# Patient Record
Sex: FEMALE | Race: WHITE | NOT HISPANIC OR LATINO | Employment: UNEMPLOYED | URBAN - METROPOLITAN AREA
[De-identification: names, ages, dates, MRNs, and addresses within clinical notes are randomized per-mention and may not be internally consistent; named-entity substitution may affect disease eponyms.]

---

## 2018-08-10 ENCOUNTER — OFFICE VISIT CONVERTED (OUTPATIENT)
Dept: FAMILY MEDICINE CLINIC | Facility: CLINIC | Age: 58
End: 2018-08-10
Attending: FAMILY MEDICINE

## 2018-08-10 ENCOUNTER — CONVERSION ENCOUNTER (OUTPATIENT)
Dept: FAMILY MEDICINE CLINIC | Facility: CLINIC | Age: 58
End: 2018-08-10

## 2018-12-31 ENCOUNTER — CONVERSION ENCOUNTER (OUTPATIENT)
Dept: FAMILY MEDICINE CLINIC | Facility: CLINIC | Age: 58
End: 2018-12-31

## 2018-12-31 ENCOUNTER — OFFICE VISIT CONVERTED (OUTPATIENT)
Dept: FAMILY MEDICINE CLINIC | Facility: CLINIC | Age: 58
End: 2018-12-31
Attending: FAMILY MEDICINE

## 2019-02-28 ENCOUNTER — OFFICE VISIT CONVERTED (OUTPATIENT)
Dept: FAMILY MEDICINE CLINIC | Facility: CLINIC | Age: 59
End: 2019-02-28
Attending: FAMILY MEDICINE

## 2019-02-28 ENCOUNTER — HOSPITAL ENCOUNTER (OUTPATIENT)
Dept: FAMILY MEDICINE CLINIC | Facility: CLINIC | Age: 59
Discharge: HOME | End: 2019-02-28
Attending: FAMILY MEDICINE

## 2019-02-28 LAB — CONV RHEUMATOID FACTOR IGM: 19 [IU]/ML (ref 0–14)

## 2019-03-01 LAB
CENTROMERE B AB SER-ACNC: <0.2 AI (ref 0–0.9)
CHROMATIN AB: <0.2 AI (ref 0–0.9)
CONV ANTI DOUBLE STRAND DNA  (REFLEX): <1 IU/ML (ref 0–9)
CONV ANTI NUCLEAR ANTIBODY WITH REFLEX: POSITIVE
CONV SS-A ANTIBODY (REFLEX): <0.2 AI (ref 0–0.9)
CONV SS-B ANTIBODY (REFLEX): <0.2 AI (ref 0–0.9)
ENA JO1 AB SER IA-ACNC: <0.2 AI (ref 0–0.9)
ENA RNP AB SER-ACNC: <0.2 AI (ref 0–0.9)
ENA SCL70 AB SER QL IA: 8 AI (ref 0–0.9)
ENA SM AB SER-ACNC: <0.2 AI (ref 0–0.9)
Lab: ABNORMAL

## 2019-03-13 ENCOUNTER — HOSPITAL ENCOUNTER (OUTPATIENT)
Dept: URGENT CARE | Facility: CLINIC | Age: 59
Discharge: HOME | End: 2019-03-13
Attending: FAMILY MEDICINE

## 2019-03-15 LAB
CONV HEPATITIS B SURFACE AG W CONFIRMATION RE: NEGATIVE
CONV HIV-1/ HIV-2: NEGATIVE
HCV AB SER DONR QL: <0.1 S/CO RATIO (ref 0–0.9)

## 2019-05-20 ENCOUNTER — CONVERSION ENCOUNTER (OUTPATIENT)
Dept: FAMILY MEDICINE CLINIC | Facility: CLINIC | Age: 59
End: 2019-05-20

## 2019-05-20 ENCOUNTER — OFFICE VISIT CONVERTED (OUTPATIENT)
Dept: FAMILY MEDICINE CLINIC | Facility: CLINIC | Age: 59
End: 2019-05-20
Attending: NURSE PRACTITIONER

## 2019-12-20 ENCOUNTER — OFFICE VISIT CONVERTED (OUTPATIENT)
Dept: FAMILY MEDICINE CLINIC | Facility: CLINIC | Age: 59
End: 2019-12-20
Attending: FAMILY MEDICINE

## 2020-02-13 ENCOUNTER — OFFICE VISIT CONVERTED (OUTPATIENT)
Dept: FAMILY MEDICINE CLINIC | Facility: CLINIC | Age: 60
End: 2020-02-13
Attending: FAMILY MEDICINE

## 2020-04-20 ENCOUNTER — TELEPHONE CONVERTED (OUTPATIENT)
Dept: FAMILY MEDICINE CLINIC | Facility: CLINIC | Age: 60
End: 2020-04-20
Attending: FAMILY MEDICINE

## 2020-04-24 ENCOUNTER — CONVERSION ENCOUNTER (OUTPATIENT)
Dept: FAMILY MEDICINE CLINIC | Facility: CLINIC | Age: 60
End: 2020-04-24

## 2020-10-09 ENCOUNTER — HOSPITAL ENCOUNTER (OUTPATIENT)
Dept: FAMILY MEDICINE CLINIC | Facility: CLINIC | Age: 60
Discharge: HOME | End: 2020-10-09
Attending: FAMILY MEDICINE

## 2020-10-09 ENCOUNTER — OFFICE VISIT CONVERTED (OUTPATIENT)
Dept: FAMILY MEDICINE CLINIC | Facility: CLINIC | Age: 60
End: 2020-10-09
Attending: FAMILY MEDICINE

## 2020-10-09 ENCOUNTER — CONVERSION ENCOUNTER (OUTPATIENT)
Dept: FAMILY MEDICINE CLINIC | Facility: CLINIC | Age: 60
End: 2020-10-09

## 2020-10-09 LAB
ALBUMIN SERPL-MCNC: 4.4 G/DL (ref 3.5–5)
ALBUMIN/GLOB SERPL: 1.2 {RATIO} (ref 1.4–2.6)
ALP SERPL-CCNC: 116 U/L (ref 53–141)
ALT SERPL-CCNC: 14 U/L (ref 10–40)
ANION GAP SERPL CALC-SCNC: 14 MMOL/L (ref 8–19)
AST SERPL-CCNC: 19 U/L (ref 15–50)
BILIRUB SERPL-MCNC: 0.65 MG/DL (ref 0.2–1.3)
BUN SERPL-MCNC: 13 MG/DL (ref 5–25)
BUN/CREAT SERPL: 17 {RATIO} (ref 6–20)
CALCIUM SERPL-MCNC: 9.8 MG/DL (ref 8.7–10.4)
CHLORIDE SERPL-SCNC: 100 MMOL/L (ref 99–111)
CONV CO2: 28 MMOL/L (ref 22–32)
CONV TOTAL PROTEIN: 8 G/DL (ref 6.3–8.2)
CREAT UR-MCNC: 0.78 MG/DL (ref 0.5–0.9)
GFR SERPLBLD BASED ON 1.73 SQ M-ARVRAT: >60 ML/MIN/{1.73_M2}
GLOBULIN UR ELPH-MCNC: 3.6 G/DL (ref 2–3.5)
GLUCOSE SERPL-MCNC: 147 MG/DL (ref 65–99)
OSMOLALITY SERPL CALC.SUM OF ELEC: 289 MOSM/KG (ref 273–304)
POTASSIUM SERPL-SCNC: 3.9 MMOL/L (ref 3.5–5.3)
SODIUM SERPL-SCNC: 138 MMOL/L (ref 135–147)

## 2021-01-12 ENCOUNTER — OFFICE VISIT CONVERTED (OUTPATIENT)
Dept: FAMILY MEDICINE CLINIC | Facility: CLINIC | Age: 61
End: 2021-01-12
Attending: FAMILY MEDICINE

## 2021-01-12 ENCOUNTER — HOSPITAL ENCOUNTER (OUTPATIENT)
Dept: FAMILY MEDICINE CLINIC | Facility: CLINIC | Age: 61
Discharge: HOME | End: 2021-01-12
Attending: FAMILY MEDICINE

## 2021-01-12 LAB
EST. AVERAGE GLUCOSE BLD GHB EST-MCNC: 160 MG/DL
HBA1C MFR BLD: 7.2 % (ref 3.5–5.7)

## 2021-01-14 LAB — SARS-COV-2 RNA SPEC QL NAA+PROBE: DETECTED

## 2021-03-11 ENCOUNTER — CONVERSION ENCOUNTER (OUTPATIENT)
Dept: FAMILY MEDICINE CLINIC | Facility: CLINIC | Age: 61
End: 2021-03-11

## 2021-03-11 ENCOUNTER — OFFICE VISIT CONVERTED (OUTPATIENT)
Dept: FAMILY MEDICINE CLINIC | Facility: CLINIC | Age: 61
End: 2021-03-11
Attending: FAMILY MEDICINE

## 2021-03-11 ENCOUNTER — HOSPITAL ENCOUNTER (OUTPATIENT)
Dept: FAMILY MEDICINE CLINIC | Facility: CLINIC | Age: 61
Discharge: HOME | End: 2021-03-11
Attending: FAMILY MEDICINE

## 2021-03-11 LAB
ALBUMIN SERPL-MCNC: 4.5 G/DL (ref 3.5–5)
ALBUMIN/GLOB SERPL: 1.5 {RATIO} (ref 1.4–2.6)
ALP SERPL-CCNC: 106 U/L (ref 53–141)
ALT SERPL-CCNC: 18 U/L (ref 10–40)
ANION GAP SERPL CALC-SCNC: 16 MMOL/L (ref 8–19)
AST SERPL-CCNC: 24 U/L (ref 15–50)
BILIRUB SERPL-MCNC: 0.25 MG/DL (ref 0.2–1.3)
BILIRUB UR QL STRIP: NEGATIVE
BUN SERPL-MCNC: 11 MG/DL (ref 5–25)
BUN/CREAT SERPL: 15 {RATIO} (ref 6–20)
CALCIUM SERPL-MCNC: 9.9 MG/DL (ref 8.7–10.4)
CHLORIDE SERPL-SCNC: 99 MMOL/L (ref 99–111)
COLOR UR: YELLOW
CONV CLARITY OF URINE: CLEAR
CONV CO2: 30 MMOL/L (ref 22–32)
CONV PROTEIN IN URINE BY AUTOMATED TEST STRIP: NORMAL
CONV TOTAL PROTEIN: 7.6 G/DL (ref 6.3–8.2)
CONV UROBILINOGEN IN URINE BY AUTOMATED TEST STRIP: NORMAL
CREAT UR-MCNC: 0.71 MG/DL (ref 0.5–0.9)
GFR SERPLBLD BASED ON 1.73 SQ M-ARVRAT: >60 ML/MIN/{1.73_M2}
GLOBULIN UR ELPH-MCNC: 3.1 G/DL (ref 2–3.5)
GLUCOSE SERPL-MCNC: 136 MG/DL (ref 65–99)
GLUCOSE UR QL: NEGATIVE
HGB UR QL STRIP: NEGATIVE
KETONES UR QL STRIP: NEGATIVE
LEUKOCYTE ESTERASE UR QL STRIP: NEGATIVE
NITRITE UR QL STRIP: NEGATIVE
OSMOLALITY SERPL CALC.SUM OF ELEC: 293 MOSM/KG (ref 273–304)
PH UR STRIP.AUTO: 6 [PH]
POTASSIUM SERPL-SCNC: 4.1 MMOL/L (ref 3.5–5.3)
SODIUM SERPL-SCNC: 141 MMOL/L (ref 135–147)
SP GR UR: NORMAL

## 2021-03-13 LAB
AMPICILLIN SUSC ISLT: >=32
AMPICILLIN+SULBAC SUSC ISLT: >=32
BACTERIA UR CULT: ABNORMAL
CEFAZOLIN SUSC ISLT: >=64
CEFEPIME SUSC ISLT: 16
CEFTAZIDIME SUSC ISLT: >=64
CEFTRIAXONE SUSC ISLT: >=64
CIPROFLOXACIN SUSC ISLT: 0.5
ERTAPENEM SUSC ISLT: <=0.12
GENTAMICIN SUSC ISLT: <=1
LEVOFLOXACIN SUSC ISLT: 1
NITROFURANTOIN SUSC ISLT: 64
PIP+TAZO SUSC ISLT: <=4
TMP SMX SUSC ISLT: >=320
TOBRAMYCIN SUSC ISLT: <=1

## 2021-05-11 ENCOUNTER — CONVERSION ENCOUNTER (OUTPATIENT)
Dept: OTHER | Facility: HOSPITAL | Age: 61
End: 2021-05-11

## 2021-05-11 ENCOUNTER — HOSPITAL ENCOUNTER (OUTPATIENT)
Dept: FAMILY MEDICINE CLINIC | Facility: CLINIC | Age: 61
Discharge: HOME | End: 2021-05-11
Attending: FAMILY MEDICINE

## 2021-05-11 ENCOUNTER — OFFICE VISIT CONVERTED (OUTPATIENT)
Dept: FAMILY MEDICINE CLINIC | Facility: CLINIC | Age: 61
End: 2021-05-11
Attending: FAMILY MEDICINE

## 2021-05-11 LAB
ALBUMIN SERPL-MCNC: 4 G/DL (ref 3.5–5)
ALBUMIN/GLOB SERPL: 1 {RATIO} (ref 1.4–2.6)
ALP SERPL-CCNC: 126 U/L (ref 53–141)
ALT SERPL-CCNC: 13 U/L (ref 10–40)
ANION GAP SERPL CALC-SCNC: 19 MMOL/L (ref 8–19)
AST SERPL-CCNC: 20 U/L (ref 15–50)
BASOPHILS # BLD AUTO: 0.14 10*3/UL (ref 0–0.2)
BASOPHILS NFR BLD AUTO: 1.2 % (ref 0–3)
BILIRUB SERPL-MCNC: 0.22 MG/DL (ref 0.2–1.3)
BUN SERPL-MCNC: 8 MG/DL (ref 5–25)
BUN/CREAT SERPL: 11 {RATIO} (ref 6–20)
CALCIUM SERPL-MCNC: 9.5 MG/DL (ref 8.7–10.4)
CHLORIDE SERPL-SCNC: 99 MMOL/L (ref 99–111)
CONV ABS IMM GRAN: 0.04 10*3/UL (ref 0–0.2)
CONV CO2: 26 MMOL/L (ref 22–32)
CONV IMMATURE GRAN: 0.4 % (ref 0–1.8)
CONV TOTAL PROTEIN: 7.9 G/DL (ref 6.3–8.2)
CREAT UR-MCNC: 0.71 MG/DL (ref 0.5–0.9)
DEPRECATED RDW RBC AUTO: 41.4 FL (ref 36.4–46.3)
EOSINOPHIL # BLD AUTO: 0.31 10*3/UL (ref 0–0.7)
EOSINOPHIL # BLD AUTO: 2.7 % (ref 0–7)
ERYTHROCYTE [DISTWIDTH] IN BLOOD BY AUTOMATED COUNT: 13.1 % (ref 11.7–14.4)
GFR SERPLBLD BASED ON 1.73 SQ M-ARVRAT: >60 ML/MIN/{1.73_M2}
GLOBULIN UR ELPH-MCNC: 3.9 G/DL (ref 2–3.5)
GLUCOSE SERPL-MCNC: 180 MG/DL (ref 65–99)
HCT VFR BLD AUTO: 48.2 % (ref 37–47)
HGB BLD-MCNC: 14.8 G/DL (ref 12–16)
LYMPHOCYTES # BLD AUTO: 3.24 10*3/UL (ref 1–5)
LYMPHOCYTES NFR BLD AUTO: 28.5 % (ref 20–45)
MCH RBC QN AUTO: 26.7 PG (ref 27–31)
MCHC RBC AUTO-ENTMCNC: 30.7 G/DL (ref 33–37)
MCV RBC AUTO: 87 FL (ref 81–99)
MONOCYTES # BLD AUTO: 0.54 10*3/UL (ref 0.2–1.2)
MONOCYTES NFR BLD AUTO: 4.7 % (ref 3–10)
NEUTROPHILS # BLD AUTO: 7.1 10*3/UL (ref 2–8)
NEUTROPHILS NFR BLD AUTO: 62.5 % (ref 30–85)
NRBC CBCN: 0 % (ref 0–0.7)
OSMOLALITY SERPL CALC.SUM OF ELEC: 291 MOSM/KG (ref 273–304)
PLATELET # BLD AUTO: 312 10*3/UL (ref 130–400)
PMV BLD AUTO: 11 FL (ref 9.4–12.3)
POTASSIUM SERPL-SCNC: 4.5 MMOL/L (ref 3.5–5.3)
RBC # BLD AUTO: 5.54 10*6/UL (ref 4.2–5.4)
SODIUM SERPL-SCNC: 139 MMOL/L (ref 135–147)
T4 FREE SERPL-MCNC: 1.1 NG/DL (ref 0.9–1.8)
TSH SERPL-ACNC: 2.06 M[IU]/L (ref 0.27–4.2)
WBC # BLD AUTO: 11.37 10*3/UL (ref 4.8–10.8)

## 2021-05-12 LAB
EST. AVERAGE GLUCOSE BLD GHB EST-MCNC: 157 MG/DL
HBA1C MFR BLD: 7.1 % (ref 3.5–5.7)

## 2021-05-12 NOTE — PROGRESS NOTES
Quick Note      Patient Name: Desiree Garcia   Patient ID: [id number]   Sex: Female   YOB: 1960    Primary Care Provider: Sandra Kaba MD   Referring Provider: Sandra Kaba MD    Visit Date: April 20, 2020    Provider: Sandra Kaba MD   Location: [facility]   Location Address: [address] [place] [address], [address] [place] [address]  [id number]   Location Phone: [phone]          History Of Present Illness  TELEHEALTH TELEPHONE VISIT  Chief Complaint: 3 month follow up   Desiree Garcia is a 60 year old /White female who is presenting for evaluation via telehealth telephone visit. Verbal consent obtained before beginning visit.   Provider spent 25 minutes with the patient during telehealth visit.   The following staff were present during this visit: Lindsay Tang   Past Medical History/Overview of Patient Symptoms     Depressionpatient reports that her depression is not well controlled since she was not able to get the Trintellix medication.  Patient reports that she was told that her insurance would not cover without prior to position from her doctor's office.  Will be receiving a prescription to see what paperwork comes through for approval.    Shoulder cystpatient reports that she has the cyst on her shoulder that is still not treated and with the antibiotic it did help medicine but she needed to have antibiotic for longer.  Patient will be coming into the office on Friday of this week for me to assess it and I&D it if necessary.               Assessment  · Major depressive disorder     296.20/F32.9  · Sebaceous cyst     706.2/L72.3    Problems Reconciled  Plan  · Orders  o Physician Telephone Evaluation, 21-30 minutes (00462) - 706.2/L72.3, 296.20/F32.9 - 04/20/2020  · Medications  o Symbicort 160-4.5 mcg/actuation inhalation HFA aerosol inhaler   SIG: inhale 2 puffs by inhalation route 2 times per day in the morning and evening for 30 days   DISP: (2) 6 gm aer w/adap  with 0 refills  Adjusted on 04/20/2020     o lisinopril 5 mg oral tablet   SIG: take 1 tablet (5 mg) by oral route once daily for 90 days   DISP: (90) tablets with 1 refills  Refilled on 04/20/2020     o Trintellix 10 mg oral tablet   SIG: take 1 tablet (10 mg) by oral route once daily at the same time each day for 30 days   DISP: (30) tablets with 2 refills  Refilled on 04/20/2020     o venlafaxine 37.5 mg oral capsule,extended release 24hr   SIG: take 1 capsule (37.5 mg) by oral route twice daily with food for 1 week then once daily with food for 1 week.   DISP: (21) capsules with 0 refills  Discontinued on 04/20/2020     o Medications have been Reconciled  o Transition of Care or Provider Policy  · Instructions  o Plan Of Care:   o Chronic conditions reviewed and taken into consideration for today's treatment plan.  o Discussed Covid-19 precautions including, but not limited to, social distancing, avoid touching your face, and hand washing.   · Disposition  o Return Visit Request in/on 4 days +/- 0 days ([id number]).            Electronically Signed by: Sandra Kaba MD -Author on April 20, 2020 02:06:00 PM

## 2021-05-13 NOTE — PROGRESS NOTES
Progress Note      Patient Name: Desiree Garcia   Patient ID: [id number]   Sex: Female   YOB: 1960    Primary Care Provider: Sandra Kaba MD   Referring Provider: Sandra Kaba MD    Visit Date: October 9, 2020    Provider: Sandra Kaba MD   Location: [facility]   Location Address: [address] [place] [address], [address] [place] [address]  [id number]   Location Phone: [phone]          Chief Complaint  · Annual Physical Exam      History Of Present Illness  Desiree Garcia is a 60 year old /White female who presents for evaluation and treatment of:      Pt has a disability form her today for food stamps.  She reports that she is currently homeless and living on the floor of her sons place.  She is tearful and says she has called the local women shelters but a lot of them have closed down due to COVID.    COPD- pt is currently supposed to be taking Symbicort but not able to always get samples.     Depression- pt is supposed to be on Trintellix but not able to give samples today so I will be sending in her prescription.    HTN- pt reports she has not been taking her Lisinopril and her bp is well controlled today.       Past Medical History  Arthritis; Cervical cancer screening; Colon cancer screening; COPD (chronic obstructive pulmonary disease); Depression with anxiety; Forgetfulness; Gastric reflux; Leg paresthesia; Lumbago/low back pain; Lumbar Spinal Stenosis; Migraines; Night sweat; Radiculopathy, lumbosacral; Reflux; SOB (shortness of breath)         Past Surgical History  Cesarian Section; Cholecystectomy; Colonoscopy; EGD; Foot surgery, right; Hand surgery         Medication List  dicyclomine 10 mg oral capsule; lisinopril 5 mg oral tablet; Symbicort 160-4.5 mcg/actuation inhalation HFA aerosol inhaler; Trintellix 10 mg oral tablet         Allergy List  NO KNOWN DRUG ALLERGIES         Family Medical History  Stroke; Heart Disease; - No Family History of  "Colorectal Cancer         Reproductive History   6 Para 3 0 0 0       Social History  Alcohol (Former); Tobacco (Current every day)         Immunizations  Name Date Admin   Influenza 10/09/2020   Influenza Refused         Review of Systems  · Constitutional  o Denies  o : fatigue, fever, weight loss, weight gain  · Eyes  o Denies  o : eye discomfort, eye pain  · HENT  o Denies  o : vertigo, nasal congestion  · Cardiovascular  o Denies  o : chest pain, irregular heart beats  · Respiratory  o Denies  o : shortness of breath, wheezing  · Gastrointestinal  o Denies  o : nausea, vomiting  · Genitourinary  o Denies  o : frequency, dysuria  · Integument  o Denies  o : rash, itching  · Neurologic  o Denies  o : muscular weakness, memory difficulties  · Musculoskeletal  o Denies  o : joint swelling, muscle pain  · Psychiatric  o Denies  o : anxiety, depression  · Heme-Lymph  o Denies  o : lightheadedness, easy bleeding  · Allergic-Immunologic  o Denies  o : sinus allergy symptoms, allergic dermatitis      Vitals  Date Time BP Position Site L\R Cuff Size HR RR TEMP (F) WT  HT  BMI kg/m2 BSA m2 O2 Sat FR L/min FiO2 HC       10/09/2020 03:21 /76 Sitting    82 - R  97.5 169lbs 2oz 5'  6\" 27.3 1.89 94 %            Physical Examination  · Constitutional  o Appearance  o : well-nourished, in no acute distress  · Eyes  o Conjunctivae  o : conjunctivae normal  o Sclerae  o : sclerae white  o Pupils and Irises  o : pupils equal, round, and reactive to light and accommodation bilaterally  o Eyelids/Ocular Adnexae  o : eyelid appearance normal, no exudates present  · Ears, Nose, Mouth and Throat  o Ears  o :   § External Ears  § : external auditory canal appearance within normal limits, no discharge present  § Otoscopic Examination  § : tympanic membrane appearance within normal limits bilaterally  o Nose  o :   § External Nose  § : appearance normal  § Nasopharynx  § : no discharge present  o Oral Cavity  o :   § Oral " Mucosa  § : oral mucosa normal  § Lips  § : lip appearance normal  o Throat  o :   § Oropharynx  § : no inflammation or lesions present, tonsils within normal limits  · Neck  o Inspection/Palpation  o : normal appearance, no masses or tenderness, trachea midline  o Range of Motion  o : cervical range of motion within normal limits  o Thyroid  o : gland size normal, nontender, no nodules or masses present on palpation  o Jugular Veins  o : JVP normal  · Respiratory  o Respiratory Effort  o : breathing unlabored  o Inspection of Chest  o : normal appearance  o Auscultation of Lungs  o : normal breath sounds throughout  · Cardiovascular  o Heart  o :   § Auscultation of Heart  § : regular rate and rhythm, no murmurs, gallops or rubs  o Peripheral Vascular System  o :   § Extremities  § : no edema  · Gastrointestinal  o Abdominal Examination  o : abdomen nontender to palpation, tone normal without rigidity or guarding, no masses present, bowel sounds present in all four quadrants  o Liver and spleen  o : no hepatomegaly present, liver nontender to palpation, spleen not palpable  o Hernias  o : no hernias present  · Lymphatic  o Neck  o : no lymphadenopathy present  · Musculoskeletal  o Spine  o :   § Inspection/Palpation  § : no spinal tenderness, scoliosis or kyphosis present  § Stability  § : no subluxations present  § Range of Motion  § : spine range of motion normal  o Right Upper Extremity  o :   § Inspection/Palpation  § : no tenderness to palpation, ROM normal  o Left Upper Extremity  o :   § Inspection/Palpation  § : no tenderness to palpation, ROM normal  o Right Lower Extremity  o :   § Inspection/Palpation  § : no joint or limb tenderness to palpation, ROM normal  o Left Lower Extremity  o :   § Inspection/Palpation  § : no joint or limb tenderness to palpation, ROM normal  · Skin and Subcutaneous Tissue  o General Inspection  o : no rashes or lesions present, no areas of discoloration  o Body Hair  o : scalp  palpation normal, hair normal for age, general body hair distribution normal for age  o Digits and Nails  o : no clubbing, cyanosis, deformities or edema present, normal appearing nails  · Neurologic  o Mental Status Examination  o :   § Orientation  § : grossly oriented to person, place and time  o Gait and Station  o : normal gait, able to stand without difficulty  · Psychiatric  o Judgement and Insight  o : judgment and insight intact  o Mood and Affect  o : mood normal, affect appropriate          Assessment  · COPD (chronic obstructive pulmonary disease)     496/J44.9  · Essential hypertension     401.9/I10  · Polyarthralgia     719.49/M25.50  · Need for influenza vaccination     V04.81/Z23      Plan  · Orders  o Fluarix QUADRIVALENT Vaccine, Syringe, Latex Free, Preservative Free, age 3+ (46784) - V04.81/Z23 - 10/09/2020   Vaccine - Influenza; Dose: 0.5; Site: Left Deltoid; Route: Intramuscular; Date: 10/09/2020 15:23:00; Exp: 06/30/2021; Lot: [id number]; Mfg: [employer]annah; TradeName: Fluarix, quadrivalent, preservative free; Administered By: Citlaly Scanlon MA; Comment: NDC: 06332-887-33 patient tolerated well  o CMP [facility] (04671) - 719.49/M25.50 - 10/09/2020  o ACO-39: Current medications updated and reviewed (1159F, ) - - 10/09/2020  o ACO-14: Influenza immunization administered or previously received [facility] () - - 10/09/2020  · Medications  o Symbicort 160-4.5 mcg/actuation inhalation HFA aerosol inhaler   SIG: inhale 2 puffs by inhalation route 2 times per day in the morning and evening for 30 days   DISP: (1) Inhaler with 11 refills  Adjusted on 10/09/2020     o Trintellix 10 mg oral tablet   SIG: take 1 tablet (10 mg) by oral route once daily at the same time each day for 30 days   DISP: (30) Tablet with 11 refills  Adjusted on 10/09/2020     o lisinopril 5 mg oral tablet   SIG: take 1 tablet (5 mg) by oral route once daily for 90 days   DISP: (90) tablets with 1 refills  Discontinued on 10/09/2020      o Medications have been Reconciled  o Transition of Care or Provider Policy  · Instructions  o Patient was educated/instructed on their diagnosis, treatment and medications prior to discharge from the clinic today.  o Minutes spent with patient including greater than 50% in Education/Counseling/Care Coordination.  o Time spent with the patient was minutes, more than 50% face to face. 25 minutes  · Disposition  o Return Visit Request in/on 3 months +/- 0 days ([id number]).            Electronically Signed by: Sandra Kaba MD -Author on October 9, 2020 04:13:18 PM

## 2021-05-14 VITALS
TEMPERATURE: 99.5 F | HEART RATE: 91 BPM | BODY MASS INDEX: 30.58 KG/M2 | HEIGHT: 66 IN | SYSTOLIC BLOOD PRESSURE: 138 MMHG | OXYGEN SATURATION: 91 % | DIASTOLIC BLOOD PRESSURE: 74 MMHG | RESPIRATION RATE: 24 BRPM | WEIGHT: 190.25 LBS

## 2021-05-14 VITALS
RESPIRATION RATE: 14 BRPM | HEART RATE: 91 BPM | WEIGHT: 182 LBS | HEIGHT: 66 IN | SYSTOLIC BLOOD PRESSURE: 144 MMHG | OXYGEN SATURATION: 98 % | DIASTOLIC BLOOD PRESSURE: 78 MMHG | TEMPERATURE: 98 F | BODY MASS INDEX: 29.25 KG/M2

## 2021-05-14 VITALS
BODY MASS INDEX: 27.18 KG/M2 | OXYGEN SATURATION: 94 % | DIASTOLIC BLOOD PRESSURE: 76 MMHG | HEIGHT: 66 IN | SYSTOLIC BLOOD PRESSURE: 136 MMHG | HEART RATE: 82 BPM | TEMPERATURE: 97.5 F | WEIGHT: 169.12 LBS
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
## 2021-05-14 NOTE — PROGRESS NOTES
Progress Note      Patient Name: Desiree Garcia   Patient ID: [id number]   Sex: Female   YOB: 1960    Primary Care Provider: Sandra Kaba MD   Referring Provider: Sandra Kaba MD    Visit Date: March 11, 2021    Provider: Sandra Kaba MD   Location: [facility]   Location Address: [address] [place] [address], [address] [place] [address]  [id number]   Location Phone: [phone]          Chief Complaint  · Follow Up      History Of Present Illness  Desiree Garcia is a 61 year old /White female who presents for evaluation and treatment of:      Pt reports she has an apartment that [employer] [place] and has rent free for 3 month then gradually increase her part to pay.  Pt needs to get a job.    Pt has left arm pain from the shoulder all the way down the arm.  Pt most likely has a flare up of her arthritis since she has some pain with range of motion.  Pt also reports a lot of back pain and leg pain which will get worse since she is going to have to walk to work before she is able to save up enough money to get a car.     DM type 2- pt was a newly diagnosed Diabetic at our last visit but she was also living in a homeless shelter.  Now patient is in an apartment and able to control her meals.   She is taking the Metformin 500mg twice a day.       Past Medical History  Arthritis; Cervical cancer screening; Colon cancer screening; COPD (chronic obstructive pulmonary disease); Depression with anxiety; Forgetfulness; Gastric reflux; Leg paresthesia; Lumbago/low back pain; Lumbar Spinal Stenosis; Migraines; Night sweat; Radiculopathy, lumbosacral; Reflux; SOB (shortness of breath)         Past Surgical History  Cesarian Section; Cholecystectomy; Colonoscopy; EGD; Foot surgery, right; Hand surgery         Medication List  dicyclomine 10 mg oral capsule; FreeStyle Lancets 28 gauge miscellaneous misc; FreeStyle Lite Meter miscellaneous kit; FreeStyle Lite Strips  "miscellaneous strip; metformin 500 mg oral tablet; Symbicort 160-4.5 mcg/actuation inhalation HFA aerosol inhaler         Allergy List  NO KNOWN DRUG ALLERGIES         Family Medical History  Stroke; Heart Disease; - No Family History of Colorectal Cancer         Reproductive History   6 Para 3 0 0 0       Social History  Alcohol (Former); Tobacco (Current every day)         Immunizations  Name Date Admin   Influenza 10/09/2020   Influenza Refused         Review of Systems  · Constitutional  o Denies  o : fatigue, fever, weight loss, weight gain  · Eyes  o Denies  o : eye discomfort, eye pain  · HENT  o Denies  o : vertigo, nasal congestion  · Cardiovascular  o Denies  o : chest pain, irregular heart beats  · Respiratory  o Denies  o : shortness of breath, wheezing  · Gastrointestinal  o Denies  o : nausea, vomiting  · Genitourinary  o Denies  o : frequency, dysuria  · Integument  o Denies  o : rash, itching  · Neurologic  o Denies  o : muscular weakness, memory difficulties  · Musculoskeletal  o Denies  o : joint swelling, muscle pain  · Psychiatric  o Denies  o : anxiety, depression  · Heme-Lymph  o Denies  o : lightheadedness, easy bleeding  · Allergic-Immunologic  o Denies  o : sinus allergy symptoms, allergic dermatitis      Vitals  Date Time BP Position Site L\R Cuff Size HR RR TEMP (F) WT  HT  BMI kg/m2 BSA m2 O2 Sat FR L/min FiO2 HC       2021 01:06 /78 Sitting    91 - R 14 98 182lbs 0oz 5'  6\" 29.38 1.96 98 %            Physical Examination  · Constitutional  o Appearance  o : well-nourished, in no acute distress  · Eyes  o Conjunctivae  o : conjunctivae normal  o Sclerae  o : sclerae white  o Pupils and Irises  o : pupils equal, round, and reactive to light and accommodation bilaterally  o Eyelids/Ocular Adnexae  o : eyelid appearance normal, no exudates present  · Ears, Nose, Mouth and Throat  o Ears  o :   § External Ears  § : external auditory canal appearance within normal limits, no " discharge present  § Otoscopic Examination  § : tympanic membrane appearance within normal limits bilaterally  o Nose  o :   § External Nose  § : appearance normal  § Nasopharynx  § : no discharge present  o Oral Cavity  o :   § Oral Mucosa  § : oral mucosa normal  § Lips  § : lip appearance normal  o Throat  o :   § Oropharynx  § : no inflammation or lesions present, tonsils within normal limits  · Neck  o Inspection/Palpation  o : normal appearance, no masses or tenderness, trachea midline  o Range of Motion  o : cervical range of motion within normal limits  o Thyroid  o : gland size normal, nontender, no nodules or masses present on palpation  o Jugular Veins  o : JVP normal  · Respiratory  o Respiratory Effort  o : breathing unlabored  o Inspection of Chest  o : normal appearance  o Auscultation of Lungs  o : normal breath sounds throughout  · Cardiovascular  o Heart  o :   § Auscultation of Heart  § : regular rate and rhythm, no murmurs, gallops or rubs  o Peripheral Vascular System  o :   § Extremities  § : no edema  · Gastrointestinal  o Abdominal Examination  o : abdomen nontender to palpation, tone normal without rigidity or guarding, no masses present, bowel sounds present in all four quadrants  o Liver and spleen  o : no hepatomegaly present, liver nontender to palpation, spleen not palpable  o Hernias  o : no hernias present  · Lymphatic  o Neck  o : no lymphadenopathy present  · Musculoskeletal  o Spine  o :   § Inspection/Palpation  § : no spinal tenderness, scoliosis or kyphosis present  § Stability  § : no subluxations present  § Range of Motion  § : spine range of motion normal  o Right Upper Extremity  o :   § Inspection/Palpation  § : no tenderness to palpation, ROM normal  o Left Upper Extremity  o :   § Inspection/Palpation  § : no tenderness to palpation, ROM normal  o Right Lower Extremity  o :   § Inspection/Palpation  § : no joint or limb tenderness to palpation, ROM normal  o Left Lower  Extremity  o :   § Inspection/Palpation  § : no joint or limb tenderness to palpation, ROM normal  · Skin and Subcutaneous Tissue  o General Inspection  o : no rashes or lesions present, no areas of discoloration  o Body Hair  o : scalp palpation normal, hair normal for age, general body hair distribution normal for age  o Digits and Nails  o : no clubbing, cyanosis, deformities or edema present, normal appearing nails  · Neurologic  o Mental Status Examination  o :   § Orientation  § : grossly oriented to person, place and time  o Gait and Station  o : normal gait, able to stand without difficulty  · Psychiatric  o Judgement and Insight  o : judgment and insight intact  o Mood and Affect  o : mood normal, affect appropriate          Results  · In-Office Procedures  o Lab procedure  § IOP - Urinalysis without Microscopy (Clinitek) [facility] (82573)   § Color Ur: Yellow   § Clarity Ur: Clear   § Glucose Ur Ql Strip: Negative   § Bilirub Ur Ql Strip: Negative   § Ketones Ur Ql Strip: Negative   § Sp Gr Ur Qn: greater than 1.030   § Hgb Ur Ql Strip: Negative   § pH Ur-LsCnc: 6.0   § Prot Ur Ql Strip: Trace   § Urobilinogen Ur Strip-mCnc: 0.2 E.U./dL   § Nitrite Ur Ql Strip: Negative   § WBC Est Ur Ql Strip: Negative       Assessment  · Diabetes mellitus, type 2     250.00/E11.9  · Osteoarthritis     715.90/M19.90  · Screening for depression     V79.0/Z13.89  · Increased urinary frequency     788.41/R35.0  · Shoulder pain, left     719.41/M25.512      Plan  · Orders  o [facility] ([id number]) - 250.00/E11.9 - 03/11/2021  o ACO-18: Positive screen for clinical depression using a standardized tool and a follow-up plan documented () - V79.0/Z13.89 - 03/11/2021  o IM - Injection Fee [facility] (89797) - - 03/11/2021  o ACO-39: Current medications updated and reviewed (, 1159F) - - 03/11/2021  o 4.00 - Toradol Injection 60mg (-6) - 719.41/M25.512 - 03/11/2021   Injection - Toradol 60 mg; Dose: 60 mg; Site: Left Hip; Route:  intramuscular; Date: 03/11/2021 17:10:04; Exp: 02/01/2022; Lot: -DK; Mfg: N/A; TradeName: ketorolac; Location: [facility]; Administered By: Lindsay Tang MA; Comment: tolerated well  o Urine Culture (Clean Catch) [facility] ([id number]) - 250.00/E11.9, 788.41/R35.0 - 03/11/2021  · Medications  o tramadol 50 mg oral tablet   SIG: take 1 tablet (50 mg) by oral route every 4-6 hours as needed   DISP: (60) Tablet with 1 refills  Prescribed on 03/11/2021     o Medications have been Reconciled  o Transition of Care or Provider Policy  · Instructions  o Depression Screen completed and scanned into the EMR under the designated folder within the patient's documents.  o Today's PHQ-9 result is 15___  o Patient was educated/instructed on their diagnosis, treatment and medications prior to discharge from the clinic today.  o Minutes spent with patient including greater than 50% in Education/Counseling/Care Coordination.  o Time spent with the patient was minutes, more than 50% face to face. 25 minutes  · Disposition  o f/u 1 month            Electronically Signed by: Sandra Kaba MD -Author on March 13, 2021 05:56:51 PM

## 2021-05-15 VITALS
TEMPERATURE: 98 F | HEIGHT: 66 IN | RESPIRATION RATE: 16 BRPM | WEIGHT: 173.12 LBS | BODY MASS INDEX: 27.82 KG/M2 | DIASTOLIC BLOOD PRESSURE: 82 MMHG | HEART RATE: 89 BPM | OXYGEN SATURATION: 95 % | SYSTOLIC BLOOD PRESSURE: 138 MMHG

## 2021-05-15 VITALS
DIASTOLIC BLOOD PRESSURE: 88 MMHG | SYSTOLIC BLOOD PRESSURE: 154 MMHG | HEART RATE: 86 BPM | TEMPERATURE: 97.7 F | OXYGEN SATURATION: 95 % | HEIGHT: 66 IN | WEIGHT: 170 LBS | BODY MASS INDEX: 27.32 KG/M2

## 2021-05-15 VITALS
SYSTOLIC BLOOD PRESSURE: 170 MMHG | HEIGHT: 66 IN | OXYGEN SATURATION: 96 % | HEART RATE: 100 BPM | WEIGHT: 176 LBS | BODY MASS INDEX: 28.28 KG/M2 | TEMPERATURE: 97.5 F | DIASTOLIC BLOOD PRESSURE: 82 MMHG | RESPIRATION RATE: 18 BRPM

## 2021-05-15 VITALS
HEART RATE: 84 BPM | RESPIRATION RATE: 16 BRPM | DIASTOLIC BLOOD PRESSURE: 84 MMHG | HEIGHT: 66 IN | TEMPERATURE: 98.1 F | WEIGHT: 168.37 LBS | OXYGEN SATURATION: 94 % | SYSTOLIC BLOOD PRESSURE: 136 MMHG | BODY MASS INDEX: 27.06 KG/M2

## 2021-05-16 VITALS
DIASTOLIC BLOOD PRESSURE: 82 MMHG | TEMPERATURE: 98.2 F | HEART RATE: 104 BPM | BODY MASS INDEX: 29.45 KG/M2 | SYSTOLIC BLOOD PRESSURE: 170 MMHG | HEIGHT: 66 IN | WEIGHT: 183.25 LBS | OXYGEN SATURATION: 96 %

## 2021-05-16 VITALS
RESPIRATION RATE: 20 BRPM | HEART RATE: 87 BPM | TEMPERATURE: 98.1 F | HEIGHT: 66 IN | OXYGEN SATURATION: 97 % | SYSTOLIC BLOOD PRESSURE: 140 MMHG | WEIGHT: 179.5 LBS | DIASTOLIC BLOOD PRESSURE: 82 MMHG | BODY MASS INDEX: 28.85 KG/M2

## 2021-05-16 VITALS
OXYGEN SATURATION: 94 % | TEMPERATURE: 98.2 F | WEIGHT: 180.5 LBS | DIASTOLIC BLOOD PRESSURE: 82 MMHG | HEIGHT: 66 IN | HEART RATE: 97 BPM | SYSTOLIC BLOOD PRESSURE: 170 MMHG | BODY MASS INDEX: 29.01 KG/M2

## 2021-06-03 ENCOUNTER — TRANSCRIBE ORDERS (OUTPATIENT)
Dept: ADMINISTRATIVE | Facility: HOSPITAL | Age: 61
End: 2021-06-03

## 2021-06-03 ENCOUNTER — CONVERSION ENCOUNTER (OUTPATIENT)
Dept: ORTHOPEDIC SURGERY | Facility: CLINIC | Age: 61
End: 2021-06-03

## 2021-06-03 ENCOUNTER — OFFICE VISIT CONVERTED (OUTPATIENT)
Dept: ORTHOPEDIC SURGERY | Facility: CLINIC | Age: 61
End: 2021-06-03
Attending: ORTHOPAEDIC SURGERY

## 2021-06-03 DIAGNOSIS — M77.8 LEFT SHOULDER TENDONITIS: Primary | ICD-10-CM

## 2021-06-05 NOTE — PROGRESS NOTES
Progress Note      Patient Name: Desiree Garcia   Patient ID: [id number]   Sex: Female   YOB: 1960    Primary Care Provider: Sandra Kaba MD   Referring Provider: Sandra Kaba MD    Visit Date: May 11, 2021    Provider: Sandra Kaba MD   Location: [facility]   Location Address: [address] [place] [address], [address]  [id number]   Location Phone: [phone]          Chief Complaint  · Diabetes Mellitus follow up      History Of Present Illness  Desiree Garcia is a 61 year old /White female who presents for evaluation and treatment of:      Lower Extremity Edema- Pt reports her right leg swelled up bad and hurts all the way down her feet.    DM type 2- pt last A1C was 7.2.  Pt reports she has been compliant with taking her metformin.     Pt reports she will be going up for exam and evaluation for disability.    She is currently working with a company to find her an apartment because she was previously homeless.    Left Shoulder Pain- pt reports she has some way injured her left shoulder and has a difficult time raising her arm above her head.  I worry about a possible rotator cuff injury.       Past Medical History  Arthritis; Cervical cancer screening; Colon cancer screening; COPD (chronic obstructive pulmonary disease); Depression with anxiety; Forgetfulness; Gastric reflux; Leg paresthesia; Lumbago/low back pain; Lumbar spinal stenosis; Migraines; Night sweat; Radiculopathy, lumbosacral; Reflux; SOB (shortness of breath)         Past Surgical History  Cesarian Section; Cholecystectomy; Colonoscopy; EGD; Foot surgery, right; Hand surgery         Medication List  dicyclomine 10 mg oral capsule; FreeStyle Lancets 28 gauge miscellaneous misc; FreeStyle Lite Meter miscellaneous kit; FreeStyle Lite Strips miscellaneous strip; metformin 500 mg oral tablet; Symbicort 160-4.5 mcg/actuation inhalation HFA aerosol inhaler; tramadol 50 mg oral tablet  "        Allergy List  NO KNOWN DRUG ALLERGIES       Allergies Reconciled  Family Medical History  Stroke; Heart Disease; - No Family History of Colorectal Cancer         Reproductive History   6 Para 3 0 0 0       Social History  Alcohol (Former); Tobacco (Current every day)         Immunizations  Name Date Admin   Influenza 10/09/2020   Influenza Refused         Review of Systems  · Constitutional  o Denies  o : fatigue, fever, weight loss, weight gain  · Eyes  o Denies  o : eye discomfort, eye pain  · HENT  o Denies  o : vertigo, nasal congestion  · Cardiovascular  o Admits  o : lower extremity edema  o Denies  o : chest pain  · Respiratory  o Denies  o : shortness of breath, wheezing  · Gastrointestinal  o Denies  o : nausea, vomiting  · Genitourinary  o Denies  o : frequency, dysuria  · Integument  o Denies  o : rash, itching  · Neurologic  o Denies  o : muscular weakness, memory difficulties  · Musculoskeletal  o Admits  o : shoulder pain  o Denies  o : joint swelling, muscle pain  · Psychiatric  o Denies  o : anxiety, depression  · Heme-Lymph  o Denies  o : lightheadedness, easy bleeding  · Allergic-Immunologic  o Denies  o : sinus allergy symptoms, allergic dermatitis      Vitals  Date Time BP Position Site L\R Cuff Size HR RR TEMP (F) WT  HT  BMI kg/m2 BSA m2 O2 Sat FR L/min FiO2 HC       2021 01:24 /70 Sitting    91 - R  98.2 179lbs 0oz 5'  6\" 28.89 1.94 95 %            Physical Examination  · Constitutional  o Appearance  o : well-nourished, in no acute distress  · Eyes  o Conjunctivae  o : conjunctivae normal  o Sclerae  o : sclerae white  o Pupils and Irises  o : pupils equal, round, and reactive to light and accommodation bilaterally  o Eyelids/Ocular Adnexae  o : eyelid appearance normal, no exudates present  · Ears, Nose, Mouth and Throat  o Ears  o :   § External Ears  § : external auditory canal appearance within normal limits, no discharge present  § Otoscopic Examination  § : " tympanic membrane appearance within normal limits bilaterally  o Nose  o :   § External Nose  § : appearance normal  § Nasopharynx  § : no discharge present  o Oral Cavity  o :   § Oral Mucosa  § : oral mucosa normal  § Lips  § : lip appearance normal  o Throat  o :   § Oropharynx  § : no inflammation or lesions present, tonsils within normal limits  · Neck  o Inspection/Palpation  o : normal appearance, no masses or tenderness, trachea midline  o Range of Motion  o : cervical range of motion within normal limits  o Thyroid  o : gland size normal, nontender, no nodules or masses present on palpation  o Jugular Veins  o : JVP normal  · Respiratory  o Respiratory Effort  o : breathing unlabored  o Inspection of Chest  o : normal appearance  o Auscultation of Lungs  o : normal breath sounds throughout  · Cardiovascular  o Heart  o :   § Auscultation of Heart  § : regular rate and rhythm, no murmurs, gallops or rubs  o Peripheral Vascular System  o :   § Extremities  § : no edema  · Gastrointestinal  o Abdominal Examination  o : abdomen nontender to palpation, tone normal without rigidity or guarding, no masses present, bowel sounds present in all four quadrants  o Liver and spleen  o : no hepatomegaly present, liver nontender to palpation, spleen not palpable  o Hernias  o : no hernias present  · Lymphatic  o Neck  o : no lymphadenopathy present  · Musculoskeletal  o Spine  o :   § Inspection/Palpation  § : no spinal tenderness, scoliosis or kyphosis present  § Stability  § : no subluxations present  § Range of Motion  § : spine range of motion normal  o Right Upper Extremity  o :   § Inspection/Palpation  § : no tenderness to palpation, ROM normal  o Left Upper Extremity  o :   § Inspection/Palpation  § : tenderness to palpation present   o Right Lower Extremity  o :   § Inspection/Palpation  § : no joint or limb tenderness to palpation, ROM normal  o Left Lower Extremity  o :   § Inspection/Palpation  § : no joint or  limb tenderness to palpation, ROM normal  · Skin and Subcutaneous Tissue  o General Inspection  o : no rashes or lesions present, no areas of discoloration  o Body Hair  o : scalp palpation normal, hair normal for age, general body hair distribution normal for age  o Digits and Nails  o : no clubbing, cyanosis, deformities or edema present, normal appearing nails  · Neurologic  o Mental Status Examination  o :   § Orientation  § : grossly oriented to person, place and time  o Gait and Station  o : normal gait, able to stand without difficulty  · Psychiatric  o Judgement and Insight  o : judgment and insight intact  o Mood and Affect  o : mood normal, affect appropriate          Assessment  · Diabetes mellitus, type 2     250.00/E11.9  · Fatigue     780.79/R53.83  · Shoulder pain, left     719.41/M25.512      Plan  · Orders  o CBC with Auto Diff [facility] ([id number]) - 250.00/E11.9, 780.79/R53.83 - 05/11/2021  o CMP [facility] (45401) - 250.00/E11.9 - 05/11/2021  o Hgb A1c [facility] (27827) - 250.00/E11.9 - 05/11/2021  o Thyroid Profile (THYII, 91977, 27910) - 250.00/E11.9 - 05/11/2021  o ACO-39: Current medications updated and reviewed (1159F, ) - - 05/11/2021  o ORTHOPEDIC CONSULTATION (ORTHO) - 719.41/M25.512 - 05/11/2021  · Medications  o tramadol 50 mg oral tablet   SIG: take 1 tablet (50 mg) by oral route every 4-6 hours as needed   DISP: (60) Tablet with 1 refills  Refilled on 05/11/2021     o Medications have been Reconciled  o Transition of Care or Provider Policy  · Instructions  o Patient was educated/instructed on their diagnosis, treatment and medications prior to discharge from the clinic today.  o Minutes spent with patient including greater than 50% in Education/Counseling/Care Coordination.  o Time spent with the patient was minutes, more than 50% face to face. 25 minutes  · Disposition  o Return Visit Request in/on 3 months +/- 0 days ([id number]).            Electronically Signed by: Sandra Kaba MD -Author on June  2, 2021 02:48:12 PM

## 2021-06-14 ENCOUNTER — HOSPITAL ENCOUNTER (INPATIENT)
Facility: HOSPITAL | Age: 61
LOS: 3 days | Discharge: HOME | End: 2021-06-17
Attending: EMERGENCY MEDICINE | Admitting: INTERNAL MEDICINE

## 2021-06-14 ENCOUNTER — APPOINTMENT (OUTPATIENT)
Dept: GENERAL RADIOLOGY | Facility: HOSPITAL | Age: 61
End: 2021-06-14

## 2021-06-14 DIAGNOSIS — I21.11 STEMI INVOLVING RIGHT CORONARY ARTERY (HCC): ICD-10-CM

## 2021-06-14 DIAGNOSIS — E11.9 TYPE 2 DIABETES MELLITUS WITHOUT COMPLICATION, WITHOUT LONG-TERM CURRENT USE OF INSULIN (HCC): ICD-10-CM

## 2021-06-14 DIAGNOSIS — I20.0 UNSTABLE ANGINA PECTORIS (HCC): ICD-10-CM

## 2021-06-14 DIAGNOSIS — I21.3 ST ELEVATION MYOCARDIAL INFARCTION (STEMI), UNSPECIFIED ARTERY (HCC): Primary | ICD-10-CM

## 2021-06-14 LAB
ALBUMIN SERPL-MCNC: 4.1 G/DL (ref 3.5–5.2)
ALBUMIN/GLOB SERPL: 1.3 G/DL
ALP SERPL-CCNC: 135 U/L (ref 39–117)
ALT SERPL W P-5'-P-CCNC: 17 U/L (ref 1–33)
ANION GAP SERPL CALCULATED.3IONS-SCNC: 11.5 MMOL/L (ref 5–15)
ANION GAP SERPL CALCULATED.3IONS-SCNC: 13 MMOL/L (ref 5–15)
AST SERPL-CCNC: 27 U/L (ref 1–32)
BASOPHILS # BLD AUTO: 0.15 10*3/MM3 (ref 0–0.2)
BASOPHILS NFR BLD AUTO: 0.8 % (ref 0–1.5)
BILIRUB SERPL-MCNC: 0.4 MG/DL (ref 0–1.2)
BUN SERPL-MCNC: 14 MG/DL (ref 8–23)
BUN SERPL-MCNC: 16 MG/DL (ref 8–23)
BUN/CREAT SERPL: 22.6 (ref 7–25)
BUN/CREAT SERPL: 23.2 (ref 7–25)
CALCIUM SPEC-SCNC: 9 MG/DL (ref 8.6–10.5)
CALCIUM SPEC-SCNC: 9.9 MG/DL (ref 8.6–10.5)
CHLORIDE SERPL-SCNC: 93 MMOL/L (ref 98–107)
CHLORIDE SERPL-SCNC: 95 MMOL/L (ref 98–107)
CHOLEST SERPL-MCNC: 160 MG/DL (ref 0–200)
CK MB SERPL-CCNC: 28.34 NG/ML
CK SERPL-CCNC: 241 U/L (ref 20–180)
CK SERPL-CCNC: 511 U/L (ref 20–180)
CO2 SERPL-SCNC: 24.5 MMOL/L (ref 22–29)
CO2 SERPL-SCNC: 27 MMOL/L (ref 22–29)
CREAT SERPL-MCNC: 0.62 MG/DL (ref 0.57–1)
CREAT SERPL-MCNC: 0.69 MG/DL (ref 0.57–1)
DEPRECATED RDW RBC AUTO: 42.1 FL (ref 37–54)
DEPRECATED RDW RBC AUTO: 42.5 FL (ref 37–54)
EOSINOPHIL # BLD AUTO: 0.27 10*3/MM3 (ref 0–0.4)
EOSINOPHIL NFR BLD AUTO: 1.5 % (ref 0.3–6.2)
ERYTHROCYTE [DISTWIDTH] IN BLOOD BY AUTOMATED COUNT: 13.8 % (ref 12.3–15.4)
ERYTHROCYTE [DISTWIDTH] IN BLOOD BY AUTOMATED COUNT: 13.8 % (ref 12.3–15.4)
GFR SERPL CREATININE-BSD FRML MDRD: 87 ML/MIN/1.73
GFR SERPL CREATININE-BSD FRML MDRD: 98 ML/MIN/1.73
GLOBULIN UR ELPH-MCNC: 3.2 GM/DL
GLUCOSE BLDC GLUCOMTR-MCNC: 148 MG/DL (ref 70–130)
GLUCOSE BLDC GLUCOMTR-MCNC: 164 MG/DL (ref 70–130)
GLUCOSE BLDC GLUCOMTR-MCNC: 167 MG/DL (ref 70–130)
GLUCOSE BLDC GLUCOMTR-MCNC: 173 MG/DL (ref 70–130)
GLUCOSE SERPL-MCNC: 159 MG/DL (ref 65–99)
GLUCOSE SERPL-MCNC: 232 MG/DL (ref 65–99)
HBA1C MFR BLD: 7.19 % (ref 4.8–5.6)
HCT VFR BLD AUTO: 45.9 % (ref 34–46.6)
HCT VFR BLD AUTO: 46.2 % (ref 34–46.6)
HDLC SERPL-MCNC: 48 MG/DL (ref 40–60)
HGB BLD-MCNC: 14.7 G/DL (ref 12–15.9)
HGB BLD-MCNC: 14.9 G/DL (ref 12–15.9)
HOLD SPECIMEN: NORMAL
IMM GRANULOCYTES # BLD AUTO: 0.13 10*3/MM3 (ref 0–0.05)
IMM GRANULOCYTES NFR BLD AUTO: 0.7 % (ref 0–0.5)
INR PPP: 0.86 (ref 2–3)
LDLC SERPL CALC-MCNC: 72 MG/DL (ref 0–100)
LDLC/HDLC SERPL: 1.3 {RATIO}
LYMPHOCYTES # BLD AUTO: 4.21 10*3/MM3 (ref 0.7–3.1)
LYMPHOCYTES NFR BLD AUTO: 23.5 % (ref 19.6–45.3)
MAGNESIUM SERPL-MCNC: 2.1 MG/DL (ref 1.6–2.4)
MAGNESIUM SERPL-MCNC: 2.1 MG/DL (ref 1.6–2.4)
MCH RBC QN AUTO: 27.1 PG (ref 26.6–33)
MCH RBC QN AUTO: 27.2 PG (ref 26.6–33)
MCHC RBC AUTO-ENTMCNC: 32 G/DL (ref 31.5–35.7)
MCHC RBC AUTO-ENTMCNC: 32.3 G/DL (ref 31.5–35.7)
MCV RBC AUTO: 84.3 FL (ref 79–97)
MCV RBC AUTO: 84.5 FL (ref 79–97)
MONOCYTES # BLD AUTO: 1.44 10*3/MM3 (ref 0.1–0.9)
MONOCYTES NFR BLD AUTO: 8 % (ref 5–12)
NEUTROPHILS NFR BLD AUTO: 11.75 10*3/MM3 (ref 1.7–7)
NEUTROPHILS NFR BLD AUTO: 65.5 % (ref 42.7–76)
NRBC BLD AUTO-RTO: 0 /100 WBC (ref 0–0.2)
NT-PROBNP SERPL-MCNC: 236.2 PG/ML (ref 0–900)
PHOSPHATE SERPL-MCNC: 3.6 MG/DL (ref 2.5–4.5)
PLAT MORPH BLD: NORMAL
PLATELET # BLD AUTO: 266 10*3/MM3 (ref 140–450)
PLATELET # BLD AUTO: 280 10*3/MM3 (ref 140–450)
PMV BLD AUTO: 10.3 FL (ref 6–12)
PMV BLD AUTO: 9.9 FL (ref 6–12)
POTASSIUM SERPL-SCNC: 4.1 MMOL/L (ref 3.5–5.2)
POTASSIUM SERPL-SCNC: 4.3 MMOL/L (ref 3.5–5.2)
PROT SERPL-MCNC: 7.3 G/DL (ref 6–8.5)
PROTHROMBIN TIME: 9.7 SECONDS (ref 9.4–12)
QT INTERVAL: 344 MS
RBC # BLD AUTO: 5.43 10*6/MM3 (ref 3.77–5.28)
RBC # BLD AUTO: 5.48 10*6/MM3 (ref 3.77–5.28)
RBC MORPH BLD: NORMAL
SODIUM SERPL-SCNC: 131 MMOL/L (ref 136–145)
SODIUM SERPL-SCNC: 133 MMOL/L (ref 136–145)
TRIGL SERPL-MCNC: 249 MG/DL (ref 0–150)
TROPONIN T SERPL-MCNC: 0.11 NG/ML (ref 0–0.03)
TROPONIN T SERPL-MCNC: 0.51 NG/ML (ref 0–0.03)
TSH SERPL DL<=0.05 MIU/L-ACNC: 5.66 UIU/ML (ref 0.27–4.2)
VLDLC SERPL-MCNC: 40 MG/DL (ref 5–40)
WBC # BLD AUTO: 17.95 10*3/MM3 (ref 3.4–10.8)
WBC # BLD AUTO: 19.79 10*3/MM3 (ref 3.4–10.8)
WBC MORPH BLD: NORMAL

## 2021-06-14 PROCEDURE — 85007 BL SMEAR W/DIFF WBC COUNT: CPT | Performed by: EMERGENCY MEDICINE

## 2021-06-14 PROCEDURE — C1874 STENT, COATED/COV W/DEL SYS: HCPCS | Performed by: INTERNAL MEDICINE

## 2021-06-14 PROCEDURE — 25010000002 MORPHINE PER 10 MG: Performed by: INTERNAL MEDICINE

## 2021-06-14 PROCEDURE — 25010000002 ONDANSETRON PER 1 MG: Performed by: EMERGENCY MEDICINE

## 2021-06-14 PROCEDURE — 25010000002 CANGRELOR TETRASODIUM 50 MG RECONSTITUTED SOLUTION 1 EACH VIAL: Performed by: INTERNAL MEDICINE

## 2021-06-14 PROCEDURE — 93005 ELECTROCARDIOGRAM TRACING: CPT | Performed by: EMERGENCY MEDICINE

## 2021-06-14 PROCEDURE — C1725 CATH, TRANSLUMIN NON-LASER: HCPCS | Performed by: INTERNAL MEDICINE

## 2021-06-14 PROCEDURE — 85610 PROTHROMBIN TIME: CPT | Performed by: EMERGENCY MEDICINE

## 2021-06-14 PROCEDURE — 92941 PRQ TRLML REVSC TOT OCCL AMI: CPT | Performed by: INTERNAL MEDICINE

## 2021-06-14 PROCEDURE — C1769 GUIDE WIRE: HCPCS | Performed by: INTERNAL MEDICINE

## 2021-06-14 PROCEDURE — 94799 UNLISTED PULMONARY SVC/PX: CPT

## 2021-06-14 PROCEDURE — 83735 ASSAY OF MAGNESIUM: CPT | Performed by: INTERNAL MEDICINE

## 2021-06-14 PROCEDURE — 25010000002 HEPARIN (PORCINE) PER 1000 UNITS: Performed by: INTERNAL MEDICINE

## 2021-06-14 PROCEDURE — C1887 CATHETER, GUIDING: HCPCS | Performed by: INTERNAL MEDICINE

## 2021-06-14 PROCEDURE — 84484 ASSAY OF TROPONIN QUANT: CPT | Performed by: INTERNAL MEDICINE

## 2021-06-14 PROCEDURE — 99153 MOD SED SAME PHYS/QHP EA: CPT | Performed by: INTERNAL MEDICINE

## 2021-06-14 PROCEDURE — 99291 CRITICAL CARE FIRST HOUR: CPT | Performed by: SURGERY

## 2021-06-14 PROCEDURE — B2111ZZ FLUOROSCOPY OF MULTIPLE CORONARY ARTERIES USING LOW OSMOLAR CONTRAST: ICD-10-PCS | Performed by: INTERNAL MEDICINE

## 2021-06-14 PROCEDURE — B2151ZZ FLUOROSCOPY OF LEFT HEART USING LOW OSMOLAR CONTRAST: ICD-10-PCS | Performed by: INTERNAL MEDICINE

## 2021-06-14 PROCEDURE — 25010000002 HYDROMORPHONE 1 MG/ML SOLUTION: Performed by: INTERNAL MEDICINE

## 2021-06-14 PROCEDURE — 99223 1ST HOSP IP/OBS HIGH 75: CPT | Performed by: INTERNAL MEDICINE

## 2021-06-14 PROCEDURE — 99152 MOD SED SAME PHYS/QHP 5/>YRS: CPT | Performed by: INTERNAL MEDICINE

## 2021-06-14 PROCEDURE — 82553 CREATINE MB FRACTION: CPT | Performed by: EMERGENCY MEDICINE

## 2021-06-14 PROCEDURE — 25010000002 HYDRALAZINE PER 20 MG: Performed by: INTERNAL MEDICINE

## 2021-06-14 PROCEDURE — 25010000002 FENTANYL CITRATE (PF) 50 MCG/ML SOLUTION: Performed by: INTERNAL MEDICINE

## 2021-06-14 PROCEDURE — 83880 ASSAY OF NATRIURETIC PEPTIDE: CPT | Performed by: INTERNAL MEDICINE

## 2021-06-14 PROCEDURE — 82550 ASSAY OF CK (CPK): CPT | Performed by: EMERGENCY MEDICINE

## 2021-06-14 PROCEDURE — 4A023N7 MEASUREMENT OF CARDIAC SAMPLING AND PRESSURE, LEFT HEART, PERCUTANEOUS APPROACH: ICD-10-PCS | Performed by: INTERNAL MEDICINE

## 2021-06-14 PROCEDURE — 83036 HEMOGLOBIN GLYCOSYLATED A1C: CPT | Performed by: INTERNAL MEDICINE

## 2021-06-14 PROCEDURE — 83735 ASSAY OF MAGNESIUM: CPT | Performed by: EMERGENCY MEDICINE

## 2021-06-14 PROCEDURE — 25010000002 HEPARIN (PORCINE) PER 1000 UNITS: Performed by: EMERGENCY MEDICINE

## 2021-06-14 PROCEDURE — 027034Z DILATION OF CORONARY ARTERY, ONE ARTERY WITH DRUG-ELUTING INTRALUMINAL DEVICE, PERCUTANEOUS APPROACH: ICD-10-PCS | Performed by: INTERNAL MEDICINE

## 2021-06-14 PROCEDURE — 80061 LIPID PANEL: CPT | Performed by: INTERNAL MEDICINE

## 2021-06-14 PROCEDURE — 84100 ASSAY OF PHOSPHORUS: CPT | Performed by: PHYSICIAN ASSISTANT

## 2021-06-14 PROCEDURE — 85027 COMPLETE CBC AUTOMATED: CPT | Performed by: INTERNAL MEDICINE

## 2021-06-14 PROCEDURE — 80053 COMPREHEN METABOLIC PANEL: CPT | Performed by: EMERGENCY MEDICINE

## 2021-06-14 PROCEDURE — 82550 ASSAY OF CK (CPK): CPT | Performed by: INTERNAL MEDICINE

## 2021-06-14 PROCEDURE — 25010000002 MIDAZOLAM PER 1MG: Performed by: INTERNAL MEDICINE

## 2021-06-14 PROCEDURE — C9606 PERC D-E COR REVASC W AMI S: HCPCS | Performed by: INTERNAL MEDICINE

## 2021-06-14 PROCEDURE — 84443 ASSAY THYROID STIM HORMONE: CPT | Performed by: INTERNAL MEDICINE

## 2021-06-14 PROCEDURE — 0 IOPAMIDOL PER 1 ML: Performed by: INTERNAL MEDICINE

## 2021-06-14 PROCEDURE — 82962 GLUCOSE BLOOD TEST: CPT

## 2021-06-14 PROCEDURE — C9460 INJECTION, CANGRELOR: HCPCS | Performed by: INTERNAL MEDICINE

## 2021-06-14 PROCEDURE — C1894 INTRO/SHEATH, NON-LASER: HCPCS | Performed by: INTERNAL MEDICINE

## 2021-06-14 PROCEDURE — 85025 COMPLETE CBC W/AUTO DIFF WBC: CPT | Performed by: EMERGENCY MEDICINE

## 2021-06-14 PROCEDURE — 93458 L HRT ARTERY/VENTRICLE ANGIO: CPT | Performed by: INTERNAL MEDICINE

## 2021-06-14 PROCEDURE — 99284 EMERGENCY DEPT VISIT MOD MDM: CPT

## 2021-06-14 PROCEDURE — 71045 X-RAY EXAM CHEST 1 VIEW: CPT

## 2021-06-14 PROCEDURE — 25010000002 MORPHINE PER 10 MG: Performed by: EMERGENCY MEDICINE

## 2021-06-14 DEVICE — XIENCE SIERRA™ EVEROLIMUS ELUTING CORONARY STENT SYSTEM 3.50 MM X 23 MM / RAPID-EXCHANGE
Type: IMPLANTABLE DEVICE | Status: FUNCTIONAL
Brand: XIENCE SIERRA™

## 2021-06-14 RX ORDER — NICOTINE 21 MG/24HR
1 PATCH, TRANSDERMAL 24 HOURS TRANSDERMAL EVERY 24 HOURS
Status: DISCONTINUED | OUTPATIENT
Start: 2021-06-14 | End: 2021-06-14 | Stop reason: SDUPTHER

## 2021-06-14 RX ORDER — ACETAMINOPHEN 325 MG/1
650 TABLET ORAL EVERY 4 HOURS PRN
Status: DISCONTINUED | OUTPATIENT
Start: 2021-06-14 | End: 2021-06-17 | Stop reason: HOSPADM

## 2021-06-14 RX ORDER — ATORVASTATIN CALCIUM 40 MG/1
40 TABLET, FILM COATED ORAL ONCE
Status: COMPLETED | OUTPATIENT
Start: 2021-06-14 | End: 2021-06-14

## 2021-06-14 RX ORDER — VORTIOXETINE 10 MG/1
10 TABLET, FILM COATED ORAL DAILY
COMMUNITY
Start: 2021-05-26 | End: 2021-12-06

## 2021-06-14 RX ORDER — NALOXONE HCL 0.4 MG/ML
0.4 VIAL (ML) INJECTION
Status: DISCONTINUED | OUTPATIENT
Start: 2021-06-14 | End: 2021-06-17 | Stop reason: HOSPADM

## 2021-06-14 RX ORDER — BISACODYL 10 MG
10 SUPPOSITORY, RECTAL RECTAL DAILY PRN
Status: DISCONTINUED | OUTPATIENT
Start: 2021-06-14 | End: 2021-06-17 | Stop reason: HOSPADM

## 2021-06-14 RX ORDER — TRAMADOL HYDROCHLORIDE 50 MG/1
50 TABLET ORAL EVERY 4 HOURS PRN
COMMUNITY
Start: 2021-05-29 | End: 2021-08-16 | Stop reason: SINTOL

## 2021-06-14 RX ORDER — POLYETHYLENE GLYCOL 3350 17 G/17G
17 POWDER, FOR SOLUTION ORAL DAILY PRN
Status: DISCONTINUED | OUTPATIENT
Start: 2021-06-14 | End: 2021-06-17 | Stop reason: HOSPADM

## 2021-06-14 RX ORDER — ONDANSETRON 2 MG/ML
4 INJECTION INTRAMUSCULAR; INTRAVENOUS EVERY 6 HOURS PRN
Status: DISCONTINUED | OUTPATIENT
Start: 2021-06-14 | End: 2021-06-17 | Stop reason: HOSPADM

## 2021-06-14 RX ORDER — SODIUM CHLORIDE 0.9 % (FLUSH) 0.9 %
10 SYRINGE (ML) INJECTION AS NEEDED
Status: DISCONTINUED | OUTPATIENT
Start: 2021-06-14 | End: 2021-06-17 | Stop reason: HOSPADM

## 2021-06-14 RX ORDER — ONDANSETRON 2 MG/ML
4 INJECTION INTRAMUSCULAR; INTRAVENOUS ONCE
Status: COMPLETED | OUTPATIENT
Start: 2021-06-14 | End: 2021-06-14

## 2021-06-14 RX ORDER — LORAZEPAM 2 MG/ML
0.5 INJECTION INTRAMUSCULAR EVERY 6 HOURS PRN
Status: DISCONTINUED | OUTPATIENT
Start: 2021-06-14 | End: 2021-06-17 | Stop reason: HOSPADM

## 2021-06-14 RX ORDER — ATORVASTATIN CALCIUM 40 MG/1
40 TABLET, FILM COATED ORAL NIGHTLY
Status: DISCONTINUED | OUTPATIENT
Start: 2021-06-14 | End: 2021-06-17 | Stop reason: HOSPADM

## 2021-06-14 RX ORDER — CARVEDILOL 3.12 MG/1
3.12 TABLET ORAL 2 TIMES DAILY WITH MEALS
Status: DISCONTINUED | OUTPATIENT
Start: 2021-06-14 | End: 2021-06-16

## 2021-06-14 RX ORDER — HYDRALAZINE HYDROCHLORIDE 20 MG/ML
INJECTION INTRAMUSCULAR; INTRAVENOUS AS NEEDED
Status: DISCONTINUED | OUTPATIENT
Start: 2021-06-14 | End: 2021-06-14 | Stop reason: HOSPADM

## 2021-06-14 RX ORDER — PANTOPRAZOLE SODIUM 40 MG/1
40 TABLET, DELAYED RELEASE ORAL
Status: DISCONTINUED | OUTPATIENT
Start: 2021-06-15 | End: 2021-06-17 | Stop reason: HOSPADM

## 2021-06-14 RX ORDER — LIDOCAINE HYDROCHLORIDE 20 MG/ML
INJECTION, SOLUTION INFILTRATION; PERINEURAL AS NEEDED
Status: DISCONTINUED | OUTPATIENT
Start: 2021-06-14 | End: 2021-06-14 | Stop reason: HOSPADM

## 2021-06-14 RX ORDER — FENTANYL CITRATE 50 UG/ML
INJECTION, SOLUTION INTRAMUSCULAR; INTRAVENOUS AS NEEDED
Status: DISCONTINUED | OUTPATIENT
Start: 2021-06-14 | End: 2021-06-14 | Stop reason: HOSPADM

## 2021-06-14 RX ORDER — NALOXONE HCL 0.4 MG/ML
0.4 VIAL (ML) INJECTION
Status: DISCONTINUED | OUTPATIENT
Start: 2021-06-14 | End: 2021-06-16

## 2021-06-14 RX ORDER — ALUMINA, MAGNESIA, AND SIMETHICONE 2400; 2400; 240 MG/30ML; MG/30ML; MG/30ML
15 SUSPENSION ORAL EVERY 6 HOURS PRN
Status: DISCONTINUED | OUTPATIENT
Start: 2021-06-14 | End: 2021-06-17 | Stop reason: HOSPADM

## 2021-06-14 RX ORDER — ASPIRIN 81 MG/1
81 TABLET ORAL DAILY
Status: DISCONTINUED | OUTPATIENT
Start: 2021-06-14 | End: 2021-06-17 | Stop reason: HOSPADM

## 2021-06-14 RX ORDER — DEXTROSE MONOHYDRATE 100 MG/ML
25 INJECTION, SOLUTION INTRAVENOUS
Status: DISCONTINUED | OUTPATIENT
Start: 2021-06-14 | End: 2021-06-17 | Stop reason: HOSPADM

## 2021-06-14 RX ORDER — LISINOPRIL 5 MG/1
5 TABLET ORAL DAILY
COMMUNITY
Start: 2021-04-02 | End: 2021-06-17 | Stop reason: HOSPADM

## 2021-06-14 RX ORDER — BISACODYL 5 MG/1
5 TABLET, DELAYED RELEASE ORAL DAILY PRN
Status: DISCONTINUED | OUTPATIENT
Start: 2021-06-14 | End: 2021-06-17 | Stop reason: HOSPADM

## 2021-06-14 RX ORDER — HEPARIN SODIUM 5000 [USP'U]/ML
INJECTION, SOLUTION INTRAVENOUS; SUBCUTANEOUS
Status: COMPLETED | OUTPATIENT
Start: 2021-06-14 | End: 2021-06-14

## 2021-06-14 RX ORDER — NICOTINE 21 MG/24HR
1 PATCH, TRANSDERMAL 24 HOURS TRANSDERMAL EVERY 24 HOURS
Status: DISCONTINUED | OUTPATIENT
Start: 2021-06-14 | End: 2021-06-17 | Stop reason: HOSPADM

## 2021-06-14 RX ORDER — NITROGLYCERIN 0.4 MG/1
0.4 TABLET SUBLINGUAL
Status: DISCONTINUED | OUTPATIENT
Start: 2021-06-14 | End: 2021-06-17 | Stop reason: HOSPADM

## 2021-06-14 RX ORDER — HEPARIN SODIUM 1000 [USP'U]/ML
INJECTION, SOLUTION INTRAVENOUS; SUBCUTANEOUS AS NEEDED
Status: DISCONTINUED | OUTPATIENT
Start: 2021-06-14 | End: 2021-06-14 | Stop reason: HOSPADM

## 2021-06-14 RX ORDER — MORPHINE SULFATE 2 MG/ML
1 INJECTION, SOLUTION INTRAMUSCULAR; INTRAVENOUS EVERY 4 HOURS PRN
Status: DISCONTINUED | OUTPATIENT
Start: 2021-06-14 | End: 2021-06-17 | Stop reason: HOSPADM

## 2021-06-14 RX ORDER — NICOTINE POLACRILEX 4 MG
15 LOZENGE BUCCAL
Status: DISCONTINUED | OUTPATIENT
Start: 2021-06-14 | End: 2021-06-17 | Stop reason: HOSPADM

## 2021-06-14 RX ORDER — AMOXICILLIN 250 MG
2 CAPSULE ORAL 2 TIMES DAILY
Status: DISCONTINUED | OUTPATIENT
Start: 2021-06-14 | End: 2021-06-17 | Stop reason: HOSPADM

## 2021-06-14 RX ORDER — MIDAZOLAM HYDROCHLORIDE 2 MG/2ML
INJECTION, SOLUTION INTRAMUSCULAR; INTRAVENOUS AS NEEDED
Status: DISCONTINUED | OUTPATIENT
Start: 2021-06-14 | End: 2021-06-14 | Stop reason: HOSPADM

## 2021-06-14 RX ADMIN — CANGRELOR 4 MCG/KG/MIN: 50 INJECTION, POWDER, LYOPHILIZED, FOR SOLUTION INTRAVENOUS at 04:41

## 2021-06-14 RX ADMIN — NITROGLYCERIN 1 INCH: 20 OINTMENT TOPICAL at 17:51

## 2021-06-14 RX ADMIN — ATORVASTATIN CALCIUM 40 MG: 40 TABLET, FILM COATED ORAL at 04:09

## 2021-06-14 RX ADMIN — NITROGLYCERIN 1 INCH: 20 OINTMENT TOPICAL at 09:04

## 2021-06-14 RX ADMIN — ASPIRIN 81 MG: 81 TABLET, COATED ORAL at 09:05

## 2021-06-14 RX ADMIN — MORPHINE SULFATE 1 MG: 2 INJECTION, SOLUTION INTRAMUSCULAR; INTRAVENOUS at 07:48

## 2021-06-14 RX ADMIN — TICAGRELOR 90 MG: 90 TABLET ORAL at 20:41

## 2021-06-14 RX ADMIN — CARVEDILOL 3.12 MG: 3.12 TABLET, FILM COATED ORAL at 17:51

## 2021-06-14 RX ADMIN — HYDROMORPHONE HYDROCHLORIDE: 1 INJECTION, SOLUTION INTRAMUSCULAR; INTRAVENOUS; SUBCUTANEOUS at 20:33

## 2021-06-14 RX ADMIN — ATORVASTATIN CALCIUM 40 MG: 40 TABLET, FILM COATED ORAL at 20:37

## 2021-06-14 RX ADMIN — DOCUSATE SODIUM 50MG AND SENNOSIDES 8.6MG 2 TABLET: 8.6; 5 TABLET, FILM COATED ORAL at 09:05

## 2021-06-14 RX ADMIN — CARVEDILOL 3.12 MG: 3.12 TABLET, FILM COATED ORAL at 07:46

## 2021-06-14 RX ADMIN — HEPARIN SODIUM 4734 UNITS: 5000 INJECTION INTRAVENOUS; SUBCUTANEOUS at 04:08

## 2021-06-14 RX ADMIN — MORPHINE SULFATE 4 MG: 4 INJECTION, SOLUTION INTRAMUSCULAR; INTRAVENOUS at 04:20

## 2021-06-14 RX ADMIN — TICAGRELOR 90 MG: 90 TABLET ORAL at 09:05

## 2021-06-14 RX ADMIN — DOCUSATE SODIUM 50MG AND SENNOSIDES 8.6MG 2 TABLET: 8.6; 5 TABLET, FILM COATED ORAL at 20:37

## 2021-06-14 RX ADMIN — ONDANSETRON 4 MG: 2 INJECTION INTRAMUSCULAR; INTRAVENOUS at 04:20

## 2021-06-14 RX ADMIN — NITROGLYCERIN 1 INCH: 20 OINTMENT TOPICAL at 12:41

## 2021-06-14 NOTE — CONSULTS
Nutrition Services    Patient Name:  Desiree Garcia  YOB: 1960  MRN: [id number]  Admit Date:  6/14/2021     Reviewed for MST score 2+.  Patient consuming 75% of meals since admission.  Weight report reveals no significant weight loss.    No further nutrition assessment indicated at this time.  RD will continue to follow prn per protocol.      Electronically signed by:  Cristy Flores RD  06/14/21 14:54 EDT

## 2021-06-14 NOTE — ED PROVIDER NOTES
Subjective   Pt reports that she has presented to the ED with complaints of crushing chest pain that started about an hour ago. She has had mild dyspnea, nausea, and diaphoresis with this pain. Pt was given 4 NTG en route, 4mg of Morphine, and 4 ASA.       History provided by:  Patient and EMS personnel   used: No    Chest Pain  Pain location:  Substernal area  Pain quality: crushing    Pain radiates to:  Does not radiate  Pain severity:  Moderate  Onset quality:  Sudden  Duration:  1 hour  Timing:  Constant  Progression:  Worsening  Chronicity:  New  Context: breathing    Context: not drug use, not eating, not intercourse, not lifting, not movement, not raising an arm, not at rest, not stress and not trauma    Relieved by:  Nothing  Worsened by:  Nothing  Ineffective treatments:  Aspirin and nitroglycerin  Associated symptoms: diaphoresis, nausea and shortness of breath    Associated symptoms: no abdominal pain, no AICD problem, no altered mental status, no anorexia, no anxiety, no back pain, no claudication, no cough, no dizziness, no dysphagia, no fatigue, no fever, no headache, no heartburn, no lower extremity edema, no near-syncope, no numbness, no orthopnea, no palpitations, no PND, no syncope, no vomiting and no weakness        Review of Systems   Constitutional: Positive for diaphoresis. Negative for chills, fatigue and fever.   HENT: Negative for congestion, ear pain, rhinorrhea, sore throat, tinnitus and trouble swallowing.    Eyes: Negative for photophobia and pain.   Respiratory: Positive for shortness of breath. Negative for cough.    Cardiovascular: Positive for chest pain. Negative for palpitations, orthopnea, claudication, syncope, PND and near-syncope.   Gastrointestinal: Positive for nausea. Negative for abdominal pain, anorexia, constipation, diarrhea, heartburn and vomiting.   Endocrine: Negative for polydipsia.   Genitourinary: Negative for dysuria and hematuria.    Musculoskeletal: Negative for back pain and neck pain.   Skin: Negative for rash.   Allergic/Immunologic: Negative for food allergies.   Neurological: Negative for dizziness, seizures, weakness, numbness and headaches.   Hematological: Negative for adenopathy.   Psychiatric/Behavioral: Negative for self-injury and suicidal ideas.       History reviewed. No pertinent past medical history.    No Known Allergies    History reviewed. No pertinent surgical history.    History reviewed. No pertinent family history.    Social History     Socioeconomic History   • Marital status:      Spouse name: Not on file   • Number of children: Not on file   • Years of education: Not on file   • Highest education level: Not on file         Objective   Physical Exam  Vitals and nursing note reviewed.   Constitutional:       General: She is awake.      Appearance: Normal appearance. She is ill-appearing (APPEARS UNCOMFORTABLE) and diaphoretic (MODERATELY).   HENT:      Head: Normocephalic and atraumatic.      Right Ear: Tympanic membrane, ear canal and external ear normal.      Left Ear: Tympanic membrane, ear canal and external ear normal.      Nose: Nose normal.      Mouth/Throat:      Mouth: Mucous membranes are moist.      Pharynx: Oropharynx is clear.   Eyes:      General: Lids are normal.      Extraocular Movements: Extraocular movements intact.      Conjunctiva/sclera: Conjunctivae normal.      Pupils: Pupils are equal, round, and reactive to light.   Cardiovascular:      Rate and Rhythm: Normal rate and regular rhythm.      Pulses: Normal pulses.      Heart sounds: Normal heart sounds.   Pulmonary:      Effort: Pulmonary effort is normal.      Breath sounds: Normal breath sounds and air entry.   Abdominal:      General: Bowel sounds are normal.      Palpations: Abdomen is soft.   Musculoskeletal:         General: Normal range of motion.      Right shoulder: Normal.      Left shoulder: Normal.      Right upper arm:  "Normal.      Left upper arm: Normal.      Right elbow: Normal.      Left elbow: Normal.      Right forearm: Normal.      Left forearm: Normal.      Right wrist: Normal.      Left wrist: Normal.      Right hand: Normal.      Left hand: Normal.      Cervical back: Normal, full passive range of motion without pain, normal range of motion and neck supple.      Thoracic back: Normal.      Lumbar back: Normal.      Right hip: Normal.      Left hip: Normal.      Right upper leg: Normal.      Left upper leg: Normal.      Right knee: Normal.      Left knee: Normal.      Right lower leg: Normal.      Left lower leg: Normal.      Right ankle: Normal.      Left ankle: Normal.      Right foot: Normal.      Left foot: Normal.   Skin:     General: Skin is warm and moist.   Neurological:      General: No focal deficit present.      Mental Status: She is alert and oriented to person, place, and time. Mental status is at baseline.      Cranial Nerves: Cranial nerves are intact.      Sensory: Sensation is intact.      Motor: Motor function is intact.      Coordination: Coordination is intact.   Psychiatric:         Attention and Perception: Attention and perception normal.         Mood and Affect: Mood and affect normal.         Speech: Speech normal.         Behavior: Behavior normal. Behavior is cooperative.         Thought Content: Thought content normal.         Cognition and Memory: Cognition and memory normal.         Judgment: Judgment normal.         Procedures         ED Course     /77   Pulse 103   Temp 97.6 °F (36.4 °C) (Oral)   Resp 26   Ht 167.6 cm (66\")   Wt 79.9 kg (176 lb 2.4 oz)   SpO2 97%   BMI 28.43 kg/m²   Labs Reviewed   CK TOTAL AND CKMB - Abnormal; Notable for the following components:       Result Value    CKMB 28.34 (*)     Creatine Kinase 241 (*)     All other components within normal limits    Narrative:     CKMB results may be falsely decreased if patient taking Biotin.   COMPREHENSIVE " METABOLIC PANEL - Abnormal; Notable for the following components:    Glucose 232 (*)     Sodium 133 (*)     Chloride 93 (*)     Alkaline Phosphatase 135 (*)     All other components within normal limits    Narrative:     GFR Normal >60  Chronic Kidney Disease <60  Kidney Failure <15     PROTIME-INR - Abnormal; Notable for the following components:    INR 0.86 (*)     All other components within normal limits    Narrative:     Suggested Therapeutic Ranges For Oral Anticoagulant Therapy:  Level of Therapy                      INR Target Range  Standard Dose                            2.0-3.0  High Dose                                2.5-3.5  Patients not receiving anticoagulant  Therapy Normal Range                     0.6-1.2   CBC WITH AUTO DIFFERENTIAL - Abnormal; Notable for the following components:    WBC 17.95 (*)     RBC 5.48 (*)     Immature Grans % 0.7 (*)     Neutrophils, Absolute 11.75 (*)     Lymphocytes, Absolute 4.21 (*)     Monocytes, Absolute 1.44 (*)     Immature Grans, Absolute 0.13 (*)     All other components within normal limits   MAGNESIUM - Normal   SCAN SLIDE - Normal   RAINBOW DRAW    Narrative:     The following orders were created for panel order [place] Draw.  Procedure                               Abnormality         Status                     ---------                               -----------         ------                     Green Top (Gel)[[id number]]                                  Final result               Lavender Top[[id number]]                                                                Gold Top - SST[[id number]]                                   Final result                 Please view results for these tests on the individual orders.   POCT TROPONIN I   CBC AND DIFFERENTIAL    Narrative:     The following orders were created for panel order CBC & Differential.  Procedure                               Abnormality         Status                     ---------                                -----------         ------                     Scan Slide[[id number]]                   Normal              Final result               CBC Auto Differential[[id number]]        Abnormal            Final result                 Please view results for these tests on the individual orders.   GREEN TOP   [facility]   POCT TROPONIN-I WITH HOLD TUBE    Narrative:     The following orders were created for panel order POC Troponin I with Hold Tube.  Procedure                               Abnormality         Status                     ---------                               -----------         ------                     POC Troponin I[[id number]]                                                              HOLD Troponin-I Tube[[id number]]                             In process                   Please view results for these tests on the individual orders.   HOLD ISTAT TROPONIN-I TUBE     Medications   sodium chloride 0.9 % flush 10 mL ( Intravenous MAR Hold 6/14/21 0426)   sodium chloride 0.9 % bolus 1,000 mL ( Intravenous MAR Hold 6/14/21 0426)   Midazolam HCl (PF) (VERSED) injection (2 mg Intravenous Given 6/14/21 0432)   fentaNYL citrate (PF) (SUBLIMAZE) injection (25 mcg Intravenous Given 6/14/21 0435)   Cangrelor Tetrasodium (200 mcg/mL) (KENGREAL) bolus from bag 2,400 mcg (2,400 mcg Intravenous Bolus from Bag 6/14/21 0440)     Followed by   cangrelor 50 mg in 250 ml (200 mcg/ml) IV infusion (4 mcg/kg/min × 79.9 kg Intravenous New Bag 6/14/21 0441)   heparin (porcine) injection (5,000 Units Intravenous Given 6/14/21 0443)   nitroglycerin 100 mcg/mL 20 mL syringe (400 mcg Intra-coronary Given 6/14/21 0517)   hydrALAZINE (APRESOLINE) injection (20 mg Intravenous Given 6/14/21 0514)   iopamidol (ISOVUE-370) 76 % injection (156 mL Intracatheter Given 6/14/21 0519)   lidocaine (XYLOCAINE) 2% injection (5 mL Injection Given 6/14/21 0430)   atorvastatin (LIPITOR) tablet 40 mg (40 mg Oral Given 6/14/21 0409)    heparin (porcine) 5000 UNIT/ML injection (4,734 Units Intravenous Given 6/14/21 0408)   morphine injection 4 mg (4 mg Intravenous Given 6/14/21 0420)   ondansetron (ZOFRAN) injection 4 mg (4 mg Intravenous Given 6/14/21 0420)     XR Chest 1 View    Result Date: 6/14/2021  Narrative: PROCEDURE: XR CHEST 1 VW  COMPARISON: None.  Prior studies from the Archive for unable to be retrieved during the time of this interpretation.  INDICATIONS: STEMI ALERT.  FINDINGS: A single AP upright portable chest radiograph reveals obscuring external artifact, no cardiac enlargement, and bilateral parahilar and infrahilar opacities, which may represent mild pulmonary edema.  Pneumonia cannot be excluded.  Atelectasis alone is possible.  No definite pleural effusion is suggested.  There may be chronic calcified granulomatous disease of the chest.  CONCLUSION: Bilateral atelectasis and/or infiltrate(s) is (are) possible.  No cardiac enlargement is suggested.      ADÁN MARINELLI JR, MD       Electronically Signed and Approved By: ADÁN MARINELLI JR, MD on 6/14/2021 at 4:49                  EKG: normal sinus rhythm, ST depression in I, aVL, V1, & V2, ST elevation in II, III, aVF, V3, V4, V5, & V6, code STEMI activated.       We received EMS ECG transmission at 0354 and activated code STEMI based on transmitted EKG.  Patient was discussed with  who agrees to activation.                             MDM  Code STEMI was activated based on EMS ECG.  Patient's pain was controlled with morphine and Zofran and IV fluids in the emergency department.  Nitroglycerin was held due to the location of suspected infarction.  Patient was treated with heparin bolus, aspirin prior to arrival and Plavix.  Patient was transferred emergently to the cardiac catheterization suite.  The patient's airway is intact, vital signs, and respiratory status are safe for admission at this time.      Final diagnoses:   ST elevation myocardial infarction (STEMI),  unspecified artery (CMS/HCC)   Unstable angina pectoris (CMS/[facility])       Documentation assistance provided by scooter Contreras.  Information recorded by the scribe was done at my direction and has been verified and validated by me.     Santos Contreras  06/14/21 0416       Sidney Ventura MD  06/14/21 0521

## 2021-06-14 NOTE — H&P
[facility]   CARDIOLOGY HISTORY AND PHYSICAL    Patient Name: Desiree Garcia  : 1960  MRN: [id number]  Primary Care Physician: Sandra Akers MD  Date of admission: 2021    Subjective   Subjective     Chief Complaint: Chest pain  Reason for admission : Acute inferior second elevation myocardial infarction    HPI:    Desiree Garcia is a 61 y.o. female with diabetes, hypertension, hyperlipidemia, chronic obstructive pulmonary disease.  She presented to the emergency room because of crushing chest pain.  Patient reports that she was having heartburn for the past 3 to 4 days.  This morning at 3 AM she woke up with severe crushing substernal chest pain and pressure.  There was no radiation of the pain she also felt diaphoretic and dizzy along with shortness of breath.  She called EMS.  EKG done in the field showed ST segment elevation inferior leads.  Patient was brought to the ER and subsequently to the Cath Lab on an emergent basis.    I evaluated the patient in the Cath Lab.  She already received heparin IV, aspirin and statins in the ER.  She was still reporting 3 out of 10 chest pain on arrival to the Cath Lab.  Patient denies having history of any cardiac illness or cardiac testing done in the past.  She has not had any chest pain until 3 to 4 days back.  She has COPD but not on oxygen.    Review of Systems   All systems were reviewed and negative except for: Chest pain, diaphoresis and shortness of breath along with cough.    Personal History     History reviewed. No pertinent past medical history.    Diabetes mellitus on oral agents  Hypertension  Chronic obstructive pulmonary disease    Family History: Positive for premature coronary artery disease.  Patient's father had acute myocardial infarction at the age of 35 years.    Social History:  Patient is a current smoker, who smokes half a pack of cigarettes per day.  Denies any alcohol or recreational drug usage.  She lives by  herself.    Home Medications:     A complete list of home medications is not available at the time of this dictation.  Patient takes medicines for diabetes mellitus and hypertension.    Allergies:  No Known Allergies    Objective   Objective     Vitals:   Temp:  [97.6 °F (36.4 °C)] 97.6 °F (36.4 °C)  Heart Rate:  [] 103  Resp:  [16-26] 26  BP: (116-119)/(77-83) 119/77  Physical Exam    Constitutional: Awake, alert, in mild distress because of chest pain   Eyes: PERRLA, sclerae anicteric, no conjunctival injection   HENT: NCAT, mucous membranes moist   Neck: Supple, no thyromegaly, no lymphadenopathy, trachea midline   Respiratory: Bilateral faint wheezing heard, no crackles   Cardiovascular: RRR, no murmurs, rubs, or gallops, palpable pedal pulses bilaterally   Gastrointestinal: Positive bowel sounds, soft, nontender, nondistended   Musculoskeletal: No bilateral ankle edema, no clubbing or cyanosis to extremities   Psychiatric: Appropriate affect, cooperative   Neurologic: Oriented x 3, strength symmetric in all extremities, speech clear   Skin: No rashes     Result Review    Result Review:  I have personally reviewed the results from the time of this admission to 6/14/2021 05:36 EDT and agree with these findings:  [x]  Laboratory  []  Microbiology  [x]  Radiology  [x]  EKG/Telemetry   [x]  Cardiology/Vascular   []  Pathology  [x]  Old records  []  Other:  Most notable findings include:     CBC    CBC 12/26/20 5/11/21 6/14/21   WBC 11.79 (A) 11.37 (A) 17.95 (A)   RBC 4.74 5.54 (A) 5.48 (A)   Hemoglobin 13.0 14.8 14.9   Hematocrit 41.8 48.2 (A) 46.2   MCV 88.2 87.0 84.3   MCH 27.4 26.7 (A) 27.2   MCHC 31.1 (A) 30.7 (A) 32.3   RDW 14.2 13.1 13.8   Platelets 262 312 280   (A) Abnormal value            CMP    CMP 3/11/21 5/11/21 6/14/21   Glucose   232 (A)   Glucose 136 (A) 180 (A)    BUN 11 8 16   Creatinine 0.71 0.71 0.69   eGFR Non African Am   87   Sodium 141 139 133 (A)   Potassium 4.1 4.5 4.3   Chloride 99  99 93 (A)   Calcium 9.9 9.5 9.9   Albumin 4.5 4.0 4.10   Total Bilirubin 0.25 0.22 0.4   Alkaline Phosphatase 106 126 135 (A)   AST (SGOT) 24 20 27   ALT (SGPT) 18 13 17   (A) Abnormal value            CARDIAC LABS:      Lab 06/14/21  0407   PROTIME 9.7   INR 0.86*     Cardiac catheterization : Done via right radial approach.  95% stenosis of the mid right coronary artery with heavy thrombus burden.  LAD artery had nonobstructive disease.  LVEF is 55% with mid to distal inferior wall hypokinesis.  Patient underwent angioplasty stent placement to mid right coronary artery with 3.5x23 Xience Tonia drug-eluting stent.    Assessment/Plan   Assessment / Plan     Brief Patient Summary:  Desiree Garcia is a 61 y.o. female who came to the hospital because of crushing chest pain and acute inferior ST segment elevation myocardial infarction.  She is status post cardiac authorization and primary angioplasty to mid right coronary artery.    Active Hospital Problems:  Active Hospital Problems    Diagnosis    • **ST elevation myocardial infarction (STEMI) (CMS/[facility])    Hypertension  Diabetes mellitus  Chronic obstructive pulmonary disease      Plan:     We will continue aspirin, Brilinta, statins  Will start carvedilol 3.125 mg p.o. twice daily, titrate the dose up as tolerated by blood pressure  We will place the patient on insulin sliding scale for now  Restart home medications once list is available  COPD management per ICU team    DVT prophylaxis:  Medical DVT prophylaxis orders are present.    CODE STATUS:    Level Of Support Discussed With: Patient  Code Status: CPR  Medical Interventions (Level of Support Prior to Arrest): Full    Admission Status:  I believe this patient meets inpatient status.    Electronically signed by Sidney Xiao MD, 06/14/21, 5:36 AM EDT.

## 2021-06-14 NOTE — NURSING NOTE
Cardiac Rehab Phase I - Occurrence #1    Education Topics:  Cardiac Rehab Yes   No Phase I need assessed No     Topic Components:  Exercise Yes     Teaching Aids:  Phase 2 Brochure Yes     Teaching Method:  Written Instruction Yes     Teaching Recipient:  Patient Yes       Recovery/Discharge Instructions:  Cardiac Rehab Phase 2 Yes     Cardiac Rehab Phase I Comm  Cardiac Rehab information sent to patient

## 2021-06-14 NOTE — CONSULTS
Pulmonary / Critical Care Consult Note      Patient Name: Desiree Garcia  : 1960  MRN: [id number]  Primary Care Physician:  Provider, No Known  Referring Physician: Sidney Xiao MD  Date of admission: 2021    Subjective   Subjective     Reason for Consult/ Chief Complaint:   STEMI    HPI:  Desiree Garcia is a 61 y.o. female with DM, HTN, HLD, and COPD who presented to the ED with severe chest pain. She was found to have STEMI in the ED and was taken emergently to the cath lab. She is s/p angio to RCA. She currently denies complaints.    Review of Systems  Constitutional symptoms:  Denied complaints   Ear, nose, throat: Denied complaints  Cardiovascular:  Denied complaints  Respiratory: Denied complaints  Gastrointestinal: Denied complaints  Musculoskeletal: Denied complaints  Genitourinary: Denied complaints  Allergy / Immunology: Denied complaints  Hematologic: Denied complaints  Neurologic: Denied complaints  Skin: Denied complaints  Endocrine: Denied complaints  Psychiatric: Denied complaints      Personal History     Past Medical History:   Diagnosis Date   • Arthritis    • Cervical cancer screening    • COPD (chronic obstructive pulmonary disease) (CMS/HCC)    • Depression with anxiety    • Diabetes mellitus (CMS/HCC)    • Forgetfulness    • Gastric reflux    • Hypertension    • Leg paresthesia 2017    Right   • Limb swelling    • Lumbago 2017    Low back pain   • Lumbar spinal stenosis 2017    L4/5 moderate to severe central canal stenosis   • Lumbosacral radiculopathy 2017    Right   • Migraines    • Night sweat    • Reflux esophagitis    • Shortness of breath    • Stomach disorder        Past Surgical History:   Procedure Laterality Date   • ABDOMINAL SURGERY      3 C-SECTIONS   • CARDIAC CATHETERIZATION     •  SECTION      x 3   • CHOLECYSTECTOMY     • COLONOSCOPY     • ENDOSCOPY      2007   • FOOT SURGERY Right    • HAND SURGERY          Family History: family history includes Arthritis in her brother, father, mother, and sister; Cancer in her mother; Diabetes in her brother and father; Heart disease in her brother and father; Stroke in her brother, father, mother, sister, and son. Otherwise pertinent FHx was reviewed and not pertinent to current issue.    Social History:  reports that she has been smoking cigarettes. She started smoking about 54 years ago. She has been smoking about 0.50 packs per day. She has never used smokeless tobacco. She reports that she does not drink alcohol and does not use drugs.    Home Medications:  Vortioxetine HBr, lisinopril, metFORMIN, and traMADol    Allergies:  No Known Allergies    Objective    Objective     Vitals:   Temp:  [97.6 °F (36.4 °C)-98.3 °F (36.8 °C)] 98.3 °F (36.8 °C)  Heart Rate:  [] 84  Resp:  [11-27] 21  BP: (116-156)/(77-91) 144/88    Physical Exam:  Vital Signs Reviewed   WDWN, Alert, NAD.    HEENT:  PERRL, EOMI.  OP, nares clear  Neck:  Supple, no JVD, no thyromegaly  Chest:  good aeration, clear to auscultation bilaterally, tympanic to percussion bilaterally, no work of breathing noted  CV: RRR, no MGR, pulses 2+, equal.  Abd:  Soft, NT, ND, + BS, no HSM  EXT:  no clubbing, no cyanosis, no edema  Neuro:  A&Ox3, CN grossly intact, no focal deficits.  Skin: No rashes or lesions noted      Result Review    Result Review:  I have personally reviewed the results from the time of this admission to 6/14/2021 16:18 EDT and agree with these findings:  [x]  Laboratory  [x]  Microbiology  [x]  Radiology  [x]  EKG/Telemetry   [x]  Cardiology/Vascular   []  Pathology  []  Old records  []  Other:  Most notable findings include: Leukocytosis, elevated troponin but trending down.    Assessment/Plan   Assessment / Plan     Active Hospital Problems:  Active Hospital Problems    Diagnosis    • Type 2 diabetes mellitus, without long-term current use of insulin (CMS/[facility])    • STEMI involving right  coronary artery (CMS/HCC)        Plan:    CV: Continue ASA, Brilinta, beta blocker, statin. Currently stable.    Pulm: Stable on RA, continue pulmonary toilet.    FEN/GI: Bowel regimen, regular diet.    Prophylaxis: PPI + SCDs    The patient is critically ill in the ICU with STEMI, COPD. Multidisciplinary bedside critical care rounds were performed with nursing staff, respiratory therapy, pharmacy, nutritional services, social work. I have personally reviewed the chart, labs and any pertinent imaging available.  I have spent 30 minutes of critical care time, excluding procedures, in the care of this patient.      Electronically signed by Terese Earl MD, 06/14/21, 4:18 PM EDT.

## 2021-06-15 ENCOUNTER — APPOINTMENT (OUTPATIENT)
Dept: CARDIOLOGY | Facility: HOSPITAL | Age: 61
End: 2021-06-15

## 2021-06-15 LAB
ALBUMIN SERPL-MCNC: 4 G/DL (ref 3.5–5.2)
ALBUMIN/GLOB SERPL: 1.3 G/DL
ALP SERPL-CCNC: 126 U/L (ref 39–117)
ALT SERPL W P-5'-P-CCNC: 24 U/L (ref 1–33)
ANION GAP SERPL CALCULATED.3IONS-SCNC: 8.5 MMOL/L (ref 5–15)
AST SERPL-CCNC: 70 U/L (ref 1–32)
BASOPHILS # BLD AUTO: 0.12 10*3/MM3 (ref 0–0.2)
BASOPHILS NFR BLD AUTO: 0.6 % (ref 0–1.5)
BH CV ECHO MEAS - AO ROOT DIAM: 2.1 CM
BH CV ECHO MEAS - AO V2 MAX: 154 CM/SEC
BH CV ECHO MEAS - EDV(MOD-SP2): 83 ML
BH CV ECHO MEAS - EDV(MOD-SP4): 63 ML
BH CV ECHO MEAS - ESV(MOD-SP2): 17 ML
BH CV ECHO MEAS - ESV(MOD-SP4): 30 ML
BH CV ECHO MEAS - IVSD: 1.1 CM
BH CV ECHO MEAS - LA DIMENSION(2D): 2.9 CM
BH CV ECHO MEAS - LAT PEAK E' VEL: 5 CM/SEC
BH CV ECHO MEAS - LV V1 MAX: 77 CM/SEC
BH CV ECHO MEAS - LVIDD: 3.1 CM
BH CV ECHO MEAS - LVIDS: 2.3 CM
BH CV ECHO MEAS - LVPWD: 1 CM
BH CV ECHO MEAS - MED PEAK E' VEL: 5 CM/SEC
BH CV ECHO MEAS - MV A MAX VEL: 86 CM/SEC
BH CV ECHO MEAS - MV DEC TIME: 217 MSEC
BH CV ECHO MEAS - MV E MAX VEL: 45 CM/SEC
BH CV ECHO MEAS - MV E/A: 0.5
BH CV ECHO MEASUREMENTS AVERAGE E/E' RATIO: 9
BILIRUB SERPL-MCNC: 1.4 MG/DL (ref 0–1.2)
BUN SERPL-MCNC: 12 MG/DL (ref 8–23)
BUN/CREAT SERPL: 15 (ref 7–25)
CALCIUM SPEC-SCNC: 9.3 MG/DL (ref 8.6–10.5)
CHLORIDE SERPL-SCNC: 95 MMOL/L (ref 98–107)
CK MB SERPL-CCNC: 61.6 NG/ML
CK SERPL-CCNC: 557 U/L (ref 20–180)
CO2 SERPL-SCNC: 29.5 MMOL/L (ref 22–29)
CREAT SERPL-MCNC: 0.8 MG/DL (ref 0.57–1)
DEPRECATED RDW RBC AUTO: 42.7 FL (ref 37–54)
EOSINOPHIL # BLD AUTO: 0.18 10*3/MM3 (ref 0–0.4)
EOSINOPHIL NFR BLD AUTO: 0.9 % (ref 0.3–6.2)
ERYTHROCYTE [DISTWIDTH] IN BLOOD BY AUTOMATED COUNT: 14 % (ref 12.3–15.4)
GFR SERPL CREATININE-BSD FRML MDRD: 73 ML/MIN/1.73
GLOBULIN UR ELPH-MCNC: 3.2 GM/DL
GLUCOSE BLDC GLUCOMTR-MCNC: 173 MG/DL (ref 70–130)
GLUCOSE BLDC GLUCOMTR-MCNC: 177 MG/DL (ref 70–130)
GLUCOSE BLDC GLUCOMTR-MCNC: 179 MG/DL (ref 70–130)
GLUCOSE BLDC GLUCOMTR-MCNC: 209 MG/DL (ref 70–130)
GLUCOSE SERPL-MCNC: 149 MG/DL (ref 65–99)
HCT VFR BLD AUTO: 45.6 % (ref 34–46.6)
HGB BLD-MCNC: 14.4 G/DL (ref 12–15.9)
IMM GRANULOCYTES # BLD AUTO: 0.17 10*3/MM3 (ref 0–0.05)
IMM GRANULOCYTES NFR BLD AUTO: 0.9 % (ref 0–0.5)
IVRT: 53 MSEC
LEFT ATRIUM VOLUME INDEX: 23 ML/M2
LYMPHOCYTES # BLD AUTO: 3.45 10*3/MM3 (ref 0.7–3.1)
LYMPHOCYTES NFR BLD AUTO: 17.5 % (ref 19.6–45.3)
MAGNESIUM SERPL-MCNC: 2.1 MG/DL (ref 1.6–2.4)
MAXIMAL PREDICTED HEART RATE: 160 BPM
MCH RBC QN AUTO: 26.9 PG (ref 26.6–33)
MCHC RBC AUTO-ENTMCNC: 31.6 G/DL (ref 31.5–35.7)
MCV RBC AUTO: 85.1 FL (ref 79–97)
MONOCYTES # BLD AUTO: 1.52 10*3/MM3 (ref 0.1–0.9)
MONOCYTES NFR BLD AUTO: 7.7 % (ref 5–12)
NEUTROPHILS NFR BLD AUTO: 14.3 10*3/MM3 (ref 1.7–7)
NEUTROPHILS NFR BLD AUTO: 72.4 % (ref 42.7–76)
NRBC BLD AUTO-RTO: 0 /100 WBC (ref 0–0.2)
PLATELET # BLD AUTO: 262 10*3/MM3 (ref 140–450)
PMV BLD AUTO: 10 FL (ref 6–12)
POTASSIUM SERPL-SCNC: 4.6 MMOL/L (ref 3.5–5.2)
PROT SERPL-MCNC: 7.2 G/DL (ref 6–8.5)
QT INTERVAL: 361 MS
QT INTERVAL: 398 MS
RBC # BLD AUTO: 5.36 10*6/MM3 (ref 3.77–5.28)
SODIUM SERPL-SCNC: 133 MMOL/L (ref 136–145)
STRESS TARGET HR: 136 BPM
TROPONIN T SERPL-MCNC: 1.08 NG/ML (ref 0–0.03)
WBC # BLD AUTO: 19.74 10*3/MM3 (ref 3.4–10.8)

## 2021-06-15 PROCEDURE — 84484 ASSAY OF TROPONIN QUANT: CPT | Performed by: INTERNAL MEDICINE

## 2021-06-15 PROCEDURE — 83735 ASSAY OF MAGNESIUM: CPT | Performed by: PHYSICIAN ASSISTANT

## 2021-06-15 PROCEDURE — 82553 CREATINE MB FRACTION: CPT | Performed by: INTERNAL MEDICINE

## 2021-06-15 PROCEDURE — 99232 SBSQ HOSP IP/OBS MODERATE 35: CPT | Performed by: INTERNAL MEDICINE

## 2021-06-15 PROCEDURE — 80053 COMPREHEN METABOLIC PANEL: CPT | Performed by: INTERNAL MEDICINE

## 2021-06-15 PROCEDURE — 85025 COMPLETE CBC W/AUTO DIFF WBC: CPT | Performed by: PHYSICIAN ASSISTANT

## 2021-06-15 PROCEDURE — 93306 TTE W/DOPPLER COMPLETE: CPT | Performed by: INTERNAL MEDICINE

## 2021-06-15 PROCEDURE — 93306 TTE W/DOPPLER COMPLETE: CPT

## 2021-06-15 PROCEDURE — 94799 UNLISTED PULMONARY SVC/PX: CPT

## 2021-06-15 PROCEDURE — 82550 ASSAY OF CK (CPK): CPT | Performed by: INTERNAL MEDICINE

## 2021-06-15 PROCEDURE — 93005 ELECTROCARDIOGRAM TRACING: CPT | Performed by: INTERNAL MEDICINE

## 2021-06-15 PROCEDURE — 25010000002 MORPHINE PER 10 MG: Performed by: INTERNAL MEDICINE

## 2021-06-15 PROCEDURE — 82962 GLUCOSE BLOOD TEST: CPT

## 2021-06-15 RX ORDER — TRAMADOL HYDROCHLORIDE 50 MG/1
50 TABLET ORAL EVERY 4 HOURS PRN
Status: DISCONTINUED | OUTPATIENT
Start: 2021-06-15 | End: 2021-06-17 | Stop reason: HOSPADM

## 2021-06-15 RX ORDER — LISINOPRIL 5 MG/1
5 TABLET ORAL DAILY
Status: DISCONTINUED | OUTPATIENT
Start: 2021-06-15 | End: 2021-06-16

## 2021-06-15 RX ADMIN — ASPIRIN 81 MG: 81 TABLET, COATED ORAL at 08:06

## 2021-06-15 RX ADMIN — TICAGRELOR 90 MG: 90 TABLET ORAL at 20:42

## 2021-06-15 RX ADMIN — CARVEDILOL 3.12 MG: 3.12 TABLET, FILM COATED ORAL at 08:05

## 2021-06-15 RX ADMIN — NITROGLYCERIN 1 INCH: 20 OINTMENT TOPICAL at 17:47

## 2021-06-15 RX ADMIN — NITROGLYCERIN 1 INCH: 20 OINTMENT TOPICAL at 11:11

## 2021-06-15 RX ADMIN — LISINOPRIL 5 MG: 5 TABLET ORAL at 08:08

## 2021-06-15 RX ADMIN — NITROGLYCERIN 1 INCH: 20 OINTMENT TOPICAL at 06:18

## 2021-06-15 RX ADMIN — DOCUSATE SODIUM 50MG AND SENNOSIDES 8.6MG 2 TABLET: 8.6; 5 TABLET, FILM COATED ORAL at 20:41

## 2021-06-15 RX ADMIN — CARVEDILOL 3.12 MG: 3.12 TABLET, FILM COATED ORAL at 17:47

## 2021-06-15 RX ADMIN — MORPHINE SULFATE 1 MG: 2 INJECTION, SOLUTION INTRAMUSCULAR; INTRAVENOUS at 11:42

## 2021-06-15 RX ADMIN — DOCUSATE SODIUM 50MG AND SENNOSIDES 8.6MG 2 TABLET: 8.6; 5 TABLET, FILM COATED ORAL at 08:06

## 2021-06-15 RX ADMIN — ATORVASTATIN CALCIUM 40 MG: 40 TABLET, FILM COATED ORAL at 20:42

## 2021-06-15 RX ADMIN — TICAGRELOR 90 MG: 90 TABLET ORAL at 08:05

## 2021-06-15 RX ADMIN — PANTOPRAZOLE SODIUM 40 MG: 40 TABLET, DELAYED RELEASE ORAL at 06:18

## 2021-06-15 RX ADMIN — TRAMADOL HYDROCHLORIDE 50 MG: 50 TABLET, FILM COATED ORAL at 08:05

## 2021-06-15 RX ADMIN — VORTIOXETINE 10 MG: 10 TABLET, FILM COATED ORAL at 08:08

## 2021-06-15 RX ADMIN — NITROGLYCERIN 1 INCH: 20 OINTMENT TOPICAL at 00:09

## 2021-06-15 NOTE — PROGRESS NOTES
[facility]     Cardiology Progress Note    Patient Name: Desiree Garcia  : 1960  MRN: [id number]  Primary Care Physician:  Provider, No Known  Date of admission: 2021    Subjective   Subjective     Chief Complaint: Follow-up visit for acute inferior Szell elevation myocardial infarction    HPI:  Patient Reports no new complaints today.  No further episodes of chest pain in the past 24 hours.  She is getting very restless due to inability for smoking.  Denies any major shortness of breath, palpitations.  No events noted on telemetry.    Review of Systems   All systems were reviewed and negative except for: Restlessness, baseline shortness of breath.  Negative for chest pain    Objective   Objective     Vitals:   Temp:  [98.3 °F (36.8 °C)-98.7 °F (37.1 °C)] 98.6 °F (37 °C)  Heart Rate:  [79-98] 85  Resp:  [11-22] 14  BP: (115-156)/(66-95) 133/76  Physical Exam      General : Alert, awake, no acute distress  CVS : Regular rate and rhythm, no murmur, rubs or gallops  Lungs: Bilateral faint wheezing heard, no crackles   Abdomen: Soft, nontender, bowel sounds heard in all 4 quadrants  Extremities: Warm, well-perfused, no pedal edema    Scheduled Meds:!Patient Home Medications Stored in Pharmacy, , Does not apply, Once  aspirin, 81 mg, Oral, Daily  atorvastatin, 40 mg, Oral, Nightly  carvedilol, 3.125 mg, Oral, BID With Meals  lisinopril, 5 mg, Oral, Daily  nicotine, 1 patch, Transdermal, Q24H  nitroglycerin, 1 inch, Topical, Q6H  pantoprazole, 40 mg, Oral, Q AM  senna-docusate sodium, 2 tablet, Oral, BID  sodium chloride, 1,000 mL, Intravenous, Once  ticagrelor, 90 mg, Oral, BID  Vortioxetine HBr, 10 mg, Oral, Daily         Result Review    Result Review:  I have personally reviewed the results from the time of this admission to 6/15/2021 08:39 EDT and agree with these findings:  [x]  Laboratory  []  Microbiology  [x]  Radiology  [x]  EKG/Telemetry   [x]  Cardiology/Vascular   []  Pathology  []  Old  records  []  Other:  Most notable findings include:     CBC    CBC 5/11/21 6/14/21 6/14/21 6/15/21     0406 0736    WBC 11.37 (A) 17.95 (A) 19.79 (A) 19.74 (A)   RBC 5.54 (A) 5.48 (A) 5.43 (A) 5.36 (A)   Hemoglobin 14.8 14.9 14.7 14.4   Hematocrit 48.2 (A) 46.2 45.9 45.6   MCV 87.0 84.3 84.5 85.1   MCH 26.7 (A) 27.2 27.1 26.9   MCHC 30.7 (A) 32.3 32.0 31.6   RDW 13.1 13.8 13.8 14.0   Platelets 312 280 266 262   (A) Abnormal value            CMP    CMP 5/11/21 6/14/21 6/14/21 6/15/21     0406 0736    Glucose  232 (A) 159 (A) 149 (A)   Glucose 180 (A)      BUN 8 16 14 12   Creatinine 0.71 0.69 0.62 0.80   eGFR Non African Am  87 98 73   Sodium 139 133 (A) 131 (A) 133 (A)   Potassium 4.5 4.3 4.1 4.6   Chloride 99 93 (A) 95 (A) 95 (A)   Calcium 9.5 9.9 9.0 9.3   Albumin 4.0 4.10  4.00   Total Bilirubin 0.22 0.4  1.4 (A)   Alkaline Phosphatase 126 135 (A)  126 (A)   AST (SGOT) 20 27  70 (A)   ALT (SGPT) 13 17  24   (A) Abnormal value             CARDIAC LABS:      Lab 06/15/21  0311 06/14/21  1005 06/14/21  0407   PROBNP  --   --  236.2   TROPONIN T 1.080* 0.506* 0.108*   PROTIME  --   --  9.7   INR  --   --  0.86*        Assessment/Plan   Assessment / Plan     Brief Patient Summary:  Desiree Garcia is a 61 y.o. female who was admitted with acute inferior STEMI with myocardial infarction, status post primary angioplasty to mid right coronary artery.  Currently no angina.  Troponin is still uptrending but other cardiac markers started to trend down.    Active Hospital Problems:  Active Hospital Problems    Diagnosis    • **ST elevation myocardial infarction (STEMI) (CMS/[facility])    • Type 2 diabetes mellitus, without long-term current use of insulin (CMS/[facility])    • STEMI involving right coronary artery (CMS/[facility])    Long-term tobacco use        Plan:     We will continue aspirin, Brilinta, statins  Continue carvedilol at the current dose, titrate the dose up as tolerated for blood pressure  Restart home lisinopril  We will  check echocardiogram today to reassess LV function rule out significant valvular abnormalities  Transfer the patient out of intensive care unit  Refusing according patch  Patient wanted to go home today, I explained to the patient in detail about the need to stay for 1 more day for monitoring, to have the echo done and for further medication titration.  She agreed to stay.       CODE STATUS:   Level Of Support Discussed With: Patient  Code Status: CPR  Medical Interventions (Level of Support Prior to Arrest): Full      Electronically signed by Sidney Xiao MD, 06/15/21, 8:39 AM EDT.

## 2021-06-16 LAB
ALBUMIN SERPL-MCNC: 3.6 G/DL (ref 3.5–5.2)
ALBUMIN/GLOB SERPL: 1.1 G/DL
ALP SERPL-CCNC: 119 U/L (ref 39–117)
ALT SERPL W P-5'-P-CCNC: 17 U/L (ref 1–33)
ANION GAP SERPL CALCULATED.3IONS-SCNC: 10.8 MMOL/L (ref 5–15)
ANION GAP SERPL CALCULATED.3IONS-SCNC: 11.4 MMOL/L (ref 5–15)
AST SERPL-CCNC: 43 U/L (ref 1–32)
BASOPHILS # BLD AUTO: 0.12 10*3/MM3 (ref 0–0.2)
BASOPHILS NFR BLD AUTO: 0.6 % (ref 0–1.5)
BILIRUB SERPL-MCNC: 1.1 MG/DL (ref 0–1.2)
BUN SERPL-MCNC: 24 MG/DL (ref 8–23)
BUN SERPL-MCNC: 27 MG/DL (ref 8–23)
BUN/CREAT SERPL: 14.2 (ref 7–25)
BUN/CREAT SERPL: 20.9 (ref 7–25)
CALCIUM SPEC-SCNC: 8.6 MG/DL (ref 8.6–10.5)
CALCIUM SPEC-SCNC: 8.6 MG/DL (ref 8.6–10.5)
CHLORIDE SERPL-SCNC: 97 MMOL/L (ref 98–107)
CHLORIDE SERPL-SCNC: 97 MMOL/L (ref 98–107)
CK MB SERPL-CCNC: 13.03 NG/ML
CK SERPL-CCNC: 254 U/L (ref 20–180)
CO2 SERPL-SCNC: 23.2 MMOL/L (ref 22–29)
CO2 SERPL-SCNC: 24.6 MMOL/L (ref 22–29)
CREAT SERPL-MCNC: 1.15 MG/DL (ref 0.57–1)
CREAT SERPL-MCNC: 1.9 MG/DL (ref 0.57–1)
DEPRECATED RDW RBC AUTO: 41.6 FL (ref 37–54)
EOSINOPHIL # BLD AUTO: 0.14 10*3/MM3 (ref 0–0.4)
EOSINOPHIL NFR BLD AUTO: 0.7 % (ref 0.3–6.2)
ERYTHROCYTE [DISTWIDTH] IN BLOOD BY AUTOMATED COUNT: 13.9 % (ref 12.3–15.4)
GFR SERPL CREATININE-BSD FRML MDRD: 27 ML/MIN/1.73
GFR SERPL CREATININE-BSD FRML MDRD: 48 ML/MIN/1.73
GLOBULIN UR ELPH-MCNC: 3.3 GM/DL
GLUCOSE BLDC GLUCOMTR-MCNC: 146 MG/DL (ref 70–130)
GLUCOSE BLDC GLUCOMTR-MCNC: 153 MG/DL (ref 70–130)
GLUCOSE BLDC GLUCOMTR-MCNC: 164 MG/DL (ref 70–130)
GLUCOSE SERPL-MCNC: 141 MG/DL (ref 65–99)
GLUCOSE SERPL-MCNC: 187 MG/DL (ref 65–99)
HCT VFR BLD AUTO: 42.2 % (ref 34–46.6)
HGB BLD-MCNC: 13.5 G/DL (ref 12–15.9)
IMM GRANULOCYTES # BLD AUTO: 0.29 10*3/MM3 (ref 0–0.05)
IMM GRANULOCYTES NFR BLD AUTO: 1.4 % (ref 0–0.5)
LYMPHOCYTES # BLD AUTO: 3.37 10*3/MM3 (ref 0.7–3.1)
LYMPHOCYTES NFR BLD AUTO: 15.7 % (ref 19.6–45.3)
MAGNESIUM SERPL-MCNC: 2.2 MG/DL (ref 1.6–2.4)
MCH RBC QN AUTO: 26.6 PG (ref 26.6–33)
MCHC RBC AUTO-ENTMCNC: 32 G/DL (ref 31.5–35.7)
MCV RBC AUTO: 83.1 FL (ref 79–97)
MONOCYTES # BLD AUTO: 1.83 10*3/MM3 (ref 0.1–0.9)
MONOCYTES NFR BLD AUTO: 8.5 % (ref 5–12)
NEUTROPHILS NFR BLD AUTO: 15.71 10*3/MM3 (ref 1.7–7)
NEUTROPHILS NFR BLD AUTO: 73.1 % (ref 42.7–76)
NRBC BLD AUTO-RTO: 0 /100 WBC (ref 0–0.2)
PLATELET # BLD AUTO: 244 10*3/MM3 (ref 140–450)
PMV BLD AUTO: 10.3 FL (ref 6–12)
POTASSIUM SERPL-SCNC: 3.7 MMOL/L (ref 3.5–5.2)
POTASSIUM SERPL-SCNC: 4.2 MMOL/L (ref 3.5–5.2)
PROT SERPL-MCNC: 6.9 G/DL (ref 6–8.5)
QT INTERVAL: 367 MS
QT INTERVAL: 392 MS
RBC # BLD AUTO: 5.08 10*6/MM3 (ref 3.77–5.28)
SODIUM SERPL-SCNC: 131 MMOL/L (ref 136–145)
SODIUM SERPL-SCNC: 133 MMOL/L (ref 136–145)
TRANSFERRIN SERPL-MCNC: 223 MG/DL (ref 200–360)
WBC # BLD AUTO: 21.46 10*3/MM3 (ref 3.4–10.8)

## 2021-06-16 PROCEDURE — 80053 COMPREHEN METABOLIC PANEL: CPT | Performed by: INTERNAL MEDICINE

## 2021-06-16 PROCEDURE — 84466 ASSAY OF TRANSFERRIN: CPT | Performed by: INTERNAL MEDICINE

## 2021-06-16 PROCEDURE — 82553 CREATINE MB FRACTION: CPT | Performed by: INTERNAL MEDICINE

## 2021-06-16 PROCEDURE — 82962 GLUCOSE BLOOD TEST: CPT

## 2021-06-16 PROCEDURE — 82550 ASSAY OF CK (CPK): CPT | Performed by: INTERNAL MEDICINE

## 2021-06-16 PROCEDURE — 99233 SBSQ HOSP IP/OBS HIGH 50: CPT | Performed by: INTERNAL MEDICINE

## 2021-06-16 PROCEDURE — 83735 ASSAY OF MAGNESIUM: CPT | Performed by: INTERNAL MEDICINE

## 2021-06-16 PROCEDURE — 93005 ELECTROCARDIOGRAM TRACING: CPT | Performed by: INTERNAL MEDICINE

## 2021-06-16 PROCEDURE — 94799 UNLISTED PULMONARY SVC/PX: CPT

## 2021-06-16 PROCEDURE — 85025 COMPLETE CBC W/AUTO DIFF WBC: CPT | Performed by: INTERNAL MEDICINE

## 2021-06-16 RX ORDER — SODIUM CHLORIDE 9 MG/ML
125 INJECTION, SOLUTION INTRAVENOUS CONTINUOUS
Status: DISCONTINUED | OUTPATIENT
Start: 2021-06-16 | End: 2021-06-16

## 2021-06-16 RX ORDER — COLCHICINE 0.6 MG/1
0.3 TABLET ORAL DAILY
Status: DISCONTINUED | OUTPATIENT
Start: 2021-06-16 | End: 2021-06-17 | Stop reason: HOSPADM

## 2021-06-16 RX ORDER — CARVEDILOL 3.12 MG/1
3.12 TABLET ORAL EVERY 12 HOURS SCHEDULED
Status: DISCONTINUED | OUTPATIENT
Start: 2021-06-16 | End: 2021-06-17 | Stop reason: HOSPADM

## 2021-06-16 RX ADMIN — NICOTINE 1 PATCH: 21 PATCH, EXTENDED RELEASE TRANSDERMAL at 08:03

## 2021-06-16 RX ADMIN — SODIUM CHLORIDE, PRESERVATIVE FREE 10 ML: 5 INJECTION INTRAVENOUS at 08:02

## 2021-06-16 RX ADMIN — ATORVASTATIN CALCIUM 40 MG: 40 TABLET, FILM COATED ORAL at 20:33

## 2021-06-16 RX ADMIN — CARVEDILOL 3.12 MG: 3.12 TABLET, FILM COATED ORAL at 15:35

## 2021-06-16 RX ADMIN — TICAGRELOR 90 MG: 90 TABLET ORAL at 20:33

## 2021-06-16 RX ADMIN — DOCUSATE SODIUM 50MG AND SENNOSIDES 8.6MG 2 TABLET: 8.6; 5 TABLET, FILM COATED ORAL at 08:03

## 2021-06-16 RX ADMIN — ASPIRIN 81 MG: 81 TABLET, COATED ORAL at 08:03

## 2021-06-16 RX ADMIN — PANTOPRAZOLE SODIUM 40 MG: 40 TABLET, DELAYED RELEASE ORAL at 06:16

## 2021-06-16 RX ADMIN — SODIUM CHLORIDE, PRESERVATIVE FREE 10 ML: 5 INJECTION INTRAVENOUS at 20:33

## 2021-06-16 RX ADMIN — COLCHICINE 0.3 MG: 0.6 TABLET, FILM COATED ORAL at 10:31

## 2021-06-16 RX ADMIN — VORTIOXETINE 10 MG: 10 TABLET, FILM COATED ORAL at 08:04

## 2021-06-16 RX ADMIN — SODIUM CHLORIDE 125 ML/HR: 9 INJECTION, SOLUTION INTRAVENOUS at 15:39

## 2021-06-16 RX ADMIN — NITROGLYCERIN 1 INCH: 20 OINTMENT TOPICAL at 06:16

## 2021-06-16 RX ADMIN — TICAGRELOR 90 MG: 90 TABLET ORAL at 08:04

## 2021-06-16 RX ADMIN — SODIUM CHLORIDE 125 ML/HR: 9 INJECTION, SOLUTION INTRAVENOUS at 08:06

## 2021-06-16 NOTE — PROGRESS NOTES
[facility]     Cardiology Progress Note    Patient Name: Desiree Garcia  : 1960  MRN: [id number]  Primary Care Physician:  Provider, No Known  Date of admission: 2021    Subjective   Subjective     Chief Complaint: Chest pain, left-sided neck pain    HPI:  Patient Reports constant moderate left-sided neck pain since admission.  She also reports having chest pain while taking a deep breath.  This pain is different from what she had at the time of presentation with acute STEMI.  Denies any significant shortness of breath or diaphoresis.  Yesterday, her blood pressure has been low most of the day.  She received her Coreg dose in the evening.  There are no events on telemetry and cardiac markers have been trending down.  However, her creatinine is 1.9 today, normal for the past 3 days.    Review of Systems   All systems were reviewed and negative except for: Pleuritic chest pain, left-sided neck pain.    Objective   Objective     Vitals:   Temp:  [98.1 °F (36.7 °C)-99.1 °F (37.3 °C)] 98.1 °F (36.7 °C)  Heart Rate:  [73-94] 88  Resp:  [17-22] 22  BP: ()/(26-67) 118/67  Physical Exam      General : Alert, awake, in mild distress because of left-sided neck pain   CVS : Regular rate and rhythm, no murmur, rubs or gallops  Lungs: Clear to auscultation bilaterally, no crackles or rhonchi  Abdomen: Soft, nontender, bowel sounds heard in all 4 quadrants  Extremities: Warm, well-perfused, no pedal edema    Scheduled Meds:!Patient Home Medications Stored in Pharmacy, , Does not apply, Once  aspirin, 81 mg, Oral, Daily  atorvastatin, 40 mg, Oral, Nightly  colchicine, 0.3 mg, Oral, Daily  nicotine, 1 patch, Transdermal, Q24H  pantoprazole, 40 mg, Oral, Q AM  senna-docusate sodium, 2 tablet, Oral, BID  sodium chloride, 1,000 mL, Intravenous, Once  ticagrelor, 90 mg, Oral, BID  Vortioxetine HBr, 10 mg, Oral, Daily      Continuous Infusions:sodium chloride, 125 mL/hr, Last Rate: 125 mL/hr (21  0806)           Result Review    Result Review:  I have personally reviewed the results from the time of this admission to 6/16/2021 08:49 EDT and agree with these findings:  [x]  Laboratory  []  Microbiology  [x]  Radiology  [x]  EKG/Telemetry   [x]  Cardiology/Vascular   []  Pathology  []  Old records  []  Other:  Most notable findings include:     CBC    CBC 6/14/21 6/14/21 6/15/21 6/16/21    0406 0736     WBC 17.95 (A) 19.79 (A) 19.74 (A) 21.46 (A)   RBC 5.48 (A) 5.43 (A) 5.36 (A) 5.08   Hemoglobin 14.9 14.7 14.4 13.5   Hematocrit 46.2 45.9 45.6 42.2   MCV 84.3 84.5 85.1 83.1   MCH 27.2 27.1 26.9 26.6   MCHC 32.3 32.0 31.6 32.0   RDW 13.8 13.8 14.0 13.9   Platelets 280 266 262 244   (A) Abnormal value            CMP    CMP 6/14/21 6/14/21 6/15/21 6/16/21    0406 0736     Glucose 232 (A) 159 (A) 149 (A) 141 (A)   BUN 16 14 12 27 (A)   Creatinine 0.69 0.62 0.80 1.90 (A)   eGFR Non  Am 87 98 73 27 (A)   Sodium 133 (A) 131 (A) 133 (A) 133 (A)   Potassium 4.3 4.1 4.6 4.2   Chloride 93 (A) 95 (A) 95 (A) 97 (A)   Calcium 9.9 9.0 9.3 8.6   Albumin 4.10  4.00 3.60   Total Bilirubin 0.4  1.4 (A) 1.1   Alkaline Phosphatase 135 (A)  126 (A) 119 (A)   AST (SGOT) 27  70 (A) 43 (A)   ALT (SGPT) 17  24 17   (A) Abnormal value             CARDIAC LABS:      Lab 06/15/21  0311 06/14/21  1005 06/14/21  0407   PROBNP  --   --  236.2   TROPONIN T 1.080* 0.506* 0.108*   PROTIME  --   --  9.7   INR  --   --  0.86*      Results for orders placed during the hospital encounter of 06/14/21    Adult Transthoracic Echo Complete w/ Color, Spectral and Contrast if necessary per protocol    Interpretation Summary  · Left ventricular systolic function is normal.  No pericardial effusion noted    Assessment/Plan   Assessment / Plan     Brief Patient Summary:  Desiree Garcia is a 61 y.o. female who was admitted with acute inferior system elevation myocardial infarction, status post primary and plastic stent placement right coronary  artery.  Currently patient reports constant left-sided neck pain and chest pain.  Chest pain is a pleuritic quality.  Cardiac markers trending down.  Patient developed acute kidney injury with a creatinine of 1.9 today.    Active Hospital Problems:  Active Hospital Problems    Diagnosis    • **ST elevation myocardial infarction (STEMI) (CMS/[facility])    • Type 2 diabetes mellitus, without long-term current use of insulin (CMS/[facility])    • STEMI involving right coronary artery (CMS/[facility])    Acute kidney injury        Plan:     1.  Acute kidney injury : Possibly induced by contrast, hypertension.  Patient was taking Metformin before admission.  Home lisinopril was restarted yesterday since her renal functions were stable.  Will hold further dose of lisinopril.  Start IV fluid hydration, normal saline 125 mL/h continuous.  Repeat BMP in the evening.    2.  STEMI/coronary artery disease ; cardiac markers trending down, some of the EKG changes persisting.  No events on telemetry.  She reports continuous chest pain, with pleuritic quality.  Echo showed no evidence of pericardial effusion.  Will empirically start colchicine 0.3 mg twice daily for possible pericarditis.  Will do a chest x-ray today.  Continue aspirin, Brilinta, statins today. Holding beta-blockers due to low blood pressure.    3.  Depression : Home antidepressants restarted on 6/15/2021       CODE STATUS:   Level Of Support Discussed With: Patient  Code Status: CPR  Medical Interventions (Level of Support Prior to Arrest): Full      Electronically signed by Sidney Xiao MD, 06/16/21, 8:49 AM EDT.

## 2021-06-17 ENCOUNTER — READMISSION MANAGEMENT (OUTPATIENT)
Dept: CALL CENTER | Facility: HOSPITAL | Age: 61
End: 2021-06-17

## 2021-06-17 VITALS
OXYGEN SATURATION: 92 % | BODY MASS INDEX: 31.22 KG/M2 | SYSTOLIC BLOOD PRESSURE: 108 MMHG | RESPIRATION RATE: 22 BRPM | TEMPERATURE: 98.1 F | DIASTOLIC BLOOD PRESSURE: 73 MMHG | WEIGHT: 187.39 LBS | HEIGHT: 65 IN | HEART RATE: 88 BPM

## 2021-06-17 PROBLEM — N17.9 ACUTE KIDNEY INJURY (HCC): Status: ACTIVE | Noted: 2021-06-17

## 2021-06-17 PROBLEM — N17.9 ACUTE KIDNEY INJURY: Status: RESOLVED | Noted: 2021-06-17 | Resolved: 2021-06-17

## 2021-06-17 LAB
ALBUMIN SERPL-MCNC: 3.7 G/DL (ref 3.5–5.2)
ALBUMIN/GLOB SERPL: 1 G/DL
ALP SERPL-CCNC: 127 U/L (ref 39–117)
ALT SERPL W P-5'-P-CCNC: 18 U/L (ref 1–33)
ANION GAP SERPL CALCULATED.3IONS-SCNC: 10.7 MMOL/L (ref 5–15)
AST SERPL-CCNC: 29 U/L (ref 1–32)
BILIRUB SERPL-MCNC: 0.7 MG/DL (ref 0–1.2)
BUN SERPL-MCNC: 17 MG/DL (ref 8–23)
BUN/CREAT SERPL: 20 (ref 7–25)
CALCIUM SPEC-SCNC: 9.1 MG/DL (ref 8.6–10.5)
CHLORIDE SERPL-SCNC: 99 MMOL/L (ref 98–107)
CO2 SERPL-SCNC: 24.3 MMOL/L (ref 22–29)
CREAT SERPL-MCNC: 0.85 MG/DL (ref 0.57–1)
DEPRECATED RDW RBC AUTO: 43.5 FL (ref 37–54)
ERYTHROCYTE [DISTWIDTH] IN BLOOD BY AUTOMATED COUNT: 14.1 % (ref 12.3–15.4)
GFR SERPL CREATININE-BSD FRML MDRD: 68 ML/MIN/1.73
GLOBULIN UR ELPH-MCNC: 3.7 GM/DL
GLUCOSE BLDC GLUCOMTR-MCNC: 135 MG/DL (ref 70–130)
GLUCOSE BLDC GLUCOMTR-MCNC: 160 MG/DL (ref 70–130)
GLUCOSE SERPL-MCNC: 174 MG/DL (ref 65–99)
HCT VFR BLD AUTO: 43.8 % (ref 34–46.6)
HGB BLD-MCNC: 13.8 G/DL (ref 12–15.9)
MAGNESIUM SERPL-MCNC: 2.3 MG/DL (ref 1.6–2.4)
MCH RBC QN AUTO: 26.9 PG (ref 26.6–33)
MCHC RBC AUTO-ENTMCNC: 31.5 G/DL (ref 31.5–35.7)
MCV RBC AUTO: 85.4 FL (ref 79–97)
PLATELET # BLD AUTO: 285 10*3/MM3 (ref 140–450)
PMV BLD AUTO: 10.3 FL (ref 6–12)
POTASSIUM SERPL-SCNC: 3.9 MMOL/L (ref 3.5–5.2)
PROT SERPL-MCNC: 7.4 G/DL (ref 6–8.5)
RBC # BLD AUTO: 5.13 10*6/MM3 (ref 3.77–5.28)
SODIUM SERPL-SCNC: 134 MMOL/L (ref 136–145)
WBC # BLD AUTO: 19.61 10*3/MM3 (ref 3.4–10.8)

## 2021-06-17 PROCEDURE — 99238 HOSP IP/OBS DSCHRG MGMT 30/<: CPT | Performed by: INTERNAL MEDICINE

## 2021-06-17 PROCEDURE — 85027 COMPLETE CBC AUTOMATED: CPT | Performed by: INTERNAL MEDICINE

## 2021-06-17 PROCEDURE — 83735 ASSAY OF MAGNESIUM: CPT | Performed by: INTERNAL MEDICINE

## 2021-06-17 PROCEDURE — 80053 COMPREHEN METABOLIC PANEL: CPT | Performed by: INTERNAL MEDICINE

## 2021-06-17 PROCEDURE — 82962 GLUCOSE BLOOD TEST: CPT

## 2021-06-17 PROCEDURE — 94799 UNLISTED PULMONARY SVC/PX: CPT

## 2021-06-17 RX ORDER — CARVEDILOL 3.12 MG/1
3.12 TABLET ORAL EVERY 12 HOURS SCHEDULED
Qty: 60 TABLET | Refills: 3 | Status: SHIPPED | OUTPATIENT
Start: 2021-06-17 | End: 2022-05-03 | Stop reason: SDUPTHER

## 2021-06-17 RX ORDER — ASPIRIN 81 MG/1
81 TABLET ORAL DAILY
Qty: 30 TABLET | Refills: 3 | Status: SHIPPED | OUTPATIENT
Start: 2021-06-18 | End: 2022-05-03 | Stop reason: SDUPTHER

## 2021-06-17 RX ORDER — ATORVASTATIN CALCIUM 40 MG/1
40 TABLET, FILM COATED ORAL NIGHTLY
Qty: 30 TABLET | Refills: 3 | Status: SHIPPED | OUTPATIENT
Start: 2021-06-17 | End: 2022-05-03 | Stop reason: SDUPTHER

## 2021-06-17 RX ADMIN — CARVEDILOL 3.12 MG: 3.12 TABLET, FILM COATED ORAL at 08:52

## 2021-06-17 RX ADMIN — COLCHICINE 0.3 MG: 0.6 TABLET, FILM COATED ORAL at 08:52

## 2021-06-17 RX ADMIN — NICOTINE 1 PATCH: 21 PATCH, EXTENDED RELEASE TRANSDERMAL at 08:50

## 2021-06-17 RX ADMIN — Medication: at 13:42

## 2021-06-17 RX ADMIN — VORTIOXETINE 10 MG: 10 TABLET, FILM COATED ORAL at 08:52

## 2021-06-17 RX ADMIN — SODIUM CHLORIDE, PRESERVATIVE FREE 10 ML: 5 INJECTION INTRAVENOUS at 08:50

## 2021-06-17 RX ADMIN — TICAGRELOR 90 MG: 90 TABLET ORAL at 08:51

## 2021-06-17 RX ADMIN — ASPIRIN 81 MG: 81 TABLET, COATED ORAL at 08:51

## 2021-06-17 RX ADMIN — PANTOPRAZOLE SODIUM 40 MG: 40 TABLET, DELAYED RELEASE ORAL at 06:30

## 2021-06-17 NOTE — CASE MANAGEMENT/SOCIAL WORK
Pt admitted for STEMI, pt Underwent LHC with PCI to RCA. Pt will d/c on Brilinta and aspirin.     Cardiac Rehab has been consulted. Pt informed of this and was agreeable.     CM added clinical references for Coronary stenting and radial care.    CM called pharmacy for pricing of Brilinta, no cost to patient with insurance.    Pt will be informed prior to d/c.

## 2021-06-17 NOTE — OUTREACH NOTE
Prep Survey      Responses   [facility] facility patient discharged from?  [facility]   Is LACE score < 7 ?  No   Emergency Room discharge w/ pulse ox?  No   Eligibility  [facility]   Date of Admission  06/14/21   Date of Discharge  06/17/21   Discharge Disposition  Home or Self Care   Discharge diagnosis  CARDIAC CATHETERIZATION  STEMI   Does the patient have one of the following disease processes/diagnoses(primary or secondary)?  Acute MI (STEMI,NSTEMI)   Does the patient have Home health ordered?  No   Is there a DME ordered?  No   Prep survey completed?  Yes          Citlaly Saleh RN

## 2021-06-17 NOTE — DISCHARGE SUMMARY
[facility]         CARDIOLOGY DISCHARGE SUMMARY    Patient Name: Desiree Garcia  : 1960  MRN: [id number]    Date of Admission: 2021  Date of Discharge: 2021  Primary Care Physician: Sandra Kaba MD    Consults     Date and Time Order Name Status Description    2021  6:33 AM Inpatient Intensivist Consult Completed     2021  4:03 AM Consult Interventional Cardiology and Notify Cath Lab Completed           Presenting Problem:   Unstable angina pectoris (CMS/HCC) [I20.0]  ST elevation myocardial infarction (STEMI), unspecified artery (CMS/HCC) [I21.3]  STEMI involving right coronary artery (CMS/HCC) [I21.11]    Active and Resolved Hospital Problems:  Active Hospital Problems    Diagnosis POA   • **ST elevation myocardial infarction (STEMI) (CMS/HCC) [I21.3] Yes   • Type 2 diabetes mellitus, without long-term current use of insulin (CMS/HCC) [E11.9] Yes   • STEMI involving right coronary artery (CMS/HCC) [I21.11] Yes      Resolved Hospital Problems    Diagnosis POA   • Acute kidney injury (CMS/HCC) [N17.9] No         Hospital Course     Hospital Course:  Desiree Garcia is a 61 y.o. female with a diabetes mellitus, hypertension, who presented to the emergency room because of chest pain of few hours duration.  EKG is done in the ER showed presence of acute inferior she still elevation myocardial infarction.  Patient underwent emergent cardiac catheterization which showed a subtotal thrombotic occlusion of the mid right coronary artery.  She underwent angioplasty and stent placement to the mid right coronary artery with drug-eluting stents.  Please see the procedure note for further details.  She was subsequently admitted to intensive care unit.  For the next 2 days she reported constant pleuritic chest pain.  However EKG changes resolved cardiac markers trended down the pain was managed conservatively.  Echocardiogram done showed preserved LV function and no  evidence of pericardial effusion.  Patient has noted episodes of hypotension on 6/15/2021.  The next morning her creatinine was 1.9 and of note she came with a normal creatinine at the time of admission.  Of note, home lisinopril was restarted on 6/15/2021 as well.  Lisinopril was discontinued and she was given IV fluid hydration. On 6/17/2021, BUN and creatinine were back to her baseline and normal.  She did not have any further episodes of chest pain. She was noted to be in good spirits, hemodynamically stable.  Telemetry did not show any arrhythmias.  She was also noted her persistent leukocytosis however it was trending down.  She did not have any fever or chills or any other signs or symptoms history of an active infection.  She is being discharged home in a stable condition on dual endplate therapy, statins and beta-blockers.  Metformin can be restarted on 6/18/2021.  Will hold lisinopril for the next 2 to 3 weeks until the repeat BMP is done because of recent acute kidney injury.  Extensive counseling regarding medication compliance especially compliance with dual alpha therapy was performed.   She will follow up with primary care provider and cardiology within the next 2 weeks.      DISCHARGE Follow Up Recommendations for labs and diagnostics:    1.  Follow-up with cardiology and primary care provider in 2 weeks  2.  Repeat BMP in 2 to 3 weeks, hold lisinopril until BMP is done  3.  Start taking home Metformin on 6/18/2021      Day of Discharge     Vital Signs:  Temp:  [98.1 °F (36.7 °C)-98.6 °F (37 °C)] 98.1 °F (36.7 °C)  Heart Rate:  [87-92] 88  Resp:  [16-22] 22  BP: (108-140)/(61-77) 108/73  Physical Exam:    General : Alert, awake, no acute distress, pleasant  HEENT: No pallor anicteric, extraocular motors normal  Neck: Supple, no carotid bruit, nodular venous distention, thyroid nonpalpable  CVS : Regular rate and rhythm, no murmur, rubs or gallops  Lungs: Clear to auscultation bilaterally, no crackles  or rhonchi  Abdomen: Soft, nontender, bowel sounds heard in all 4 quadrants  Extremities: Warm, well-perfused, no pedal edema  Neuro: Alert, awake, oriented x3, no focal neurological deficits  Skin: No skin rashes    Pertinent  and/or Most Recent Results     LAB RESULTS:      Lab 06/17/21  0416 06/16/21  0418 06/15/21  0311 06/14/21  0736 06/14/21  0407 06/14/21  0406   WBC 19.61* 21.46* 19.74* 19.79*  --  17.95*   HEMOGLOBIN 13.8 13.5 14.4 14.7  --  14.9   HEMATOCRIT 43.8 42.2 45.6 45.9  --  46.2   PLATELETS 285 244 262 266  --  280   NEUTROS ABS  --  15.71* 14.30*  --   --  11.75*   IMMATURE GRANS (ABS)  --  0.29* 0.17*  --   --  0.13*   LYMPHS ABS  --  3.37* 3.45*  --   --  4.21*   MONOS ABS  --  1.83* 1.52*  --   --  1.44*   EOS ABS  --  0.14 0.18  --   --  0.27   MCV 85.4 83.1 85.1 84.5  --  84.3   PROTIME  --   --   --   --  9.7  --          Lab 06/17/21  0416 06/16/21  1518 06/16/21  0418 06/15/21  0311 06/14/21  0736 06/14/21  0407 06/14/21  0406   SODIUM 134* 131* 133* 133* 131*  --  133*   POTASSIUM 3.9 3.7 4.2 4.6 4.1  --  4.3   CHLORIDE 99 97* 97* 95* 95*  --  93*   CO2 24.3 23.2 24.6 29.5* 24.5  --  27.0   ANION GAP 10.7 10.8 11.4 8.5 11.5  --  13.0   BUN 17 24* 27* 12 14  --  16   CREATININE 0.85 1.15* 1.90* 0.80 0.62  --  0.69   GLUCOSE 174* 187* 141* 149* 159*  --  232*   CALCIUM 9.1 8.6 8.6 9.3 9.0  --  9.9   MAGNESIUM 2.3  --  2.2 2.1  --  2.1 2.1   PHOSPHORUS  --   --   --   --  3.6  --   --    HEMOGLOBIN A1C  --   --   --   --   --   --  7.19*   TSH  --   --   --   --   --  5.660*  --          Lab 06/17/21  0416 06/16/21  0418 06/15/21  0311 06/14/21  0406   TOTAL PROTEIN 7.4 6.9 7.2 7.3   ALBUMIN 3.70 3.60 4.00 4.10   GLOBULIN 3.7 3.3 3.2 3.2   ALT (SGPT) 18 17 24 17   AST (SGOT) 29 43* 70* 27   BILIRUBIN 0.7 1.1 1.4* 0.4   ALK PHOS 127* 119* 126* 135*         Lab 06/15/21  0311 06/14/21  1005 06/14/21  0407   PROBNP  --   --  236.2   TROPONIN T 1.080* 0.506* 0.108*   PROTIME  --   --  9.7   INR   --   --  0.86*         Lab 06/14/21  0407   CHOLESTEROL 160   LDL CHOL 72   HDL CHOL 48   TRIGLYCERIDES 249*         Lab 06/16/21  0418   TRANSFERRIN 223         Brief Urine Lab Results  (Last result in the past 365 days)      Color   Clarity   Blood   Leuk Est   Nitrite   Protein   CREAT   Urine HCG        03/11/21 1410 Yellow Clear Negative Negative Negative Trace                          Results for orders placed during the hospital encounter of 06/14/21    Adult Transthoracic Echo Complete w/ Color, Spectral and Contrast if necessary per protocol    Interpretation Summary  · Left ventricular systolic function is normal.  Study was technically difficult.    Labs Pending at Discharge: None        Discharge Details        Discharge Medications      New Medications      Instructions Start Date   aspirin 81 MG EC tablet   81 mg, Oral, Daily   Start Date: June 18, 2021     atorvastatin 40 MG tablet  Commonly known as: LIPITOR   40 mg, Oral, Nightly      carvedilol 3.125 MG tablet  Commonly known as: COREG   3.125 mg, Oral, Every 12 Hours Scheduled      ticagrelor 90 MG tablet tablet  Commonly known as: BRILINTA   90 mg, Oral, 2 Times Daily         Continue These Medications      Instructions Start Date   metFORMIN 500 MG tablet  Commonly known as: GLUCOPHAGE   500 mg, Oral, 2 times daily      traMADol 50 MG tablet  Commonly known as: ULTRAM   50 mg, Oral, Every 4 Hours PRN      Trintellix 10 MG tablet  Generic drug: Vortioxetine HBr   10 mg, Oral, Daily         Stop These Medications    lisinopril 5 MG tablet  Commonly known as: PRINIVIL,ZESTRIL            No Known Allergies      Discharge Disposition:  Home or Self Care    Diet:  Hospital:  Diet Order   Procedures   • Diet Regular; Cardiac         Discharge Activity:   Activity Instructions     Activity as Tolerated              CODE STATUS:  Code Status and Medical Interventions:   Ordered at: 06/14/21 0533     Level Of Support Discussed With:    Patient     Code  Status:    CPR     Medical Interventions (Level of Support Prior to Arrest):    Full         Future Appointments   Date Time Provider Department Center   6/28/2021  2:15 PM [facility]aba MD [facility]   7/2/2021  9:30 AM Jose Yanez MD [facility]       Additional Instructions for the Follow-ups that You Need to Schedule     Discharge Follow-up with PCP   As directed       Currently Documented PCP:    Sandra Kaba MD    PCP Phone Number:    [phone]     Follow Up Details: Follow up in 2 weeks               Time spent on Discharge including face to face service: 30 minutes    Electronically signed by Sidney Xiao MD, 06/17/21, 12:31 PM EDT.

## 2021-06-17 NOTE — NURSING NOTE
Cardiac Rehab Phase I - Occurrence #1    Education Topics:  Cardiac Rehab yes   No Phase I need assessed no     Topic Components:  Exercise yes     Teaching Aids:  Phase 2 Brochure yes     Teaching Method:  Written Instruction yes     Teaching Recipient:  Patient yes       Recovery/Discharge Instructions:  Cardiac Rehab Phase 2 yes     Cardiac Rehab Phase I Comm  Cardiac Rehab information sent to pt via mail

## 2021-06-17 NOTE — PLAN OF CARE
"Goal Outcome Evaluation:      NO CHANGE IN PT STATUS SINCE ARRIVING ON UNIT. PT STATED SHE CONTINUES TO HAVE \"MUSCLE SPASMS IN LEFT SIDE OF NECK.\" VITAL SIGNS STABLE.            "
Goal Outcome Evaluation:              Outcome Summary: NO CHANGE  
Goal Outcome Evaluation:           Progress: improving  Outcome Summary: Patient did well throughout the night, no complaints of chest pain.  
Goal Outcome Evaluation:      PT BLOOD PRESSURE IMPROVING. POSSIBLE DC TOMORROW.            
Goal Outcome Evaluation:  Plan of Care Reviewed With: patient        Progress: no change  Outcome Summary: Patient continues to have Left sided neck pain. Patient in tears this morning holding chest due to pain. States it has been like this for 2 days with no changes. Patient states she just wants to go home. RN educated patient on importance of remaining in hospital at this time. Patient states understanding.  Patient continues to deny nicotine patch. Patient appears anxious and states she takes depression medications at home but none have been given to her since admission. Dr. Piper called this [facility]             Patient's home antidepressant medication has been restarted on 6/15/2021.   
Goal Outcome Evaluation:  Plan of Care Reviewed With: patient   NO C/O CHEST PAIN. VSS.      Progress: improving     
Goal Outcome Evaluation: Pt being discharged                 
Patient tolerated procedure well.
Patient tolerated procedure well.

## 2021-06-18 ENCOUNTER — TRANSITIONAL CARE MANAGEMENT TELEPHONE ENCOUNTER (OUTPATIENT)
Dept: CALL CENTER | Facility: HOSPITAL | Age: 61
End: 2021-06-18

## 2021-06-18 NOTE — OUTREACH NOTE
Call Center TCM Note      Responses   [facility] patient discharged from?  [facility]   Does the patient have one of the following disease processes/diagnoses(primary or secondary)?  Acute MI (STEMI,NSTEMI)   TCM attempt successful?  Yes   Call start time  1002   Call end time  1009   Discharge diagnosis  CARDIAC CATHETERIZATION  STEMI   Medication alerts for this patient  Bril[place]   Meds reviewed with patient/caregiver?  Yes   Is the patient having any side effects they believe may be caused by any medication additions or changes?  No   Does the patient have all prescriptions related to this admission filled (includes statins,anticoagulants,HTN meds,anti-arrhythmia meds)  Yes   Is the patient taking all medications as directed (includes completed medication regime)?  Yes   Comments regarding appointments  Cardiology 6/28/21 @1000am   Does the patient have a primary care provider?   Yes   Does the patient have an appointment with their PCP,cardiologist,or clinic within 7 days of discharge?  Greater than 7 days   What is preventing the patient from scheduling follow up appointments within 7 days of discharge?  Earlier appointment not available   Nursing Interventions  Verified appointment date/time/provider [PCP appt scheduled for 6/28/21.  Appt date meets TCM guidelines but noted to be a  15 minute appt,  will alert staff  this is a hospital f/u appt.]   Has the patient kept scheduled appointments due by today?  N/A   What is the Home health agency?   na   Has home health visited the patient within 72 hours of discharge?  N/A   Psychosocial issues?  No   Did the patient receive a copy of their discharge instructions?  Yes   Nursing interventions  Reviewed instructions with patient   What is the patient's perception of their health status since discharge?  Same [Patient complains of bruising to arms from IV sites/venipunctures]   Nursing interventions  Nurse provided patient education   Is the patient/caregiver  able to teach back signs and symptoms of when to call for help immediately:  Sudden chest discomfort, Sudden discomfort in arms, back, neck or jaw, Shortness of breath at any time, Sudden sweating or clammy skin, Nausea or vomiting, Dizziness or lightheadedness, Irregular or rapid heart rate   Nursing interventions  Nurse provided patient education   Is the pateint /caregiver able to teach back the importance of cardiac rehab?  No [REviewed with patient]   Nursing interventions  Provided education on importance of cardiac rehab   Is the patient/caregiver able to teach back lifestyle changes to help prevent MIs  Regular exercise as approved by provider, Heart healthy diet, Managing diabetes, Reducing stress, Quit smoking   Is the patient/caregiver able to teach back ways to prevent a second heart attack:  Take medications, Follow up with MD, Manage risk factors   If the patient is a current smoker, are they able to teach back resources for cessation?  Smoking cessation medications, Smoking cessation support groups   Is the patient/caregiver able to teach back the hierarchy of who to call/visit for symptoms/problems? PCP, Specialist, Home health nurse, Urgent Care, ED, 911  Yes   TCM call completed?  Yes          Latesha Bass RN    6/18/2021, 10:15 EDT

## 2021-06-18 NOTE — OUTREACH NOTE
Call Center TCM Note      Responses   [facility] patient discharged from?  [facility]   Does the patient have one of the following disease processes/diagnoses(primary or secondary)?  Acute MI (STEMI,NSTEMI)   TCM attempt successful?  No [No verbal release]   Unsuccessful attempts  Attempt 1          Latesha Bass RN    6/18/2021, 09:35 EDT

## 2021-06-21 ENCOUNTER — APPOINTMENT (OUTPATIENT)
Dept: GENERAL RADIOLOGY | Facility: HOSPITAL | Age: 61
End: 2021-06-21

## 2021-06-21 ENCOUNTER — HOSPITAL ENCOUNTER (EMERGENCY)
Facility: HOSPITAL | Age: 61
Discharge: HOME | End: 2021-06-22
Attending: EMERGENCY MEDICINE | Admitting: EMERGENCY MEDICINE

## 2021-06-21 DIAGNOSIS — R53.1 WEAKNESS: ICD-10-CM

## 2021-06-21 DIAGNOSIS — N30.00 ACUTE CYSTITIS WITHOUT HEMATURIA: Primary | ICD-10-CM

## 2021-06-21 LAB
ALBUMIN SERPL-MCNC: 4 G/DL (ref 3.5–5.2)
ALBUMIN/GLOB SERPL: 1.1 G/DL
ALP SERPL-CCNC: 139 U/L (ref 39–117)
ALT SERPL W P-5'-P-CCNC: 22 U/L (ref 1–33)
ANION GAP SERPL CALCULATED.3IONS-SCNC: 13.2 MMOL/L (ref 5–15)
AST SERPL-CCNC: 19 U/L (ref 1–32)
BASOPHILS # BLD AUTO: 0.13 10*3/MM3 (ref 0–0.2)
BASOPHILS NFR BLD AUTO: 0.8 % (ref 0–1.5)
BILIRUB SERPL-MCNC: 0.4 MG/DL (ref 0–1.2)
BUN SERPL-MCNC: 13 MG/DL (ref 8–23)
BUN/CREAT SERPL: 16 (ref 7–25)
CALCIUM SPEC-SCNC: 9.8 MG/DL (ref 8.6–10.5)
CHLORIDE SERPL-SCNC: 98 MMOL/L (ref 98–107)
CO2 SERPL-SCNC: 24.8 MMOL/L (ref 22–29)
CREAT SERPL-MCNC: 0.81 MG/DL (ref 0.57–1)
DEPRECATED RDW RBC AUTO: 40.4 FL (ref 37–54)
EOSINOPHIL # BLD AUTO: 0.28 10*3/MM3 (ref 0–0.4)
EOSINOPHIL NFR BLD AUTO: 1.8 % (ref 0.3–6.2)
ERYTHROCYTE [DISTWIDTH] IN BLOOD BY AUTOMATED COUNT: 13.4 % (ref 12.3–15.4)
GFR SERPL CREATININE-BSD FRML MDRD: 72 ML/MIN/1.73
GLOBULIN UR ELPH-MCNC: 3.6 GM/DL
GLUCOSE SERPL-MCNC: 175 MG/DL (ref 65–99)
HCT VFR BLD AUTO: 42.6 % (ref 34–46.6)
HGB BLD-MCNC: 13.7 G/DL (ref 12–15.9)
HOLD SPECIMEN: NORMAL
HOLD SPECIMEN: NORMAL
IMM GRANULOCYTES # BLD AUTO: 0.09 10*3/MM3 (ref 0–0.05)
IMM GRANULOCYTES NFR BLD AUTO: 0.6 % (ref 0–0.5)
LYMPHOCYTES # BLD AUTO: 3.81 10*3/MM3 (ref 0.7–3.1)
LYMPHOCYTES NFR BLD AUTO: 24.6 % (ref 19.6–45.3)
MAGNESIUM SERPL-MCNC: 1.8 MG/DL (ref 1.6–2.4)
MCH RBC QN AUTO: 26.8 PG (ref 26.6–33)
MCHC RBC AUTO-ENTMCNC: 32.2 G/DL (ref 31.5–35.7)
MCV RBC AUTO: 83.2 FL (ref 79–97)
MONOCYTES # BLD AUTO: 0.93 10*3/MM3 (ref 0.1–0.9)
MONOCYTES NFR BLD AUTO: 6 % (ref 5–12)
NEUTROPHILS NFR BLD AUTO: 10.27 10*3/MM3 (ref 1.7–7)
NEUTROPHILS NFR BLD AUTO: 66.2 % (ref 42.7–76)
NRBC BLD AUTO-RTO: 0 /100 WBC (ref 0–0.2)
PLATELET # BLD AUTO: 373 10*3/MM3 (ref 140–450)
PMV BLD AUTO: 9.9 FL (ref 6–12)
POTASSIUM SERPL-SCNC: 3.6 MMOL/L (ref 3.5–5.2)
PROT SERPL-MCNC: 7.6 G/DL (ref 6–8.5)
RBC # BLD AUTO: 5.12 10*6/MM3 (ref 3.77–5.28)
SODIUM SERPL-SCNC: 136 MMOL/L (ref 136–145)
TROPONIN I SERPL-MCNC: 0.21 NG/ML (ref 0–0.6)
TROPONIN T SERPL-MCNC: 0.24 NG/ML (ref 0–0.03)
WBC # BLD AUTO: 15.51 10*3/MM3 (ref 3.4–10.8)
WHOLE BLOOD HOLD SPECIMEN: NORMAL

## 2021-06-21 PROCEDURE — 99283 EMERGENCY DEPT VISIT LOW MDM: CPT

## 2021-06-21 PROCEDURE — 93005 ELECTROCARDIOGRAM TRACING: CPT

## 2021-06-21 PROCEDURE — 84484 ASSAY OF TROPONIN QUANT: CPT

## 2021-06-21 PROCEDURE — 36415 COLL VENOUS BLD VENIPUNCTURE: CPT

## 2021-06-21 PROCEDURE — 81001 URINALYSIS AUTO W/SCOPE: CPT | Performed by: EMERGENCY MEDICINE

## 2021-06-21 PROCEDURE — 84484 ASSAY OF TROPONIN QUANT: CPT | Performed by: EMERGENCY MEDICINE

## 2021-06-21 PROCEDURE — 93005 ELECTROCARDIOGRAM TRACING: CPT | Performed by: EMERGENCY MEDICINE

## 2021-06-21 PROCEDURE — 83735 ASSAY OF MAGNESIUM: CPT

## 2021-06-21 PROCEDURE — 71045 X-RAY EXAM CHEST 1 VIEW: CPT

## 2021-06-21 PROCEDURE — 85025 COMPLETE CBC W/AUTO DIFF WBC: CPT

## 2021-06-21 PROCEDURE — 80053 COMPREHEN METABOLIC PANEL: CPT

## 2021-06-21 RX ORDER — SODIUM CHLORIDE 0.9 % (FLUSH) 0.9 %
10 SYRINGE (ML) INJECTION AS NEEDED
Status: DISCONTINUED | OUTPATIENT
Start: 2021-06-21 | End: 2021-06-22 | Stop reason: HOSPADM

## 2021-06-22 VITALS
SYSTOLIC BLOOD PRESSURE: 137 MMHG | HEIGHT: 66 IN | HEART RATE: 87 BPM | WEIGHT: 187.39 LBS | DIASTOLIC BLOOD PRESSURE: 81 MMHG | BODY MASS INDEX: 30.12 KG/M2 | TEMPERATURE: 97.6 F | OXYGEN SATURATION: 96 % | RESPIRATION RATE: 20 BRPM

## 2021-06-22 LAB
BACTERIA UR QL AUTO: ABNORMAL /HPF
BILIRUB UR QL STRIP: NEGATIVE
CLARITY UR: ABNORMAL
COLOR UR: YELLOW
GLUCOSE UR STRIP-MCNC: NEGATIVE MG/DL
HGB UR QL STRIP.AUTO: NEGATIVE
HYALINE CASTS UR QL AUTO: ABNORMAL /LPF
KETONES UR QL STRIP: NEGATIVE
LEUKOCYTE ESTERASE UR QL STRIP.AUTO: NEGATIVE
NITRITE UR QL STRIP: POSITIVE
PH UR STRIP.AUTO: <=5 [PH] (ref 5–8)
PROT UR QL STRIP: NEGATIVE
QT INTERVAL: 326 MS
RBC # UR: ABNORMAL /HPF
REF LAB TEST METHOD: ABNORMAL
SP GR UR STRIP: 1.02 (ref 1–1.03)
SQUAMOUS #/AREA URNS HPF: ABNORMAL /HPF
TROPONIN T SERPL-MCNC: 0.24 NG/ML (ref 0–0.03)
UROBILINOGEN UR QL STRIP: ABNORMAL
WBC UR QL AUTO: ABNORMAL /HPF

## 2021-06-22 PROCEDURE — 96372 THER/PROPH/DIAG INJ SC/IM: CPT

## 2021-06-22 PROCEDURE — 25010000002 CEFTRIAXONE PER 250 MG: Performed by: EMERGENCY MEDICINE

## 2021-06-22 RX ORDER — NITROFURANTOIN 25; 75 MG/1; MG/1
100 CAPSULE ORAL 2 TIMES DAILY
Qty: 14 CAPSULE | Refills: 0 | Status: SHIPPED | OUTPATIENT
Start: 2021-06-22 | End: 2021-06-29

## 2021-06-22 RX ADMIN — LIDOCAINE HYDROCHLORIDE 1 G: 10 INJECTION, SOLUTION EPIDURAL; INFILTRATION; INTRACAUDAL; PERINEURAL at 01:37

## 2021-06-22 NOTE — ED TRIAGE NOTES
Pt reports weakness since Thursday when she was d/c from hospital after MI. Pt also states it feels like her abdomen drops when she stands and that she has a knot on her head that is not visible. Pt family member states the pt seems off since being d/c from hospital.

## 2021-06-22 NOTE — ED PROVIDER NOTES
Time: 2210  Arrived by: private car  Chief Complaint:   Chief Complaint   Patient presents with   • Weakness - Generalized     History provided by: patient  History is limited by: N/A     History of Present Illness:  Patient is a 61 y.o. year old female that presents to the emergency department with  generalized weakness.  Patient was recently admitted for an MI.  She states she has been having generalized weakness at home since discharge.  She states her legs feel like jelly.  She reports mild cough.  No fever no chills.  She also report lower abdominal discomfort. She denies any chest pain at this time.  No shortness of breath.    Onset of Symptoms: 4 day(s) ago.  Timing: constant   Content: Feels weak. Still able to ambulate  Location: n/a  Quality: weakness  Severity: moderate  Modifying factors: nothing improves nothing worsens  Associated signs/symptoms: none    Similar Symptoms Previously: no  Recently seen: yes admitted for MI      Patient Care Team  Primary Care Provider: Sandra Kaba MD    Past Medical History:       No Known Allergies  Past Medical History:   Diagnosis Date   • Arthritis    • Cervical cancer screening    • COPD (chronic obstructive pulmonary disease) (CMS/[facility])    • Depression with anxiety    • Diabetes mellitus (CMS/[facility])    • Forgetfulness    • Gastric reflux    • Hypertension    • Leg paresthesia 01/20/2017    Right   • Limb swelling    • Lumbago 01/20/2017    Low back pain   • Lumbar spinal stenosis 02/23/2017    L4/5 moderate to severe central canal stenosis   • Lumbosacral radiculopathy 01/20/2017    Right   • Migraines    • Night sweat    • Reflux esophagitis    • Shortness of breath    • Stomach disorder      Past Surgical History:   Procedure Laterality Date   • ABDOMINAL SURGERY      3 C-SECTIONS   • CARDIAC CATHETERIZATION     • CARDIAC CATHETERIZATION N/A 6/14/2021    Procedure: Left Heart Cath;  Surgeon: Sidney Xiao MD;  Location: [facility] INVASIVE LOCATION;   Service: Cardiology;  Laterality: N/A;   •  SECTION      x 3   • CHOLECYSTECTOMY     • COLONOSCOPY     • ENDOSCOPY      2007   • FOOT SURGERY Right    • HAND SURGERY       Family History   Problem Relation Age of Onset   • Stroke Mother    • Cancer Mother         Unspecified   • Arthritis Mother    • Stroke Father    • Heart disease Father    • Diabetes Father         Unspecified type   • Arthritis Father    • Stroke Sister    • Arthritis Sister    • Stroke Brother    • Heart disease Brother    • Diabetes Brother         Unspecified type   • Arthritis Brother    • Stroke Son        Home Medications:  Prior to Admission medications    Medication Sig Start Date End Date Taking? Authorizing Provider   aspirin 81 MG EC tablet Take 1 tablet by mouth Daily. Indications: Acute Heart Attack 21   Sidney Xiao MD   atorvastatin (LIPITOR) 40 MG tablet Take 1 tablet by mouth Every Night. 21   Sidney Xiao MD   carvedilol (COREG) 3.125 MG tablet Take 1 tablet by mouth Every 12 (Twelve) Hours. 21   Sidney Xiao MD   metFORMIN (GLUCOPHAGE) 500 MG tablet Take 500 mg by mouth 2 (two) times a day. 21   Mara Hinoojsa MD   ticagrelor (BRILINTA) 90 MG tablet tablet Take 1 tablet by mouth 2 (Two) Times a Day. Indications: Acute Coronary Syndrome 21   Sidney Xiao MD   traMADol (ULTRAM) 50 MG tablet Take 50 mg by mouth Every 4 (Four) Hours As Needed for Pain. 21   Mara Hinojosa MD   Trintellix 10 MG tablet Take 10 mg by mouth Daily. 21   Mara Hinojosa MD        Social History:   Social History     Tobacco Use   • Smoking status: Current Every Day Smoker     Packs/day: 0.50     Types: Cigarettes     Start date: 1967   • Smokeless tobacco: Never Used   • Tobacco comment: Smoked 21-30 years   Substance Use Topics   • Alcohol use: Never     Comment: Does not drink   • Drug use: Never     Recent travel: no     Record Review:  I have reviewed  "the patient's records in [facility].     Review of Systems:  Review of Systems   Constitutional: Negative for chills and fever.   HENT: Negative for congestion and sore throat.    Eyes: Negative for pain and redness.   Respiratory: Negative for shortness of breath.    Cardiovascular: Negative for chest pain and leg swelling.   Gastrointestinal: Negative for abdominal pain.   Genitourinary: Negative for difficulty urinating and dysuria.   Musculoskeletal: Negative for back pain and neck pain.   Skin: Negative for pallor and rash.   Neurological: Negative for dizziness, weakness, numbness and headaches.   Psychiatric/Behavioral: Negative for agitation and behavioral problems.        Physical Exam:  /81 (BP Location: Right arm, Patient Position: Lying)   Pulse 87   Temp 97.6 °F (36.4 °C) (Oral)   Resp 20   Ht 167.6 cm (66\")   Wt 85 kg (187 lb 6.3 oz)   SpO2 96%   BMI 30.25 kg/m²     Physical Exam  Constitutional:       Appearance: Normal appearance.   HENT:      Head: Atraumatic.      Nose: Nose normal.      Mouth/Throat:      Mouth: Mucous membranes are moist.   Eyes:      Extraocular Movements: Extraocular movements intact.      Conjunctiva/sclera: Conjunctivae normal.      Pupils: Pupils are equal, round, and reactive to light.   Cardiovascular:      Rate and Rhythm: Normal rate and regular rhythm.      Pulses: Normal pulses.      Heart sounds: Normal heart sounds.   Pulmonary:      Effort: Pulmonary effort is normal.      Breath sounds: Normal breath sounds.   Abdominal:      Palpations: Abdomen is soft.      Tenderness: There is no abdominal tenderness.   Musculoskeletal:         General: Normal range of motion.   Skin:     General: Skin is warm and dry.      Capillary Refill: Capillary refill takes less than 2 seconds.   Neurological:      General: No focal deficit present.      Mental Status: She is alert and oriented to person, place, and time. Mental status is at baseline.      Cranial Nerves: No " cranial nerve deficit.      Sensory: No sensory deficit.      Motor: No weakness.      Coordination: Coordination normal.      Gait: Gait normal.   Psychiatric:         Mood and Affect: Mood normal.         Behavior: Behavior normal.         Thought Content: Thought content normal.                Medications in the Emergency Department:  Medications   sodium chloride 0.9 % flush 10 mL (has no administration in time range)   cefTRIAXone (ROCEPHIN) 350 mg/ml in lidocaine 1% IM syringe (1 gm vial) (1 g Intramuscular Given 6/22/21 0137)        Labs  Lab Results (last 24 hours)     Procedure Component Value Units Date/Time    CBC & Differential [[id number]]  (Abnormal) Collected: 06/21/21 2043    Specimen: Blood Updated: 06/21/21 2104    Narrative:      The following orders were created for panel order CBC & Differential.  Procedure                               Abnormality         Status                     ---------                               -----------         ------                     CBC Auto Differential[[id number]]        Abnormal            Final result                 Please view results for these tests on the individual orders.    Comprehensive Metabolic Panel [[id number]]  (Abnormal) Collected: 06/21/21 2043    Specimen: Blood Updated: 06/21/21 2129     Glucose 175 mg/dL      BUN 13 mg/dL      Creatinine 0.81 mg/dL      Sodium 136 mmol/L      Potassium 3.6 mmol/L      Chloride 98 mmol/L      CO2 24.8 mmol/L      Calcium 9.8 mg/dL      Total Protein 7.6 g/dL      Albumin 4.00 g/dL      ALT (SGPT) 22 U/L      AST (SGOT) 19 U/L      Alkaline Phosphatase 139 U/L      Total Bilirubin 0.4 mg/dL      eGFR Non African Amer 72 mL/min/1.73      Globulin 3.6 gm/dL      A/G Ratio 1.1 g/dL      BUN/Creatinine Ratio 16.0     Anion Gap 13.2 mmol/L     Narrative:      GFR Normal >60  Chronic Kidney Disease <60  Kidney Failure <15      Troponin [[id number]]  (Abnormal) Collected: 06/21/21 2043    Specimen: Blood Updated:  06/21/21 2137     Troponin T 0.240 ng/mL     Narrative:      Troponin T Reference Range:  <= 0.03 ng/mL-   Negative for AMI  >0.03 ng/mL-     Abnormal for myocardial necrosis.  Clinicians would have to utilize clinical acumen, EKG, Troponin and serial changes to determine if it is an Acute Myocardial Infarction or myocardial injury due to an underlying chronic condition.       Results may be falsely decreased if patient taking Biotin.      Magnesium [[id number]]  (Normal) Collected: 06/21/21 2043    Specimen: Blood Updated: 06/21/21 2129     Magnesium 1.8 mg/dL     CBC Auto Differential [[id number]]  (Abnormal) Collected: 06/21/21 2043    Specimen: Blood Updated: 06/21/21 2104     WBC 15.51 10*3/mm3      RBC 5.12 10*6/mm3      Hemoglobin 13.7 g/dL      Hematocrit 42.6 %      MCV 83.2 fL      MCH 26.8 pg      MCHC 32.2 g/dL      RDW 13.4 %      RDW-SD 40.4 fl      MPV 9.9 fL      Platelets 373 10*3/mm3      Neutrophil % 66.2 %      Lymphocyte % 24.6 %      Monocyte % 6.0 %      Eosinophil % 1.8 %      Basophil % 0.8 %      Immature Grans % 0.6 %      Neutrophils, Absolute 10.27 10*3/mm3      Lymphocytes, Absolute 3.81 10*3/mm3      Monocytes, Absolute 0.93 10*3/mm3      Eosinophils, Absolute 0.28 10*3/mm3      Basophils, Absolute 0.13 10*3/mm3      Immature Grans, Absolute 0.09 10*3/mm3      nRBC 0.0 /100 WBC     Troponin [[id number]]  (Abnormal) Collected: 06/21/21 2337    Specimen: Blood Updated: 06/22/21 0115     Troponin T 0.240 ng/mL     Narrative:      Troponin T Reference Range:  <= 0.03 ng/mL-   Negative for AMI  >0.03 ng/mL-     Abnormal for myocardial necrosis.  Clinicians would have to utilize clinical acumen, EKG, Troponin and serial changes to determine if it is an Acute Myocardial Infarction or myocardial injury due to an underlying chronic condition.       Results may be falsely decreased if patient taking Biotin.      POC Troponin I [[id number]]  (Normal) Collected: 06/21/21 2338    Specimen: Blood  Updated: 06/21/21 2350     Troponin I 0.21 ng/mL      Comment: Serial Number: [id number]:  [id number]       Urinalysis With Microscopic If Indicated (No Culture) - Urine, Clean Catch [[id number]]  (Abnormal) Collected: 06/21/21 2348    Specimen: Urine, Clean Catch Updated: 06/22/21 0007     Color, UA Yellow     Appearance, UA Cloudy     pH, UA <=5.0     Specific Gravity, UA 1.022     Glucose, UA Negative     Ketones, UA Negative     Bilirubin, UA Negative     Blood, UA Negative     Protein, UA Negative     Leuk Esterase, UA Negative     Nitrite, UA Positive     Urobilinogen, UA 0.2 E.U./dL    Urinalysis, Microscopic Only - Urine, Clean Catch [[id number]]  (Abnormal) Collected: 06/21/21 2348    Specimen: Urine, Clean Catch Updated: 06/22/21 0007     RBC, UA 0-2 /HPF      WBC, UA 3-5 /HPF      Bacteria, UA 3+ /HPF      Squamous Epithelial Cells, UA 7-12 /HPF      Hyaline Casts, UA 7-12 /LPF      Methodology Automated Microscopy           Imaging:  XR Chest 1 View   Final Result          Procedures:  Procedures    Progress  ED Course as of Jun 22 0349 Mon Jun 21, 2021   2110 Normal sinus rhythm with rate of 98. QRS normal. PA interval normal. QTc interval is normal. No ST elevation or depression. Non specific t wave changes. No change when compared to andrew. This EKG was interpreted by me.          ECG 12 Lead [LD]   2156 Patient was recently admitted for MI. Trop is trending down. Last trop on 1.08 6 days ago   Troponin T(!!): 0.240 [LD]   2348 Sodium: 136 [LD]      ED Course User Index  [LD] Chante Horan MD                            Medical Decision Making:  MDM  Number of Diagnoses or Management Options  Acute cystitis without hematuria  Weakness  Diagnosis management comments: Patient is afebrile and nontoxic-appearing.  Vital signs are stable.  At time of evaluation she is lying in bed in no acute distress.  Patient reports generalized weakness has been present since she was discharged from the  hospital after she had an MI.  EKG shows sinus rhythm no acute ST elevation.  Troponin is elevated to 0.24 this is down from recent troponin of 1.08.  Repeat troponin 3. hours later was similar.  She denies any chest pain.  This is likely troponin trending down from recent MI.  Labs show no other significant abnormality.  UA consistent with urinary tract infection.  She was given a dose of antibiotics.  Patient has no focal neurological deficits.  She is able to ambulate without any difficulty.  On reevaluation she reports feeling better.  Recommend follow-up with her primary physician.  Discussed return precautions, discharge instructions and answered all herquestions.       Amount and/or Complexity of Data Reviewed  Clinical lab tests: reviewed and ordered  Tests in the radiology section of CPT®: reviewed and ordered  Tests in the medicine section of CPT®: reviewed and ordered  Review and summarize past medical records: yes  Independent visualization of images, tracings, or specimens: yes    Risk of Complications, Morbidity, and/or Mortality  Presenting problems: moderate  Diagnostic procedures: moderate  Management options: moderate    Patient Progress  Patient progress: stable       Final diagnoses:   Acute cystitis without hematuria   Weakness        Disposition:  ED Disposition     ED Disposition Condition Comment    Discharge Stable           MD Marline Mcknight Lize-Mari, MD  06/22/21 5706

## 2021-06-24 ENCOUNTER — READMISSION MANAGEMENT (OUTPATIENT)
Dept: CALL CENTER | Facility: HOSPITAL | Age: 61
End: 2021-06-24

## 2021-06-24 NOTE — OUTREACH NOTE
AMI Week 2 Survey      Responses   [facility] patient discharged from?  [facility]   Does the patient have one of the following disease processes/diagnoses(primary or secondary)?  Acute MI (STEMI,NSTEMI)   Week 2 attempt successful?  No   Unsuccessful attempts  Attempt 1          Marcela Santoyo RN

## 2021-06-25 PROBLEM — G43.909 MIGRAINES: Status: ACTIVE | Noted: 2021-06-25

## 2021-06-25 PROBLEM — J44.9 COPD (CHRONIC OBSTRUCTIVE PULMONARY DISEASE): Status: ACTIVE | Noted: 2021-06-25

## 2021-06-25 PROBLEM — R61 NIGHT SWEAT: Status: ACTIVE | Noted: 2021-06-25

## 2021-06-25 PROBLEM — M54.30 SCIATICA: Status: ACTIVE | Noted: 2017-01-20

## 2021-06-25 PROBLEM — Z12.4 CERVICAL CANCER SCREENING: Status: ACTIVE | Noted: 2021-06-25

## 2021-06-25 PROBLEM — M19.90 ARTHRITIS: Status: ACTIVE | Noted: 2021-06-25

## 2021-06-25 PROBLEM — R68.89 FORGETFULNESS: Status: ACTIVE | Noted: 2021-06-25

## 2021-06-25 PROBLEM — R20.2 LEG PARESTHESIA: Status: ACTIVE | Noted: 2017-01-20

## 2021-06-25 PROBLEM — M48.061 LUMBAR SPINAL STENOSIS: Status: ACTIVE | Noted: 2017-02-23

## 2021-06-25 PROBLEM — IMO0002 THORACIC OR LUMBOSACRAL NEURITIS OR RADICULITIS: Status: ACTIVE | Noted: 2017-01-20

## 2021-06-25 PROBLEM — F41.8 DEPRESSION WITH ANXIETY: Status: ACTIVE | Noted: 2021-06-25

## 2021-06-25 PROBLEM — R06.02 SOB (SHORTNESS OF BREATH): Status: ACTIVE | Noted: 2021-06-25

## 2021-06-25 PROBLEM — K21.9 ESOPHAGEAL REFLUX: Status: ACTIVE | Noted: 2021-06-25

## 2021-06-25 PROBLEM — K21.9 GASTRIC REFLUX: Status: ACTIVE | Noted: 2021-06-25

## 2021-06-28 ENCOUNTER — OFFICE VISIT (OUTPATIENT)
Dept: FAMILY MEDICINE CLINIC | Facility: CLINIC | Age: 61
End: 2021-06-28

## 2021-06-28 VITALS
TEMPERATURE: 98 F | WEIGHT: 178.4 LBS | OXYGEN SATURATION: 96 % | HEART RATE: 91 BPM | BODY MASS INDEX: 28.67 KG/M2 | DIASTOLIC BLOOD PRESSURE: 64 MMHG | SYSTOLIC BLOOD PRESSURE: 130 MMHG | HEIGHT: 66 IN

## 2021-06-28 DIAGNOSIS — M25.562 ACUTE PAIN OF LEFT KNEE: ICD-10-CM

## 2021-06-28 DIAGNOSIS — I21.11 ST ELEVATION MYOCARDIAL INFARCTION INVOLVING RIGHT CORONARY ARTERY (HCC): ICD-10-CM

## 2021-06-28 DIAGNOSIS — M79.602 PAIN OF LEFT UPPER EXTREMITY: Primary | ICD-10-CM

## 2021-06-28 DIAGNOSIS — M25.532 LEFT WRIST PAIN: ICD-10-CM

## 2021-06-28 PROCEDURE — 80048 BASIC METABOLIC PNL TOTAL CA: CPT | Performed by: FAMILY MEDICINE

## 2021-06-28 PROCEDURE — 99214 OFFICE O/P EST MOD 30 MIN: CPT | Performed by: FAMILY MEDICINE

## 2021-06-28 RX ORDER — HYDROCODONE BITARTRATE AND ACETAMINOPHEN 5; 325 MG/1; MG/1
1 TABLET ORAL EVERY 4 HOURS PRN
Qty: 42 TABLET | Refills: 0 | Status: SHIPPED | OUTPATIENT
Start: 2021-06-28 | End: 2021-07-05

## 2021-06-29 LAB
ANION GAP SERPL CALCULATED.3IONS-SCNC: 8.4 MMOL/L (ref 5–15)
BUN SERPL-MCNC: 7 MG/DL (ref 8–23)
BUN/CREAT SERPL: 9.9 (ref 7–25)
CALCIUM SPEC-SCNC: 8.8 MG/DL (ref 8.6–10.5)
CHLORIDE SERPL-SCNC: 96 MMOL/L (ref 98–107)
CO2 SERPL-SCNC: 27.6 MMOL/L (ref 22–29)
CREAT SERPL-MCNC: 0.71 MG/DL (ref 0.57–1)
GFR SERPL CREATININE-BSD FRML MDRD: 84 ML/MIN/1.73
GLUCOSE SERPL-MCNC: 193 MG/DL (ref 65–99)
POTASSIUM SERPL-SCNC: 3.7 MMOL/L (ref 3.5–5.2)
SODIUM SERPL-SCNC: 132 MMOL/L (ref 136–145)

## 2021-06-30 ENCOUNTER — READMISSION MANAGEMENT (OUTPATIENT)
Dept: CALL CENTER | Facility: HOSPITAL | Age: 61
End: 2021-06-30

## 2021-06-30 NOTE — OUTREACH NOTE
AMI Week 1 Survey      Responses   [facility] patient discharged from?  [place]   Does the patient have one of the following disease processes/diagnoses(primary or secondary)?  Acute MI (STEMI,NSTEMI)          Yue Blunt LPN

## 2021-07-02 ENCOUNTER — OFFICE VISIT (OUTPATIENT)
Dept: CARDIOLOGY | Facility: CLINIC | Age: 61
End: 2021-07-02

## 2021-07-02 ENCOUNTER — OFFICE VISIT (OUTPATIENT)
Dept: ORTHOPEDIC SURGERY | Facility: CLINIC | Age: 61
End: 2021-07-02

## 2021-07-02 VITALS — BODY MASS INDEX: 29.57 KG/M2 | OXYGEN SATURATION: 94 % | WEIGHT: 184 LBS | HEART RATE: 90 BPM | HEIGHT: 66 IN

## 2021-07-02 VITALS
DIASTOLIC BLOOD PRESSURE: 78 MMHG | SYSTOLIC BLOOD PRESSURE: 140 MMHG | WEIGHT: 176 LBS | BODY MASS INDEX: 28.28 KG/M2 | HEIGHT: 66 IN

## 2021-07-02 DIAGNOSIS — I10 ESSENTIAL HYPERTENSION: Primary | ICD-10-CM

## 2021-07-02 DIAGNOSIS — S63.502A SPRAIN OF LEFT WRIST, INITIAL ENCOUNTER: ICD-10-CM

## 2021-07-02 DIAGNOSIS — E78.5 HYPERLIPIDEMIA LDL GOAL <70: ICD-10-CM

## 2021-07-02 DIAGNOSIS — M17.12 PRIMARY OSTEOARTHRITIS OF LEFT KNEE: ICD-10-CM

## 2021-07-02 DIAGNOSIS — M25.532 LEFT WRIST PAIN: Primary | ICD-10-CM

## 2021-07-02 DIAGNOSIS — M25.562 LEFT KNEE PAIN, UNSPECIFIED CHRONICITY: ICD-10-CM

## 2021-07-02 DIAGNOSIS — S80.00XA CONTUSION OF KNEE, UNSPECIFIED LATERALITY, INITIAL ENCOUNTER: ICD-10-CM

## 2021-07-02 DIAGNOSIS — I25.10 CAD S/P PERCUTANEOUS CORONARY ANGIOPLASTY: ICD-10-CM

## 2021-07-02 DIAGNOSIS — Z98.61 CAD S/P PERCUTANEOUS CORONARY ANGIOPLASTY: ICD-10-CM

## 2021-07-02 PROCEDURE — 99214 OFFICE O/P EST MOD 30 MIN: CPT | Performed by: INTERNAL MEDICINE

## 2021-07-02 PROCEDURE — 99213 OFFICE O/P EST LOW 20 MIN: CPT | Performed by: ORTHOPAEDIC SURGERY

## 2021-07-02 PROCEDURE — 29075 APPL CST ELBW FNGR SHORT ARM: CPT | Performed by: ORTHOPAEDIC SURGERY

## 2021-07-02 RX ORDER — LISINOPRIL 5 MG/1
5 TABLET ORAL DAILY
Qty: 30 TABLET | Refills: 11
Start: 2021-07-02 | End: 2021-08-03 | Stop reason: SDUPTHER

## 2021-07-02 NOTE — PROGRESS NOTES
"Chief Complaint  Pain of the Left Shoulder and Pain of the Left Wrist     Subjective      Desiree Garcia presents to [facility] for follow up evaluation of her left shoulder and left wrist. The patient couldn't have an MRI due to she recently had a heart attack and had to have a stent placed. She reports since the stent she has no shoulder pain. She does report an injury to her left wrist. She fell on an out stretched hand and knee. She has been wearing a wrist brace. She is a smoker. She has no other complaints today.     No Known Allergies     Social History     Socioeconomic History   • Marital status:      Spouse name: Not on file   • Number of children: Not on file   • Years of education: Not on file   • Highest education level: Not on file   Tobacco Use   • Smoking status: Current Every Day Smoker     Packs/day: 0.50     Years: 30.00     Pack years: 15.00     Types: Cigarettes     Start date: 1/14/1967   • Smokeless tobacco: Never Used   • Tobacco comment: Smoked 21-30 years   Substance and Sexual Activity   • Alcohol use: Never     Comment: Does not drink   • Drug use: Never   • Sexual activity: Defer     Partners: Female        Review of Systems     Objective   Vital Signs:   Pulse 90   Ht 167.6 cm (66\")   Wt 83.5 kg (184 lb)   SpO2 94%   BMI 29.70 kg/m²       Physical Exam  Constitutional:       Appearance: Normal appearance. He is well-developed and normal weight.   HENT:      Head: Normocephalic.      Right Ear: Hearing and external ear normal.      Left Ear: Hearing and external ear normal.      Nose: Nose normal.   Eyes:      Conjunctiva/sclera: Conjunctivae normal.   Cardiovascular:      Rate and Rhythm: Normal rate.   Pulmonary:      Effort: Pulmonary effort is normal.      Breath sounds: No wheezing or rales.   Abdominal:      Palpations: Abdomen is soft.      Tenderness: There is no abdominal tenderness.   Musculoskeletal:      Cervical back: Normal " range of motion.   Skin:     Findings: No rash.   Neurological:      Mental Status: He is alert and oriented to person, place, and time.   Psychiatric:         Mood and Affect: Mood and affect normal.         Judgment: Judgment normal.       Ortho Exam      Left wrist- mild swelling to the hand and wrist. Tender over the distal radius. Extension 30. Flexion 25 degrees. Supination -10 pronation -10. Small hematoma to the lateral forearm with ecchymosis. Sensation to light touch median, radial, ulnar nerve. Positive AIN, PIN, ulnar nerve. Positive pulses. Paresthesia to the fingers.     Left knee- abrasion to anterior left knee. No signs of infection. diffuse tenderness to the knee. ROM -10 to 90 degrees. Neurovascularly intact. Stable to varus/valgus stress. Stable to anterior/posterior drawer.     Orthopedic Injury Treatment    Date/Time: 7/2/2021 10:36 AM  Performed by: Jose Yanez MD  Authorized by: Jose Yanez MD   Injury location: wrist  Location details: left wrist  Pre-procedure neurovascular assessment: neurovascularly intact    Anesthesia:  Local anesthesia used: no    Sedation:  Patient sedated: no    Immobilization: cast  Supplies used: cotton padding (fiberglass)  Post-procedure neurovascular assessment: post-procedure neurovascularly intact  Patient tolerance: patient tolerated the procedure well with no immediate complications  Comments: Closed treatment was obtained and fiberglass cast was applied.  The patient tolerated the procedure without any complications.  Applied by MERT Mcfadden          X-Ray Report:  Left wrist(s) X-Ray  Indication: Evaluation of left wrist/knee pain  AP and Lateral and standing view(s)  Findings: no acute fracture. Can not exclude non displaced occult fracture to distal radius. Degenerative changes to the wrist. ---Left knee advanced degenerative changes to the knee with bone on bone medial compartment. Tricompartmental  Osteophytes. enthesophyte to superior  patella. No acute fracture.  Prior studies available for comparison: no           Imaging Results (Most Recent)     Procedure Component Value Units Date/Time    XR Wrist 2 View Left [[id number]] Resulted: 21 1117     Updated: 21 111    Narrative:      Left wrist(s) X-Ray  Indication: Evaluation of left wrist/knee pain  AP and Lateral and standing view(s)  Findings: no acute fracture. Can not exclude non displaced occult fracture   to distal radius. Degenerative changes to the wrist. ---Left knee advanced   degenerative changes to the knee with bone on bone medial compartment.   Tricompartmental  Osteophytes. enthesophyte to superior patella. No acute   fracture.  Prior studies available for comparison: no     XR Knee 3 View Left [[id number]] Resulted: 21 1018     Updated: 21 1023           Result Review :       Adult Transthoracic Echo Complete w/ Color, Spectral and Contrast if necessary per protocol    Result Date: 6/15/2021  Narrative: · Left ventricular systolic function is normal.      XR Wrist 2 View Left    Result Date: 2021  Narrative: Left wrist(s) X-Ray Indication: Evaluation of left wrist/knee pain AP and Lateral and standing view(s) Findings: no acute fracture. Can not exclude non displaced occult fracture to distal radius. Degenerative changes to the wrist. ---Left knee advanced degenerative changes to the knee with bone on bone medial compartment. Tricompartmental  Osteophytes. enthesophyte to superior patella. No acute fracture. Prior studies available for comparison: no     Cardiac Catheterization/Vascular Study    Result Date: 2021  Narrative: [facility] CARDIAC CATHETERIZATION PROCEDURE REPORT Patient: Desiree Garcia : 1960 MRN: [id number] Procedure Date: 21 Referring Physician: Dr. Sidney Ventura MD Interventional Cardiologist: Sidney Xiao MD Indication: 1. Acute inferior Ciszek elevation myocardial infarction Clinical  Presentation: Patient presented with the crushing midsternal chest pain of less than 1 hour duration.  EKGs done in the field were consistent with acute inferior ST segment elevation myocardial infarction.  She was brought to the Cath Lab on emergent basis. Procedure performed: 1. Diagnostic Left Heart Catheterization 2. Coronary Angiography 3. Left Ventriculography Successful percutaneous coronary intervention to mid right coronary artery with placement of 3.5x23 Xience Tonia drug-eluting stent, postdilated using 3.75 noncompliant balloon with good angiographic results and no complications. Access Site(s): 1. Right radial Findings: 1. Coronary Artery Anatomy: Dominance: Right Left Main: Normal with no stenosis Left Anterior Descending: Medium caliber vessel.  Mid LAD had a long 40% stenosis.  First diagonal branch has a 50% no sinus ostium.  Distal and apical LAD free of any stenosis. Left Circumflex Artery: Gives rise to 2 OM branches and subbranches.  No significant stenosis noted in the left circumflex artery. Right Coronary Artery: Dominant vessel, giving rise to right PDA and PLV branches.  There is a long 95% stenosis involving the mid right coronary artery with heavy thrombus burden, which is a culprit lesion for patient presented with inferior STEMI.  After angioplasty, the distal LAD was noted to have a 30% stenosis.  The PDA and PLV branches have luminal irregularities. 2. Hemodynamics: The opening aortic pressure was 150/82 with a mean 107 mmHg.  The left ventricular end-diastolic pressure 17 mmHg.  There was no gradient across aortic valve on pullback.  The closing aortic pressure aky697/89 with a mean 126 mmHg. 3. Left Ventriculogram: Ejection Fraction: 55% Wall Motion: Moderate distal anterior hypokinesis Mitral Regurgitation: Trace 4. Percutaneous Intervention: Location: Mid RCA Treatment: Drug-eluting stent placement Pre-stenosis: 95% Post-stenosis: 0% Lesion Type C: Yes ABILIO Flow Pre: 3 ABILIO Flow  Post: 3 Bifurcation: No Severe Calcium: No Dissection: No Conclusions: 1. Successful percutaneous coronary intervention to mid right coronary artery with placement of 3.5x23 Xience Tonia drug-eluting stent, postdilated using 3.75 noncompliant balloon with good angiographic results and no complications. 2. 95% subtotal thrombotic occlusion of the right coronary artery which is the culprit lesion for which presented with inferior STEMI 3. Nonobstructive coronary disease noted in the left artery descending artery 4. Overall LV ejection fraction is 55% with the distal inferior wall hypokinesis 5. Normal left ventricular end-diastolic pressure Recommendations: 1. Continue dual endplate therapy with aspirin, Brilinta 2. High intensity statins and other measures for secondary prevention 3. Tobacco cessation 4. Further management intensive care unit Procedure Status: Emergent Details of the procedure: Informed consent was obtained with an explanation of the risks, benefits and alternatives of the procedure. The patient was brought to the Cardiac Catheterization Laboratory and was prepped and draped in a standard sterile fashion. Moderate sedation with Fentanyl and Versed was administered by the circulating nurse. Lidocaine 2% was used to anesthetize the right radial artery and a 5/6 Slender sheath was placed.  A 6 [place] Ikari 3.75 guide catheter was used to engage the ostium of right coronary artery.  A 4 [place] JL 3 5 catheter was used to successfully engage the ostium of left main coronary artery.  All the angiography was performed by injection of nonionic contrast in all appropriate projections.  We found 95% thrombotic stenosis of the mid right coronary artery, hence proceeded with angioplasty.  Please see below for details of angioplasty.  A 5 [place] pigtail catheter was used across the aortic valve.  Left ventricular hemodynamics were documented.  Left ventricular view was performed by injection of nonionic contrast  in WELCH projection.  Gradient across aortic was documented pullback technique.  All the catheter exchanges were done over a long exchange length wire. Details of angioplasty: 6 [place] Ikari 3.75 guide catheter was used to engage the ostium of right coronary artery.  A BMW intervention wire was advanced without difficulty across the lesion in the mid right coronary artery.  The lesion was predilated using 2.5x12 NC trek balloon with multiple inflations.  Lesion was then stented from normal to normal segment using 3.5x23 Xience Tonia drug-eluting stent deployed at 14 carol.  The stent was postdilated using 3.75x12 noncompliant balloon with multiple inflations.  Maximal inflation pressure was 20 carol at the proximal segment of the stent.  Post dilating balloon was removed and final angiogram was performed in multiple projections before and after wound interventional wire.  There was no dissection, perforation or distal embolization.  Good stent apposition was noted Patient was already loaded with aspirin in the ER.  Cangrelor was used a second antiplatelet agent during the procedure.  At the end of the procedure she was loaded with 180 mg of Brilinta.  Heparin was used for anticoagulation throughout the procedure with frequent checking of ACT.  At the end of the procedure, the radial artery sheath was pulled and TR band was applied for hemostasis.  The results of the test were explained in detail with the patient and there was no family member available for discussion. Cumulative fluoroscopy time: 12.6 minutes Cumulative air kerma: 191 0 mg Y Total amount of contrast used: 155 cc of Isovue Complications: None. Estimated Blood Loss: 10 mL Sidney Xiao MD 06/14/21 05:52 EDT    XR Chest 1 View    Result Date: 6/21/2021  Narrative: PROCEDURE: XR CHEST 1 VW  COMPARISON: [facility], , XR CHEST 1 VW, 6/14/2021, 4:12.  INDICATIONS: Weak/Dizzy/AMS  FINDINGS:  The cardiomediastinal silhouette is within normal  limits. The lungs are clear. There is no focal consolidation, pneumothorax or large pleural effusion.   CONCLUSION: No acute process.       CORY BAL MD       Electronically Signed and Approved By: CORY BAL MD on 6/21/2021 at 22:53             XR Chest 1 View    Result Date: 6/14/2021  Narrative: PROCEDURE: XR CHEST 1 VW  COMPARISON: None.  Prior studies from the Archive for unable to be retrieved during the time of this interpretation.  INDICATIONS: STEMI ALERT.  FINDINGS: A single AP upright portable chest radiograph reveals obscuring external artifact, no cardiac enlargement, and bilateral parahilar and infrahilar opacities, which may represent mild pulmonary edema.  Pneumonia cannot be excluded.  Atelectasis alone is possible.  No definite pleural effusion is suggested.  There may be chronic calcified granulomatous disease of the chest.  CONCLUSION: Bilateral atelectasis and/or infiltrate(s) is (are) possible.  No cardiac enlargement is suggested.      ADÁN MARINELLI JR, MD       Electronically Signed and Approved By: ADÁN MARINELLI JR, MD on 6/14/2021 at 4:49                      Assessment and Plan     DX: Left wrist selene. Left knee osteoarthritis. knee contusion    Discussed the treatment plan with the patient.  The patient was placed into a short arm cast. Cast care was discussed. Plan for conservative treatment for her knee    Call or return if worsening symptoms.    Follow Up     Follow up in 4 weeks with cast removal and repeat x-ray of the wrist      Patient was given instructions and counseling regarding her condition or for health maintenance advice. Please see specific information pulled into the AVS if appropriate.     Scribed for Jose Yanez MD by Sarah Weinberg.  07/02/21   10:09 EDT    I have personally performed the services described in this document as scribed by the above individual and it is both accurate and complete.  Jose Yanez MD 07/02/21  10:09 EDT

## 2021-07-02 NOTE — PROGRESS NOTES
CARDIOLOGY FOLLOW-UP PROGRESS NOTE        Chief Complaint  Hospital Follow Up Visit, acute myocardial infarction, acute kidney injury    Subjective            Desiree Garcia presents to [facility] CARDIOLOGY  History of Present Illness    This is a 61-year-old female with diabetes, hypertension, hyperlipidemia,, COPD who presented to the emergency room on 6/14/2021 because of chest pain of several hours duration.  She was having light chest discomfort and heartburn for the preceding 3 days as well.  EKGs done in the ER and by EMS showed inferior ST segment elevation myocardial infarction.  Cardiac cath showed 95 restenosis of the mid right coronary artery with heavy thrombus burden.  LAD artery had nonobstructive disease.  She underwent primary angioplasty and stent placement to mid right coronary artery with good angiographic results.  LV ejection fraction 55% with some inferior wall hypokinesis.  She continued to have pleuritic chest pain for the next 2 days.  On day 3 patient was noted to have episodes of hypotension with systolic blood pressure going down to 70s.  She received IV fluid boluses.  Of note patient appeared to be very depressed since initial event and was having poor oral intake.  She was spending time almost entirely in bed.  Her lisinopril was held and in 2 days creatinine became normal.  Chest pain resolved.  She was discharged home in a stable condition with dual antiplatelet therapy.    Patient had another visit to the emergency room 2 days later with weakness and some chest discomfort.  Cardiac work-up was unremarkable however UA showed presence of UTI.  She had a fall 3 days back with significant pain and swelling of her left wrist.  There were no fractures on x-rays but she is on cast now.  She has not had any chest pain episodes for the past 1 week.  She does have shortness of breath on moderate exertion.  No dizziness or syncopal episodes.  No bleeding  manifestations.  Taking all the medications as prescribed.      Past History:    Medical History: has a past medical history of Arthritis, Cervical cancer screening, COPD (chronic obstructive pulmonary disease) (CMS/[facility]), Depression with anxiety, Diabetes mellitus (CMS/[facility]), Forgetfulness, Gastric reflux, Hypertension, Leg paresthesia (2017), Limb swelling, Lumbago (2017), Lumbar spinal stenosis (2017), Lumbosacral radiculopathy (2017), Migraines, Night sweat, Reflux esophagitis, Shortness of breath, and Stomach disorder.     Surgical History: has a past surgical history that includes Abdominal surgery; Cardiac catheterization;  section; Cholecystectomy; Colonoscopy (); Esophagogastroduodenoscopy; Foot surgery (Right); Hand surgery; and Cardiac catheterization (N/A, 2021).     Family History: family history includes Arthritis in her brother, father, mother, and sister; Cancer in her mother; Diabetes in her brother and father; Heart disease in her brother and father; Stroke in her brother, father, mother, sister, and son.     Social History: reports that she has been smoking cigarettes. She started smoking about 54 years ago. She has a 15.00 pack-year smoking history. She has never used smokeless tobacco. She reports that she does not drink alcohol and does not use drugs.    Allergies: Patient has no known allergies.    Current Outpatient Medications on File Prior to Visit   Medication Sig   • aspirin 81 MG EC tablet Take 1 tablet by mouth Daily. Indications: Acute Heart Attack   • atorvastatin (LIPITOR) 40 MG tablet Take 1 tablet by mouth Every Night.   • carvedilol (COREG) 3.125 MG tablet Take 1 tablet by mouth Every 12 (Twelve) Hours.   • HYDROcodone-acetaminophen (NORCO) 5-325 MG per tablet Take 1 tablet by mouth Every 4 (Four) Hours As Needed for Moderate Pain  (arm pain) for up to 7 days.   • metFORMIN (GLUCOPHAGE) 500 MG tablet Take 500 mg by mouth 2 (two) times a day.  "  • ticagrelor (BRILINTA) 90 MG tablet tablet Take 1 tablet by mouth 2 (Two) Times a Day. Indications: Acute Coronary Syndrome   • traMADol (ULTRAM) 50 MG tablet Take 50 mg by mouth Every 4 (Four) Hours As Needed for Pain.   • Trintellix 10 MG tablet Take 10 mg by mouth Daily.     No current facility-administered medications on file prior to visit.          Review of Systems   Respiratory: Positive for shortness of breath and wheezing. Negative for cough.    Cardiovascular: Negative for chest pain, palpitations and leg swelling.   Gastrointestinal: Negative for nausea and vomiting.   Neurological: Negative for dizziness and syncope.        Objective     /78 (BP Location: Left arm, Patient Position: Sitting, Cuff Size: Adult)   Ht 167.6 cm (66\")   Wt 79.8 kg (176 lb)   BMI 28.41 kg/m²       Physical Exam  General : Alert, awake, no acute distress  CVS : Regular rate and rhythm, no murmur, rubs or gallops  Lungs: Clear to auscultation bilaterally, no crackles or rhonchi  Abdomen: Soft, nontender, bowel sounds heard in all 4 quadrants  Extremities: Warm, well-perfused, no pedal edema    Result Review :     The following data was reviewed by: Sidney Xiao MD on 07/02/2021:    CMP    CMP 6/17/21 6/21/21 6/28/21   Glucose 174 (A) 175 (A) 193 (A)   BUN 17 13 7 (A)   Creatinine 0.85 0.81 0.71   eGFR Non  Am 68 72 84   Sodium 134 (A) 136 132 (A)   Potassium 3.9 3.6 3.7   Chloride 99 98 96 (A)   Calcium 9.1 9.8 8.8   Albumin 3.70 4.00    Total Bilirubin 0.7 0.4    Alkaline Phosphatase 127 (A) 139 (A)    AST (SGOT) 29 19    ALT (SGPT) 18 22    (A) Abnormal value            CBC    CBC 6/16/21 6/17/21 6/21/21   WBC 21.46 (A) 19.61 (A) 15.51 (A)   RBC 5.08 5.13 5.12   Hemoglobin 13.5 13.8 13.7   Hematocrit 42.2 43.8 42.6   MCV 83.1 85.4 83.2   MCH 26.6 26.9 26.8   MCHC 32.0 31.5 32.2   RDW 13.9 14.1 13.4   Platelets 244 285 373   (A) Abnormal value            TSH    TSH 5/11/21 6/14/21   TSH 2.060 5.660 (A) "   (A) Abnormal value            Lipid Panel    Lipid Panel 6/14/21   Total Cholesterol 160   Triglycerides 249 (A)   HDL Cholesterol 48   VLDL Cholesterol 40   LDL Cholesterol  72   LDL/HDL Ratio 1.30   (A) Abnormal value               Data reviewed: Cardiology studies     Results for orders placed during the hospital encounter of 06/14/21    Adult Transthoracic Echo Complete w/ Color, Spectral and Contrast if necessary per protocol    Interpretation Summary  · Left ventricular systolic function is normal.    Cardiac cath on 6/14/2021    1. Successful percutaneous coronary intervention to mid right coronary artery with placement of 3.5x23 Xience Tonia drug-eluting stent, postdilated using 3.75 noncompliant balloon with good angiographic results and no complications.  2. 95% subtotal thrombotic occlusion of the right coronary artery which is the culprit lesion for which presented with inferior STEMI  3. Nonobstructive coronary disease noted in the left artery descending artery  4. Overall LV ejection fraction is 55% with the distal inferior wall hypokinesis  5. Normal left ventricular end-diastolic pressure             Assessment and Plan        Diagnoses and all orders for this visit:    1. CAD S/P percutaneous coronary angioplasty (Primary)  -     Comprehensive Metabolic Panel; Future  -     Lipid Panel; Future  -     CK; Future    Currently stable with no angina-like chest pain.  She reports some shortness of breath which could be related to Brilinta or due to COPD.  Continue aspirin, Brilinta, statin, beta-blocker.  We will enroll the patient in phase 2 cardiac rehabitation.    2. Hyperlipidemia LDL goal <70  -     Comprehensive Metabolic Panel; Future  -     Lipid Panel; Future  -     CK; Future    Continue Lipitor, will check lipid panel before next visit.    3.  Essential hypertension    Blood pressure is reasonably well controlled.  Recent labs showed normal creatinine and renal functions.  Will restart  "lisinopril 5 mg p.o. daily, which was held at the time of discharge due to acute kidney injury.    Other orders  -     lisinopril (PRINIVIL,ZESTRIL) 5 MG tablet; Take 1 tablet by mouth Daily.  Dispense: 30 tablet; Refill: 11      Desiree Garcia  reports that she has been smoking cigarettes. She started smoking about 54 years ago. She has a 15.00 pack-year smoking history. She has never used smokeless tobacco.. I have educated her on the risk of diseases from using tobacco products such as Tobacco Cessation Diseases:\"cancer\",\"COPD\",\"heart disease\"     I advised her to quit and she is not Willing to Quit Tobacco Products at this time.  She wants to try and cut down on her own.  Declined pharmacological therapy.    I spent 4 minutes counseling the patient.           Follow Up     Return in about 3 months (around 10/2/2021) for With Ms JOVANNA RODRIGUEZ ALVIN.    Patient was given instructions and counseling regarding her condition or for health maintenance advice. Please see specific information pulled into the AVS if appropriate.       "

## 2021-07-06 ENCOUNTER — READMISSION MANAGEMENT (OUTPATIENT)
Dept: CALL CENTER | Facility: HOSPITAL | Age: 61
End: 2021-07-06

## 2021-07-06 NOTE — OUTREACH NOTE
AMI Week 3 Survey      Responses   [facility] patient discharged from?  [facility]   Does the patient have one of the following disease processes/diagnoses(primary or secondary)?  Acute MI (STEMI,NSTEMI)   Week 3 attempt successful?  Yes   Call start time  1435   Call end time  1437   Discharge diagnosis  CARDIAC CATHETERIZATION  STEMI   Meds reviewed with patient/caregiver?  Yes   Is the patient having any side effects they believe may be caused by any medication additions or changes?  No   Does the patient have all prescriptions related to this admission filled (includes statins,anticoagulants,HTN meds,anti-arrhythmia meds)  Yes   Is the patient taking all medications as directed (includes completed medication regime)?  Yes   Does the patient have a primary care provider?   Yes   Does the patient have an appointment with their PCP,cardiologist,or clinic within 7 days of discharge?  Yes   Has the patient kept scheduled appointments due by today?  Yes   Has home health visited the patient within 72 hours of discharge?  N/A   Psychosocial issues?  No   Did the patient receive a copy of their discharge instructions?  Yes   Nursing interventions  Reviewed instructions with patient   What is the patient's perception of their health status since discharge?  Improving   Nursing interventions  Nurse provided patient education   Is the patient/caregiver able to teach back signs and symptoms of when to call for help immediately:  Sudden chest discomfort, Sudden discomfort in arms, back, neck or jaw, Shortness of breath at any time, Sudden sweating or clammy skin, Nausea or vomiting, Dizziness or lightheadedness, Irregular or rapid heart rate   Nursing interventions  Nurse provided patient education   Is the patient/caregiver able to teach back lifestyle changes to help prevent MIs  Regular exercise as approved by provider, Heart healthy diet, Managing diabetes, Reducing stress, Quit smoking   If the patient is a current  smoker, are they able to teach back resources for cessation?  Smoking cessation medications, Smoking cessation support groups   Is the patient/caregiver able to teach back the hierarchy of who to call/visit for symptoms/problems? PCP, Specialist, Home health nurse, Urgent Care, ED, 911  Yes   Week 3 call completed?  Yes   Revoked  No further contact(revokes)-requires comment   Is the patient interested in additional calls from an ambulatory ?  NOTE:  applies to high risk patients requiring additional follow-up.  No   Graduated/Revoked comments  No further calls per patient          Angel Stover RN

## 2021-07-15 VITALS
HEART RATE: 91 BPM | WEIGHT: 179 LBS | TEMPERATURE: 98.2 F | HEIGHT: 66 IN | SYSTOLIC BLOOD PRESSURE: 140 MMHG | BODY MASS INDEX: 28.77 KG/M2 | DIASTOLIC BLOOD PRESSURE: 70 MMHG | OXYGEN SATURATION: 95 %

## 2021-07-15 VITALS — WEIGHT: 174 LBS | HEIGHT: 66 IN | BODY MASS INDEX: 27.97 KG/M2

## 2021-07-15 NOTE — H&P
History and Physical      Patient Name: Desiree Garcia   Patient ID: [id number]   Sex: Female   YOB: 1960    Primary Care Provider: Sandra Kaba MD   Referring Provider: Sandra Kaba MD    Visit Date: Amirah 3, 2021    Provider: Jose Yanez MD   Location: [facility]   Location Address: [address] [place] [address]  [id number]   Location Phone: [phone]          Chief Complaint  · Left shoulder pain      History Of Present Illness  Desiree Garcia is a 61 year old /White female who presents today to [facility].      The patient presents here today for evaluation of her left shoulder. The patient states her shoulder feels out of place and has been ongoing for about 3 months. She has been wearing a sling. She has no known injury or trauma to the left shoulder. She has had no treatment.       Past Medical History  Arthritis; Cervical cancer screening; Chronic Obstructive Pulmonary Disease; Colon cancer screening; COPD (chronic obstructive pulmonary disease); Depression with anxiety; Diabetes; Forgetfulness; Gastric reflux; Hypertension; Leg paresthesia; Limb Swelling; Lumbago/low back pain; Lumbar Spinal Stenosis; Migraines; Night sweat; Radiculopathy, lumbosacral; Reflux; Shortness of Breath; SOB (shortness of breath); Stomach Disorder         Past Surgical History  Cesarian Section; Cholecystectomy; Colonoscopy; EGD; Foot surgery, right; Hand surgery         Medication List  dicyclomine 10 mg oral capsule; FreeStyle Lancets 28 gauge miscellaneous misc; FreeStyle Lite Meter miscellaneous kit; FreeStyle Lite Strips miscellaneous strip; metformin 500 mg oral tablet; Symbicort 160-4.5 mcg/actuation inhalation HFA aerosol inhaler; tramadol 50 mg oral tablet         Allergy List  NO KNOWN DRUG ALLERGIES       Allergies Reconciled  Family Medical History  Stroke; Heart Disease; Cancer, Unspecified; Diabetes, unspecified type; - No Family History  "of Colorectal Cancer; Family history of Arthritis         Reproductive History   6 Para 3 0 0 0       Social History  Alcohol (Former); Alcohol Use (Never); lives alone; .; Recreational Drug Use (Never); Tobacco (Current every day); Unemployed.         Review of Systems  · Constitutional  o Denies  o : fever, chills, weight loss  · Cardiovascular  o Denies  o : chest pain, shortness of breath  · Gastrointestinal  o Denies  o : liver disease, heartburn, nausea, blood in stools  · Genitourinary  o Denies  o : painful urination, blood in urine  · Integument  o Denies  o : rash, itching  · Neurologic  o Denies  o : headache, weakness, loss of consciousness  · Musculoskeletal  o Denies  o : painful, swollen joints  · Psychiatric  o Denies  o : drug/alcohol addiction, anxiety, depression      Vitals  Date Time BP Position Site L\R Cuff Size HR RR TEMP (F) WT  HT  BMI kg/m2 BSA m2 O2 Sat FR L/min FiO2 HC       2021 02:10 PM         174lbs 0oz 5'  6\" 28.08 1.92             Physical Examination  · Constitutional  o Appearance  o : well developed, well-nourished, no obvious deformities present  · Head and Face  o Head  o :   § Inspection  § : normocephalic  o Face  o :   § Inspection  § : no facial lesions  · Eyes  o Conjunctivae  o : conjunctivae normal  o Sclerae  o : sclerae white  · Ears, Nose, Mouth and Throat  o Ears  o :   § External Ears  § : appearance within normal limits  § Hearing  § : intact  o Nose  o :   § External Nose  § : appearance normal  · Neck  o Inspection/Palpation  o : normal appearance  o Range of Motion  o : full range of motion  · Respiratory  o Respiratory Effort  o : breathing unlabored  o Inspection of Chest  o : normal appearance  o Auscultation of Lungs  o : no audible wheezing or rales  · Cardiovascular  o Heart  o : regular rate  · Gastrointestinal  o Abdominal Examination  o : soft and non-tender  · Skin and Subcutaneous Tissue  o General Inspection  o : intact, no " rashes  · Psychiatric  o General  o : Alert and oriented x3  o Judgement and Insight  o : judgment and insight intact  o Mood and Affect  o : mood normal, affect appropriate  · Left Shoulder  o Inspection  o : Forward elevation 140. Abduction 90 External rotation 60. Internal rotation 35. 4+ Supraspinatus strength. 5/5 infraspinatus and infrared subscap. Internal rotation to SI joint. Positive Neer and Dunlap. Positive cross arm adduction.   · Injection Note/Aspiration Note  o Site  o : left shoulder  o Procedure  o : Procedure: After educating the patient, patient gave consent for procedure. After using Chloraprep, the joint space was injected. The patient tolerated the procedure well.  o Medication  o : 80 mg of DepoMedrol with 9cc of 1% Lidocaine  · In Office Procedures  o View  o : AP/LATERAL  o Site  o : left shoulder   o Indication  o : left shoulder pain  o Study  o : X-rays ordered, taken in the office, and reviewed today.  o Xray  o : X-rays- mild degenerative changes. Small calcification at the greater tuberosity. No fracture subluxation or dislocation.   o Comparative Data  o : No comparative data found          Assessment  · Primary osteoarthritis of left shoulder     715.11/M19.012  · Left shoulder pain, unspecified chronicity     719.41/M25.512  · Shoulder tendonitis, left     726.10/M77.8      Plan  · Orders  o Depo-Medrol injection 80mg () - - 06/03/2021   Lot [id number] Exp 11 2022 [employer] Administered by ENZO GOOD MD  o Shoulder Intra-articular Injection without US Guidance [facility] ([id number]) - - 06/03/2021   Lot [id number] Exp 12 01 2022 [employer] Administered by ENZO GOOD MD  o Scapula (Left) [facility] ([id number]-HZ) - 719.41/M25.512 - 06/03/2021  · Medications  o Medications have been Reconciled  o Transition of Care or Provider Policy  · Instructions  o Reviewed the patient's Past Medical, Social, and Family history as well as the ROS at today's visit, no changes.  o Call or return  if worsening symptoms.  o The above service was scribed by Sarah Weinberg on my behalf and I attest to the accuracy of the note. jsb  o Discussed the treatment plan with the patient. Plan for MRI of the left shoulder. Discussed the risks and benefits of a left shoulder injection. The patient expressed understanding and wished to proceed. She tolerated the injection well.  o Electronically Identified Patient Education Materials Provided Electronically            Electronically Signed by: Sarah Weinberg MA -Author on June 7, 2021 08:06:18 AM  Electronically Co-signed by: Jose Yanez MD -Reviewer on June 7, 2021 09:34:45 AM

## 2021-07-16 PROBLEM — S80.02XA CONTUSION OF LEFT KNEE: Status: ACTIVE | Noted: 2021-07-16

## 2021-07-19 ENCOUNTER — OFFICE VISIT (OUTPATIENT)
Dept: ORTHOPEDIC SURGERY | Facility: CLINIC | Age: 61
End: 2021-07-19

## 2021-07-19 VITALS — WEIGHT: 170.4 LBS | OXYGEN SATURATION: 96 % | HEIGHT: 66 IN | HEART RATE: 88 BPM | BODY MASS INDEX: 27.38 KG/M2

## 2021-07-19 DIAGNOSIS — S62.175D CLOSED NONDISPLACED FRACTURE OF TRAPEZIUM OF LEFT WRIST WITH ROUTINE HEALING, SUBSEQUENT ENCOUNTER: ICD-10-CM

## 2021-07-19 DIAGNOSIS — S63.502D SPRAIN OF LEFT WRIST, SUBSEQUENT ENCOUNTER: Primary | ICD-10-CM

## 2021-07-19 DIAGNOSIS — M17.12 ARTHRITIS OF KNEE, LEFT: ICD-10-CM

## 2021-07-19 DIAGNOSIS — S80.02XD CONTUSION OF LEFT KNEE, SUBSEQUENT ENCOUNTER: ICD-10-CM

## 2021-07-19 PROCEDURE — 99213 OFFICE O/P EST LOW 20 MIN: CPT | Performed by: PHYSICIAN ASSISTANT

## 2021-07-19 NOTE — PROGRESS NOTES
Chief Complaint  Pain and Follow-up of the Left Wrist and Follow-up of the Left Knee    Subjective          Desiree Garcia is a 61 y.o. female  presents to [facility] for   History of Present Illness    Patient presents for follow-up evaluation of left wrist sprain, left knee pain/contusion/left knee arthritis. Patient was last seen on July 2, 2021 by Dr. Yanez she was seen for injury to her left wrist, left knee, original injury occurred on patient was seen by Dr. Yanez, she was advised of probable sprain of the left wrist but Dr. Yanez did not rule out occult fracture and placed her into a short arm cast.  Patient states the cast was getting uncomfortable and this past Friday she cut it off with some wire clippers.  Patient presents with a wrist brace.  Patient still points to the dorsum of the wrist, base of the thumb as her area of worst pain.  She states she has pain with range of motion of the wrist.  Patient denies numbness and tingling.  She states she had bruising and swelling to the wrist and the knee, she states this has resolved.  Her main concern is the wrist pain.  No Known Allergies     Social History     Socioeconomic History   • Marital status:      Spouse name: Not on file   • Number of children: Not on file   • Years of education: Not on file   • Highest education level: Not on file   Tobacco Use   • Smoking status: Current Every Day Smoker     Packs/day: 0.50     Years: 30.00     Pack years: 15.00     Types: Cigarettes     Start date: 1/14/1967   • Smokeless tobacco: Never Used   • Tobacco comment: Smoked 21-30 years   Vaping Use   • Vaping Use: Never used   Substance and Sexual Activity   • Alcohol use: Never     Comment: Does not drink   • Drug use: Never   • Sexual activity: Defer     Partners: Female        REVIEW OF SYSTEMS    Constitutional: Denies fevers, chills, weight loss  Cardiovascular: Denies chest pain, shortness of  "breath  Skin: Denies rashes, acute skin changes  Neurologic: Denies headache, loss of consciousness  MSK: Left wrist pain, left knee pain.      Objective   Vital Signs:   Pulse 88   Ht 167.6 cm (66\")   Wt 77.3 kg (170 lb 6.4 oz)   SpO2 96%   BMI 27.50 kg/m²     Body mass index is 27.5 kg/m².    Physical Exam    Left wrist:, Tenderness to palpation of the dorsum of the wrist, base of the thumb, patient able to touch thumb to each finger, makes a full fist, full finger and thumb range of motion, wrist range of motion limited secondary to pain/stiffness.  2+ radial ulnar pulses, 2+ capillary refill.    Orthopedic Injury Treatment    Date/Time: 7/19/2021 2:58 PM  Performed by: Case Bell PA  Authorized by: Case Bell PA   Injury location: wrist  Location details: left wrist  Injury type: fracture  Pre-procedure neurovascular assessment: neurovascularly intact  Pre-procedure distal perfusion: normal  Pre-procedure neurological function: normal  Pre-procedure range of motion: reduced    Anesthesia:  Local anesthesia used: no    Sedation:  Patient sedated: no    Manipulation performed: no  Immobilization: cast  Supplies used: cotton padding (fiberglass)  Post-procedure neurovascular assessment: post-procedure neurovascularly intact  Post-procedure distal perfusion: normal  Post-procedure neurological function: normal  Post-procedure range of motion: unchanged  Patient tolerance: patient tolerated the procedure well with no immediate complications  Comments: Closed treatment was obtained and fiberglass cast was applied.  The patient tolerated the procedure without any complications.            Imaging Results (Most Recent)     Procedure Component Value Units Date/Time    XR Wrist 2 View Left [[id number]] Resulted: 07/19/21 1529     Updated: 07/19/21 1530    Narrative:      • View:AP and Lateral view(s)  • Site: Left wrist  • Indication: Left wrist pain  • Study: X-rays ordered, taken in " the office, and reviewed today  • X-ray: Possible nondisplaced fracture to the trapezium, mild to moderate   degenerative changes.  • Comparative data: No comparative data found             Result Review :   The following data was reviewed by: ANN Long on 07/19/2021:  Data reviewed: Radiologic studies Reviewed by me, reviewed by Dr. Yanez and               Assessment and Plan    Diagnoses and all orders for this visit:    1. Sprain of left wrist, subsequent encounter (Primary)  -     XR Wrist 2 View Left  -     Orthopedic Injury Treatment    2. Contusion of left knee, subsequent encounter    3. Arthritis of knee, left    4. Closed nondisplaced fracture of trapezium of left wrist with routine healing, subsequent encounter  -     Orthopedic Injury Treatment        Reviewed x-rays with the patient, discussed diagnosis and treatment options, patient was advised we will treat her conservatively with a short arm thumb spica cast, cast was placed, patient tolerated casting well, cast care was discussed she was advised to not remove her cast prior to her next visit.  Follow-up in 2 weeks with x-rays in the cast.    Call or return if worsening symptoms.    Follow Up   Return in about 2 weeks (around 8/2/2021) for Recheck.  Patient was given instructions and counseling regarding her condition or for health maintenance advice. Please see specific information pulled into the AVS if appropriate.

## 2021-08-03 ENCOUNTER — TELEPHONE (OUTPATIENT)
Dept: CARDIOLOGY | Facility: CLINIC | Age: 61
End: 2021-08-03

## 2021-08-03 RX ORDER — LISINOPRIL 5 MG/1
TABLET ORAL
Qty: 90 TABLET | Refills: 0 | Status: SHIPPED | OUTPATIENT
Start: 2021-08-03 | End: 2022-05-03 | Stop reason: SDUPTHER

## 2021-08-03 RX ORDER — LISINOPRIL 5 MG/1
5 TABLET ORAL DAILY
Qty: 30 TABLET | Refills: 11
Start: 2021-08-03 | End: 2021-08-16 | Stop reason: SDUPTHER

## 2021-08-03 NOTE — TELEPHONE ENCOUNTER
Received VM from patient stating that she needed to get an appt to get her cast cut off.     Returned call and advised patient that she needs to call Dr. Yanez's office. Phone number provided.

## 2021-08-03 NOTE — TELEPHONE ENCOUNTER
Caller: Desiree Garcia    Relationship: Self    Best call back number: [phone]    Medication needed:   Requested Prescriptions     Pending Prescriptions Disp Refills   • lisinopril (PRINIVIL,ZESTRIL) 5 MG tablet 30 tablet 11     Sig: Take 1 tablet by mouth Daily.       When do you need the refill by: ASAP      Does the patient have less than a 3 day supply:  [x] Yes  [] No    What is the patient's preferred pharmacy: [address] [phone] [facility] [phone]

## 2021-08-05 ENCOUNTER — OFFICE VISIT (OUTPATIENT)
Dept: ORTHOPEDIC SURGERY | Facility: CLINIC | Age: 61
End: 2021-08-05

## 2021-08-05 VITALS — HEIGHT: 66 IN | BODY MASS INDEX: 27.74 KG/M2 | WEIGHT: 172.6 LBS | OXYGEN SATURATION: 97 % | HEART RATE: 97 BPM

## 2021-08-05 DIAGNOSIS — S62.175D CLOSED NONDISPLACED FRACTURE OF TRAPEZIUM OF LEFT WRIST WITH ROUTINE HEALING, SUBSEQUENT ENCOUNTER: Primary | ICD-10-CM

## 2021-08-05 DIAGNOSIS — S80.02XD CONTUSION OF LEFT KNEE, SUBSEQUENT ENCOUNTER: ICD-10-CM

## 2021-08-05 DIAGNOSIS — S63.502D SPRAIN OF LEFT WRIST, SUBSEQUENT ENCOUNTER: ICD-10-CM

## 2021-08-05 DIAGNOSIS — M25.562 LEFT KNEE PAIN, UNSPECIFIED CHRONICITY: ICD-10-CM

## 2021-08-05 DIAGNOSIS — M17.12 ARTHRITIS OF KNEE, LEFT: ICD-10-CM

## 2021-08-05 PROCEDURE — 99213 OFFICE O/P EST LOW 20 MIN: CPT | Performed by: PHYSICIAN ASSISTANT

## 2021-08-05 NOTE — PROGRESS NOTES
"Chief Complaint  Pain and Follow-up of the Left Wrist and Follow-up of the Left Knee    Subjective          Desiree Garcia is a 61 y.o. female  presents to [facility] for   History of Present Illness    Patient presents for follow-up evaluation of left wrist sprain, left triquetral fracture, left knee contusion/arthritis/left knee pain.  Patient presents in cast cast was left in place for x-rays and initial physical exam.  Patient states she has decreased pain in the wrist, denies need for pain medication or NSAIDs, she states she would like to come out of her cast.  She states her knee is better, no new complaints of the left knee.  No Known Allergies     Social History     Socioeconomic History   • Marital status:      Spouse name: Not on file   • Number of children: Not on file   • Years of education: Not on file   • Highest education level: Not on file   Tobacco Use   • Smoking status: Current Every Day Smoker     Packs/day: 0.50     Years: 30.00     Pack years: 15.00     Types: Cigarettes     Start date: 1/14/1967   • Smokeless tobacco: Never Used   • Tobacco comment: Smoked 21-30 years   Vaping Use   • Vaping Use: Never used   Substance and Sexual Activity   • Alcohol use: Never     Comment: Does not drink   • Drug use: Never   • Sexual activity: Defer     Partners: Female        REVIEW OF SYSTEMS    Constitutional: Denies fevers, chills, weight loss  Cardiovascular: Denies chest pain, shortness of breath  Skin: Denies rashes, acute skin changes  Neurologic: Denies headache, loss of consciousness  MSK: Left wrist pain, left knee pain      Objective   Vital Signs:   Pulse 97   Ht 167.6 cm (66\")   Wt 78.3 kg (172 lb 9.6 oz)   SpO2 97%   BMI 27.86 kg/m²     Body mass index is 27.86 kg/m².    Physical Exam    Left wrist: Initial exam patient had cast in place, cast was removed after x-rays were reviewed, skin exam after cast was removed, skin is intact, no swelling, no " erythema, no skin irritation.  Limited range of motion secondary to stiffness of her wrist, full range of motion of fingers and thumb, neurovascular intact, 2+ radial ulnar pulses.  Nontender to palpation at base of thumb where patient had prior pain.    Procedures    Imaging Results (Most Recent)     Procedure Component Value Units Date/Time    XR Wrist 2 View Left [[id number]] Resulted: 08/05/21 1153     Updated: 08/05/21 1153    Narrative:      • View:AP and Lateral view(s)  • Site: Left wrist  • Indication: Left wrist pain  • Study: X-rays ordered, taken in the office, and reviewed today  • X-ray: Good healing of triquetral fracture found on prior exam, no   increased displacement or angulation  • Comparative data: No comparative data found             Result Review :   The following data was reviewed by: ANN Long on 08/05/2021:  Data reviewed: Radiologic studies Reviewed by me with the patient             Assessment and Plan    Diagnoses and all orders for this visit:    1. Closed nondisplaced fracture of trapezium of left wrist with routine healing, subsequent encounter (Primary)  -     XR Wrist 2 View Left  -     Ambulatory Referral to Physical Therapy Evaluate and treat (2-3x/week for 6-8 weeks)    2. Contusion of left knee, subsequent encounter    3. Left knee pain, unspecified chronicity    4. Arthritis of knee, left    5. Sprain of left wrist, subsequent encounter  -     Ambulatory Referral to Physical Therapy Evaluate and treat (2-3x/week for 6-8 weeks)        Reviewed x-rays with the patient, advised her she may come out of the cast, this was removed and patient was placed in a brace with thumb component.  Patient was advised to work on gentle range of motion at home out of the brace, use the brace with activities, follow-up in 4 weeks with x-rays.    Call or return if worsening symptoms.    Follow Up   Return in about 4 weeks (around 9/2/2021) for Recheck.  Patient was given  instructions and counseling regarding her condition or for health maintenance advice. Please see specific information pulled into the AVS if appropriate.

## 2021-08-16 ENCOUNTER — OFFICE VISIT (OUTPATIENT)
Dept: FAMILY MEDICINE CLINIC | Facility: CLINIC | Age: 61
End: 2021-08-16

## 2021-08-16 VITALS
DIASTOLIC BLOOD PRESSURE: 78 MMHG | TEMPERATURE: 98.2 F | SYSTOLIC BLOOD PRESSURE: 138 MMHG | HEART RATE: 86 BPM | WEIGHT: 175.2 LBS | OXYGEN SATURATION: 97 % | HEIGHT: 66 IN | BODY MASS INDEX: 28.16 KG/M2

## 2021-08-16 DIAGNOSIS — G89.29 CHRONIC LEFT SHOULDER PAIN: ICD-10-CM

## 2021-08-16 DIAGNOSIS — M25.512 CHRONIC LEFT SHOULDER PAIN: ICD-10-CM

## 2021-08-16 DIAGNOSIS — E11.9 TYPE 2 DIABETES MELLITUS WITHOUT COMPLICATION, WITHOUT LONG-TERM CURRENT USE OF INSULIN (HCC): ICD-10-CM

## 2021-08-16 DIAGNOSIS — J41.1 MUCOPURULENT CHRONIC BRONCHITIS (HCC): ICD-10-CM

## 2021-08-16 DIAGNOSIS — J44.1 COPD WITH EXACERBATION (HCC): Primary | ICD-10-CM

## 2021-08-16 PROCEDURE — 99214 OFFICE O/P EST MOD 30 MIN: CPT | Performed by: FAMILY MEDICINE

## 2021-08-16 PROCEDURE — 96372 THER/PROPH/DIAG INJ SC/IM: CPT | Performed by: FAMILY MEDICINE

## 2021-08-16 RX ORDER — ALBUTEROL SULFATE 1.25 MG/3ML
1 SOLUTION RESPIRATORY (INHALATION) EVERY 6 HOURS PRN
Qty: 3 ML | Refills: 12 | Status: SHIPPED | OUTPATIENT
Start: 2021-08-16 | End: 2021-08-26 | Stop reason: SDUPTHER

## 2021-08-16 RX ORDER — METHYLPREDNISOLONE SODIUM SUCCINATE 125 MG/2ML
125 INJECTION, POWDER, LYOPHILIZED, FOR SOLUTION INTRAMUSCULAR; INTRAVENOUS ONCE
Status: COMPLETED | OUTPATIENT
Start: 2021-08-16 | End: 2021-08-16

## 2021-08-16 RX ORDER — LEVOFLOXACIN 500 MG/1
500 TABLET, FILM COATED ORAL DAILY
Qty: 7 TABLET | Refills: 0 | Status: SHIPPED | OUTPATIENT
Start: 2021-08-16 | End: 2021-08-23

## 2021-08-16 RX ORDER — HYDROCODONE BITARTRATE AND ACETAMINOPHEN 5; 325 MG/1; MG/1
1 TABLET ORAL EVERY 4 HOURS PRN
Qty: 42 TABLET | Refills: 0 | Status: SHIPPED | OUTPATIENT
Start: 2021-08-16 | End: 2021-08-19 | Stop reason: SDUPTHER

## 2021-08-16 RX ADMIN — METHYLPREDNISOLONE SODIUM SUCCINATE 125 MG: 125 INJECTION, POWDER, LYOPHILIZED, FOR SOLUTION INTRAMUSCULAR; INTRAVENOUS at 16:26

## 2021-08-18 ENCOUNTER — TELEPHONE (OUTPATIENT)
Dept: FAMILY MEDICINE CLINIC | Facility: CLINIC | Age: 61
End: 2021-08-18

## 2021-08-18 NOTE — TELEPHONE ENCOUNTER
Caller: Desiree Garcia    Relationship: Self    Best call back number: [phone]    What is the best time to reach you: ANY    Who are you requesting to speak with (clinical staff, provider,  specific staff member): CLINICAL STAFF    What was the call regarding: PATIENT CALLED STATING THAT THE PHARMACY S UNABLE TO FILL HER PRESCRIPTION DUE TO THE DOSAGE BEING EVERY FOUR HOURS. THE HYDROcodone-acetaminophen (NORCO) 5-325 MG per tablet NEEDS TO BE EVERY SIX HOURS BEFORE THEY CAN FILL IT    Do you require a callback: NO

## 2021-08-19 DIAGNOSIS — M25.512 CHRONIC LEFT SHOULDER PAIN: ICD-10-CM

## 2021-08-19 DIAGNOSIS — G89.29 CHRONIC LEFT SHOULDER PAIN: ICD-10-CM

## 2021-08-19 RX ORDER — HYDROCODONE BITARTRATE AND ACETAMINOPHEN 5; 325 MG/1; MG/1
1 TABLET ORAL EVERY 6 HOURS PRN
Qty: 42 TABLET | Refills: 0 | Status: SHIPPED | OUTPATIENT
Start: 2021-08-19 | End: 2021-08-26

## 2021-08-26 DIAGNOSIS — J44.1 COPD WITH EXACERBATION (HCC): ICD-10-CM

## 2021-08-26 DIAGNOSIS — M25.512 CHRONIC LEFT SHOULDER PAIN: ICD-10-CM

## 2021-08-26 DIAGNOSIS — G89.29 CHRONIC LEFT SHOULDER PAIN: ICD-10-CM

## 2021-08-26 DIAGNOSIS — J41.1 MUCOPURULENT CHRONIC BRONCHITIS (HCC): ICD-10-CM

## 2021-08-26 RX ORDER — HYDROCODONE BITARTRATE AND ACETAMINOPHEN 5; 325 MG/1; MG/1
1 TABLET ORAL EVERY 6 HOURS PRN
Qty: 42 TABLET | Refills: 0 | Status: CANCELLED | OUTPATIENT
Start: 2021-08-26 | End: 2021-09-02

## 2021-08-26 RX ORDER — ALBUTEROL SULFATE 1.25 MG/3ML
1 SOLUTION RESPIRATORY (INHALATION) EVERY 6 HOURS PRN
Qty: 3 ML | Refills: 12 | Status: SHIPPED | OUTPATIENT
Start: 2021-08-26 | End: 2021-09-25

## 2021-08-26 NOTE — TELEPHONE ENCOUNTER
Caller: JoseDesiree    Relationship: Self    Best call back number: [phone] (H)    Medication needed:   Requested Prescriptions     Pending Prescriptions Disp Refills   • albuterol (ACCUNEB) 1.25 MG/3ML nebulizer solution 3 mL 12     Sig: Take 3 mL by nebulization Every 6 (Six) Hours As Needed for Wheezing for up to 30 days.   • HYDROcodone-acetaminophen (NORCO) 5-325 MG per tablet 42 tablet 0     Sig: Take 1 tablet by mouth Every 6 (Six) Hours As Needed for Moderate Pain  for up to 7 days.       When do you need the refill by: ASAP    What additional details did the patient provide when requesting the medication: PATIENT STATED THAT HER PHARMACY TOLD HER THAT HER INSURANCE WILL NOT PAY FOR HER MEDICATION WITHOUT SPEAKING WITH THE OFFICE- PATIENT DOES NOT KNOW IF THE MEDIATION NEEDS A PA    Does the patient have less than a 3 day supply:  [x] Yes  [] No    What is the patient's preferred pharmacy: [address] [phone] [facility] [phone] [facility]

## 2021-09-01 ENCOUNTER — TELEPHONE (OUTPATIENT)
Dept: FAMILY MEDICINE CLINIC | Facility: CLINIC | Age: 61
End: 2021-09-01

## 2021-09-01 NOTE — TELEPHONE ENCOUNTER
Caller: [facility] [place] [id number]  [place], [place] - [address] [facility] - [phone]  - [phone] [place]    Relationship: Pharmacy    Best call back number: [phone]    What is the best time to reach you: AFTER 2    Who are you requesting to speak with CLINICAL     What was the call regarding: PHARMACY IS CALLING ON A MEDICATION REGARDING PATIENT.    PLEASE ADVISE     Do you require a callback: YES

## 2021-09-30 PROBLEM — I21.3 ST ELEVATION MYOCARDIAL INFARCTION (STEMI): Status: RESOLVED | Noted: 2021-06-14 | Resolved: 2021-09-30

## 2021-09-30 PROBLEM — E78.5 HYPERLIPIDEMIA: Status: ACTIVE | Noted: 2021-09-30

## 2021-09-30 PROBLEM — I25.10 CORONARY ARTERY DISEASE INVOLVING NATIVE HEART WITHOUT ANGINA PECTORIS: Status: ACTIVE | Noted: 2021-09-30

## 2021-09-30 PROBLEM — I25.10 CORONARY ARTERY DISEASE INVOLVING NATIVE HEART WITHOUT ANGINA PECTORIS: Chronic | Status: ACTIVE | Noted: 2021-06-14

## 2021-09-30 PROBLEM — I10 HYPERTENSION, ESSENTIAL: Status: ACTIVE | Noted: 2021-09-30

## 2021-11-01 ENCOUNTER — OFFICE VISIT (OUTPATIENT)
Dept: CARDIOLOGY | Facility: CLINIC | Age: 61
End: 2021-11-01

## 2021-11-01 VITALS
DIASTOLIC BLOOD PRESSURE: 81 MMHG | HEIGHT: 66 IN | SYSTOLIC BLOOD PRESSURE: 142 MMHG | BODY MASS INDEX: 28.45 KG/M2 | WEIGHT: 177 LBS | HEART RATE: 86 BPM

## 2021-11-01 DIAGNOSIS — I25.10 CORONARY ARTERY DISEASE INVOLVING NATIVE HEART WITHOUT ANGINA PECTORIS, UNSPECIFIED VESSEL OR LESION TYPE: Primary | Chronic | ICD-10-CM

## 2021-11-01 DIAGNOSIS — F17.219 CIGARETTE NICOTINE DEPENDENCE WITH NICOTINE-INDUCED DISORDER: ICD-10-CM

## 2021-11-01 DIAGNOSIS — R68.89 FORGETFULNESS: ICD-10-CM

## 2021-11-01 DIAGNOSIS — E78.5 HYPERLIPIDEMIA, UNSPECIFIED HYPERLIPIDEMIA TYPE: ICD-10-CM

## 2021-11-01 DIAGNOSIS — R53.83 FATIGUE, UNSPECIFIED TYPE: ICD-10-CM

## 2021-11-01 DIAGNOSIS — I10 HYPERTENSION, ESSENTIAL: ICD-10-CM

## 2021-11-01 PROCEDURE — 99214 OFFICE O/P EST MOD 30 MIN: CPT | Performed by: NURSE PRACTITIONER

## 2021-11-01 NOTE — ASSESSMENT & PLAN NOTE
I have educated the patient on the risk of diseases from using tobacco products such as heart disease. Patient verbalizes understanding of the need for smoking cessation but is unwilling to attempt at this time.  Declined any aids

## 2021-11-01 NOTE — ASSESSMENT & PLAN NOTE
Uncertain control.  Encourage her to take her medications regularly.  Start to do a blood pressure log and will adjust meds accordingly.  On days when she feels dizzy or tired she is to also check her blood pressure and heart rate at that time.  Continue lisinopril 5 mg and carvedilol 3.125 twice daily

## 2021-11-01 NOTE — ASSESSMENT & PLAN NOTE
States she forgets meds frequently.  She does carry a smart phone so encouraged her to set alarms to take her meds and use a medicine planner.

## 2021-11-01 NOTE — PROGRESS NOTES
Chief Complaint  Coronary Artery Disease, Hyperlipidemia, Hypertension, Dizziness, and no refills needed    Subjective            Desiree Garcia presents to [facility] CARDIOLOG[facility]  She is a 61-year-old white female comes in for evaluation of coronary disease, hypertension, and hyperlipidemia.  Denies any chest pain or pressure.  Continues complain of shortness of breath is mild with mod exertion that is unchanged.  Does complain of fatigue at times.  Feels like it is attributed to her medications.  However she also admits that she forgets to take her medications and sometimes months at a time.  Does not monitor blood sugars at home.  Continues to smoke a half a pack a day without any desire to stop.  Has occasional pedal edema and admits that her diet is high in salt.  Complains of occasional dizziness.  Denies palpitations, syncope, PND, and orthopnea.  Has not had a Covid vaccine.  Weight is the same.            Past History:    Past Medical History:   Diagnosis Date   • Arthritis    • Cervical cancer screening    • COPD (chronic obstructive pulmonary disease) ([facility])    • Coronary artery disease involving native heart without angina pectoris 9/30/2021    primary angioplasty and stent placement to mid right coronary artery with good angiographic results on 6/14/2021 after STEMI   • Depression with anxiety    • Diabetes mellitus ([facility])    • Forgetfulness    • Gastric reflux    • Hypertension    • Leg paresthesia 01/20/2017    Right   • Limb swelling    • Lumbago 01/20/2017    Low back pain   • Lumbar spinal stenosis 02/23/2017    L4/5 moderate to severe central canal stenosis   • Lumbosacral radiculopathy 01/20/2017    Right   • Migraines    • Night sweat    • Reflux esophagitis    • Shortness of breath    • ST elevation myocardial infarction (STEMI) (CMS/[facility]) 6/14/2021   • Stomach disorder         Family History: family history includes Arthritis in her brother, father, mother, and sister;  Cancer in her mother and sister; Diabetes in her brother and father; Heart attack in her father; Heart disease in her brother and father; Stroke in her brother, father, mother, and sister.     Social History: reports that she has been smoking cigarettes. She started smoking about 54 years ago. She has a 15.00 pack-year smoking history. She has never used smokeless tobacco. She reports that she does not drink alcohol and does not use drugs.    Allergies: Tramadol      Past Surgical History:   Procedure Laterality Date   • ABDOMINAL SURGERY      3 C-SECTIONS   • CARDIAC CATHETERIZATION     • CARDIAC CATHETERIZATION N/A 2021    Procedure: Left Heart Cath;  Surgeon: Sidney Xiao MD;  Location: [facility] CATH INVASIVE [facility];  Service: Cardiology;  Laterality: N/A;   •  SECTION      x 3   • CHOLECYSTECTOMY     • COLONOSCOPY     • CORONARY STENT PLACEMENT     • ENDOSCOPY      2007   • FOOT SURGERY Right    • HAND SURGERY          Prior to Admission medications    Medication Sig Start Date End Date Taking? Authorizing Provider   aspirin 81 MG EC tablet Take 1 tablet by mouth Daily. Indications: Acute Heart Attack 21  Yes Sidney Xiao MD   atorvastatin (LIPITOR) 40 MG tablet Take 1 tablet by mouth Every Night. 21  Yes Sidney Xiao MD   carvedilol (COREG) 3.125 MG tablet Take 1 tablet by mouth Every 12 (Twelve) Hours. 21  Yes Sidney Xiao MD   lisinopril (PRINIVIL,ZESTRIL) 5 MG tablet Take 1 tablet by mouth once daily for 90 days 8/3/21  Yes Sandra Kaba MD   metFORMIN (GLUCOPHAGE) 500 MG tablet TAKE 1 TABLET BY MOUTH TWICE DAILY WITH MORNING MEAL AND WITH EVENING MEAL 7/15/21  Yes Sandra Kaba MD   ticagrelor (BRILINTA) 90 MG tablet tablet Take 1 tablet by mouth 2 (Two) Times a Day. Indications: Acute Coronary Syndrome 21  Yes Sidney Xiao MD   Trintellix 10 MG tablet Take 10 mg by mouth Daily. 21  Yes Mara Hinojosa MD     "    Review of Systems   Constitutional: Positive for fatigue.   Respiratory: Positive for cough and shortness of breath.    Cardiovascular: Positive for leg swelling. Negative for chest pain and palpitations.   Neurological: Positive for dizziness.   All other systems reviewed and are negative.       Objective     Physical Exam  Constitutional:       General: She is not in acute distress.     Appearance: She is obese.      Comments: Smells like smoke   Neck:      Vascular: No carotid bruit.   Cardiovascular:      Rate and Rhythm: Normal rate and regular rhythm.      Heart sounds: Normal heart sounds.   Pulmonary:      Effort: Pulmonary effort is normal.      Breath sounds: Normal breath sounds.   Musculoskeletal:      Right lower leg: No edema.      Left lower leg: No edema.   Neurological:      Mental Status: She is alert.       /81   Pulse 86   Ht 167.6 cm (66\")   Wt 80.3 kg (177 lb)   BMI 28.57 kg/m²       Vitals:    11/01/21 1208   BP: 142/81   Pulse:        Result Review :         The following data was reviewed by: ALVIN Rivera on 11/01/2021:      Lab Results   Component Value Date    PROBNP 236.2 06/14/2021    BNP 63 12/26/2020    BNP 65 03/17/2019     CMP    CMP 6/17/21 6/21/21 6/28/21   Glucose 174 (A) 175 (A) 193 (A)   BUN 17 13 7 (A)   Creatinine 0.85 0.81 0.71   eGFR Non  Am 68 72 84   Sodium 134 (A) 136 132 (A)   Potassium 3.9 3.6 3.7   Chloride 99 98 96 (A)   Calcium 9.1 9.8 8.8   Albumin 3.70 4.00    Total Bilirubin 0.7 0.4    Alkaline Phosphatase 127 (A) 139 (A)    AST (SGOT) 29 19    ALT (SGPT) 18 22    (A) Abnormal value            CBC w/diff    CBC w/Diff 6/16/21 6/17/21 6/21/21   WBC 21.46 (A) 19.61 (A) 15.51 (A)   RBC 5.08 5.13 5.12   Hemoglobin 13.5 13.8 13.7   Hematocrit 42.2 43.8 42.6   MCV 83.1 85.4 83.2   MCH 26.6 26.9 26.8   MCHC 32.0 31.5 32.2   RDW 13.9 14.1 13.4   Platelets 244 285 373   Neutrophil Rel % 73.1  66.2   Immature Granulocyte Rel % 1.4 (A)  0.6 (A) "   Lymphocyte Rel % 15.7 (A)  24.6   Monocyte Rel % 8.5  6.0   Eosinophil Rel % 0.7  1.8   Basophil Rel % 0.6  0.8   (A) Abnormal value             Lipid Panel    Lipid Panel 6/14/21   Total Cholesterol 160   Triglycerides 249 (A)   HDL Cholesterol 48   VLDL Cholesterol 40   LDL Cholesterol  72   LDL/HDL Ratio 1.30   (A) Abnormal value             Lab Results   Component Value Date    TSH 5.660 (H) 06/14/2021    TSH 2.060 05/11/2021      Lab Results   Component Value Date    FREET4 1.1 05/11/2021      No results found for: DDIMERQUANT  Magnesium   Date Value Ref Range Status   06/21/2021 1.8 1.6 - 2.4 mg/dL Final      No results found for: DIGOXIN   A1C Last 3 Results    HGBA1C Last 3 Results 1/12/21 5/11/21 6/14/21   Hemoglobin A1C 7.2 (A) 7.1 (A) 7.19 (A)   (A) Abnormal value       Comments are available for some flowsheets but are not being displayed.                                Assessment and Plan        Diagnoses and all orders for this visit:    1. Coronary artery disease involving native heart without angina pectoris, unspecified vessel or lesion type (Primary)  Assessment & Plan:  Without angina.  Continue aspirin and Brilinta.      2. Hypertension, essential  Assessment & Plan:  Uncertain control.  Encourage her to take her medications regularly.  Start to do a blood pressure log and will adjust meds accordingly.  On days when she feels dizzy or tired she is to also check her blood pressure and heart rate at that time.  Continue lisinopril 5 mg and carvedilol 3.125 twice daily      3. Hyperlipidemia, unspecified hyperlipidemia type  Assessment & Plan:  Uncertain control.  She is due to have labs this week.  Continue atorvastatin 40 mg a day.      4. Fatigue, unspecified type  Comments:  Encouraged to do a blood pressure log and check when she feels fatigue and dizzy.  We will check a thyroid evaluate her fatigue  Orders:  -     TSH; Future  -     T4, Free; Future    5. Forgetfulness  Assessment &  Plan:  States she forgets meds frequently.  She does carry a smart phone so encouraged her to set alarms to take her meds and use a medicine planner.      6. Cigarette nicotine dependence with nicotine-induced disorder  Assessment & Plan:  I have educated the patient on the risk of diseases from using tobacco products such as heart disease. Patient verbalizes understanding of the need for smoking cessation but is unwilling to attempt at this time.  Declined any aids      7.  Encouraged to get the Covid vaccine in practice safety measures.          Follow Up     Return in about 6 months (around 5/1/2022) for with Dr. Xiao.    Patient was given instructions and counseling regarding her condition or for health maintenance advice. Please see specific information pulled into the AVS if appropriate.       ALVIN Brody  11/01/21 12:12 EDT

## 2021-11-04 ENCOUNTER — OFFICE VISIT (OUTPATIENT)
Dept: FAMILY MEDICINE CLINIC | Facility: CLINIC | Age: 61
End: 2021-11-04

## 2021-11-04 VITALS
SYSTOLIC BLOOD PRESSURE: 134 MMHG | OXYGEN SATURATION: 95 % | TEMPERATURE: 98 F | HEIGHT: 66 IN | DIASTOLIC BLOOD PRESSURE: 68 MMHG | HEART RATE: 85 BPM | RESPIRATION RATE: 18 BRPM | BODY MASS INDEX: 29.09 KG/M2 | WEIGHT: 181 LBS

## 2021-11-04 DIAGNOSIS — M25.512 CHRONIC LEFT SHOULDER PAIN: Primary | ICD-10-CM

## 2021-11-04 DIAGNOSIS — E03.9 HYPOTHYROIDISM, UNSPECIFIED TYPE: ICD-10-CM

## 2021-11-04 DIAGNOSIS — E11.9 TYPE 2 DIABETES MELLITUS WITHOUT COMPLICATION, WITHOUT LONG-TERM CURRENT USE OF INSULIN (HCC): ICD-10-CM

## 2021-11-04 DIAGNOSIS — R53.83 OTHER FATIGUE: ICD-10-CM

## 2021-11-04 DIAGNOSIS — G89.29 CHRONIC LEFT SHOULDER PAIN: Primary | ICD-10-CM

## 2021-11-04 DIAGNOSIS — R53.83 FATIGUE, UNSPECIFIED TYPE: ICD-10-CM

## 2021-11-04 LAB
ALBUMIN SERPL-MCNC: 4.5 G/DL (ref 3.5–5.2)
ALBUMIN/GLOB SERPL: 1.4 G/DL
ALP SERPL-CCNC: 143 U/L (ref 39–117)
ALT SERPL W P-5'-P-CCNC: 14 U/L (ref 1–33)
ANION GAP SERPL CALCULATED.3IONS-SCNC: 8.8 MMOL/L (ref 5–15)
AST SERPL-CCNC: 14 U/L (ref 1–32)
BILIRUB SERPL-MCNC: 0.4 MG/DL (ref 0–1.2)
BUN SERPL-MCNC: 13 MG/DL (ref 8–23)
BUN/CREAT SERPL: 20.6 (ref 7–25)
CALCIUM SPEC-SCNC: 9.6 MG/DL (ref 8.6–10.5)
CHLORIDE SERPL-SCNC: 102 MMOL/L (ref 98–107)
CO2 SERPL-SCNC: 28.2 MMOL/L (ref 22–29)
CREAT SERPL-MCNC: 0.63 MG/DL (ref 0.57–1)
GFR SERPL CREATININE-BSD FRML MDRD: 96 ML/MIN/1.73
GLOBULIN UR ELPH-MCNC: 3.2 GM/DL
GLUCOSE SERPL-MCNC: 149 MG/DL (ref 65–99)
HBA1C MFR BLD: 7.75 % (ref 4.8–5.6)
POTASSIUM SERPL-SCNC: 4.1 MMOL/L (ref 3.5–5.2)
PROT SERPL-MCNC: 7.7 G/DL (ref 6–8.5)
SODIUM SERPL-SCNC: 139 MMOL/L (ref 136–145)
T4 FREE SERPL-MCNC: 1.22 NG/DL (ref 0.93–1.7)
TSH SERPL DL<=0.05 MIU/L-ACNC: 2.95 UIU/ML (ref 0.27–4.2)

## 2021-11-04 PROCEDURE — 99214 OFFICE O/P EST MOD 30 MIN: CPT | Performed by: FAMILY MEDICINE

## 2021-11-04 PROCEDURE — 83036 HEMOGLOBIN GLYCOSYLATED A1C: CPT | Performed by: FAMILY MEDICINE

## 2021-11-04 PROCEDURE — 84443 ASSAY THYROID STIM HORMONE: CPT | Performed by: FAMILY MEDICINE

## 2021-11-04 PROCEDURE — 84439 ASSAY OF FREE THYROXINE: CPT | Performed by: FAMILY MEDICINE

## 2021-11-04 PROCEDURE — 80053 COMPREHEN METABOLIC PANEL: CPT | Performed by: FAMILY MEDICINE

## 2021-11-04 NOTE — PROGRESS NOTES
Chief Complaint  Chief Complaint   Patient presents with   • Diabetes   • COPD   • Fatigue     after taking meds       HPI:  Desiree Garcia presents to [facility]     Pt reports she feels tired and weak after taking her medications.   Hypothyroidism-patient last thyroid levels were checked over the summer and her TSH level was actually lower so we will be rechecking it with thyroid antibodies.  That may explain why patient has been very fatigued.  Patient also to possibly be fatigued after taking carvedilol which side effect is fatigue.    Left shoulder pain-patient with medical to complete her physical therapy since there was cancellations due to Covid.  Patient needs to get back in with physical therapy for her right shoulder pain.    Diabetes type 2-patient is still taking her Metformin medication for her blood sugar.  Patient last hemoglobin A1c was 7.1 checked 5 months ago.      Medical History:  Past History:  Medical History: has a past medical history of Arthritis, Cervical cancer screening, COPD (chronic obstructive pulmonary disease) ([facility]), Coronary artery disease involving native heart without angina pectoris (2021), Depression with anxiety, Diabetes mellitus ([facility]), Forgetfulness, Gastric reflux, Hypertension, Leg paresthesia (2017), Limb swelling, Lumbago (2017), Lumbar spinal stenosis (2017), Lumbosacral radiculopathy (2017), Migraines, Night sweat, Reflux esophagitis, Shortness of breath, ST elevation myocardial infarction (STEMI) (CMS/[facility]) (2021), and Stomach disorder.   Surgical History: has a past surgical history that includes Abdominal surgery; Cardiac catheterization;  section; Cholecystectomy; Colonoscopy (); Esophagogastroduodenoscopy; Foot surgery (Right); Hand surgery; Cardiac catheterization (N/A, 2021); and Coronary stent placement.   Family History: family history includes Arthritis in her brother,  father, mother, and sister; Cancer in her mother and sister; Diabetes in her brother and father; Heart attack in her father; Heart disease in her brother and father; Stroke in her brother, father, mother, and sister.   Social History: reports that she has been smoking cigarettes. She started smoking about 54 years ago. She has a 15.00 pack-year smoking history. She has never used smokeless tobacco. She reports that she does not drink alcohol and does not use drugs.  Immunization History   Administered Date(s) Administered   • Influenza, Unspecified 10/09/2020         Allergies: Tramadol     Medications:  Current Outpatient Medications on File Prior to Visit   Medication Sig Dispense Refill   • aspirin 81 MG EC tablet Take 1 tablet by mouth Daily. Indications: Acute Heart Attack 30 tablet 3   • atorvastatin (LIPITOR) 40 MG tablet Take 1 tablet by mouth Every Night. 30 tablet 3   • carvedilol (COREG) 3.125 MG tablet Take 1 tablet by mouth Every 12 (Twelve) Hours. 60 tablet 3   • lisinopril (PRINIVIL,ZESTRIL) 5 MG tablet Take 1 tablet by mouth once daily for 90 days 90 tablet 0   • metFORMIN (GLUCOPHAGE) 500 MG tablet TAKE 1 TABLET BY MOUTH TWICE DAILY WITH MORNING MEAL AND WITH EVENING MEAL 60 tablet 0   • ticagrelor (BRILINTA) 90 MG tablet tablet Take 1 tablet by mouth 2 (Two) Times a Day. Indications: Acute Coronary Syndrome 60 tablet 3   • Trintellix 10 MG tablet Take 10 mg by mouth Daily.       No current facility-administered medications on file prior to visit.        Health Maintenance Due   Topic Date Due   • URINE MICROALBUMIN  Never done   • ANNUAL PHYSICAL  Never done   • Pneumococcal Vaccine 0-64 (1 of 2 - PPSV23) Never done   • COVID-19 Vaccine (1) Never done   • TDAP/TD VACCINES (1 - Tdap) Never done   • ZOSTER VACCINE (1 of 2) Never done   • MAMMOGRAM  08/15/2018   • HEPATITIS C SCREENING  Never done   • DIABETIC FOOT EXAM  Never done   • PAP SMEAR  Never done   • DIABETIC EYE EXAM  Never done  "      Vital Signs:   Vitals:    11/04/21 1416   BP: 134/68   Pulse: 85   Resp: 18   Temp: 98 °F (36.7 °C)   SpO2: 95%   Weight: 82.1 kg (181 lb)   Height: 167.6 cm (66\")      Body mass index is 29.21 kg/m².     ROS:  Review of Systems   Constitutional: Negative for fatigue and fever.   HENT: Negative for congestion, ear pain and sinus pressure.    Respiratory: Negative for cough, chest tightness and shortness of breath.    Cardiovascular: Negative for chest pain, palpitations and leg swelling.   Gastrointestinal: Negative for abdominal pain and diarrhea.   Genitourinary: Negative for dysuria and frequency.   Neurological: Negative for speech difficulty, headache and confusion.   Psychiatric/Behavioral: Negative for agitation and behavioral problems.          Physical Exam  Constitutional:       Appearance: Normal appearance.   HENT:      Right Ear: Tympanic membrane normal.      Left Ear: Tympanic membrane normal.      Nose: Nose normal.   Eyes:      Extraocular Movements: Extraocular movements intact.      Conjunctiva/sclera: Conjunctivae normal.      Pupils: Pupils are equal, round, and reactive to light.   Cardiovascular:      Rate and Rhythm: Normal rate and regular rhythm.   Pulmonary:      Effort: Pulmonary effort is normal.      Breath sounds: Normal breath sounds.   Abdominal:      General: Bowel sounds are normal.   Musculoskeletal:         General: Normal range of motion.      Cervical back: Normal range of motion.   Skin:     General: Skin is warm and dry.   Neurological:      General: No focal deficit present.      Mental Status: She is alert and oriented to person, place, and time.   Psychiatric:         Mood and Affect: Mood normal.         Behavior: Behavior normal.          Result Review    Comprehensive Metabolic Panel (07/02/2021 14:48)  Lipid Panel (07/02/2021 14:48)       Diagnoses and all orders for this visit:    1. Chronic left shoulder pain (Primary)  -     Ambulatory Referral to Physical " Therapy Evaluate and treat; ROM    2. Type 2 diabetes mellitus without complication, without long-term current use of insulin (HCC)  -     Comprehensive metabolic panel  -     Hemoglobin A1c    3. Other fatigue  -     Thyroid Antibodies    4. Hypothyroidism, unspecified type  -     Thyroid Antibodies    5. Fatigue, unspecified type  Comments:  Encouraged to do a blood pressure log and check when she feels fatigue and dizzy.  We will check a thyroid evaluate her fatigue  Orders:  -     TSH  -     T4, Free          Smoking Cessation:    Desiree Garcia  reports that she has been smoking cigarettes. She started smoking about 54 years ago. She has a 15.00 pack-year smoking history. She has never used smokeless tobacco.. I have educated her on the risk of diseases from using tobacco products such as cancer, COPD and heart disease.     I advised her to quit and she is not willing to quit.    I spent 3  minutes counseling the patient.          Follow Up   Return in about 3 months (around 2/4/2022).  Patient was given instructions and counseling regarding her condition or for health maintenance advice. Please see specific information pulled into the AVS if appropriate.       Sandra Kaba MD

## 2021-11-08 ENCOUNTER — TELEPHONE (OUTPATIENT)
Dept: CARDIOLOGY | Facility: CLINIC | Age: 61
End: 2021-11-08

## 2021-11-08 NOTE — TELEPHONE ENCOUNTER
----- Message from ALVIN Rivera sent at 11/7/2021 10:51 AM EST -----  Thyroid lab is normal. Does not need meds.

## 2021-12-03 ENCOUNTER — TREATMENT (OUTPATIENT)
Dept: PHYSICAL THERAPY | Facility: CLINIC | Age: 61
End: 2021-12-03

## 2021-12-03 DIAGNOSIS — M25.512 CHRONIC LEFT SHOULDER PAIN: Primary | ICD-10-CM

## 2021-12-03 DIAGNOSIS — M25.532 LEFT WRIST PAIN: ICD-10-CM

## 2021-12-03 DIAGNOSIS — M25.612 DECREASED ROM OF LEFT SHOULDER: ICD-10-CM

## 2021-12-03 DIAGNOSIS — G89.29 CHRONIC LEFT SHOULDER PAIN: Primary | ICD-10-CM

## 2021-12-03 PROCEDURE — 97162 PT EVAL MOD COMPLEX 30 MIN: CPT | Performed by: PHYSICAL THERAPIST

## 2021-12-03 NOTE — PROGRESS NOTES
Physical Therapy Initial Evaluation and Plan of Care      Patient: Desiree Garcia   : 1960  Diagnosis/ICD-10 Code:  Chronic left shoulder pain [M25.512, G89.29]  Referring practitioner: Sandra Kaba MD  Date of Initial Visit: 12/3/2021  Today's Date: 12/3/2021  Patient seen for 1 sessions    Progress note due: 2022  Re-cert due: 3/2/2022           Subjective Questionnaire: UEFI:     PMH: stomach disorder, STEMI, SOB, migraines, lumbosacral radiculopathy, lumbago, HTN, forgetfullness, depression, DM, CAD, COPD , L wrist fracture , heart stent         Subjective Evaluation    History of Present Illness  Mechanism of injury: Pt states she fell on black top and landed on her L knee and L hand in 2021. She sustained a fracture of her L wrist and was in cast for while. It took a while for doctors to find the fracture and  it took a long time to get better. Her left shoulder has also been hurting since her fall and has not improved. She has trouble lifting it and reaching overhead. She is difficulty with her daily activities such as cleaning the house, folding laundry, and doing dishes.     Pain  Location: L shoulder: 7/10,  L wrist: 10/10 with activity   Aggravating factors: lifting, overhead activity, keyboarding, movement, sleeping, outstretched reach and repetitive movement    Social Support  Lives with: alone    Hand dominance: right    Diagnostic Tests  Abnormal x-ray: Good healing of triquetral fracture found on prior exam.    Patient Goals  Patient goals for therapy: decreased pain, increased motion, independence with ADLs/IADLs, increased strength and return to sport/leisure activities             Objective          Static Posture     Head  Forward.    Shoulders  Rounded.    Thoracic Spine  Hyperkyphosis.    Rib Cage  Elevated.    Palpation   Left   Tenderness of the anterior deltoid.     Tenderness     Left Shoulder   Tenderness in the lateral scapula and supraspinatus tendon.      Left Wrist/Hand   Tenderness in the first dorsal compartment and carpometacarpal joint.     Active Range of Motion   Left Shoulder   Flexion: 125 degrees with pain  Abduction: 85 degrees with pain  External rotation 0°: 70 degrees   Internal rotation 0°: 65 degrees with pain    Right Shoulder   Normal active range of motion    Strength/Myotome Testing     Left Shoulder     Planes of Motion   Flexion: 4-   Abduction: 4-   External rotation at 0°: 4-   Internal rotation at 0°: 4-     Right Shoulder     Planes of Motion   Flexion: 4+   Abduction: 4+   External rotation at 0°: 4   Internal rotation at 0°: 4     Left Elbow   Flexion: 4  Forearm pronation: 4    Right Elbow   Flexion: 4+  Forearm pronation: 4+    Left Wrist/Hand      (2nd hand position)     Trial 1: 25 lbs    Right Wrist/Hand      (2nd hand position)     Trial 1: 75 lbs              Assessment & Plan     Assessment  Impairments: abnormal muscle firing, abnormal or restricted ROM, activity intolerance, impaired physical strength and pain with function  Functional Limitations: carrying objects, lifting, sleeping, pulling, pushing, reaching behind back, reaching overhead and unable to perform repetitive tasks  Assessment details: Pt presents with chronic left shoulder and left wrist pain. Pt has decreased ROM and and strength with L shoulder. Pt has decreased  strength of L hand compared to R. Pt has decreased activity tolerance with functional activities due to pain and hypomobility. Pt will benefit from skilled PT to address functional deficits and return to PLOF.         Goals  Plan Goals: 1. The patient has limited ROM of the left shoulder.    LTG 1: 12 weeks:  The patient will demonstrate 170 degrees of left shoulder flexion, 170 degrees of shoulder abduction, 75 degrees of shoulder external rotation, and 80 degrees of shoulder internal rotation to allow the patient to reach into upper kitchen cabinets and manipulate clothing behind the  back with greater ease.    STATUS:  new   STG 1a: 6 weeks:  The patient will demonstrate 150 degrees of left shoulder flexion, 150 degrees of shoulder abduction, 55 degrees of shoulder external rotation, and 70 degrees of shoulder internal rotation.    STATUS: Progressing    TREATMENT: Manual therapy, therapeutic exercise, home exercise instruction, and modalities as needed to include: electrical stimulation, ultrasound, moist heat, and ice.    2. The patient has limited strength of the left shoulder.   LTG 2: 12 weeks:  The patient will demonstrate 5/5 strength for left shoulder flexion, abduction, external rotation, and internal rotation in order to demonstrate improved shoulder stability.    STATUS:  new   STG 2a: 6 weeks:  The patient will demonstrate 4/5 strength for left shoulder flexion, abduction, external rotation, and internal rotation.    STATUS:  new   STG2b:  4 weeks:  The patient will be independent with home exercises.     STATUS:  new   TREATMENT: Manual therapy, therapeutic exercise, home exercise instruction, and modalities as needed to include: electrical stimulation, ultrasound, moist heat, and ice.     3. The patient complains of pain to the left shoulder.   LTG 3: 12 weeks:  The patient will report a pain rating of 2/10 or better in order to improve sleep quality and tolerance to performance of activities of daily living.    STATUS:  new   STG 3a: 6 weeks:  The patient will report a pain rating of 3/10 or better.     STATUS:  new   TREATMENT: Manual therapy, therapeutic exercise, home exercise instruction, and modalities as needed to include: electrical stimulation, ultrasound, moist heat, and ice.    4. Carrying, Moving, and Handling Objects Functional Limitation     LTG 4: 12 weeks:  The patient will demonstrate 1-19% limitation by achieving a score of 65 on the Upper Extremity Functional Index.    STATUS:  new   STG 4a: 6 weeks:  The patient will demonstrate 20-39% limitation by achieving a  score of 49 on the Upper Extremity Functional Index.      STATUS:  New    TREATMENT:  Manual therapy, therapeutic exercise, home exercise instruction, and modalities as needed to include: moist heat, electrical stimulation, and ultrasound.          Plan  Therapy options: will be seen for skilled therapy services  Planned modality interventions: cryotherapy, TENS and thermotherapy (hydrocollator packs)  Planned therapy interventions: manual therapy, neuromuscular re-education, soft tissue mobilization, strengthening, stretching, therapeutic activities, joint mobilization, home exercise program, functional ROM exercises and flexibility  Frequency: 2x week  Duration in weeks: 6  Treatment plan discussed with: patient        Visit Diagnoses:    ICD-10-CM ICD-9-CM   1. Chronic left shoulder pain  M25.512 719.41    G89.29 338.29   2. Decreased ROM of left shoulder  M25.612 719.51   3. Left wrist pain  M25.532 719.43       Timed:         Manual Therapy:    0     mins  12853;     Therapeutic Exercise:    0     mins  80291;     Neuromuscular Albert:    0    mins  31745;    Therapeutic Activity:     0     mins  15464;     Gait Trainin     mins  46354;     Ultrasound:     0     mins  56636;    Ionto                               0    mins   77155  Self-care  __0__ mins [id number]    Un-Timed:  Electrical Stimulation:    0     mins  08036 (MC );  Traction     0     mins 98596  Low Eval     0     Mins  85120  Mod Eval     30     Mins  14728  High Eval                       0     Mins  27683  Hot pack     0     Mins    Cold pack                       0     Mins      Timed Treatment:   0   mins   Total Treatment:     30   mins    PT SIGNATURE: Didier Tuttle PT, DPDIONNE    NPI:[id number]  [place] License: [id number]        Initial Certification  Certification Period: 12/3/2021 thru 3/2/2022  I certify that the therapy services are furnished while this patient is under my care.  The services outlined above are required by this  patient, and will be reviewed every 90 days.     PHYSICIAN: Sandra Kaba MD      DATE:     Please sign and return via fax to [phone].. Thank you, [facility].

## 2021-12-06 RX ORDER — VORTIOXETINE 10 MG/1
TABLET, FILM COATED ORAL
Qty: 30 TABLET | Refills: 0 | Status: SHIPPED | OUTPATIENT
Start: 2021-12-06 | End: 2022-08-16 | Stop reason: SDUPTHER

## 2021-12-15 ENCOUNTER — TREATMENT (OUTPATIENT)
Dept: PHYSICAL THERAPY | Facility: CLINIC | Age: 61
End: 2021-12-15

## 2021-12-15 DIAGNOSIS — M25.612 DECREASED ROM OF LEFT SHOULDER: ICD-10-CM

## 2021-12-15 DIAGNOSIS — M25.532 LEFT WRIST PAIN: ICD-10-CM

## 2021-12-15 DIAGNOSIS — M25.512 CHRONIC LEFT SHOULDER PAIN: Primary | ICD-10-CM

## 2021-12-15 DIAGNOSIS — G89.29 CHRONIC LEFT SHOULDER PAIN: Primary | ICD-10-CM

## 2021-12-15 PROCEDURE — 97110 THERAPEUTIC EXERCISES: CPT | Performed by: PHYSICAL THERAPIST

## 2021-12-15 PROCEDURE — 97530 THERAPEUTIC ACTIVITIES: CPT | Performed by: PHYSICAL THERAPIST

## 2021-12-15 PROCEDURE — 97140 MANUAL THERAPY 1/> REGIONS: CPT | Performed by: PHYSICAL THERAPIST

## 2021-12-15 NOTE — PROGRESS NOTES
Physical Therapy Daily Progress Note        Patient: Desiree Garcia   : 1960  Diagnosis/ICD-10 Code:  Chronic left shoulder pain [M25.512, G89.29]  Referring practitioner: Sandra Kaba MD  Date of Initial Visit: Type: THERAPY  Noted: 12/3/2021  Today's Date: 12/15/2021  Patient seen for 2 sessions             Subjective  Desiree Garcia denies having any pain upon arrival today, but reports having persistent pain with any attempts at left shoulder or wrist movement.  She reports having greater pain in her wrist as compared to her shoulder.    Objective     Active Range of Motion /Passive Range of Motion:  Left Shoulder   Flexion: 125  / 130 degrees with pain  Abduction: 85 / 130   degrees with pain  External rotation 0°: 70 degrees /7  70 degrees  Internal rotation 0°: 65 degrees /  70 degrees with pain     See Exercise and Manual Logs for complete treatment.       Assessment/Plan     Pt tolerated therapy session moderately well - with initiation of therapeutic exercises, CKC-Functional activities, and Manual therapy. She has improved, but continues to have deficits in Left Shoulder and Wrist ROM,  Strength, and Stability; limiting function and ability to perform ADLs at this time.  Pt exhibits increased stiffness and guarding of entire left Lower Extremity throughout therapy session.    Progress per Plan of Care           Timed:  Manual Therapy:    10     mins  76903;  Therapeutic Exercise:    20     mins  93176;     Neuromuscular Albert:    0    mins  47175;    Therapeutic Activity:     10     mins  63530;     Gait Trainin     mins  72088;     Ultrasound:     0     mins  38722;    Electrical Stimulation:    0     mins  14171;  Iontophoresis     0     mins  18068    Untimed:  Electrical Stimulation:    0     mins  03751 ( );  Mechanical Traction:    0     mins  02071;   Fluidotherapy     0     mins  46094  Hot pack     00     mins  97552  Cold pack     0     mins  70334    Timed  Treatment:   40   mins   Total Treatment:     40   mins        Ana Wan PTA  Physical Therapist Assistant

## 2021-12-29 ENCOUNTER — TREATMENT (OUTPATIENT)
Dept: PHYSICAL THERAPY | Facility: CLINIC | Age: 61
End: 2021-12-29

## 2021-12-29 DIAGNOSIS — M25.612 DECREASED ROM OF LEFT SHOULDER: ICD-10-CM

## 2021-12-29 DIAGNOSIS — M25.512 CHRONIC LEFT SHOULDER PAIN: Primary | ICD-10-CM

## 2021-12-29 DIAGNOSIS — G89.29 CHRONIC LEFT SHOULDER PAIN: Primary | ICD-10-CM

## 2021-12-29 DIAGNOSIS — M25.532 LEFT WRIST PAIN: ICD-10-CM

## 2021-12-29 PROCEDURE — 97110 THERAPEUTIC EXERCISES: CPT | Performed by: PHYSICAL THERAPIST

## 2021-12-29 PROCEDURE — 97530 THERAPEUTIC ACTIVITIES: CPT | Performed by: PHYSICAL THERAPIST

## 2021-12-29 PROCEDURE — 97140 MANUAL THERAPY 1/> REGIONS: CPT | Performed by: PHYSICAL THERAPIST

## 2021-12-29 NOTE — PROGRESS NOTES
Physical Therapy Daily Progress Note        Patient: Desiree Garcia   : 1960  Diagnosis/ICD-10 Code:  Chronic left shoulder pain [M25.512, G89.29]  Referring practitioner: Sandra Kaba MD  Date of Initial Visit: Type: THERAPY  Noted: 12/3/2021  Today's Date: 2021  Patient seen for 3 sessions             Subjective   Desiree Garcia reports having same pain in her left shoulder and Wrist, but does report that her wrist is bothering her more than her shoulder- rating her pain at 5/10 upon arrival.    Objective     Active Range of Motion: (Sitting)  Left Shoulder   Flexion: 132 degrees with pain  Abduction: 115 degrees with pain  External rotation 0°: 68 degrees with pain  Internal rotation BTB-Sacrum    See Exercise and Manual Logs for complete treatment.       Assessment/Plan     Pt tolerated therapy session moderately well - with performance of therapeutic exercises, Functional activities, and Manual therapy. She exhibits improved active ROM,  but continues to have deficits in Left Shoulder and Wrist ROM,  Strength,  Stability, and Pain; limiting function and ability to perform ADLs at this time.  She exhibits general muscle guarding of entire left Lower Extremity throughout therapy session.  Decreased inferior/posterior Gleno-humeral glide noted during manual therapy.     Progress per Plan of Care           Timed:  Manual Therapy:    10     mins  85336;  Therapeutic Exercise:    20     mins  87089;     Neuromuscular Albert:    0    mins  15877;    Therapeutic Activity:     8     mins  89760;     Gait Trainin     mins  66641;     Ultrasound:     0     mins  77998;    Electrical Stimulation:    0     mins  19951;  Iontophoresis     0     mins  98700    Untimed:  Electrical Stimulation:    0     mins  22333 ( );  Mechanical Traction:    0     mins  88310;   Fluidotherapy     0     mins  72866  Hot pack     0     mins  91210  Cold pack     0     mins  60219    Timed Treatment:    38   mins   Total Treatment:     38   mins        Ana Wan PTA  Physical Therapist Assistant

## 2022-01-12 ENCOUNTER — TREATMENT (OUTPATIENT)
Dept: PHYSICAL THERAPY | Facility: CLINIC | Age: 62
End: 2022-01-12

## 2022-01-12 DIAGNOSIS — M25.612 DECREASED ROM OF LEFT SHOULDER: ICD-10-CM

## 2022-01-12 DIAGNOSIS — M25.512 CHRONIC LEFT SHOULDER PAIN: Primary | ICD-10-CM

## 2022-01-12 DIAGNOSIS — M25.532 LEFT WRIST PAIN: ICD-10-CM

## 2022-01-12 DIAGNOSIS — G89.29 CHRONIC LEFT SHOULDER PAIN: Primary | ICD-10-CM

## 2022-01-12 PROCEDURE — 97140 MANUAL THERAPY 1/> REGIONS: CPT | Performed by: PHYSICAL THERAPIST

## 2022-01-12 PROCEDURE — 97110 THERAPEUTIC EXERCISES: CPT | Performed by: PHYSICAL THERAPIST

## 2022-01-12 NOTE — PROGRESS NOTES
PT Progress note       Patient: Desiree Garcia   : 1960  Diagnosis/ICD-10 Code:  Chronic left shoulder pain [M25.512, G89.29]  Referring practitioner: Sandra Kaba MD  Date of Initial Visit: Type: THERAPY  Noted: 12/3/2021  Today's Date: 2022  Patient seen for 4 sessions    Progress note due: 2022  Re-cert due: 2022    Subjective:     Subjective Questionnaire: UEFI: 15/80  Clinical Progress: unchanged- unable to attend PT appts due to family emergency  Home Program Compliance: No  Treatment has included: therapeutic exercise and manual therapy    Subjective Evaluation    History of Present Illness    Subjective comment: Pt states she had a lot of pain after last session of therapy and took 3 days to improve. Pt says she had family emergency so missed many appointments of therapy. She says she wiill be back on track now. She says she will see Dr. Kaba next week and will ask her about her hip pain and discuss MRI of the shoulder. Pain  Current pain ratin         Objective      Objective            Static Posture      Head  Forward.     Shoulders  Rounded.     Thoracic Spine  Hyperkyphosis.     Rib Cage  Elevated.     Palpation   Left   Tenderness of the anterior deltoid.        Tenderness      Left Shoulder   Tenderness in the lateral scapula and supraspinatus tendon.      Left Wrist/Hand   Tenderness in the first dorsal compartment and carpometacarpal joint.      Active Range of Motion   Left Shoulder   Flexion: 125 degrees with pain  Abduction: 100 degrees with pain  External rotation 0°: 70 degrees   Internal rotation 0°: 65 degrees with pain    L wrist flexion WFL  L wrist extension: 50%  Decrease with pain. Hard end feel  L wrist supination/pronation : 50% decrease with pain    Right Shoulder   Normal active range of motion     Strength/Myotome Testing     Left Shoulder      Planes of Motion   Flexion: 4-   Abduction: 4-   External rotation at 0°: 4-   Internal rotation at  0°: 4-     Right Shoulder      Planes of Motion   Flexion: 4+   Abduction: 4+   External rotation at 0°: 4   Internal rotation at 0°: 4      Left Elbow   Flexion: 4  Forearm pronation: 4     Right Elbow   Flexion: 4+  Forearm pronation: 4+     Left Wrist/Hand       (2nd hand position)     Trial 1: 55 lbs     Right Wrist/Hand       (2nd hand position)     Trial 1: 75 lbs        Assessment & Plan     Assessment  Impairments: abnormal muscle firing, abnormal or restricted ROM, activity intolerance, impaired physical strength and pain with function  Functional Limitations: carrying objects, lifting, sleeping, pulling, pushing, reaching behind back, reaching overhead and unable to perform repetitive tasks  Assessment details: Pt continues to have significant left shoulder pain and stiffness as compared to initial evaluation. Pt some made some improvements but overall no major improvements have been made. However Pt was unable to to attend several appointments due to family emergency. Pt has significant pain with daily activities and reports severe functional disability via the UEFI. Pt will benefit from skilled PT to address functional deficits and return to PLOF.         Goals  Plan Goals: Goals  Plan Goals: 1. The patient has limited ROM of the left shoulder.                    LTG 1: 12 weeks:  The patient will demonstrate 170 degrees of left shoulder flexion, 170 degrees of shoulder abduction, 75 degrees of shoulder external rotation, and 80 degrees of shoulder internal rotation to allow the patient to reach into upper kitchen cabinets and manipulate clothing behind the back with greater ease.                          STATUS:  ongoing              STG 1a: 6 weeks:  The patient will demonstrate 150 degrees of left shoulder flexion, 150 degrees of shoulder abduction, 55 degrees of shoulder external rotation, and 70 degrees of shoulder internal rotation.                          STATUS: ongoing               TREATMENT: Manual therapy, therapeutic exercise, home exercise instruction, and modalities as needed to include: electrical stimulation, ultrasound, moist heat, and ice.    2. The patient has limited strength of the left shoulder.              LTG 2: 12 weeks:  The patient will demonstrate 5/5 strength for left shoulder flexion, abduction, external rotation, and internal rotation in order to demonstrate improved shoulder stability.                          STATUS:  ongoing              STG 2a: 6 weeks:  The patient will demonstrate 4/5 strength for left shoulder flexion, abduction, external rotation, and internal rotation.                          STATUS:  ongoing              STG2b:  4 weeks:  The patient will be independent with home exercises.                           STATUS:  ongoing              TREATMENT: Manual therapy, therapeutic exercise, home exercise instruction, and modalities as needed to include: electrical stimulation, ultrasound, moist heat, and ice.                3. The patient complains of pain to the left shoulder.              LTG 3: 12 weeks:  The patient will report a pain rating of 2/10 or better in order to improve sleep quality and tolerance to performance of activities of daily living.                          STATUS: ongoing              STG 3a: 6 weeks:  The patient will report a pain rating of 3/10 or better.                           STATUS:  ongoing              TREATMENT: Manual therapy, therapeutic exercise, home exercise instruction, and modalities as needed to include: electrical stimulation, ultrasound, moist heat, and ice.    4. Carrying, Moving, and Handling Objects Functional Limitation                               LTG 4: 12 weeks:  The patient will demonstrate 1-19% limitation by achieving a score of 65 on the Upper Extremity Functional Index.                          STATUS:  ongoing              STG 4a: 6 weeks:  The patient will demonstrate 20-39% limitation by  achieving a score of 49 on the Upper Extremity Functional Index.                            STATUS: ongoing              TREATMENT:  Manual therapy, therapeutic exercise, home exercise instruction, and modalities as needed to include: moist heat, electrical stimulation, and ultrasound.     Plan  Therapy options: will be seen for skilled therapy services  Planned modality interventions: cryotherapy, TENS and thermotherapy (hydrocollator packs)  Planned therapy interventions: manual therapy, neuromuscular re-education, soft tissue mobilization, strengthening, stretching, therapeutic activities, joint mobilization, home exercise program, functional ROM exercises and flexibility  Frequency: 2x week  Duration in weeks: 6  Treatment plan discussed with: patient        Visit Diagnoses:    ICD-10-CM ICD-9-CM   1. Chronic left shoulder pain  M25.512 719.41    G89.29 338.29   2. Decreased ROM of left shoulder  M25.612 719.51   3. Left wrist pain  M25.532 719.43       Progress toward previous goals: Not Met    Recommendations: Continue as planned  Timeframe: 6 weeks  Prognosis to achieve goals: good    PT Signature: Didier Tuttle, PT, DPT          Based upon review of the patient's progress and continued therapy plan, it is my medical opinion that Desiree Garcia should continue physical therapy treatment at [facility]  [address] [place] [id number] [phone].  Signature:  Sandra Kaba MD  NPI: [id number]  Timed:         Manual Therapy:    15     mins  71986;     Therapeutic Exercise:    8     mins  48004;     Neuromuscular Albert:    0    mins  01666;    Therapeutic Activity:     0     mins  87222;     Gait Trainin     mins  55505;     Ultrasound:     0     mins  58598;    Ionto                               0    mins   85054  Self-care  __0__ mins 70132    Un-Timed:  Electrical Stimulation:    0     mins  81530 (MC );  Traction     0     mins  53353  Re- Eval     10     Mins     Hot pack     0     Mins    Cold pack                       0     Mins      Timed Treatment:   23   mins   Total Treatment:     33   mins

## 2022-01-19 ENCOUNTER — TREATMENT (OUTPATIENT)
Dept: PHYSICAL THERAPY | Facility: CLINIC | Age: 62
End: 2022-01-19

## 2022-01-19 DIAGNOSIS — G89.29 CHRONIC LEFT SHOULDER PAIN: Primary | ICD-10-CM

## 2022-01-19 DIAGNOSIS — M25.532 LEFT WRIST PAIN: ICD-10-CM

## 2022-01-19 DIAGNOSIS — M25.512 CHRONIC LEFT SHOULDER PAIN: Primary | ICD-10-CM

## 2022-01-19 DIAGNOSIS — M25.612 DECREASED ROM OF LEFT SHOULDER: ICD-10-CM

## 2022-01-19 PROCEDURE — 97140 MANUAL THERAPY 1/> REGIONS: CPT | Performed by: PHYSICAL THERAPIST

## 2022-01-19 PROCEDURE — 97110 THERAPEUTIC EXERCISES: CPT | Performed by: PHYSICAL THERAPIST

## 2022-01-19 NOTE — PROGRESS NOTES
Physical Therapy Daily Treatment Note      Patient: Desiree Garcia   : 1960  Referring practitioner: Sandra Kaba MD  Date of Initial Visit: Type: THERAPY  Noted: 12/3/2021  Today's Date: 2022  Patient seen for 5 sessions         Desiree Garcia reports: body is achy all over due to the weather. Pain in the shoulder is 4/10. She sees her doctor tomorrow.       Subjective     Objective   See Exercise, Manual, and Modality Logs for complete treatment.       Assessment & Plan     Assessment    Assessment details: Pt demonstrates increased tolerance to activity and exercise as compared to last visit and was able to progress exercises for L shoulder/scapular strengthening. Progress next visit as tolerated.         Visit Diagnoses:    ICD-10-CM ICD-9-CM   1. Chronic left shoulder pain  M25.512 719.41    G89.29 338.29   2. Decreased ROM of left shoulder  M25.612 719.51   3. Left wrist pain  M25.532 719.43       Progress per Plan of Care           Timed:         Manual Therapy:    8     mins  67999;     Therapeutic Exercise:    22     mins  04757;     Neuromuscular Albert:    0    mins  11002;    Therapeutic Activity:     0     mins  95576;     Gait Trainin     mins  09163;     Ultrasound:     0     mins  68081;    Ionto                               0    mins   18333  Self-care  __0__ mins 84906    Un-Timed:  Electrical Stimulation:    0     mins  66854 ( );  Traction     0     mins 00025  Hot pack     0     Mins    Cold pack                       0     Mins      Timed Treatment:   30   mins   Total Treatment:     30   mins    Didier Tutlte PT, DPT  Physical Therapist    NPI:[id number]  [place] License: [id number]

## 2022-01-25 ENCOUNTER — TREATMENT (OUTPATIENT)
Dept: PHYSICAL THERAPY | Facility: CLINIC | Age: 62
End: 2022-01-25

## 2022-01-25 DIAGNOSIS — M25.532 LEFT WRIST PAIN: ICD-10-CM

## 2022-01-25 DIAGNOSIS — M25.612 DECREASED ROM OF LEFT SHOULDER: ICD-10-CM

## 2022-01-25 DIAGNOSIS — G89.29 CHRONIC LEFT SHOULDER PAIN: Primary | ICD-10-CM

## 2022-01-25 DIAGNOSIS — M25.512 CHRONIC LEFT SHOULDER PAIN: Primary | ICD-10-CM

## 2022-01-25 PROCEDURE — 97110 THERAPEUTIC EXERCISES: CPT | Performed by: PHYSICAL THERAPIST

## 2022-01-25 NOTE — PROGRESS NOTES
Physical Therapy Daily Treatment Note      Patient: Desiree Garcia   : 1960  Referring practitioner: Sandra Kaba MD  Date of Initial Visit: Type: THERAPY  Noted: 12/3/2021  Today's Date: 2022  Patient seen for 6 sessions         Desiree Garcia reports: her shoulder motion and strength feel the same but she is now able to put on her shirt with less pain. She is trying to work her shoulder at home with exercises. Pain is 3/10 today.       Subjective     Objective   See Exercise, Manual, and Modality Logs for complete treatment.       Assessment & Plan     Assessment    Assessment details: Pt tolerated session and is progressing exercises for ROM and strength with less complaint of pain. But Pt is limited due to difficulty relaxing shoulders and neck due to habit. Pt requires mod to max cues to correct form. Progress next visit as tolerated.         Visit Diagnoses:    ICD-10-CM ICD-9-CM   1. Chronic left shoulder pain  M25.512 719.41    G89.29 338.29   2. Decreased ROM of left shoulder  M25.612 719.51   3. Left wrist pain  M25.532 719.43       Progress per Plan of Care           Timed:         Manual Therapy:    0     mins  20696;     Therapeutic Exercise:    30     mins  62014;     Neuromuscular Albert:    0    mins  61295;    Therapeutic Activity:     0     mins  23470;     Gait Trainin     mins  15177;     Ultrasound:     0     mins  62858;    Ionto                               0    mins   77417  Self-care  __0__ mins 51211    Un-Timed:  Electrical Stimulation:    0     mins  81492 ( );  Traction     0     mins 95203  Hot pack     0     Mins    Cold pack                       0     Mins      Timed Treatment:   30   mins   Total Treatment:     30   mins    Didier Tuttle PT, DPT  Physical Therapist    NPI:[id number]  [place] License: [id number]

## 2022-01-26 ENCOUNTER — TELEPHONE (OUTPATIENT)
Dept: FAMILY MEDICINE CLINIC | Facility: CLINIC | Age: 62
End: 2022-01-26

## 2022-01-26 NOTE — TELEPHONE ENCOUNTER
Patient scheduled for a follow up with Laura Hill on 01/16/2021 at 3:45 pm, should be with Dr. Kaba. I left voicemail for patient to call back to reschedule with Dr. Kaba.

## 2022-02-08 ENCOUNTER — TREATMENT (OUTPATIENT)
Dept: PHYSICAL THERAPY | Facility: CLINIC | Age: 62
End: 2022-02-08

## 2022-02-08 DIAGNOSIS — M25.532 LEFT WRIST PAIN: ICD-10-CM

## 2022-02-08 DIAGNOSIS — G89.29 CHRONIC LEFT SHOULDER PAIN: Primary | ICD-10-CM

## 2022-02-08 DIAGNOSIS — M25.512 CHRONIC LEFT SHOULDER PAIN: Primary | ICD-10-CM

## 2022-02-08 DIAGNOSIS — M25.612 DECREASED ROM OF LEFT SHOULDER: ICD-10-CM

## 2022-02-08 PROCEDURE — 97110 THERAPEUTIC EXERCISES: CPT | Performed by: PHYSICAL THERAPIST

## 2022-02-08 NOTE — PROGRESS NOTES
Physical Therapy Daily Treatment Note      Patient: Desiree Garcia   : 1960  Referring practitioner: Sandra Kaba MD  Date of Initial Visit: Type: THERAPY  Noted: 12/3/2021  Today's Date: 2022  Patient seen for 7 sessions         Desiree Garcia reports: her right shoulder is bothering her today. Left shoulder is doing better.       Subjective     Objective   See Exercise, Manual, and Modality Logs for complete treatment.       Assessment & Plan     Assessment    Assessment details: Pt tolerated session and demonstrates improvement in activity and exercise tolerance. Pt able to increase resistance on shoulder strengthening exercises in today's session. Anticipate discharge next session with HEP handout due to improvement in symptoms.         Visit Diagnoses:    ICD-10-CM ICD-9-CM   1. Chronic left shoulder pain  M25.512 719.41    G89.29 338.29   2. Decreased ROM of left shoulder  M25.612 719.51   3. Left wrist pain  M25.532 719.43       Progress per Plan of Care           Timed:         Manual Therapy:    0     mins  52514;     Therapeutic Exercise:    28     mins  40666;     Neuromuscular Albert:    0    mins  31489;    Therapeutic Activity:     0     mins  05934;     Gait Trainin     mins  34989;     Ultrasound:     0     mins  49723;    Ionto                               0    mins   15562  Self-care  __0__ mins 94208    Un-Timed:  Electrical Stimulation:    0     mins  71909 ( );  Traction     0     mins 10397  Hot pack     0     Mins    Cold pack                       0     Mins      Timed Treatment:   28   mins   Total Treatment:     28   mins    Didier Tuttle PT, DPT  Physical Therapist    NPI:[id number]  [place] License: [id number]

## 2022-02-10 ENCOUNTER — TREATMENT (OUTPATIENT)
Dept: PHYSICAL THERAPY | Facility: CLINIC | Age: 62
End: 2022-02-10

## 2022-02-10 DIAGNOSIS — M25.512 CHRONIC LEFT SHOULDER PAIN: Primary | ICD-10-CM

## 2022-02-10 DIAGNOSIS — G89.29 CHRONIC LEFT SHOULDER PAIN: Primary | ICD-10-CM

## 2022-02-10 DIAGNOSIS — M25.532 LEFT WRIST PAIN: ICD-10-CM

## 2022-02-10 DIAGNOSIS — M25.612 DECREASED ROM OF LEFT SHOULDER: ICD-10-CM

## 2022-02-10 PROCEDURE — 97110 THERAPEUTIC EXERCISES: CPT | Performed by: PHYSICAL THERAPIST

## 2022-02-10 NOTE — PROGRESS NOTES
Physical Therapy Discharge Note      Patient: Desiree Garcia   : 1960  Referring practitioner: Sandra Kaba MD  Date of Initial Visit: Type: THERAPY  Noted: 12/3/2021  Today's Date: 2/10/2022  Patient seen for 8 sessions         Desiree Garcia reports: she is sore from last session but otherwise she is feeling improved mobility and strength in her shoulders since she has come to therapy.     LEFS: 39/80    Subjective     Objective          Strength/Myotome Testing     Left Wrist/Hand      (2nd hand position)     Trial 1: 70 lbs    Right Wrist/Hand      (2nd hand position)     Trial 1: 60 lbs      See Exercise, Manual, and Modality Logs for complete treatment.       Assessment & Plan     Assessment    Assessment details: Pt demonstrates improved in bilateral shoulder pain and functional mobility and is better able to tolerate daily activities at home. Pt is independent with HEP and was educated on new HEP handout. Pt will be discharged from therapy and will follow up with ref provider.     Goals  Plan Goals: Plan Goals: 1. The patient has limited ROM of the left shoulder.                    LTG 1: 12 weeks:  The patient will demonstrate 170 degrees of left shoulder flexion, 170 degrees of shoulder abduction, 75 degrees of shoulder external rotation, and 80 degrees of shoulder internal rotation to allow the patient to reach into upper kitchen cabinets and manipulate clothing behind the back with greater ease.                          STATUS: not met               STG 1a: 6 weeks:  The patient will demonstrate 150 degrees of left shoulder flexion, 150 degrees of shoulder abduction, 55 degrees of shoulder external rotation, and 70 degrees of shoulder internal rotation.                          STATUS: not met               TREATMENT: Manual therapy, therapeutic exercise, home exercise instruction, and modalities as needed to include: electrical stimulation, ultrasound, moist heat, and  ice.    2. The patient has limited strength of the left shoulder.              LTG 2: 12 weeks:  The patient will demonstrate 5/5 strength for left shoulder flexion, abduction, external rotation, and internal rotation in order to demonstrate improved shoulder stability.                          STATUS:  not met               STG 2a: 6 weeks:  The patient will demonstrate 4/5 strength for left shoulder flexion, abduction, external rotation, and internal rotation.                          STATUS:  not met               STG2b:  4 weeks:  The patient will be independent with home exercises.                           STATUS met               TREATMENT: Manual therapy, therapeutic exercise, home exercise instruction, and modalities as needed to include: electrical stimulation, ultrasound, moist heat, and ice.                3. The patient complains of pain to the left shoulder.              LTG 3: 12 weeks:  The patient will report a pain rating of 2/10 or better in order to improve sleep quality and tolerance to performance of activities of daily living.                          STATUS: ongoing              STG 3a: 6 weeks:  The patient will report a pain rating of 3/10 or better.                           STATUS:  ongoing              TREATMENT: Manual therapy, therapeutic exercise, home exercise instruction, and modalities as needed to include: electrical stimulation, ultrasound, moist heat, and ice.    4. Carrying, Moving, and Handling Objects Functional Limitation                               LTG 4: 12 weeks:  The patient will demonstrate 1-19% limitation by achieving a score of 65 on [facility] Upper Extremity Functional Index.                          STATUS:  not met               STG 4a: 6 weeks:  The patient will demonstrate 20-39% limitation by achieving a score of 49 on the Upper Extremity Functional Index.                            STATUS:not met               TREATMENT:  Manual therapy, therapeutic exercise,  home exercise instruction, and modalities as needed to include: moist heat, electrical stimulation, and ultrasound.     Plan  Therapy options: will not be seen for skilled therapy services        Visit Diagnoses:    ICD-10-CM ICD-9-CM   1. Chronic left shoulder pain  M25.512 719.41    G89.29 338.29   2. Decreased ROM of left shoulder  M25.612 719.51   3. Left wrist pain  M25.532 719.43                Timed:         Manual Therapy:    0     mins  01764;     Therapeutic Exercise:    25     mins  44293;     Neuromuscular Albert:   0   mins  07926;    Therapeutic Activity:     0     mins  38840;     Gait Trainin     mins  04421;     Ultrasound:     0     mins  83777;    Ionto                               0    mins   55304  Self-care  __0__ mins 17597    Un-Timed:  Electrical Stimulation:    0     mins  99797 ( );  Traction     0     mins 54072  Hot pack     0     Mins    Cold pack                       0     Mins      Timed Treatment:   25   mins   Total Treatment:     25   mins    Didier Tuttle PT, DPT  Physical Therapist    NPI:[id number]  [place] License: [id number]

## 2022-03-14 ENCOUNTER — OFFICE VISIT (OUTPATIENT)
Dept: FAMILY MEDICINE CLINIC | Facility: CLINIC | Age: 62
End: 2022-03-14

## 2022-03-14 VITALS
DIASTOLIC BLOOD PRESSURE: 86 MMHG | TEMPERATURE: 97.6 F | HEIGHT: 66 IN | BODY MASS INDEX: 30.53 KG/M2 | OXYGEN SATURATION: 94 % | WEIGHT: 190 LBS | SYSTOLIC BLOOD PRESSURE: 142 MMHG | RESPIRATION RATE: 16 BRPM | HEART RATE: 92 BPM

## 2022-03-14 DIAGNOSIS — G89.29 CHRONIC RIGHT SHOULDER PAIN: ICD-10-CM

## 2022-03-14 DIAGNOSIS — R53.1 RIGHT SIDED WEAKNESS: ICD-10-CM

## 2022-03-14 DIAGNOSIS — E11.65 TYPE 2 DIABETES MELLITUS WITH HYPERGLYCEMIA, WITHOUT LONG-TERM CURRENT USE OF INSULIN: ICD-10-CM

## 2022-03-14 DIAGNOSIS — Z12.31 ENCOUNTER FOR SCREENING MAMMOGRAM FOR MALIGNANT NEOPLASM OF BREAST: Primary | ICD-10-CM

## 2022-03-14 DIAGNOSIS — M25.511 CHRONIC RIGHT SHOULDER PAIN: ICD-10-CM

## 2022-03-14 PROCEDURE — 99214 OFFICE O/P EST MOD 30 MIN: CPT | Performed by: FAMILY MEDICINE

## 2022-03-14 RX ORDER — HYDROCODONE BITARTRATE AND ACETAMINOPHEN 5; 325 MG/1; MG/1
1 TABLET ORAL EVERY 4 HOURS PRN
Qty: 42 TABLET | Refills: 0 | Status: SHIPPED | OUTPATIENT
Start: 2022-03-14 | End: 2022-03-21

## 2022-03-14 RX ORDER — SITAGLIPTIN AND METFORMIN HYDROCHLORIDE 500; 50 MG/1; MG/1
1 TABLET, FILM COATED ORAL 2 TIMES DAILY WITH MEALS
Qty: 180 TABLET | Refills: 1 | Status: SHIPPED | OUTPATIENT
Start: 2022-03-14 | End: 2022-05-10 | Stop reason: SDUPTHER

## 2022-03-14 NOTE — PROGRESS NOTES
Chief Complaint  Chief Complaint   Patient presents with   • Diabetes   • COPD   • Shoulder Pain     Left better now Rt side pain       HPI:  Desiree Garcia presents to [facility]    Pt reports that her right arm became weak after therapy about a week later.  Pt reports she is feeling that she may have had a stroke.  Pt also has pain in her right hip.      DM Type 2- pt last Hgb A1C was 7.75 on 2022 which was not as well controlled.  Currently compliant with medication regimen and will continue as prescribed.     COPD- pt reports she is still having some trouble with her breathing. O2 is at 94% today.       Medical History:  Past History:  Medical History: has a past medical history of Arthritis, Cervical cancer screening, COPD (chronic obstructive pulmonary disease) ([facility]), Coronary artery disease involving native heart without angina pectoris (2021), Depression with anxiety, Diabetes mellitus ([facility]), Forgetfulness, Gastric reflux, Hypertension, Leg paresthesia (2017), Limb swelling, Lumbago (2017), Lumbar spinal stenosis (2017), Lumbosacral radiculopathy (2017), Migraines, Night sweat, Reflux esophagitis, Shortness of breath, ST elevation myocardial infarction (STEMI) (CMS/Formerly [facility]) (2021), and Stomach disorder.   Surgical History: has a past surgical history that includes Abdominal surgery; Cardiac catheterization;  section; Cholecystectomy; Colonoscopy (); Esophagogastroduodenoscopy; Foot surgery (Right); Hand surgery; Cardiac catheterization (N/A, 2021); and Coronary stent placement.   Family History: family history includes Arthritis in her brother, father, mother, and sister; Cancer in her mother and sister; Diabetes in her brother and father; Heart attack in her father; Heart disease in her brother and father; Stroke in her brother, father, mother, and sister.   Social History: reports that she has been smoking  "cigarettes. She started smoking about 55 years ago. She has a 15.00 pack-year smoking history. She has never used smokeless tobacco. She reports that she does not drink alcohol and does not use drugs.  Immunization History   Administered Date(s) Administered   • Influenza, Unspecified 10/09/2020         Allergies: Tramadol     Medications:  Current Outpatient Medications on File Prior to Visit   Medication Sig Dispense Refill   • aspirin 81 MG EC tablet Take 1 tablet by mouth Daily. Indications: Acute Heart Attack 30 tablet 3   • atorvastatin (LIPITOR) 40 MG tablet Take 1 tablet by mouth Every Night. 30 tablet 3   • carvedilol (COREG) 3.125 MG tablet Take 1 tablet by mouth Every 12 (Twelve) Hours. 60 tablet 3   • lisinopril (PRINIVIL,ZESTRIL) 5 MG tablet Take 1 tablet by mouth once daily for 90 days 90 tablet 0   • ticagrelor (BRILINTA) 90 MG tablet tablet Take 1 tablet by mouth 2 (Two) Times a Day. Indications: Acute Coronary Syndrome 60 tablet 3   • Trelegy Ellipta 100-62.5-25 MCG/INH inhaler Inhale 1 puff Daily.     • Trintellix 10 MG tablet TAKE 1 TABLET BY MOUTH ONCE DAILY AT  SAME  TIME  EACH  DAY 30 tablet 0     No current facility-administered medications on file prior to visit.        Health Maintenance Due   Topic Date Due   • URINE MICROALBUMIN  Never done   • ANNUAL PHYSICAL  Never done   • COVID-19 Vaccine (1) Never done   • Pneumococcal Vaccine 0-64 (1 of 2 - PPSV23) Never done   • TDAP/TD VACCINES (1 - Tdap) Never done   • ZOSTER VACCINE (1 of 2) Never done   • HEPATITIS C SCREENING  Never done   • DIABETIC FOOT EXAM  Never done   • PAP SMEAR  Never done   • DIABETIC EYE EXAM  Never done       Vital Signs:   Vitals:    03/14/22 1512   BP: 142/86   BP Location: Left arm   Pulse: 92   Resp: 16   Temp: 97.6 °F (36.4 °C)   SpO2: 94%   Weight: 86.2 kg (190 lb)   Height: 167.6 cm (66\")      Body mass index is 30.67 kg/m².     ROS:  Review of Systems   Constitutional: Negative for fatigue and fever.   HENT: " Negative for congestion, ear pain and sinus pressure.    Respiratory: Negative for cough, chest tightness and shortness of breath.    Cardiovascular: Negative for chest pain, palpitations and leg swelling.   Gastrointestinal: Negative for abdominal pain and diarrhea.   Genitourinary: Negative for dysuria and frequency.   Neurological: Negative for speech difficulty, headache and confusion.   Psychiatric/Behavioral: Negative for agitation and behavioral problems.          Physical Exam  Constitutional:       Appearance: Normal appearance.   HENT:      Right Ear: Tympanic membrane normal.      Left Ear: Tympanic membrane normal.      Nose: Nose normal.   Eyes:      Extraocular Movements: Extraocular movements intact.      Conjunctiva/sclera: Conjunctivae normal.      Pupils: Pupils are equal, round, and reactive to light.   Cardiovascular:      Rate and Rhythm: Normal rate and regular rhythm.   Pulmonary:      Effort: Pulmonary effort is normal.      Breath sounds: Normal breath sounds.   Abdominal:      General: Bowel sounds are normal.   Musculoskeletal:         General: Normal range of motion.      Cervical back: Normal range of motion.   Skin:     General: Skin is warm and dry.   Neurological:      General: No focal deficit present.      Mental Status: She is alert and oriented to person, place, and time.   Psychiatric:         Mood and Affect: Mood normal.         Behavior: Behavior normal.          Result Review   Hemoglobin A1c (11/04/2021 15:17)  Comprehensive metabolic panel (11/04/2021 15:17)       Diagnoses and all orders for this visit:    1. Encounter for screening mammogram for malignant neoplasm of breast (Primary)  -     Mammo Screening Digital Tomosynthesis Bilateral With CAD; Future    2. Type 2 diabetes mellitus with hyperglycemia, without long-term current use of insulin (HCC)  -     sitaGLIPtin-metFORMIN (Janumet)  MG per tablet; Take 1 tablet by mouth 2 (Two) Times a Day With Meals for 90  days.  Dispense: 180 tablet; Refill: 1  -     Hemoglobin A1c; Future  -     Comprehensive Metabolic Panel; Future    3. Right sided weakness  -     Cancel: MRI Brain Without Contrast; Future    4. Chronic right shoulder pain  -     XR Shoulder 2+ View Right; Future  -     HYDROcodone-acetaminophen (NORCO) 5-325 MG per tablet; Take 1 tablet by mouth Every 4 (Four) Hours As Needed for Moderate Pain  for up to 7 days.  Dispense: 42 tablet; Refill: 0          Smoking Cessation:    Desiree Garcia  reports that she has been smoking cigarettes. She started smoking about 55 years ago. She has a 15.00 pack-year smoking history. She has never used smokeless tobacco.          Follow Up   No follow-ups on file.  Patient was given instructions and counseling regarding her condition or for health maintenance advice. Please see specific information pulled into the AVS if appropriate.       Sandra Kaba MD

## 2022-03-15 ENCOUNTER — TELEPHONE (OUTPATIENT)
Dept: FAMILY MEDICINE CLINIC | Facility: CLINIC | Age: 62
End: 2022-03-15

## 2022-03-15 NOTE — TELEPHONE ENCOUNTER
Pharmacy called and  stated that since this is an intial fill for her shoulder pain Rt side now that he can only fill for 3 days per [place] law she will receive 18 pills and if she needs to continue she will need a new rx

## 2022-03-22 ENCOUNTER — HOSPITAL ENCOUNTER (OUTPATIENT)
Dept: MAMMOGRAPHY | Facility: HOSPITAL | Age: 62
Discharge: HOME | End: 2022-03-22

## 2022-03-22 ENCOUNTER — HOSPITAL ENCOUNTER (OUTPATIENT)
Dept: GENERAL RADIOLOGY | Facility: HOSPITAL | Age: 62
Discharge: HOME | End: 2022-03-22

## 2022-03-22 ENCOUNTER — LAB (OUTPATIENT)
Dept: LAB | Facility: HOSPITAL | Age: 62
End: 2022-03-22

## 2022-03-22 DIAGNOSIS — G89.29 CHRONIC RIGHT SHOULDER PAIN: ICD-10-CM

## 2022-03-22 DIAGNOSIS — M25.511 CHRONIC RIGHT SHOULDER PAIN: ICD-10-CM

## 2022-03-22 DIAGNOSIS — Z12.31 ENCOUNTER FOR SCREENING MAMMOGRAM FOR MALIGNANT NEOPLASM OF BREAST: ICD-10-CM

## 2022-03-22 DIAGNOSIS — E11.65 TYPE 2 DIABETES MELLITUS WITH HYPERGLYCEMIA, WITHOUT LONG-TERM CURRENT USE OF INSULIN: ICD-10-CM

## 2022-03-22 LAB
ALBUMIN SERPL-MCNC: 4.5 G/DL (ref 3.5–5.2)
ALBUMIN/GLOB SERPL: 1.3 G/DL
ALP SERPL-CCNC: 161 U/L (ref 39–117)
ALT SERPL W P-5'-P-CCNC: 26 U/L (ref 1–33)
ANION GAP SERPL CALCULATED.3IONS-SCNC: 11.2 MMOL/L (ref 5–15)
AST SERPL-CCNC: 28 U/L (ref 1–32)
BILIRUB SERPL-MCNC: 0.4 MG/DL (ref 0–1.2)
BUN SERPL-MCNC: 11 MG/DL (ref 8–23)
BUN/CREAT SERPL: 14.7 (ref 7–25)
CALCIUM SPEC-SCNC: 10.2 MG/DL (ref 8.6–10.5)
CHLORIDE SERPL-SCNC: 97 MMOL/L (ref 98–107)
CO2 SERPL-SCNC: 30.8 MMOL/L (ref 22–29)
CREAT SERPL-MCNC: 0.75 MG/DL (ref 0.57–1)
EGFRCR SERPLBLD CKD-EPI 2021: 90.7 ML/MIN/1.73
GLOBULIN UR ELPH-MCNC: 3.6 GM/DL
GLUCOSE SERPL-MCNC: 228 MG/DL (ref 65–99)
HBA1C MFR BLD: 9.2 % (ref 4.8–5.6)
POTASSIUM SERPL-SCNC: 4.4 MMOL/L (ref 3.5–5.2)
PROT SERPL-MCNC: 8.1 G/DL (ref 6–8.5)
SODIUM SERPL-SCNC: 139 MMOL/L (ref 136–145)

## 2022-03-22 PROCEDURE — 77063 BREAST TOMOSYNTHESIS BI: CPT

## 2022-03-22 PROCEDURE — 86376 MICROSOMAL ANTIBODY EACH: CPT

## 2022-03-22 PROCEDURE — 83036 HEMOGLOBIN GLYCOSYLATED A1C: CPT

## 2022-03-22 PROCEDURE — 86800 THYROGLOBULIN ANTIBODY: CPT

## 2022-03-22 PROCEDURE — 73030 X-RAY EXAM OF SHOULDER: CPT

## 2022-03-22 PROCEDURE — 36415 COLL VENOUS BLD VENIPUNCTURE: CPT

## 2022-03-22 PROCEDURE — 77067 SCR MAMMO BI INCL CAD: CPT

## 2022-03-22 PROCEDURE — 80053 COMPREHEN METABOLIC PANEL: CPT

## 2022-03-22 NOTE — PROGRESS NOTES
Please call and inform patient that her right shoulder x-ray is not broken but does have a lot of arthritis.

## 2022-03-23 LAB
THYROGLOB AB SERPL-ACNC: <1 IU/ML (ref 0–0.9)
THYROPEROXIDASE AB SERPL-ACNC: 146 IU/ML (ref 0–34)

## 2022-04-01 DIAGNOSIS — R92.8 ABNORMAL MAMMOGRAM OF RIGHT BREAST: Primary | ICD-10-CM

## 2022-05-03 ENCOUNTER — OFFICE VISIT (OUTPATIENT)
Dept: CARDIOLOGY | Facility: CLINIC | Age: 62
End: 2022-05-03

## 2022-05-03 VITALS
SYSTOLIC BLOOD PRESSURE: 170 MMHG | HEART RATE: 88 BPM | DIASTOLIC BLOOD PRESSURE: 82 MMHG | HEIGHT: 66 IN | WEIGHT: 185 LBS | OXYGEN SATURATION: 96 % | BODY MASS INDEX: 29.73 KG/M2

## 2022-05-03 DIAGNOSIS — I25.10 CORONARY ARTERY DISEASE INVOLVING NATIVE CORONARY ARTERY OF NATIVE HEART WITHOUT ANGINA PECTORIS: Primary | Chronic | ICD-10-CM

## 2022-05-03 DIAGNOSIS — E78.2 MIXED HYPERLIPIDEMIA: ICD-10-CM

## 2022-05-03 DIAGNOSIS — I10 HYPERTENSION, ESSENTIAL: ICD-10-CM

## 2022-05-03 PROCEDURE — 99214 OFFICE O/P EST MOD 30 MIN: CPT | Performed by: INTERNAL MEDICINE

## 2022-05-03 RX ORDER — CARVEDILOL 3.12 MG/1
3.12 TABLET ORAL EVERY 12 HOURS SCHEDULED
Qty: 180 TABLET | Refills: 3 | Status: SHIPPED | OUTPATIENT
Start: 2022-05-03

## 2022-05-03 RX ORDER — ATORVASTATIN CALCIUM 40 MG/1
40 TABLET, FILM COATED ORAL NIGHTLY
Qty: 90 TABLET | Refills: 3 | Status: SHIPPED | OUTPATIENT
Start: 2022-05-03

## 2022-05-03 RX ORDER — CLOPIDOGREL BISULFATE 75 MG/1
75 TABLET ORAL DAILY
Qty: 90 TABLET | Refills: 3 | Status: SHIPPED | OUTPATIENT
Start: 2022-05-03

## 2022-05-03 RX ORDER — LISINOPRIL 10 MG/1
5 TABLET ORAL DAILY
Qty: 90 TABLET | Refills: 3 | Status: SHIPPED | OUTPATIENT
Start: 2022-05-03

## 2022-05-03 RX ORDER — ASPIRIN 81 MG/1
81 TABLET ORAL DAILY
Qty: 90 TABLET | Refills: 3 | Status: SHIPPED | OUTPATIENT
Start: 2022-05-03

## 2022-05-05 ENCOUNTER — HOSPITAL ENCOUNTER (OUTPATIENT)
Dept: ULTRASOUND IMAGING | Facility: HOSPITAL | Age: 62
Discharge: HOME | End: 2022-05-05

## 2022-05-05 ENCOUNTER — HOSPITAL ENCOUNTER (OUTPATIENT)
Dept: MAMMOGRAPHY | Facility: HOSPITAL | Age: 62
Discharge: HOME | End: 2022-05-05

## 2022-05-05 DIAGNOSIS — R92.8 ABNORMAL MAMMOGRAM OF RIGHT BREAST: ICD-10-CM

## 2022-05-05 PROCEDURE — 77065 DX MAMMO INCL CAD UNI: CPT

## 2022-05-05 PROCEDURE — 76642 ULTRASOUND BREAST LIMITED: CPT

## 2022-05-05 PROCEDURE — G0279 TOMOSYNTHESIS, MAMMO: HCPCS

## 2022-05-09 ENCOUNTER — TELEPHONE (OUTPATIENT)
Dept: FAMILY MEDICINE CLINIC | Facility: CLINIC | Age: 62
End: 2022-05-09

## 2022-05-09 NOTE — TELEPHONE ENCOUNTER
Caller: Desiree Garcia    Relationship: Self    Best call back number: [phone]    Requested Prescriptions:   METFORMIN 500MG     Pharmacy where request should be sent: [address] - [phone]  - [phone] FX     Additional details provided by patient: PATIENT CALLED STATING SHE IS NEEDING A CALL BACK TO SPEAK WITH THE NURSE REGARDING MEDICATION QUESTIONS THAT HER HEART DOCTOR INCREASED THE DOSAGE.     Does the patient have less than a 3 day supply:  [x] Yes  [] No    Edie James Rep   05/09/22 13:36 EDT

## 2022-05-10 DIAGNOSIS — E11.65 TYPE 2 DIABETES MELLITUS WITH HYPERGLYCEMIA, WITHOUT LONG-TERM CURRENT USE OF INSULIN: ICD-10-CM

## 2022-05-10 RX ORDER — SITAGLIPTIN AND METFORMIN HYDROCHLORIDE 500; 50 MG/1; MG/1
1 TABLET, FILM COATED ORAL 2 TIMES DAILY WITH MEALS
Qty: 180 TABLET | Refills: 1 | Status: SHIPPED | OUTPATIENT
Start: 2022-05-10 | End: 2022-05-13

## 2022-05-19 ENCOUNTER — OFFICE VISIT (OUTPATIENT)
Dept: FAMILY MEDICINE CLINIC | Facility: CLINIC | Age: 62
End: 2022-05-19

## 2022-05-19 VITALS
HEIGHT: 66 IN | BODY MASS INDEX: 29.89 KG/M2 | WEIGHT: 186 LBS | RESPIRATION RATE: 16 BRPM | OXYGEN SATURATION: 94 % | TEMPERATURE: 97.8 F | HEART RATE: 94 BPM | SYSTOLIC BLOOD PRESSURE: 124 MMHG | DIASTOLIC BLOOD PRESSURE: 80 MMHG

## 2022-05-19 DIAGNOSIS — G89.29 CHRONIC RIGHT SHOULDER PAIN: ICD-10-CM

## 2022-05-19 DIAGNOSIS — E07.9 THYROID DISORDER: ICD-10-CM

## 2022-05-19 DIAGNOSIS — E11.69 TYPE 2 DIABETES MELLITUS WITH OTHER SPECIFIED COMPLICATION, WITHOUT LONG-TERM CURRENT USE OF INSULIN: ICD-10-CM

## 2022-05-19 DIAGNOSIS — M79.644 PAIN OF RIGHT THUMB: Primary | ICD-10-CM

## 2022-05-19 DIAGNOSIS — M19.011 PRIMARY OSTEOARTHRITIS OF RIGHT SHOULDER: ICD-10-CM

## 2022-05-19 DIAGNOSIS — M25.511 CHRONIC RIGHT SHOULDER PAIN: ICD-10-CM

## 2022-05-19 DIAGNOSIS — I10 HYPERTENSION, ESSENTIAL: ICD-10-CM

## 2022-05-19 PROBLEM — I21.11 STEMI INVOLVING RIGHT CORONARY ARTERY (HCC): Status: RESOLVED | Noted: 2021-06-14 | Resolved: 2022-05-19

## 2022-05-19 PROBLEM — E78.2 MIXED HYPERLIPIDEMIA: Status: ACTIVE | Noted: 2021-09-30

## 2022-05-19 PROCEDURE — 99214 OFFICE O/P EST MOD 30 MIN: CPT | Performed by: FAMILY MEDICINE

## 2022-05-19 NOTE — ASSESSMENT & PLAN NOTE
Blood pressure on the higher side in the office today.  Continue lisinopril, restarting Coreg today.

## 2022-05-19 NOTE — PROGRESS NOTES
Chief Complaint  Chief Complaint   Patient presents with   • Follow-up     On labwork       HPI:  Desiree Garcia presents to [facility]    DM Type 2- pt last Hgb A1C was 9.2 on 3/22/22 which was not as well controlled.  Currently compliant with medication regimen and will continue as prescribed.  Pt needs to take her metformin 500mg twice a day.  Pt admits she was not taking it consistently before.    Right shoulder pain-patient reports that her shoulder is becoming more stiff and hurts to raise up in the air.  I will begin physical therapy for her right shoulder.  Patient had an x-ray of the right shoulder that showed arthritis.    Right hand pain-patient has what appears to be a cyst in her right thumb that is the size of a quarter.  I will be getting a hand x-ray and possible referral to hand surgery if need be removed.    HTN- Pt BP today is 124/80 which is well controlled.  Pt is compliant with their medication and will continue their current medication regimen. No side effects to the medication.    Thyroid disorder-patient had thyroid antibodies testing done on March 22, 2022 and her thyroid antibodies were 146 which was significantly higher than normal.  I will be referring her to endocrinology.      Past History:  Medical History: has a past medical history of Arthritis, Cervical cancer screening, COPD (chronic obstructive pulmonary disease) ([facility]), Coronary artery disease involving native heart without angina pectoris (9/30/2021), Depression with anxiety, Diabetes mellitus ([facility]), Forgetfulness, Gastric reflux, Hypertension, Leg paresthesia (01/20/2017), Limb swelling, Lumbago (01/20/2017), Lumbar spinal stenosis (02/23/2017), Lumbosacral radiculopathy (01/20/2017), Migraines, Night sweat, Reflux esophagitis, Shortness of breath, ST elevation myocardial infarction (STEMI) (CMS/[facility]) (6/14/2021), and Stomach disorder.   Surgical History: has a past surgical history that  includes Abdominal surgery; Cardiac catheterization;  section; Cholecystectomy; Colonoscopy (); Esophagogastroduodenoscopy; Foot surgery (Right); Hand surgery; Cardiac catheterization (N/A, 2021); and Coronary stent placement.   Family History: family history includes Arthritis in her brother, father, mother, and sister; Cancer in her mother and sister; Diabetes in her brother and father; Heart attack in her father; Heart disease in her brother and father; Stroke in her brother, father, mother, and sister.   Social History: reports that she has been smoking cigarettes. She started smoking about 55 years ago. She has a 30.00 pack-year smoking history. She has never used smokeless tobacco. She reports that she does not drink alcohol and does not use drugs.  Immunization History   Administered Date(s) Administered   • Influenza, Unspecified 10/09/2020         Allergies: Tramadol     Medications:  Current Outpatient Medications on File Prior to Visit   Medication Sig Dispense Refill   • aspirin 81 MG EC tablet Take 1 tablet by mouth Daily. Indications: Acute Heart Attack 90 tablet 3   • atorvastatin (LIPITOR) 40 MG tablet Take 1 tablet by mouth Every Night. 90 tablet 3   • carvedilol (COREG) 3.125 MG tablet Take 1 tablet by mouth Every 12 (Twelve) Hours. 180 tablet 3   • clopidogrel (PLAVIX) 75 MG tablet Take 1 tablet by mouth Daily. 90 tablet 3   • lisinopril (PRINIVIL,ZESTRIL) 10 MG tablet Take 0.5 tablets by mouth Daily. 90 tablet 3   • metFORMIN (GLUCOPHAGE) 500 MG tablet TAKE 1 TABLET BY MOUTH TWICE DAILY WITH MORNING MEAL AND WITH EVENING MEAL 60 tablet 0   • Trelegy Ellipta 100-62.5-25 MCG/INH inhaler Inhale 1 puff Daily.     • Trintellix 10 MG tablet TAKE 1 TABLET BY MOUTH ONCE DAILY AT  SAME  TIME  EACH  DAY 30 tablet 0     No current facility-administered medications on file prior to visit.        Health Maintenance Due   Topic Date Due   • URINE MICROALBUMIN  Never done   • ANNUAL PHYSICAL   "Never done   • COVID-19 Vaccine (1) Never done   • Pneumococcal Vaccine 0-64 (1 - PCV) Never done   • TDAP/TD VACCINES (1 - Tdap) Never done   • ZOSTER VACCINE (1 of 2) Never done   • LUNG CANCER SCREENING  Never done   • HEPATITIS C SCREENING  Never done   • DIABETIC FOOT EXAM  Never done   • PAP SMEAR  Never done   • DIABETIC EYE EXAM  Never done       Vital Signs:   Vitals:    05/19/22 1422   BP: 124/80   BP Location: Left arm   Patient Position: Sitting   Pulse: 94   Resp: 16   Temp: 97.8 °F (36.6 °C)   SpO2: 94%   Weight: 84.4 kg (186 lb)   Height: 167.6 cm (66\")      Body mass index is 30.02 kg/m².     ROS:  Review of Systems   Constitutional: Negative for fatigue and fever.   HENT: Negative for congestion, ear pain and sinus pressure.    Respiratory: Negative for cough, chest tightness and shortness of breath.    Cardiovascular: Negative for chest pain, palpitations and leg swelling.   Gastrointestinal: Negative for abdominal pain and diarrhea.   Genitourinary: Negative for dysuria and frequency.   Neurological: Negative for speech difficulty, headache and confusion.   Psychiatric/Behavioral: Negative for agitation and behavioral problems.          Physical Exam  Vitals reviewed.   Constitutional:       Appearance: Normal appearance.   HENT:      Right Ear: Tympanic membrane normal.      Left Ear: Tympanic membrane normal.      Nose: Nose normal.   Eyes:      Extraocular Movements: Extraocular movements intact.      Conjunctiva/sclera: Conjunctivae normal.      Pupils: Pupils are equal, round, and reactive to light.   Cardiovascular:      Rate and Rhythm: Normal rate and regular rhythm.   Pulmonary:      Effort: Pulmonary effort is normal.      Breath sounds: Normal breath sounds.   Abdominal:      General: Bowel sounds are normal.   Musculoskeletal:         General: Tenderness present.      Cervical back: Normal range of motion.   Skin:     General: Skin is warm and dry.   Neurological:      General: No " focal deficit present.      Mental Status: She is alert and oriented to person, place, and time.   Psychiatric:         Mood and Affect: Mood normal.         Behavior: Behavior normal.          Result Review   Hemoglobin A1c (03/22/2022 09:25)  Comprehensive Metabolic Panel (03/22/2022 09:25)  Thyroid Antibodies (03/22/2022 09:25)  US Breast Right Limited (05/05/2022 12:45)       Diagnoses and all orders for this visit:    1. Pain of right thumb (Primary)  -     XR Hand 2 View Right; Future    2. Thyroid disorder  -     Ambulatory Referral to Endocrinology    3. Type 2 diabetes mellitus with other specified complication, without long-term current use of insulin (HCC)  -     Ambulatory Referral to Endocrinology    4. Chronic right shoulder pain    5. Primary osteoarthritis of right shoulder  -     Ambulatory Referral to Physical Therapy Evaluate and treat, Ortho; ROM    6. Hypertension, essential          Smoking Cessation:    Desiree Garcia  reports that she has been smoking cigarettes. She started smoking about 55 years ago. She has a 30.00 pack-year smoking history. She has never used smokeless tobacco.          Follow Up   Return in about 3 months (around 8/19/2022).  Patient was given instructions and counseling regarding her condition or for health maintenance advice. Please see specific information pulled into the AVS if appropriate.       Sandra Kaba MD

## 2022-05-19 NOTE — ASSESSMENT & PLAN NOTE
Lipid control uncertain, restarting atorvastatin 40 mg at bedtime.  We will check lipid panel before next visit.

## 2022-05-19 NOTE — ASSESSMENT & PLAN NOTE
Patient has no angina-like symptoms at this time.  She had stent placement for STEMI 11 months back.  She stopped taking aspirin, Brilinta, statins and beta-blockers several months back.  We had a long discussion with the patient regarding the need for medication compliance especially in the setting of recent stent placement.  We will restart aspirin, Plavix 75 mg, Coreg 3.125 mg and atorvastatin at this time.

## 2022-05-19 NOTE — PROGRESS NOTES
CARDIOLOGY FOLLOW-UP PROGRESS NOTE        Chief Complaint  Hypertension, Shortness of Breath, and Coronary Artery Disease    Subjective            Desiree Garcia presents to [facility]  History of Present Illness    Ms. Garcia is here for a follow-up visit.  She suffered acute myocardial infarction in  of last year.  She was last seen in the office in November.  Currently she is not taking aspirin, Brilinta or Coreg for the past several months.  She denies having any chest pain.  She does have shortness of breath and cough.  No palpitations or dizziness.  Overall she feels fatigued.      Past History:    1. CAD : Previous angioplasty to right coronary artery.  Presentation to hospital on 2021 with acute inferior STEMI, status post angioplasty and stent placement to mid right coronary artery.      Medical History:  Past Medical History:   Diagnosis Date   • Arthritis    • Cervical cancer screening    • COPD (chronic obstructive pulmonary disease) ([facility])    • Coronary artery disease involving native heart without angina pectoris 2021    primary angioplasty and stent placement to mid right coronary artery with good angiographic results on 2021 after STEMI   • Depression with anxiety    • Diabetes mellitus ([facility])    • Forgetfulness    • Gastric reflux    • Hypertension    • Leg paresthesia 2017    Right   • Limb swelling    • Lumbago 2017    Low back pain   • Lumbar spinal stenosis 2017    L4/5 moderate to severe central canal stenosis   • Lumbosacral radiculopathy 2017    Right   • Migraines    • Night sweat    • Reflux esophagitis    • Shortness of breath    • ST elevation myocardial infarction (STEMI) (CMS/HCC) 2021   • Stomach disorder        Surgical History: has a past surgical history that includes Abdominal surgery; Cardiac catheterization;  section; Cholecystectomy; Colonoscopy (); Esophagogastroduodenoscopy; Foot  "surgery (Right); Hand surgery; Cardiac catheterization (N/A, 6/14/2021); and Coronary stent placement.     Family History: family history includes Arthritis in her brother, father, mother, and sister; Cancer in her mother and sister; Diabetes in her brother and father; Heart attack in her father; Heart disease in her brother and father; Stroke in her brother, father, mother, and sister.     Social History: reports that she has been smoking cigarettes. She started smoking about 55 years ago. She has a 30.00 pack-year smoking history. She has never used smokeless tobacco. She reports that she does not drink alcohol and does not use drugs.    Allergies: Tramadol    Current Outpatient Medications on File Prior to Visit   Medication Sig   • Trelegy Ellipta 100-62.5-25 MCG/INH inhaler Inhale 1 puff Daily.   • Trintellix 10 MG tablet TAKE 1 TABLET BY MOUTH ONCE DAILY AT  SAME  TIME  EACH  DAY     No current facility-administered medications on file prior to visit.          Review of Systems   Constitutional: Positive for fatigue.   Respiratory: Positive for shortness of breath. Negative for cough and wheezing.    Cardiovascular: Negative for chest pain, palpitations and leg swelling.   Gastrointestinal: Negative for nausea and vomiting.   Neurological: Negative for dizziness and syncope.        Objective     /82   Pulse 88   Ht 167.6 cm (66\")   Wt 83.9 kg (185 lb)   SpO2 96%   BMI 29.86 kg/m²       Physical Exam    General : Alert, awake, no acute distress  Neck : Supple, no carotid bruit, no jugular venous distention  CVS : Regular rate and rhythm, no murmur, rubs or gallops  Lungs: Bilateral wheezing present, no crackles   Abdomen: Soft, nontender, bowel sounds heard in all 4 quadrants  Extremities: Warm, well-perfused, no pedal edema    Result Review :     The following data was reviewed by: Sidney Xiao MD on 05/03/2022:    CMP    CMP 6/28/21 11/4/21 3/22/22   Glucose 193 (A) 149 (A) 228 (A)   BUN 7 (A) " 13 11   Creatinine 0.71 0.63 0.75   eGFR Non African Am 84 96    Sodium 132 (A) 139 139   Potassium 3.7 4.1 4.4   Chloride 96 (A) 102 97 (A)   Calcium 8.8 9.6 10.2   Albumin  4.50 4.50   Total Bilirubin  0.4 0.4   Alkaline Phosphatase  143 (A) 161 (A)   AST (SGOT)  14 28   ALT (SGPT)  14 26   (A) Abnormal value            CBC    CBC 6/16/21 6/17/21 6/21/21   WBC 21.46 (A) 19.61 (A) 15.51 (A)   RBC 5.08 5.13 5.12   Hemoglobin 13.5 13.8 13.7   Hematocrit 42.2 43.8 42.6   MCV 83.1 85.4 83.2   MCH 26.6 26.9 26.8   MCHC 32.0 31.5 32.2   RDW 13.9 14.1 13.4   Platelets 244 285 373   (A) Abnormal value            TSH    TSH 6/14/21 11/4/21   TSH 5.660 (A) 2.950   (A) Abnormal value            Lipid Panel    Lipid Panel 6/14/21   Total Cholesterol 160   Triglycerides 249 (A)   HDL Cholesterol 48   VLDL Cholesterol 40   LDL Cholesterol  72   LDL/HDL Ratio 1.30   (A) Abnormal value                 Data reviewed: Cardiology studies        Results for orders placed during the hospital encounter of 06/14/21    Adult Transthoracic Echo Complete w/ Color, Spectral and Contrast if necessary per protocol    Interpretation Summary  · Left ventricular systolic function is normal.                   Assessment and Plan        Diagnoses and all orders for this visit:    1. Coronary artery disease involving native coronary artery of native heart without angina pectoris (Primary)  Assessment & Plan:  Patient has no angina-like symptoms at this time.  She had stent placement for STEMI 11 months back.  She stopped taking aspirin, Brilinta, statins and beta-blockers several months back.  We had a long discussion with the patient regarding the need for medication compliance especially in the setting of recent stent placement.  We will restart aspirin, Plavix 75 mg, Coreg 3.125 mg and atorvastatin at this time.    Orders:  -     aspirin 81 MG EC tablet; Take 1 tablet by mouth Daily. Indications: Acute Heart Attack  Dispense: 90 tablet; Refill:  3  -     atorvastatin (LIPITOR) 40 MG tablet; Take 1 tablet by mouth Every Night.  Dispense: 90 tablet; Refill: 3  -     clopidogrel (PLAVIX) 75 MG tablet; Take 1 tablet by mouth Daily.  Dispense: 90 tablet; Refill: 3    2. Hypertension, essential  Assessment & Plan:  Blood pressure on the higher side in the office today.  Continue lisinopril, restarting Coreg today.    Orders:  -     carvedilol (COREG) 3.125 MG tablet; Take 1 tablet by mouth Every 12 (Twelve) Hours.  Dispense: 180 tablet; Refill: 3  -     lisinopril (PRINIVIL,ZESTRIL) 10 MG tablet; Take 0.5 tablets by mouth Daily.  Dispense: 90 tablet; Refill: 3    3. Mixed hyperlipidemia  Assessment & Plan:  Lipid control uncertain, restarting atorvastatin 40 mg at bedtime.  We will check lipid panel before next visit.    Orders:  -     atorvastatin (LIPITOR) 40 MG tablet; Take 1 tablet by mouth Every Night.  Dispense: 90 tablet; Refill: 3            Follow Up     Return in about 6 months (around 11/3/2022) for Next scheduled follow up.    Patient was given instructions and counseling regarding her condition or for health maintenance advice. Please see specific information pulled into the AVS if appropriate.

## 2022-06-03 ENCOUNTER — TELEPHONE (OUTPATIENT)
Dept: FAMILY MEDICINE CLINIC | Facility: CLINIC | Age: 62
End: 2022-06-03

## 2022-06-03 NOTE — TELEPHONE ENCOUNTER
Caller: Desiree Garcia    Relationship: Self    Best call back number:[phone]    What medication are you requesting: ANTIBIOTIC    What are your current symptoms: RED SORE ON LEG AFTER GARDENING, VERY PAINFUL AND FEELS LIKE A BLISTER    How long have you been experiencing symptoms: 3 DAYS    Have you had these symptoms before:    [] Yes  [x] No    Have you been treated for these symptoms before:   [x] Yes  [] No    If a prescription is needed, what is your preferred pharmacy and phone number:      [address] - [phone]  - [phone] FX  [phone]

## 2022-06-20 ENCOUNTER — TELEPHONE (OUTPATIENT)
Dept: CARDIOLOGY | Facility: CLINIC | Age: 62
End: 2022-06-20

## 2022-06-20 NOTE — TELEPHONE ENCOUNTER
Received VM from patient.    Returned call. Patient stated that she has a vibration in her chest. Stated it is in her right breast. Denied chest pain or SOB. Advised it does not sound cardiac and to discuss with PCP.

## 2022-06-28 ENCOUNTER — TREATMENT (OUTPATIENT)
Dept: PHYSICAL THERAPY | Facility: CLINIC | Age: 62
End: 2022-06-28

## 2022-06-28 DIAGNOSIS — M19.011 PRIMARY OSTEOARTHRITIS OF RIGHT SHOULDER: Primary | ICD-10-CM

## 2022-06-28 DIAGNOSIS — M25.511 CHRONIC RIGHT SHOULDER PAIN: ICD-10-CM

## 2022-06-28 DIAGNOSIS — G89.29 CHRONIC RIGHT SHOULDER PAIN: ICD-10-CM

## 2022-06-28 PROCEDURE — 97161 PT EVAL LOW COMPLEX 20 MIN: CPT | Performed by: PHYSICAL THERAPIST

## 2022-06-28 NOTE — PROGRESS NOTES
Physical Therapy Initial Evaluation and Plan of Care      Patient: Desiree Garcia   : 1960  Diagnosis/ICD-10 Code:  Primary osteoarthritis of right shoulder [M19.011]  Referring practitioner: Sandra Kaba MD  Date of Initial Visit: 2022  Today's Date: 2022  Patient seen for 1 sessions    Progress note due: 2022  Re-cert due: 2022           Subjective Questionnaire: UEFI: 30      Subjective Evaluation    History of Present Illness  Mechanism of injury: Pt states her right shoulder starting hurting about 4 months ago after she was d/c from PT for her left shoulder. She says its gradually worse and has a lot of pain if she tries to move it or sleep on it. At first it felt like something was pulling it out of socket but now its always painful. Pain radiates across her chest but not down her arm. She says she has no pain with sitting.     Pain  Current pain ratin  At worst pain ratin  Quality: radiating, sharp, grinding and tight  Relieving factors: rest and ice  Progression: worsening    Hand dominance: right    Patient Goals  Patient goals for therapy: increased strength, decreased pain, increased motion, independence with ADLs/IADLs and return to sport/leisure activities             Objective          Static Posture     Head  Forward.    Shoulders  Rounded.    Postural Observations  Seated posture: fair  Standing posture: fair        Active Range of Motion   Left Shoulder   Flexion: 145 degrees   Abduction: 130 degrees   External rotation BTH: T2   Internal rotation BTB: T11     Right Shoulder   Flexion: 105 degrees   Abduction: 95 degrees   External rotation BTH: Active external rotation behind the head: r EAR. with pain  Internal rotation BTB: Active internal rotation behind the back: R buttock  with pain    Left Elbow   Normal active range of motion    Right Elbow   Normal active range of motion    Passive Range of Motion     Right Shoulder   Flexion: 150 degrees with  pain  Abduction: 115 degrees with pain  External rotation 90°: 60 degrees with pain  Internal rotation 90°: 45 degrees with pain    Strength/Myotome Testing     Right Shoulder     Planes of Motion   Flexion: 3+   Extension: 3+   Abduction: 3+   External rotation at 0°: 3+   Internal rotation at 0°: 3+     Additional Strength Details  R shoulder MMT limited due to pain              Assessment & Plan     Assessment  Impairments: abnormal muscle firing, abnormal or restricted ROM, activity intolerance, impaired physical strength and pain with function  Functional Limitations: carrying objects, lifting, sleeping, pulling, pushing, reaching behind back, reaching overhead and unable to perform repetitive tasks  Assessment details: Pt presents with chronic right shoulder pain with mobility and strength deficits. Pt has significant difficulties with daily activities and is has difficulty sleeping due to pain. Pt will benefit from skilled PT to address functional deficits and return to PLOF.         Goals  Plan Goals: 1. The patient has limited ROM of the R  shoulder.    LTG 1: 12 weeks:  The patient will demonstrate 120 degrees of R shoulder flexion, 110 degrees of shoulder abduction, 75 degrees of shoulder external rotation, and 80 degrees of shoulder internal rotation to allow the patient to reach into upper kitchen cabinets and manipulate clothing behind the back with greater ease.    STATUS:  new          2. The patient has limited strength of the R shoulder.   LTG 2: 12 weeks:  The patient will demonstrate 4+/5 strength for R shoulder flexion, abduction, external rotation, and internal rotation in order to demonstrate improved shoulder stability.    STATUS:  new   STG 2a: 6 weeks:  The patient will demonstrate 4/5 strength for R shoulder flexion, abduction, external rotation, and internal rotation.    STATUS:  new   STG2b:  4 weeks:  The patient will be independent with home exercises.     STATUS:  new       3. The  patient complains of pain to the R shoulder.   LTG 3: 12 weeks:  The patient will report a pain rating of 2/10 or better in order to improve sleep quality and tolerance to performance of activities of daily living.    STATUS:  new   STG 3a: 6 weeks:  The patient will report a pain rating of 3/10 or better.     STATUS:  new   4. Carrying, Moving, and Handling Objects Functional Limitation     LTG 4: 12 weeks:  The patient will demonstrate 1-19% limitation by achieving a score of 30 on the Upper Extremity Functional Index.    STATUS: new          Plan  Therapy options: will be seen for skilled therapy services  Planned modality interventions: cryotherapy, TENS and thermotherapy (hydrocollator packs)  Planned therapy interventions: manual therapy, neuromuscular re-education, soft tissue mobilization, strengthening, stretching, therapeutic activities, joint mobilization, home exercise program, functional ROM exercises and flexibility  Frequency: 2x week  Duration in weeks: 8        Visit Diagnoses:    ICD-10-CM ICD-9-CM   1. Primary osteoarthritis of right shoulder  M19.011 715.11   2. Chronic right shoulder pain  M25.511 719.41    G89.29 338.29       Timed:         Manual Therapy:    0     mins  01678;     Therapeutic Exercise:    0     mins  27130;     Neuromuscular Albert:    0    mins  19686;    Therapeutic Activity:     0     mins  92409;     Gait Trainin     mins  28239;     Ultrasound:     0     mins  05894;    Ionto                               0    mins   59180  Self-care  __0__ mins 51229    Un-Timed:  Electrical Stimulation:    0     mins  03490 ( );  Traction     0     mins 95372  Low Eval    45    Mins  14003  Mod Eval     0     Mins  48062  High Eval                       0     Mins  29507  Hot pack     0     Mins    Cold pack                       0     Mins      Timed Treatment:   0   mins   Total Treatment:     45   mins    PT SIGNATURE: Didier Tuttle, PT, DPT      Initial  Certification  Certification Period: 6/28/2022 thru 9/25/2022  I certify that the therapy services are furnished while this patient is under my care.  The services outlined above are required by this patient, and will be reviewed every 90 days.     PHYSICIAN: Sandra Kaba MD   NPI: [id number]     DATE:     Please sign and return via fax to [phone].. Thank you, [facility].

## 2022-07-06 ENCOUNTER — TREATMENT (OUTPATIENT)
Dept: PHYSICAL THERAPY | Facility: CLINIC | Age: 62
End: 2022-07-06

## 2022-07-06 DIAGNOSIS — M25.511 CHRONIC RIGHT SHOULDER PAIN: ICD-10-CM

## 2022-07-06 DIAGNOSIS — M19.011 PRIMARY OSTEOARTHRITIS OF RIGHT SHOULDER: Primary | ICD-10-CM

## 2022-07-06 DIAGNOSIS — G89.29 CHRONIC RIGHT SHOULDER PAIN: ICD-10-CM

## 2022-07-06 PROCEDURE — 97140 MANUAL THERAPY 1/> REGIONS: CPT | Performed by: PHYSICAL THERAPIST

## 2022-07-06 PROCEDURE — 97110 THERAPEUTIC EXERCISES: CPT | Performed by: PHYSICAL THERAPIST

## 2022-07-06 NOTE — PROGRESS NOTES
"Physical Therapy Daily Treatment Note      Patient: Desiree Garcia   : 1960  Referring practitioner: Sandra Kaba MD  Date of Initial Visit: Type: THERAPY  Noted: 2022  Today's Date: 2022  Patient seen for 2 sessions           Subjective   Desiree Garcia reports: shoulder pain 4/10      Objective   See Exercise, Manual, and Modality Logs for complete treatment.       Assessment & Plan     Assessment  Prognosis details: Patient needing cues for proper performance of exercises and demonstrates \"flinching\" movement with manual and exercises; states pain 8/10 after session.        Visit Diagnoses:    ICD-10-CM ICD-9-CM   1. Primary osteoarthritis of right shoulder  M19.011 715.11   2. Chronic right shoulder pain  M25.511 719.41    G89.29 338.29       Progress per Plan of Care           Timed:  Manual Therapy:    8     mins  09432;  Therapeutic Exercise:    18     mins  72596;     Neuromuscular Albert:        mins  57083;    Therapeutic Activity:          mins  38960;     Gait Training:           mins  75737;     Ultrasound:          mins  27890;    Electrical Stimulation:         mins  36779 ( );    Untimed:  Electrical Stimulation:         mins  75072 ( );  Mechanical Traction:         mins  68719;     Timed Treatment:   26   mins   Total Treatment:     26   mins  Sandra Austin PT    Electronically singed 2022      [place] PT license: [id number]  Physical Therapist  "

## 2022-07-13 ENCOUNTER — TREATMENT (OUTPATIENT)
Dept: PHYSICAL THERAPY | Facility: CLINIC | Age: 62
End: 2022-07-13

## 2022-07-13 DIAGNOSIS — M25.512 CHRONIC LEFT SHOULDER PAIN: ICD-10-CM

## 2022-07-13 DIAGNOSIS — M19.011 PRIMARY OSTEOARTHRITIS OF RIGHT SHOULDER: Primary | ICD-10-CM

## 2022-07-13 DIAGNOSIS — M25.612 DECREASED ROM OF LEFT SHOULDER: ICD-10-CM

## 2022-07-13 DIAGNOSIS — G89.29 CHRONIC LEFT SHOULDER PAIN: ICD-10-CM

## 2022-07-13 DIAGNOSIS — G89.29 CHRONIC RIGHT SHOULDER PAIN: ICD-10-CM

## 2022-07-13 DIAGNOSIS — M25.511 CHRONIC RIGHT SHOULDER PAIN: ICD-10-CM

## 2022-07-13 PROCEDURE — 97140 MANUAL THERAPY 1/> REGIONS: CPT | Performed by: PHYSICAL THERAPIST

## 2022-07-13 PROCEDURE — 97110 THERAPEUTIC EXERCISES: CPT | Performed by: PHYSICAL THERAPIST

## 2022-07-20 ENCOUNTER — TREATMENT (OUTPATIENT)
Dept: PHYSICAL THERAPY | Facility: CLINIC | Age: 62
End: 2022-07-20

## 2022-07-20 DIAGNOSIS — G89.29 CHRONIC LEFT SHOULDER PAIN: ICD-10-CM

## 2022-07-20 DIAGNOSIS — M25.612 DECREASED ROM OF LEFT SHOULDER: ICD-10-CM

## 2022-07-20 DIAGNOSIS — M19.011 PRIMARY OSTEOARTHRITIS OF RIGHT SHOULDER: Primary | ICD-10-CM

## 2022-07-20 DIAGNOSIS — M25.512 CHRONIC LEFT SHOULDER PAIN: ICD-10-CM

## 2022-07-20 DIAGNOSIS — G89.29 CHRONIC RIGHT SHOULDER PAIN: ICD-10-CM

## 2022-07-20 DIAGNOSIS — M25.511 CHRONIC RIGHT SHOULDER PAIN: ICD-10-CM

## 2022-07-20 PROCEDURE — 97140 MANUAL THERAPY 1/> REGIONS: CPT | Performed by: PHYSICAL THERAPIST

## 2022-07-20 PROCEDURE — 97110 THERAPEUTIC EXERCISES: CPT | Performed by: PHYSICAL THERAPIST

## 2022-07-26 ENCOUNTER — TREATMENT (OUTPATIENT)
Dept: PHYSICAL THERAPY | Facility: CLINIC | Age: 62
End: 2022-07-26

## 2022-07-26 DIAGNOSIS — G89.29 CHRONIC RIGHT SHOULDER PAIN: ICD-10-CM

## 2022-07-26 DIAGNOSIS — M19.011 PRIMARY OSTEOARTHRITIS OF RIGHT SHOULDER: Primary | ICD-10-CM

## 2022-07-26 DIAGNOSIS — M25.511 CHRONIC RIGHT SHOULDER PAIN: ICD-10-CM

## 2022-07-26 PROCEDURE — 97110 THERAPEUTIC EXERCISES: CPT | Performed by: PHYSICAL THERAPIST

## 2022-07-26 PROCEDURE — 97140 MANUAL THERAPY 1/> REGIONS: CPT | Performed by: PHYSICAL THERAPIST

## 2022-07-26 NOTE — PROGRESS NOTES
Physical Therapy Daily Treatment Note        Patient: Desiree Garcia   : 1960  Diagnosis/ICD-10 Code:  Primary osteoarthritis of right shoulder [M19.011]  Referring practitioner: Sandra Kaba MD  Date of Initial Visit: Type: THERAPY  Noted: 2022  Today's Date: 2022  Patient seen for 5 sessions             Subjective   Desiree Garcia reports having increased pain in her right shoulder and rates her pain at 8/10 upon arrival today.  She states that she has not seen any change in her pain since beginning therapy.    Subjective Questionnaire: UEFI:           Objective     Right Shoulder AROM:  Flexion: 120 degrees -  Extension:  25 degrees  Abduction: 110 degrees -   External rotation:  55 degrees -   Internal rotation:  BTB- Right Sacrum    **Limited by pain and Catching at mid-end range of motion**     Right Shoulder PROM:  Flexion: 140 degrees   Abduction: 110 degrees -  External rotation:  65 degrees -   Internal rotation:  55 degrees-    **Limited by pain and muscle guarding versus tightness**       Strength/Myotome Testing     Right Shoulder      Planes of Motion   Flexion: 3+/5   Extension: 4-/5   Abduction: 3+/5  External rotation at 0°: 3+/5   Internal rotation at 0°: 3+/5     Additional Strength Details  R shoulder MMT limited due to pain and catching     Postural Observations:  Forward head / Rounded Shoulders / Protracted Scapulae      See Exercise, Manual, and Modality Logs for complete treatment.       Assessment/Plan     Pt tolerated therapy session moderately well - with performance of therapeutic exercises, CKC-Functional activities, and Manual therapy. She  continues to have deficits in Right Shoulder and Scapular ROM, Strength,  Stability, and Pain ; limiting function and ability to perform ADLs at this time without pain and difficulty.  Symptoms continue to be easily exacerbated with attempts at gentle scapular and shoulder strengthening and stabilization  progression.  Have been unable to progress program secondary to right shoulder pain.     Goals  Plan Goals: 1. The patient has limited ROM of the R  shoulder.                     LTG 1: 12 weeks:  The patient will demonstrate 120 degrees of R shoulder flexion, 110 degrees of shoulder abduction, 75 degrees of shoulder external rotation, and 80 degrees of shoulder internal rotation to allow the patient to reach into upper kitchen cabinets and manipulate clothing behind the back with greater ease.                          STATUS:  PARTIALLY MET                                2. The patient has limited strength of the R shoulder.              LTG 2: 12 weeks:  The patient will demonstrate 4+/5 strength for R shoulder flexion, abduction, external rotation, and internal rotation in order to demonstrate improved shoulder stability.                          STATUS:  UNMET              STG 2a: 6 weeks:  The patient will demonstrate 4/5 strength for R shoulder flexion, abduction, external rotation, and internal rotation.                          STATUS:  UNMET              STG2b:  4 weeks:  The patient will be independent with home exercises.                           STATUS:  MET                  3. The patient complains of pain to the R shoulder.              LTG 3: 12 weeks:  The patient will report a pain rating of 2/10 or better in order to improve sleep quality and tolerance to performance of activities of daily living.                          STATUS:  UNMET              STG 3a: 6 weeks:  The patient will report a pain rating of 3/10 or better.                           STATUS:  UNMET              4. Carrying, Moving, and Handling Objects Functional Limitation                               LTG 4: 12 weeks:  The patient will demonstrate 1-19% limitation by achieving a score of 30 on the Upper Extremity Functional Index.                          STATUS: UNMET    Plan:  Therapy placed on hold until Follow-up with PCP on  22 secondary to persistent pain and lack of functional gains.           Timed:  Manual Therapy:    8     mins  57066;  Therapeutic Exercise:    22     mins  96480;     Neuromuscular Albert:    0    mins  64448;    Therapeutic Activity:     0     mins  18105;     Gait Trainin     mins  09623;     Ultrasound:     0     mins  43272;    Electrical Stimulation:    0     mins  03740;  Iontophoresis     0     mins  11580    Untimed:  Electrical Stimulation:    0     mins  10068 ( );  Mechanical Traction:    0     mins  89304;   Fluidotherapy     0     mins  43581  Hot pack     0     mins  07593  Cold pack     0     mins  12988    Timed Treatment:   30   mins   Total Treatment:     30   mins        Ana Wan PTA  Physical Therapist Assistant

## 2022-08-15 ENCOUNTER — TELEPHONE (OUTPATIENT)
Dept: FAMILY MEDICINE CLINIC | Facility: CLINIC | Age: 62
End: 2022-08-15

## 2022-08-16 ENCOUNTER — OFFICE VISIT (OUTPATIENT)
Dept: FAMILY MEDICINE CLINIC | Facility: CLINIC | Age: 62
End: 2022-08-16

## 2022-08-16 VITALS
WEIGHT: 186 LBS | HEIGHT: 66 IN | TEMPERATURE: 98.5 F | BODY MASS INDEX: 29.89 KG/M2 | HEART RATE: 84 BPM | SYSTOLIC BLOOD PRESSURE: 146 MMHG | DIASTOLIC BLOOD PRESSURE: 80 MMHG | OXYGEN SATURATION: 96 %

## 2022-08-16 DIAGNOSIS — E11.9 TYPE 2 DIABETES MELLITUS WITHOUT COMPLICATION, WITHOUT LONG-TERM CURRENT USE OF INSULIN: ICD-10-CM

## 2022-08-16 DIAGNOSIS — F41.8 DEPRESSION WITH ANXIETY: ICD-10-CM

## 2022-08-16 DIAGNOSIS — M79.644 PAIN OF RIGHT THUMB: Primary | ICD-10-CM

## 2022-08-16 LAB
ALBUMIN SERPL-MCNC: 3.9 G/DL (ref 3.5–5.2)
ALBUMIN/GLOB SERPL: 1.3 G/DL
ALP SERPL-CCNC: 114 U/L (ref 39–117)
ALT SERPL W P-5'-P-CCNC: 24 U/L (ref 1–33)
ANION GAP SERPL CALCULATED.3IONS-SCNC: 9.2 MMOL/L (ref 5–15)
AST SERPL-CCNC: 29 U/L (ref 1–32)
BILIRUB SERPL-MCNC: 0.3 MG/DL (ref 0–1.2)
BUN SERPL-MCNC: 15 MG/DL (ref 8–23)
BUN/CREAT SERPL: 23.4 (ref 7–25)
CALCIUM SPEC-SCNC: 9.3 MG/DL (ref 8.6–10.5)
CHLORIDE SERPL-SCNC: 98 MMOL/L (ref 98–107)
CO2 SERPL-SCNC: 28.8 MMOL/L (ref 22–29)
CREAT SERPL-MCNC: 0.64 MG/DL (ref 0.57–1)
EGFRCR SERPLBLD CKD-EPI 2021: 100.7 ML/MIN/1.73
GLOBULIN UR ELPH-MCNC: 3.1 GM/DL
GLUCOSE SERPL-MCNC: 136 MG/DL (ref 65–99)
HBA1C MFR BLD: 8.3 % (ref 4.8–5.6)
POTASSIUM SERPL-SCNC: 3.9 MMOL/L (ref 3.5–5.2)
PROT SERPL-MCNC: 7 G/DL (ref 6–8.5)
SODIUM SERPL-SCNC: 136 MMOL/L (ref 136–145)

## 2022-08-16 PROCEDURE — 80053 COMPREHEN METABOLIC PANEL: CPT | Performed by: FAMILY MEDICINE

## 2022-08-16 PROCEDURE — 99214 OFFICE O/P EST MOD 30 MIN: CPT | Performed by: FAMILY MEDICINE

## 2022-08-16 PROCEDURE — 83036 HEMOGLOBIN GLYCOSYLATED A1C: CPT | Performed by: FAMILY MEDICINE

## 2022-08-16 RX ORDER — VORTIOXETINE 10 MG/1
10 TABLET, FILM COATED ORAL DAILY
Qty: 90 TABLET | Refills: 1 | Status: SHIPPED | OUTPATIENT
Start: 2022-08-16 | End: 2023-03-30 | Stop reason: SDUPTHER

## 2022-08-16 RX ORDER — SITAGLIPTIN AND METFORMIN HYDROCHLORIDE 500; 50 MG/1; MG/1
1 TABLET, FILM COATED ORAL 2 TIMES DAILY WITH MEALS
COMMUNITY
Start: 2022-08-09

## 2022-08-19 ENCOUNTER — HOSPITAL ENCOUNTER (OUTPATIENT)
Dept: GENERAL RADIOLOGY | Facility: HOSPITAL | Age: 62
Discharge: HOME | End: 2022-08-19
Admitting: FAMILY MEDICINE

## 2022-08-19 DIAGNOSIS — M79.644 PAIN OF RIGHT THUMB: ICD-10-CM

## 2022-08-19 PROCEDURE — 73130 X-RAY EXAM OF HAND: CPT

## 2022-08-31 ENCOUNTER — TELEPHONE (OUTPATIENT)
Dept: FAMILY MEDICINE CLINIC | Facility: CLINIC | Age: 62
End: 2022-08-31

## 2022-09-15 ENCOUNTER — TELEPHONE (OUTPATIENT)
Dept: FAMILY MEDICINE CLINIC | Facility: CLINIC | Age: 62
End: 2022-09-15

## 2022-09-15 NOTE — TELEPHONE ENCOUNTER
INFORMED PATIENT HER PASSPORT PAPERWORK WAS READY TO BE PICKED UP, PATIENT STATED SHE WOULD BE IN TODAY TO .

## 2022-09-27 ENCOUNTER — TELEPHONE (OUTPATIENT)
Dept: FAMILY MEDICINE CLINIC | Facility: CLINIC | Age: 62
End: 2022-09-27

## 2022-09-27 NOTE — TELEPHONE ENCOUNTER
CONTACTED PATIENT TO RESCHEDULE 10/17 APPOINTMENT, PROVIDER OUT OF OFFICE THAT MORNING. WAS ABLE TO RESCHEDULE APPOINTMENT WITH PATIENT  FOR LATER DATE.  HUB TO READ

## 2022-11-10 ENCOUNTER — OFFICE VISIT (OUTPATIENT)
Dept: FAMILY MEDICINE CLINIC | Facility: CLINIC | Age: 62
End: 2022-11-10

## 2022-11-10 VITALS
SYSTOLIC BLOOD PRESSURE: 148 MMHG | HEIGHT: 66 IN | RESPIRATION RATE: 16 BRPM | OXYGEN SATURATION: 96 % | BODY MASS INDEX: 27.16 KG/M2 | WEIGHT: 169 LBS | TEMPERATURE: 98 F | HEART RATE: 90 BPM | DIASTOLIC BLOOD PRESSURE: 80 MMHG

## 2022-11-10 DIAGNOSIS — B35.3 TINEA PEDIS OF BOTH FEET: ICD-10-CM

## 2022-11-10 DIAGNOSIS — E11.9 TYPE 2 DIABETES MELLITUS WITHOUT COMPLICATION, WITHOUT LONG-TERM CURRENT USE OF INSULIN: ICD-10-CM

## 2022-11-10 DIAGNOSIS — M19.042 PRIMARY OSTEOARTHRITIS OF BOTH HANDS: ICD-10-CM

## 2022-11-10 DIAGNOSIS — M79.641 CHRONIC HAND PAIN, RIGHT: Primary | ICD-10-CM

## 2022-11-10 DIAGNOSIS — G89.29 CHRONIC HAND PAIN, RIGHT: Primary | ICD-10-CM

## 2022-11-10 DIAGNOSIS — S91.339A PUNCTURE WOUND OF FOOT, UNSPECIFIED LATERALITY, INITIAL ENCOUNTER: ICD-10-CM

## 2022-11-10 DIAGNOSIS — M19.041 PRIMARY OSTEOARTHRITIS OF BOTH HANDS: ICD-10-CM

## 2022-11-10 PROCEDURE — 90715 TDAP VACCINE 7 YRS/> IM: CPT | Performed by: FAMILY MEDICINE

## 2022-11-10 PROCEDURE — 99214 OFFICE O/P EST MOD 30 MIN: CPT | Performed by: FAMILY MEDICINE

## 2022-11-10 PROCEDURE — 90471 IMMUNIZATION ADMIN: CPT | Performed by: FAMILY MEDICINE

## 2022-11-10 RX ORDER — KETOCONAZOLE 20 MG/G
1 CREAM TOPICAL DAILY
Qty: 60 G | Refills: 0 | Status: SHIPPED | OUTPATIENT
Start: 2022-11-10 | End: 2022-11-24

## 2022-11-10 NOTE — PROGRESS NOTES
Chief Complaint  Chief Complaint   Patient presents with   • stepped on a tac     X1 month ago        HPI:  Desiree Garcia presents to [facility]     Right Hand Pain- Pt has pain in her right thumb and it appears as though something is squeezing in her right thumb.  Pt has knots in her hand.     DM Type 2- pt last Hgb A1c was 8.30 which was not as well controlled but improving.  Currently compliant with medication regimen and will continue as prescribed. Pt is seeing an Endocrinologist Benita Marcelo.     Rash on feet-patient reports that she has history where she stepped on attack and at that time I looked at her feet which I did not see any evidence of infection the patient does have significant amount of dry skin which looks like a tinea pedis infection.  I will be sending her prescription for ketoconazole cream.  Because patient will be needing a tetanus shot for having puncture on the sole of the feet.      Past History:  Medical History: has a past medical history of Arthritis, Cervical cancer screening, COPD (chronic obstructive pulmonary disease) ([facility]), Coronary artery disease involving native heart without angina pectoris (2021), Depression with anxiety, Diabetes mellitus ([facility]), Forgetfulness, Gastric reflux, Hypertension, Leg paresthesia (2017), Limb swelling, Lumbago (2017), Lumbar spinal stenosis (2017), Lumbosacral radiculopathy (2017), Migraines, Night sweat, Reflux esophagitis, Shortness of breath, ST elevation myocardial infarction (STEMI) (CMS/[facility]) (2021), and Stomach disorder.   Surgical History: has a past surgical history that includes Abdominal surgery; Cardiac catheterization;  section; Cholecystectomy; Colonoscopy (); Esophagogastroduodenoscopy; Foot surgery (Right); Hand surgery; Cardiac catheterization (N/A, 2021); and Coronary stent placement.   Family History: family history includes Arthritis in  her brother, father, mother, and sister; Cancer in her mother and sister; Diabetes in her brother and father; Heart attack in her father; Heart disease in her brother and father; Stroke in her brother, father, mother, and sister.   Social History: reports that she has been smoking cigarettes. She started smoking about 55 years ago. She has a 30.00 pack-year smoking history. She has been exposed to tobacco smoke. She has never used smokeless tobacco. She reports that she does not drink alcohol and does not use drugs.  Immunization History   Administered Date(s) Administered   • Influenza, Unspecified 10/09/2020   • Tdap 11/10/2022         Allergies: Tramadol     Medications:  Current Outpatient Medications on File Prior to Visit   Medication Sig Dispense Refill   • aspirin 81 MG EC tablet Take 1 tablet by mouth Daily. Indications: Acute Heart Attack 90 tablet 3   • atorvastatin (LIPITOR) 40 MG tablet Take 1 tablet by mouth Every Night. 90 tablet 3   • carvedilol (COREG) 3.125 MG tablet Take 1 tablet by mouth Every 12 (Twelve) Hours. 180 tablet 3   • clopidogrel (PLAVIX) 75 MG tablet Take 1 tablet by mouth Daily. 90 tablet 3   • Janumet  MG per tablet Take 1 tablet by mouth 2 (Two) Times a Day With Meals.     • Jardiance 10 MG tablet tablet Take 1 tablet by mouth Daily.     • lisinopril (PRINIVIL,ZESTRIL) 10 MG tablet Take 0.5 tablets by mouth Daily. 90 tablet 3   • metFORMIN (GLUCOPHAGE) 500 MG tablet TAKE 1 TABLET BY MOUTH TWICE DAILY WITH MORNING MEAL AND WITH EVENING MEAL 60 tablet 0   • Trelegy Ellipta 100-62.5-25 MCG/INH inhaler Inhale 1 puff Daily.     • Vortioxetine HBr (Trintellix) 10 MG tablet Take 10 mg by mouth Daily for 90 days. 90 tablet 1     No current facility-administered medications on file prior to visit.        Health Maintenance Due   Topic Date Due   • URINE MICROALBUMIN  Never done   • LUNG CANCER SCREENING  Never done   • HEPATITIS C SCREENING  Never done   • DIABETIC FOOT EXAM  Never  "done   • PAP SMEAR  Never done   • DIABETIC EYE EXAM  Never done   • LIPID PANEL  06/14/2022       Vital Signs:   Vitals:    11/10/22 1317   BP: 148/80   BP Location: Right arm   Patient Position: Sitting   Pulse: 90   Resp: 16   Temp: 98 °F (36.7 °C)   SpO2: 96%   Weight: 76.7 kg (169 lb)   Height: 167.6 cm (66\")      Body mass index is 27.28 kg/m².     ROS:  Review of Systems   Constitutional: Negative for fatigue and fever.   HENT: Negative for congestion, ear pain and sinus pressure.    Respiratory: Negative for cough, chest tightness and shortness of breath.    Cardiovascular: Negative for chest pain, palpitations and leg swelling.   Gastrointestinal: Negative for abdominal pain and diarrhea.   Genitourinary: Negative for dysuria and frequency.   Neurological: Negative for speech difficulty, headache and confusion.   Psychiatric/Behavioral: Negative for agitation and behavioral problems.          Physical Exam  Vitals reviewed.   Constitutional:       Appearance: Normal appearance.   HENT:      Right Ear: Tympanic membrane normal.      Left Ear: Tympanic membrane normal.      Nose: Nose normal.   Eyes:      Extraocular Movements: Extraocular movements intact.      Conjunctiva/sclera: Conjunctivae normal.      Pupils: Pupils are equal, round, and reactive to light.   Cardiovascular:      Rate and Rhythm: Normal rate and regular rhythm.   Pulmonary:      Effort: Pulmonary effort is normal.      Breath sounds: Normal breath sounds.   Abdominal:      General: Bowel sounds are normal.   Musculoskeletal:         General: Normal range of motion.      Cervical back: Normal range of motion.   Skin:     General: Skin is warm and dry.   Neurological:      General: No focal deficit present.      Mental Status: She is alert and oriented to person, place, and time.   Psychiatric:         Mood and Affect: Mood normal.         Behavior: Behavior normal.          Result Review   XR Hand 3+ View Right (08/19/2022 13:00)  US " Breast Right Limited (05/05/2022 12:45)  Mammo Diagnostic Digital Tomosynthesis Right With CAD (05/05/2022 12:31)       Diagnoses and all orders for this visit:    1. Chronic hand pain, right (Primary)  -     MRI Hand Right Without Contrast; Future    2. Tinea pedis of both feet  -     ketoconazole (NIZORAL) 2 % cream; Apply 1 application topically to the appropriate area as directed Daily for 14 days.  Dispense: 60 g; Refill: 0    3. Primary osteoarthritis of both hands  -     Diclofenac Sodium (VOLTAREN) 1 % gel gel; Apply 4 g topically to the appropriate area as directed 4 (Four) Times a Day As Needed (arthritis pain) for up to 30 days.  Dispense: 100 g; Refill: 1    4. Type 2 diabetes mellitus without complication, without long-term current use of insulin (HCC)  -     Ambulatory referral to Podiatry    5. Puncture wound of foot, unspecified laterality, initial encounter  -     Tdap Vaccine Greater Than or Equal To 8yo IM          Smoking Cessation:    Desiree Garcia  reports that she has been smoking cigarettes. She started smoking about 55 years ago. She has a 30.00 pack-year smoking history. She has been exposed to tobacco smoke. She has never used smokeless tobacco.        Follow Up   Return in about 3 months (around 2/10/2023) for Annual physical.  Patient was given instructions and counseling regarding her condition or for health maintenance advice. Please see specific information pulled into the AVS if appropriate.       Sandra Kaab MD

## 2022-12-05 ENCOUNTER — OFFICE VISIT (OUTPATIENT)
Dept: CARDIOLOGY | Facility: CLINIC | Age: 62
End: 2022-12-05

## 2022-12-05 VITALS
DIASTOLIC BLOOD PRESSURE: 86 MMHG | HEART RATE: 91 BPM | WEIGHT: 171.8 LBS | BODY MASS INDEX: 27.61 KG/M2 | SYSTOLIC BLOOD PRESSURE: 150 MMHG | HEIGHT: 66 IN

## 2022-12-05 DIAGNOSIS — I25.10 CORONARY ARTERY DISEASE INVOLVING NATIVE CORONARY ARTERY OF NATIVE HEART WITHOUT ANGINA PECTORIS: Primary | Chronic | ICD-10-CM

## 2022-12-05 DIAGNOSIS — E78.2 MIXED HYPERLIPIDEMIA: ICD-10-CM

## 2022-12-05 DIAGNOSIS — I10 HYPERTENSION, ESSENTIAL: ICD-10-CM

## 2022-12-05 PROCEDURE — 99213 OFFICE O/P EST LOW 20 MIN: CPT | Performed by: INTERNAL MEDICINE

## 2022-12-11 PROBLEM — R06.02 SOB (SHORTNESS OF BREATH): Status: RESOLVED | Noted: 2021-06-25 | Resolved: 2022-12-11

## 2022-12-11 PROBLEM — J44.1 COPD WITH EXACERBATION: Status: RESOLVED | Noted: 2021-08-16 | Resolved: 2022-12-11

## 2022-12-11 PROBLEM — M19.90 ARTHRITIS: Status: RESOLVED | Noted: 2021-06-25 | Resolved: 2022-12-11

## 2022-12-11 PROBLEM — M25.562 LEFT KNEE PAIN: Status: RESOLVED | Noted: 2021-07-02 | Resolved: 2022-12-11

## 2022-12-11 PROBLEM — K21.9 GASTRIC REFLUX: Status: RESOLVED | Noted: 2021-06-25 | Resolved: 2022-12-11

## 2022-12-11 PROBLEM — M25.562 ACUTE PAIN OF LEFT KNEE: Status: RESOLVED | Noted: 2021-06-28 | Resolved: 2022-12-11

## 2022-12-11 PROBLEM — Z12.4 CERVICAL CANCER SCREENING: Status: RESOLVED | Noted: 2021-06-25 | Resolved: 2022-12-11

## 2022-12-11 NOTE — PROGRESS NOTES
CARDIOLOGY FOLLOW-UP PROGRESS NOTE        Chief Complaint  Follow-up, Coronary Artery Disease, Hypertension, and Hyperlipidemia    Subjective            Desiree Garcia presents to [facility] CARDIOLOGY  History of Present Illness    Ms. Garcia is here for routine 6-month follow-up visit.  During last office visit on 5/3/2022, she was not taking aspirin Brilinta or Coreg.  She was started on Plavix and a new prescription for aspirin and Coreg were given during that visit.  Today patient reports that she is taking all the medicines as prescribed.  Denies any recent episodes of chest pain.  She continues to have shortness of breath both at rest and activities along with wheezing.  Denies palpitations or dizziness.  For the past 3 to 4 days, she is reporting right earache with some discharge.       Past History:     CAD : Index presentation to hospital on 6/14/2021 with acute inferior STEMI, status post angioplasty and stent placement to mid right coronary artery.    Mixed hyperlipidemia  Essential hypertension  Diabetes mellitus  Chronic obstructive pulmonary disease        Medical History:  Past Medical History:   Diagnosis Date   • Arthritis    • Cervical cancer screening    • COPD (chronic obstructive pulmonary disease) (HCC)    • Coronary artery disease involving native heart without angina pectoris 9/30/2021    primary angioplasty and stent placement to mid right coronary artery with good angiographic results on 6/14/2021 after STEMI   • Depression with anxiety    • Diabetes mellitus (HCC)    • Forgetfulness    • Gastric reflux    • Hypertension    • Leg paresthesia 01/20/2017    Right   • Limb swelling    • Lumbago 01/20/2017    Low back pain   • Lumbar spinal stenosis 02/23/2017    L4/5 moderate to severe central canal stenosis   • Lumbosacral radiculopathy 01/20/2017    Right   • Migraines    • Night sweat    • Reflux esophagitis    • Shortness of breath    • ST elevation myocardial  infarction (STEMI) (CMS/[facility]) 2021   • Stomach disorder        Surgical History: has a past surgical history that includes Abdominal surgery; Cardiac catheterization;  section; Cholecystectomy; Colonoscopy (); Esophagogastroduodenoscopy; Foot surgery (Right); Hand surgery; Cardiac catheterization (N/A, 2021); and Coronary stent placement.     Family History: family history includes Arthritis in her brother, father, mother, and sister; Cancer in her mother and sister; Diabetes in her brother and father; Heart attack in her father; Heart disease in her brother and father; Stroke in her brother, father, mother, and sister.     Social History: reports that she has been smoking cigarettes. She started smoking about 55 years ago. She has a 30.00 pack-year smoking history. She has been exposed to tobacco smoke. She has never used smokeless tobacco. She reports that she does not drink alcohol and does not use drugs.    Allergies: Tramadol    Current Outpatient Medications on File Prior to Visit   Medication Sig   • aspirin 81 MG EC tablet Take 1 tablet by mouth Daily. Indications: Acute Heart Attack   • atorvastatin (LIPITOR) 40 MG tablet Take 1 tablet by mouth Every Night.   • carvedilol (COREG) 3.125 MG tablet Take 1 tablet by mouth Every 12 (Twelve) Hours.   • clopidogrel (PLAVIX) 75 MG tablet Take 1 tablet by mouth Daily.   • [] Diclofenac Sodium (VOLTAREN) 1 % gel gel Apply 4 g topically to the appropriate area as directed 4 (Four) Times a Day As Needed (arthritis pain) for up to 30 days.   • Janumet  MG per tablet Take 1 tablet by mouth 2 (Two) Times a Day With Meals.   • Jardiance 10 MG tablet tablet Take 1 tablet by mouth Daily.   • lisinopril (PRINIVIL,ZESTRIL) 10 MG tablet Take 0.5 tablets by mouth Daily. (Patient taking differently: Take 10 mg by mouth Daily.)   • metFORMIN (GLUCOPHAGE) 500 MG tablet TAKE 1 TABLET BY MOUTH TWICE DAILY WITH MORNING MEAL AND WITH EVENING MEAL   •  "Trelegy Ellipta 100-62.5-25 MCG/INH inhaler Inhale 1 puff Daily.   • Vortioxetine HBr (Trintellix) 10 MG tablet Take 10 mg by mouth Daily for 90 days.     No current facility-administered medications on file prior to visit.          Review of Systems   Respiratory: Negative for cough, shortness of breath and wheezing.    Cardiovascular: Negative for chest pain, palpitations and leg swelling.   Gastrointestinal: Negative for nausea and vomiting.   Neurological: Negative for dizziness and syncope.        Objective     /86   Pulse 91   Ht 167.6 cm (66\")   Wt 77.9 kg (171 lb 12.8 oz)   BMI 27.73 kg/m²       Physical Exam    General : Alert, awake, no acute distress  Neck : Supple, no carotid bruit, no jugular venous distention  CVS : Regular rate and rhythm, no murmur, rubs or gallops  Lungs: Clear to auscultation bilaterally, no crackles or rhonchi  Abdomen: Soft, nontender, bowel sounds heard in all 4 quadrants  Extremities: Warm, well-perfused, no pedal edema    Result Review :     The following data was reviewed by: Sidney Xiao MD on 12/05/2022:    CMP    CMP 3/22/22 8/16/22   Glucose 228 (A) 136 (A)   BUN 11 15   Creatinine 0.75 0.64   Sodium 139 136   Potassium 4.4 3.9   Chloride 97 (A) 98   Calcium 10.2 9.3   Albumin 4.50 3.90   Total Bilirubin 0.4 0.3   Alkaline Phosphatase 161 (A) 114   AST (SGOT) 28 29   ALT (SGPT) 26 24   (A) Abnormal value                       Data reviewed: Cardiology studies        Results for orders placed during the hospital encounter of 06/14/21    Adult Transthoracic Echo Complete w/ Color, Spectral and Contrast if necessary per protocol    Interpretation Summary  · Left ventricular systolic function is normal.                   Assessment and Plan        Diagnoses and all orders for this visit:    1. Coronary artery disease involving native coronary artery of native heart without angina pectoris (Primary)  Assessment & Plan:  She is currently stable with no " angina-like symptoms.  LV systolic function is preserved.  Compliant with medications.  Continue aspirin, Plavix, carvedilol and atorvastatin at the current dose.    Orders:  -     Lipid Panel; Future    2. Hypertension, essential  Assessment & Plan:  Blood pressure on the higher side in the office today.  Previously better controlled.  She reports significant earache at this time.  We will continue lisinopril and Coreg at the current dose.  Counseled regarding low-sodium diet.      3. Mixed hyperlipidemia  Assessment & Plan:  Lipid control uncertain.  Continue atorvastatin 40 mg nightly.  We will repeat the lipid panel now and adjust the dose of the medication if needed.    Orders:  -     Lipid Panel; Future            Follow Up     Return in about 6 months (around 6/5/2023) for Next scheduled follow up, with Edith ESQUIVEL.    Patient was given instructions and counseling regarding her condition or for health maintenance advice. Please see specific information pulled into the AVS if appropriate.

## 2022-12-12 ENCOUNTER — HOSPITAL ENCOUNTER (OUTPATIENT)
Dept: MRI IMAGING | Facility: HOSPITAL | Age: 62
Discharge: HOME | End: 2022-12-12
Admitting: FAMILY MEDICINE

## 2022-12-12 DIAGNOSIS — M79.641 CHRONIC HAND PAIN, RIGHT: ICD-10-CM

## 2022-12-12 DIAGNOSIS — G89.29 CHRONIC HAND PAIN, RIGHT: ICD-10-CM

## 2022-12-12 PROCEDURE — 73218 MRI UPPER EXTREMITY W/O DYE: CPT

## 2022-12-30 ENCOUNTER — OFFICE VISIT (OUTPATIENT)
Dept: FAMILY MEDICINE CLINIC | Facility: CLINIC | Age: 62
End: 2022-12-30
Payer: COMMERCIAL

## 2022-12-30 VITALS
OXYGEN SATURATION: 96 % | BODY MASS INDEX: 27.31 KG/M2 | SYSTOLIC BLOOD PRESSURE: 148 MMHG | HEIGHT: 66 IN | WEIGHT: 169.9 LBS | TEMPERATURE: 98.4 F | DIASTOLIC BLOOD PRESSURE: 82 MMHG | HEART RATE: 85 BPM

## 2022-12-30 DIAGNOSIS — E11.9 TYPE 2 DIABETES MELLITUS WITHOUT COMPLICATION, WITHOUT LONG-TERM CURRENT USE OF INSULIN: ICD-10-CM

## 2022-12-30 DIAGNOSIS — M79.641 PAIN OF RIGHT HAND: ICD-10-CM

## 2022-12-30 DIAGNOSIS — Z12.11 SCREENING FOR COLON CANCER: ICD-10-CM

## 2022-12-30 DIAGNOSIS — D48.1 TENOSYNOVIAL GIANT CELL TUMOR OF HAND: Primary | ICD-10-CM

## 2022-12-30 LAB — HBA1C MFR BLD: 8.1 % (ref 4.8–5.6)

## 2022-12-30 PROCEDURE — 99214 OFFICE O/P EST MOD 30 MIN: CPT | Performed by: FAMILY MEDICINE

## 2022-12-30 PROCEDURE — 83036 HEMOGLOBIN GLYCOSYLATED A1C: CPT | Performed by: FAMILY MEDICINE

## 2022-12-30 RX ORDER — EMPAGLIFLOZIN 25 MG/1
25 TABLET, FILM COATED ORAL DAILY
COMMUNITY
Start: 2022-12-12

## 2022-12-30 NOTE — PROGRESS NOTES
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  Pt reports that she had to have surgery on her right hand in the past.     DM Type 2- pt last Hgb A1c was 8.3 which was not well controlled but improving.  Currently compliant with medication regimen and will continue as prescribed.       Procedures     Past History:  Medical History: has a past medical history of Arthritis, Cervical cancer screening, COPD (chronic obstructive pulmonary disease) ([facility]), Coronary artery disease involving native heart without angina pectoris (2021), Depression with anxiety, Diabetes mellitus ([facility]), Forgetfulness, Gastric reflux, Hypertension, Leg paresthesia (2017), Limb swelling, Lumbago (2017), Lumbar spinal stenosis (2017), Lumbosacral radiculopathy (2017), Migraines, Night sweat, Reflux esophagitis, Shortness of breath, ST elevation myocardial infarction (STEMI) (CMS/[facility]) (2021), and Stomach disorder.   Surgical History: has a past surgical history that includes Abdominal surgery; Cardiac catheterization;  section; Cholecystectomy; Colonoscopy (); Esophagogastroduodenoscopy; Foot surgery (Right); Hand surgery; Cardiac catheterization (N/A, 2021); and Coronary stent placement.   Family History: family history includes Arthritis in her brother, father, mother, and sister; Cancer in her mother and sister; Diabetes in her brother and father; Heart attack in her father; Heart disease in her brother and father; Stroke in her brother, father, mother, and sister.   Social History: reports that she has been smoking cigarettes. She started smoking about 56 years ago. She has a 30.00 pack-year smoking history. She has been  exposed to tobacco smoke. She has never used smokeless tobacco. She reports that she does not drink alcohol and does not use drugs.  Immunization History   Administered Date(s) Administered   • Influenza, Unspecified 10/09/2020   • Tdap 11/10/2022         Allergies: Tramadol     Medications:  Current Outpatient Medications on File Prior to Visit   Medication Sig Dispense Refill   • acetaminophen (TYLENOL) 500 MG tablet Take 2 tablets by mouth Every 8 (Eight) Hours As Needed for Moderate Pain (Ear pain). 20 tablet 0   • aspirin 81 MG EC tablet Take 1 tablet by mouth Daily. Indications: Acute Heart Attack 90 tablet 3   • atorvastatin (LIPITOR) 40 MG tablet Take 1 tablet by mouth Every Night. 90 tablet 3   • carvedilol (COREG) 3.125 MG tablet Take 1 tablet by mouth Every 12 (Twelve) Hours. 180 tablet 3   • clopidogrel (PLAVIX) 75 MG tablet Take 1 tablet by mouth Daily. 90 tablet 3   • Janumet  MG per tablet Take 1 tablet by mouth 2 (Two) Times a Day With Meals.     • Jardiance 25 MG tablet tablet Take 25 mg by mouth Daily.     • lisinopril (PRINIVIL,ZESTRIL) 10 MG tablet Take 0.5 tablets by mouth Daily. (Patient taking differently: Take 10 mg by mouth Daily.) 90 tablet 3   • metFORMIN (GLUCOPHAGE) 500 MG tablet TAKE 1 TABLET BY MOUTH TWICE DAILY WITH MORNING MEAL AND WITH EVENING MEAL 60 tablet 0   • Trelegy Ellipta 100-62.5-25 MCG/INH inhaler Inhale 1 puff Daily.     • Vortioxetine HBr (Trintellix) 10 MG tablet Take 10 mg by mouth Daily for 90 days. 90 tablet 1     No current facility-administered medications on file prior to visit.        Health Maintenance Due   Topic Date Due   • URINE MICROALBUMIN  Never done   • COVID-19 Vaccine (1) Never done   • Pneumococcal Vaccine 0-64 (1 - PCV) Never done   • ZOSTER VACCINE (1 of 2) Never done   • LUNG CANCER SCREENING  Never done   • HEPATITIS C SCREENING  Never done   • DIABETIC FOOT EXAM  Never done   • PAP SMEAR  Never done   • DIABETIC EYE EXAM  Never done   •  "LIPID PANEL  06/14/2022       Vital Signs:   Vitals:    12/30/22 1307   BP: 148/82   BP Location: Right arm   Patient Position: Sitting   Pulse: 85   Temp: 98.4 °F (36.9 °C)   TempSrc: Oral   SpO2: 96%   Weight: 77.1 kg (169 lb 14.4 oz)   Height: 167.6 cm (66\")      Body mass index is 27.42 kg/m².     ROS:  Review of Systems   Constitutional: Negative for fatigue and fever.   HENT: Negative for congestion, ear pain and sinus pressure.    Respiratory: Negative for cough, chest tightness and shortness of breath.    Cardiovascular: Negative for chest pain, palpitations and leg swelling.   Gastrointestinal: Negative for abdominal pain and diarrhea.   Genitourinary: Negative for dysuria and frequency.   Neurological: Negative for speech difficulty, headache and confusion.   Psychiatric/Behavioral: Negative for agitation and behavioral problems.          Physical Exam  Vitals reviewed.   Constitutional:       Appearance: Normal appearance.   HENT:      Right Ear: Tympanic membrane normal.      Left Ear: Tympanic membrane normal.      Nose: Nose normal.   Eyes:      Extraocular Movements: Extraocular movements intact.      Conjunctiva/sclera: Conjunctivae normal.      Pupils: Pupils are equal, round, and reactive to light.   Cardiovascular:      Rate and Rhythm: Normal rate and regular rhythm.   Pulmonary:      Effort: Pulmonary effort is normal.      Breath sounds: Normal breath sounds.   Abdominal:      General: Bowel sounds are normal.   Musculoskeletal:         General: Normal range of motion.      Cervical back: Normal range of motion.   Skin:     General: Skin is warm and dry.   Neurological:      General: No focal deficit present.      Mental Status: She is alert and oriented to person, place, and time.   Psychiatric:         Mood and Affect: Mood normal.         Behavior: Behavior normal.          Result Review   MRI Hand Right Without Contrast (12/12/2022 11:49)       Diagnoses and all orders for this visit:    1. " Tenosynovial giant cell tumor of hand (Primary)  -     Ambulatory Referral to Hand Surgery    2. Pain of right hand  -     Ambulatory Referral to Hand Surgery    3. Screening for colon cancer  -     Amb referral for Screening Colonoscopy    4. Type 2 diabetes mellitus without complication, without long-term current use of insulin (HCC)  -     Hemoglobin A1c          Smoking Cessation:    Desiree Garcia  reports that she has been smoking cigarettes. She started smoking about 56 years ago. She has a 30.00 pack-year smoking history. She has been exposed to tobacco smoke. She has never used smokeless tobacco.          Follow Up   Return in about 3 months (around 3/30/2023).  Patient was given instructions and counseling regarding her condition or for health maintenance advice. Please see specific information pulled into the AVS if appropriate.       Sandra Kaba MD

## 2023-02-07 ENCOUNTER — TELEPHONE (OUTPATIENT)
Dept: FAMILY MEDICINE CLINIC | Facility: CLINIC | Age: 63
End: 2023-02-07
Payer: COMMERCIAL

## 2023-02-07 NOTE — TELEPHONE ENCOUNTER
Patient called stating that she has an ulcer on her foot and wanted to know what she should do.  Advised patient that Dr. Kaba is currently out of the office and we have no available appointments with another provider in the office.  Patient was advised to go to Urgent Care to be evaluated and treated.  Patient verbally stated understanding and stated she would go to [facility].

## 2023-03-30 ENCOUNTER — OFFICE VISIT (OUTPATIENT)
Dept: FAMILY MEDICINE CLINIC | Facility: CLINIC | Age: 63
End: 2023-03-30
Payer: COMMERCIAL

## 2023-03-30 VITALS
RESPIRATION RATE: 14 BRPM | HEART RATE: 95 BPM | TEMPERATURE: 98 F | DIASTOLIC BLOOD PRESSURE: 68 MMHG | HEIGHT: 66 IN | SYSTOLIC BLOOD PRESSURE: 118 MMHG | OXYGEN SATURATION: 95 % | WEIGHT: 174 LBS | BODY MASS INDEX: 27.97 KG/M2

## 2023-03-30 DIAGNOSIS — H92.01 OTALGIA OF RIGHT EAR: ICD-10-CM

## 2023-03-30 DIAGNOSIS — J41.1 MUCOPURULENT CHRONIC BRONCHITIS: ICD-10-CM

## 2023-03-30 DIAGNOSIS — E11.9 TYPE 2 DIABETES MELLITUS WITHOUT COMPLICATION, WITHOUT LONG-TERM CURRENT USE OF INSULIN: Primary | ICD-10-CM

## 2023-03-30 DIAGNOSIS — I10 HYPERTENSION, ESSENTIAL: ICD-10-CM

## 2023-03-30 DIAGNOSIS — F41.8 DEPRESSION WITH ANXIETY: ICD-10-CM

## 2023-03-30 PROCEDURE — 80053 COMPREHEN METABOLIC PANEL: CPT | Performed by: FAMILY MEDICINE

## 2023-03-30 PROCEDURE — 83036 HEMOGLOBIN GLYCOSYLATED A1C: CPT | Performed by: FAMILY MEDICINE

## 2023-03-30 RX ORDER — ACETAMINOPHEN 500 MG
1000 TABLET ORAL EVERY 8 HOURS PRN
Qty: 90 TABLET | Refills: 1 | Status: SHIPPED | OUTPATIENT
Start: 2023-03-30 | End: 2023-04-29

## 2023-03-30 NOTE — PROGRESS NOTES
"Chief Complaint  Chief Complaint   Patient presents with   • Diabetes   • Hypertension   • Arthritis     Rt shoulder        HPI:  Desiree Garcia presents to [facility]    Depression- The patient has not been taking Trintellix. Her medications have not been sent by her pharmacy. The patient is not doing well. She states that she \"hates herself\" and she \"tortures herself.\" She does not want to see a psychiatrist. She is having issues with her disability payment being reduced.    COPD- The patient has not been receiving her Trelegy. She is feeling significant improvement with Trelegy. Her cough is severe if she has to miss taking this. She has been experiencing exacerbated allergy symptoms recently. She has right-sided otalgia and she requests a prescription of Tylenol.    HTN- The patient has not been receiving her lisinopril. She is taking her carvedilol.    DM- The patient is receiving her Janumet, but not her metformin. She is taking Jardiance as well.    Procedures     Past History:  Medical History: has a past medical history of Arthritis, Cervical cancer screening, COPD (chronic obstructive pulmonary disease), Coronary artery disease involving native heart without angina pectoris (2021), Depression with anxiety, Diabetes mellitus, Forgetfulness, Gastric reflux, Hypertension, Leg paresthesia (2017), Limb swelling, Lumbago (2017), Lumbar spinal stenosis (2017), Lumbosacral radiculopathy (2017), Migraines, Night sweat, Reflux esophagitis, Shortness of breath, ST elevation myocardial infarction (STEMI) (CMS/[facility]) (2021), and Stomach disorder.   Surgical History: has a past surgical history that includes Abdominal surgery; Cardiac catheterization;  section; Cholecystectomy; Colonoscopy (); Esophagogastroduodenoscopy; Foot surgery (Right); Hand surgery; Cardiac catheterization (N/A, 2021); Coronary stent placement; and " Hysterectomy (1985).   Family History: family history includes Arthritis in her brother, father, mother, and sister; Cancer in her mother and sister; Diabetes in her brother and father; Heart attack in her father; Heart disease in her brother and father; Stroke in her brother, father, mother, and sister.   Social History: reports that she has been smoking cigarettes. She started smoking about 56 years ago. She has a 30.00 pack-year smoking history. She has been exposed to tobacco smoke. She has never used smokeless tobacco. She reports that she does not drink alcohol and does not use drugs.  Immunization History   Administered Date(s) Administered   • Influenza, Unspecified 10/09/2020   • Tdap 11/10/2022         Allergies: Tramadol     Medications:  Current Outpatient Medications on File Prior to Visit   Medication Sig Dispense Refill   • aspirin 81 MG EC tablet Take 1 tablet by mouth Daily. Indications: Acute Heart Attack 90 tablet 3   • atorvastatin (LIPITOR) 40 MG tablet Take 1 tablet by mouth Every Night. 90 tablet 3   • carvedilol (COREG) 3.125 MG tablet Take 1 tablet by mouth Every 12 (Twelve) Hours. 180 tablet 3   • clopidogrel (PLAVIX) 75 MG tablet Take 1 tablet by mouth Daily. 90 tablet 3   • Janumet  MG per tablet Take 1 tablet by mouth 2 (Two) Times a Day With Meals.     • Jardiance 25 MG tablet tablet Take 1 tablet by mouth Daily.     • metFORMIN (GLUCOPHAGE) 500 MG tablet TAKE 1 TABLET BY MOUTH TWICE DAILY WITH MORNING MEAL AND WITH EVENING MEAL 60 tablet 0   • lisinopril (PRINIVIL,ZESTRIL) 10 MG tablet Take 0.5 tablets by mouth Daily. (Patient taking differently: Take 1 tablet by mouth Daily.) 90 tablet 3     No current facility-administered medications on file prior to visit.        Health Maintenance Due   Topic Date Due   • URINE MICROALBUMIN  Never done   • Pneumococcal Vaccine 0-64 (1 - PCV) Never done   • ZOSTER VACCINE (1 of 2) Never done   • LUNG CANCER SCREENING  Never done   • HEPATITIS  "C SCREENING  Never done   • ANNUAL PHYSICAL  Never done   • LIPID PANEL  06/14/2022       Vital Signs:   Vitals:    03/30/23 1301   BP: 118/68   BP Location: Left arm   Patient Position: Sitting   Pulse: 95   Resp: 14   Temp: 98 °F (36.7 °C)   SpO2: 95%   Weight: 78.9 kg (174 lb)   Height: 167.6 cm (66\")      Body mass index is 28.08 kg/m².     ROS:  Review of Systems   Constitutional: Negative for fatigue and fever.   HENT: Negative for congestion, ear pain and sinus pressure.    Respiratory: Negative for cough, chest tightness and shortness of breath.    Cardiovascular: Negative for chest pain, palpitations and leg swelling.   Gastrointestinal: Negative for abdominal pain and diarrhea.   Genitourinary: Negative for dysuria and frequency.   Neurological: Negative for speech difficulty, headache and confusion.   Psychiatric/Behavioral: Negative for agitation and behavioral problems.          Physical Exam  Vitals reviewed.   Constitutional:       Appearance: Normal appearance.   HENT:      Right Ear: Tympanic membrane normal.      Left Ear: Tympanic membrane normal.      Nose: Nose normal.   Eyes:      Extraocular Movements: Extraocular movements intact.      Conjunctiva/sclera: Conjunctivae normal.      Pupils: Pupils are equal, round, and reactive to light.   Cardiovascular:      Rate and Rhythm: Normal rate and regular rhythm.   Pulmonary:      Effort: Pulmonary effort is normal.      Breath sounds: Normal breath sounds.   Abdominal:      General: Bowel sounds are normal.   Musculoskeletal:         General: Normal range of motion.      Cervical back: Normal range of motion.   Skin:     General: Skin is warm and dry.   Neurological:      General: No focal deficit present.      Mental Status: She is alert and oriented to person, place, and time.   Psychiatric:         Mood and Affect: Mood normal.         Behavior: Behavior normal.          Result Review   Hemoglobin A1c (12/30/2022 14:20)       Diagnoses and all " orders for this visit:    1. Type 2 diabetes mellitus without complication, without long-term current use of insulin (Primary)  -     Comprehensive Metabolic Panel  -     Hemoglobin A1c    2. Otalgia of right ear  -     acetaminophen (TYLENOL) 500 MG tablet; Take 2 tablets by mouth Every 8 (Eight) Hours As Needed for Moderate Pain (Ear pain) for up to 30 days.  Dispense: 90 tablet; Refill: 1    3. Mucopurulent chronic bronchitis  -     Fluticasone-Umeclidin-Vilant (Trelegy Ellipta) 100-62.5-25 MCG/ACT inhaler; Inhale 1 puff Daily for 60 days.  Dispense: 60 each; Refill: 11    4. Depression with anxiety  -     Vortioxetine HBr (Trintellix) 10 MG tablet tablet; Take 1 tablet by mouth Daily for 90 days.  Dispense: 90 tablet; Refill: 3    5. Hypertension, essential      - We will put the patient in touch with the office .    Smoking Cessation:    Desiree Garcia  reports that she has been smoking cigarettes. She started smoking about 56 years ago. She has a 30.00 pack-year smoking history. She has been exposed to tobacco smoke. She has never used smokeless tobacco.        Follow Up   Return in about 3 months (around 6/30/2023).  Patient was given instructions and counseling regarding her condition or for health maintenance advice. Please see specific information pulled into the AVS if appropriate.       Sandra Kaba MD     Transcribed from ambient dictation for Sandra Kaba MD by Farooq Hurley, Quality .  09/09/22   13:04 CDT    Patient verbalized consent to the visit recording.  I have personally performed the services described in this document as transcribed by the above individual, and it is both accurate and complete.  Sandra Kaba MD  4/12/2023  11:17 CDT

## 2023-03-30 NOTE — PROGRESS NOTES
Chief Complaint  Chief Complaint   Patient presents with   • Diabetes   • Hypertension   • Arthritis     Rt shoulder        HPI:  Desiree Garcia presents to [facility]     DM Type 2- pt last Hgb A1c was 8.10 which was not well controlled but improving.  Currently compliant with medication regimen and will continue as prescribed.     HTN- Pt BP today is 118/68 which is well controlled.  Pt is compliant with their medication and will continue their current medication regimen. No side effects to the medication.    Sinus congestion with pain in her right ear.  Pt also has been coughing and not taking anything over the counter. Pt has COPD and needs a refill on her trelegy.     Procedures     Past History:  Medical History: has a past medical history of Arthritis, Cervical cancer screening, COPD (chronic obstructive pulmonary disease), Coronary artery disease involving native heart without angina pectoris (2021), Depression with anxiety, Diabetes mellitus, Forgetfulness, Gastric reflux, Hypertension, Leg paresthesia (2017), Limb swelling, Lumbago (2017), Lumbar spinal stenosis (2017), Lumbosacral radiculopathy (2017), Migraines, Night sweat, Reflux esophagitis, Shortness of breath, ST elevation myocardial infarction (STEMI) (CMS/[facility]) (2021), and Stomach disorder.   Surgical History: has a past surgical history that includes Abdominal surgery; Cardiac catheterization;  section; Cholecystectomy; Colonoscopy (); Esophagogastroduodenoscopy; Foot surgery (Right); Hand surgery; Cardiac catheterization (N/A, 2021); Coronary stent placement; and Hysterectomy ().   Family History: family history includes Arthritis in her brother, father, mother, and sister; Cancer in her mother and sister; Diabetes in her brother and father; Heart attack in her father; Heart disease in her brother and father; Stroke in her brother, father, mother, and  sister.   Social History: reports that she has been smoking cigarettes. She started smoking about 56 years ago. She has a 30.00 pack-year smoking history. She has been exposed to tobacco smoke. She has never used smokeless tobacco. She reports that she does not drink alcohol and does not use drugs.  Immunization History   Administered Date(s) Administered   • Influenza, Unspecified 10/09/2020   • Tdap 11/10/2022         Allergies: Tramadol     Medications:  Current Outpatient Medications on File Prior to Visit   Medication Sig Dispense Refill   • aspirin 81 MG EC tablet Take 1 tablet by mouth Daily. Indications: Acute Heart Attack 90 tablet 3   • atorvastatin (LIPITOR) 40 MG tablet Take 1 tablet by mouth Every Night. 90 tablet 3   • carvedilol (COREG) 3.125 MG tablet Take 1 tablet by mouth Every 12 (Twelve) Hours. 180 tablet 3   • clopidogrel (PLAVIX) 75 MG tablet Take 1 tablet by mouth Daily. 90 tablet 3   • Janumet  MG per tablet Take 1 tablet by mouth 2 (Two) Times a Day With Meals.     • Jardiance 25 MG tablet tablet Take 1 tablet by mouth Daily.     • metFORMIN (GLUCOPHAGE) 500 MG tablet TAKE 1 TABLET BY MOUTH TWICE DAILY WITH MORNING MEAL AND WITH EVENING MEAL 60 tablet 0   • lisinopril (PRINIVIL,ZESTRIL) 10 MG tablet Take 0.5 tablets by mouth Daily. (Patient taking differently: Take 1 tablet by mouth Daily.) 90 tablet 3     No current facility-administered medications on file prior to visit.        Health Maintenance Due   Topic Date Due   • URINE MICROALBUMIN  Never done   • Pneumococcal Vaccine 0-64 (1 - PCV) Never done   • ZOSTER VACCINE (1 of 2) Never done   • LUNG CANCER SCREENING  Never done   • HEPATITIS C SCREENING  Never done   • ANNUAL PHYSICAL  Never done   • LIPID PANEL  06/14/2022       Vital Signs:   Vitals:    03/30/23 1301   BP: 118/68   BP Location: Left arm   Patient Position: Sitting   Pulse: 95   Resp: 14   Temp: 98 °F (36.7 °C)   SpO2: 95%   Weight: 78.9 kg (174 lb)   Height: 167.6  "cm (66\")      Body mass index is 28.08 kg/m².     ROS:  Review of Systems   Constitutional: Negative for fatigue and fever.   HENT: Positive for congestion and ear pain. Negative for sinus pressure.    Respiratory: Positive for cough. Negative for chest tightness and shortness of breath.    Cardiovascular: Negative for chest pain, palpitations and leg swelling.   Gastrointestinal: Negative for abdominal pain and diarrhea.   Genitourinary: Negative for dysuria and frequency.   Neurological: Negative for speech difficulty, headache and confusion.   Psychiatric/Behavioral: Negative for agitation and behavioral problems.          Physical Exam  Vitals reviewed.   Constitutional:       Appearance: Normal appearance.   HENT:      Right Ear: Tympanic membrane normal.      Left Ear: Tympanic membrane normal.      Nose: Nose normal.   Eyes:      Extraocular Movements: Extraocular movements intact.      Conjunctiva/sclera: Conjunctivae normal.      Pupils: Pupils are equal, round, and reactive to light.   Cardiovascular:      Rate and Rhythm: Normal rate and regular rhythm.   Pulmonary:      Effort: Pulmonary effort is normal.      Breath sounds: Normal breath sounds.   Abdominal:      General: Bowel sounds are normal.   Musculoskeletal:         General: Normal range of motion.      Cervical back: Normal range of motion.   Skin:     General: Skin is warm and dry.   Neurological:      General: No focal deficit present.      Mental Status: She is alert and oriented to person, place, and time.   Psychiatric:         Mood and Affect: Mood normal.         Behavior: Behavior normal.          Result Review   Hemoglobin A1c (12/30/2022 14:20)       Diagnoses and all orders for this visit:    1. Type 2 diabetes mellitus without complication, without long-term current use of insulin (Primary)  -     Comprehensive Metabolic Panel  -     Hemoglobin A1c    2. Otalgia of right ear  -     acetaminophen (TYLENOL) 500 MG tablet; Take 2 " tablets by mouth Every 8 (Eight) Hours As Needed for Moderate Pain (Ear pain) for up to 30 days.  Dispense: 90 tablet; Refill: 1    3. Mucopurulent chronic bronchitis  -     Fluticasone-Umeclidin-Vilant (Trelegy Ellipta) 100-62.5-25 MCG/ACT inhaler; Inhale 1 puff Daily for 60 days.  Dispense: 60 each; Refill: 11    4. Depression with anxiety  -     Vortioxetine HBr (Trintellix) 10 MG tablet tablet; Take 1 tablet by mouth Daily for 90 days.  Dispense: 90 tablet; Refill: 3    5. Hypertension, essential          Smoking Cessation:    Desiree Garcia  reports that she has been smoking cigarettes. She started smoking about 56 years ago. She has a 30.00 pack-year smoking history. She has been exposed to tobacco smoke. She has never used smokeless tobacco.          Follow Up   Return in about 3 months (around 6/30/2023).  Patient was given instructions and counseling regarding her condition or for health maintenance advice. Please see specific information pulled into the AVS if appropriate.       Sandra Kaba MD

## 2023-03-31 LAB
ALBUMIN SERPL-MCNC: 4.4 G/DL (ref 3.5–5.2)
ALBUMIN/GLOB SERPL: 1.3 G/DL
ALP SERPL-CCNC: 147 U/L (ref 39–117)
ALT SERPL W P-5'-P-CCNC: 15 U/L (ref 1–33)
ANION GAP SERPL CALCULATED.3IONS-SCNC: 10.8 MMOL/L (ref 5–15)
AST SERPL-CCNC: 19 U/L (ref 1–32)
BILIRUB SERPL-MCNC: 0.5 MG/DL (ref 0–1.2)
BUN SERPL-MCNC: 13 MG/DL (ref 8–23)
BUN/CREAT SERPL: 16 (ref 7–25)
CALCIUM SPEC-SCNC: 9.8 MG/DL (ref 8.6–10.5)
CHLORIDE SERPL-SCNC: 100 MMOL/L (ref 98–107)
CO2 SERPL-SCNC: 29.2 MMOL/L (ref 22–29)
CREAT SERPL-MCNC: 0.81 MG/DL (ref 0.57–1)
EGFRCR SERPLBLD CKD-EPI 2021: 82.2 ML/MIN/1.73
GLOBULIN UR ELPH-MCNC: 3.4 GM/DL
GLUCOSE SERPL-MCNC: 119 MG/DL (ref 65–99)
HBA1C MFR BLD: 8 % (ref 4.8–5.6)
POTASSIUM SERPL-SCNC: 3.7 MMOL/L (ref 3.5–5.2)
PROT SERPL-MCNC: 7.8 G/DL (ref 6–8.5)
SODIUM SERPL-SCNC: 140 MMOL/L (ref 136–145)

## 2023-04-13 ENCOUNTER — OFFICE VISIT (OUTPATIENT)
Dept: FAMILY MEDICINE CLINIC | Facility: CLINIC | Age: 63
End: 2023-04-13
Payer: COMMERCIAL

## 2023-04-13 VITALS
SYSTOLIC BLOOD PRESSURE: 136 MMHG | RESPIRATION RATE: 14 BRPM | HEART RATE: 74 BPM | BODY MASS INDEX: 28.61 KG/M2 | WEIGHT: 178 LBS | DIASTOLIC BLOOD PRESSURE: 82 MMHG | HEIGHT: 66 IN | TEMPERATURE: 97.8 F | OXYGEN SATURATION: 95 %

## 2023-04-13 DIAGNOSIS — J41.1 MUCOPURULENT CHRONIC BRONCHITIS: Primary | ICD-10-CM

## 2023-04-13 DIAGNOSIS — Z71.88: ICD-10-CM

## 2023-04-13 DIAGNOSIS — Z00.00 ANNUAL PHYSICAL EXAM: ICD-10-CM

## 2023-04-13 DIAGNOSIS — F41.8 DEPRESSION WITH ANXIETY: ICD-10-CM
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
## 2023-04-17 ENCOUNTER — REFERRAL TRIAGE (OUTPATIENT)
Dept: CASE MANAGEMENT | Facility: OTHER | Age: 63
End: 2023-04-17
Payer: COMMERCIAL

## 2023-04-17 ENCOUNTER — PATIENT OUTREACH (OUTPATIENT)
Dept: CASE MANAGEMENT | Facility: OTHER | Age: 63
End: 2023-04-17
Payer: COMMERCIAL

## 2023-04-17 NOTE — OUTREACH NOTE
Social Work Assessment  Questions/Answers    Flowsheet Row Most Recent Value   Referral Source physician   Reason for Consult community resources   Preferred Language English   People in Home alone   Current Living Arrangements home   Primary Care Provided by self   Provides Primary Care For no one, unable/limited ability to care for self   Family Caregiver if Needed sibling(s)   Source of Income disability   Application for Public Assistance obtained public assistance pending number        SDOH updated and reviewed with the patient during this program:  Financial Resource Strain: Low Risk    • Difficulty of Paying Living Expenses: Not very hard      Food Insecurity: Food Insecurity Present   • Worried About Running Out of Food in the Last Year: Sometimes true   • Ran Out of Food in the Last Year: Sometimes true      Transportation Needs: No Transportation Needs   • Lack of Transportation (Medical): No   • Lack of Transportation (Non-Medical): No      Housing Stability: Low Risk    • Unable to Pay for Housing in the Last Year: No   • Number of Places Lived in the Last Year: 1   • Unstable Housing in the Last Year: No      Continuing Care   [facility]   COMMUNIC[address] [address]    Phone: [phone]    Resource for: Depression, Stress   [employer]    [address] [place] [id number]    Phone: [phone]    Resource for: Transportation Needs   [facility]    [address] [facility] [address]    Phone: [phone]    Resource for: Financial Resource Strain, Food Insecurity     Patient Outreach    MSW spoke with patient to discuss resources needed. Patient interested in meal delivery. MSW sent patient information for [employer] Meals on Wheels, [employer] transportation, and also counseling resources. Patient thankful for call and MSW available should patient need further resources.    Ana Duval  Worker  Ambulatory Case Management    4/17/2023, 13:21 EDT

## 2023-04-25 DIAGNOSIS — J41.1 MUCOPURULENT CHRONIC BRONCHITIS: ICD-10-CM

## 2023-04-25 NOTE — TELEPHONE ENCOUNTER
Caller: Desiree Garcia    Relationship to patient: Self    Best call back number: [phone]    Patient is needing: PATIENT IS REQUESTING A CALL BACK . SHE SAID THE PHARMACY IS WANTING HER TO HAVE A PRIOR AUTHORIZATION ON JARDIANCE AND TRELIGY. SHE IS ALSO REQUESTING A REFILL ON HER BLOOD PRESSURE MEDICATION AND DEPRESSION MEDICATION BUT DOES NOT KNOW THE NAME OF THOSE MEDICATIONS.    [address] [facility] - [phone]  - [phone] FX  [phone]  Associate Signed OrdersPatient EstimateProvidersCurrent Interactions

## 2023-04-27 RX ORDER — EMPAGLIFLOZIN 25 MG/1
25 TABLET, FILM COATED ORAL DAILY
Qty: 90 TABLET | Refills: 1 | Status: SHIPPED | OUTPATIENT
Start: 2023-04-27 | End: 2023-07-26

## 2023-04-27 RX ORDER — FLUTICASONE FUROATE, UMECLIDINIUM BROMIDE AND VILANTEROL TRIFENATATE 100; 62.5; 25 UG/1; UG/1; UG/1
1 POWDER RESPIRATORY (INHALATION) DAILY
Qty: 60 EACH | Refills: 5 | Status: SHIPPED | OUTPATIENT
Start: 2023-04-27 | End: 2023-06-26

## 2023-05-17 DIAGNOSIS — I10 HYPERTENSION, ESSENTIAL: ICD-10-CM

## 2023-05-18 RX ORDER — LISINOPRIL 10 MG/1
5 TABLET ORAL DAILY
Qty: 90 TABLET | Refills: 0 | Status: SHIPPED | OUTPATIENT
Start: 2023-05-18

## 2023-05-23 ENCOUNTER — TELEPHONE (OUTPATIENT)
Dept: FAMILY MEDICINE CLINIC | Facility: CLINIC | Age: 63
End: 2023-05-23
Payer: MEDICARE

## 2023-05-23 NOTE — TELEPHONE ENCOUNTER
Patient missed appointment on 05/16/2023 @ 1:00 with Dr. Kaba. Left message on machine. Explained policy concerning missed appointments and same day cancellations.

## 2023-05-24 ENCOUNTER — OFFICE VISIT (OUTPATIENT)
Dept: CARDIOLOGY | Facility: CLINIC | Age: 63
End: 2023-05-24
Payer: MEDICARE

## 2023-05-24 VITALS
HEART RATE: 80 BPM | DIASTOLIC BLOOD PRESSURE: 85 MMHG | SYSTOLIC BLOOD PRESSURE: 164 MMHG | WEIGHT: 179.2 LBS | BODY MASS INDEX: 28.8 KG/M2 | HEIGHT: 66 IN

## 2023-05-24 DIAGNOSIS — I10 HYPERTENSION, ESSENTIAL: ICD-10-CM

## 2023-05-24 DIAGNOSIS — E78.2 MIXED HYPERLIPIDEMIA: ICD-10-CM

## 2023-05-24 DIAGNOSIS — I25.10 CORONARY ARTERY DISEASE INVOLVING NATIVE CORONARY ARTERY OF NATIVE HEART WITHOUT ANGINA PECTORIS: Primary | ICD-10-CM

## 2023-05-24 RX ORDER — ATORVASTATIN CALCIUM 40 MG/1
40 TABLET, FILM COATED ORAL NIGHTLY
Qty: 90 TABLET | Refills: 3 | Status: SHIPPED | OUTPATIENT
Start: 2023-05-24

## 2023-05-24 RX ORDER — CARVEDILOL 3.12 MG/1
3.12 TABLET ORAL EVERY 12 HOURS SCHEDULED
Qty: 180 TABLET | Refills: 3 | Status: SHIPPED | OUTPATIENT
Start: 2023-05-24

## 2023-05-24 RX ORDER — ASPIRIN 81 MG/1
81 TABLET ORAL DAILY
Qty: 90 TABLET | Refills: 3 | Status: SHIPPED | OUTPATIENT
Start: 2023-05-24

## 2023-05-24 RX ORDER — LISINOPRIL 10 MG/1
5 TABLET ORAL DAILY
Qty: 90 TABLET | Refills: 3 | Status: SHIPPED | OUTPATIENT
Start: 2023-05-24

## 2023-05-24 RX ORDER — CLOPIDOGREL BISULFATE 75 MG/1
75 TABLET ORAL DAILY
Qty: 90 TABLET | Refills: 3 | Status: SHIPPED | OUTPATIENT
Start: 2023-05-24

## 2023-05-24 NOTE — PROGRESS NOTES
Chief Complaint  Coronary Artery Disease, Hypertension, Hyperlipidemia, and Follow-up    Subjective        History of Present Illness  Desiree Garcia presents to [facility] CARDIOLOGY   Ms. Garcia is a 63-year-old female coming in for routine cardiac follow-up of coronary disease and hypertension.  She reports overall she is doing well, does not have any complaints today of chest pains, palpitations, lightheadedness or dizziness.  She does have chronic shortness of breath and cough worsened with activity, which has not changed in severity.      Past History:      CAD : Index presentation to hospital on 2021 with acute inferior STEMI, status post angioplasty and stent placement to mid right coronary artery.     Mixed hyperlipidemia  Essential hypertension  Diabetes mellitus  Chronic obstructive pulmonary disease    Past Medical History:   Diagnosis Date    Arthritis     Cervical cancer screening     COPD (chronic obstructive pulmonary disease)     Coronary artery disease involving native heart without angina pectoris 2021    Depression with anxiety     Diabetes mellitus     Forgetfulness     Gastric reflux     Hypertension     Leg paresthesia 2017    Limb swelling     Lumbago 2017    Lumbar spinal stenosis 2017    Lumbosacral radiculopathy 2017    Migraines     Night sweat     Reflux esophagitis     Shortness of breath     ST elevation myocardial infarction (STEMI) (CMS/[facility]) 2021    Stomach disorder        Allergies   Allergen Reactions    Tramadol Itching        Past Surgical History:   Procedure Laterality Date    ABDOMINAL SURGERY      3 C-SECTIONS    CARDIAC CATHETERIZATION      CARDIAC CATHETERIZATION N/A 2021    Procedure: Left Heart Cath;  Surgeon: Sidney Xiao MD;  Location: [facility] CATH INVASIVE LOCATION;  Service: Cardiology;  Laterality: N/A;     SECTION      x 3    CHOLECYSTECTOMY      COLONOSCOPY      CORONARY STENT  "PLACEMENT      ENDOSCOPY      2007 2014    FOOT SURGERY Right     HAND SURGERY      HYSTERECTOMY  1985        Social History  She  reports that she has been smoking cigarettes. She started smoking about 56 years ago. She has a 30.00 pack-year smoking history. She has been exposed to tobacco smoke. She has never used smokeless tobacco. She reports that she does not drink alcohol and does not use drugs.    Family History  Her family history includes Arthritis in her brother, father, mother, and sister; Cancer in her mother and sister; Diabetes in her brother and father; Heart attack in her father; Heart disease in her brother and father; Stroke in her brother, father, mother, and sister.       Current Outpatient Medications on File Prior to Visit   Medication Sig    Fluticasone-Umeclidin-Vilant (Trelegy Ellipta) 100-62.5-25 MCG/ACT inhaler Inhale 1 puff Daily for 60 days.    Janumet  MG per tablet Take 1 tablet by mouth 2 (Two) Times a Day With Meals.    Jardiance 25 MG tablet tablet Take 1 tablet by mouth Daily for 90 days.    metFORMIN (GLUCOPHAGE) 500 MG tablet TAKE 1 TABLET BY MOUTH TWICE DAILY WITH MORNING MEAL AND WITH EVENING MEAL    Vortioxetine HBr (Trintellix) 10 MG tablet tablet Take 1 tablet by mouth Daily for 90 days.     No current facility-administered medications on file prior to visit.         Review of Systems   Constitutional:  Positive for fatigue.   Respiratory:  Positive for cough and shortness of breath. Negative for chest tightness.    Cardiovascular:  Negative for chest pain, palpitations and leg swelling.   Gastrointestinal:  Negative for nausea and vomiting.   Neurological:  Negative for dizziness and syncope.   Hematological:  Does not bruise/bleed easily.  Complains of angina.    Objective   Vitals:    05/24/23 1251   BP: 164/85   Pulse: 80   Weight: 81.3 kg (179 lb 3.2 oz)   Height: 167.6 cm (66\")         Physical Exam  General : Alert, awake, no acute distress  Neck : Supple, no " carotid bruit, no jugular venous distention  CVS : Regular rate and rhythm, no murmur, rubs or gallops  Lungs: Clear to auscultation bilaterally, no crackles or rhonchi  Abdomen: Soft, nontender, bowel sounds active  Extremities: Warm, well-perfused, no pedal edema      Result Review     The following data was reviewed by ALVIN Castelan  proBNP   Date Value Ref Range Status   06/14/2021 236.2 0.0 - 900.0 pg/mL Final     CMP          8/16/2022    14:23 3/30/2023    13:43   CMP   Glucose 136  119    BUN 15  13    Creatinine 0.64  0.81    EGFR 100.7  82.2    Sodium 136  140    Potassium 3.9  3.7    Chloride 98  100    Calcium 9.3  9.8    Total Protein 7.0  7.8    Albumin 3.90  4.4    Globulin 3.1  3.4    Total Bilirubin 0.3  0.5    Alkaline Phosphatase 114  147    AST (SGOT) 29  19    ALT (SGPT) 24  15    Albumin/Globulin Ratio 1.3  1.3    BUN/Creatinine Ratio 23.4  16.0    Anion Gap 9.2  10.8         Lab Results   Component Value Date    TSH 2.950 11/04/2021      Lab Results   Component Value Date    FREET4 1.22 11/04/2021      No results found for: DDIMERQUANT  Magnesium   Date Value Ref Range Status   06/21/2021 1.8 1.6 - 2.4 mg/dL Final      No results found for: DIGOXIN   Lab Results   Component Value Date    TROPONINT 0.240 (C) 06/21/2021               Results for orders placed during the hospital encounter of 06/14/21    Adult Transthoracic Echo Complete w/ Color, Spectral and Contrast if necessary per protocol    Interpretation Summary  · Left ventricular systolic function is normal.             Assessment and Plan   Diagnoses and all orders for this visit:    1. Coronary artery disease involving native coronary artery of native heart without angina pectoris (Primary)  Assessment & Plan:  She is stable without any without any complaints of angina.  She has preserved LV function, continue current medication regiment with aspirin, Plavix, carvedilol, and atorvastatin.    Orders:  -     aspirin 81 MG EC  tablet; Take 1 tablet by mouth Daily. Indications: Acute Heart Attack  Dispense: 90 tablet; Refill: 3  -     atorvastatin (LIPITOR) 40 MG tablet; Take 1 tablet by mouth Every Night.  Dispense: 90 tablet; Refill: 3  -     clopidogrel (PLAVIX) 75 MG tablet; Take 1 tablet by mouth Daily.  Dispense: 90 tablet; Refill: 3    2. Hypertension, essential  Assessment & Plan:  Blood pressure in the office today is elevated, reports she has not taken this morning's dose of medication, counseled regarding medication compliance.  Request 2-week BP journal from home, if BP continues to remain elevated would recommend increasing dose of lisinopril.    Orders:  -     carvedilol (COREG) 3.125 MG tablet; Take 1 tablet by mouth Every 12 (Twelve) Hours.  Dispense: 180 tablet; Refill: 3  -     lisinopril (PRINIVIL,ZESTRIL) 10 MG tablet; Take 0.5 tablets by mouth Daily.  Dispense: 90 tablet; Refill: 3  -     Comprehensive Metabolic Panel; Future    3. Mixed hyperlipidemia  Assessment & Plan:  No recent lipid profile available, continue current dose atorvastatin, I requested her to have fasting lipid profile before next follow-up visit.    Orders:  -     atorvastatin (LIPITOR) 40 MG tablet; Take 1 tablet by mouth Every Night.  Dispense: 90 tablet; Refill: 3  -     Comprehensive Metabolic Panel; Future  -     Lipid Panel; Future                Follow Up   Return in about 6 months (around 11/24/2023) for with Dr. Xiao.    Patient was given instructions and counseling regarding her condition or for health maintenance advice. Please see specific information pulled into the AVS if appropriate.     Signed,  Edith Alcocer, APRN  05/24/2023     Dictated Utilizing Dragon Dictation: Please note that portions of this note were completed with a voice recognition program.  Part of this note may be an electronic transcription/translation of spoken language to printed text using the Dragon Dictation System.

## 2023-06-11 NOTE — ASSESSMENT & PLAN NOTE
Blood pressure in the office today is elevated, reports she has not taken this morning's dose of medication, counseled regarding medication compliance.  Request 2-week BP journal from home, if BP continues to remain elevated would recommend increasing dose of lisinopril.

## 2023-06-11 NOTE — ASSESSMENT & PLAN NOTE
She is stable without any without any complaints of angina.  She has preserved LV function, continue current medication regiment with aspirin, Plavix, carvedilol, and atorvastatin.

## 2023-06-11 NOTE — ASSESSMENT & PLAN NOTE
No recent lipid profile available, continue current dose atorvastatin, I requested her to have fasting lipid profile before next follow-up visit.

## 2023-09-29 DIAGNOSIS — F41.8 DEPRESSION WITH ANXIETY: ICD-10-CM

## 2023-09-29 RX ORDER — FLUTICASONE FUROATE, UMECLIDINIUM BROMIDE AND VILANTEROL TRIFENATATE 100; 62.5; 25 UG/1; UG/1; UG/1
1 POWDER RESPIRATORY (INHALATION)
Qty: 60 EACH | Refills: 3 | Status: SHIPPED | OUTPATIENT
Start: 2023-09-29

## 2023-09-29 RX ORDER — EMPAGLIFLOZIN 25 MG/1
25 TABLET, FILM COATED ORAL DAILY
Qty: 90 TABLET | Refills: 2 | Status: SHIPPED | OUTPATIENT
Start: 2023-09-29 | End: 2023-12-28

## 2023-09-29 RX ORDER — EMPAGLIFLOZIN 25 MG/1
25 TABLET, FILM COATED ORAL DAILY
COMMUNITY
Start: 2023-09-19 | End: 2023-09-29 | Stop reason: SDUPTHER

## 2023-09-29 RX ORDER — FLUTICASONE FUROATE, UMECLIDINIUM BROMIDE AND VILANTEROL TRIFENATATE 100; 62.5; 25 UG/1; UG/1; UG/1
1 POWDER RESPIRATORY (INHALATION)
COMMUNITY
Start: 2023-09-19 | End: 2023-09-29 | Stop reason: SDUPTHER

## 2023-10-06 ENCOUNTER — OFFICE VISIT (OUTPATIENT)
Dept: FAMILY MEDICINE CLINIC | Facility: CLINIC | Age: 63
End: 2023-10-06
Payer: COMMERCIAL

## 2023-10-06 VITALS
HEIGHT: 66 IN | OXYGEN SATURATION: 99 % | TEMPERATURE: 98.9 F | HEART RATE: 75 BPM | WEIGHT: 183.6 LBS | SYSTOLIC BLOOD PRESSURE: 138 MMHG | BODY MASS INDEX: 29.51 KG/M2 | DIASTOLIC BLOOD PRESSURE: 76 MMHG

## 2023-10-06 DIAGNOSIS — R59.0 PREAURICULAR LYMPHADENOPATHY: Primary | ICD-10-CM

## 2023-10-06 PROCEDURE — 3078F DIAST BP <80 MM HG: CPT | Performed by: NURSE PRACTITIONER

## 2023-10-06 PROCEDURE — 3075F SYST BP GE 130 - 139MM HG: CPT | Performed by: NURSE PRACTITIONER

## 2023-10-06 PROCEDURE — 1159F MED LIST DOCD IN RCRD: CPT | Performed by: NURSE PRACTITIONER

## 2023-10-06 PROCEDURE — 1160F RVW MEDS BY RX/DR IN RCRD: CPT | Performed by: NURSE PRACTITIONER

## 2023-10-06 PROCEDURE — 99213 OFFICE O/P EST LOW 20 MIN: CPT | Performed by: NURSE PRACTITIONER

## 2023-10-06 PROCEDURE — 3052F HG A1C>EQUAL 8.0%<EQUAL 9.0%: CPT | Performed by: NURSE PRACTITIONER

## 2023-10-06 RX ORDER — NAPROXEN SODIUM 220 MG
TABLET ORAL EVERY 12 HOURS SCHEDULED
COMMUNITY

## 2023-10-06 RX ORDER — AMOXICILLIN AND CLAVULANATE POTASSIUM 875; 125 MG/1; MG/1
1 TABLET, FILM COATED ORAL 2 TIMES DAILY
Qty: 20 TABLET | Refills: 0 | Status: SHIPPED | OUTPATIENT
Start: 2023-10-06 | End: 2023-10-16

## 2023-10-06 NOTE — PROGRESS NOTES
"Chief Complaint  Facial Swelling    Subjective      Desiree Garcia is a 63 year old female that presents to [facility] with c/o swelling to the right side face and neck that started yesterday. Area is painful. She denies fever, body aches or chills. No recent illness.       History of Present Illness    Current Outpatient Medications   Medication Instructions    amoxicillin-clavulanate (AUGMENTIN) 875-125 MG per tablet 1 tablet, Oral, 2 Times Daily    aspirin 81 mg, Oral, Daily    atorvastatin (LIPITOR) 40 mg, Oral, Nightly    carvedilol (COREG) 3.125 mg, Oral, Every 12 Hours Scheduled    clopidogrel (PLAVIX) 75 mg, Oral, Daily    Janumet  MG per tablet 1 tablet, Oral, 2 Times Daily With Meals    Jardiance 25 mg, Oral, Daily    lisinopril (PRINIVIL,ZESTRIL) 5 mg, Oral, Daily    metFORMIN (GLUCOPHAGE) 500 mg, Oral, 2 Times Daily With Meals    naproxen sodium (ALEVE) 220 MG tablet Every 12 Hours Scheduled    Trelegy Ellipta 100-62.5-25 MCG/ACT inhaler 1 puff, Inhalation, Daily - RT    Vortioxetine HBr (TRINTELLIX) 10 mg, Oral, Daily       The following portions of the patient's history were reviewed and updated as appropriate: allergies, current medications, past family history, past medical history, past social history, past surgical history, and problem list.    Objective   Vital Signs:   /76   Pulse 75   Temp 98.9 °F (37.2 °C)   Ht 167.6 cm (66\")   Wt 83.3 kg (183 lb 9.6 oz)   SpO2 99%   BMI 29.63 kg/m²     Wt Readings from Last 3 Encounters:   10/06/23 83.3 kg (183 lb 9.6 oz)   05/24/23 81.3 kg (179 lb 3.2 oz)   04/13/23 80.7 kg (178 lb)     BP Readings from Last 3 Encounters:   10/06/23 138/76   05/24/23 164/85   04/13/23 136/82     Physical Exam  Vitals reviewed.   Constitutional:       Appearance: Normal appearance. She is well-developed. She is not ill-appearing.   HENT:      Head: Normocephalic and atraumatic.        Comments: Very TTP     Right Ear: " Tympanic membrane, ear canal and external ear normal.   Eyes:      Conjunctiva/sclera: Conjunctivae normal.      Pupils: Pupils are equal, round, and reactive to light.   Cardiovascular:      Rate and Rhythm: Normal rate.   Pulmonary:      Effort: Pulmonary effort is normal.   Lymphadenopathy:      Cervical: No cervical adenopathy.   Skin:     General: Skin is warm and dry.   Neurological:      Mental Status: She is alert and oriented to person, place, and time.   Psychiatric:         Mood and Affect: Mood and affect normal.         Behavior: Behavior normal.        Result Review :  The following data was reviewed by: ALVIN Machado on 10/06/2023:            Lab Results (last 72 hours)       ** No results found for the last 72 hours. **             No Images in the past 120 days found..    Lab Results   Component Value Date    COVID19 DETECTED (A) 01/12/2021    INR 0.86 (L) 06/14/2021    BILIRUBINUR Negative 06/21/2021    POCGLU 135 (H) 06/17/2021       Procedures        Assessment and Plan   Diagnoses and all orders for this visit:    1. Preauricular lymphadenopathy (Primary)  -     amoxicillin-clavulanate (AUGMENTIN) 875-125 MG per tablet; Take 1 tablet by mouth 2 (Two) Times a Day for 10 days.  Dispense: 20 tablet; Refill: 0            Advised to follow up if symptoms do not improve with abx treatment.       There are no discontinued medications.       Follow Up   No follow-ups on file.  Patient was given instructions and counseling regarding her condition or for health maintenance advice. Please see specific information pulled into the AVS if appropriate.       ALVIN Machado  10/06/23  15:26 EDT

## 2023-12-20 RX ORDER — PSEUDOEPHED/ACETAMINOPH/DIPHEN 30MG-500MG
TABLET ORAL
Qty: 90 TABLET | Refills: 11 | Status: SHIPPED | OUTPATIENT
Start: 2023-12-20

## 2024-01-25 RX ORDER — FLUTICASONE FUROATE, UMECLIDINIUM BROMIDE AND VILANTEROL TRIFENATATE 100; 62.5; 25 UG/1; UG/1; UG/1
POWDER RESPIRATORY (INHALATION)
Qty: 60 EACH | Refills: 11 | Status: SHIPPED | OUTPATIENT
Start: 2024-01-25

## 2024-01-29 ENCOUNTER — OFFICE VISIT (OUTPATIENT)
Dept: CARDIOLOGY | Facility: CLINIC | Age: 64
End: 2024-01-29
Payer: MEDICARE

## 2024-01-29 VITALS
HEIGHT: 66 IN | DIASTOLIC BLOOD PRESSURE: 84 MMHG | BODY MASS INDEX: 28.93 KG/M2 | HEART RATE: 92 BPM | WEIGHT: 180 LBS | SYSTOLIC BLOOD PRESSURE: 162 MMHG

## 2024-01-29 DIAGNOSIS — E78.2 MIXED HYPERLIPIDEMIA: ICD-10-CM

## 2024-01-29 DIAGNOSIS — I10 HYPERTENSION, ESSENTIAL: ICD-10-CM

## 2024-01-29 DIAGNOSIS — I25.10 CORONARY ARTERY DISEASE INVOLVING NATIVE CORONARY ARTERY OF NATIVE HEART WITHOUT ANGINA PECTORIS: Primary | Chronic | ICD-10-CM

## 2024-01-29 PROCEDURE — 1159F MED LIST DOCD IN RCRD: CPT | Performed by: INTERNAL MEDICINE

## 2024-01-29 PROCEDURE — 1160F RVW MEDS BY RX/DR IN RCRD: CPT | Performed by: INTERNAL MEDICINE

## 2024-01-29 PROCEDURE — 3079F DIAST BP 80-89 MM HG: CPT | Performed by: INTERNAL MEDICINE

## 2024-01-29 PROCEDURE — 99214 OFFICE O/P EST MOD 30 MIN: CPT | Performed by: INTERNAL MEDICINE

## 2024-01-29 PROCEDURE — 3077F SYST BP >= 140 MM HG: CPT | Performed by: INTERNAL MEDICINE

## 2024-01-29 RX ORDER — LISINOPRIL 10 MG/1
10 TABLET ORAL DAILY
Qty: 90 TABLET | Refills: 3 | Status: SHIPPED | OUTPATIENT
Start: 2024-01-29

## 2024-01-29 NOTE — ASSESSMENT & PLAN NOTE
Blood pressure on the higher side in the office.  Will increase dose of lisinopril to 10 mg daily.  Continue carvedilol at the current dose.

## 2024-01-29 NOTE — ASSESSMENT & PLAN NOTE
Lipid control uncertain, no recent lipid panel available although orders were placed several times during previous visits.  Strongly encourage patient to have lipid panel done now, she has active orders in epic.  She wants to have it done next week.  Continue atorvastatin 40 mg nightly for now.

## 2024-01-29 NOTE — ASSESSMENT & PLAN NOTE
She is currently stable with no angina.  LV systolic function is preserved.  Continue aspirin, Plavix, statin and beta-blocker.  I discussed the option of stopping Plavix in June, 2024 after completing 3 years of dual antiplatelet therapy.  Patient is not comfortable going off Plavix, and prefers to continue taking it.  Aspirin may be discontinued instead during next follow-up visit.

## 2024-01-29 NOTE — PROGRESS NOTES
CARDIOLOGY FOLLOW-UP PROGRESS NOTE        Chief Complaint  Follow-up and Coronary Artery Disease    Subjective            Desiree Garcia presents to [facility] CARDIOLOGY  History of Present Illness    Ms Garcia is here for routine 6-month follow-up for coronary arteries and hyperlipidemia.  She had no recent episodes of chest pain.  She does report shortness of breath and fatigue which is stable.  She smokes approximately half a pack of cigarettes per day.  She is taking all other medications as prescribed.      Past History:    CAD : Index presentation to hospital on 2021 with acute inferior STEMI, status post angioplasty and stent placement to mid right coronary artery.     Mixed hyperlipidemia  Essential hypertension  Diabetes mellitus  Chronic obstructive pulmonary disease    Medical History:  Past Medical History:   Diagnosis Date    Arthritis     Cervical cancer screening     COPD (chronic obstructive pulmonary disease)     Coronary artery disease involving native heart without angina pectoris 2021    primary angioplasty and stent placement to mid right coronary artery with good angiographic results on 2021 after STEMI    Depression with anxiety     Diabetes mellitus     Forgetfulness     Gastric reflux     Hypertension     Leg paresthesia 2017    Right    Limb swelling     Lumbago 2017    Low back pain    Lumbar spinal stenosis 2017    L4/5 moderate to severe central canal stenosis    Lumbosacral radiculopathy 2017    Right    Migraines     Night sweat     Reflux esophagitis     Shortness of breath     ST elevation myocardial infarction (STEMI) (CMS/HCC) 2021    Stomach disorder        Surgical History: has a past surgical history that includes Abdominal surgery; Cardiac catheterization;  section; Cholecystectomy; Colonoscopy (); Esophagogastroduodenoscopy; Foot surgery (Right); Hand surgery; Cardiac catheterization (N/A,  06/14/2021); Coronary stent placement; and Hysterectomy (1985).     Family History: family history includes Arthritis in her brother, father, mother, and sister; Cancer in her mother and sister; Diabetes in her brother and father; Heart attack in her father; Heart disease in her brother and father; Stroke in her brother, father, mother, and sister.     Social History: reports that she has been smoking cigarettes. She started smoking about 57 years ago. She has a 30.00 pack-year smoking history. She has been exposed to tobacco smoke. She has never used smokeless tobacco. She reports that she does not drink alcohol and does not use drugs.    Allergies: Tramadol    Current Outpatient Medications on File Prior to Visit   Medication Sig    Acetaminophen Extra Strength 500 MG tablet TAKE TWO TABLETS BY MOUTH EVERY 8 HOURS AS NEEDED FOR MODERATE PAIN (EAR PAIN)    aspirin 81 MG EC tablet Take 1 tablet by mouth Daily. Indications: Acute Heart Attack    atorvastatin (LIPITOR) 40 MG tablet Take 1 tablet by mouth Every Night.    carvedilol (COREG) 3.125 MG tablet Take 1 tablet by mouth Every 12 (Twelve) Hours.    clopidogrel (PLAVIX) 75 MG tablet Take 1 tablet by mouth Daily.    Janumet  MG per tablet Take 1 tablet by mouth 2 (Two) Times a Day With Meals.    naproxen sodium (ALEVE) 220 MG tablet Every 12 (Twelve) Hours.    Trelegy Ellipta 100-62.5-25 MCG/ACT inhaler INHALE 1 PUFF BY MOUTH DAILY (BULK)    Vortioxetine HBr (Trintellix) 10 MG tablet tablet Take 1 tablet by mouth Daily for 360 days.    [DISCONTINUED] lisinopril (PRINIVIL,ZESTRIL) 10 MG tablet Take 0.5 tablets by mouth Daily.    metFORMIN (GLUCOPHAGE) 500 MG tablet Take 1 tablet by mouth 2 (Two) Times a Day With Meals for 90 days.     No current facility-administered medications on file prior to visit.          Review of Systems   Respiratory:  Positive for shortness of breath. Negative for cough and wheezing.    Cardiovascular:  Negative for chest pain,  "palpitations and leg swelling.   Gastrointestinal:  Positive for GERD. Negative for nausea and vomiting.   Neurological:  Negative for dizziness and syncope.        Objective     /84   Pulse 92   Ht 167.6 cm (66\")   Wt 81.6 kg (180 lb)   BMI 29.05 kg/m²       Physical Exam    General : Alert, awake, no acute distress  Neck : Supple, no carotid bruit, no jugular venous distention  CVS : Regular rate and rhythm, no murmur, rubs or gallops  Lungs: Clear to auscultation bilaterally, no crackles or rhonchi  Abdomen: Soft, nontender, bowel sounds heard in all 4 quadrants  Extremities: Warm, well-perfused, no pedal edema    Result Review :     The following data was reviewed by: Sidney Xiao MD on 01/29/2024:    CMP          3/30/2023    13:43   CMP   Glucose 119    BUN 13    Creatinine 0.81    EGFR 82.2    Sodium 140    Potassium 3.7    Chloride 100    Calcium 9.8    Total Protein 7.8    Albumin 4.4    Globulin 3.4    Total Bilirubin 0.5    Alkaline Phosphatase 147    AST (SGOT) 19    ALT (SGPT) 15    Albumin/Globulin Ratio 1.3    BUN/Creatinine Ratio 16.0    Anion Gap 10.8                 Data reviewed: Cardiology studies        Results for orders placed during the hospital encounter of 06/14/21    Adult Transthoracic Echo Complete w/ Color, Spectral and Contrast if necessary per protocol    Interpretation Summary  · Left ventricular systolic function is normal.                   Assessment and Plan        Diagnoses and all orders for this visit:    1. Coronary artery disease involving native coronary artery of native heart without angina pectoris (Primary)  Assessment & Plan:  She is currently stable with no angina.  LV systolic function is preserved.  Continue aspirin, Plavix, statin and beta-blocker.  I discussed the option of stopping Plavix in June, 2024 after completing 3 years of dual antiplatelet therapy.  Patient is not comfortable going off Plavix, and prefers to continue taking it.  Aspirin may " be discontinued instead during next follow-up visit.      2. Mixed hyperlipidemia  Assessment & Plan:  Lipid control uncertain, no recent lipid panel available although orders were placed several times during previous visits.  Strongly encourage patient to have lipid panel done now, she has active orders in epic.  She wants to have it done next week.  Continue atorvastatin 40 mg nightly for now.      3. Hypertension, essential  Assessment & Plan:  Blood pressure on the higher side in the office.  Will increase dose of lisinopril to 10 mg daily.  Continue carvedilol at the current dose.    Orders:  -     lisinopril (PRINIVIL,ZESTRIL) 10 MG tablet; Take 1 tablet by mouth Daily.  Dispense: 90 tablet; Refill: 3              Follow Up     Return in about 6 months (around 7/29/2024) for Next scheduled follow up, with Edith ESQUIVEL.    Patient was given instructions and counseling regarding her condition or for health maintenance advice. Please see specific information pulled into the AVS if appropriate.

## 2024-05-22 DIAGNOSIS — I10 HYPERTENSION, ESSENTIAL: ICD-10-CM

## 2024-05-22 DIAGNOSIS — E78.2 MIXED HYPERLIPIDEMIA: ICD-10-CM

## 2024-05-22 DIAGNOSIS — I25.10 CORONARY ARTERY DISEASE INVOLVING NATIVE CORONARY ARTERY OF NATIVE HEART WITHOUT ANGINA PECTORIS: ICD-10-CM

## 2024-05-23 RX ORDER — CLOPIDOGREL BISULFATE 75 MG/1
TABLET ORAL
Qty: 90 TABLET | Refills: 11 | Status: SHIPPED | OUTPATIENT
Start: 2024-05-23

## 2024-05-23 RX ORDER — ASPIRIN 81 MG/1
TABLET, COATED ORAL
Qty: 90 TABLET | Refills: 11 | Status: SHIPPED | OUTPATIENT
Start: 2024-05-23

## 2024-05-23 RX ORDER — ATORVASTATIN CALCIUM 40 MG/1
TABLET, FILM COATED ORAL
Qty: 90 TABLET | Refills: 11 | Status: SHIPPED | OUTPATIENT
Start: 2024-05-23

## 2024-05-23 RX ORDER — CARVEDILOL 3.12 MG/1
TABLET ORAL
Qty: 180 TABLET | Refills: 11 | Status: SHIPPED | OUTPATIENT
Start: 2024-05-23

## 2024-05-24 DIAGNOSIS — I10 HYPERTENSION, ESSENTIAL: ICD-10-CM

## 2024-05-24 RX ORDER — LISINOPRIL 10 MG/1
TABLET ORAL
Qty: 90 TABLET | Refills: 3 | Status: SHIPPED | OUTPATIENT
Start: 2024-05-24

## 2024-07-18 RX ORDER — EMPAGLIFLOZIN 25 MG/1
TABLET, FILM COATED ORAL
Qty: 90 TABLET | Refills: 11 | Status: SHIPPED | OUTPATIENT
Start: 2024-07-18

## 2024-07-29 ENCOUNTER — OFFICE VISIT (OUTPATIENT)
Dept: CARDIOLOGY | Facility: CLINIC | Age: 64
End: 2024-07-29
Payer: MEDICARE

## 2024-07-29 VITALS
HEIGHT: 66 IN | HEART RATE: 94 BPM | DIASTOLIC BLOOD PRESSURE: 80 MMHG | BODY MASS INDEX: 27.97 KG/M2 | SYSTOLIC BLOOD PRESSURE: 145 MMHG | WEIGHT: 174 LBS

## 2024-07-29 DIAGNOSIS — I73.9 CLAUDICATION: ICD-10-CM

## 2024-07-29 DIAGNOSIS — I10 HYPERTENSION, ESSENTIAL: ICD-10-CM

## 2024-07-29 DIAGNOSIS — I25.10 CORONARY ARTERY DISEASE INVOLVING NATIVE CORONARY ARTERY OF NATIVE HEART WITHOUT ANGINA PECTORIS: Primary | ICD-10-CM

## 2024-07-29 DIAGNOSIS — R06.09 DYSPNEA ON EXERTION: ICD-10-CM

## 2024-07-29 DIAGNOSIS — E78.2 MIXED HYPERLIPIDEMIA: ICD-10-CM

## 2024-07-29 PROCEDURE — 3077F SYST BP >= 140 MM HG: CPT | Performed by: FAMILY MEDICINE

## 2024-07-29 PROCEDURE — 99214 OFFICE O/P EST MOD 30 MIN: CPT | Performed by: FAMILY MEDICINE

## 2024-07-29 PROCEDURE — 3079F DIAST BP 80-89 MM HG: CPT | Performed by: FAMILY MEDICINE

## 2024-07-29 RX ORDER — CARVEDILOL 3.12 MG/1
6.25 TABLET ORAL 2 TIMES DAILY WITH MEALS
Qty: 180 TABLET | Refills: 3 | Status: SHIPPED | OUTPATIENT
Start: 2024-07-29 | End: 2024-07-29

## 2024-07-29 RX ORDER — CARVEDILOL 6.25 MG/1
6.25 TABLET ORAL 2 TIMES DAILY WITH MEALS
Qty: 180 TABLET | Refills: 1 | Status: SHIPPED | OUTPATIENT
Start: 2024-07-29

## 2024-07-29 NOTE — PROGRESS NOTES
Chief Complaint  Coronary Artery Disease    Subjective        History of Present Illness  Desiree Garcia presents to [facility] CARDIOLOGY   Ms. Garcia is a 64-year-old female coming in for routine 9-month cardiac follow-up for coronary artery disease and hyperlipidemia.  She has no complaints today of any chest pain or pressure.  She does complain of symptoms of shortness of breath.  She has occasional cough.  She continues to smoke approximately half pack per day.  She is also complaining of some lower leg pain and numbness at times.  Describes pain as being worsened by prolonged standing or walking.        Past History:     CAD : Index presentation to hospital on 2021 with acute inferior STEMI, status post angioplasty and stent placement to mid right coronary artery.     Mixed hyperlipidemia  Essential hypertension  Diabetes mellitus  Chronic obstructive pulmonary disease    Past Medical History:   Diagnosis Date    Arthritis     Cervical cancer screening     COPD (chronic obstructive pulmonary disease)     Coronary artery disease involving native heart without angina pectoris 2021    Depression with anxiety     Diabetes mellitus     Forgetfulness     Gastric reflux     Hypertension     Leg paresthesia 2017    Limb swelling     Lumbago 2017    Lumbar spinal stenosis 2017    Lumbosacral radiculopathy 2017    Migraines     Night sweat     Reflux esophagitis     Shortness of breath     ST elevation myocardial infarction (STEMI) (CMS/HCC) 2021    Stomach disorder        Allergies   Allergen Reactions    Tramadol Itching        Past Surgical History:   Procedure Laterality Date    ABDOMINAL SURGERY      3 C-SECTIONS    CARDIAC CATHETERIZATION      CARDIAC CATHETERIZATION N/A 2021    Procedure: Left Heart Cath;  Surgeon: Sidney Xiao MD;  Location: [facility] CATH INVASIVE LOCATION;  Service: Cardiology;  Laterality: N/A;     SECTION       x 3    CHOLECYSTECTOMY      COLONOSCOPY  2014    CORONARY STENT PLACEMENT      ENDOSCOPY      2007 2014    FOOT SURGERY Right     HAND SURGERY      HYSTERECTOMY  1985        Social History  She  reports that she has been smoking cigarettes. She started smoking about 57 years ago. She has a 57.5 pack-year smoking history. She has been exposed to tobacco smoke. She has never used smokeless tobacco. She reports that she does not drink alcohol and does not use drugs.    Family History  Her family history includes Arthritis in her brother, father, mother, and sister; Cancer in her mother and sister; Diabetes in her brother and father; Heart attack in her father; Heart disease in her brother and father; Stroke in her brother, father, mother, and sister.       Current Outpatient Medications on File Prior to Visit   Medication Sig    Acetaminophen Extra Strength 500 MG tablet TAKE TWO TABLETS BY MOUTH EVERY 8 HOURS AS NEEDED FOR MODERATE PAIN (EAR PAIN)    Aspirin Low Dose 81 MG EC tablet TAKE ONE TABLET BY MOUTH DAILY AT 9 AM FOR ACUTE HEART ATTACK    atorvastatin (LIPITOR) 40 MG tablet TAKE ONE TABLET BY MOUTH DAILY AT 9 PM AT NIGHT    clopidogrel (PLAVIX) 75 MG tablet TAKE ONE TABLET BY MOUTH DAILY AT 9 AM    Jardiance 25 MG tablet tablet TAKE ONE TABLET BY MOUTH DAILY AT 9 AM    lisinopril (PRINIVIL,ZESTRIL) 10 MG tablet TAKE 1/2 TABLET BY MOUTH DAILY AT 9 AM    metFORMIN (GLUCOPHAGE) 500 MG tablet TAKE ONE TABLET BY MOUTH TWICE DAILY WITH MEALS @ 9AM - 5PM    naproxen sodium (ALEVE) 220 MG tablet Every 12 (Twelve) Hours.    Trelegy Ellipta 100-62.5-25 MCG/ACT inhaler INHALE 1 PUFF BY MOUTH DAILY (BULK)    Vortioxetine HBr (Trintellix) 10 MG tablet tablet Take 1 tablet by mouth Daily for 360 days.    [DISCONTINUED] carvedilol (COREG) 3.125 MG tablet TAKE ONE TABLET BY MOUTH EVERY TWELVE HOURS @ 9AM & 9PM     No current facility-administered medications on file prior to visit.         Review of Systems   Constitutional:   "Positive for fatigue.   Respiratory:  Positive for cough and shortness of breath. Negative for chest tightness.    Cardiovascular:  Negative for chest pain, palpitations and leg swelling.   Gastrointestinal:  Negative for nausea and vomiting.   Neurological:  Negative for dizziness and syncope.        Objective   Vitals:    07/29/24 1319   BP: 145/80   Pulse: 94   Weight: 78.9 kg (174 lb)   Height: 167.6 cm (66\")         Physical Exam  General : Alert, awake, anxious, slightly tearful  Neck : Supple, no carotid bruit, no jugular venous distention  CVS : Regular rate and rhythm, no murmur, no rubs or gallops  Lungs: Clear but somewhat diminished bilaterally, no crackles or rhonchi  Abdomen: Soft, nontender, bowel sounds active  Extremities: Warm, well-perfused, no pedal edema      Result Review     The following data was reviewed by ALVIN Castelan  proBNP   Date Value Ref Range Status   06/14/2021 236.2 0.0 - 900.0 pg/mL Final          Lab Results   Component Value Date    TSH 2.950 11/04/2021      Lab Results   Component Value Date    FREET4 1.22 11/04/2021      No results found for: \"DDIMERQUANT\"  Magnesium   Date Value Ref Range Status   06/21/2021 1.8 1.6 - 2.4 mg/dL Final      No results found for: \"DIGOXIN\"   Lab Results   Component Value Date    TROPONINT 0.240 (C) 06/21/2021               Results for orders placed during the hospital encounter of 06/14/21    Adult Transthoracic Echo Complete w/ Color, Spectral and Contrast if necessary per protocol    Interpretation Summary  · Left ventricular systolic function is normal.             Assessment and Plan   Diagnoses and all orders for this visit:    1. Coronary artery disease involving native coronary artery of native heart without angina pectoris (Primary)  Assessment & Plan:  She is stable without symptoms of angina.  She has completed 3 years of DAPT therapy.  We discussed this today, instructed her she could discontinue aspirin and we will continue " Plavix as monotherapy.    Orders:  -     Adult Transthoracic Echo Complete W/ Cont if Necessary Per Protocol; Future    2. Hypertension, essential  Assessment & Plan:  Blood pressure mildly elevated.  Will go ahead and increase dose of carvedilol to 6.25 mg twice daily, continue 10 mg lisinopril.    Orders:  -     Comprehensive Metabolic Panel; Future  -     Discontinue: carvedilol (COREG) 3.125 MG tablet; Take 2 tablets by mouth 2 (Two) Times a Day With Meals.  Dispense: 180 tablet; Refill: 3    3. Mixed hyperlipidemia  Assessment & Plan:  She has not had lipid panel drawn for some time, discussed this with her, she is agreeable to go have it done during the next week.  We will continue current dose atorvastatin 40 mg nightly, and reevaluate when lipid profile is available.    Orders:  -     Comprehensive Metabolic Panel; Future  -     Lipid Panel; Future    4. Dyspnea on exertion  Assessment & Plan:  Symptoms are likely multifactoral.  There is definitely emphysema component, I strongly urged smoking cessation.  Will go ahead and reevaluate her LV function and valvular status with echocardiogram, since last one was 3 years ago.    Orders:  -     Adult Transthoracic Echo Complete W/ Cont if Necessary Per Protocol; Future    5. Claudication  -     Doppler Arterial Multi Level Lower Extremity - Bilateral CAR; Future             Follow Up   Return in about 6 months (around 1/29/2025) for with Dr. Xiao.    Patient was given instructions and counseling regarding her condition or for health maintenance advice. Please see specific information pulled into the AVS if appropriate.     Signed,  Edith Alcocer, APRN  07/29/2024     Dictated Utilizing Dragon Dictation: Please note that portions of this note were completed with a voice recognition program.  Part of this note may be an electronic transcription/translation of spoken language to printed text using the Dragon Dictation System.

## 2024-07-30 NOTE — ASSESSMENT & PLAN NOTE
She is stable without symptoms of angina.  She has completed 3 years of DAPT therapy.  We discussed this today, instructed her she could discontinue aspirin and we will continue Plavix as monotherapy.

## 2024-07-30 NOTE — ASSESSMENT & PLAN NOTE
Blood pressure mildly elevated.  Will go ahead and increase dose of carvedilol to 6.25 mg twice daily, continue 10 mg lisinopril.

## 2024-07-30 NOTE — ASSESSMENT & PLAN NOTE
Symptoms are likely multifactoral.  There is definitely emphysema component, I strongly urged smoking cessation.  Will go ahead and reevaluate her LV function and valvular status with echocardiogram, since last one was 3 years ago.

## 2024-07-30 NOTE — ASSESSMENT & PLAN NOTE
She has not had lipid panel drawn for some time, discussed this with her, she is agreeable to go have it done during the next week.  We will continue current dose atorvastatin 40 mg nightly, and reevaluate when lipid profile is available.

## 2024-08-08 ENCOUNTER — OFFICE VISIT (OUTPATIENT)
Dept: FAMILY MEDICINE CLINIC | Facility: CLINIC | Age: 64
End: 2024-08-08
Payer: MEDICARE

## 2024-08-08 VITALS
HEART RATE: 79 BPM | TEMPERATURE: 97.9 F | OXYGEN SATURATION: 95 % | BODY MASS INDEX: 28.7 KG/M2 | DIASTOLIC BLOOD PRESSURE: 84 MMHG | SYSTOLIC BLOOD PRESSURE: 140 MMHG | HEIGHT: 66 IN | WEIGHT: 178.6 LBS

## 2024-08-08 DIAGNOSIS — E11.65 TYPE 2 DIABETES MELLITUS WITH HYPERGLYCEMIA, WITHOUT LONG-TERM CURRENT USE OF INSULIN: Primary | ICD-10-CM

## 2024-08-08 DIAGNOSIS — R41.3 MEMORY LOSS: ICD-10-CM

## 2024-08-08 DIAGNOSIS — D48.19 TENOSYNOVIAL GIANT CELL TUMOR OF HAND: ICD-10-CM

## 2024-08-08 DIAGNOSIS — R20.2 LEG PARESTHESIA: ICD-10-CM

## 2024-08-08 DIAGNOSIS — E55.9 VITAMIN D DEFICIENCY: ICD-10-CM

## 2024-08-08 DIAGNOSIS — J41.1 MUCOPURULENT CHRONIC BRONCHITIS: ICD-10-CM

## 2024-08-08 LAB
25(OH)D3 SERPL-MCNC: 13 NG/ML (ref 30–100)
ALBUMIN SERPL-MCNC: 3.9 G/DL (ref 3.5–5.2)
ALBUMIN/GLOB SERPL: 1.2 G/DL
ALP SERPL-CCNC: 141 U/L (ref 39–117)
ALT SERPL W P-5'-P-CCNC: 25 U/L (ref 1–33)
ANION GAP SERPL CALCULATED.3IONS-SCNC: 11 MMOL/L (ref 5–15)
AST SERPL-CCNC: 30 U/L (ref 1–32)
BILIRUB SERPL-MCNC: 0.2 MG/DL (ref 0–1.2)
BUN SERPL-MCNC: 7 MG/DL (ref 8–23)
BUN/CREAT SERPL: 11.5 (ref 7–25)
CALCIUM SPEC-SCNC: 9 MG/DL (ref 8.6–10.5)
CHLORIDE SERPL-SCNC: 100 MMOL/L (ref 98–107)
CO2 SERPL-SCNC: 27 MMOL/L (ref 22–29)
CREAT SERPL-MCNC: 0.61 MG/DL (ref 0.57–1)
EGFRCR SERPLBLD CKD-EPI 2021: 100.6 ML/MIN/1.73
GLOBULIN UR ELPH-MCNC: 3.2 GM/DL
GLUCOSE SERPL-MCNC: 227 MG/DL (ref 65–99)
HBA1C MFR BLD: 9.9 % (ref 4.8–5.6)
POTASSIUM SERPL-SCNC: 3.9 MMOL/L (ref 3.5–5.2)
PROT SERPL-MCNC: 7.1 G/DL (ref 6–8.5)
SODIUM SERPL-SCNC: 138 MMOL/L (ref 136–145)
TSH SERPL DL<=0.05 MIU/L-ACNC: 4 UIU/ML (ref 0.27–4.2)
VIT B12 BLD-MCNC: 344 PG/ML (ref 211–946)

## 2024-08-08 PROCEDURE — 36415 COLL VENOUS BLD VENIPUNCTURE: CPT | Performed by: FAMILY MEDICINE

## 2024-08-08 PROCEDURE — 83036 HEMOGLOBIN GLYCOSYLATED A1C: CPT | Performed by: FAMILY MEDICINE

## 2024-08-08 PROCEDURE — 99214 OFFICE O/P EST MOD 30 MIN: CPT | Performed by: FAMILY MEDICINE

## 2024-08-08 PROCEDURE — 80053 COMPREHEN METABOLIC PANEL: CPT | Performed by: FAMILY MEDICINE

## 2024-08-08 PROCEDURE — 82607 VITAMIN B-12: CPT | Performed by: FAMILY MEDICINE

## 2024-08-08 PROCEDURE — 3079F DIAST BP 80-89 MM HG: CPT | Performed by: FAMILY MEDICINE

## 2024-08-08 PROCEDURE — 1125F AMNT PAIN NOTED PAIN PRSNT: CPT | Performed by: FAMILY MEDICINE

## 2024-08-08 PROCEDURE — 3077F SYST BP >= 140 MM HG: CPT | Performed by: FAMILY MEDICINE

## 2024-08-08 PROCEDURE — 3046F HEMOGLOBIN A1C LEVEL >9.0%: CPT | Performed by: FAMILY MEDICINE

## 2024-08-08 PROCEDURE — 84443 ASSAY THYROID STIM HORMONE: CPT | Performed by: FAMILY MEDICINE

## 2024-08-08 PROCEDURE — 82306 VITAMIN D 25 HYDROXY: CPT | Performed by: FAMILY MEDICINE

## 2024-08-08 RX ORDER — ALBUTEROL SULFATE 90 UG/1
2 AEROSOL, METERED RESPIRATORY (INHALATION) EVERY 4 HOURS PRN
Qty: 18 G | Refills: 5 | Status: SHIPPED | OUTPATIENT
Start: 2024-08-08

## 2024-08-08 RX ORDER — ALBUTEROL SULFATE 2.5 MG/3ML
2.5 SOLUTION RESPIRATORY (INHALATION) EVERY 4 HOURS PRN
Qty: 300 ML | Refills: 12 | Status: SHIPPED | OUTPATIENT
Start: 2024-08-08 | End: 2024-09-07

## 2024-08-08 RX ORDER — EMPAGLIFLOZIN 25 MG/1
25 TABLET, FILM COATED ORAL DAILY
Qty: 90 TABLET | Refills: 3 | Status: SHIPPED | OUTPATIENT
Start: 2024-08-08 | End: 2024-11-06

## 2024-08-08 NOTE — PROGRESS NOTES
Chief Complaint  swelling  (Leg & feet swelling)    Subjective        Desiree Garcia presents to [facility]  History of Present Illness  The patient presents for evaluation of shoulder pain.    She reports experiencing significant pain in her shoulders. Additionally, she experiences shaking in her hands when attempting to  objects, a condition that prevents her from controlling the movement. This issue began approximately 1.5 months ago. She has a history of carpal tunnel syndrome in her left hand.        Past Medical History:   Diagnosis Date    Arthritis     Cervical cancer screening     COPD (chronic obstructive pulmonary disease)     Coronary artery disease involving native heart without angina pectoris 9/30/2021    primary angioplasty and stent placement to mid right coronary artery with good angiographic results on 6/14/2021 after STEMI    Depression with anxiety     Diabetes mellitus     Forgetfulness     Gastric reflux     Hypertension     Leg paresthesia 01/20/2017    Right    Limb swelling     Lumbago 01/20/2017    Low back pain    Lumbar spinal stenosis 02/23/2017    L4/5 moderate to severe central canal stenosis    Lumbosacral radiculopathy 01/20/2017    Right    Migraines     Night sweat     Reflux esophagitis     Shortness of breath     ST elevation myocardial infarction (STEMI) (CMS/HCC) 6/14/2021    Stomach disorder       Family History   Problem Relation Age of Onset    Stroke Mother     Cancer Mother         Unspecified    Arthritis Mother     Stroke Father     Heart disease Father     Diabetes Father         Unspecified type    Arthritis Father     Heart attack Father     Stroke Sister     Arthritis Sister     Cancer Sister     Stroke Brother     Heart disease Brother     Diabetes Brother         Unspecified type    Arthritis Brother       Objective   Vital Signs:  /84 (BP Location: Right arm, Patient Position: Sitting, Cuff Size: Adult)   Pulse  "79   Temp 97.9 °F (36.6 °C) (Oral)   Ht 167.6 cm (66\")   Wt 81 kg (178 lb 9.6 oz)   SpO2 95%   BMI 28.83 kg/m²   Estimated body mass index is 28.83 kg/m² as calculated from the following:    Height as of this encounter: 167.6 cm (66\").    Weight as of this encounter: 81 kg (178 lb 9.6 oz).         Review of Systems   Constitutional:  Negative for activity change, chills, fatigue and fever.   HENT:  Negative for congestion, sinus pressure, sinus pain and sore throat.    Eyes:  Negative for discharge and redness.   Respiratory:  Negative for cough and chest tightness.    Cardiovascular:  Negative for chest pain, palpitations and leg swelling.   Gastrointestinal:  Negative for abdominal pain and diarrhea.   Endocrine: Negative for cold intolerance and heat intolerance.   Genitourinary:  Negative for difficulty urinating and dysuria.   Musculoskeletal:  Negative for gait problem and neck stiffness.   Skin:  Negative for pallor and rash.   Neurological:  Negative for dizziness and headaches.   Psychiatric/Behavioral:  Negative for agitation, behavioral problems and confusion.         Physical Exam    Physical Exam  Vitals reviewed.   Constitutional:       Appearance: Normal appearance.   HENT:      Right Ear: Tympanic membrane normal.      Left Ear: Tympanic membrane normal.      Nose: Nose normal.   Eyes:      Extraocular Movements: Extraocular movements intact.      Conjunctiva/sclera: Conjunctivae normal.      Pupils: Pupils are equal, round, and reactive to light.   Cardiovascular:      Rate and Rhythm: Normal rate and regular rhythm.   Pulmonary:      Effort: Pulmonary effort is normal.      Breath sounds: Normal breath sounds.   Abdominal:      General: Bowel sounds are normal.   Musculoskeletal:         General: Normal range of motion.      Cervical back: Normal range of motion.   Skin:     General: Skin is warm and dry.   Neurological:      General: No focal deficit present.      Mental Status: She is alert " and oriented to person, place, and time.   Psychiatric:         Mood and Affect: Mood normal.         Behavior: Behavior normal.          Result Review       Results               Assessment and Plan     Diagnoses and all orders for this visit:    1. Type 2 diabetes mellitus with hyperglycemia, without long-term current use of insulin (Primary)  -     Hemoglobin A1c  -     MicroAlbumin, Urine, Random - Urine, Clean Catch  -     Comprehensive Metabolic Panel  -     Jardiance 25 MG tablet tablet; Take 1 tablet by mouth Daily for 90 days.  Dispense: 90 tablet; Refill: 3    2. Mucopurulent chronic bronchitis  -     albuterol (PROVENTIL) (2.5 MG/3ML) 0.083% nebulizer solution; Take 2.5 mg by nebulization Every 4 (Four) Hours As Needed for Wheezing for up to 30 days.  Dispense: 300 mL; Refill: 12  -     albuterol sulfate  (90 Base) MCG/ACT inhaler; Inhale 2 puffs Every 4 (Four) Hours As Needed for Wheezing.  Dispense: 18 g; Refill: 5    3. Leg paresthesia  -     Vitamin B12    4. Memory loss  -     TSH    5. Vitamin D deficiency  -     Vitamin D,25-Hydroxy    6. Tenosynovial giant cell tumor of hand  -     Ambulatory Referral to Hand Surgery  -     diclofenac (VOLTAREN) 75 MG EC tablet; Take 1 tablet by mouth 2 (Two) Times a Day for 30 days.  Dispense: 60 tablet; Refill: 2      Assessment & Plan         I spent 35 minutes caring for Desiree on this date of service. This time includes time spent by me in the following activities:reviewing tests  Follow Up   Return in about 3 months (around 11/8/2024).  Patient was given instructions and counseling regarding her condition or for health maintenance advice. Please see specific information pulled into the AVS if appropriate.   Patient or patient representative verbalized consent for the use of Ambient Listening during the visit with  Sandra Kaba MD for chart documentation. 8/13/2024  16:07 EDT    Sandra Kaba MD

## 2024-08-09 ENCOUNTER — TELEPHONE (OUTPATIENT)
Dept: FAMILY MEDICINE CLINIC | Facility: CLINIC | Age: 64
End: 2024-08-09
Payer: MEDICARE

## 2024-08-09 NOTE — TELEPHONE ENCOUNTER
Caller: Desiree Garcia    Relationship: Self    Best call back number: [phone]    What is the best time to reach you: ANY     Who are you requesting to speak with (clinical staff, provider,  specific staff member): CLINICAL     What was the call regarding: PATIENT WAS SEEN YESTERDAY AND WANTED A CALL BACK REGARDING MEDICATIONS. PATIENT STATED PROVIDER WAS GOING TO SEND SOMETHING IN FOR PAIN AND PHARMACY HAS NOT RECEIVED ANYTHING YET AND DIDN'T KNOW WHEN MEDICATION WAS GOING TO BE SENT IN.

## 2024-08-13 DIAGNOSIS — F41.8 DEPRESSION WITH ANXIETY: ICD-10-CM

## 2024-08-13 RX ORDER — DICLOFENAC SODIUM 75 MG/1
75 TABLET, DELAYED RELEASE ORAL 2 TIMES DAILY
Qty: 60 TABLET | Refills: 2 | Status: SHIPPED | OUTPATIENT
Start: 2024-08-13 | End: 2024-09-12

## 2024-08-13 RX ORDER — VORTIOXETINE 10 MG/1
TABLET, FILM COATED ORAL
Qty: 90 TABLET | Refills: 3 | Status: SHIPPED | OUTPATIENT
Start: 2024-08-13

## 2024-08-14 ENCOUNTER — HOSPITAL ENCOUNTER (OUTPATIENT)
Dept: CARDIOLOGY | Facility: HOSPITAL | Age: 64
Discharge: HOME | End: 2024-08-14
Payer: MEDICARE

## 2024-08-14 ENCOUNTER — HOSPITAL ENCOUNTER (OUTPATIENT)
Facility: HOSPITAL | Age: 64
Discharge: HOME | End: 2024-08-14
Payer: MEDICARE

## 2024-08-14 DIAGNOSIS — I73.9 CLAUDICATION: ICD-10-CM

## 2024-08-14 DIAGNOSIS — I25.10 CORONARY ARTERY DISEASE INVOLVING NATIVE CORONARY ARTERY OF NATIVE HEART WITHOUT ANGINA PECTORIS: ICD-10-CM

## 2024-08-14 DIAGNOSIS — R06.09 DYSPNEA ON EXERTION: ICD-10-CM

## 2024-08-14 LAB
BH CV LOWER ARTERIAL LEFT ABI RATIO: 0.91
BH CV LOWER ARTERIAL LEFT DORSALIS PEDIS SYS MAX: 151
BH CV LOWER ARTERIAL LEFT GREAT TOE SYS MAX: 115
BH CV LOWER ARTERIAL LEFT POST TIBIAL SYS MAX: 150
BH CV LOWER ARTERIAL LEFT TBI RATIO: 0.69
BH CV LOWER ARTERIAL RIGHT ABI RATIO: 0.93
BH CV LOWER ARTERIAL RIGHT DORSALIS PEDIS SYS MAX: 155
BH CV LOWER ARTERIAL RIGHT GREAT TOE SYS MAX: 110
BH CV LOWER ARTERIAL RIGHT POST TIBIAL SYS MAX: 152
BH CV LOWER ARTERIAL RIGHT TBI RATIO: 0.66
UPPER ARTERIAL LEFT ARM BRACHIAL SYS MAX: 166
UPPER ARTERIAL RIGHT ARM BRACHIAL SYS MAX: 152

## 2024-08-14 PROCEDURE — 93923 UPR/LXTR ART STDY 3+ LVLS: CPT

## 2024-08-14 PROCEDURE — 93306 TTE W/DOPPLER COMPLETE: CPT

## 2024-08-16 LAB
BH CV ECHO MEAS - AO MEAN PG: 5 MMHG
BH CV ECHO MEAS - AO V2 VTI: 24.6 CM
BH CV ECHO MEAS - EDV(CUBED): 45.1 ML
BH CV ECHO MEAS - EDV(MOD-SP2): 55.5 ML
BH CV ECHO MEAS - EDV(MOD-SP4): 57.4 ML
BH CV ECHO MEAS - EF(MOD-SP2): 54.2 %
BH CV ECHO MEAS - EF(MOD-SP4): 51.7 %
BH CV ECHO MEAS - ESV(CUBED): 18 ML
BH CV ECHO MEAS - ESV(MOD-SP2): 25.4 ML
BH CV ECHO MEAS - ESV(MOD-SP4): 27.7 ML
BH CV ECHO MEAS - FS: 26.4 %
BH CV ECHO MEAS - IVS/LVPW: 1.36 CM
BH CV ECHO MEAS - IVSD: 1.19 CM
BH CV ECHO MEAS - LAT PEAK E' VEL: 7.5 CM/SEC
BH CV ECHO MEAS - LV MASS(C)D: 111.2 GRAMS
BH CV ECHO MEAS - LV MAX PG: 3.7 MMHG
BH CV ECHO MEAS - LV MEAN PG: 2 MMHG
BH CV ECHO MEAS - LV V1 MAX: 96 CM/SEC
BH CV ECHO MEAS - LV V1 VTI: 17.7 CM
BH CV ECHO MEAS - LVIDD: 3.6 CM
BH CV ECHO MEAS - LVIDS: 2.6 CM
BH CV ECHO MEAS - LVPWD: 0.88 CM
BH CV ECHO MEAS - MED PEAK E' VEL: 7.1 CM/SEC
BH CV ECHO MEAS - MV A MAX VEL: 103 CM/SEC
BH CV ECHO MEAS - MV DEC SLOPE: 422 CM/SEC2
BH CV ECHO MEAS - MV DEC TIME: 0.22 SEC
BH CV ECHO MEAS - MV E MAX VEL: 83.6 CM/SEC
BH CV ECHO MEAS - MV E/A: 0.81
BH CV ECHO MEAS - MV MEAN PG: 3 MMHG
BH CV ECHO MEAS - MV P1/2T: 61.2 MSEC
BH CV ECHO MEAS - MV V2 VTI: 23 CM
BH CV ECHO MEAS - MVA(P1/2T): 3.6 CM2
BH CV ECHO MEAS - RVDD: 3.1 CM
BH CV ECHO MEAS - SV(MOD-SP2): 30.1 ML
BH CV ECHO MEAS - SV(MOD-SP4): 29.7 ML
BH CV ECHO MEAS - TAPSE (>1.6): 1.71 CM
BH CV ECHO MEASUREMENTS AVERAGE E/E' RATIO: 11.45
BH CV XLRA - TDI S': 13.3 CM/SEC
IVRT: 66 MS
LEFT ATRIUM VOLUME INDEX: 20.5 ML/M2

## 2024-08-22 ENCOUNTER — TELEPHONE (OUTPATIENT)
Dept: CARDIOLOGY | Facility: CLINIC | Age: 64
End: 2024-08-22
Payer: MEDICARE

## 2024-08-22 NOTE — TELEPHONE ENCOUNTER
----- Message from Edith Alcocer sent at 8/21/2024  7:29 PM EDT -----  Echocardiogram shows normal LV systolic function with EF of 56 to 60%.  There were no significant valvular issues.  No treatment changes recommended at this time, and she may keep her previously scheduled routine follow-up cardiac visit.

## 2024-08-22 NOTE — TELEPHONE ENCOUNTER
----- Message from Edith Alcocer sent at 8/21/2024  7:28 PM EDT -----  WEST shows normal arterial blood flow in the legs there is some mild changes down at the level of the toes, but overall the test is reassuring.  No treatment changes at this time, commend to keep previously scheduled routine follow-up visit.

## 2024-10-06 ENCOUNTER — APPOINTMENT (OUTPATIENT)
Dept: CT IMAGING | Facility: HOSPITAL | Age: 64
End: 2024-10-06
Payer: MEDICARE

## 2024-10-06 ENCOUNTER — HOSPITAL ENCOUNTER (EMERGENCY)
Facility: HOSPITAL | Age: 64
Discharge: HOME | End: 2024-10-06
Attending: EMERGENCY MEDICINE | Admitting: EMERGENCY MEDICINE
Payer: MEDICARE

## 2024-10-06 VITALS
HEART RATE: 85 BPM | HEIGHT: 66 IN | WEIGHT: 176.37 LBS | TEMPERATURE: 98.5 F | BODY MASS INDEX: 28.34 KG/M2 | RESPIRATION RATE: 18 BRPM | DIASTOLIC BLOOD PRESSURE: 85 MMHG | OXYGEN SATURATION: 91 % | SYSTOLIC BLOOD PRESSURE: 135 MMHG

## 2024-10-06 DIAGNOSIS — R91.1 LEFT LOWER LOBE PULMONARY NODULE: ICD-10-CM

## 2024-10-06 DIAGNOSIS — N12 PYELONEPHRITIS: Primary | ICD-10-CM

## 2024-10-06 DIAGNOSIS — D35.00 ADRENAL ADENOMA, UNSPECIFIED LATERALITY: ICD-10-CM

## 2024-10-06 LAB
ALBUMIN SERPL-MCNC: 3.8 G/DL (ref 3.5–5.2)
ALBUMIN/GLOB SERPL: 1 G/DL
ALP SERPL-CCNC: 168 U/L (ref 39–117)
ALT SERPL W P-5'-P-CCNC: 26 U/L (ref 1–33)
ANION GAP SERPL CALCULATED.3IONS-SCNC: 13.3 MMOL/L (ref 5–15)
AST SERPL-CCNC: 30 U/L (ref 1–32)
BACTERIA UR QL AUTO: ABNORMAL /HPF
BASOPHILS # BLD AUTO: 0.13 10*3/MM3 (ref 0–0.2)
BASOPHILS NFR BLD AUTO: 1.1 % (ref 0–1.5)
BILIRUB SERPL-MCNC: 0.7 MG/DL (ref 0–1.2)
BILIRUB UR QL STRIP: NEGATIVE
BUN SERPL-MCNC: 5 MG/DL (ref 8–23)
BUN/CREAT SERPL: 7.5 (ref 7–25)
CALCIUM SPEC-SCNC: 9.2 MG/DL (ref 8.6–10.5)
CHLORIDE SERPL-SCNC: 96 MMOL/L (ref 98–107)
CLARITY UR: CLEAR
CO2 SERPL-SCNC: 28.7 MMOL/L (ref 22–29)
COLOR UR: YELLOW
CREAT SERPL-MCNC: 0.67 MG/DL (ref 0.57–1)
D-LACTATE SERPL-SCNC: 1.5 MMOL/L (ref 0.5–2)
D-LACTATE SERPL-SCNC: 2.4 MMOL/L (ref 0.5–2)
DEPRECATED RDW RBC AUTO: 41.1 FL (ref 37–54)
EGFRCR SERPLBLD CKD-EPI 2021: 98.4 ML/MIN/1.73
EOSINOPHIL # BLD AUTO: 0.12 10*3/MM3 (ref 0–0.4)
EOSINOPHIL NFR BLD AUTO: 1 % (ref 0.3–6.2)
ERYTHROCYTE [DISTWIDTH] IN BLOOD BY AUTOMATED COUNT: 13.3 % (ref 12.3–15.4)
GLOBULIN UR ELPH-MCNC: 3.8 GM/DL
GLUCOSE SERPL-MCNC: 269 MG/DL (ref 65–99)
GLUCOSE UR STRIP-MCNC: ABNORMAL MG/DL
HCT VFR BLD AUTO: 46.6 % (ref 34–46.6)
HGB BLD-MCNC: 14.9 G/DL (ref 12–15.9)
HGB UR QL STRIP.AUTO: NEGATIVE
HOLD SPECIMEN: NORMAL
HOLD SPECIMEN: NORMAL
HYALINE CASTS UR QL AUTO: ABNORMAL /LPF
IMM GRANULOCYTES # BLD AUTO: 0.04 10*3/MM3 (ref 0–0.05)
IMM GRANULOCYTES NFR BLD AUTO: 0.3 % (ref 0–0.5)
KETONES UR QL STRIP: NEGATIVE
LEUKOCYTE ESTERASE UR QL STRIP.AUTO: NEGATIVE
LIPASE SERPL-CCNC: 107 U/L (ref 13–60)
LYMPHOCYTES # BLD AUTO: 2.9 10*3/MM3 (ref 0.7–3.1)
LYMPHOCYTES NFR BLD AUTO: 24.1 % (ref 19.6–45.3)
MCH RBC QN AUTO: 27.3 PG (ref 26.6–33)
MCHC RBC AUTO-ENTMCNC: 32 G/DL (ref 31.5–35.7)
MCV RBC AUTO: 85.3 FL (ref 79–97)
MONOCYTES # BLD AUTO: 0.64 10*3/MM3 (ref 0.1–0.9)
MONOCYTES NFR BLD AUTO: 5.3 % (ref 5–12)
NEUTROPHILS NFR BLD AUTO: 68.2 % (ref 42.7–76)
NEUTROPHILS NFR BLD AUTO: 8.18 10*3/MM3 (ref 1.7–7)
NITRITE UR QL STRIP: POSITIVE
NRBC BLD AUTO-RTO: 0 /100 WBC (ref 0–0.2)
PH UR STRIP.AUTO: 5.5 [PH] (ref 5–8)
PLATELET # BLD AUTO: 269 10*3/MM3 (ref 140–450)
PMV BLD AUTO: 10.4 FL (ref 6–12)
POTASSIUM SERPL-SCNC: 3.5 MMOL/L (ref 3.5–5.2)
PROT SERPL-MCNC: 7.6 G/DL (ref 6–8.5)
PROT UR QL STRIP: ABNORMAL
RBC # BLD AUTO: 5.46 10*6/MM3 (ref 3.77–5.28)
RBC # UR STRIP: ABNORMAL /HPF
REF LAB TEST METHOD: ABNORMAL
SODIUM SERPL-SCNC: 138 MMOL/L (ref 136–145)
SP GR UR STRIP: 1.01 (ref 1–1.03)
SQUAMOUS #/AREA URNS HPF: ABNORMAL /HPF
UROBILINOGEN UR QL STRIP: ABNORMAL
WBC # UR STRIP: ABNORMAL /HPF
WBC NRBC COR # BLD AUTO: 12.01 10*3/MM3 (ref 3.4–10.8)
WHOLE BLOOD HOLD COAG: NORMAL
WHOLE BLOOD HOLD SPECIMEN: NORMAL

## 2024-10-06 PROCEDURE — 25010000002 KETOROLAC TROMETHAMINE PER 15 MG

## 2024-10-06 PROCEDURE — 87086 URINE CULTURE/COLONY COUNT: CPT

## 2024-10-06 PROCEDURE — 83605 ASSAY OF LACTIC ACID: CPT | Performed by: EMERGENCY MEDICINE

## 2024-10-06 PROCEDURE — 25510000001 IOPAMIDOL PER 1 ML: Performed by: EMERGENCY MEDICINE

## 2024-10-06 PROCEDURE — 96374 THER/PROPH/DIAG INJ IV PUSH: CPT

## 2024-10-06 PROCEDURE — 87077 CULTURE AEROBIC IDENTIFY: CPT

## 2024-10-06 PROCEDURE — 36415 COLL VENOUS BLD VENIPUNCTURE: CPT

## 2024-10-06 PROCEDURE — 96361 HYDRATE IV INFUSION ADD-ON: CPT

## 2024-10-06 PROCEDURE — 85025 COMPLETE CBC W/AUTO DIFF WBC: CPT

## 2024-10-06 PROCEDURE — 81001 URINALYSIS AUTO W/SCOPE: CPT | Performed by: EMERGENCY MEDICINE

## 2024-10-06 PROCEDURE — 83690 ASSAY OF LIPASE: CPT | Performed by: EMERGENCY MEDICINE

## 2024-10-06 PROCEDURE — 99285 EMERGENCY DEPT VISIT HI MDM: CPT

## 2024-10-06 PROCEDURE — 74177 CT ABD & PELVIS W/CONTRAST: CPT

## 2024-10-06 PROCEDURE — 87186 SC STD MICRODIL/AGAR DIL: CPT

## 2024-10-06 PROCEDURE — 96375 TX/PRO/DX INJ NEW DRUG ADDON: CPT

## 2024-10-06 PROCEDURE — 25810000003 SODIUM CHLORIDE 0.9 % SOLUTION

## 2024-10-06 PROCEDURE — 25010000002 CEFTRIAXONE PER 250 MG

## 2024-10-06 PROCEDURE — 80053 COMPREHEN METABOLIC PANEL: CPT | Performed by: EMERGENCY MEDICINE

## 2024-10-06 PROCEDURE — 25010000002 ONDANSETRON PER 1 MG

## 2024-10-06 RX ORDER — ONDANSETRON 4 MG/1
4 TABLET, ORALLY DISINTEGRATING ORAL 4 TIMES DAILY PRN
Qty: 20 TABLET | Refills: 0 | Status: SHIPPED | OUTPATIENT
Start: 2024-10-06

## 2024-10-06 RX ORDER — SODIUM CHLORIDE 0.9 % (FLUSH) 0.9 %
10 SYRINGE (ML) INJECTION AS NEEDED
Status: DISCONTINUED | OUTPATIENT
Start: 2024-10-06 | End: 2024-10-06 | Stop reason: HOSPADM

## 2024-10-06 RX ORDER — LEVOFLOXACIN 750 MG/1
750 TABLET, FILM COATED ORAL DAILY
Qty: 7 TABLET | Refills: 0 | Status: SHIPPED | OUTPATIENT
Start: 2024-10-06 | End: 2024-10-13

## 2024-10-06 RX ORDER — IOPAMIDOL 755 MG/ML
100 INJECTION, SOLUTION INTRAVASCULAR
Status: COMPLETED | OUTPATIENT
Start: 2024-10-06 | End: 2024-10-06

## 2024-10-06 RX ORDER — KETOROLAC TROMETHAMINE 30 MG/ML
30 INJECTION, SOLUTION INTRAMUSCULAR; INTRAVENOUS ONCE
Status: COMPLETED | OUTPATIENT
Start: 2024-10-06 | End: 2024-10-06

## 2024-10-06 RX ORDER — ONDANSETRON 2 MG/ML
4 INJECTION INTRAMUSCULAR; INTRAVENOUS ONCE
Status: COMPLETED | OUTPATIENT
Start: 2024-10-06 | End: 2024-10-06

## 2024-10-06 RX ORDER — IBUPROFEN 600 MG/1
600 TABLET, FILM COATED ORAL EVERY 6 HOURS PRN
Qty: 20 TABLET | Refills: 0 | Status: SHIPPED | OUTPATIENT
Start: 2024-10-06

## 2024-10-06 RX ADMIN — SODIUM CHLORIDE 1000 ML: 9 INJECTION, SOLUTION INTRAVENOUS at 14:46

## 2024-10-06 RX ADMIN — KETOROLAC TROMETHAMINE 30 MG: 30 INJECTION, SOLUTION INTRAMUSCULAR; INTRAVENOUS at 14:45

## 2024-10-06 RX ADMIN — IOPAMIDOL 100 ML: 755 INJECTION, SOLUTION INTRAVENOUS at 15:35

## 2024-10-06 RX ADMIN — CEFTRIAXONE SODIUM 1000 MG: 1 INJECTION, POWDER, FOR SOLUTION INTRAMUSCULAR; INTRAVENOUS at 14:45

## 2024-10-06 RX ADMIN — ONDANSETRON 4 MG: 2 INJECTION, SOLUTION INTRAMUSCULAR; INTRAVENOUS at 14:45

## 2024-10-06 NOTE — ED PROVIDER NOTES
Time: 1:19 PM EDT  Date of encounter:  10/6/2024  Room number:   Independent Historian/Clinical History and Information was obtained by:   Patient    History is limited by: N/A    Chief Complaint: ab pain       History of Present Illness:  Patient is a 64 y.o. year old female who presents to the emergency department for evaluation of abdominal pain patient describes generalized abdominal pain but being worse on bilateral flanks.  She denies lower back pain.  She has also had a increase in urine frequency but does not report any dysuria.     HPI    Patient Care Team  Primary Care Provider: Sandra Kaba MD    Past Medical History:     Allergies   Allergen Reactions    Tramadol Itching     Past Medical History:   Diagnosis Date    Arthritis     Cervical cancer screening     COPD (chronic obstructive pulmonary disease)     Coronary artery disease involving native heart without angina pectoris 2021    primary angioplasty and stent placement to mid right coronary artery with good angiographic results on 2021 after STEMI    Depression with anxiety     Diabetes mellitus     Forgetfulness     Gastric reflux     Hypertension     Leg paresthesia 2017    Right    Limb swelling     Lumbago 2017    Low back pain    Lumbar spinal stenosis 2017    L4/5 moderate to severe central canal stenosis    Lumbosacral radiculopathy 2017    Right    Migraines     Night sweat     Reflux esophagitis     Shortness of breath     ST elevation myocardial infarction (STEMI) (CMS/HCC) 2021    Stomach disorder      Past Surgical History:   Procedure Laterality Date    ABDOMINAL SURGERY      3 C-SECTIONS    CARDIAC CATHETERIZATION      CARDIAC CATHETERIZATION N/A 2021    Procedure: Left Heart Cath;  Surgeon: Sidney Xiao MD;  Location: formerly [facility] CATH INVASIVE LOCATION;  Service: Cardiology;  Laterality: N/A;     SECTION      x 3    CHOLECYSTECTOMY      COLONOSCOPY      CORONARY STENT  PLACEMENT      ENDOSCOPY      2007 2014    FOOT SURGERY Right     HAND SURGERY      HYSTERECTOMY  1985     Family History   Problem Relation Age of Onset    Stroke Mother     Cancer Mother         Unspecified    Arthritis Mother     Stroke Father     Heart disease Father     Diabetes Father         Unspecified type    Arthritis Father     Heart attack Father     Stroke Sister     Arthritis Sister     Cancer Sister     Stroke Brother     Heart disease Brother     Diabetes Brother         Unspecified type    Arthritis Brother        Home Medications:  Prior to Admission medications    Medication Sig Start Date End Date Taking? Authorizing Provider   Acetaminophen Extra Strength 500 MG tablet TAKE TWO TABLETS BY MOUTH EVERY 8 HOURS AS NEEDED FOR MODERATE PAIN (EAR PAIN) 12/20/23   Sandra Kaba MD   albuterol sulfate  (90 Base) MCG/ACT inhaler Inhale 2 puffs Every 4 (Four) Hours As Needed for Wheezing. 8/8/24   Sandra Kaba MD   Aspirin Low Dose 81 MG EC tablet TAKE ONE TABLET BY MOUTH DAILY AT 9 AM FOR ACUTE HEART ATTACK 5/23/24   Edith Alcocer APRN   atorvastatin (LIPITOR) 40 MG tablet TAKE ONE TABLET BY MOUTH DAILY AT 9 PM AT NIGHT 5/23/24   Edith Alcocer APRN   carvedilol (COREG) 6.25 MG tablet Take 1 tablet by mouth 2 (Two) Times a Day With Meals. 7/29/24   Edith Alcocer APRN   clopidogrel (PLAVIX) 75 MG tablet TAKE ONE TABLET BY MOUTH DAILY AT 9 AM 5/23/24   Edith Alcocer APRN   Jardiance 25 MG tablet tablet Take 1 tablet by mouth Daily for 90 days. 8/8/24 11/6/24  Sandra Kaba MD   lisinopril (PRINIVIL,ZESTRIL) 10 MG tablet TAKE 1/2 TABLET BY MOUTH DAILY AT 9 AM 5/24/24   Edith Alcocer APRN   metFORMIN (GLUCOPHAGE) 500 MG tablet TAKE ONE TABLET BY MOUTH TWICE DAILY WITH MEALS @ 9AM - 5PM 3/20/24   Sandra Kaba MD   Trelefan Ellipta 100-62.5-25 MCG/ACT inhaler INHALE 1 PUFF BY MOUTH DAILY (BULK) 1/25/24   Sandra Kaba MD   Trintellix 10 MG tablet tablet TAKE ONE TABLET  "BY MOUTH DAILY AT 9 AM  DAYS 8/13/24   Sandra Kaba MD        Social History:   Social History     Tobacco Use    Smoking status: Every Day     Current packs/day: 1.00     Average packs/day: 1 pack/day for 57.7 years (57.7 ttl pk-yrs)     Types: Cigarettes     Start date: 1/14/1967     Passive exposure: Current    Smokeless tobacco: Never    Tobacco comments:     Smoked 21-30 years   Vaping Use    Vaping status: Never Used   Substance Use Topics    Alcohol use: Never     Comment: Does not drink    Drug use: Never         Review of Systems:  Review of Systems   Constitutional:  Negative for chills and fever.   HENT:  Negative for congestion, ear pain and sore throat.    Eyes:  Negative for pain.   Respiratory:  Negative for cough, chest tightness and shortness of breath.    Cardiovascular:  Negative for chest pain.   Gastrointestinal:  Negative for abdominal pain, diarrhea, nausea and vomiting.   Genitourinary:  Positive for flank pain (bilateral). Negative for hematuria.   Musculoskeletal:  Negative for joint swelling.   Skin:  Negative for pallor.   Neurological:  Negative for seizures and headaches.   All other systems reviewed and are negative.       Physical Exam:  /85 (BP Location: Left arm, Patient Position: Lying)   Pulse 85   Temp 98.5 °F (36.9 °C) (Oral)   Resp 18   Ht 167.6 cm (65.98\")   Wt 80 kg (176 lb 5.9 oz)   SpO2 91%   BMI 28.48 kg/m²     Physical Exam  Vitals and nursing note reviewed.   Constitutional:       General: She is not in acute distress.     Appearance: Normal appearance. She is not ill-appearing, toxic-appearing or diaphoretic.   HENT:      Head: Normocephalic and atraumatic.      Mouth/Throat:      Mouth: Mucous membranes are moist.   Eyes:      General: No scleral icterus.  Cardiovascular:      Rate and Rhythm: Normal rate and regular rhythm.      Pulses: Normal pulses.      Heart sounds: Normal heart sounds.   Pulmonary:      Effort: Pulmonary effort is normal. " No respiratory distress.      Breath sounds: Normal breath sounds.   Abdominal:      General: Abdomen is flat.      Palpations: Abdomen is soft.      Tenderness: There is abdominal tenderness in the periumbilical area. There is right CVA tenderness and left CVA tenderness. There is no rebound.   Musculoskeletal:         General: Normal range of motion.      Cervical back: Normal range of motion and neck supple.   Skin:     General: Skin is warm and dry.      Coloration: Skin is not cyanotic, jaundiced, mottled or pale.      Findings: No erythema or rash.   Neurological:      Mental Status: She is alert and oriented to person, place, and time. Mental status is at baseline.                  Procedures:  Procedures      Medical Decision Making:      Comorbidities that affect care:    Diabetes, COPD, forgetfulness, migraines, shortness of breath, hypertension depression and anxiety, CAD    External Notes reviewed:          The following orders were placed and all results were independently analyzed by me:  Orders Placed This Encounter   Procedures    Urine Culture - Urine,    CT Abdomen Pelvis With Contrast    Wilson Draw    Comprehensive Metabolic Panel    Lipase    Urinalysis With Microscopic If Indicated (No Culture) - Urine, Clean Catch    Lactic Acid, Plasma    CBC Auto Differential    Urinalysis, Microscopic Only - Urine, Clean Catch    STAT Lactic Acid, Reflex    Ambulatory Referral to Urology    NPO Diet NPO Type: Strict NPO    Undress & Gown    Insert Peripheral IV    CBC & Differential    Green Top (Gel)    Lavender Top    Gold Top - SST    Light Blue Top       Medications Given in the Emergency Department:  Medications   sodium chloride 0.9 % flush 10 mL (has no administration in time range)   cefTRIAXone (ROCEPHIN) in NS 1 gram/10ml IV PUSH syringe (1,000 mg Intravenous Given 10/6/24 1445)   ketorolac (TORADOL) injection 30 mg (30 mg Intravenous Given 10/6/24 1445)   sodium chloride 0.9 % bolus 1,000 mL (0  mL Intravenous Stopped 10/6/24 1651)   ondansetron (ZOFRAN) injection 4 mg (4 mg Intravenous Given 10/6/24 1445)   iopamidol (ISOVUE-370) 76 % injection 100 mL (100 mL Intravenous Given 10/6/24 1535)        ED Course:    ED Course as of 10/06/24 1652   Sun Oct 06, 2024   1630 Lactate: 1.5  Improved with IV fluids.   Pt was afebrile, patient was not tachycardic and heart rate did not exceed 90, her respiratory rate was within normal limits tach was noted to improve with fluids.   [MS]      ED Course User Index  [MS] Citlaly Rodas, ALVIN       Labs:    Lab Results (last 24 hours)       Procedure Component Value Units Date/Time    CBC & Differential [[id number]]  (Abnormal) Collected: 10/06/24 1250    Specimen: Blood Updated: 10/06/24 1258    Narrative:      The following orders were created for panel order CBC & Differential.  Procedure                               Abnormality         Status                     ---------                               -----------         ------                     CBC Auto Differential[[id number]]        Abnormal            Final result                 Please view results for these tests on the individual orders.    Comprehensive Metabolic Panel [[id number]]  (Abnormal) Collected: 10/06/24 1250    Specimen: Blood Updated: 10/06/24 1335     Glucose 269 mg/dL      BUN 5 mg/dL      Creatinine 0.67 mg/dL      Sodium 138 mmol/L      Potassium 3.5 mmol/L      Chloride 96 mmol/L      CO2 28.7 mmol/L      Calcium 9.2 mg/dL      Total Protein 7.6 g/dL      Albumin 3.8 g/dL      ALT (SGPT) 26 U/L      AST (SGOT) 30 U/L      Alkaline Phosphatase 168 U/L      Total Bilirubin 0.7 mg/dL      Globulin 3.8 gm/dL      A/G Ratio 1.0 g/dL      BUN/Creatinine Ratio 7.5     Anion Gap 13.3 mmol/L      eGFR 98.4 mL/min/1.73     Narrative:      GFR Normal >60  Chronic Kidney Disease <60  Kidney Failure <15      Lipase [[id number]]  (Abnormal) Collected: 10/06/24 1250    Specimen: Blood Updated: 10/06/24  1335     Lipase 107 U/L     Lactic Acid, Plasma [[id number]]  (Abnormal) Collected: 10/06/24 1250    Specimen: Blood Updated: 10/06/24 1425     Lactate 2.4 mmol/L     CBC Auto Differential [[id number]]  (Abnormal) Collected: 10/06/24 1250    Specimen: Blood Updated: 10/06/24 1258     WBC 12.01 10*3/mm3      RBC 5.46 10*6/mm3      Hemoglobin 14.9 g/dL      Hematocrit 46.6 %      MCV 85.3 fL      MCH 27.3 pg      MCHC 32.0 g/dL      RDW 13.3 %      RDW-SD 41.1 fl      MPV 10.4 fL      Platelets 269 10*3/mm3      Neutrophil % 68.2 %      Lymphocyte % 24.1 %      Monocyte % 5.3 %      Eosinophil % 1.0 %      Basophil % 1.1 %      Immature Grans % 0.3 %      Neutrophils, Absolute 8.18 10*3/mm3      Lymphocytes, Absolute 2.90 10*3/mm3      Monocytes, Absolute 0.64 10*3/mm3      Eosinophils, Absolute 0.12 10*3/mm3      Basophils, Absolute 0.13 10*3/mm3      Immature Grans, Absolute 0.04 10*3/mm3      nRBC 0.0 /100 WBC     Urinalysis With Microscopic If Indicated (No Culture) - Urine, Clean Catch [[id number]]  (Abnormal) Collected: 10/06/24 1350    Specimen: Urine, Clean Catch Updated: 10/06/24 1359     Color, UA Yellow     Appearance, UA Clear     pH, UA 5.5     Specific Gravity, UA 1.011     Glucose,  mg/dL (Trace)     Ketones, UA Negative     Bilirubin, UA Negative     Blood, UA Negative     Protein, UA Trace     Leuk Esterase, UA Negative     Nitrite, UA Positive     Urobilinogen, UA 0.2 E.U./dL    Urinalysis, Microscopic Only - Urine, Clean Catch [[id number]]  (Abnormal) Collected: 10/06/24 1350    Specimen: Urine, Clean Catch Updated: 10/06/24 1359     RBC, UA 0-2 /HPF      WBC, UA 6-10 /HPF      Bacteria, UA 4+ /HPF      Squamous Epithelial Cells, UA 3-6 /HPF      Hyaline Casts, UA 3-6 /LPF      Methodology Automated Microscopy    Urine Culture - Urine, Urine, Clean Catch [[id number]] Collected: 10/06/24 1350    Specimen: Urine, Clean Catch Updated: 10/06/24 1432    STAT Lactic Acid, Reflex [[id number]]  (Normal)  Collected: 10/06/24 1607    Specimen: Blood from Arm, Right Updated: 10/06/24 1627     Lactate 1.5 mmol/L              Imaging:    CT Abdomen Pelvis With Contrast    Result Date: 10/6/2024  CT ABDOMEN PELVIS W CONTRAST Date of Exam: 10/6/2024 3:27 PM EDT Indication: flank pain with + nitrites, r/o stone. Comparison: None available. Technique: Axial CT images were obtained of the abdomen and pelvis after the uneventful intravenous administration of iodinated contrast. Reconstructed coronal and sagittal images were also obtained. Automated exposure control and iterative construction methods were used. Findings: Lower Chest: 9 mm left lower lobe nodule adjacent to the left heart border. Scarring in the right middle lobe Organs: No urinary calculus or hydronephrosis. Bilateral renal cysts. Normal bilateral renal enhancement. Low-attenuation of the liver. Spleen, pancreas unremarkable. Bilateral adrenal nodularity. Gallbladder surgically absent Gastrointestinal: No acute abnormality demonstrated. Normal appendix Pelvis: No abnormal fluid collection. Uterus surgically absent. Urinary bladder grossly unremarkable. No bladder calculus Peritoneum/Retroperitoneum: No ascites or pneumoperitoneum. Normal caliber aorta. Circumaortic left renal vein Bones/Soft Tissues: No acute bony abnormality. Degenerative disease of the lower thoracic and lumbar spine. Bilateral hip osteoarthritis     1. No acute process demonstrated. No ureterolithiasis or obstructive uropathy 2. Hepatic steatosis 3. Bilateral adrenal nodularity which may be due to the presence of small adrenal adenomas or less likely nodular hyperplasia 4. 9 mm left lower lobe nodule. Recommend nonemergent dedicated CT chest Electronically Signed: Yoan Mendez  10/6/2024 3:52 PM EDT  Workstation ID: [id number]       Differential Diagnosis and Discussion:    Flank Pain: Differential diagnosis includes but is not limited to kidney stones, pyelonephritis, musculoskeletal  disorders, renal infarction, urinary tract infection, hydronephrosis, radiculopathy, aortic aneurysm, renal cell carcinoma.    All labs were reviewed and interpreted by me.  CT scan radiology impression was interpreted by me.    MDM     Amount and/or Complexity of Data Reviewed  Clinical lab tests: reviewed                   Patient Care Considerations:    SEPSIS was considered but is NOT present in the emergency department as SIRS criteria is not present. CONSULT: I considered consulting urology, however patient had no renal stones or hydronephrosis identified on CT.  No indications of an infected stone.  A referral was provided for to the urology department for patient's pyelonephritis as well as adrenal adenomas      Consultants/Shared Management Plan:    None    Social Determinants of Health:    Patient is independent, reliable, and has access to care.       Disposition and Care Coordination:    Discharged: The patient is suitable and stable for discharge with no need for consideration of admission.    I have explained the patient´s condition, diagnoses and treatment plan based on the information available to me at this time. I have answered questions and addressed any concerns. The patient has a good  understanding of the patient´s diagnosis, condition, and treatment plan as can be expected at this point. The vital signs have been stable. The patient´s condition is stable and appropriate for discharge from the emergency department.      The patient will pursue further outpatient evaluation with the primary care physician or other designated or consulting physician as outlined in the discharge instructions. They are agreeable to this plan of care and follow-up instructions have been explained in detail. The patient has received these instructions in written format and has expressed an understanding of the discharge instructions. The patient is aware that any significant change in condition or worsening of  symptoms should prompt an immediate return to this or the closest emergency department or call to 911.    Final diagnoses:   Pyelonephritis   Left lower lobe pulmonary nodule   Adrenal adenoma, unspecified laterality        ED Disposition       ED Disposition   Discharge    Condition   Stable    Comment   --               This medical record created using voice recognition software.       Citllay Rodas, APRN  10/06/24 [id number]

## 2024-10-06 NOTE — DISCHARGE INSTRUCTIONS
Please follow-up with your primary care provider in 5 to 7 days.  Please take the medication prescribed today as directed.  Please be sure to finish all of your antibiotics.  It is important that you increase your water intake.  If it anytime you develop a fever that you cannot control with Tylenol or Motrin, severe nausea, vomiting, diarrhea, or become unable to urinate please return to the emergency department immediately.  A culture, that is additional testing, is being completed on your urine sample however it will not be resulted for another 24 to 48 hours.  If the results indicate that you need a different antibiotic someone from the hospital will call you and prescribe the appropriate antibiotic to you.    Your CAT scan did not show any kidney stones.  However it did show a nodule on your left lower lobe of your left lung.  You will also need to follow-up with your primary care provider for this.  She may elect to do additional imaging such as a CT of your chest.     I have also sent a referral to the urologist.  If you do not get better or if your primary care provider feels that you need additional testing you will need to follow-up with the urologist

## 2024-10-07 ENCOUNTER — TELEPHONE (OUTPATIENT)
Dept: FAMILY MEDICINE CLINIC | Facility: CLINIC | Age: 64
End: 2024-10-07
Payer: MEDICARE

## 2024-10-07 DIAGNOSIS — E55.9 VITAMIN D DEFICIENCY: Primary | ICD-10-CM

## 2024-10-07 NOTE — TELEPHONE ENCOUNTER
Pt needs ER hospital follow up scheduled. [facility] 10/6 kidney infection and mass on lung.     Please advise.

## 2024-10-08 LAB — BACTERIA SPEC AEROBE CULT: ABNORMAL

## 2024-10-08 NOTE — TELEPHONE ENCOUNTER
ER fu apt scheduled for patient for this Friday 10/11/24 @ 1:00pm.  Patient notified and verbally stated understanding.

## 2024-10-09 ENCOUNTER — TELEPHONE (OUTPATIENT)
Dept: FAMILY MEDICINE CLINIC | Facility: CLINIC | Age: 64
End: 2024-10-09

## 2024-10-09 NOTE — TELEPHONE ENCOUNTER
Caller: Desiree Garcia    Relationship: Self    Best call back number: [phone]    What is the best time to reach you: ANYTIME    Who are you requesting to speak with (clinical staff, provider,  specific staff member): NURSE       What was the call regarding: PATIENT IS CALLING STATING THAT THE VITAMIN D 3 IS GOING TO COST HER 4.00 AND SHE DOESN'T HAVE IT, WANTED TO SEE IF THERE WAS ANYTHING ELSE SHE COULD GET THAT WOULD BE CHEAPER OR PAID FOR.

## 2024-10-10 RX ORDER — PSEUDOEPHED/ACETAMINOPH/DIPHEN 30MG-500MG
TABLET ORAL
Qty: 90 TABLET | Refills: 11 | Status: SHIPPED | OUTPATIENT
Start: 2024-10-10

## 2024-10-11 ENCOUNTER — OFFICE VISIT (OUTPATIENT)
Dept: FAMILY MEDICINE CLINIC | Facility: CLINIC | Age: 64
End: 2024-10-11
Payer: MEDICARE

## 2024-10-11 VITALS
OXYGEN SATURATION: 95 % | SYSTOLIC BLOOD PRESSURE: 132 MMHG | TEMPERATURE: 98.9 F | WEIGHT: 178.6 LBS | HEIGHT: 66 IN | HEART RATE: 92 BPM | BODY MASS INDEX: 28.7 KG/M2 | DIASTOLIC BLOOD PRESSURE: 76 MMHG

## 2024-10-11 DIAGNOSIS — K52.9 CHRONIC DIARRHEA: ICD-10-CM

## 2024-10-11 DIAGNOSIS — Z78.0 ENCOUNTER FOR OSTEOPOROSIS SCREENING IN ASYMPTOMATIC POSTMENOPAUSAL PATIENT: ICD-10-CM

## 2024-10-11 DIAGNOSIS — N28.1 BILATERAL RENAL CYSTS: ICD-10-CM

## 2024-10-11 DIAGNOSIS — D35.02 BILATERAL ADRENAL ADENOMAS: Primary | ICD-10-CM

## 2024-10-11 DIAGNOSIS — Z12.11 SCREENING FOR COLON CANCER: ICD-10-CM

## 2024-10-11 DIAGNOSIS — D35.01 BILATERAL ADRENAL ADENOMAS: Primary | ICD-10-CM

## 2024-10-11 DIAGNOSIS — Z12.31 BREAST CANCER SCREENING BY MAMMOGRAM: ICD-10-CM

## 2024-10-11 DIAGNOSIS — Z13.820 ENCOUNTER FOR OSTEOPOROSIS SCREENING IN ASYMPTOMATIC POSTMENOPAUSAL PATIENT: ICD-10-CM

## 2024-10-11 DIAGNOSIS — R91.1 NODULE OF LOWER LOBE OF LEFT LUNG: ICD-10-CM

## 2024-10-11 DIAGNOSIS — E11.65 TYPE 2 DIABETES MELLITUS WITH HYPERGLYCEMIA, WITHOUT LONG-TERM CURRENT USE OF INSULIN: ICD-10-CM

## 2024-10-11 PROCEDURE — G2211 COMPLEX E/M VISIT ADD ON: HCPCS | Performed by: FAMILY MEDICINE

## 2024-10-11 PROCEDURE — 3046F HEMOGLOBIN A1C LEVEL >9.0%: CPT | Performed by: FAMILY MEDICINE

## 2024-10-11 PROCEDURE — 1125F AMNT PAIN NOTED PAIN PRSNT: CPT | Performed by: FAMILY MEDICINE

## 2024-10-11 PROCEDURE — 99214 OFFICE O/P EST MOD 30 MIN: CPT | Performed by: FAMILY MEDICINE

## 2024-10-11 PROCEDURE — 3075F SYST BP GE 130 - 139MM HG: CPT | Performed by: FAMILY MEDICINE

## 2024-10-11 PROCEDURE — 3078F DIAST BP <80 MM HG: CPT | Performed by: FAMILY MEDICINE

## 2024-10-11 NOTE — PROGRESS NOTES
Chief Complaint  Abdominal Cramping, Leg Pain, and Leg Swelling    Subjective        Desiree Garcia presents to [facility]  History of Present Illness  The patient presents for evaluation of multiple medical concerns.    She reports experiencing nausea, stomach cramps, and a persistent cough at night. She also mentions a sensation of something lodged in her throat. She has been diagnosed with a kidney infection and is currently on a 7-day course of antibiotics, with two days remaining. She reports no constipation but admits to having diarrhea. She also notes a decrease in appetite and occasional blood in her stool.    She experiences pain in her feet, which has been evaluated and found to be non-specific. This pain limits her mobility, causing her to limp. Additionally, she reports swelling in her right leg when weight is applied. She has a history of a hand fracture from a fall on concrete.    She expresses concern about her voice, suspecting it may be related to her thyroid.    She has a history of cancer.        Medical History: has a past medical history of Arthritis, Cervical cancer screening, COPD (chronic obstructive pulmonary disease), Coronary artery disease involving native heart without angina pectoris (2021), Depression with anxiety, Diabetes mellitus, Forgetfulness, Gastric reflux, Hypertension, Leg paresthesia (2017), Limb swelling, Lumbago (2017), Lumbar spinal stenosis (2017), Lumbosacral radiculopathy (2017), Migraines, Night sweat, Reflux esophagitis, Shortness of breath, ST elevation myocardial infarction (STEMI) (CMS/[facility]) (2021), and Stomach disorder.   Surgical History: has a past surgical history that includes Abdominal surgery; Cardiac catheterization;  section; Cholecystectomy; Colonoscopy (); Esophagogastroduodenoscopy; Foot surgery (Right); Hand surgery; Cardiac catheterization (N/A, 2021); Coronary  "stent placement; and Hysterectomy (1985).   Family History: family history includes Arthritis in her brother, father, mother, and sister; Cancer in her mother and sister; Diabetes in her brother and father; Heart attack in her father; Heart disease in her brother and father; Stroke in her brother, father, mother, and sister.   Social History: reports that she has been smoking cigarettes. She started smoking about 57 years ago. She has a 57.8 pack-year smoking history. She has been exposed to tobacco smoke. She has never used smokeless tobacco. She reports that she does not drink alcohol and does not use drugs.  Immunization History   Administered Date(s) Administered    Influenza, Unspecified 10/09/2020    Tdap 11/10/2022       Objective   Vital Signs:  /76   Pulse 92   Temp 98.9 °F (37.2 °C)   Ht 167.6 cm (65.98\")   Wt 81 kg (178 lb 9.6 oz)   SpO2 95%   BMI 28.84 kg/m²   Estimated body mass index is 28.84 kg/m² as calculated from the following:    Height as of this encounter: 167.6 cm (65.98\").    Weight as of this encounter: 81 kg (178 lb 9.6 oz).             ROS:  Review of Systems   Constitutional:  Negative for fatigue and fever.   HENT:  Negative for congestion, ear pain and sinus pressure.    Respiratory:  Negative for cough, chest tightness and shortness of breath.    Cardiovascular:  Negative for chest pain, palpitations and leg swelling.   Gastrointestinal:  Positive for abdominal pain. Negative for diarrhea.   Genitourinary:  Negative for dysuria and frequency.   Neurological:  Negative for speech difficulty, headache and confusion.   Psychiatric/Behavioral:  Negative for agitation and behavioral problems.       Physical Exam  Vitals reviewed.   Constitutional:       Appearance: Normal appearance.   HENT:      Right Ear: Tympanic membrane normal.      Left Ear: Tympanic membrane normal.      Nose: Nose normal.   Eyes:      Extraocular Movements: Extraocular movements intact.      " Conjunctiva/sclera: Conjunctivae normal.      Pupils: Pupils are equal, round, and reactive to light.   Cardiovascular:      Rate and Rhythm: Normal rate and regular rhythm.   Pulmonary:      Effort: Pulmonary effort is normal.      Breath sounds: Normal breath sounds.   Abdominal:      General: Bowel sounds are normal.   Musculoskeletal:         General: Normal range of motion.      Cervical back: Normal range of motion.   Skin:     General: Skin is warm and dry.   Neurological:      General: No focal deficit present.      Mental Status: She is alert and oriented to person, place, and time.   Psychiatric:         Mood and Affect: Mood normal.         Behavior: Behavior normal.       Physical Exam        Result Review :      Results  Laboratory Studies  A1c is 9.9. Vitamin D was very low.    Imaging  CT scan showed a small lung nodule in the lower part of the left lung and small nodules inside the adrenal glands. No kidney stones were found, but some areas of concern were noted. Gallbladder was not present.             Assessment and Plan   Diagnoses and all orders for this visit:    1. Bilateral adrenal adenomas (Primary)  -     Ambulatory Referral to Urology  -     Ambulatory Referral to Social Care Services (Amb Case Mgmt)    2. Bilateral renal cysts  -     Ambulatory Referral to Urology    3. Nodule of lower lobe of left lung  -     CT Chest With Contrast Diagnostic; Future  -     Ambulatory Referral to Social Care Services (Amb Case Mgmt)    4. Breast cancer screening by mammogram  -     Mammo Screening Digital Tomosynthesis Bilateral With CAD; Future    5. Encounter for osteoporosis screening in asymptomatic postmenopausal patient  -     Dexa Bone Density, Axial (Every 2 Years); Future    6. Chronic diarrhea  -     Ambulatory Referral to Gastroenterology  -     Ambulatory Referral to Social Care Services (Amb Case Mgmt)    7. Screening for colon cancer  -     Ambulatory Referral For Screening Colonoscopy    8.  Type 2 diabetes mellitus with hyperglycemia, without long-term current use of insulin  -     Ambulatory Referral to Social Care Services ([facility])      Assessment & Plan  1. Abdominal pain.  The patient reports severe abdominal pain, nausea, and cramps, which have significantly impacted her daily activities. A recent CT scan revealed a small nodule in the lower left lung and minor nodules within the adrenal glands. No gallbladder was detected. She has been referred to Dr. Rodriguez for further evaluation of her abdominal pain and to update her colonoscopy, as her last one was in 2014. A nonemergency CT scan of her lungs will be arranged to investigate the detected nodule. She is also experiencing diarrhea and occasional blood in her stool, which will be further evaluated by Dr. Rodriguez.    2. Kidney infection.  She was diagnosed with a severe kidney infection and is currently completing a 7-day course of antibiotics. She has two more doses remaining. A follow-up appointment with Dr. Singer, a nephrologist, has been scheduled to monitor her kidney health.    3. Diabetes Mellitus.  Her A1c level is elevated at 9.9. She was informed that this level is high and needs to be managed better. Continuation of her current diabetes management plan is advised, and further adjustments will be considered based on follow-up results.    4. Vitamin D deficiency.  Her vitamin D levels are significantly low. She has been advised to commence vitamin D supplementation. The prescription has been sent to [facility] pharmacy. She was informed about the importance of vitamin D for bone health and encouraged to ensure she takes the supplement.    5. Health Maintenance.  A mammogram is due as her last one was conducted in 2022. Arrangements will be made to have her mammogram and other necessary tests done on the same day to minimize trips.    Follow-up  Return in 1 month for follow up.       I spent 35 minutes caring for Desiree on this date  of service. This time includes time spent by me in the following activities:reviewing tests  Follow Up   Return if symptoms worsen or fail to improve.  Patient was given instructions and counseling regarding her condition or for health maintenance advice. Please see specific information pulled into the AVS if appropriate.   Patient or patient representative verbalized consent for the use of Ambient Listening during the visit with  Sandra Kaba MD for chart documentation. 10/26/2024  13:37 EDT    Sandra Kaba MD

## 2024-10-14 ENCOUNTER — REFERRAL TRIAGE (OUTPATIENT)
Age: 64
End: 2024-10-14
Payer: MEDICARE

## 2024-10-15 ENCOUNTER — PATIENT OUTREACH (OUTPATIENT)
Age: 64
End: 2024-10-15
Payer: MEDICARE

## 2024-10-15 ENCOUNTER — DOCUMENTATION (OUTPATIENT)
Dept: PHYSICAL THERAPY | Facility: CLINIC | Age: 64
End: 2024-10-15
Payer: MEDICARE

## 2024-10-15 NOTE — PROGRESS NOTES
Outpatient Physical Therapy  [address], [place] [address]      Discharge Summary  Discharge Summary from Physical Therapy Report      Dates  PT visit: 6/28/22-7/26/22  Number of Visits: 5       Goals  Plan Goals: 1. The patient has limited ROM of the R  shoulder.                     LTG 1: 12 weeks:  The patient will demonstrate 120 degrees of R shoulder flexion, 110 degrees of shoulder abduction, 75 degrees of shoulder external rotation, and 80 degrees of shoulder internal rotation to allow the patient to reach into upper kitchen cabinets and manipulate clothing behind the back with greater ease.                          STATUS:  new                                2. The patient has limited strength of the R shoulder.              LTG 2: 12 weeks:  The patient will demonstrate 4+/5 strength for R shoulder flexion, abduction, external rotation, and internal rotation in order to demonstrate improved shoulder stability.                          STATUS:  new              STG 2a: 6 weeks:  The patient will demonstrate 4/5 strength for R shoulder flexion, abduction, external rotation, and internal rotation.                          STATUS:  new              STG2b:  4 weeks:  The patient will be independent with home exercises.                           STATUS:  new                  3. The patient complains of pain to the R shoulder.              LTG 3: 12 weeks:  The patient will report a pain rating of 2/10 or better in order to improve sleep quality and tolerance to performance of activities of daily living.                          STATUS:  new              STG 3a: 6 weeks:  The patient will report a pain rating of 3/10 or better.                           STATUS:  new              4. Carrying, Moving, and Handling Objects Functional Limitation                               LTG 4: 12 weeks:  The patient will demonstrate 1-19% limitation by achieving a score of 30 on the Upper Extremity Functional  Index.                          STATUS: new    Discharge Plan: Continue with current home exercise program as instructed    Date of Discharge 10/15/2024      Electronically signed:   Natalie Sal PTA  Physical Therapist Assistant  [facility] License #: [id number]

## 2024-10-15 NOTE — OUTREACH NOTE
MSW attempted to reach patient, mailbox full, unable to leave VM. MSW will continue to attempt x2.    Ana MESA -   Ambulatory Case Management    10/15/2024, 14:21 EDT

## 2024-10-18 ENCOUNTER — PATIENT OUTREACH (OUTPATIENT)
Age: 64
End: 2024-10-18
Payer: MEDICARE

## 2024-10-18 DIAGNOSIS — E11.65 TYPE 2 DIABETES MELLITUS WITH HYPERGLYCEMIA, WITHOUT LONG-TERM CURRENT USE OF INSULIN: ICD-10-CM

## 2024-10-18 RX ORDER — EMPAGLIFLOZIN 25 MG/1
25 TABLET, FILM COATED ORAL DAILY
Qty: 90 TABLET | Refills: 3 | Status: SHIPPED | OUTPATIENT
Start: 2024-10-18 | End: 2025-01-16

## 2024-10-18 NOTE — TELEPHONE ENCOUNTER
Caller: [facility] - [place], [place] - [address] - [phone] [facility] [phone]    Relationship: Pharmacy      Requested Prescriptions:   Requested Prescriptions     Pending Prescriptions Disp Refills    Jardiance 25 MG tablet tablet 90 tablet 3     Sig: Take 1 tablet by mouth Daily for 90 days.        Pharmacy where request should be sent: [facility] - [facility] [address] [facility] - [phone] [facility] [phone] FX     Last office visit with prescribing clinician: 10/11/2024   Last telemedicine visit with prescribing clinician: Visit date not found   Next office visit with prescribing clinician: Visit date not found       Edie Nagy Rep   10/18/24 14:55 EDT

## 2024-10-18 NOTE — OUTREACH NOTE
MSW has attempted to reach patient x3 and no answer, VM is full, so unable to leave . MSW available if patient calls back.    Ana MESA -   Ambulatory Case Management    10/18/2024, 10:08 EDT

## 2024-10-25 ENCOUNTER — HOSPITAL ENCOUNTER (OUTPATIENT)
Dept: CT IMAGING | Facility: HOSPITAL | Age: 64
Discharge: HOME | End: 2024-10-25
Payer: MEDICARE

## 2024-10-25 DIAGNOSIS — R91.1 NODULE OF LOWER LOBE OF LEFT LUNG: ICD-10-CM

## 2024-10-25 PROCEDURE — 71260 CT THORAX DX C+: CPT

## 2024-10-25 PROCEDURE — 25510000001 IOPAMIDOL PER 1 ML: Performed by: FAMILY MEDICINE

## 2024-10-25 RX ORDER — IOPAMIDOL 755 MG/ML
100 INJECTION, SOLUTION INTRAVASCULAR
Status: COMPLETED | OUTPATIENT
Start: 2024-10-25 | End: 2024-10-25

## 2024-10-25 RX ADMIN — IOPAMIDOL 100 ML: 755 INJECTION, SOLUTION INTRAVENOUS at 15:25

## 2024-11-27 ENCOUNTER — HOSPITAL ENCOUNTER (OUTPATIENT)
Dept: MAMMOGRAPHY | Facility: HOSPITAL | Age: 64
Discharge: HOME | End: 2024-11-27
Payer: MEDICARE

## 2024-11-27 ENCOUNTER — HOSPITAL ENCOUNTER (OUTPATIENT)
Dept: BONE DENSITY | Facility: HOSPITAL | Age: 64
Discharge: HOME | End: 2024-11-27
Payer: MEDICARE

## 2024-11-27 DIAGNOSIS — Z78.0 ENCOUNTER FOR OSTEOPOROSIS SCREENING IN ASYMPTOMATIC POSTMENOPAUSAL PATIENT: ICD-10-CM

## 2024-11-27 DIAGNOSIS — Z12.31 BREAST CANCER SCREENING BY MAMMOGRAM: ICD-10-CM

## 2024-11-27 DIAGNOSIS — Z13.820 ENCOUNTER FOR OSTEOPOROSIS SCREENING IN ASYMPTOMATIC POSTMENOPAUSAL PATIENT: ICD-10-CM

## 2024-11-27 PROCEDURE — 77067 SCR MAMMO BI INCL CAD: CPT

## 2024-11-27 PROCEDURE — 77080 DXA BONE DENSITY AXIAL: CPT

## 2024-11-27 PROCEDURE — 77063 BREAST TOMOSYNTHESIS BI: CPT

## 2024-12-09 ENCOUNTER — TELEPHONE (OUTPATIENT)
Dept: FAMILY MEDICINE CLINIC | Facility: CLINIC | Age: 64
End: 2024-12-09

## 2024-12-09 NOTE — TELEPHONE ENCOUNTER
Caller: Desiree Garcia    Relationship to patient: Self    Best call back number: [phone]     Patient is needing: PATIENT WOULD LIKE A CALL BACK WITH RESULTS FROM ALL LABS, PATIENT CANNOT GET ON TO [facility] EVEN AFTER SPEAKING TO THE TECH TEAM.    PATIENT STATES SHE IS IN PAIN AND WOULD LIKE RESULTS ASAP.

## 2024-12-12 ENCOUNTER — TELEPHONE (OUTPATIENT)
Dept: FAMILY MEDICINE CLINIC | Facility: CLINIC | Age: 64
End: 2024-12-12
Payer: MEDICARE

## 2024-12-12 DIAGNOSIS — M79.89 PAIN AND SWELLING OF RIGHT LOWER LEG: Primary | ICD-10-CM

## 2024-12-12 DIAGNOSIS — M79.661 PAIN AND SWELLING OF RIGHT LOWER LEG: Primary | ICD-10-CM

## 2024-12-12 NOTE — TELEPHONE ENCOUNTER
Patient called stating that she is experiencing severe right leg pain and doesn't know what she should do.  Patient has had this pain for about 6 weeks and can barely walk.  Information was relayed to Dr. Kaba who placed an order for a doppler to rule out a DVT.  Patient was advised to expect a phone call once this test has been scheduled for her.  Patient verbally stated understanding.

## 2024-12-13 ENCOUNTER — TELEPHONE (OUTPATIENT)
Dept: FAMILY MEDICINE CLINIC | Facility: CLINIC | Age: 64
End: 2024-12-13

## 2024-12-13 ENCOUNTER — HOSPITAL ENCOUNTER (OUTPATIENT)
Facility: HOSPITAL | Age: 64
Discharge: HOME | End: 2024-12-13
Payer: MEDICARE

## 2024-12-13 DIAGNOSIS — M79.661 PAIN AND SWELLING OF RIGHT LOWER LEG: ICD-10-CM

## 2024-12-13 DIAGNOSIS — M79.89 PAIN AND SWELLING OF RIGHT LOWER LEG: ICD-10-CM

## 2024-12-13 LAB
BH CV LOWER VASCULAR LEFT COMMON FEMORAL AUGMENT: NORMAL
BH CV LOWER VASCULAR LEFT COMMON FEMORAL COMPETENT: NORMAL
BH CV LOWER VASCULAR LEFT COMMON FEMORAL COMPRESS: NORMAL
BH CV LOWER VASCULAR LEFT COMMON FEMORAL PHASIC: NORMAL
BH CV LOWER VASCULAR LEFT COMMON FEMORAL SPONT: NORMAL
BH CV LOWER VASCULAR RIGHT COMMON FEMORAL AUGMENT: NORMAL
BH CV LOWER VASCULAR RIGHT COMMON FEMORAL COMPETENT: NORMAL
BH CV LOWER VASCULAR RIGHT COMMON FEMORAL COMPRESS: NORMAL
BH CV LOWER VASCULAR RIGHT COMMON FEMORAL PHASIC: NORMAL
BH CV LOWER VASCULAR RIGHT COMMON FEMORAL SPONT: NORMAL
BH CV LOWER VASCULAR RIGHT DISTAL FEMORAL COMPRESS: NORMAL
BH CV LOWER VASCULAR RIGHT GASTRONEMIUS COMPRESS: NORMAL
BH CV LOWER VASCULAR RIGHT GREATER SAPH AK COMPRESS: NORMAL
BH CV LOWER VASCULAR RIGHT GREATER SAPH BK COMPRESS: NORMAL
BH CV LOWER VASCULAR RIGHT LESSER SAPH COMPRESS: NORMAL
BH CV LOWER VASCULAR RIGHT PERONEAL COMPRESS: NORMAL
BH CV LOWER VASCULAR RIGHT POPLITEAL AUGMENT: NORMAL
BH CV LOWER VASCULAR RIGHT POPLITEAL COMPETENT: NORMAL
BH CV LOWER VASCULAR RIGHT POPLITEAL COMPRESS: NORMAL
BH CV LOWER VASCULAR RIGHT POPLITEAL PHASIC: NORMAL
BH CV LOWER VASCULAR RIGHT POPLITEAL SPONT: NORMAL
BH CV LOWER VASCULAR RIGHT POSTERIOR TIBIAL COMPRESS: NORMAL
BH CV LOWER VASCULAR RIGHT PROXIMAL FEMORAL COMPRESS: NORMAL
BH CV LOWER VASCULAR RIGHT SAPHENOFEMORAL JUNCTION COMPRESS: NORMAL

## 2024-12-13 PROCEDURE — 93971 EXTREMITY STUDY: CPT

## 2024-12-13 NOTE — TELEPHONE ENCOUNTER
Caller: Desiree Garcia    Relationship to patient: Self    Best call back number: [phone]     Patient is needing: PATIENT HAD A DOPLAR DONE 12.13.24.  PATIENT WAS INFORMED IT WAS NOT A BLOOD CLOT.      PATIENT WOULD LIKE A CALL BACK TO DISCUSS HER PLAN OF CARE.  PATIENT STATES SHE IS IN CONSIDERABLE PAIN. PATIENT REQUESTS IF SHE MISSES THE CALL TO PLEASE LEAVE A DETAILED MESSAGE IN HER VOICE MAIL.

## 2024-12-23 ENCOUNTER — OFFICE VISIT (OUTPATIENT)
Dept: FAMILY MEDICINE CLINIC | Facility: CLINIC | Age: 64
End: 2024-12-23
Payer: MEDICARE

## 2024-12-23 VITALS
HEART RATE: 72 BPM | WEIGHT: 170.5 LBS | OXYGEN SATURATION: 94 % | BODY MASS INDEX: 27.4 KG/M2 | HEIGHT: 66 IN | DIASTOLIC BLOOD PRESSURE: 80 MMHG | SYSTOLIC BLOOD PRESSURE: 142 MMHG | TEMPERATURE: 98.6 F

## 2024-12-23 DIAGNOSIS — M16.11 PRIMARY OSTEOARTHRITIS OF RIGHT HIP: ICD-10-CM

## 2024-12-23 DIAGNOSIS — E11.65 TYPE 2 DIABETES MELLITUS WITH HYPERGLYCEMIA, WITHOUT LONG-TERM CURRENT USE OF INSULIN: ICD-10-CM

## 2024-12-23 DIAGNOSIS — E55.9 VITAMIN D DEFICIENCY: ICD-10-CM

## 2024-12-23 DIAGNOSIS — M25.551 HIP PAIN, ACUTE, RIGHT: ICD-10-CM

## 2024-12-23 DIAGNOSIS — K52.9 CHRONIC DIARRHEA: ICD-10-CM

## 2024-12-23 DIAGNOSIS — M54.31 SCIATICA OF RIGHT SIDE: Primary | ICD-10-CM

## 2024-12-23 LAB — HBA1C MFR BLD: 9.4 % (ref 4.8–5.6)

## 2024-12-23 PROCEDURE — 3077F SYST BP >= 140 MM HG: CPT | Performed by: FAMILY MEDICINE

## 2024-12-23 PROCEDURE — 36415 COLL VENOUS BLD VENIPUNCTURE: CPT | Performed by: FAMILY MEDICINE

## 2024-12-23 PROCEDURE — 3046F HEMOGLOBIN A1C LEVEL >9.0%: CPT | Performed by: FAMILY MEDICINE

## 2024-12-23 PROCEDURE — G2211 COMPLEX E/M VISIT ADD ON: HCPCS | Performed by: FAMILY MEDICINE

## 2024-12-23 PROCEDURE — 83036 HEMOGLOBIN GLYCOSYLATED A1C: CPT | Performed by: FAMILY MEDICINE

## 2024-12-23 PROCEDURE — 1159F MED LIST DOCD IN RCRD: CPT | Performed by: FAMILY MEDICINE

## 2024-12-23 PROCEDURE — 1160F RVW MEDS BY RX/DR IN RCRD: CPT | Performed by: FAMILY MEDICINE

## 2024-12-23 PROCEDURE — 3079F DIAST BP 80-89 MM HG: CPT | Performed by: FAMILY MEDICINE

## 2024-12-23 PROCEDURE — 99214 OFFICE O/P EST MOD 30 MIN: CPT | Performed by: FAMILY MEDICINE

## 2024-12-23 PROCEDURE — 1125F AMNT PAIN NOTED PAIN PRSNT: CPT | Performed by: FAMILY MEDICINE

## 2024-12-23 RX ORDER — MELOXICAM 15 MG/1
15 TABLET ORAL DAILY
Qty: 7 TABLET | Refills: 0 | Status: SHIPPED | OUTPATIENT
Start: 2024-12-23 | End: 2024-12-30

## 2024-12-23 NOTE — PROGRESS NOTES
Chief Complaint  Results, Leg Pain (Right leg pain and swelling /), and Mass (On back of the head /Noticed couple days ago /)    Subjective        Desiree Garcia presents to [facility]  History of Present Illness  The patient presents for evaluation of a knot on the back of her head, right leg pain, chronic diarrhea, and diabetes.    She reports the presence of a non-painful, immobile mass at the base of her skull, which she suspects may be related to an ear infection. She also notes that one side of the mass appears larger than the other.    She describes a progressive worsening of her leg condition, characterized by swelling and difficulty in mobility, particularly when attempting to use the bathroom. She experiences pain in her buttocks but does not report any associated back pain. She has been diagnosed with sciatica and bilateral hip arthritis. She has not experienced any recent falls. She also reports difficulty in straightening up after sitting on the couch and discomfort when sitting on the toilet due to leg pain. She has undergone both upper and lower leg scans. She continues to smoke. She is currently on a daily regimen of vitamin D and Tylenol 3 times a day as needed. She has discontinued the use of ibuprofen.    She reports experiencing diarrhea and has never had constipation. She is scheduled for a colonoscopy consultation on 03/06/2025.    She has diabetes.    Supplemental Information  She has an upcoming hand surgery appointment this Friday.    SOCIAL HISTORY  The patient admits to smoking.    MEDICATIONS  Current: Vitamin D, Fosamax, Tylenol, Plavix, atorvastatin, blood pressure medicine, blood sugar medicine, depression medicine, inhaler.  Discontinued: Ibuprofen.        Medical History: has a past medical history of Arthritis, Cervical cancer screening, COPD (chronic obstructive pulmonary disease), Coronary artery disease involving native heart without angina  "pectoris (2021), Depression with anxiety, Diabetes mellitus, Forgetfulness, Gastric reflux, Hypertension, Leg paresthesia (2017), Limb swelling, Lumbago (2017), Lumbar spinal stenosis (2017), Lumbosacral radiculopathy (2017), Migraines, Night sweat, Reflux esophagitis, Shortness of breath, ST elevation myocardial infarction (STEMI) (CMS/[facility]) (2021), and Stomach disorder.   Surgical History: has a past surgical history that includes Abdominal surgery; Cardiac catheterization;  section; Cholecystectomy; Colonoscopy (); Esophagogastroduodenoscopy; Foot surgery (Right); Hand surgery; Cardiac catheterization (N/A, 2021); Coronary stent placement; and Hysterectomy ().   Family History: family history includes Arthritis in her brother, father, mother, and sister; Cancer in her mother and sister; Diabetes in her brother and father; Heart attack in her father; Heart disease in her brother and father; Stroke in her brother, father, mother, and sister.   Social History: reports that she has been smoking cigarettes. She started smoking about 58 years ago. She has a 58 pack-year smoking history. She has been exposed to tobacco smoke. She has never used smokeless tobacco. She reports that she does not drink alcohol and does not use drugs.  Immunization History   Administered Date(s) Administered    Influenza, Unspecified 10/09/2020    Tdap 11/10/2022       Objective   Vital Signs:  /80 (BP Location: Left arm, Patient Position: Sitting, Cuff Size: Adult)   Pulse 72   Temp 98.6 °F (37 °C) (Oral)   Ht 167.6 cm (65.98\")   Wt 77.3 kg (170 lb 8 oz)   SpO2 94%   BMI 27.54 kg/m²   Estimated body mass index is 27.54 kg/m² as calculated from the following:    Height as of this encounter: 167.6 cm (65.98\").    Weight as of this encounter: 77.3 kg (170 lb 8 oz).             ROS:  Review of Systems   Constitutional:  Negative for fatigue and fever.   HENT:  Negative for " congestion, ear pain and sinus pressure.    Respiratory:  Negative for cough, chest tightness and shortness of breath.    Cardiovascular:  Negative for chest pain, palpitations and leg swelling.   Gastrointestinal:  Negative for abdominal pain and diarrhea.   Genitourinary:  Negative for dysuria and frequency.   Neurological:  Negative for speech difficulty, headache and confusion.   Psychiatric/Behavioral:  Negative for agitation and behavioral problems.       Physical Exam  Vitals reviewed.   Constitutional:       Appearance: Normal appearance.   HENT:      Right Ear: Tympanic membrane normal.      Left Ear: Tympanic membrane normal.      Nose: Nose normal.   Eyes:      Extraocular Movements: Extraocular movements intact.      Conjunctiva/sclera: Conjunctivae normal.      Pupils: Pupils are equal, round, and reactive to light.   Cardiovascular:      Rate and Rhythm: Normal rate and regular rhythm.   Pulmonary:      Effort: Pulmonary effort is normal.      Breath sounds: Normal breath sounds.   Abdominal:      General: Bowel sounds are normal.   Musculoskeletal:         General: Normal range of motion.      Cervical back: Normal range of motion.   Skin:     General: Skin is warm and dry.   Neurological:      General: No focal deficit present.      Mental Status: She is alert and oriented to person, place, and time.   Psychiatric:         Mood and Affect: Mood normal.         Behavior: Behavior normal.       Physical Exam  Lungs were auscultated.  Heart was examined.      Result Review     The following data was reviewed by: Sandra Kaba MD on 12/23/2024:  Common labs          8/8/2024    16:27 10/6/2024    12:50 12/23/2024    14:30   Common Labs   Glucose 227  269     BUN 7  5     Creatinine 0.61  0.67     Sodium 138  138     Potassium 3.9  3.5     Chloride 100  96     Calcium 9.0  9.2     Albumin 3.9  3.8     Total Bilirubin 0.2  0.7     Alkaline Phosphatase 141  168     AST (SGOT) 30  30     ALT (SGPT) 25   26     WBC  12.01     Hemoglobin  14.9     Hematocrit  46.6     Platelets  269     Hemoglobin A1C 9.90   9.40      Results  Laboratory Studies  Vitamin D was low. Kidney function is good.    Imaging  Scan of right leg showed no clot. Bone x-ray showed osteopenia. CT scan showed a small increase in size of one of the nodules in the lungs from 8 mm in 2015 to 10 mm now. Bilateral arthritis in hips observed.             Assessment and Plan    Diagnoses and all orders for this visit:    1. Sciatica of right side (Primary)    2. Vitamin D deficiency  -     vitamin D3 (Vitamin D) 125 MCG (5000 UT) capsule capsule; Take 1 capsule by mouth Daily for 90 days.  Dispense: 90 capsule; Refill: 0    3. Hip pain, acute, right  -     Ambulatory Referral to Orthopedic Surgery  -     XR Hip With or Without Pelvis 2 - 3 View Right; Future    4. Primary osteoarthritis of right hip  -     Ambulatory Referral to Orthopedic Surgery  -     meloxicam (MOBIC) 15 MG tablet; Take 1 tablet by mouth Daily for 7 days.  Dispense: 7 tablet; Refill: 0    5. Chronic diarrhea    6. Type 2 diabetes mellitus with hyperglycemia, without long-term current use of insulin  -     Hemoglobin A1c      Assessment & Plan  1. Right leg pain.  The etiology of the pain could be attributed to a pinched nerve in the lower back, radiating down the leg. Diabetes may also be a contributing factor to the nerve pain. The presence of bilateral hip arthritis is noted. An x-ray of the hip will be ordered to rule out any fractures. A high dose of meloxicam will be prescribed for a duration of 1 week to manage the pain. She is advised to discontinue the use of baby aspirin during this period. Gabapentin may be considered for nerve pain management. She is encouraged to continue her diabetes medication regimen.    2. Chronic diarrhea.  She reports chronic diarrhea with watery stools. A colonoscopy is scheduled for 03/06/2025 to further investigate the cause.    3. Diabetes.  Her  blood sugar levels were elevated during the last check. She is advised to continue her current diabetes medication regimen. Labs will be ordered to recheck her blood sugar levels.    4. Osteopenia.  Her bone x-ray showed osteopenia, indicating thin bones. She is advised to continue taking vitamin D daily to help strengthen her bones.    5. Lung nodules.  There was a small increase in the size of one of the lung nodules from 8 mm in 2015 to 10 mm currently. A follow-up CT scan is recommended in 6 months to monitor any changes in the lung nodules.    6. Skull bump.  The bump on the back of her head is determined to be a normal variation of her skull. No further action is required.    7. Medication management.  She is advised to discontinue the use of ibuprofen and reduce the intake of Tylenol. She is also advised to hold aspirin while taking meloxicam and can resume it after completing the meloxicam course. She is encouraged to continue taking her cholesterol, blood pressure, and depression medications as prescribed.       I spent 35 minutes caring for Desiree on this date of service. This time includes time spent by me in the following activities:reviewing tests  Follow Up   Return in about 4 weeks (around 1/20/2025).  Patient was given instructions and counseling regarding her condition or for health maintenance advice. Please see specific information pulled into the AVS if appropriate.   Patient or patient representative verbalized consent for the use of Ambient Listening during the visit with  Sandra Kaba MD for chart documentation. 12/31/2024  19:52 ESTEBAN Kaba MD

## 2025-01-03 ENCOUNTER — HOSPITAL ENCOUNTER (OUTPATIENT)
Dept: GENERAL RADIOLOGY | Facility: HOSPITAL | Age: 65
Discharge: HOME | End: 2025-01-03
Payer: MEDICARE

## 2025-01-03 DIAGNOSIS — M25.551 HIP PAIN, ACUTE, RIGHT: ICD-10-CM

## 2025-01-03 PROCEDURE — 73502 X-RAY EXAM HIP UNI 2-3 VIEWS: CPT

## 2025-01-15 RX ORDER — FLUTICASONE FUROATE, UMECLIDINIUM BROMIDE AND VILANTEROL TRIFENATATE 100; 62.5; 25 UG/1; UG/1; UG/1
1 POWDER RESPIRATORY (INHALATION) DAILY
Qty: 60 EACH | Refills: 11 | Status: SHIPPED | OUTPATIENT
Start: 2025-01-15

## 2025-01-23 ENCOUNTER — REFERRAL TRIAGE (OUTPATIENT)
Dept: CASE MANAGEMENT | Facility: OTHER | Age: 65
End: 2025-01-23
Payer: MEDICARE

## 2025-01-23 ENCOUNTER — OFFICE VISIT (OUTPATIENT)
Dept: FAMILY MEDICINE CLINIC | Facility: CLINIC | Age: 65
End: 2025-01-23
Payer: MEDICARE

## 2025-01-23 VITALS
WEIGHT: 162 LBS | HEIGHT: 66 IN | OXYGEN SATURATION: 94 % | SYSTOLIC BLOOD PRESSURE: 134 MMHG | DIASTOLIC BLOOD PRESSURE: 82 MMHG | BODY MASS INDEX: 26.03 KG/M2 | TEMPERATURE: 98.8 F | HEART RATE: 97 BPM

## 2025-01-23 DIAGNOSIS — F41.8 DEPRESSION WITH ANXIETY: ICD-10-CM

## 2025-01-23 DIAGNOSIS — J44.1 COPD WITH EXACERBATION: ICD-10-CM

## 2025-01-23 DIAGNOSIS — E11.9 TYPE 2 DIABETES MELLITUS WITHOUT COMPLICATION, WITHOUT LONG-TERM CURRENT USE OF INSULIN: ICD-10-CM

## 2025-01-23 DIAGNOSIS — Z00.00 ENCOUNTER FOR SUBSEQUENT ANNUAL WELLNESS VISIT (AWV) IN MEDICARE PATIENT: ICD-10-CM

## 2025-01-23 DIAGNOSIS — N34.2 INFECTIVE URETHRITIS: ICD-10-CM

## 2025-01-23 DIAGNOSIS — R32 URINARY INCONTINENCE, UNSPECIFIED TYPE: Primary | ICD-10-CM

## 2025-01-23 LAB
BILIRUB BLD-MCNC: NEGATIVE MG/DL
CLARITY, POC: ABNORMAL
COLOR UR: ABNORMAL
GLUCOSE UR STRIP-MCNC: NEGATIVE MG/DL
KETONES UR QL: NEGATIVE
LEUKOCYTE EST, POC: ABNORMAL
NITRITE UR-MCNC: POSITIVE MG/ML
PH UR: 5.5 [PH] (ref 5–8)
PROT UR STRIP-MCNC: ABNORMAL MG/DL
RBC # UR STRIP: NEGATIVE /UL
SP GR UR: 1.05 (ref 1–1.03)
UROBILINOGEN UR QL: ABNORMAL

## 2025-01-23 PROCEDURE — 87086 URINE CULTURE/COLONY COUNT: CPT | Performed by: FAMILY MEDICINE

## 2025-01-23 PROCEDURE — 87088 URINE BACTERIA CULTURE: CPT | Performed by: FAMILY MEDICINE

## 2025-01-23 PROCEDURE — 87186 SC STD MICRODIL/AGAR DIL: CPT | Performed by: FAMILY MEDICINE

## 2025-01-23 RX ORDER — ONDANSETRON 4 MG/1
4 TABLET, ORALLY DISINTEGRATING ORAL
COMMUNITY
Start: 2024-10-07

## 2025-01-23 RX ORDER — ALBUTEROL SULFATE 0.83 MG/ML
2.5 SOLUTION RESPIRATORY (INHALATION) EVERY 4 HOURS PRN
COMMUNITY
Start: 2024-08-08

## 2025-01-23 RX ORDER — CARVEDILOL 3.12 MG/1
3.12 TABLET ORAL EVERY 12 HOURS SCHEDULED
COMMUNITY
Start: 2025-01-07

## 2025-01-23 RX ORDER — METFORMIN HYDROCHLORIDE EXTENDED-RELEASE TABLETS 1000 MG/1
1000 TABLET, FILM COATED, EXTENDED RELEASE ORAL
Qty: 30 TABLET | Refills: 2 | Status: SHIPPED | OUTPATIENT
Start: 2025-01-23 | End: 2025-02-22

## 2025-01-23 RX ORDER — EMPAGLIFLOZIN 25 MG/1
25 TABLET, FILM COATED ORAL DAILY
COMMUNITY
Start: 2025-01-07

## 2025-01-23 RX ORDER — CEFTRIAXONE 1 G/1
1 INJECTION, POWDER, FOR SOLUTION INTRAMUSCULAR; INTRAVENOUS ONCE
Status: COMPLETED | OUTPATIENT
Start: 2025-01-23 | End: 2025-01-23

## 2025-01-23 RX ADMIN — CEFTRIAXONE 1 G: 1 INJECTION, POWDER, FOR SOLUTION INTRAMUSCULAR; INTRAVENOUS at 15:54

## 2025-01-23 NOTE — PROGRESS NOTES
Subjective   The ABCs of the Annual Wellness Visit  Medicare Wellness Visit      Desiree Garcia is a 64 y.o. patient who presents for a Medicare Wellness Visit.    The following portions of the patient's history were reviewed and   updated as appropriate: allergies, current medications, past family history, past medical history, past social history, past surgical history, and problem list.    Compared to one year ago, the patient's physical   health is worse.  Compared to one year ago, the patient's mental   health is worse.    Recent Hospitalizations:  She was not admitted to the hospital during the last year.     Current Medical Providers:  Patient Care Team:  Sandra Kaba MD as PCP - General (Family Medicine)  Marisol Nazario, RN as Ambulatory  ([facility] [place])    Outpatient Medications Prior to Visit   Medication Sig Dispense Refill    albuterol (PROVENTIL) (2.5 MG/3ML) 0.083% nebulizer solution 2.5 mg Every 4 (Four) Hours As Needed.      albuterol sulfate  (90 Base) MCG/ACT inhaler Inhale 2 puffs Every 4 (Four) Hours As Needed for Wheezing. 18 g 5    Aspirin Low Dose 81 MG EC tablet TAKE ONE TABLET BY MOUTH DAILY AT 9 AM FOR ACUTE HEART ATTACK 90 tablet 11    atorvastatin (LIPITOR) 40 MG tablet TAKE ONE TABLET BY MOUTH DAILY AT 9 PM AT NIGHT 90 tablet 11    carvedilol (COREG) 3.125 MG tablet Take 1 tablet by mouth Every 12 (Twelve) Hours.      clopidogrel (PLAVIX) 75 MG tablet TAKE ONE TABLET BY MOUTH DAILY AT 9 AM 90 tablet 11    Jardiance 25 MG tablet tablet Take 1 tablet by mouth Daily.      lisinopril (PRINIVIL,ZESTRIL) 10 MG tablet TAKE 1/2 TABLET BY MOUTH DAILY AT 9 AM 90 tablet 3    ondansetron ODT (ZOFRAN-ODT) 4 MG disintegrating tablet Place 1 tablet on the tongue.      Trelegy Ellipta 100-62.5-25 MCG/ACT inhaler INHALE 1 PUFF BY MOUTH DAILY 60 each 11    Trintellix 10 MG tablet tablet TAKE ONE TABLET BY MOUTH DAILY AT 9 AM  DAYS 90 tablet 3    vitamin D3  (Vitamin D) 125 MCG (5000 UT) capsule capsule Take 1 capsule by mouth Daily for 90 days. 90 capsule 0    carvedilol (COREG) 6.25 MG tablet Take 1 tablet by mouth 2 (Two) Times a Day With Meals. 180 tablet 1    metFORMIN (GLUCOPHAGE) 500 MG tablet TAKE ONE TABLET BY MOUTH TWICE DAILY WITH MEALS @ 9AM - 5PM 180 tablet 11     No facility-administered medications prior to visit.     No opioid medication identified on active medication list. I have reviewed chart for other potential  high risk medication/s and harmful drug interactions in the elderly.      Aspirin is on active medication list. Aspirin use is indicated based on review of current medical condition/s. Pros and cons of this therapy have been discussed today. Benefits of this medication outweigh potential harm.  Patient has been encouraged to continue taking this medication.  .      Patient Active Problem List   Diagnosis    Type 2 diabetes mellitus, without long-term current use of insulin    COPD (chronic obstructive pulmonary disease)    Depression with anxiety    Esophageal reflux    Forgetfulness    Leg paresthesia    Lumbar spinal stenosis    Migraines    Night sweat    Sciatica    Dyspnea on exertion    Thoracic or lumbosacral neuritis or radiculitis    Left wrist pain    Sprain of left wrist    Arthritis of knee, left    Contusion of left knee    Nondisplaced fracture of trapezium bone of left wrist with routine healing    Chronic left shoulder pain    Coronary artery disease involving native heart without angina pectoris    Hypertension, essential    Mixed hyperlipidemia    Cigarette nicotine dependence with nicotine-induced disorder     Advance Care Planning Advance Directive is not on file.  ACP discussion was held with the patient during this visit. Patient does not have an advance directive, information provided.            Objective   Vitals:    01/23/25 1426   BP: 134/82   Pulse: 97   Temp: 98.8 °F (37.1 °C)   SpO2: 94%   Weight: 73.5 kg (162  "lb)   Height: 167.6 cm (66\")   PainSc: 10-Worst pain ever       Estimated body mass index is 26.15 kg/m² as calculated from the following:    Height as of this encounter: 167.6 cm (66\").    Weight as of this encounter: 73.5 kg (162 lb).                Does the patient have evidence of cognitive impairment? Yes  Lab Results   Component Value Date    HGBA1C 9.40 (H) 12/23/2024                                                                                                Health  Risk Assessment    Smoking Status:  Social History     Tobacco Use   Smoking Status Every Day    Current packs/day: 1.00    Average packs/day: 1 pack/day for 58.0 years (58.0 ttl pk-yrs)    Types: Cigarettes    Start date: 1/14/1967    Passive exposure: Current   Smokeless Tobacco Never   Tobacco Comments    Smoked 21-30 years     Alcohol Consumption:  Social History     Substance and Sexual Activity   Alcohol Use Never    Comment: Does not drink       Fall Risk Screen  STEADI Fall Risk Assessment has not been completed.    Depression Screening   Little interest or pleasure in doing things? Almost all   Feeling down, depressed, or hopeless? Almost all   PHQ-2 Total Score 6   Trouble falling or staying asleep, or sleeping too much? Almost all   Feeling tired or having little energy? Almost all   Poor appetite or overeating? Almost all   Feeling bad about yourself - or that you are a failure or have let yourself or your family down? Almost all   Trouble concentrating on things, such as reading the newspaper or watching television? Almost all   Moving or speaking so slowly that other people could have noticed? Or the opposite - being so fidgety or restless that you have been moving around a lot more than usual? Not at all   Thoughts that you would be better off dead, or of hurting yourself in some way? Several days   PHQ-9 Total Score 22   If you checked off any problems, how difficult have these problems made it for you to do your work, take care " of things at home, or get along with other people? Extremely difficult      Health Habits and Functional and Cognitive Screenin/23/2025     2:00 PM   Functional & Cognitive Status   Do you have difficulty preparing food and eating? Yes   Do you have difficulty bathing yourself, getting dressed or grooming yourself? Yes   Do you have difficulty using the toilet? Yes   Do you have difficulty moving around from place to place? Yes   Do you have trouble with steps or getting out of a bed or a chair? Yes   Current Diet Other   Dental Exam Not up to date   Eye Exam Not up to date   Exercise (times per week) 0 times per week   Current Exercises Include No Regular Exercise   Do you need help using the phone?  No   Are you deaf or do you have serious difficulty hearing?  No   Do you need help to go to places out of walking distance? Yes   Do you need help shopping? Yes   Do you need help preparing meals?  Yes   Do you need help with housework?  Yes   Do you need help with laundry? Yes   Do you need help taking your medications? Yes   Do you need help managing money? No   Do you ever drive or ride in a car without wearing a seat belt? Yes   Have you felt unusual stress, anger or loneliness in the last month? Yes   Who do you live with? Child   If you need help, do you have trouble finding someone available to you? No   Have you been bothered in the last four weeks by sexual problems? No   Do you have difficulty concentrating, remembering or making decisions? Yes           Age-appropriate Screening Schedule:  Refer to the list below for future screening recommendations based on patient's age, sex and/or medical conditions. Orders for these recommended tests are listed in the plan section. The patient has been provided with a written plan.    Health Maintenance List  Health Maintenance   Topic Date Due    Pneumococcal Vaccine 0-64 (1 of 2 - PCV) Never done    ZOSTER VACCINE (1 of 2) Never done    HEPATITIS C SCREENING   Never done    LIPID PANEL  06/14/2022    DIABETIC EYE EXAM  01/10/2024    COLORECTAL CANCER SCREENING  08/21/2024    INFLUENZA VACCINE  03/31/2025 (Originally 7/1/2024)    COVID-19 Vaccine (1 - 2024-25 season) 04/14/2025 (Originally 9/1/2024)    BMI FOLLOWUP  01/29/2025    HEMOGLOBIN A1C  06/23/2025    LUNG CANCER SCREENING  10/25/2025    ANNUAL WELLNESS VISIT  01/23/2026    MAMMOGRAM  11/27/2026    TDAP/TD VACCINES (2 - Td or Tdap) 11/10/2032                                                                                                                                                CMS Preventative Services Quick Reference  Risk Factors Identified During Encounter  Fall Risk-High or Moderate: Discussed Fall Prevention in the home    The above risks/problems have been discussed with the patient.  Pertinent information has been shared with the patient in the After Visit Summary.  An After Visit Summary and PPPS were made available to the patient.    Follow Up:  Next Medicare Wellness visit to be scheduled in 1 year.          Additional E&M Note during same encounter follows:  Patient has multiple medical problems which are significant and separately identifiable that require additional work above and beyond the Medicare Wellness Visit.      Chief Complaint  Medicare Wellness-subsequent and Leg Pain (Her son has to help her out of bed.  She urinates on herself.)    Desiree Garcia is a 64 y.o. female who presents to [facility]     History of Present Illness  The patient presents for evaluation of left-sided neck pain, urinary incontinence, right hip pain, diabetes, and memory loss.    She reports experiencing intermittent left-sided neck pain, which she attributes to a sensation of lung collapse during respiration. She has been managing this with albuterol but does not report any productive cough. Her mobility is limited due to the pain, confining her to bed rest. She suspects  "pneumonia as the underlying cause, given her son's observation of potential fluid accumulation. She does not report any febrile episodes or exposure to sick individuals at home. She is on albuterol and Trelegy inhaler.    She also reports urinary incontinence, necessitating the use of adult diapers. She does not experience any dysuria but notes leg tremors upon standing. She has provided a urine sample for analysis.    She experiences bilateral leg pain, with the right leg being more severely affected. She has undergone radiographic imaging of the affected area. She does not take calcium supplements.    She has observed weight loss, which she attributes to her son's cooking, particularly the inclusion of fish in her diet. She is currently on metformin for blood glucose control but admits to occasional non-compliance due to memory issues. She expresses a preference for oral medication over injections. Her diet includes oatmeal with added sugar. She is on metformin.    She reports short-term memory loss, which is a source of concern for her. She missed an appointment with her cardiologist due to her memory issues.    Supplemental Information  She is on Trintellix for depression. She is on vitamin D3.    SOCIAL HISTORY  She lives with her son.    MEDICATIONS  Current: Albuterol, Trelegy, Trintellix, vitamin D3, metformin    Objective   Vital Signs:   Vitals:    01/23/25 1426   BP: 134/82   Pulse: 97   Temp: 98.8 °F (37.1 °C)   SpO2: 94%   Weight: 73.5 kg (162 lb)   Height: 167.6 cm (66\")   PainSc: 10-Worst pain ever       Wt Readings from Last 3 Encounters:   01/23/25 73.5 kg (162 lb)   12/23/24 77.3 kg (170 lb 8 oz)   10/11/24 81 kg (178 lb 9.6 oz)     BP Readings from Last 3 Encounters:   01/23/25 134/82   12/23/24 142/80   10/11/24 132/76       Physical Exam  Vitals reviewed.   Constitutional:       Appearance: Normal appearance.   HENT:      Right Ear: Tympanic membrane normal.      Left Ear: Tympanic membrane " normal.      Nose: Nose normal.   Eyes:      Extraocular Movements: Extraocular movements intact.      Conjunctiva/sclera: Conjunctivae normal.      Pupils: Pupils are equal, round, and reactive to light.   Cardiovascular:      Rate and Rhythm: Normal rate and regular rhythm.   Pulmonary:      Effort: Pulmonary effort is normal.      Breath sounds: Normal breath sounds.   Abdominal:      General: Bowel sounds are normal.   Musculoskeletal:         General: Normal range of motion.      Cervical back: Normal range of motion.   Skin:     General: Skin is warm and dry.   Neurological:      General: No focal deficit present.      Mental Status: She is alert and oriented to person, place, and time.   Psychiatric:         Mood and Affect: Mood normal.         Behavior: Behavior normal.         Physical Exam  Lungs were auscultated.  Heart was examined.    Vital Signs  Oxygen saturation is at 94 percent.    The following data was reviewed by Sandra Kaba MD on 01/23/2025  Common Labs   Common labs          8/8/2024    16:27 10/6/2024    12:50 12/23/2024    14:30   Common Labs   Glucose 227  269     BUN 7  5     Creatinine 0.61  0.67     Sodium 138  138     Potassium 3.9  3.5     Chloride 100  96     Calcium 9.0  9.2     Albumin 3.9  3.8     Total Bilirubin 0.2  0.7     Alkaline Phosphatase 141  168     AST (SGOT) 30  30     ALT (SGPT) 25  26     WBC  12.01     Hemoglobin  14.9     Hematocrit  46.6     Platelets  269     Hemoglobin A1C 9.90   9.40        Results  Laboratory Studies  Blood sugar is 9.4.    Imaging  X-rays show a lot of arthritis in the right hip.        Assessment & Plan   Diagnoses and all orders for this visit:    1. Urinary incontinence, unspecified type (Primary)  -     POCT urinalysis dipstick, manual  -     Urine Culture - Urine, Urine, Clean Catch; Future  -     Urine Culture - Urine, Urine, Clean Catch    2. Type 2 diabetes mellitus without complication, without long-term current use of  insulin  -     metFORMIN (FORTAMET) 1000 MG (OSM) 24 hr tablet; Take 1 tablet by mouth Daily With Breakfast for 30 days.  Dispense: 30 tablet; Refill: 2  -     Ambulatory Referral to Chronic Care Management Medication Adherence, Care Coordination, Advanced Care Planning, Caregiving/Support, Disease Education, Barriers to Care    3. Infective urethritis  -     cefTRIAXone (ROCEPHIN) injection 1 g    4. COPD with exacerbation  -     cefTRIAXone (ROCEPHIN) injection 1 g  -     amoxicillin-clavulanate (AUGMENTIN) 875-125 MG per tablet; Take 1 tablet by mouth 2 (Two) Times a Day for 7 days.  Dispense: 14 tablet; Refill: 0  -     Ambulatory Referral to Chronic Care Management Medication Adherence, Care Coordination, Advanced Care Planning, Caregiving/Support, Disease Education, Barriers to Care    5. Depression with anxiety  -     Ambulatory Referral to Chronic Care Management Medication Adherence, Care Coordination, Advanced Care Planning, Caregiving/Support, Disease Education, Barriers to Care    6. Encounter for subsequent annual wellness visit (AWV) in Medicare patient        Assessment & Plan  1. Left-sided neck pain.  Her oxygen saturation is currently at 94 percent, which is lower than her usual levels. There is a concern about potential pneumonia. An antibiotic injection will be administered today, and a prescription for oral antibiotics will be provided for her to start tomorrow.    2. Urinary incontinence.  She reports frequent urination and incontinence. A urine test will be conducted today to rule out a urinary tract infection.    3. Right hip pain.  She has significant arthritis in her right hip, which is likely contributing to her leg pain and affecting her mobility. A referral to an orthopedic specialist will be made for further evaluation and potential intervention.    4. Diabetes mellitus.  Her blood glucose levels have slightly decreased to 9.4, but further improvement is necessary. She is advised to  avoid foods high in sugar and to ensure she takes her metformin regularly. A new medication that can be taken once daily will be prescribed to help manage her blood sugar levels.    5. Memory loss.  She reports worsening short-term memory loss, which may be exacerbated by potential infections. A follow-up appointment will be scheduled in one month to reassess her memory and conduct a natural test to evaluate for possible dementia.    Follow-up  The patient will follow up in 1 month.               FOLLOW UP  Return in about 4 weeks (around 2/20/2025).  Patient was given instructions and counseling regarding her condition or for health maintenance advice. Please see specific information pulled into the AVS if appropriate.     Patient or patient representative verbalized consent for the use of Ambient Listening during the visit with  Sandra Kaba MD for chart documentation. 1/23/2025  20:07 ESTEBAN Kaba MD  01/23/25  20:07 EST

## 2025-01-24 ENCOUNTER — TELEPHONE (OUTPATIENT)
Dept: CARDIOLOGY | Facility: CLINIC | Age: 65
End: 2025-01-24
Payer: MEDICARE

## 2025-01-24 DIAGNOSIS — I10 HYPERTENSION, ESSENTIAL: ICD-10-CM

## 2025-01-24 RX ORDER — LISINOPRIL 10 MG/1
20 TABLET ORAL DAILY
Qty: 90 TABLET | Refills: 3 | Status: SHIPPED | OUTPATIENT
Start: 2025-01-24 | End: 2025-01-24 | Stop reason: SDUPTHER

## 2025-01-24 RX ORDER — LISINOPRIL 10 MG/1
20 TABLET ORAL DAILY
Qty: 180 TABLET | Refills: 3 | Status: SHIPPED | OUTPATIENT
Start: 2025-01-24

## 2025-01-24 NOTE — TELEPHONE ENCOUNTER
The [facility] received a fax that requires your attention. The document has been indexed to the patient’s chart for your review.      Reason for sending: EXTERNAL MEDICAL RECORD NOTIFICATION     Documents Description: LISINOPRIL 90 DAY REQ/[id number]    Name of Sender: [employer]     Date Indexed: 1.23.25

## 2025-01-24 NOTE — TELEPHONE ENCOUNTER
REC'D A CLARIFICATION REQUEST FROM [employer], REGARDING DOSE INSTRUCTIONS DUE TO THE QTY THAT WAS SENT IN.    PT IS TAKING 2 TABLETS QD BUT ONLY 90 TABLETS WAS REFILLED.  AMOUNT SHOULD  QTY.    SENDING IN NEW REFILL W/ CORRECTED QTY.

## 2025-01-24 NOTE — TELEPHONE ENCOUNTER
.[facility] received a fax that requires your attention. The document has been indexed to the patient’s chart for your review.      Reason for sending: EXTERNAL MEDICAL RECORD NOTIFICATION     Documents Description: LISINOPRIL CLARIFICATION [id number]    Name of Sender: [employer]     Date Indexed: 1.24.25

## 2025-01-26 LAB — BACTERIA SPEC AEROBE CULT: ABNORMAL

## 2025-01-27 ENCOUNTER — PATIENT OUTREACH (OUTPATIENT)
Dept: CASE MANAGEMENT | Facility: OTHER | Age: 65
End: 2025-01-27
Payer: MEDICARE

## 2025-01-27 DIAGNOSIS — F41.8 DEPRESSION WITH ANXIETY: ICD-10-CM

## 2025-01-27 DIAGNOSIS — I87.2 EDEMA OF RIGHT LOWER LEG DUE TO PERIPHERAL VENOUS INSUFFICIENCY: ICD-10-CM

## 2025-01-27 DIAGNOSIS — R06.09 DYSPNEA ON EXERTION: ICD-10-CM

## 2025-01-27 DIAGNOSIS — E11.69 TYPE 2 DIABETES MELLITUS WITH OTHER SPECIFIED COMPLICATION, WITHOUT LONG-TERM CURRENT USE OF INSULIN: Primary | ICD-10-CM

## 2025-01-27 DIAGNOSIS — R60.0 EDEMA OF RIGHT LOWER LEG DUE TO PERIPHERAL VENOUS INSUFFICIENCY: ICD-10-CM

## 2025-01-27 NOTE — OUTREACH NOTE
AMBULATORY CASE MANAGEMENT NOTE    Names and Relationships of Patient/Support Persons: Contact: Desiree Garcia; Relationship: Self -     CCM Interim Update  Called and spoke with patient, reviewed CCM program. Patient consented with RN. Reviewed RN role, length of time and billing.     Patient lives at home with her son, who wants to be her paid primary caregiver. Patient stated she has DM, her bs are elevated and she has a BGM, but doesn't use regularly. Patient also stated she has edema in R lower leg, which pain keeps her awake at night. She isn't able to control the pain well. Patient stated she needs assistance with transportation and medication coverage.     Patient and RN discussed MSW and patient verbalized agreement for call from her for additional information. RN discussed LTADD, CATS, TRIXIE,     RN notified we will send info in mail and patient will be called next week for follow up. Verbalized agreement.         Adult Patient Profile  Questions/Answers      Flowsheet Row Most Recent Value   Symptoms/Conditions Managed at Home diabetes, type 2, musculoskeletal, respiratory   Barriers to Managing Health medication, unable to afford, financial resources, lack of transportation, stress of chronic illness   Additional Documentation Diabetes Symptoms/Conditions Management (Group), Respiratory Symptoms/Conditions Management (Group)   Diabetes Management Strategies activity, coping strategies, medication therapy, exercise   Diabetes Self-Management Outcome not well   Musculoskeletal Symptoms/Conditions joint pain, mobility limited, unsteady gait   Musculoskeletal Management Strategies activity, exercise, medication therapy   Musculoskeletal Self-Management Outcome unsure   Musculoskeletal Comment edema in BLE.   Respiratory Symptoms/Conditions shortness of breath   Respiratory Management Strategies activity, exercise, adequate rest, coping strategies, diet modification   Respiratory Self-Management Outcome  unsure   Q1: How often do you have a drink containing alcohol? Never   Q2: How many drinks containing alcohol do you have on a typical day when you are drinking? None   Q3: How often do you have six or more drinks on one occasion? Never   Audit-C Score 0        Send Education  Questions/Answers      Flowsheet Row Most Recent Value   Advanced Directives: Not Interested At This Time        SDOH updated and reviewed with the patient during this program:  --     Alcohol Use: Not At Risk (1/27/2025)    AUDIT-C     Frequency of Alcohol Consumption: Never     Average Number of Drinks: Patient does not drink     Frequency of Binge Drinking: Never      --     Depression: At risk (1/23/2025)    PHQ-2     PHQ-2 Score: 22          Financial Resource Strain: Low Risk  (1/27/2025)    Overall Financial Resource Strain (CARDIA)     Difficulty of Paying Living Expenses: Not very hard      --     Food Insecurity: Food Insecurity Present (1/27/2025)    Hunger Vital Sign     Worried About Running Out of Food in the Last Year: Sometimes true     Ran Out of Food in the Last Year: Sometimes true      --     Health Literacy: Unknown (4/17/2023)    Education     Preferred Language: English      --     Housing Stability: Unknown (1/27/2025)    Housing Stability Vital Sign     Unable to Pay for Housing in the Last Year: No     Homeless in the Last Year: No      --     Interpersonal Safety: Not At Risk (1/27/2025)    Humiliation, Afraid, Rape, and Kick questionnaire     Fear of Current or Ex-Partner: No     Emotionally Abused: No     Physically Abused: No     Sexually Abused: No      --     Physical Activity: Inactive (1/27/2025)    Exercise Vital Sign     Days of Exercise per Week: 0 days     Minutes of Exercise per Session: 0 min      --     Social Connections: Moderately Integrated (1/27/2025)    Social Connection and Isolation Panel [NHANES]     Frequency of Communication with Friends and Family: More than three times a week     Frequency  of Social Gatherings with Friends and Family: More than three times a week     Attends Sabianist Services: 1 to 4 times per year     Active Member of Clubs or Organizations: No     Attends Club or Organization Meetings: Never     Marital Status:       --     Stress: Stress Concern Present (1/27/2025)    [facility] [place] of Occupational [facility] - Occupational Stress Questionnaire     Feeling of Stress : To some extent      --     Tobacco Use: High Risk (1/23/2025)    Patient History     Smoking Tobacco Use: Every Day     Smokeless Tobacco Use: Never     Passive Exposure: Current      --     Transportation Needs: Unknown (1/27/2025)    PRAPARE - Transportation     Lack of Transportation (Medical): No      --     Utilities: Not At Risk (1/27/2025)    [facility] Utilities     Threatened with loss of utilities: No       Education Documentation  No documentation found.        Marisol BARRON  Ambulatory Case Management    1/27/2025, 14:02 EST

## 2025-01-29 ENCOUNTER — PATIENT OUTREACH (OUTPATIENT)
Age: 65
End: 2025-01-29
Payer: MEDICARE

## 2025-01-29 NOTE — OUTREACH NOTE
MSW attempted to reach patient on this day, unable to leave message, mailbox is full. MSW to continue to attempt x2.    Ana MESA -   Ambulatory Case Management    1/29/2025, 15:40 EST

## 2025-01-30 ENCOUNTER — LAB (OUTPATIENT)
Facility: HOSPITAL | Age: 65
End: 2025-01-30
Payer: MEDICARE

## 2025-01-30 ENCOUNTER — OFFICE VISIT (OUTPATIENT)
Dept: ORTHOPEDIC SURGERY | Facility: CLINIC | Age: 65
End: 2025-01-30
Payer: MEDICARE

## 2025-01-30 ENCOUNTER — OFFICE VISIT (OUTPATIENT)
Dept: CARDIOLOGY | Facility: CLINIC | Age: 65
End: 2025-01-30
Payer: MEDICARE

## 2025-01-30 VITALS
DIASTOLIC BLOOD PRESSURE: 81 MMHG | HEIGHT: 66 IN | WEIGHT: 166 LBS | BODY MASS INDEX: 26.68 KG/M2 | SYSTOLIC BLOOD PRESSURE: 158 MMHG | OXYGEN SATURATION: 90 % | HEART RATE: 96 BPM

## 2025-01-30 VITALS
HEIGHT: 66 IN | BODY MASS INDEX: 26.68 KG/M2 | HEART RATE: 96 BPM | DIASTOLIC BLOOD PRESSURE: 83 MMHG | WEIGHT: 166 LBS | SYSTOLIC BLOOD PRESSURE: 152 MMHG

## 2025-01-30 DIAGNOSIS — I25.10 CORONARY ARTERY DISEASE INVOLVING NATIVE CORONARY ARTERY OF NATIVE HEART WITHOUT ANGINA PECTORIS: Primary | Chronic | ICD-10-CM

## 2025-01-30 DIAGNOSIS — E78.2 MIXED HYPERLIPIDEMIA: ICD-10-CM

## 2025-01-30 DIAGNOSIS — M16.11 PRIMARY OSTEOARTHRITIS OF RIGHT HIP: Primary | ICD-10-CM

## 2025-01-30 DIAGNOSIS — I10 HYPERTENSION, ESSENTIAL: ICD-10-CM

## 2025-01-30 LAB
ALBUMIN SERPL-MCNC: 3.7 G/DL (ref 3.5–5.2)
ALBUMIN/GLOB SERPL: 1 G/DL
ALP SERPL-CCNC: 134 U/L (ref 39–117)
ALT SERPL W P-5'-P-CCNC: 18 U/L (ref 1–33)
ANION GAP SERPL CALCULATED.3IONS-SCNC: 9.5 MMOL/L (ref 5–15)
AST SERPL-CCNC: 20 U/L (ref 1–32)
BILIRUB SERPL-MCNC: 0.4 MG/DL (ref 0–1.2)
BUN SERPL-MCNC: 10 MG/DL (ref 8–23)
BUN/CREAT SERPL: 13.5 (ref 7–25)
CALCIUM SPEC-SCNC: 9.7 MG/DL (ref 8.6–10.5)
CHLORIDE SERPL-SCNC: 102 MMOL/L (ref 98–107)
CHOLEST SERPL-MCNC: 161 MG/DL (ref 0–200)
CO2 SERPL-SCNC: 28.5 MMOL/L (ref 22–29)
CREAT SERPL-MCNC: 0.74 MG/DL (ref 0.57–1)
EGFRCR SERPLBLD CKD-EPI 2021: 91 ML/MIN/1.73
GLOBULIN UR ELPH-MCNC: 3.6 GM/DL
GLUCOSE SERPL-MCNC: 155 MG/DL (ref 65–99)
HDLC SERPL-MCNC: 38 MG/DL (ref 40–60)
LDLC SERPL CALC-MCNC: 94 MG/DL (ref 0–100)
LDLC/HDLC SERPL: 2.36 {RATIO}
POTASSIUM SERPL-SCNC: 3.9 MMOL/L (ref 3.5–5.2)
PROT SERPL-MCNC: 7.3 G/DL (ref 6–8.5)
SODIUM SERPL-SCNC: 140 MMOL/L (ref 136–145)
TRIGL SERPL-MCNC: 167 MG/DL (ref 0–150)
VLDLC SERPL-MCNC: 29 MG/DL (ref 5–40)

## 2025-01-30 PROCEDURE — 1160F RVW MEDS BY RX/DR IN RCRD: CPT | Performed by: INTERNAL MEDICINE

## 2025-01-30 PROCEDURE — 80053 COMPREHEN METABOLIC PANEL: CPT

## 2025-01-30 PROCEDURE — 80061 LIPID PANEL: CPT

## 2025-01-30 PROCEDURE — 99214 OFFICE O/P EST MOD 30 MIN: CPT | Performed by: INTERNAL MEDICINE

## 2025-01-30 PROCEDURE — 36415 COLL VENOUS BLD VENIPUNCTURE: CPT

## 2025-01-30 PROCEDURE — 3077F SYST BP >= 140 MM HG: CPT | Performed by: INTERNAL MEDICINE

## 2025-01-30 PROCEDURE — 1159F MED LIST DOCD IN RCRD: CPT | Performed by: INTERNAL MEDICINE

## 2025-01-30 PROCEDURE — 3079F DIAST BP 80-89 MM HG: CPT | Performed by: INTERNAL MEDICINE

## 2025-01-30 RX ORDER — CARVEDILOL 6.25 MG/1
6.25 TABLET ORAL EVERY 12 HOURS SCHEDULED
Qty: 60 TABLET | Refills: 11 | Status: SHIPPED | OUTPATIENT
Start: 2025-01-30

## 2025-01-30 NOTE — PROGRESS NOTES
"Chief Complaint  Initial Evaluation of the Right Hip       Subjective      Desiree Garcia presents to [facility] for an evaluation  of her right hip. Her right hip has bene bothering her for about a year but has gradually gotten worse. She denies any specific injury, trauma, falls or prior surgery. She is ambulating today with a Rolator walking. She has a hard time putting her socks on and off and getting in and out of the car. She has pain with weight bearing. She is not able to take anti inflammatories due to being on blood thinners. She is a diabetic. She has pain at nighttime.     Allergies   Allergen Reactions    Tramadol Itching        Social History     Socioeconomic History    Marital status: Legally    Tobacco Use    Smoking status: Every Day     Current packs/day: 1.00     Average packs/day: 1 pack/day for 58.0 years (58.0 ttl pk-yrs)     Types: Cigarettes     Start date: 1/14/1967     Passive exposure: Current    Smokeless tobacco: Never    Tobacco comments:     Smoked 21-30 years   Vaping Use    Vaping status: Never Used   Substance and Sexual Activity    Alcohol use: Never     Comment: Does not drink    Drug use: Never    Sexual activity: Defer        I reviewed the patient's chief complaint, history of present illness, review of systems, past medical history, surgical history, family history, social history, medications, and allergy list.     Review of Systems     Constitutional: Denies fevers, chills, weight loss  Cardiovascular: Denies chest pain, shortness of breath  Skin: Denies rashes, acute skin changes  Neurologic: Denies headache, loss of consciousness  MSK: Right hip pain       Vital Signs:   /81   Pulse 96   Ht 167.6 cm (66\")   Wt 75.3 kg (166 lb)   SpO2 90%   BMI 26.79 kg/m²            Ortho Exam    Physical Exam  General:Alert. No acute distress     Right lower extremity: one plus edema to the right lower extremity, negative straight " leg raise, hip flexion to 70 degrees with pain, external rotation  to 20 degrees, internal rotation to 5 degrees, non tender to the lateral  hip, distal neurovascularly intact, positive  pulses, positive EHL, FHL, GS, and TA. Sensation intact to all 5 nerves of the foot.      Procedures    Imaging Results (Most Recent)       None             Result Review :       XR Hip With or Without Pelvis 2 - 3 View Right    Result Date: 1/6/2025  Narrative: XR HIP W OR WO PELVIS 2-3 VIEW RIGHT Date of Exam: 1/3/2025 1:20 PM EST Indication: hip pain after fall patient fell 6 months ago. Comparison: None available. FINDINGS: There is no displaced fracture or dislocation. The articulation of the hip is intact. Changes of arthropathy are observed. Age-related changes of the SI joints and pubic symphysis are also noted. The pubic rami are intact. No focal osseous abnormalities are seen. Vascular calcifications are seen in the soft tissues.     Impression: 1.No evidence for displaced fracture or dislocation. 2.Moderate changes of osteoarthritis are noted involving the hip. Electronically Signed: Babar Millard MD  1/6/2025 1:24 PM EST  Workstation ID: [id number]            Assessment and Plan     Diagnoses and all orders for this visit:    1. Primary osteoarthritis of right hip (Primary)        The patient presents here today for an evaluation  of her right hip.     Discussed operative treatment options regarding a right hip arthroplasty versus non operative treatment options regarding injections, medications and therapy.     Patient wishes to proceed with conservative treatment options.     Order placed today for a right hip intra articular injection done by radiology.      She will continue current medications for pain control.      Educated on risk of smoking/nicotine. Discussed options for smoking cessation regarding chantix, nicorette gum and/ or to call the quit hotline at [phone]  and Call or return if worsening  symptoms.    Follow Up     6 - 8 weeks after the injection     Patient was given instructions and counseling regarding her condition or for health maintenance advice. Please see specific information pulled into the AVS if appropriate.     Scribed for Jose Yanez MD by Caitlin Campos.  01/30/25   15:31 EST    I have personally performed the services described in this document as scribed by the above individual and it is both accurate and complete. Jose Yanez MD 01/30/25

## 2025-01-31 ENCOUNTER — PATIENT OUTREACH (OUTPATIENT)
Dept: CASE MANAGEMENT | Facility: OTHER | Age: 65
End: 2025-01-31
Payer: MEDICARE

## 2025-01-31 DIAGNOSIS — E11.69 TYPE 2 DIABETES MELLITUS WITH OTHER SPECIFIED COMPLICATION, WITHOUT LONG-TERM CURRENT USE OF INSULIN: Primary | ICD-10-CM

## 2025-01-31 DIAGNOSIS — F41.8 DEPRESSION WITH ANXIETY: ICD-10-CM

## 2025-01-31 DIAGNOSIS — R60.0 EDEMA OF RIGHT LOWER LEG DUE TO PERIPHERAL VENOUS INSUFFICIENCY: ICD-10-CM

## 2025-01-31 DIAGNOSIS — I87.2 EDEMA OF RIGHT LOWER LEG DUE TO PERIPHERAL VENOUS INSUFFICIENCY: ICD-10-CM

## 2025-01-31 NOTE — OUTREACH NOTE
CCM End of Month Documentation    This Chronic Medical Management Care Plan for Desiree Garcia, 64 y.o. female, has been established and a new plan of care implemented for the month of January.  A cumulative time of 35  minutes was spent on this patient record this month, including phone call with patient; face to face with provider.    Regarding the patient's problems: has Type 2 diabetes mellitus, without long-term current use of insulin; COPD (chronic obstructive pulmonary disease); Depression with anxiety; Esophageal reflux; Forgetfulness; Leg paresthesia; Lumbar spinal stenosis; Migraines; Night sweat; Sciatica; Dyspnea on exertion; Thoracic or lumbosacral neuritis or radiculitis; Left wrist pain; Sprain of left wrist; Arthritis of knee, left; Contusion of left knee; Nondisplaced fracture of trapezium bone of left wrist with routine healing; Chronic left shoulder pain; Coronary artery disease involving native heart without angina pectoris; Hypertension, essential; Mixed hyperlipidemia; and Cigarette nicotine dependence with nicotine-induced disorder on their problem list., the following items were addressed: medical records; medications; referrals to community service providers and any changes can be found within the plan section of the note.  A detailed listing of time spent for chronic care management is tracked within each outreach encounter.  Current medications include:  has a current medication list which includes the following prescription(s): albuterol, albuterol sulfate hfa, aspirin low dose, atorvastatin, carvedilol, clopidogrel, jardiance, lisinopril, metformin, ondansetron odt, trelegy ellipta, trintellix, and vitamin d3. and the patient is reported to be patient is noncompliant with medication protocol,  Medications are reported to be non-effective in controlling symptoms and changes have been made to the medication protocol.  Regarding these diagnoses, referrals were made to the following  provider(s):  NA.  All notes on chart for PCP to review.    The patient was monitored remotely for activity level; pain; medications; blood glucose.    The patient's physical needs include:  needs assistance with ADLs; medication education; help taking medications as prescribed.     The patient's mental support needs include:  continued support    The patient's cognitive support needs include:  continued support    The patient's psychosocial support needs include:  continued support    The patient's functional needs include: DME    The patient's environmental needs include:  resources for disability needs; no access to transportation    Care Plan overall comments:  No data recorded    Refer to previous outreach notes for more information on the areas listed above.    Monthly Billing Diagnoses  (E11.69) Type 2 diabetes mellitus with other specified complication, without long-term current use of insulin    (F41.8) Depression with anxiety    (I87.2,  R60.0) Edema of right lower leg due to peripheral venous insufficiency    Medications   Medications have been reconciled    Care Plan progress this month:      Recently Modified Care Plans Updates made since 12/31/2024 12:00 AM      No recently modified care plans.               Current Specialty Plan of Care Status signed by both patient and provider    Instructions   Patient was provided an electronic copy of care plan  CCM services were explained and offered and patient has accepted these services.  Patient has given their written consent to receive CCM services and understands that this includes the authorization of electronic communication of medical information with the other treating providers.  Patient understands that they may stop CCM services at any time and these changes will be effective at the end of the calendar month and will effectively revocate the agreement of CCM services.  Patient understands that only one practitioner can furnish and be paid for CCM  services during one calendar month.  Patient also understands that there may be co-payment or deductible fees in association with CCM services.  Patient will continue with at least monthly follow-up calls with the Ambulatory .    Marisol BARRON  Ambulatory Case Management    1/31/2025, 11:19 EST

## 2025-02-02 NOTE — ASSESSMENT & PLAN NOTE
She is currently stable with no angina but LV systolic function is preserved.  Recommend to continue Plavix, statins and beta-blockers.

## 2025-02-02 NOTE — ASSESSMENT & PLAN NOTE
Blood pressure remains elevated, will increase the carvedilol back to 6.25 mg twice daily.  Continue lisinopril at the current dose.

## 2025-02-02 NOTE — PROGRESS NOTES
CARDIOLOGY FOLLOW-UP PROGRESS NOTE        Chief Complaint  Follow-up, Coronary Artery Disease, and Hyperlipidemia    Subjective            Desiree Garcia presents to [facility]  History of Present Illness      Ms. Garcia is here for routine 6-month follow-up visit.  She has no new complaints today.  No recent episodes of chest pain.  He continues to have shortness of breath with activity with occasional cough.  No recent hospitalizations.  During last office visit, dose of carvedilol was increased due to high blood pressure.  However patient continues to take 3.25 mg without changing it.      Past History:    CAD : Index presentation to hospital on 6/14/2021 with acute inferior STEMI, status post angioplasty and stent placement to mid right coronary artery.     Mixed hyperlipidemia  Essential hypertension  Diabetes mellitus  Chronic obstructive pulmonary disease    Medical History:  Past Medical History:   Diagnosis Date    Arthritis     Cervical cancer screening     COPD (chronic obstructive pulmonary disease)     Coronary artery disease involving native heart without angina pectoris 9/30/2021    primary angioplasty and stent placement to mid right coronary artery with good angiographic results on 6/14/2021 after STEMI    Depression with anxiety     Diabetes mellitus     Forgetfulness     Gastric reflux     Hypertension     Leg paresthesia 01/20/2017    Right    Limb swelling     Lumbago 01/20/2017    Low back pain    Lumbar spinal stenosis 02/23/2017    L4/5 moderate to severe central canal stenosis    Lumbosacral radiculopathy 01/20/2017    Right    Migraines     Night sweat     Reflux esophagitis     Shortness of breath     ST elevation myocardial infarction (STEMI) (CMS/[facility]) 6/14/2021    Stomach disorder        Family History: family history includes Arthritis in her brother, father, mother, and sister; Cancer in her mother and sister; Diabetes in her brother and father;  Heart attack in her father; Heart disease in her brother and father; Stroke in her brother, father, mother, and sister.     Social History: reports that she has been smoking cigarettes. She started smoking about 58 years ago. She has a 58 pack-year smoking history. She has been exposed to tobacco smoke. She has never used smokeless tobacco. She reports that she does not drink alcohol and does not use drugs.    Allergies: Tramadol    Current Outpatient Medications on File Prior to Visit   Medication Sig    albuterol (PROVENTIL) (2.5 MG/3ML) 0.083% nebulizer solution 2.5 mg Every 4 (Four) Hours As Needed.    albuterol sulfate  (90 Base) MCG/ACT inhaler Inhale 2 puffs Every 4 (Four) Hours As Needed for Wheezing.    Aspirin Low Dose 81 MG EC tablet TAKE ONE TABLET BY MOUTH DAILY AT 9 AM FOR ACUTE HEART ATTACK    atorvastatin (LIPITOR) 40 MG tablet TAKE ONE TABLET BY MOUTH DAILY AT 9 PM AT NIGHT    clopidogrel (PLAVIX) 75 MG tablet TAKE ONE TABLET BY MOUTH DAILY AT 9 AM    Jardiance 25 MG tablet tablet Take 1 tablet by mouth Daily.    lisinopril (PRINIVIL,ZESTRIL) 10 MG tablet Take 2 tablets by mouth Daily.    metFORMIN (FORTAMET) 1000 MG (OSM) 24 hr tablet Take 1 tablet by mouth Daily With Breakfast for 30 days.    ondansetron ODT (ZOFRAN-ODT) 4 MG disintegrating tablet Place 1 tablet on the tongue.    Trelegy Ellipta 100-62.5-25 MCG/ACT inhaler INHALE 1 PUFF BY MOUTH DAILY    Trintellix 10 MG tablet tablet TAKE ONE TABLET BY MOUTH DAILY AT 9 AM  DAYS    vitamin D3 (Vitamin D) 125 MCG (5000 UT) capsule capsule Take 1 capsule by mouth Daily for 90 days.     No current facility-administered medications on file prior to visit.          Review of Systems   Respiratory:  Positive for cough and shortness of breath. Negative for wheezing.    Cardiovascular:  Negative for chest pain, palpitations and leg swelling.   Gastrointestinal:  Negative for nausea and vomiting.   Neurological:  Negative for dizziness and  "syncope.        Objective     /83   Pulse 96   Ht 167.6 cm (66\")   Wt 75.3 kg (166 lb)   BMI 26.79 kg/m²       Physical Exam    General : Alert, awake, no acute distress  Neck : Supple, no carotid bruit, no jugular venous distention  CVS : Regular rate and rhythm, no murmur, rubs or gallops  Lungs: Bilateral wheezing heard, no crackles  Abdomen: Soft, nontender, bowel sounds heard in all 4 quadrants  Extremities: Warm, well-perfused, no pedal edema    Result Review :     The following data was reviewed by: Sidney Xiao MD on 01/30/2025:    CMP          8/8/2024    16:27 10/6/2024    12:50   CMP   Glucose 227  269    BUN 7  5    Creatinine 0.61  0.67    EGFR 100.6  98.4    Sodium 138  138    Potassium 3.9  3.5    Chloride 100  96    Calcium 9.0  9.2    Total Protein 7.1  7.6    Albumin 3.9  3.8    Globulin 3.2  3.8    Total Bilirubin 0.2  0.7    Alkaline Phosphatase 141  168    AST (SGOT) 30  30    ALT (SGPT) 25  26    Albumin/Globulin Ratio 1.2  1.0    BUN/Creatinine Ratio 11.5  7.5    Anion Gap 11.0  13.3      CBC          10/6/2024    12:50   CBC   WBC 12.01    RBC 5.46    Hemoglobin 14.9    Hematocrit 46.6    MCV 85.3    MCH 27.3    MCHC 32.0    RDW 13.3    Platelets 269      TSH          8/8/2024    16:27   TSH   TSH 4.000               Data reviewed: Cardiology studies        Results for orders placed during the hospital encounter of 08/14/24    Adult Transthoracic Echo Complete W/ Cont if Necessary Per Protocol    Interpretation Summary    Left ventricular systolic function is normal. Left ventricular ejection fraction appears to be 56 - 60%.    Left ventricular diastolic function is consistent with (grade I) impaired relaxation.    There are no significant valvular abnormalities, based on Doppler study.    This is a technically difficult study.  Interpretation is based on limited available images.                   Assessment and Plan        Diagnoses and all orders for this visit:    1. " Coronary artery disease involving native coronary artery of native heart without angina pectoris (Primary)  Assessment & Plan:  She is currently stable with no angina but LV systolic function is preserved.  Recommend to continue Plavix, statins and beta-blockers.      2. Mixed hyperlipidemia  Assessment & Plan:  No lipid panel available for the past 4 years.  We will do lipid panel and CMP today (even though patient is not fasting, patient reports forgetfulness to have the labs done before follow-ups.).  Will make adjustments in dose of atorvastatin after reviewing the labs.    Orders:  -     Comprehensive Metabolic Panel; Future  -     Lipid Panel; Future    3. Hypertension, essential  Assessment & Plan:  Blood pressure remains elevated, will increase the carvedilol back to 6.25 mg twice daily.  Continue lisinopril at the current dose.    Orders:  -     carvedilol (COREG) 6.25 MG tablet; Take 1 tablet by mouth Every 12 (Twelve) Hours.  Dispense: 60 tablet; Refill: 11              Follow Up     Return for Next scheduled follow up, with Edith ESQIUVEL.    Patient was given instructions and counseling regarding her condition or for health maintenance advice. Please see specific information pulled into the AVS if appropriate.

## 2025-02-02 NOTE — PROGRESS NOTES
Labs done on 1/30/2025 reviewed.  Kidney functions, liver enzymes, sodium and potassium are within normal limits.    Cholesterol levels are reasonably well-controlled.  Total cholesterol and triglycerides are at acceptable levels.  LDL, bad cholesterol is at 94 and it is above the goal.  However this is not a fasting sample.  At this time, we recommend to continue atorvastatin 40 mg nightly.    Need fasting lipid panel in 6 months before the next follow-up visit.  Please schedule 6 months follow-up with June ESQUIVEL.      Electronically signed by Sidney Xiao MD, 02/01/25, 7:21 PM EST.

## 2025-02-02 NOTE — ASSESSMENT & PLAN NOTE
No lipid panel available for the past 4 years.  We will do lipid panel and CMP today (even though patient is not fasting, patient reports forgetfulness to have the labs done before follow-ups.).  Will make adjustments in dose of atorvastatin after reviewing the labs.

## 2025-02-03 ENCOUNTER — TELEPHONE (OUTPATIENT)
Dept: CARDIOLOGY | Facility: CLINIC | Age: 65
End: 2025-02-03
Payer: MEDICARE

## 2025-02-03 ENCOUNTER — PATIENT OUTREACH (OUTPATIENT)
Age: 65
End: 2025-02-03
Payer: MEDICARE

## 2025-02-03 DIAGNOSIS — E78.2 MIXED HYPERLIPIDEMIA: Primary | ICD-10-CM

## 2025-02-03 NOTE — TELEPHONE ENCOUNTER
----- Message from Sidney Xiao sent at 2/1/2025  7:21 PM EST -----  Labs done on 1/30/2025 reviewed.  Kidney functions, liver enzymes, sodium and potassium are within normal limits.    Cholesterol levels are reasonably well-controlled.  Total cholesterol and triglycerides are at acceptable levels.  LDL, bad cholesterol is at 94 and it is above the goal.  However this is not a fasting sample.  At this time, we recommend to continue atorvastatin 40 mg nightly.    Need fasting lipid panel in 6 months before the next follow-up visit.  Please schedule 6 months follow-up with June ESQUIVEL.      Electronically signed by Sidney Xiao MD, 02/01/25, 7:21 PM EST.

## 2025-02-03 NOTE — TELEPHONE ENCOUNTER
JUAN patient. Went over results and recommendations. Patient states she is having pain in her hip, RN clarified it is not from medications but per patient she needs hip replacement per Dr Yanez.     Patient is to have injection for hip soon. Encouraged patient to reach out to cardiology when she is ready to have procedure to work on cardiac clearance.     Scheduled f/u. Patient verbalized understanding and appreciation.     Reached out to Ortho to see if patient will need clearance for injection.     Scheduled f/u and placed lab order.

## 2025-02-03 NOTE — OUTREACH NOTE
MSW has attempted x3 to reach patient and UTR. Patient's voicemail is full, so unable to leave messages.    Ana MESA -   Ambulatory Case Management    2/3/2025, 11:33 EST

## 2025-02-03 NOTE — TELEPHONE ENCOUNTER
Procedure: Steroid Injection Hip    Medication Directive: Plavix and aspirin    PMH: CAD, HLD, HTN    Last Seen: 01/30/2025

## 2025-02-07 ENCOUNTER — APPOINTMENT (OUTPATIENT)
Dept: GENERAL RADIOLOGY | Facility: HOSPITAL | Age: 65
End: 2025-02-07
Payer: MEDICARE

## 2025-02-07 ENCOUNTER — HOSPITAL ENCOUNTER (OUTPATIENT)
Dept: INTERVENTIONAL RADIOLOGY/VASCULAR | Facility: HOSPITAL | Age: 65
Discharge: HOME | End: 2025-02-07
Payer: MEDICARE

## 2025-02-07 ENCOUNTER — HOSPITAL ENCOUNTER (EMERGENCY)
Facility: HOSPITAL | Age: 65
Discharge: HOME | End: 2025-02-07
Attending: EMERGENCY MEDICINE
Payer: MEDICARE

## 2025-02-07 ENCOUNTER — TELEPHONE (OUTPATIENT)
Dept: ORTHOPEDIC SURGERY | Facility: CLINIC | Age: 65
End: 2025-02-07

## 2025-02-07 VITALS
SYSTOLIC BLOOD PRESSURE: 152 MMHG | DIASTOLIC BLOOD PRESSURE: 82 MMHG | RESPIRATION RATE: 18 BRPM | OXYGEN SATURATION: 100 % | HEART RATE: 117 BPM | TEMPERATURE: 98.5 F

## 2025-02-07 DIAGNOSIS — M54.31 RIGHT SIDED SCIATICA: ICD-10-CM

## 2025-02-07 DIAGNOSIS — M19.90 ARTHRITIS: Primary | ICD-10-CM

## 2025-02-07 DIAGNOSIS — M16.11 PRIMARY OSTEOARTHRITIS OF RIGHT HIP: ICD-10-CM

## 2025-02-07 PROCEDURE — 25010000002 ORPHENADRINE CITRATE PER 60 MG: Performed by: NURSE PRACTITIONER

## 2025-02-07 PROCEDURE — 25010000002 KETOROLAC TROMETHAMINE PER 15 MG: Performed by: NURSE PRACTITIONER

## 2025-02-07 PROCEDURE — 25010000002 LIDOCAINE 2% SOLUTION: Performed by: ORTHOPAEDIC SURGERY

## 2025-02-07 PROCEDURE — 99283 EMERGENCY DEPT VISIT LOW MDM: CPT

## 2025-02-07 PROCEDURE — 96372 THER/PROPH/DIAG INJ SC/IM: CPT

## 2025-02-07 PROCEDURE — 25510000001 IOPAMIDOL 61 % SOLUTION: Performed by: ORTHOPAEDIC SURGERY

## 2025-02-07 PROCEDURE — 77002 NEEDLE LOCALIZATION BY XRAY: CPT

## 2025-02-07 PROCEDURE — 25010000002 BUPIVACAINE (PF) 0.5 % SOLUTION: Performed by: ORTHOPAEDIC SURGERY

## 2025-02-07 PROCEDURE — 73502 X-RAY EXAM HIP UNI 2-3 VIEWS: CPT

## 2025-02-07 PROCEDURE — 25010000002 METHYLPREDNISOLONE PER 80 MG: Performed by: ORTHOPAEDIC SURGERY

## 2025-02-07 RX ORDER — ORPHENADRINE CITRATE 100 MG/1
100 TABLET ORAL 2 TIMES DAILY PRN
Qty: 20 TABLET | Refills: 0 | Status: SHIPPED | OUTPATIENT
Start: 2025-02-07

## 2025-02-07 RX ORDER — KETOROLAC TROMETHAMINE 30 MG/ML
30 INJECTION, SOLUTION INTRAMUSCULAR; INTRAVENOUS ONCE
Status: COMPLETED | OUTPATIENT
Start: 2025-02-07 | End: 2025-02-07

## 2025-02-07 RX ORDER — BUPIVACAINE HYDROCHLORIDE 5 MG/ML
10 INJECTION, SOLUTION EPIDURAL; INTRACAUDAL ONCE
Status: COMPLETED | OUTPATIENT
Start: 2025-02-07 | End: 2025-02-07

## 2025-02-07 RX ORDER — DEXAMETHASONE 4 MG/1
4 TABLET ORAL 2 TIMES DAILY WITH MEALS
Qty: 14 TABLET | Refills: 0 | Status: SHIPPED | OUTPATIENT
Start: 2025-02-07 | End: 2025-02-14

## 2025-02-07 RX ORDER — KETOROLAC TROMETHAMINE 10 MG/1
10 TABLET, FILM COATED ORAL EVERY 6 HOURS PRN
Qty: 20 TABLET | Refills: 0 | Status: SHIPPED | OUTPATIENT
Start: 2025-02-07

## 2025-02-07 RX ORDER — LIDOCAINE HYDROCHLORIDE 20 MG/ML
20 INJECTION, SOLUTION INFILTRATION; PERINEURAL ONCE
Status: COMPLETED | OUTPATIENT
Start: 2025-02-07 | End: 2025-02-07

## 2025-02-07 RX ORDER — ORPHENADRINE CITRATE 30 MG/ML
60 INJECTION INTRAMUSCULAR; INTRAVENOUS ONCE
Status: COMPLETED | OUTPATIENT
Start: 2025-02-07 | End: 2025-02-07

## 2025-02-07 RX ORDER — IOPAMIDOL 612 MG/ML
15 INJECTION, SOLUTION INTRATHECAL
Status: COMPLETED | OUTPATIENT
Start: 2025-02-07 | End: 2025-02-07

## 2025-02-07 RX ORDER — METHYLPREDNISOLONE ACETATE 80 MG/ML
80 INJECTION, SUSPENSION INTRA-ARTICULAR; INTRALESIONAL; INTRAMUSCULAR; SOFT TISSUE ONCE
Status: COMPLETED | OUTPATIENT
Start: 2025-02-07 | End: 2025-02-07

## 2025-02-07 RX ADMIN — IOPAMIDOL 15 ML: 612 INJECTION, SOLUTION INTRATHECAL at 09:35

## 2025-02-07 RX ADMIN — METHYLPREDNISOLONE ACETATE 80 MG: 80 INJECTION, SUSPENSION INTRA-ARTICULAR; INTRALESIONAL; INTRAMUSCULAR; SOFT TISSUE at 09:36

## 2025-02-07 RX ADMIN — LIDOCAINE HYDROCHLORIDE 20 ML: 20 INJECTION, SOLUTION INFILTRATION; PERINEURAL at 09:33

## 2025-02-07 RX ADMIN — BUPIVACAINE HYDROCHLORIDE 4 ML: 5 INJECTION, SOLUTION EPIDURAL; INTRACAUDAL; PERINEURAL at 09:36

## 2025-02-07 RX ADMIN — KETOROLAC TROMETHAMINE 30 MG: 30 INJECTION, SOLUTION INTRAMUSCULAR; INTRAVENOUS at 22:09

## 2025-02-07 RX ADMIN — SODIUM BICARBONATE 1 MEQ: 84 INJECTION, SOLUTION INTRAVENOUS at 09:33

## 2025-02-07 RX ADMIN — ORPHENADRINE CITRATE 60 MG: 60 INJECTION INTRAMUSCULAR; INTRAVENOUS at 22:10

## 2025-02-07 NOTE — TELEPHONE ENCOUNTER
Provider:     Caller: Desiree Garcia    Relationship to Patient: Self    Phone Number: [phone]    Reason for Call: PATIENT CALLING CRYING IN PAIN. SAYS SHE IS IN HORRIBLE PAIN FOLLOWING HER 02/07/2025 INJECTION. ATTEMPTED TO WT WITH NO ANSWER. PLEASE ADVISE.

## 2025-02-08 NOTE — DISCHARGE INSTRUCTIONS
Rest.  Ice.  Medications as prescribed.    Follow-up with PCP and Ortho doc for further pain management    Use cane or walker for ambulation    Return for new or worsening symptoms

## 2025-02-08 NOTE — ED PROVIDER NOTES
Time: 8:57 PM EST  Date of encounter:  2025  Independent Historian/Clinical History and Information was obtained by:   Patient    History is limited by: N/A    Chief Complaint: Right leg pain      History of Present Illness:  Patient is a 64 y.o. year old female who presents to the emergency department for evaluation of Right leg pain.  Patient had a steroid shot in earlier today and now has increased pain and states she is not able to walk.      Patient Care Team  Primary Care Provider: Sandra Kaba MD    Past Medical History:     Allergies   Allergen Reactions    Tramadol Itching     Past Medical History:   Diagnosis Date    Arthritis     Cervical cancer screening     COPD (chronic obstructive pulmonary disease)     Coronary artery disease involving native heart without angina pectoris 2021    primary angioplasty and stent placement to mid right coronary artery with good angiographic results on 2021 after STEMI    Depression with anxiety     Diabetes mellitus     Forgetfulness     Gastric reflux     Hypertension     Leg paresthesia 2017    Right    Limb swelling     Lumbago 2017    Low back pain    Lumbar spinal stenosis 2017    L4/5 moderate to severe central canal stenosis    Lumbosacral radiculopathy 2017    Right    Migraines     Night sweat     Reflux esophagitis     Shortness of breath     ST elevation myocardial infarction (STEMI) (CMS/[facility]) 2021    Stomach disorder      Past Surgical History:   Procedure Laterality Date    ABDOMINAL SURGERY      3 C-SECTIONS    CARDIAC CATHETERIZATION      CARDIAC CATHETERIZATION N/A 2021    Procedure: Left Heart Cath;  Surgeon: Sidney Xiao MD;  Location: [facility] CATH INVASIVE LOCATION;  Service: Cardiology;  Laterality: N/A;     SECTION      x 3    CHOLECYSTECTOMY      COLONOSCOPY  2014    CORONARY STENT PLACEMENT      ENDOSCOPY      2007    FOOT SURGERY Right     HAND SURGERY      HYSTERECTOMY        Family History   Problem Relation Age of Onset    Stroke Mother     Cancer Mother         Unspecified    Arthritis Mother     Stroke Father     Heart disease Father     Diabetes Father         Unspecified type    Arthritis Father     Heart attack Father     Stroke Sister     Arthritis Sister     Cancer Sister     Stroke Brother     Heart disease Brother     Diabetes Brother         Unspecified type    Arthritis Brother        Home Medications:  Prior to Admission medications    Medication Sig Start Date End Date Taking? Authorizing Provider   albuterol (PROVENTIL) (2.5 MG/3ML) 0.083% nebulizer solution 2.5 mg Every 4 (Four) Hours As Needed. 8/8/24   Mara Hinojosa MD   albuterol sulfate  (90 Base) MCG/ACT inhaler Inhale 2 puffs Every 4 (Four) Hours As Needed for Wheezing. 8/8/24   Sandra Kaba MD   Aspirin Low Dose 81 MG EC tablet TAKE ONE TABLET BY MOUTH DAILY AT 9 AM FOR ACUTE HEART ATTACK 5/23/24   Edith Alcocer APRN   atorvastatin (LIPITOR) 40 MG tablet TAKE ONE TABLET BY MOUTH DAILY AT 9 PM AT NIGHT 5/23/24   Edith Alcocer APRN   carvedilol (COREG) 6.25 MG tablet Take 1 tablet by mouth Every 12 (Twelve) Hours. 1/30/25   Sidney Xiao MD   clopidogrel (PLAVIX) 75 MG tablet TAKE ONE TABLET BY MOUTH DAILY AT 9 AM 5/23/24   Edith Alcocer APRN   Jardiance 25 MG tablet tablet Take 1 tablet by mouth Daily. 1/7/25   Mara Hinojosa MD   lisinopril (PRINIVIL,ZESTRIL) 10 MG tablet Take 2 tablets by mouth Daily. 1/24/25   Edith Alcocer APRN   metFORMIN (FORTAMET) 1000 MG (OSM) 24 hr tablet Take 1 tablet by mouth Daily With Breakfast for 30 days. 1/23/25 2/22/25  Sandra Kaba MD   ondansetron ODT (ZOFRAN-ODT) 4 MG disintegrating tablet Place 1 tablet on the tongue. 10/7/24   Mara Hinojosa MD Trelegy Ellipta 100-62.5-25 MCG/ACT inhaler INHALE 1 PUFF BY MOUTH DAILY 1/15/25   Sandra Kaba MD   Trintellix 10 MG tablet tablet TAKE ONE TABLET BY MOUTH DAILY AT 9  AM  DAYS 8/13/24   Sandra Kaba MD   vitamin D3 (Vitamin D) 125 MCG (5000 UT) capsule capsule Take 1 capsule by mouth Daily for 90 days. 12/23/24 3/23/25  Sandra Kaba MD        Social History:   Social History     Tobacco Use    Smoking status: Every Day     Current packs/day: 1.00     Average packs/day: 1 pack/day for 58.1 years (58.1 ttl pk-yrs)     Types: Cigarettes     Start date: 1/14/1967     Passive exposure: Current    Smokeless tobacco: Never    Tobacco comments:     Smoked 21-30 years   Vaping Use    Vaping status: Never Used   Substance Use Topics    Alcohol use: Never     Comment: Does not drink    Drug use: Never         Review of Systems:  Review of Systems   Musculoskeletal:  Positive for arthralgias (Chronic bilateral hips.  Patient states she is supposed to have a hip replacement on the right soon), back pain (Chronic), gait problem (Painful due to muscle pain and like) and myalgias.   Skin:  Negative for color change, pallor and wound.   Neurological:  Negative for weakness and numbness.   Psychiatric/Behavioral: Negative.          Physical Exam:  /82 (BP Location: Right arm, Patient Position: Sitting)   Pulse 117   Temp 98.5 °F (36.9 °C) (Oral)   Resp 18   SpO2 100%     Physical Exam  HENT:      Head: Normocephalic.      Mouth/Throat:      Mouth: Mucous membranes are moist.   Eyes:      Conjunctiva/sclera: Conjunctivae normal.   Cardiovascular:      Pulses: Normal pulses.   Pulmonary:      Effort: Pulmonary effort is normal.   Abdominal:      General: There is no distension.      Tenderness: There is no right CVA tenderness or left CVA tenderness.   Musculoskeletal:         General: Tenderness (Tenderness in the right lateral and posterior hip) present.      Cervical back: Normal range of motion and neck supple.      Comments: Patient complains of pain with range of motion of right hip   Skin:     General: Skin is warm and dry.      Capillary Refill: Capillary refill  takes less than 2 seconds.   Neurological:      General: No focal deficit present.      Mental Status: She is alert and oriented to person, place, and time.      Sensory: No sensory deficit.      Motor: No weakness.   Psychiatric:         Mood and Affect: Mood normal.         Behavior: Behavior normal.                    Medical Decision Making:      Comorbidities that affect care:    Lumbar radiculopathy, COPD, Coronary Artery Disease, Diabetes, Hypertension, smoking    External Notes reviewed:    Previous Clinic Note: Patient seen by orthopedic surgery on January 30 for osteoarthritis of right hip      The following orders were placed and all results were independently analyzed by me:  Orders Placed This Encounter   Procedures    XR Hip With or Without Pelvis 2 - 3 View Right       Medications Given in the Emergency Department:  Medications   ketorolac (TORADOL) injection 30 mg (30 mg Intramuscular Given 2/7/25 2209)   orphenadrine (NORFLEX) injection 60 mg (60 mg Intramuscular Given 2/7/25 2210)        ED Course:    ED Course as of 02/08/25 0515   Fri Feb 07, 2025 2057 --- PROVIDER IN TRIAGE NOTE ---    The patient was evaluated by meNorma in triage. Orders were placed and the patient is currently awaiting disposition.    [AJ]      ED Course User Index  [AJ] Norma Garrido PA-C       Labs:    Lab Results (last 24 hours)       ** No results found for the last 24 hours. **             Imaging:    XR Hip With or Without Pelvis 2 - 3 View Right    Result Date: 2/7/2025  XR HIP W OR WO PELVIS 2-3 VIEW RIGHT Date of Exam: 2/7/2025 9:21 PM EST Indication: Pain after steroid injection Comparison: Pelvis and right hip radiograph 1/3/2025 Findings: Osteopenia. No fractures or dislocations. Severe joint space narrowing both hips. No aggressive osseous lesions.     Impression: Osteopenia. Severe arthritis of the hips. No fractures or dislocations. Electronically Signed: Tran Robles MD  2/7/2025 9:35 PM  "EST  Workstation ID: [id number]    FL Guide For Pain Meds Inj    Result Date: 2/7/2025  [place] GUIDE FOR PAIN MEDS INJECTION MAJOR JOINT Date of Exam: 2/7/2025 9:10 AM EST Indication: pain. Comparison: None available. Fluoro Time [mm:ss] : 0.1 minutes Number of Images: 1 Consent: Risks and benefits were discussed with the patient including but not limited to risk of bleeding, infection, allergic reaction/reaction to medications used and/or injury to adjacent structures. Alternatives were discussed with the patient. The patient verbalized understanding and elected to proceed with the procedure. Technique/Findings: Prior to the start of the procedure, patient rated their pain as a 10/10. Patient was placed in supine positioning on the fluoroscopy  table and the right hip was localized under fluoroscopy, site was marked with a \"dot\". The overlying skin was prepped and draped in sterile fashion. The skin was infiltrated with local anesthesia over the insertion site with 7 mL of 2% lidocaine to anesthetize the site, aspiration occurred after needle advancement to minimize risk of inadvertently administering lidocaine into a vessel. A 22 gauge needle was then advanced to gain access into joint space. Initially, A small amount of contrast dye injected to verify correct needle placement. Next, a steroid injection (1ml - Depo-Medrol 80 mg and 4ml - bupivacaine 0.5%) was easily injected into the joint space. Afterwards, the needle was then removed and a bandage was applied. Post-procedure patient rated their pain level a 8/10. Limited spot fluoro images were obtained.     Impression: Successful steroid joint injection of the right hip was performed without complication, patient tolerated procedure. The patient was instructed to obtain follow up care from the referring physician. Report dictated by: Citlaly King  I have personally reviewed this case and agree with the findings above: Electronically Signed: Escobar Veronica MD  " 2/7/2025 1:50 PM [facility]  Workstation ID: [id number]       Differential Diagnosis and Discussion:    Extremity Pain: Differential diagnosis includes but is not limited to soft tissue sprain, tendonitis, tendon injury, dislocation, fracture, deep vein thrombosis, arterial insufficiency, osteoarthritis, bursitis, and ligamentous damage.    PROCEDURES:    X-ray were performed in the emergency department and all X-ray impressions were independently interpreted by me.    No orders to display       Procedures    MDM  Number of Diagnoses or Management Options  Arthritis  Right sided sciatica  Diagnosis management comments: I have explained the patient´s condition, diagnoses and treatment plan based on the information available to me at this time. I have answered questions and addressed any concerns. The patient has a good  understanding of the patient´s diagnosis, condition, and treatment plan as can be expected at this point. The vital signs have been stable. The patient´s condition is stable and appropriate for discharge from the emergency department.      The patient will pursue further outpatient evaluation with the primary care physician or other designated or consulting physician as outlined in the discharge instructions. They are agreeable to this plan of care and follow-up instructions have been explained in detail. The patient has received these instructions in written format and have expressed an understanding of the discharge instructions. The patient is aware that any significant change in condition or worsening of symptoms should prompt an immediate return to this or the closest emergency department or call to 911.       Amount and/or Complexity of Data Reviewed  Tests in the radiology section of CPT®: reviewed and ordered  Tests in the medicine section of CPT®: ordered and reviewed    Risk of Complications, Morbidity, and/or Mortality  Presenting problems: low  Diagnostic procedures: low  Management options:  low    Patient Progress  Patient progress: stable             Patient Care Considerations:    NARCOTICS: I considered prescribing opiate pain medication as an outpatient, however this is a chronic recurrent problem patient can follow-up with her orthopedic surgery for pain management      Consultants/Shared Management Plan:    None    Social Determinants of Health:    Patient is independent, reliable, and has access to care.       Disposition and Care Coordination:    Discharged: The patient is suitable and stable for discharge with no need for consideration of admission.    I have explained the patient´s condition, diagnoses and treatment plan based on the information available to me at this time. I have answered questions and addressed any concerns. The patient has a good  understanding of the patient´s diagnosis, condition, and treatment plan as can be expected at this point. The vital signs have been stable. The patient´s condition is stable and appropriate for discharge from the emergency department.      The patient will pursue further outpatient evaluation with the primary care physician or other designated or consulting physician as outlined in the discharge instructions. They are agreeable to this plan of care and follow-up instructions have been explained in detail. The patient has received these instructions in written format and has expressed an understanding of the discharge instructions. The patient is aware that any significant change in condition or worsening of symptoms should prompt an immediate return to this or the closest emergency department or call to 911.  I have explained discharge medications and the need for follow up with the patient/caretakers. This was also printed in the discharge instructions. Patient was discharged with the following medications and follow up:      Medication List        New Prescriptions      dexAMETHasone 4 MG tablet  Commonly known as: DECADRON  Take 1 tablet by  mouth 2 (Two) Times a Day With Meals for 7 days.     ketorolac 10 MG tablet  Commonly known as: TORADOL  Take 1 tablet by mouth Every 6 (Six) Hours As Needed for Mild Pain, Moderate Pain or Severe Pain.     orphenadrine 100 MG 12 hr tablet  Commonly known as: NORFLEX  Take 1 tablet by mouth 2 (Two) Times a Day As Needed for Mild Pain or Muscle Spasms.               Where to Get Your Medications        These medications were sent to [facility] - [place], [place] - [address] - [phone]  - [phone]   [address] [facility] [address]      Phone: [phone]   dexAMETHasone 4 MG tablet  ketorolac 10 MG tablet  orphenadrine 100 MG 12 hr tablet      Sandra Kaba MD  [address]  [address]  [facility] [address]  [phone]    Schedule an appointment as soon as possible for a visit on 2/10/2025         Final diagnoses:   Arthritis   Right sided sciatica        ED Disposition       ED Disposition   Discharge    Condition   Stable    Comment   --               This medical record created using voice recognition software.             Kemi Ahn, ALVIN  02/08/25 0515

## 2025-02-10 ENCOUNTER — PATIENT OUTREACH (OUTPATIENT)
Dept: CASE MANAGEMENT | Facility: OTHER | Age: 65
End: 2025-02-10
Payer: MEDICARE

## 2025-02-10 DIAGNOSIS — R60.0 EDEMA OF RIGHT LOWER LEG DUE TO PERIPHERAL VENOUS INSUFFICIENCY: ICD-10-CM

## 2025-02-10 DIAGNOSIS — F41.8 DEPRESSION WITH ANXIETY: ICD-10-CM

## 2025-02-10 DIAGNOSIS — I87.2 EDEMA OF RIGHT LOWER LEG DUE TO PERIPHERAL VENOUS INSUFFICIENCY: ICD-10-CM

## 2025-02-10 DIAGNOSIS — E11.69 TYPE 2 DIABETES MELLITUS WITH OTHER SPECIFIED COMPLICATION, WITHOUT LONG-TERM CURRENT USE OF INSULIN: Primary | ICD-10-CM

## 2025-02-10 NOTE — TELEPHONE ENCOUNTER
Spoke to patient. Seen by ED on 02/07/2025 for pain. Patient declines to schedule f/u appointment with Dr. Yanez. Advised patient to call our office to schedule an appointment if needed in future.

## 2025-02-10 NOTE — OUTREACH NOTE
AMBULATORY CASE MANAGEMENT NOTE    Names and Relationships of Patient/Support Persons: Contact: Desiree Garcia; Relationship: Self -     Patient went to ER for pain, was given 2 shots, oral steroids and pain pills. Patient stated she feels better than she has in some time and has been able to walk. Patient and RN reviewed medications, patient stated she needs pain meds until her surgery because she can't do anything.     RN educated patient on nonpharm management, including exercising, watching TV, coloring. Patient stated nothing works and she watches TV all day. She stated she doesn't understand why Dr Kaba won't write her more pain meds. RN educated on possible referral for pain clinic to manage, due to limitations of pain meds able to be written by PCP.     Patient stated she is agreeable to pain clinic referral.     Care Coordination  RN emailed Dr Kaba and Chayo requesting pain clinic referral. Waiting for response.     Education Documentation  No documentation found.        Marisol BARRON  Ambulatory Case Management    2/10/2025, 13:46 EST

## 2025-02-11 DIAGNOSIS — M54.31 SCIATICA OF RIGHT SIDE: Primary | ICD-10-CM

## 2025-02-11 DIAGNOSIS — M16.11 PRIMARY OSTEOARTHRITIS OF RIGHT HIP: ICD-10-CM

## 2025-02-25 ENCOUNTER — TELEPHONE (OUTPATIENT)
Dept: CASE MANAGEMENT | Facility: OTHER | Age: 65
End: 2025-02-25
Payer: MEDICARE

## 2025-02-28 ENCOUNTER — PATIENT OUTREACH (OUTPATIENT)
Dept: CASE MANAGEMENT | Facility: OTHER | Age: 65
End: 2025-02-28
Payer: MEDICARE

## 2025-02-28 DIAGNOSIS — I25.10 CORONARY ARTERY DISEASE INVOLVING NATIVE CORONARY ARTERY OF NATIVE HEART WITHOUT ANGINA PECTORIS: Chronic | ICD-10-CM

## 2025-02-28 DIAGNOSIS — E11.69 TYPE 2 DIABETES MELLITUS WITH OTHER SPECIFIED COMPLICATION, WITHOUT LONG-TERM CURRENT USE OF INSULIN: Primary | ICD-10-CM

## 2025-02-28 NOTE — OUTREACH NOTE
[facility] End of Month Documentation    This Chronic Medical Management Care Plan for Desiree Garcia, 65 y.o. female, has been monitored and managed; reviewed; revised and a new plan of care implemented for the month of February.  A cumulative time of 21  minutes was spent on this patient record this month, including electronic communication with primary care provider; chart review; phone call with patient.    Regarding the patient's problems: has Type 2 diabetes mellitus, without long-term current use of insulin; COPD (chronic obstructive pulmonary disease); Depression with anxiety; Esophageal reflux; Forgetfulness; Leg paresthesia; Lumbar spinal stenosis; Migraines; Night sweat; Sciatica; Dyspnea on exertion; Thoracic or lumbosacral neuritis or radiculitis; Left wrist pain; Sprain of left wrist; Arthritis of knee, left; Contusion of left knee; Nondisplaced fracture of trapezium bone of left wrist with routine healing; Chronic left shoulder pain; Coronary artery disease involving native heart without angina pectoris; Hypertension, essential; Mixed hyperlipidemia; and Cigarette nicotine dependence with nicotine-induced disorder on their problem list., the following items were addressed: medical records; medications; referrals to community service providers and any changes can be found within the plan section of the note.  A detailed listing of time spent for chronic care management is tracked within each outreach encounter.  Current medications include:  has a current medication list which includes the following prescription(s): albuterol, albuterol sulfate hfa, aspirin low dose, atorvastatin, carvedilol, clopidogrel, jardiance, ketorolac, lisinopril, metformin, ondansetron odt, orphenadrine, trelegy ellipta, trintellix, and vitamin d3. and the patient is reported to be patient is noncompliant with medication protocol,  Medications are reported to be non-effective in controlling symptoms and changes have been made to  the medication protocol.  Regarding these diagnoses, referrals were made to the following provider(s):  NA.  All notes on chart for PCP to review.    The patient was monitored remotely for activity level; pain; medications; blood glucose.    The patient's physical needs include:  needs assistance with ADLs; medication education; help taking medications as prescribed.     The patient's mental support needs include:  continued support    The patient's cognitive support needs include:  continued support    The patient's psychosocial support needs include:  continued support    The patient's functional needs include: DME    The patient's environmental needs include:  resources for disability needs; no access to transportation    Care Plan overall comments:  No data recorded    Refer to previous outreach notes for more information on the areas listed above.    Monthly Billing Diagnoses  (E11.69) Type 2 diabetes mellitus with other specified complication, without long-term current use of insulin    (I25.10) Coronary artery disease involving native coronary artery of native heart without angina pectoris    Medications   Medications have been reconciled    Care Plan progress this month:      Recently Modified Care Plans Updates made since 1/28/2025 12:00 AM      No recently modified care plans.          Instructions   Patient was provided an electronic copy of care plan  CCM services were explained and offered and patient has accepted these services.  Patient has given their written consent to receive CCM services and understands that this includes the authorization of electronic communication of medical information with the other treating providers.  Patient understands that they may stop CCM services at any time and these changes will be effective at the end of the calendar month and will effectively revocate the agreement of CCM services.  Patient understands that only one practitioner can furnish and be paid for CCM  services during one calendar month.  Patient also understands that there may be co-payment or deductible fees in association with CCM services.  Patient will continue with at least monthly follow-up calls with the Ambulatory .    Marisol BARRON  Ambulatory Case Management    2/28/2025, 11:14 EST

## 2025-03-04 ENCOUNTER — TELEPHONE (OUTPATIENT)
Dept: CASE MANAGEMENT | Facility: OTHER | Age: 65
End: 2025-03-04
Payer: MEDICARE

## 2025-03-04 NOTE — TELEPHONE ENCOUNTER
UTR. Mailbox full, unable to leave message.    Will try again in 1 week.    Marisol THOMPSON RN  Ambulatory

## 2025-03-06 ENCOUNTER — OFFICE VISIT (OUTPATIENT)
Dept: GASTROENTEROLOGY | Facility: CLINIC | Age: 65
End: 2025-03-06
Payer: MEDICARE

## 2025-03-06 ENCOUNTER — TELEPHONE (OUTPATIENT)
Dept: GASTROENTEROLOGY | Facility: CLINIC | Age: 65
End: 2025-03-06
Payer: MEDICARE

## 2025-03-06 VITALS
DIASTOLIC BLOOD PRESSURE: 70 MMHG | BODY MASS INDEX: 26.68 KG/M2 | WEIGHT: 166 LBS | SYSTOLIC BLOOD PRESSURE: 129 MMHG | OXYGEN SATURATION: 96 % | HEIGHT: 66 IN | HEART RATE: 106 BPM

## 2025-03-06 DIAGNOSIS — K62.5 RECTAL BLEEDING: ICD-10-CM

## 2025-03-06 DIAGNOSIS — R14.0 ABDOMINAL BLOATING: ICD-10-CM

## 2025-03-06 DIAGNOSIS — R19.7 DIARRHEA, UNSPECIFIED TYPE: ICD-10-CM

## 2025-03-06 DIAGNOSIS — R19.4 ALTERED BOWEL HABITS: Primary | ICD-10-CM

## 2025-03-06 DIAGNOSIS — J30.5 ALLERGIC RHINITIS DUE TO FOOD: ICD-10-CM

## 2025-03-06 RX ORDER — SODIUM PICOSULFATE, MAGNESIUM OXIDE, AND ANHYDROUS CITRIC ACID 12; 3.5; 1 G/175ML; G/175ML; MG/175ML
175 LIQUID ORAL ONCE
Qty: 350 ML | Refills: 0 | Status: SHIPPED | OUTPATIENT
Start: 2025-03-06 | End: 2025-03-06

## 2025-03-06 NOTE — H&P (VIEW-ONLY)
Chief Complaint        Diarrhea (Patient states that anything that she eats, immediately comes back out. Patient stated that she occasionally suffers from gas pains, but does not take anything to help. Patient stated she sees some blood in her bm 1-2 times a week. Patient does not take anything to help with the diarrhea. Patient has never had a colonoscopy. Patient does not believe her food digests.)    Patient or patient representative verbalized consent for the use of Ambient Listening during the visit with  ALVIN Dunn for chart documentation. 3/6/2025  15:00 EST    History of Present Illness      Desiree Garcia is a 65 y.o. female who presents to [facility] as a new patient   History of Present Illness  The patient is a 65-year-old female presenting to the office today as a new patient for evaluation of diarrhea.    She was referred here by her primary care physician due to persistent diarrhea, which has been a lifelong issue. She reports no history of constipation. Her symptoms are particularly triggered by the consumption of greasy foods, leading to immediate bowel movements. Occasionally, she experiences abdominal pain, akin to gas discomfort, and bloating. She has not experienced any weight loss but admits to occasional hematochezia and melena. She has never undergone a colonoscopy and expresses fear about the procedure, particularly the potential diagnosis of cancer. She has not had any stool studies or food allergy panels conducted in the past. She has not had any blood work done to check for food allergies. She has a history of convex chest, which required her to consume baby food or crushed food during her childhood. She does not report any dysphagia but mentions infrequent heartburn. She is currently on Plavix for blood thinning and is under the care of a cardiologist.    Supplemental Information  Her surgical history includes three C-sections, a partial  "hysterectomy, foot amputation, and cholecystectomy performed 40 years ago.    FAMILY HISTORY  She does not have a family history of colon cancer that she is aware of. She mentions a family history of various cancers, including brain cancer, stomach cancer, and lung cancer.    MEDICATIONS  Plavix      Labs: 01/30/2025    CT Abdomen Pelvis With Contrast 10/06/2024  1. No acute process demonstrated. No ureterolithiasis or obstructive uropathy  2. Hepatic steatosis  3. Bilateral adrenal nodularity which may be due to the presence of small adrenal adenomas or less likely nodular hyperplasia  4. 9 mm left lower lobe nodule. Recommend nonemergent dedicated CT chest      Results       Result Review :   The following data was reviewed by: ALVIN Dunn on 03/06/2025     CMP          8/8/2024    16:27 10/6/2024    12:50 1/30/2025    13:50   CMP   Glucose 227  269  155    BUN 7  5  10    Creatinine 0.61  0.67  0.74    EGFR 100.6  98.4  91.0    Sodium 138  138  140    Potassium 3.9  3.5  3.9    Chloride 100  96  102    Calcium 9.0  9.2  9.7    Total Protein 7.1  7.6  7.3    Albumin 3.9  3.8  3.7    Globulin 3.2  3.8  3.6    Total Bilirubin 0.2  0.7  0.4    Alkaline Phosphatase 141  168  134    AST (SGOT) 30  30  20    ALT (SGPT) 25  26  18    Albumin/Globulin Ratio 1.2  1.0  1.0    BUN/Creatinine Ratio 11.5  7.5  13.5    Anion Gap 11.0  13.3  9.5      CBC          10/6/2024    12:50   CBC   WBC 12.01    RBC 5.46    Hemoglobin 14.9    Hematocrit 46.6    MCV 85.3    MCH 27.3    MCHC 32.0    RDW 13.3    Platelets 269        Iron Profile   Transferrin   Date Value Ref Range Status   06/16/2021 223 200 - 360 mg/dL Final     Ferritin No results found for: \"FERRITIN\"         Results          Past Medical History       Past Medical History:   Diagnosis Date    Arthritis     Cervical cancer screening     COPD (chronic obstructive pulmonary disease)     Coronary artery disease involving native heart without angina pectoris 9/30/2021 "    primary angioplasty and stent placement to mid right coronary artery with good angiographic results on 2021 after STEMI    Depression with anxiety     Diabetes mellitus     Forgetfulness     Gastric reflux     Hypertension     Leg paresthesia 2017    Right    Limb swelling     Lumbago 2017    Low back pain    Lumbar spinal stenosis 2017    L4/5 moderate to severe central canal stenosis    Lumbosacral radiculopathy 2017    Right    Migraines     Night sweat     Reflux esophagitis     Shortness of breath     ST elevation myocardial infarction (STEMI) (CMS/HCC) 2021    Stomach disorder        Past Surgical History:   Procedure Laterality Date    ABDOMINAL SURGERY      3 C-SECTIONS    CARDIAC CATHETERIZATION      CARDIAC CATHETERIZATION N/A 2021    Procedure: Left Heart Cath;  Surgeon: Sidney Xiao MD;  Location: [facility] CATH INVASIVE LOC[facility];  Service: Cardiology;  Laterality: N/A;     SECTION      x 3    CHOLECYSTECTOMY      COLONOSCOPY      CORONARY STENT PLACEMENT      ENDOSCOPY      2007    FOOT SURGERY Right     HAND SURGERY      HYSTERECTOMY  1985         Current Outpatient Medications:     albuterol (PROVENTIL) (2.5 MG/3ML) 0.083% nebulizer solution, 2.5 mg Every 4 (Four) Hours As Needed., Disp: , Rfl:     albuterol sulfate  (90 Base) MCG/ACT inhaler, Inhale 2 puffs Every 4 (Four) Hours As Needed for Wheezing., Disp: 18 g, Rfl: 5    Aspirin Low Dose 81 MG EC tablet, TAKE ONE TABLET BY MOUTH DAILY AT 9 AM FOR ACUTE HEART ATTACK, Disp: 90 tablet, Rfl: 11    atorvastatin (LIPITOR) 40 MG tablet, TAKE ONE TABLET BY MOUTH DAILY AT 9 PM AT NIGHT, Disp: 90 tablet, Rfl: 11    carvedilol (COREG) 6.25 MG tablet, Take 1 tablet by mouth Every 12 (Twelve) Hours., Disp: 60 tablet, Rfl: 11    clopidogrel (PLAVIX) 75 MG tablet, TAKE ONE TABLET BY MOUTH DAILY AT 9 AM, Disp: 90 tablet, Rfl: 11    Jardiance 25 MG tablet tablet, Take 1 tablet by mouth Daily.,  "Disp: , Rfl:     ketorolac (TORADOL) 10 MG tablet, Take 1 tablet by mouth Every 6 (Six) Hours As Needed for Mild Pain, Moderate Pain or Severe Pain., Disp: 20 tablet, Rfl: 0    lisinopril (PRINIVIL,ZESTRIL) 10 MG tablet, Take 2 tablets by mouth Daily., Disp: 180 tablet, Rfl: 3    ondansetron ODT (ZOFRAN-ODT) 4 MG disintegrating tablet, Place 1 tablet on the tongue., Disp: , Rfl:     orphenadrine (NORFLEX) 100 MG 12 hr tablet, Take 1 tablet by mouth 2 (Two) Times a Day As Needed for Mild Pain or Muscle Spasms., Disp: 20 tablet, Rfl: 0    Trelegy Ellipta 100-62.5-25 MCG/ACT inhaler, INHALE 1 PUFF BY MOUTH DAILY, Disp: 60 each, Rfl: 11    Trintellix 10 MG tablet tablet, TAKE ONE TABLET BY MOUTH DAILY AT 9 AM  DAYS, Disp: 90 tablet, Rfl: 3    vitamin D3 (Vitamin D) 125 MCG (5000 UT) capsule capsule, Take 1 capsule by mouth Daily for 90 days., Disp: 90 capsule, Rfl: 0    metFORMIN (FORTAMET) 1000 MG (OSM) 24 hr tablet, Take 1 tablet by mouth Daily With Breakfast for 30 days., Disp: 30 tablet, Rfl: 2    Sod Picosulfate-Mag Ox-Cit Acd (Clenpiq) 10-3.5-12 MG-GM -GM/175ML solution, Take 175 mL by mouth 1 (One) Time for 1 dose., Disp: 350 mL, Rfl: 0     Allergies   Allergen Reactions    Tramadol Itching       Family History   Problem Relation Age of Onset    Stroke Mother     Cancer Mother         Unspecified    Arthritis Mother     Stroke Father     Heart disease Father     Diabetes Father         Unspecified type    Arthritis Father     Heart attack Father     Stroke Sister     Arthritis Sister     Cancer Sister     Stroke Brother     Heart disease Brother     Diabetes Brother         Unspecified type    Arthritis Brother     Colon cancer Neg Hx         Social History     Social History Narrative    Lives alone       Objective       Objective     Vital Signs:   /70 (BP Location: Right arm, Patient Position: Sitting, Cuff Size: Adult)   Pulse 106   Ht 167.6 cm (65.98\")   Wt 75.3 kg (166 lb)   SpO2 96%   BMI " 26.81 kg/m²     Body mass index is 26.81 kg/m².    Physical Exam  Constitutional:       Appearance: Normal appearance.   Pulmonary:      Effort: Pulmonary effort is normal.   Neurological:      General: No focal deficit present.      Mental Status: She is alert and oriented to person, place, and time.   Psychiatric:         Mood and Affect: Mood normal.         Behavior: Behavior normal.         Physical Exam                 Assessment & Plan          Assessment and Plan    Diagnoses and all orders for this visit:    1. Altered bowel habits (Primary)  -     Enteric Parasite Panel - Stool, Per Rectum; Future  -     Enteric Bacterial Panel - Stool, Per Rectum; Future  -     Fecal Fat, Qualitative - Stool, Per Rectum; Future  -     Occult Blood, Fecal By Immunoassay - Stool, Per Rectum; Future  -     Clostridioides difficile Toxin, PCR - Stool, Per Rectum; Future  -     Fecal Lactoferrin Qual. - Stool, Per Rectum; Future  -     Pancreatic Elastase, Fecal - Stool, Per Rectum; Future  -     Calprotectin, Fecal - Stool, Per Rectum; Future  -     Allergens (52) Food; Future  -     Alpha-Gal IgE Panel; Future  -     Celiac Disease Panel; Future  -     Case Request; Standing  -     Follow Anesthesia Guidelines / Protocol; Standing  -     Follow Anesthesia Guidelines / Protocol; Future  -     Verify NPO; Standing  -     Verify Bowel Prep Was Successful; Standing  -     Give Tap Water Enema If Bowel Prep Insufficient; Standing  -     Case Request    2. Diarrhea, unspecified type  -     Enteric Parasite Panel - Stool, Per Rectum; Future  -     Enteric Bacterial Panel - Stool, Per Rectum; Future  -     Fecal Fat, Qualitative - Stool, Per Rectum; Future  -     Occult Blood, Fecal By Immunoassay - Stool, Per Rectum; Future  -     Clostridioides difficile Toxin, PCR - Stool, Per Rectum; Future  -     Fecal Lactoferrin Qual. - Stool, Per Rectum; Future  -     Pancreatic Elastase, Fecal - Stool, Per Rectum; Future  -      Calprotectin, Fecal - Stool, Per Rectum; Future  -     Allergens (52) Food; Future  -     Alpha-Gal IgE Panel; Future  -     Celiac Disease Panel; Future  -     Case Request; Standing  -     Follow Anesthesia Guidelines / Protocol; Standing  -     Follow Anesthesia Guidelines / Protocol; Future  -     Verify NPO; Standing  -     Verify Bowel Prep Was Successful; Standing  -     Give Tap Water Enema If Bowel Prep Insufficient; Standing  -     Case Request    3. Rectal bleeding  -     Enteric Parasite Panel - Stool, Per Rectum; Future  -     Enteric Bacterial Panel - Stool, Per Rectum; Future  -     Fecal Fat, Qualitative - Stool, Per Rectum; Future  -     Occult Blood, Fecal By Immunoassay - Stool, Per Rectum; Future  -     Clostridioides difficile Toxin, PCR - Stool, Per Rectum; Future  -     Fecal Lactoferrin Qual. - Stool, Per Rectum; Future  -     Pancreatic Elastase, Fecal - Stool, Per Rectum; Future  -     Calprotectin, Fecal - Stool, Per Rectum; Future  -     Allergens (52) Food; Future  -     Alpha-Gal IgE Panel; Future  -     Celiac Disease Panel; Future  -     Case Request; Standing  -     Follow Anesthesia Guidelines / Protocol; Standing  -     Follow Anesthesia Guidelines / Protocol; Future  -     Verify NPO; Standing  -     Verify Bowel Prep Was Successful; Standing  -     Give Tap Water Enema If Bowel Prep Insufficient; Standing  -     Case Request    4. Abdominal bloating  -     Enteric Parasite Panel - Stool, Per Rectum; Future  -     Enteric Bacterial Panel - Stool, Per Rectum; Future  -     Fecal Fat, Qualitative - Stool, Per Rectum; Future  -     Occult Blood, Fecal By Immunoassay - Stool, Per Rectum; Future  -     Clostridioides difficile Toxin, PCR - Stool, Per Rectum; Future  -     Fecal Lactoferrin Qual. - Stool, Per Rectum; Future  -     Pancreatic Elastase, Fecal - Stool, Per Rectum; Future  -     Calprotectin, Fecal - Stool, Per Rectum; Future  -     Allergens (52) Food; Future  -      Alpha-Gal IgE Panel; Future  -     Celiac Disease Panel; Future  -     Case Request; Standing  -     Follow Anesthesia Guidelines / Protocol; Standing  -     Follow Anesthesia Guidelines / Protocol; Future  -     Verify NPO; Standing  -     Verify Bowel Prep Was Successful; Standing  -     Give Tap Water Enema If Bowel Prep Insufficient; Standing  -     Case Request    5. Allergic rhinitis due to food  -     Allergens (52) Food; Future  -     Alpha-Gal IgE Panel; Future  -     Case Request; Standing  -     Follow Anesthesia Guidelines / Protocol; Standing  -     Follow Anesthesia Guidelines / Protocol; Future  -     Verify NPO; Standing  -     Verify Bowel Prep Was Successful; Standing  -     Give Tap Water Enema If Bowel Prep Insufficient; Standing  -     Case Request    Other orders  -     Sod Picosulfate-Mag Ox-Cit Acd (Clenpiq) 10-3.5-12 MG-GM -GM/175ML solution; Take 175 mL by mouth 1 (One) Time for 1 dose.  Dispense: 350 mL; Refill: 0      Assessment & Plan  1. Diarrhea.  She reports chronic diarrhea, particularly after consuming greasy foods, accompanied by occasional abdominal pain, bloating, and sometimes blood in the stool. She has no history of constipation and has never had a colonoscopy. A colonoscopy is recommended to rule out cancer and other potential issues such as polyps. The risks of the procedure, including sedation, infection, perforation, and bleeding, were discussed. She will need a  on the day of the procedure due to sedation. A prescription for colon prep medication has been provided, which she will  at [facility]. Blood work has been ordered to check for food allergies that might be contributing to her symptoms.  Stool studies ordered.    PROCEDURE  I have recommended that the patient undergo further evaluation with a colonoscopy.  I have discussed this procedure in detail with the patient.  I have discussed the risks, benefits and alternatives.  I have discussed the  risk of anesthesia, bleeding and perforation.  Patient understands these risks, benefits and alternatives and wishes to proceed.  I will schedule her at her earliest convenience. Patient agreeable to this plan and will call with any questions or concerns.             Follow Up       Follow Up   Return for Follow up after endoscopy in office.  Patient was given instructions and counseling regarding her condition or for health maintenance advice. Please see specific information pulled into the AVS if appropriate.

## 2025-03-06 NOTE — TELEPHONE ENCOUNTER
3/6/2025    Dear DR KNOX,     Patient: Desiree Garcia   YOB: 1960        This patient is waiting to have a Colonoscopy which I will perform at [facility] on 03.18.25 .     Our records indicate this patient is currently taking PLAVIX. This procedure requires the patient to suspend their anticoagulant medication prior to surgery.     Please respond to this request noting your recommendations. You may contact our office at [phone] Option 1 with any questions. I appreciate your prompt response in this matter.     Please return this form to our office no later than two weeks prior to the procedure date listed above. Please return form to [phone].     ____ I approve my patient to stop taking their PLAVIX 5 days prior to the scheduled procedure.    ____ I do NOT approve my patient to stop taking their PLAVIX at this time.      ____ I approve my patient from a Cardiac  standpoint    ____ I do NOT approve my patient from a Cardiac  standpoint at this time      Please specify clearance expiration date:____________________________________      Approving physician name (please print): _____________________________________________      Approving physician signature: ________________________________ Date:________________    Sincerely,  [facility] - Gastroenterology   Dr. KILO HUERTA          Please fax approval or denial to our office as soon as possible.

## 2025-03-06 NOTE — PROGRESS NOTES
Chief Complaint        Diarrhea (Patient states that anything that she eats, immediately comes back out. Patient stated that she occasionally suffers from gas pains, but does not take anything to help. Patient stated she sees some blood in her bm 1-2 times a week. Patient does not take anything to help with the diarrhea. Patient has never had a colonoscopy. Patient does not believe her food digests.)    Patient or patient representative verbalized consent for the use of Ambient Listening during the visit with  ALVIN Dunn for chart documentation. 3/6/2025  15:00 EST    History of Present Illness      Desiree Garcia is a 65 y.o. female who presents to [facility] as a new patient   History of Present Illness  The patient is a 65-year-old female presenting to the office today as a new patient for evaluation of diarrhea.    She was referred here by her primary care physician due to persistent diarrhea, which has been a lifelong issue. She reports no history of constipation. Her symptoms are particularly triggered by the consumption of greasy foods, leading to immediate bowel movements. Occasionally, she experiences abdominal pain, akin to gas discomfort, and bloating. She has not experienced any weight loss but admits to occasional hematochezia and melena. She has never undergone a colonoscopy and expresses fear about the procedure, particularly the potential diagnosis of cancer. She has not had any stool studies or food allergy panels conducted in the past. She has not had any blood work done to check for food allergies. She has a history of convex chest, which required her to consume baby food or crushed food during her childhood. She does not report any dysphagia but mentions infrequent heartburn. She is currently on Plavix for blood thinning and is under the care of a cardiologist.    Supplemental Information  Her surgical history includes three C-sections, a partial  "hysterectomy, foot amputation, and cholecystectomy performed 40 years ago.    FAMILY HISTORY  She does not have a family history of colon cancer that she is aware of. She mentions a family history of various cancers, including brain cancer, stomach cancer, and lung cancer.    MEDICATIONS  Plavix      Labs: 01/30/2025    CT Abdomen Pelvis With Contrast 10/06/2024  1. No acute process demonstrated. No ureterolithiasis or obstructive uropathy  2. Hepatic steatosis  3. Bilateral adrenal nodularity which may be due to the presence of small adrenal adenomas or less likely nodular hyperplasia  4. 9 mm left lower lobe nodule. Recommend nonemergent dedicated CT chest      Results       Result Review :   The following data was reviewed by: ALVIN Dunn on 03/06/2025     CMP          8/8/2024    16:27 10/6/2024    12:50 1/30/2025    13:50   CMP   Glucose 227  269  155    BUN 7  5  10    Creatinine 0.61  0.67  0.74    EGFR 100.6  98.4  91.0    Sodium 138  138  140    Potassium 3.9  3.5  3.9    Chloride 100  96  102    Calcium 9.0  9.2  9.7    Total Protein 7.1  7.6  7.3    Albumin 3.9  3.8  3.7    Globulin 3.2  3.8  3.6    Total Bilirubin 0.2  0.7  0.4    Alkaline Phosphatase 141  168  134    AST (SGOT) 30  30  20    ALT (SGPT) 25  26  18    Albumin/Globulin Ratio 1.2  1.0  1.0    BUN/Creatinine Ratio 11.5  7.5  13.5    Anion Gap 11.0  13.3  9.5      CBC          10/6/2024    12:50   CBC   WBC 12.01    RBC 5.46    Hemoglobin 14.9    Hematocrit 46.6    MCV 85.3    MCH 27.3    MCHC 32.0    RDW 13.3    Platelets 269        Iron Profile   Transferrin   Date Value Ref Range Status   06/16/2021 223 200 - 360 mg/dL Final     Ferritin No results found for: \"FERRITIN\"         Results          Past Medical History       Past Medical History:   Diagnosis Date    Arthritis     Cervical cancer screening     COPD (chronic obstructive pulmonary disease)     Coronary artery disease involving native heart without angina pectoris 9/30/2021 "    primary angioplasty and stent placement to mid right coronary artery with good angiographic results on 2021 after STEMI    Depression with anxiety     Diabetes mellitus     Forgetfulness     Gastric reflux     Hypertension     Leg paresthesia 2017    Right    Limb swelling     Lumbago 2017    Low back pain    Lumbar spinal stenosis 2017    L4/5 moderate to severe central canal stenosis    Lumbosacral radiculopathy 2017    Right    Migraines     Night sweat     Reflux esophagitis     Shortness of breath     ST elevation myocardial infarction (STEMI) (CMS/HCC) 2021    Stomach disorder        Past Surgical History:   Procedure Laterality Date    ABDOMINAL SURGERY      3 C-SECTIONS    CARDIAC CATHETERIZATION      CARDIAC CATHETERIZATION N/A 2021    Procedure: Left Heart Cath;  Surgeon: Sidney Xiao MD;  Location: [facility] CATH INVASIVE LOC[facility];  Service: Cardiology;  Laterality: N/A;     SECTION      x 3    CHOLECYSTECTOMY      COLONOSCOPY      CORONARY STENT PLACEMENT      ENDOSCOPY      2007    FOOT SURGERY Right     HAND SURGERY      HYSTERECTOMY  1985         Current Outpatient Medications:     albuterol (PROVENTIL) (2.5 MG/3ML) 0.083% nebulizer solution, 2.5 mg Every 4 (Four) Hours As Needed., Disp: , Rfl:     albuterol sulfate  (90 Base) MCG/ACT inhaler, Inhale 2 puffs Every 4 (Four) Hours As Needed for Wheezing., Disp: 18 g, Rfl: 5    Aspirin Low Dose 81 MG EC tablet, TAKE ONE TABLET BY MOUTH DAILY AT 9 AM FOR ACUTE HEART ATTACK, Disp: 90 tablet, Rfl: 11    atorvastatin (LIPITOR) 40 MG tablet, TAKE ONE TABLET BY MOUTH DAILY AT 9 PM AT NIGHT, Disp: 90 tablet, Rfl: 11    carvedilol (COREG) 6.25 MG tablet, Take 1 tablet by mouth Every 12 (Twelve) Hours., Disp: 60 tablet, Rfl: 11    clopidogrel (PLAVIX) 75 MG tablet, TAKE ONE TABLET BY MOUTH DAILY AT 9 AM, Disp: 90 tablet, Rfl: 11    Jardiance 25 MG tablet tablet, Take 1 tablet by mouth Daily.,  "Disp: , Rfl:     ketorolac (TORADOL) 10 MG tablet, Take 1 tablet by mouth Every 6 (Six) Hours As Needed for Mild Pain, Moderate Pain or Severe Pain., Disp: 20 tablet, Rfl: 0    lisinopril (PRINIVIL,ZESTRIL) 10 MG tablet, Take 2 tablets by mouth Daily., Disp: 180 tablet, Rfl: 3    ondansetron ODT (ZOFRAN-ODT) 4 MG disintegrating tablet, Place 1 tablet on the tongue., Disp: , Rfl:     orphenadrine (NORFLEX) 100 MG 12 hr tablet, Take 1 tablet by mouth 2 (Two) Times a Day As Needed for Mild Pain or Muscle Spasms., Disp: 20 tablet, Rfl: 0    Trelegy Ellipta 100-62.5-25 MCG/ACT inhaler, INHALE 1 PUFF BY MOUTH DAILY, Disp: 60 each, Rfl: 11    Trintellix 10 MG tablet tablet, TAKE ONE TABLET BY MOUTH DAILY AT 9 AM  DAYS, Disp: 90 tablet, Rfl: 3    vitamin D3 (Vitamin D) 125 MCG (5000 UT) capsule capsule, Take 1 capsule by mouth Daily for 90 days., Disp: 90 capsule, Rfl: 0    metFORMIN (FORTAMET) 1000 MG (OSM) 24 hr tablet, Take 1 tablet by mouth Daily With Breakfast for 30 days., Disp: 30 tablet, Rfl: 2    Sod Picosulfate-Mag Ox-Cit Acd (Clenpiq) 10-3.5-12 MG-GM -GM/175ML solution, Take 175 mL by mouth 1 (One) Time for 1 dose., Disp: 350 mL, Rfl: 0     Allergies   Allergen Reactions    Tramadol Itching       Family History   Problem Relation Age of Onset    Stroke Mother     Cancer Mother         Unspecified    Arthritis Mother     Stroke Father     Heart disease Father     Diabetes Father         Unspecified type    Arthritis Father     Heart attack Father     Stroke Sister     Arthritis Sister     Cancer Sister     Stroke Brother     Heart disease Brother     Diabetes Brother         Unspecified type    Arthritis Brother     Colon cancer Neg Hx         Social History     Social History Narrative    Lives alone       Objective       Objective     Vital Signs:   /70 (BP Location: Right arm, Patient Position: Sitting, Cuff Size: Adult)   Pulse 106   Ht 167.6 cm (65.98\")   Wt 75.3 kg (166 lb)   SpO2 96%   BMI " 26.81 kg/m²     Body mass index is 26.81 kg/m².    Physical Exam  Constitutional:       Appearance: Normal appearance.   Pulmonary:      Effort: Pulmonary effort is normal.   Neurological:      General: No focal deficit present.      Mental Status: She is alert and oriented to person, place, and time.   Psychiatric:         Mood and Affect: Mood normal.         Behavior: Behavior normal.         Physical Exam                 Assessment & Plan          Assessment and Plan    Diagnoses and all orders for this visit:    1. Altered bowel habits (Primary)  -     Enteric Parasite Panel - Stool, Per Rectum; Future  -     Enteric Bacterial Panel - Stool, Per Rectum; Future  -     Fecal Fat, Qualitative - Stool, Per Rectum; Future  -     Occult Blood, Fecal By Immunoassay - Stool, Per Rectum; Future  -     Clostridioides difficile Toxin, PCR - Stool, Per Rectum; Future  -     Fecal Lactoferrin Qual. - Stool, Per Rectum; Future  -     Pancreatic Elastase, Fecal - Stool, Per Rectum; Future  -     Calprotectin, Fecal - Stool, Per Rectum; Future  -     Allergens (52) Food; Future  -     Alpha-Gal IgE Panel; Future  -     Celiac Disease Panel; Future  -     Case Request; Standing  -     Follow Anesthesia Guidelines / Protocol; Standing  -     Follow Anesthesia Guidelines / Protocol; Future  -     Verify NPO; Standing  -     Verify Bowel Prep Was Successful; Standing  -     Give Tap Water Enema If Bowel Prep Insufficient; Standing  -     Case Request    2. Diarrhea, unspecified type  -     Enteric Parasite Panel - Stool, Per Rectum; Future  -     Enteric Bacterial Panel - Stool, Per Rectum; Future  -     Fecal Fat, Qualitative - Stool, Per Rectum; Future  -     Occult Blood, Fecal By Immunoassay - Stool, Per Rectum; Future  -     Clostridioides difficile Toxin, PCR - Stool, Per Rectum; Future  -     Fecal Lactoferrin Qual. - Stool, Per Rectum; Future  -     Pancreatic Elastase, Fecal - Stool, Per Rectum; Future  -      Calprotectin, Fecal - Stool, Per Rectum; Future  -     Allergens (52) Food; Future  -     Alpha-Gal IgE Panel; Future  -     Celiac Disease Panel; Future  -     Case Request; Standing  -     Follow Anesthesia Guidelines / Protocol; Standing  -     Follow Anesthesia Guidelines / Protocol; Future  -     Verify NPO; Standing  -     Verify Bowel Prep Was Successful; Standing  -     Give Tap Water Enema If Bowel Prep Insufficient; Standing  -     Case Request    3. Rectal bleeding  -     Enteric Parasite Panel - Stool, Per Rectum; Future  -     Enteric Bacterial Panel - Stool, Per Rectum; Future  -     Fecal Fat, Qualitative - Stool, Per Rectum; Future  -     Occult Blood, Fecal By Immunoassay - Stool, Per Rectum; Future  -     Clostridioides difficile Toxin, PCR - Stool, Per Rectum; Future  -     Fecal Lactoferrin Qual. - Stool, Per Rectum; Future  -     Pancreatic Elastase, Fecal - Stool, Per Rectum; Future  -     Calprotectin, Fecal - Stool, Per Rectum; Future  -     Allergens (52) Food; Future  -     Alpha-Gal IgE Panel; Future  -     Celiac Disease Panel; Future  -     Case Request; Standing  -     Follow Anesthesia Guidelines / Protocol; Standing  -     Follow Anesthesia Guidelines / Protocol; Future  -     Verify NPO; Standing  -     Verify Bowel Prep Was Successful; Standing  -     Give Tap Water Enema If Bowel Prep Insufficient; Standing  -     Case Request    4. Abdominal bloating  -     Enteric Parasite Panel - Stool, Per Rectum; Future  -     Enteric Bacterial Panel - Stool, Per Rectum; Future  -     Fecal Fat, Qualitative - Stool, Per Rectum; Future  -     Occult Blood, Fecal By Immunoassay - Stool, Per Rectum; Future  -     Clostridioides difficile Toxin, PCR - Stool, Per Rectum; Future  -     Fecal Lactoferrin Qual. - Stool, Per Rectum; Future  -     Pancreatic Elastase, Fecal - Stool, Per Rectum; Future  -     Calprotectin, Fecal - Stool, Per Rectum; Future  -     Allergens (52) Food; Future  -      Alpha-Gal IgE Panel; Future  -     Celiac Disease Panel; Future  -     Case Request; Standing  -     Follow Anesthesia Guidelines / Protocol; Standing  -     Follow Anesthesia Guidelines / Protocol; Future  -     Verify NPO; Standing  -     Verify Bowel Prep Was Successful; Standing  -     Give Tap Water Enema If Bowel Prep Insufficient; Standing  -     Case Request    5. Allergic rhinitis due to food  -     Allergens (52) Food; Future  -     Alpha-Gal IgE Panel; Future  -     Case Request; Standing  -     Follow Anesthesia Guidelines / Protocol; Standing  -     Follow Anesthesia Guidelines / Protocol; Future  -     Verify NPO; Standing  -     Verify Bowel Prep Was Successful; Standing  -     Give Tap Water Enema If Bowel Prep Insufficient; Standing  -     Case Request    Other orders  -     Sod Picosulfate-Mag Ox-Cit Acd (Clenpiq) 10-3.5-12 MG-GM -GM/175ML solution; Take 175 mL by mouth 1 (One) Time for 1 dose.  Dispense: 350 mL; Refill: 0      Assessment & Plan  1. Diarrhea.  She reports chronic diarrhea, particularly after consuming greasy foods, accompanied by occasional abdominal pain, bloating, and sometimes blood in the stool. She has no history of constipation and has never had a colonoscopy. A colonoscopy is recommended to rule out cancer and other potential issues such as polyps. The risks of the procedure, including sedation, infection, perforation, and bleeding, were discussed. She will need a  on the day of the procedure due to sedation. A prescription for colon prep medication has been provided, which she will  at [facility]. Blood work has been ordered to check for food allergies that might be contributing to her symptoms.  Stool studies ordered.    PROCEDURE  I have recommended that the patient undergo further evaluation with a colonoscopy.  I have discussed this procedure in detail with the patient.  I have discussed the risks, benefits and alternatives.  I have discussed the  risk of anesthesia, bleeding and perforation.  Patient understands these risks, benefits and alternatives and wishes to proceed.  I will schedule her at her earliest convenience. Patient agreeable to this plan and will call with any questions or concerns.             Follow Up       Follow Up   Return for Follow up after endoscopy in office.  Patient was given instructions and counseling regarding her condition or for health maintenance advice. Please see specific information pulled into the AVS if appropriate.

## 2025-03-07 ENCOUNTER — TELEPHONE (OUTPATIENT)
Dept: GASTROENTEROLOGY | Facility: CLINIC | Age: 65
End: 2025-03-07
Payer: MEDICARE

## 2025-03-07 NOTE — TELEPHONE ENCOUNTER
Procedure: Colonoscopy and/or EGD     Med Directive: Plavix     PMH: CAD, HTN, HLD     Last Seen: 01/30/2025

## 2025-03-07 NOTE — TELEPHONE ENCOUNTER
Desiree M Jose  1960     Patient requested to reschedule their Colonoscopy. I have offered to reschedule this patient and patient has agreed. Patient has been rescheduled to 03.25.25.    Reason for rescheduling: TRANSPORTATION    Original date of case request: 03.06.25    This procedure was ordered by ALVIN Dunn for an important reason. I have thoroughly discussed with the patient that there are risks associated with not proceeding with the procedure at this time such as a delay in diagnosis, risk of incurable disease, or cancer. Patient verbalized understanding the risks discussed.    Patient plans to call us back to reschedule: N/A    Updated clearance needed?: Yes    Is there a follow up appointment that needs to be rescheduled after the procedure date? NO.    If yes, clearance request has been submitted to: DR KNOX    Is the patient currently on any injectable medications for weight loss or diabetes? No    Has endo scheduling been notified? Yes    If yes, who did you speak to? STEPHANIE    Has the case request been updated? Yes

## 2025-03-10 ENCOUNTER — PATIENT ROUNDING (BHMG ONLY) (OUTPATIENT)
Dept: GASTROENTEROLOGY | Facility: CLINIC | Age: 65
End: 2025-03-10
Payer: MEDICARE

## 2025-03-10 NOTE — TELEPHONE ENCOUNTER
"Pt. called into clinic to see if we can review PPM. Pt. reports he has been tired for approximately a week. Pt. denies any other symptoms. He said he has not checked BP, but  he saw PCP 2 weeks ago and BP/labs were "normal"    Assisted pt. In sending transmission      [employer] dual chamber PPM programmed DDD 50      Diagnostics since 2/5/24    Ap- <1% - <1%    AT/AF burden- 0%    Presenting rhythm: As/Vs (SR)                       AMS x 1 duration 1 min. 4 secs.  EGM c/w 1:1 conduction w/ PAC's            PMT x 1                Instructed pt. To f/u with PCP for fatigue and call clinic for any other questions or concerns    " Cardiac clearance still good.  rambo

## 2025-03-10 NOTE — PROGRESS NOTES
3/10/2025      Hello, may I speak with Desiree Garcia     My name is Rosetta. I am calling from [facility]. I show that you had a recent visit with ALVIN Dunn.    Before we get started may I verify your date of birth? 1960    I am calling to officially welcome you to our practice and ask about your recent visit. Is this a good time to talk?  Unable to leave detailed message per CODIE    Tell me about your visit with us. What things went well?    We strive to ensure that we protect your safety and privacy. Is there anything we could have done to improve this during your visit?        We're always looking for ways to make our patients' experiences even better. Do you have recommendations on ways we may improve?    Overall were you satisfied with your first visit to our practice?    I appreciate you taking the time to speak with me today. Is there anything else I can do for you?    I am glad to hear that you had a very good visit and I appreciate you taking the time to provide feedback on this call. We would greatly appreciate you filling out a survey if you receive one in the mail, email or text. This is a great opportunity to provide any additional feedback that you may think of after this call as well.       Thank you, and have a great day.

## 2025-03-13 ENCOUNTER — OFFICE VISIT (OUTPATIENT)
Dept: FAMILY MEDICINE CLINIC | Facility: CLINIC | Age: 65
End: 2025-03-13
Payer: MEDICARE

## 2025-03-13 ENCOUNTER — LAB (OUTPATIENT)
Dept: LAB | Facility: HOSPITAL | Age: 65
End: 2025-03-13
Payer: MEDICARE

## 2025-03-13 VITALS
WEIGHT: 168 LBS | OXYGEN SATURATION: 93 % | HEIGHT: 66 IN | SYSTOLIC BLOOD PRESSURE: 142 MMHG | BODY MASS INDEX: 27 KG/M2 | TEMPERATURE: 98.9 F | HEART RATE: 107 BPM | DIASTOLIC BLOOD PRESSURE: 84 MMHG

## 2025-03-13 DIAGNOSIS — E11.9 TYPE 2 DIABETES MELLITUS WITHOUT COMPLICATION, WITHOUT LONG-TERM CURRENT USE OF INSULIN: Primary | ICD-10-CM

## 2025-03-13 DIAGNOSIS — R60.0 PERIPHERAL EDEMA: ICD-10-CM

## 2025-03-13 DIAGNOSIS — I10 HYPERTENSION, ESSENTIAL: ICD-10-CM

## 2025-03-13 DIAGNOSIS — R14.0 ABDOMINAL BLOATING: ICD-10-CM

## 2025-03-13 DIAGNOSIS — K62.5 RECTAL BLEEDING: ICD-10-CM

## 2025-03-13 DIAGNOSIS — R19.4 ALTERED BOWEL HABITS: ICD-10-CM

## 2025-03-13 DIAGNOSIS — R19.7 DIARRHEA, UNSPECIFIED TYPE: ICD-10-CM

## 2025-03-13 LAB
027 TOXIN: NORMAL
ALBUMIN SERPL-MCNC: 3.1 G/DL (ref 3.5–5.2)
ALBUMIN UR-MCNC: 1.9 MG/DL
ALBUMIN/GLOB SERPL: 0.9 G/DL
ALP SERPL-CCNC: 144 U/L (ref 39–117)
ALT SERPL W P-5'-P-CCNC: 17 U/L (ref 1–33)
ANION GAP SERPL CALCULATED.3IONS-SCNC: 11.9 MMOL/L (ref 5–15)
AST SERPL-CCNC: 17 U/L (ref 1–32)
BILIRUB SERPL-MCNC: 0.4 MG/DL (ref 0–1.2)
BUN SERPL-MCNC: 5 MG/DL (ref 8–23)
BUN/CREAT SERPL: 5.8 (ref 7–25)
C DIFF TOX GENS STL QL NAA+PROBE: NEGATIVE
CALCIUM SPEC-SCNC: 9.5 MG/DL (ref 8.6–10.5)
CHLORIDE SERPL-SCNC: 99 MMOL/L (ref 98–107)
CO2 SERPL-SCNC: 28.1 MMOL/L (ref 22–29)
CREAT SERPL-MCNC: 0.86 MG/DL (ref 0.57–1)
CREAT UR-MCNC: 94.8 MG/DL
EGFRCR SERPLBLD CKD-EPI 2021: 75.5 ML/MIN/1.73
GLOBULIN UR ELPH-MCNC: 3.4 GM/DL
GLUCOSE SERPL-MCNC: 256 MG/DL (ref 65–99)
HBA1C MFR BLD: 9.3 % (ref 4.8–5.6)
HEMOCCULT STL QL IA: NEGATIVE
LACTOFERRIN STL QL LA: NEGATIVE
MICROALBUMIN/CREAT UR: 20 MG/G (ref 0–29)
POTASSIUM SERPL-SCNC: 3.7 MMOL/L (ref 3.5–5.2)
PROT SERPL-MCNC: 6.5 G/DL (ref 6–8.5)
SODIUM SERPL-SCNC: 139 MMOL/L (ref 136–145)

## 2025-03-13 PROCEDURE — 89125 SPECIMEN FAT STAIN: CPT

## 2025-03-13 PROCEDURE — 83036 HEMOGLOBIN GLYCOSYLATED A1C: CPT | Performed by: FAMILY MEDICINE

## 2025-03-13 PROCEDURE — 82274 ASSAY TEST FOR BLOOD FECAL: CPT

## 2025-03-13 PROCEDURE — 82570 ASSAY OF URINE CREATININE: CPT | Performed by: FAMILY MEDICINE

## 2025-03-13 PROCEDURE — 87505 NFCT AGENT DETECTION GI: CPT

## 2025-03-13 PROCEDURE — 87493 C DIFF AMPLIFIED PROBE: CPT

## 2025-03-13 PROCEDURE — 83630 LACTOFERRIN FECAL (QUAL): CPT

## 2025-03-13 PROCEDURE — 87506 IADNA-DNA/RNA PROBE TQ 6-11: CPT

## 2025-03-13 PROCEDURE — 80053 COMPREHEN METABOLIC PANEL: CPT | Performed by: FAMILY MEDICINE

## 2025-03-13 PROCEDURE — 82043 UR ALBUMIN QUANTITATIVE: CPT | Performed by: FAMILY MEDICINE

## 2025-03-13 PROCEDURE — 82653 EL-1 FECAL QUANTITATIVE: CPT

## 2025-03-13 PROCEDURE — 83993 ASSAY FOR CALPROTECTIN FECAL: CPT

## 2025-03-13 RX ORDER — SODIUM PICOSULFATE, MAGNESIUM OXIDE, AND ANHYDROUS CITRIC ACID 12; 3.5; 1 G/175ML; G/175ML; MG/175ML
LIQUID ORAL ONCE
Status: ON HOLD | COMMUNITY
Start: 2025-03-11 | End: 2025-03-25

## 2025-03-13 RX ORDER — FUROSEMIDE 20 MG/1
20 TABLET ORAL DAILY
Qty: 7 TABLET | Refills: 0 | Status: ON HOLD | OUTPATIENT
Start: 2025-03-13 | End: 2025-03-20

## 2025-03-14 LAB
C COLI+JEJ+UPSA DNA STL QL NAA+NON-PROBE: NOT DETECTED
EC STX1+STX2 GENES STL QL NAA+NON-PROBE: NOT DETECTED
FATTY ACIDS: NORMAL
NEUTRAL FATS: NORMAL
S ENT+BONG DNA STL QL NAA+NON-PROBE: NOT DETECTED
SHIGELLA SP+EIEC IPAH ST NAA+NON-PROBE: NOT DETECTED

## 2025-03-15 LAB
CALPROTECTIN STL-MCNT: 13 UG/G (ref 0–120)
CRYPTOSP DNA STL QL NAA+NON-PROBE: NOT DETECTED
E HISTOLYT DNA STL QL NAA+NON-PROBE: NOT DETECTED
G LAMBLIA DNA STL QL NAA+NON-PROBE: NOT DETECTED

## 2025-03-17 ENCOUNTER — APPOINTMENT (OUTPATIENT)
Dept: GENERAL RADIOLOGY | Facility: HOSPITAL | Age: 65
End: 2025-03-17
Payer: MEDICARE

## 2025-03-17 ENCOUNTER — HOSPITAL ENCOUNTER (INPATIENT)
Facility: HOSPITAL | Age: 65
LOS: 3 days | Discharge: HOME | End: 2025-03-20
Attending: EMERGENCY MEDICINE | Admitting: STUDENT IN AN ORGANIZED HEALTH CARE EDUCATION/TRAINING PROGRAM
Payer: MEDICARE

## 2025-03-17 DIAGNOSIS — R60.0 PERIPHERAL EDEMA: ICD-10-CM

## 2025-03-17 DIAGNOSIS — R26.2 DIFFICULTY WALKING: ICD-10-CM

## 2025-03-17 DIAGNOSIS — I50.31 ACUTE DIASTOLIC CHF (CONGESTIVE HEART FAILURE): ICD-10-CM

## 2025-03-17 DIAGNOSIS — I21.4 NSTEMI (NON-ST ELEVATED MYOCARDIAL INFARCTION): Primary | ICD-10-CM

## 2025-03-17 PROBLEM — I25.2 HISTORY OF ST ELEVATION MYOCARDIAL INFARCTION (STEMI): Status: ACTIVE | Noted: 2025-03-17

## 2025-03-17 LAB
ALBUMIN SERPL-MCNC: 3.4 G/DL (ref 3.5–5.2)
ALBUMIN/GLOB SERPL: 1 G/DL
ALP SERPL-CCNC: 141 U/L (ref 39–117)
ALT SERPL W P-5'-P-CCNC: 11 U/L (ref 1–33)
ANION GAP SERPL CALCULATED.3IONS-SCNC: 9.2 MMOL/L (ref 5–15)
APTT PPP: 28.7 SECONDS (ref 78–95.9)
APTT PPP: 47.3 SECONDS (ref 78–95.9)
AST SERPL-CCNC: 16 U/L (ref 1–32)
BASOPHILS # BLD AUTO: 0.13 10*3/MM3 (ref 0–0.2)
BASOPHILS NFR BLD AUTO: 1 % (ref 0–1.5)
BILIRUB SERPL-MCNC: 0.4 MG/DL (ref 0–1.2)
BUN SERPL-MCNC: 7 MG/DL (ref 8–23)
BUN/CREAT SERPL: 8.6 (ref 7–25)
CALCIUM SPEC-SCNC: 9.3 MG/DL (ref 8.6–10.5)
CHLORIDE SERPL-SCNC: 97 MMOL/L (ref 98–107)
CO2 SERPL-SCNC: 32.8 MMOL/L (ref 22–29)
CREAT SERPL-MCNC: 0.81 MG/DL (ref 0.57–1)
D-LACTATE SERPL-SCNC: 2.3 MMOL/L (ref 0.5–2)
D-LACTATE SERPL-SCNC: 2.5 MMOL/L (ref 0.5–2)
D-LACTATE SERPL-SCNC: 3.4 MMOL/L (ref 0.5–2)
DEPRECATED RDW RBC AUTO: 44.8 FL (ref 37–54)
EGFRCR SERPLBLD CKD-EPI 2021: 81.2 ML/MIN/1.73
ELASTASE PANC STL-MCNT: 702 UG ELAST./G
EOSINOPHIL # BLD AUTO: 0.11 10*3/MM3 (ref 0–0.4)
EOSINOPHIL NFR BLD AUTO: 0.8 % (ref 0.3–6.2)
ERYTHROCYTE [DISTWIDTH] IN BLOOD BY AUTOMATED COUNT: 14 % (ref 12.3–15.4)
FLUAV RNA RESP QL NAA+PROBE: NOT DETECTED
FLUBV RNA RESP QL NAA+PROBE: NOT DETECTED
GEN 5 1HR TROPONIN T REFLEX: 189 NG/L
GLOBULIN UR ELPH-MCNC: 3.5 GM/DL
GLUCOSE BLDC GLUCOMTR-MCNC: 207 MG/DL (ref 70–99)
GLUCOSE SERPL-MCNC: 270 MG/DL (ref 65–99)
HCT VFR BLD AUTO: 40.8 % (ref 34–46.6)
HGB BLD-MCNC: 12.7 G/DL (ref 12–15.9)
HOLD SPECIMEN: NORMAL
HOLD SPECIMEN: NORMAL
IMM GRANULOCYTES # BLD AUTO: 0.11 10*3/MM3 (ref 0–0.05)
IMM GRANULOCYTES NFR BLD AUTO: 0.8 % (ref 0–0.5)
INR PPP: 1.04 (ref 0.86–1.15)
LYMPHOCYTES # BLD AUTO: 3.01 10*3/MM3 (ref 0.7–3.1)
LYMPHOCYTES NFR BLD AUTO: 22.9 % (ref 19.6–45.3)
MAGNESIUM SERPL-MCNC: 1.8 MG/DL (ref 1.6–2.4)
MCH RBC QN AUTO: 27.5 PG (ref 26.6–33)
MCHC RBC AUTO-ENTMCNC: 31.1 G/DL (ref 31.5–35.7)
MCV RBC AUTO: 88.5 FL (ref 79–97)
MONOCYTES # BLD AUTO: 0.88 10*3/MM3 (ref 0.1–0.9)
MONOCYTES NFR BLD AUTO: 6.7 % (ref 5–12)
NEUTROPHILS NFR BLD AUTO: 67.8 % (ref 42.7–76)
NEUTROPHILS NFR BLD AUTO: 8.88 10*3/MM3 (ref 1.7–7)
NRBC BLD AUTO-RTO: 0 /100 WBC (ref 0–0.2)
NT-PROBNP SERPL-MCNC: 3226 PG/ML (ref 0–900)
PLATELET # BLD AUTO: 319 10*3/MM3 (ref 140–450)
PMV BLD AUTO: 10.1 FL (ref 6–12)
POTASSIUM SERPL-SCNC: 3.1 MMOL/L (ref 3.5–5.2)
PROCALCITONIN SERPL-MCNC: 0.12 NG/ML (ref 0–0.25)
PROT SERPL-MCNC: 6.9 G/DL (ref 6–8.5)
PROTHROMBIN TIME: 14 SECONDS (ref 11.8–14.9)
RBC # BLD AUTO: 4.61 10*6/MM3 (ref 3.77–5.28)
RSV RNA RESP QL NAA+PROBE: NOT DETECTED
SARS-COV-2 RNA RESP QL NAA+PROBE: NOT DETECTED
SODIUM SERPL-SCNC: 139 MMOL/L (ref 136–145)
TROPONIN T % DELTA: 10
TROPONIN T NUMERIC DELTA: 17 NG/L
TROPONIN T SERPL HS-MCNC: 172 NG/L
WBC NRBC COR # BLD AUTO: 13.12 10*3/MM3 (ref 3.4–10.8)
WHOLE BLOOD HOLD COAG: NORMAL
WHOLE BLOOD HOLD SPECIMEN: NORMAL

## 2025-03-17 PROCEDURE — 25010000002 MAGNESIUM SULFATE 2 GM/50ML SOLUTION: Performed by: STUDENT IN AN ORGANIZED HEALTH CARE EDUCATION/TRAINING PROGRAM

## 2025-03-17 PROCEDURE — 87637 SARSCOV2&INF A&B&RSV AMP PRB: CPT | Performed by: EMERGENCY MEDICINE

## 2025-03-17 PROCEDURE — 72170 X-RAY EXAM OF PELVIS: CPT

## 2025-03-17 PROCEDURE — 85730 THROMBOPLASTIN TIME PARTIAL: CPT | Performed by: EMERGENCY MEDICINE

## 2025-03-17 PROCEDURE — 25010000002 FUROSEMIDE PER 20 MG: Performed by: EMERGENCY MEDICINE

## 2025-03-17 PROCEDURE — 84145 PROCALCITONIN (PCT): CPT | Performed by: STUDENT IN AN ORGANIZED HEALTH CARE EDUCATION/TRAINING PROGRAM

## 2025-03-17 PROCEDURE — 73562 X-RAY EXAM OF KNEE 3: CPT

## 2025-03-17 PROCEDURE — 25010000002 MORPHINE PER 10 MG: Performed by: STUDENT IN AN ORGANIZED HEALTH CARE EDUCATION/TRAINING PROGRAM

## 2025-03-17 PROCEDURE — 71045 X-RAY EXAM CHEST 1 VIEW: CPT

## 2025-03-17 PROCEDURE — 63710000001 INSULIN REGULAR HUMAN PER 5 UNITS: Performed by: STUDENT IN AN ORGANIZED HEALTH CARE EDUCATION/TRAINING PROGRAM

## 2025-03-17 PROCEDURE — 85730 THROMBOPLASTIN TIME PARTIAL: CPT | Performed by: STUDENT IN AN ORGANIZED HEALTH CARE EDUCATION/TRAINING PROGRAM

## 2025-03-17 PROCEDURE — 82948 REAGENT STRIP/BLOOD GLUCOSE: CPT

## 2025-03-17 PROCEDURE — 84484 ASSAY OF TROPONIN QUANT: CPT | Performed by: EMERGENCY MEDICINE

## 2025-03-17 PROCEDURE — 93005 ELECTROCARDIOGRAM TRACING: CPT | Performed by: EMERGENCY MEDICINE

## 2025-03-17 PROCEDURE — 93010 ELECTROCARDIOGRAM REPORT: CPT | Performed by: INTERNAL MEDICINE

## 2025-03-17 PROCEDURE — 25010000002 HEPARIN (PORCINE) 25000-0.45 UT/250ML-% SOLUTION: Performed by: EMERGENCY MEDICINE

## 2025-03-17 PROCEDURE — 83735 ASSAY OF MAGNESIUM: CPT | Performed by: STUDENT IN AN ORGANIZED HEALTH CARE EDUCATION/TRAINING PROGRAM

## 2025-03-17 PROCEDURE — 25010000002 POTASSIUM CHLORIDE 10 MEQ/100ML SOLUTION: Performed by: STUDENT IN AN ORGANIZED HEALTH CARE EDUCATION/TRAINING PROGRAM

## 2025-03-17 PROCEDURE — 85610 PROTHROMBIN TIME: CPT | Performed by: EMERGENCY MEDICINE

## 2025-03-17 PROCEDURE — 36415 COLL VENOUS BLD VENIPUNCTURE: CPT | Performed by: EMERGENCY MEDICINE

## 2025-03-17 PROCEDURE — 83605 ASSAY OF LACTIC ACID: CPT | Performed by: EMERGENCY MEDICINE

## 2025-03-17 PROCEDURE — 99222 1ST HOSP IP/OBS MODERATE 55: CPT | Performed by: INTERNAL MEDICINE

## 2025-03-17 PROCEDURE — 85025 COMPLETE CBC W/AUTO DIFF WBC: CPT | Performed by: EMERGENCY MEDICINE

## 2025-03-17 PROCEDURE — 80053 COMPREHEN METABOLIC PANEL: CPT | Performed by: EMERGENCY MEDICINE

## 2025-03-17 PROCEDURE — 83880 ASSAY OF NATRIURETIC PEPTIDE: CPT | Performed by: EMERGENCY MEDICINE

## 2025-03-17 PROCEDURE — 99285 EMERGENCY DEPT VISIT HI MDM: CPT

## 2025-03-17 PROCEDURE — 99222 1ST HOSP IP/OBS MODERATE 55: CPT | Performed by: STUDENT IN AN ORGANIZED HEALTH CARE EDUCATION/TRAINING PROGRAM

## 2025-03-17 RX ORDER — CARVEDILOL 3.12 MG/1
3.12 TABLET ORAL 2 TIMES DAILY
COMMUNITY
Start: 2025-03-07

## 2025-03-17 RX ORDER — BISACODYL 10 MG
10 SUPPOSITORY, RECTAL RECTAL DAILY PRN
Status: DISCONTINUED | OUTPATIENT
Start: 2025-03-17 | End: 2025-03-20 | Stop reason: HOSPADM

## 2025-03-17 RX ORDER — POTASSIUM CHLORIDE 750 MG/1
40 CAPSULE, EXTENDED RELEASE ORAL ONCE
Status: COMPLETED | OUTPATIENT
Start: 2025-03-17 | End: 2025-03-17

## 2025-03-17 RX ORDER — FUROSEMIDE 10 MG/ML
40 INJECTION INTRAMUSCULAR; INTRAVENOUS EVERY 12 HOURS
Status: DISCONTINUED | OUTPATIENT
Start: 2025-03-18 | End: 2025-03-19

## 2025-03-17 RX ORDER — ATORVASTATIN CALCIUM 40 MG/1
40 TABLET, FILM COATED ORAL NIGHTLY
Status: DISCONTINUED | OUTPATIENT
Start: 2025-03-17 | End: 2025-03-20 | Stop reason: HOSPADM

## 2025-03-17 RX ORDER — MAGNESIUM SULFATE HEPTAHYDRATE 40 MG/ML
2 INJECTION, SOLUTION INTRAVENOUS ONCE
Status: COMPLETED | OUTPATIENT
Start: 2025-03-17 | End: 2025-03-17

## 2025-03-17 RX ORDER — AMOXICILLIN 250 MG
2 CAPSULE ORAL 2 TIMES DAILY PRN
Status: DISCONTINUED | OUTPATIENT
Start: 2025-03-17 | End: 2025-03-20 | Stop reason: HOSPADM

## 2025-03-17 RX ORDER — CLOPIDOGREL BISULFATE 75 MG/1
75 TABLET ORAL DAILY
Status: DISCONTINUED | OUTPATIENT
Start: 2025-03-18 | End: 2025-03-20 | Stop reason: HOSPADM

## 2025-03-17 RX ORDER — SPIRONOLACTONE 25 MG/1
25 TABLET ORAL DAILY
Status: DISCONTINUED | OUTPATIENT
Start: 2025-03-17 | End: 2025-03-20 | Stop reason: HOSPADM

## 2025-03-17 RX ORDER — BISACODYL 5 MG/1
5 TABLET, DELAYED RELEASE ORAL DAILY PRN
Status: DISCONTINUED | OUTPATIENT
Start: 2025-03-17 | End: 2025-03-20 | Stop reason: HOSPADM

## 2025-03-17 RX ORDER — SODIUM CHLORIDE 0.9 % (FLUSH) 0.9 %
10 SYRINGE (ML) INJECTION AS NEEDED
Status: DISCONTINUED | OUTPATIENT
Start: 2025-03-17 | End: 2025-03-20 | Stop reason: HOSPADM

## 2025-03-17 RX ORDER — FUROSEMIDE 10 MG/ML
60 INJECTION INTRAMUSCULAR; INTRAVENOUS ONCE
Status: COMPLETED | OUTPATIENT
Start: 2025-03-17 | End: 2025-03-17

## 2025-03-17 RX ORDER — POTASSIUM CHLORIDE 7.45 MG/ML
10 INJECTION INTRAVENOUS
Status: DISCONTINUED | OUTPATIENT
Start: 2025-03-17 | End: 2025-03-17

## 2025-03-17 RX ORDER — SODIUM CHLORIDE 9 MG/ML
40 INJECTION, SOLUTION INTRAVENOUS AS NEEDED
Status: DISCONTINUED | OUTPATIENT
Start: 2025-03-17 | End: 2025-03-20 | Stop reason: HOSPADM

## 2025-03-17 RX ORDER — MORPHINE SULFATE 2 MG/ML
1 INJECTION, SOLUTION INTRAMUSCULAR; INTRAVENOUS ONCE
Status: COMPLETED | OUTPATIENT
Start: 2025-03-17 | End: 2025-03-17

## 2025-03-17 RX ORDER — POLYETHYLENE GLYCOL 3350 17 G/17G
17 POWDER, FOR SOLUTION ORAL DAILY PRN
Status: DISCONTINUED | OUTPATIENT
Start: 2025-03-17 | End: 2025-03-20 | Stop reason: HOSPADM

## 2025-03-17 RX ORDER — HEPARIN SODIUM 10000 [USP'U]/100ML
12 INJECTION, SOLUTION INTRAVENOUS
Status: DISCONTINUED | OUTPATIENT
Start: 2025-03-17 | End: 2025-03-18

## 2025-03-17 RX ORDER — IBUPROFEN 600 MG/1
1 TABLET ORAL
Status: DISCONTINUED | OUTPATIENT
Start: 2025-03-17 | End: 2025-03-20 | Stop reason: HOSPADM

## 2025-03-17 RX ORDER — POTASSIUM CHLORIDE 7.45 MG/ML
10 INJECTION INTRAVENOUS
Status: DISPENSED | OUTPATIENT
Start: 2025-03-17 | End: 2025-03-17

## 2025-03-17 RX ORDER — CARVEDILOL 3.12 MG/1
3.12 TABLET ORAL 2 TIMES DAILY WITH MEALS
Status: DISCONTINUED | OUTPATIENT
Start: 2025-03-17 | End: 2025-03-20 | Stop reason: HOSPADM

## 2025-03-17 RX ORDER — SODIUM CHLORIDE 0.9 % (FLUSH) 0.9 %
10 SYRINGE (ML) INJECTION EVERY 12 HOURS SCHEDULED
Status: DISCONTINUED | OUTPATIENT
Start: 2025-03-17 | End: 2025-03-20 | Stop reason: HOSPADM

## 2025-03-17 RX ORDER — NICOTINE POLACRILEX 4 MG
15 LOZENGE BUCCAL
Status: DISCONTINUED | OUTPATIENT
Start: 2025-03-17 | End: 2025-03-20 | Stop reason: HOSPADM

## 2025-03-17 RX ORDER — DEXTROSE MONOHYDRATE 25 G/50ML
25 INJECTION, SOLUTION INTRAVENOUS
Status: DISCONTINUED | OUTPATIENT
Start: 2025-03-17 | End: 2025-03-20 | Stop reason: HOSPADM

## 2025-03-17 RX ADMIN — MAGNESIUM SULFATE HEPTAHYDRATE 2 G: 40 INJECTION, SOLUTION INTRAVENOUS at 20:57

## 2025-03-17 RX ADMIN — POTASSIUM CHLORIDE 10 MEQ: 7.46 INJECTION, SOLUTION INTRAVENOUS at 16:25

## 2025-03-17 RX ADMIN — HEPARIN SODIUM 12 UNITS/KG/HR: 10000 INJECTION, SOLUTION INTRAVENOUS at 16:30

## 2025-03-17 RX ADMIN — HUMAN INSULIN 4 UNITS: 100 INJECTION, SOLUTION SUBCUTANEOUS at 20:56

## 2025-03-17 RX ADMIN — CARVEDILOL 3.12 MG: 3.12 TABLET, FILM COATED ORAL at 20:55

## 2025-03-17 RX ADMIN — POTASSIUM CHLORIDE 10 MEQ: 7.46 INJECTION, SOLUTION INTRAVENOUS at 20:00

## 2025-03-17 RX ADMIN — FUROSEMIDE 60 MG: 10 INJECTION, SOLUTION INTRAMUSCULAR; INTRAVENOUS at 16:34

## 2025-03-17 RX ADMIN — SPIRONOLACTONE 25 MG: 25 TABLET ORAL at 20:55

## 2025-03-17 RX ADMIN — POTASSIUM CHLORIDE 40 MEQ: 750 CAPSULE, EXTENDED RELEASE ORAL at 21:34

## 2025-03-17 RX ADMIN — ATORVASTATIN CALCIUM 40 MG: 40 TABLET, FILM COATED ORAL at 20:55

## 2025-03-17 RX ADMIN — MORPHINE SULFATE 1 MG: 2 INJECTION, SOLUTION INTRAMUSCULAR; INTRAVENOUS at 17:28

## 2025-03-17 NOTE — ED PROVIDER NOTES
Time: 3:39 PM EDT  Date of encounter:  3/17/2025  Independent Historian/Clinical History and Information was obtained by:   Patient    History is limited by: N/A    Chief Complaint: Weakness      History of Present Illness:  Patient is a 65 y.o. year old female who presents to the emergency department for evaluation of weakness and lower extremity edema that is gotten worse.  Patient denies chest pain.  Patient does report that she has had some mild shortness of breath.  Patient has no cough or hemoptysis.      Patient Care Team  Primary Care Provider: Sandra Kaba MD    Past Medical History:     Allergies   Allergen Reactions    Tramadol Itching     Past Medical History:   Diagnosis Date    Arthritis     Cervical cancer screening     COPD (chronic obstructive pulmonary disease)     Coronary artery disease involving native heart without angina pectoris 2021    primary angioplasty and stent placement to mid right coronary artery with good angiographic results on 2021 after STEMI    Depression with anxiety     Diabetes mellitus     Forgetfulness     Gastric reflux     Hypertension     Leg paresthesia 2017    Right    Limb swelling     Lumbago 2017    Low back pain    Lumbar spinal stenosis 2017    L4/5 moderate to severe central canal stenosis    Lumbosacral radiculopathy 2017    Right    Migraines     Night sweat     Reflux esophagitis     Shortness of breath     ST elevation myocardial infarction (STEMI) (CMS/[facility]) 2021    Stomach disorder      Past Surgical History:   Procedure Laterality Date    ABDOMINAL SURGERY      3 C-SECTIONS    CARDIAC CATHETERIZATION      CARDIAC CATHETERIZATION N/A 2021    Procedure: Left Heart Cath;  Surgeon: Sidney Xiao MD;  Location: [facility] INVASIVE LOCATION;  Service: Cardiology;  Laterality: N/A;     SECTION      x 3    CHOLECYSTECTOMY      COLONOSCOPY      CORONARY STENT PLACEMENT      ENDOSCOPY      2007     FOOT SURGERY Right     HAND SURGERY      HYSTERECTOMY  1985     Family History   Problem Relation Age of Onset    Stroke Mother     Cancer Mother         Unspecified    Arthritis Mother     Stroke Father     Heart disease Father     Diabetes Father         Unspecified type    Arthritis Father     Heart attack Father     Stroke Sister     Arthritis Sister     Cancer Sister     Stroke Brother     Heart disease Brother     Diabetes Brother         Unspecified type    Arthritis Brother     Colon cancer Neg Hx        Home Medications:  Prior to Admission medications    Medication Sig Start Date End Date Taking? Authorizing Provider   albuterol (PROVENTIL) (2.5 MG/3ML) 0.083% nebulizer solution 2.5 mg Every 4 (Four) Hours As Needed. 8/8/24   Mara Hinojosa MD   albuterol sulfate  (90 Base) MCG/ACT inhaler Inhale 2 puffs Every 4 (Four) Hours As Needed for Wheezing. 8/8/24   Sandra Kaba MD   Aspirin Low Dose 81 MG EC tablet TAKE ONE TABLET BY MOUTH DAILY AT 9 AM FOR ACUTE HEART ATTACK 5/23/24   Edith Alcocer APRN   atorvastatin (LIPITOR) 40 MG tablet TAKE ONE TABLET BY MOUTH DAILY AT 9 PM AT NIGHT 5/23/24   Edith Alcocer APRN   carvedilol (COREG) 6.25 MG tablet Take 1 tablet by mouth Every 12 (Twelve) Hours. 1/30/25   Sidney Xiao MD   Clenpiq 10-3.5-12 MG-GM -GM/175ML solution  3/11/25   Mara Hinojosa MD   clopidogrel (PLAVIX) 75 MG tablet TAKE ONE TABLET BY MOUTH DAILY AT 9 AM 5/23/24   Edith Alcocer APRN   furosemide (Lasix) 20 MG tablet Take 1 tablet by mouth Daily for 7 days. 3/13/25 3/20/25  Sandra Kaba MD   Jardiance 25 MG tablet tablet Take 1 tablet by mouth Daily. 1/7/25   Mara Hinojosa MD   lisinopril (PRINIVIL,ZESTRIL) 10 MG tablet Take 2 tablets by mouth Daily. 1/24/25   Edith Alcocer APRN   metFORMIN (FORTAMET) 1000 MG (OSM) 24 hr tablet Take 1 tablet by mouth Daily With Breakfast for 30 days. 1/23/25 2/22/25  Sandra Kaba MD Trelegy Ellipta  "100-62.5-25 MCG/ACT inhaler INHALE 1 PUFF BY MOUTH DAILY 1/15/25   Sandra Kaba MD   Trintellix 10 MG tablet tablet TAKE ONE TABLET BY MOUTH DAILY AT 9 AM  DAYS 8/13/24   Sandra Kaba MD   vitamin D3 (Vitamin D) 125 MCG (5000 UT) capsule capsule Take 1 capsule by mouth Daily for 90 days. 12/23/24 3/23/25  Sandra Kaba MD        Social History:   Social History     Tobacco Use    Smoking status: Every Day     Current packs/day: 1.00     Average packs/day: 1 pack/day for 58.2 years (58.2 ttl pk-yrs)     Types: Cigarettes     Start date: 1/14/1967     Passive exposure: Current    Smokeless tobacco: Never    Tobacco comments:     Smoked 21-30 years   Vaping Use    Vaping status: Never Used   Substance Use Topics    Alcohol use: Never     Comment: Does not drink    Drug use: Never         Review of Systems:  Review of Systems   Constitutional:  Negative for chills and fever.   HENT:  Negative for congestion, rhinorrhea and sore throat.    Eyes:  Negative for pain and visual disturbance.   Respiratory:  Positive for shortness of breath. Negative for apnea, cough and chest tightness.    Cardiovascular:  Negative for chest pain and palpitations.   Gastrointestinal:  Negative for abdominal pain, diarrhea, nausea and vomiting.   Genitourinary:  Negative for difficulty urinating and dysuria.   Musculoskeletal:  Negative for joint swelling and myalgias.   Skin:  Negative for color change.   Neurological:  Negative for seizures and headaches.   Psychiatric/Behavioral: Negative.     All other systems reviewed and are negative.       Physical Exam:  /55 (BP Location: Right arm, Patient Position: Sitting)   Pulse 100   Temp 98.3 °F (36.8 °C) (Oral)   Resp 18   Ht 167.6 cm (66\")   Wt 76.2 kg (167 lb 15.9 oz)   SpO2 97%   BMI 27.11 kg/m²     Physical Exam  Vitals and nursing note reviewed.   Constitutional:       General: She is not in acute distress.     Appearance: Normal appearance. She is not " toxic-appearing.   HENT:      Head: Normocephalic and atraumatic.      Jaw: There is normal jaw occlusion.   Eyes:      General: Lids are normal.      Extraocular Movements: Extraocular movements intact.      Conjunctiva/sclera: Conjunctivae normal.      Pupils: Pupils are equal, round, and reactive to light.   Cardiovascular:      Rate and Rhythm: Normal rate and regular rhythm.      Pulses: Normal pulses.      Heart sounds: Normal heart sounds.   Pulmonary:      Effort: Pulmonary effort is normal. No respiratory distress.      Breath sounds: Normal breath sounds. No wheezing or rhonchi.   Abdominal:      General: Abdomen is flat.      Palpations: Abdomen is soft.      Tenderness: There is no abdominal tenderness. There is no guarding or rebound.   Musculoskeletal:         General: Normal range of motion.      Cervical back: Normal range of motion and neck supple.      Right lower leg: Edema present.      Left lower leg: Edema present.   Skin:     General: Skin is warm and dry.   Neurological:      Mental Status: She is alert and oriented to person, place, and time. Mental status is at baseline.   Psychiatric:         Mood and Affect: Mood normal.                    Medical Decision Making:      Comorbidities that affect care:    Hypertension, diabetes    External Notes reviewed:    Previous ED Note: Patient was seen in the ED for right lower extremity pain.      The following orders were placed and all results were independently analyzed by me:  Orders Placed This Encounter   Procedures    COVID PRE-OP / PRE-PROCEDURE SCREENING ORDER (NO ISOLATION) - Swab, Nasopharynx    COVID-19, FLU A/B, RSV PCR 1 HR TAT - Swab, Nasopharynx    XR Chest 1 View    [place] Draw    Comprehensive Metabolic Panel    BNP    High Sensitivity Troponin T    CBC Auto Differential    Lactic Acid, Plasma    High Sensitivity Troponin T 1Hr    Protime-INR    aPTT    aPTT    NPO Diet NPO Type: Strict NPO    Undress & Gown    Continuous Pulse  Oximetry    Vital Signs    Notify Provider Platelet Count Less Than 98843    Notify Provider if PTT Not in Therapeutic Range After 24 Hours    Stop Infusion & Notify Provider if Bleeding Occurs    RN To Release aPTT Order 6 Hours After Heparin Bolus & 6 Hours After Any Heparin Rate Change    After 2 Consecutive Therapeutic aPTTs, Obtain aPTT Daily.  If a Rate Adjustment is Necessary, Resume Every 6 Hour aPTT Draws    Inpatient Hospitalist Consult    Oxygen Therapy- Nasal Cannula; Titrate 1-6 LPM Per SpO2; 90 - 95%    ECG 12 Lead ED Triage Standing Order; SOA    Insert Peripheral IV    CBC & Differential    Green Top (Gel)    Lavender Top    Gold Top - SST    Light Blue Top    CBC & Differential       Medications Given in the Emergency Department:  Medications   sodium chloride 0.9 % flush 10 mL (has no administration in time range)   heparin bolus from bag solution 4,600 Units (has no administration in time range)   heparin 95116 units/250 mL (100 units/mL) in 0.45 % NaCl infusion (has no administration in time range)   heparin bolus from bag solution 3,800 Units (has no administration in time range)   heparin bolus from bag solution 1,900 Units (has no administration in time range)   furosemide (LASIX) injection 60 mg (has no administration in time range)        ED Course:         Labs:    Lab Results (last 24 hours)       Procedure Component Value Units Date/Time    COVID PRE-OP / PRE-PROCEDURE SCREENING ORDER (NO ISOLATION) - Swab, Nasopharynx [[id number]]  (Normal) Collected: 03/17/25 1404    Specimen: Swab from Nasopharynx Updated: 03/17/25 1458    Narrative:      The following orders were created for panel order COVID PRE-OP / PRE-PROCEDURE SCREENING ORDER (NO ISOLATION) - Swab, Nasopharynx.  Procedure                               Abnormality         Status                     ---------                               -----------         ------                     COVID-19, FLU A/B, RSV P...[[id number]]  Normal               Final result                 Please view results for these tests on the individual orders.    COVID-19, FLU A/B, RSV PCR 1 HR TAT - Swab, Nasopharynx [[id number]]  (Normal) Collected: 03/17/25 1404    Specimen: Swab from Nasopharynx Updated: 03/17/25 1458     COVID19 Not Detected     Influenza A PCR Not Detected     Influenza B PCR Not Detected     RSV, PCR Not Detected    Narrative:      Fact sheet for providers: https://www.fda.gov/media/139426/download    Fact sheet for patients: https://www.fda.gov/media/633941/download    Test performed by PCR.    CBC & Differential [[id number]]  (Abnormal) Collected: 03/17/25 1432    Specimen: Blood Updated: 03/17/25 1442    Narrative:      The following orders were created for panel order CBC & Differential.  Procedure                               Abnormality         Status                     ---------                               -----------         ------                     CBC Auto Differential[[id number]]        Abnormal            Final result                 Please view results for these tests on the individual orders.    Comprehensive Metabolic Panel [[id number]]  (Abnormal) Collected: 03/17/25 1432    Specimen: Blood Updated: 03/17/25 1509     Glucose 270 mg/dL      BUN 7 mg/dL      Creatinine 0.81 mg/dL      Sodium 139 mmol/L      Potassium 3.1 mmol/L      Chloride 97 mmol/L      CO2 32.8 mmol/L      Calcium 9.3 mg/dL      Total Protein 6.9 g/dL      Albumin 3.4 g/dL      ALT (SGPT) 11 U/L      AST (SGOT) 16 U/L      Alkaline Phosphatase 141 U/L      Total Bilirubin 0.4 mg/dL      Globulin 3.5 gm/dL      A/G Ratio 1.0 g/dL      BUN/Creatinine Ratio 8.6     Anion Gap 9.2 mmol/L      eGFR 81.2 mL/min/1.73     Narrative:      GFR Categories in Chronic Kidney Disease (CKD)      GFR Category          GFR (mL/min/1.73)    Interpretation  G1                     90 or greater         Normal or high (1)  G2                      60-89                Mild decrease  (1)  G3a                   45-59                Mild to moderate decrease  G3b                   30-44                Moderate to severe decrease  G4                    15-29                Severe decrease  G5                    14 or less           Kidney failure          (1)In the absence of evidence of kidney disease, neither GFR category G1 or G2 fulfill the criteria for CKD.    eGFR calculation 2021 CKD-EPI creatinine equation, which does not include race as a factor    BNP [[id number]]  (Abnormal) Collected: 03/17/25 1432    Specimen: Blood Updated: 03/17/25 1503     proBNP 3,226.0 pg/mL     Narrative:      This assay is used as an aid in the diagnosis of individuals suspected of having heart failure. It can be used as an aid in the diagnosis of acute decompensated heart failure (ADHF) in patients presenting with signs and symptoms of ADHF to the emergency department (ED). In addition, NT-proBNP of <300 pg/mL indicates ADHF is not likely.    Age Range Result Interpretation  NT-proBNP Concentration (pg/mL:      <50             Positive            >450                   Gray                 300-450                    Negative             <300    50-75           Positive            >900                  Gray                300-900                  Negative            <300      >75             Positive            >1800                  Gray                300-1800                  Negative            <300    High Sensitivity Troponin T [[id number]]  (Abnormal) Collected: 03/17/25 1432    Specimen: Blood Updated: 03/17/25 1536     HS Troponin T 172 ng/L     Narrative:      High Sensitive Troponin T Reference Range:  <14.0 ng/L- Negative Female for AMI  <22.0 ng/L- Negative Male for AMI  >=14 - Abnormal Female indicating possible myocardial injury.  >=22 - Abnormal Male indicating possible myocardial injury.   Clinicians would have to utilize clinical acumen, EKG, Troponin, and serial changes to determine if it is an  Acute Myocardial Infarction or myocardial injury due to an underlying chronic condition.         CBC Auto Differential [[id number]]  (Abnormal) Collected: 03/17/25 1432    Specimen: Blood Updated: 03/17/25 1442     WBC 13.12 10*3/mm3      RBC 4.61 10*6/mm3      Hemoglobin 12.7 g/dL      Hematocrit 40.8 %      MCV 88.5 fL      MCH 27.5 pg      MCHC 31.1 g/dL      RDW 14.0 %      RDW-SD 44.8 fl      MPV 10.1 fL      Platelets 319 10*3/mm3      Neutrophil % 67.8 %      Lymphocyte % 22.9 %      Monocyte % 6.7 %      Eosinophil % 0.8 %      Basophil % 1.0 %      Immature Grans % 0.8 %      Neutrophils, Absolute 8.88 10*3/mm3      Lymphocytes, Absolute 3.01 10*3/mm3      Monocytes, Absolute 0.88 10*3/mm3      Eosinophils, Absolute 0.11 10*3/mm3      Basophils, Absolute 0.13 10*3/mm3      Immature Grans, Absolute 0.11 10*3/mm3      nRBC 0.0 /100 WBC     Lactic Acid, Plasma [[id number]] Collected: 03/17/25 1432    Specimen: Blood Updated: 03/17/25 1436             Imaging:    XR Chest 1 View  Result Date: 3/17/2025  XR CHEST 1 VW Date of Exam: 3/17/2025 1:11 PM EDT Indication: SOA Triage Protocol Comparison: None available. Findings: The lungs are clear bilaterally. The cardiac and mediastinal silhouettes appear normal. No effusion is seen.     Impression: No acute cardiopulmonary disease. Electronically Signed: Escobar Veronica MD  3/17/2025 1:26 PM EDT  Workstation ID: [id number]        Differential Diagnosis and Discussion:    Weakness: Based on the patient's history, signs, and symptoms, the diffential diagnosis includes but is not limited to meningitis, stroke, sepsis, subarachnoid hemorrhage, intracranial bleeding, encephalitis, acute uti, dehydration, MS, myasthenia gravis, Guillan [place], migraine variant, neuromuscular disorders vertigo, electrolyte imbalance, and metabolic disorders.    PROCEDURES:    Labs were collected in the emergency department and all labs were reviewed and interpreted by me.  X-ray were performed in  the emergency department and all X-ray impressions were independently interpreted by me.  An EKG was performed and the EKG was interpreted by me.    ECG 12 Lead ED Triage Standing Order; SOA   Preliminary Result   HEART RATE=92  bpm   RR Alqvqnse=953  ms   TX Ghyszixc=842  ms   P Horizontal Axis=-59  deg   P Front Axis=63  deg   QRSD Interval=98  ms   QT Rpyqxsjm=784  ms   DIrA=955  ms   QRS Axis=-59  deg   T Wave Axis=3  deg   - ABNORMAL ECG -   Sinus rhythm   Probable left atrial enlargement   Abnormal R-wave progression, early transition   Left ventricular hypertrophy   Inferior infarct, recent   Date and Time of Study:2025-03-17 13:16:53          Procedures    MDM     Amount and/or Complexity of Data Reviewed  Decide to obtain previous medical records or to obtain history from someone other than the patient: yes       The patient´s CBC that was reviewed and interpreted by me shows no abnormalities of critical concern. Of note, there is no anemia requiring a blood transfusion and the platelet count is acceptable.  The patient´s CMP that was reviewed and interpretted by me shows no abnormalities of critical concern. Of note, the patient´s sodium and potassium are acceptable. The patient´s liver enzymes are unremarkable. The patient´s renal function (creatinine) is preserved. The patient has a normal anion gap.  BNP is elevated.  Troponins 172.      Patient was placed on the cardiac monitor after being given heparin.  They were monitored for ventricular ectopy, arrhythmia, tachycardia, hypoxia, and changes in blood pressure.  Patient was rechecked several times throughout their stay for mental status decline and for reassessment of worsening changes in vital signs.     Total Critical Care time of 45 minutes. Total critical care time documented does not include time spent on separately billed procedures for services of nurses or physician assistants. I personally saw and examined the patient. I have reviewed all  diagnostic interpretations and treatment plans as written. I was present for the key portions of any procedures performed and the inclusive time noted in any critical care statement. Critical care time includes patient management by me, time spent at the patients bedside,  time to review lab and imaging results, discussing patient care, documentation in the medical record, and time spent with family or caregiver.          Patient Care Considerations:    None      Consultants/Shared Management Plan:    Case was discussed with Dr. Alycia Echavarria who agrees to consult.  Case was discussed with the hospitalist who agrees admit the patient.    Social Determinants of Health:    Patient is independent, reliable, and has access to care.       Disposition and Care Coordination:    Admit:   Through independent evaluation of the patient's history, physical, and imperical data, the patient meets criteria for inpatient admission to the hospital.        Final diagnoses:   NSTEMI (non-ST elevated myocardial infarction)        ED Disposition       ED Disposition   Intended Admit    Condition   --    Comment   --               This medical record created using voice recognition software.             Gunnar Watts MD  03/17/25 2611

## 2025-03-17 NOTE — H&P
[facility]  Date: 3[facility]/2025   Patient Name: Desiree Garcia  : 1960  MRN: [id number]  Primary Care Physician:  Sandra Kaba MD  Date of admission: 3/17/2025    Subjective   Subjective     Chief Complaint: Lower extremity edema, weakness    HPI:    Desiree Garcia is a 65 y.o. female with history of CAD, STEMI s/p PCI on , hyperlipidemia, hypertension, diabetes melitis, COPD, sciatica and bilateral hip arthritis who presented with generalized weakness, lower extremity edema and pain.  Patient stated she has been having lower extremity pain and swelling since few months which gradually worsened in past few days.  Complaining of lower extremity edema and pain, does have history of sciatica and bilateral hip arthritis; following orthopedic surgeon.  She is also planned to undergo colonoscopy on 3/25/2025 as outpatient.  Denied any chest pain or shortness of breath.  Patient stated her sister asked her to come to ER as she was not feeling well for past couple of days.    Patient still complaining of lower extremity pain during my evaluation.  She does have multiple rash on her face and bilateral extremities, stated she has been scratching and has issues with compulsive scratching.  Denied any bedbugs or insect bites.  Stated she lives alone and her son helps her out frequently.  Also mention she is noncompliant with her medications and forgets to take it.     On presentation to ER, vital stable.  Lab work showed hypokalemia of 3.1, hyperglycemia of 270, elevated ALP of 141 and leukocytosis of 13.12.  Lactic acid of 2.5. EKG on presentation showed possible new or worsening ischemia or infarction.  On-call cardiologist Dr. Dre Castro was reached out by ER physician; recommended to start her on heparin drip and agreed on consulting the patient.  Patient is being admitted for possible NSTEMI.  Cardiologist on board.    Personal History     Past Medical  History:  Past Medical History:   Diagnosis Date    Arthritis     Cervical cancer screening     COPD (chronic obstructive pulmonary disease)     Coronary artery disease involving native heart without angina pectoris 2021    primary angioplasty and stent placement to mid right coronary artery with good angiographic results on 2021 after STEMI    Depression with anxiety     Diabetes mellitus     Forgetfulness     Gastric reflux     Hypertension     Leg paresthesia 2017    Right    Limb swelling     Lumbago 2017    Low back pain    Lumbar spinal stenosis 2017    L4/5 moderate to severe central canal stenosis    Lumbosacral radiculopathy 2017    Right    Migraines     Night sweat     Reflux esophagitis     Shortness of breath     ST elevation myocardial infarction (STEMI) (CMS/[facility]) 2021    Stomach disorder        Past Surgical History:  Past Surgical History:   Procedure Laterality Date    ABDOMINAL SURGERY      3 C-SECTIONS    CARDIAC CATHETERIZATION      CARDIAC CATHETERIZATION N/A 2021    Procedure: Left Heart Cath;  Surgeon: Sidney Xiao MD;  Location: [facility] CATH INVASIVE LOCATION;  Service: Cardiology;  Laterality: N/A;     SECTION      x 3    CHOLECYSTECTOMY      COLONOSCOPY      CORONARY STENT PLACEMENT      ENDOSCOPY      2007    FOOT SURGERY Right     HAND SURGERY      HYSTERECTOMY  1985       Family History:       Social History:   Social History     Socioeconomic History    Marital status: Legally    Tobacco Use    Smoking status: Every Day     Current packs/day: 1.00     Average packs/day: 1 pack/day for 58.2 years (58.2 ttl pk-yrs)     Types: Cigarettes     Start date: 1967     Passive exposure: Current    Smokeless tobacco: Never    Tobacco comments:     Smoked 21-30 years   Vaping Use    Vaping status: Never Used   Substance and Sexual Activity    Alcohol use: Never     Comment: Does not drink    Drug use: Never    Sexual  activity: Defer       Home Medications:  Fluticasone-Umeclidin-Vilant, Sod Picosulfate-Mag Ox-Cit Acd, Vortioxetine HBr, albuterol, albuterol sulfate HFA, aspirin, atorvastatin, carvedilol, clopidogrel, empagliflozin, furosemide, lisinopril, metFORMIN, and vitamin D3    Allergies:  Allergies   Allergen Reactions    Tramadol Itching       Review of Systems   All systems were reviewed and negative except for: Lower extremity pain, swelling and fatigue    Objective   Objective     Vitals:   Temp:  [98.3 °F (36.8 °C)] 98.3 °F (36.8 °C)  Heart Rate:  [100] 100  Resp:  [18] 18  BP: (117)/(55) 117/55    Physical Exam    Constitutional: Awake, alert, no acute distress   Respiratory: Clear to auscultation bilaterally, nonlabored respirations    Cardiovascular: RRR, no murmurs, rubs, or gallops, palpable pedal pulses bilaterally   Gastrointestinal: Positive bowel sounds, soft, nontender, nondistended   Musculoskeletal: trace to 1+ bilateral ankle edema.    Skin: Multiple rashes with scratch marks present on her face and bilateral extremities.    Result Review    Result Review:  I have personally reviewed the results from the time of this admission to 3/17/2025 15:48 EDT and agree with these findings:  []  Laboratory  []  Microbiology  []  Radiology  []  EKG/Telemetry   []  Cardiology/Vascular   []  Pathology  []  Old records  []  Other:      Assessment & Plan   Assessment / Plan     Assessment/Plan:   NSTEMI  Acute CHF exacerbation, HFpEF  Elevated proBNP, likely from above  Elevated troponin, likely from above  Hypokalemia, repleted  Lactic acidosis  Leukocytosis  History of CAD,  History of STEMI s/p PCI on 2021  Hyperlipidemia  Hypertension  Diabetes mellitis  COPD  Sciatica and bilateral hip arthritis    -Patient is being admitted to inpatient unit.  -Presented with lower extremity swelling, pain and fatigue.  -Has history of STEMI s/p PCI and CAD.  Follows Dr. Xiao as outpatient.  -Found to have EKG changes on  presentation with elevated troponin 0.72.  -Started on heparin drip as per cardiologist recommendation for possible NSTEMI.  -Also has elevated proBNP.  -Last echo on 08/2024 showing normal EF.  Possible congestive heart failure with reduced ejection fraction.  -Receiving 1 dose of Lasix 60 mg.  Will start her on Lasix 40 mg IV twice daily from tomorrow morning.  -Lactic acidosis, will continue to trend.  -Monitor input and output.  -Cardiologist on board.  Appreciate input.  -Continue cardiac monitoring.  -Strict input and output.  -Labs in AM.  -Home medications to be started once reconciled.        VTE Prophylaxis:  Pharmacologic VTE prophylaxis orders are present.        CODE STATUS:     Full code    Admission Status:  I believe this patient meets inpatient status.    Electronically signed by Rubin Urena MD, 03/17/25, 3:48 PM EDT.

## 2025-03-17 NOTE — CONSULTS
[facility]   Cardiology Consult Note    Patient Name: Desiree Garcia  : 1960  MRN: [id number]  Primary Care Physician:  Sandra Kaba MD  Referring Physician: No ref. provider found    Date of admission: 3/17/2025    Subjective   Subjective     Reason for Consultation : Heart failure with preserved ejection fraction.  Elevated troponins     Chief Complaint : Generalized weakness, fatigue shortness of breath and leg swelling.,    HPI:  Desiree Garcia is a 65 y.o. female with past medical history significant for coronary artery disease and previous inferior wall myocardial infarction in  treated with PCI drug-eluting stent of the right coronary artery.  She also have hyperlipidemia and history of hypertension.  The patient is an active smoker for many years.  She reports some lower back pain radiating to the legs with sciatica.  She does report pruritus..  The patient came to the emergency room due to progressive fatigue and leg swelling and also shortness of breath.  Her EKG shows sinus rhythm with old inferior wall myocardial infarction and repolarization abnormalities.  The proBNP was over 20,000 with a high sensitive troponin over 176.    Review of Systems   All systems were reviewed and negative except for: Fatigue, dyspnea and lower extremity edema    Personal History     Past Medical History:   Diagnosis Date    Arthritis     Cervical cancer screening     COPD (chronic obstructive pulmonary disease)     Coronary artery disease involving native heart without angina pectoris 2021    primary angioplasty and stent placement to mid right coronary artery with good angiographic results on 2021 after STEMI    Depression with anxiety     Diabetes mellitus     Forgetfulness     Gastric reflux     Hypertension     Leg paresthesia 2017    Right    Limb swelling     Lumbago 2017    Low back pain    Lumbar spinal stenosis 2017    L4/5 moderate to severe central  canal stenosis    Lumbosacral radiculopathy 01/20/2017    Right    Migraines     Night sweat     Reflux esophagitis     Shortness of breath     ST elevation myocardial infarction (STEMI) (CMS/[facility]) 6/14/2021    Stomach disorder               Family History: family history includes Arthritis in her brother, father, mother, and sister; Cancer in her mother and sister; Diabetes in her brother and father; Heart attack in her father; Heart disease in her brother and father; Stroke in her brother, father, mother, and sister. Otherwise pertinent FHx was reviewed and not pertinent to current issue.    Social History:  reports that she has been smoking cigarettes. She started smoking about 58 years ago. She has a 58.2 pack-year smoking history. She has been exposed to tobacco smoke. She has never used smokeless tobacco. She reports that she does not drink alcohol and does not use drugs.    Home Medications:  Fluticasone-Umeclidin-Vilant, Sod Picosulfate-Mag Ox-Cit Acd, Vortioxetine HBr, albuterol, albuterol sulfate HFA, aspirin, atorvastatin, carvedilol, clopidogrel, empagliflozin, furosemide, lisinopril, metFORMIN, and vitamin D3    Allergies:  Allergies   Allergen Reactions    Tramadol Itching       Objective    Objective     Vitals:   Temp:  [98.3 °F (36.8 °C)] 98.3 °F (36.8 °C)  Heart Rate:  [] 95  Resp:  [18] 18  BP: (101-117)/(55-60) 101/60      Physical Exam:   Alert and oriented    Heart: Regular rate and rhythm.  No gallop.   Lungs: Diminished breath sounds   Abdomen: Bowel sounds positive   Lower extremity: Minimal edema   Neurologic: No apparent motor deficits          Result Review    Result Review:  I have personally reviewed the results from the time of this admission to 3/17/2025 18:56 EDT and agree with these findings:  [x]  Laboratory  []  Microbiology  [x]  Radiology  [x]  EKG/Telemetry   [x]  Cardiology/Vascular   []  Pathology  [x]  Old records  []  Other:  Most notable findings include:     CMP           1/30/2025    13:50 3/13/2025    15:55 3/17/2025    14:32   CMP   Glucose 155  256  270    BUN 10  5  7    Creatinine 0.74  0.86  0.81    EGFR 91.0  75.5  81.2    Sodium 140  139  139    Potassium 3.9  3.7  3.1    Chloride 102  99  97    Calcium 9.7  9.5  9.3    Total Protein 7.3  6.5  6.9    Albumin 3.7  3.1  3.4    Globulin 3.6  3.4  3.5    Total Bilirubin 0.4  0.4  0.4    Alkaline Phosphatase 134  144  141    AST (SGOT) 20  17  16    ALT (SGPT) 18  17  11    Albumin/Globulin Ratio 1.0  0.9  1.0    BUN/Creatinine Ratio 13.5  5.8  8.6    Anion Gap 9.5  11.9  9.2       CBC          10/6/2024    12:50 3/17/2025    14:32   CBC   WBC 12.01  13.12    RBC 5.46  4.61    Hemoglobin 14.9  12.7    Hematocrit 46.6  40.8    MCV 85.3  88.5    MCH 27.3  27.5    MCHC 32.0  31.1    RDW 13.3  14.0    Platelets 269  319       Lab Results   Component Value Date    TROPONINT 172 (C) 03/17/2025         Assessment & Plan   Assessment / Plan     Brief Patient Summary:  Desiree Garcia is a 65 y.o. female who has known previous inferior wall myocardial infarction treated with PCI drug-eluting stents in 2021, hypertension and mixed hyperlipidemia.  She continues to smoke daily.  She has now clinical evidence of heart failure with preserved ejection fraction.  Her troponins are elevated in the setting of heart failure.  Active Hospital Problems:  Active Hospital Problems    Diagnosis     **NSTEMI (non-ST elevated myocardial infarction)     Acute diastolic CHF (congestive heart failure)     History of ST elevation myocardial infarction (STEMI)          Plan:   I will continue I would proceed with optimal medical therapy including diuresis for goal output over 3 to 3.5 L/day.  Continue with blood pressure control, beta-blockers and statin therapy.  Continue with ACE inhibitors and Jardiance as tolerated.  I will add Aldactone 25 mg daily.  Follow renal function and electrolytes.  Watch intake and output.  Once the patient is stable  consider noninvasive evaluation for ischemia.  He was again counseled to quit smoking.        Electronically signed by Santi Castro MD, 03/17/25, 4:52 PM EDT.

## 2025-03-18 PROBLEM — R79.89 ELEVATED TROPONIN: Status: ACTIVE | Noted: 2025-03-17

## 2025-03-18 LAB
AMPHET+METHAMPHET UR QL: POSITIVE
AMPHETAMINES UR QL: POSITIVE
ANION GAP SERPL CALCULATED.3IONS-SCNC: 10.2 MMOL/L (ref 5–15)
APTT PPP: 54.5 SECONDS (ref 78–95.9)
BACTERIA UR QL AUTO: ABNORMAL /HPF
BARBITURATES UR QL SCN: NEGATIVE
BASOPHILS # BLD AUTO: 0.15 10*3/MM3 (ref 0–0.2)
BASOPHILS NFR BLD AUTO: 0.9 % (ref 0–1.5)
BENZODIAZ UR QL SCN: NEGATIVE
BILIRUB UR QL STRIP: NEGATIVE
BUN SERPL-MCNC: 9 MG/DL (ref 8–23)
BUN/CREAT SERPL: 9.8 (ref 7–25)
BUPRENORPHINE SERPL-MCNC: NEGATIVE NG/ML
CALCIUM SPEC-SCNC: 9.2 MG/DL (ref 8.6–10.5)
CANNABINOIDS SERPL QL: NEGATIVE
CHLORIDE SERPL-SCNC: 94 MMOL/L (ref 98–107)
CK SERPL-CCNC: 30 U/L (ref 20–180)
CLARITY UR: CLEAR
CO2 SERPL-SCNC: 30.8 MMOL/L (ref 22–29)
COCAINE UR QL: NEGATIVE
COLOR UR: YELLOW
CREAT SERPL-MCNC: 0.92 MG/DL (ref 0.57–1)
D-LACTATE SERPL-SCNC: 2 MMOL/L (ref 0.5–2)
D-LACTATE SERPL-SCNC: 2.7 MMOL/L (ref 0.5–2)
DEPRECATED RDW RBC AUTO: 43.8 FL (ref 37–54)
EGFRCR SERPLBLD CKD-EPI 2021: 69.7 ML/MIN/1.73
EOSINOPHIL # BLD AUTO: 0.11 10*3/MM3 (ref 0–0.4)
EOSINOPHIL NFR BLD AUTO: 0.7 % (ref 0.3–6.2)
ERYTHROCYTE [DISTWIDTH] IN BLOOD BY AUTOMATED COUNT: 13.7 % (ref 12.3–15.4)
FENTANYL UR-MCNC: NEGATIVE NG/ML
GLUCOSE BLDC GLUCOMTR-MCNC: 173 MG/DL (ref 70–99)
GLUCOSE BLDC GLUCOMTR-MCNC: 201 MG/DL (ref 70–99)
GLUCOSE BLDC GLUCOMTR-MCNC: 214 MG/DL (ref 70–99)
GLUCOSE BLDC GLUCOMTR-MCNC: 283 MG/DL (ref 70–99)
GLUCOSE SERPL-MCNC: 240 MG/DL (ref 65–99)
GLUCOSE UR STRIP-MCNC: NEGATIVE MG/DL
HCT VFR BLD AUTO: 41.4 % (ref 34–46.6)
HGB BLD-MCNC: 13.3 G/DL (ref 12–15.9)
HGB UR QL STRIP.AUTO: NEGATIVE
HYALINE CASTS UR QL AUTO: ABNORMAL /LPF
IMM GRANULOCYTES # BLD AUTO: 0.1 10*3/MM3 (ref 0–0.05)
IMM GRANULOCYTES NFR BLD AUTO: 0.6 % (ref 0–0.5)
KETONES UR QL STRIP: NEGATIVE
LEUKOCYTE ESTERASE UR QL STRIP.AUTO: ABNORMAL
LYMPHOCYTES # BLD AUTO: 3.93 10*3/MM3 (ref 0.7–3.1)
LYMPHOCYTES NFR BLD AUTO: 24.8 % (ref 19.6–45.3)
MAGNESIUM SERPL-MCNC: 2.2 MG/DL (ref 1.6–2.4)
MCH RBC QN AUTO: 28.2 PG (ref 26.6–33)
MCHC RBC AUTO-ENTMCNC: 32.1 G/DL (ref 31.5–35.7)
MCV RBC AUTO: 87.7 FL (ref 79–97)
METHADONE UR QL SCN: NEGATIVE
MONOCYTES # BLD AUTO: 0.82 10*3/MM3 (ref 0.1–0.9)
MONOCYTES NFR BLD AUTO: 5.2 % (ref 5–12)
NEUTROPHILS NFR BLD AUTO: 10.75 10*3/MM3 (ref 1.7–7)
NEUTROPHILS NFR BLD AUTO: 67.8 % (ref 42.7–76)
NITRITE UR QL STRIP: POSITIVE
NRBC BLD AUTO-RTO: 0 /100 WBC (ref 0–0.2)
NT-PROBNP SERPL-MCNC: 3223 PG/ML (ref 0–900)
OPIATES UR QL: POSITIVE
OXYCODONE UR QL SCN: NEGATIVE
PCP UR QL SCN: NEGATIVE
PH UR STRIP.AUTO: 6 [PH] (ref 5–8)
PHOSPHATE SERPL-MCNC: 4.2 MG/DL (ref 2.5–4.5)
PLATELET # BLD AUTO: 339 10*3/MM3 (ref 140–450)
PMV BLD AUTO: 10 FL (ref 6–12)
POTASSIUM SERPL-SCNC: 3.7 MMOL/L (ref 3.5–5.2)
PROT UR QL STRIP: NEGATIVE
RBC # BLD AUTO: 4.72 10*6/MM3 (ref 3.77–5.28)
RBC # UR STRIP: ABNORMAL /HPF
REF LAB TEST METHOD: ABNORMAL
SODIUM SERPL-SCNC: 135 MMOL/L (ref 136–145)
SP GR UR STRIP: <=1.005 (ref 1–1.03)
SQUAMOUS #/AREA URNS HPF: ABNORMAL /HPF
TRICYCLICS UR QL SCN: NEGATIVE
UROBILINOGEN UR QL STRIP: ABNORMAL
WBC # UR STRIP: ABNORMAL /HPF
WBC NRBC COR # BLD AUTO: 15.86 10*3/MM3 (ref 3.4–10.8)

## 2025-03-18 PROCEDURE — 84100 ASSAY OF PHOSPHORUS: CPT | Performed by: STUDENT IN AN ORGANIZED HEALTH CARE EDUCATION/TRAINING PROGRAM

## 2025-03-18 PROCEDURE — 80048 BASIC METABOLIC PNL TOTAL CA: CPT | Performed by: STUDENT IN AN ORGANIZED HEALTH CARE EDUCATION/TRAINING PROGRAM

## 2025-03-18 PROCEDURE — 82948 REAGENT STRIP/BLOOD GLUCOSE: CPT | Performed by: STUDENT IN AN ORGANIZED HEALTH CARE EDUCATION/TRAINING PROGRAM

## 2025-03-18 PROCEDURE — 87040 BLOOD CULTURE FOR BACTERIA: CPT | Performed by: STUDENT IN AN ORGANIZED HEALTH CARE EDUCATION/TRAINING PROGRAM

## 2025-03-18 PROCEDURE — 87088 URINE BACTERIA CULTURE: CPT | Performed by: STUDENT IN AN ORGANIZED HEALTH CARE EDUCATION/TRAINING PROGRAM

## 2025-03-18 PROCEDURE — 25010000002 CEFTRIAXONE PER 250 MG: Performed by: STUDENT IN AN ORGANIZED HEALTH CARE EDUCATION/TRAINING PROGRAM

## 2025-03-18 PROCEDURE — 82550 ASSAY OF CK (CPK): CPT | Performed by: INTERNAL MEDICINE

## 2025-03-18 PROCEDURE — 80307 DRUG TEST PRSMV CHEM ANLYZR: CPT | Performed by: STUDENT IN AN ORGANIZED HEALTH CARE EDUCATION/TRAINING PROGRAM

## 2025-03-18 PROCEDURE — 25010000002 ENOXAPARIN PER 10 MG: Performed by: INTERNAL MEDICINE

## 2025-03-18 PROCEDURE — 99232 SBSQ HOSP IP/OBS MODERATE 35: CPT | Performed by: STUDENT IN AN ORGANIZED HEALTH CARE EDUCATION/TRAINING PROGRAM

## 2025-03-18 PROCEDURE — 83735 ASSAY OF MAGNESIUM: CPT | Performed by: STUDENT IN AN ORGANIZED HEALTH CARE EDUCATION/TRAINING PROGRAM

## 2025-03-18 PROCEDURE — 85025 COMPLETE CBC W/AUTO DIFF WBC: CPT | Performed by: STUDENT IN AN ORGANIZED HEALTH CARE EDUCATION/TRAINING PROGRAM

## 2025-03-18 PROCEDURE — 83605 ASSAY OF LACTIC ACID: CPT | Performed by: EMERGENCY MEDICINE

## 2025-03-18 PROCEDURE — 25010000002 FUROSEMIDE PER 20 MG: Performed by: STUDENT IN AN ORGANIZED HEALTH CARE EDUCATION/TRAINING PROGRAM

## 2025-03-18 PROCEDURE — 87086 URINE CULTURE/COLONY COUNT: CPT | Performed by: STUDENT IN AN ORGANIZED HEALTH CARE EDUCATION/TRAINING PROGRAM

## 2025-03-18 PROCEDURE — 63710000001 INSULIN REGULAR HUMAN PER 5 UNITS: Performed by: FAMILY MEDICINE

## 2025-03-18 PROCEDURE — 81001 URINALYSIS AUTO W/SCOPE: CPT | Performed by: STUDENT IN AN ORGANIZED HEALTH CARE EDUCATION/TRAINING PROGRAM

## 2025-03-18 PROCEDURE — 85730 THROMBOPLASTIN TIME PARTIAL: CPT | Performed by: STUDENT IN AN ORGANIZED HEALTH CARE EDUCATION/TRAINING PROGRAM

## 2025-03-18 PROCEDURE — 87186 SC STD MICRODIL/AGAR DIL: CPT | Performed by: STUDENT IN AN ORGANIZED HEALTH CARE EDUCATION/TRAINING PROGRAM

## 2025-03-18 PROCEDURE — 82948 REAGENT STRIP/BLOOD GLUCOSE: CPT

## 2025-03-18 PROCEDURE — 99232 SBSQ HOSP IP/OBS MODERATE 35: CPT | Performed by: INTERNAL MEDICINE

## 2025-03-18 PROCEDURE — 63710000001 INSULIN GLARGINE PER 5 UNITS: Performed by: STUDENT IN AN ORGANIZED HEALTH CARE EDUCATION/TRAINING PROGRAM

## 2025-03-18 PROCEDURE — 83880 ASSAY OF NATRIURETIC PEPTIDE: CPT | Performed by: STUDENT IN AN ORGANIZED HEALTH CARE EDUCATION/TRAINING PROGRAM

## 2025-03-18 RX ORDER — ASPIRIN 81 MG/1
81 TABLET ORAL DAILY
Status: DISCONTINUED | OUTPATIENT
Start: 2025-03-18 | End: 2025-03-20 | Stop reason: HOSPADM

## 2025-03-18 RX ORDER — ENOXAPARIN SODIUM 100 MG/ML
40 INJECTION SUBCUTANEOUS EVERY 24 HOURS
Status: DISCONTINUED | OUTPATIENT
Start: 2025-03-18 | End: 2025-03-20 | Stop reason: HOSPADM

## 2025-03-18 RX ORDER — HEPARIN SODIUM 5000 [USP'U]/ML
5000 INJECTION, SOLUTION INTRAVENOUS; SUBCUTANEOUS EVERY 8 HOURS SCHEDULED
Status: DISCONTINUED | OUTPATIENT
Start: 2025-03-18 | End: 2025-03-18

## 2025-03-18 RX ORDER — POTASSIUM CHLORIDE 750 MG/1
10 CAPSULE, EXTENDED RELEASE ORAL
Status: DISCONTINUED | OUTPATIENT
Start: 2025-03-18 | End: 2025-03-20

## 2025-03-18 RX ADMIN — ASPIRIN 81 MG: 81 TABLET, COATED ORAL at 09:48

## 2025-03-18 RX ADMIN — Medication 1 APPLICATION: at 18:33

## 2025-03-18 RX ADMIN — POTASSIUM CHLORIDE 10 MEQ: 750 CAPSULE, EXTENDED RELEASE ORAL at 12:12

## 2025-03-18 RX ADMIN — FUROSEMIDE 40 MG: 10 INJECTION, SOLUTION INTRAMUSCULAR; INTRAVENOUS at 05:24

## 2025-03-18 RX ADMIN — Medication 10 ML: at 08:16

## 2025-03-18 RX ADMIN — ATORVASTATIN CALCIUM 40 MG: 40 TABLET, FILM COATED ORAL at 20:12

## 2025-03-18 RX ADMIN — CARVEDILOL 3.12 MG: 3.12 TABLET, FILM COATED ORAL at 18:28

## 2025-03-18 RX ADMIN — HUMAN INSULIN 2 UNITS: 100 INJECTION, SOLUTION SUBCUTANEOUS at 18:29

## 2025-03-18 RX ADMIN — CLOPIDOGREL BISULFATE 75 MG: 75 TABLET, FILM COATED ORAL at 08:16

## 2025-03-18 RX ADMIN — ENOXAPARIN SODIUM 40 MG: 100 INJECTION SUBCUTANEOUS at 09:48

## 2025-03-18 RX ADMIN — CARVEDILOL 3.12 MG: 3.12 TABLET, FILM COATED ORAL at 08:16

## 2025-03-18 RX ADMIN — POTASSIUM CHLORIDE 10 MEQ: 750 CAPSULE, EXTENDED RELEASE ORAL at 18:28

## 2025-03-18 RX ADMIN — HUMAN INSULIN 4 UNITS: 100 INJECTION, SOLUTION SUBCUTANEOUS at 08:16

## 2025-03-18 RX ADMIN — POTASSIUM CHLORIDE 10 MEQ: 750 CAPSULE, EXTENDED RELEASE ORAL at 09:49

## 2025-03-18 RX ADMIN — HUMAN INSULIN 4 UNITS: 100 INJECTION, SOLUTION SUBCUTANEOUS at 12:15

## 2025-03-18 RX ADMIN — FUROSEMIDE 40 MG: 10 INJECTION, SOLUTION INTRAMUSCULAR; INTRAVENOUS at 18:29

## 2025-03-18 RX ADMIN — Medication 10 ML: at 20:12

## 2025-03-18 RX ADMIN — Medication 1 APPLICATION: at 20:12

## 2025-03-18 RX ADMIN — SPIRONOLACTONE 25 MG: 25 TABLET ORAL at 08:16

## 2025-03-18 RX ADMIN — INSULIN GLARGINE 10 UNITS: 100 INJECTION, SOLUTION SUBCUTANEOUS at 09:48

## 2025-03-18 RX ADMIN — CEFTRIAXONE SODIUM 1000 MG: 1 INJECTION, POWDER, FOR SOLUTION INTRAMUSCULAR; INTRAVENOUS at 20:12

## 2025-03-18 NOTE — PLAN OF CARE
Goal Outcome Evaluation:  Plan of Care Reviewed With: patient admitted to unit.  Denied chest discomfort.  Heparin gtt infusing per protocol.  Attempted to administer K+ iv, but pt stated hurt to much and requested oral.  Md contacted and new orders obtained.  No s/s of distress.  Multiple areas on skin that patient has picked all in different stages of healing.  WC consult placed.  VSS.  Cont POC.

## 2025-03-18 NOTE — SIGNIFICANT NOTE
Wound Eval / Progress Noted     [place]     Patient Name: Desiree Garcia  : 1960  MRN: [id number]  Today's Date: 3/18/2025                 Admit Date: 3/17/2025    Visit Dx:    ICD-10-CM ICD-9-CM   1. NSTEMI (non-ST elevated myocardial infarction)  I21.4 410.70         Acute diastolic CHF (congestive heart failure)    Elevated troponin    History of ST elevation myocardial infarction (STEMI)        Past Medical History:   Diagnosis Date    Arthritis     Cervical cancer screening     COPD (chronic obstructive pulmonary disease)     Coronary artery disease involving native heart without angina pectoris 2021    primary angioplasty and stent placement to mid right coronary artery with good angiographic results on 2021 after STEMI    Depression with anxiety     Diabetes mellitus     Forgetfulness     Gastric reflux     Hypertension     Leg paresthesia 2017    Right    Limb swelling     Lumbago 2017    Low back pain    Lumbar spinal stenosis 2017    L4/5 moderate to severe central canal stenosis    Lumbosacral radiculopathy 2017    Right    Migraines     Night sweat     Reflux esophagitis     Shortness of breath     ST elevation myocardial infarction (STEMI) (CMS/HCC) 2021    Stomach disorder         Past Surgical History:   Procedure Laterality Date    ABDOMINAL SURGERY      3 C-SECTIONS    CARDIAC CATHETERIZATION      CARDIAC CATHETERIZATION N/A 2021    Procedure: Left Heart Cath;  Surgeon: Sidney Xiao MD;  [facility];  Service: Cardiology;  Laterality: N/A;     SECTION      x 3    CHOLECYSTECTOMY      COLONOSCOPY      CORONARY STENT PLACEMENT      ENDOSCOPY      2007    FOOT SURGERY Right     HAND SURGERY      HYSTERECTOMY           Physical Assessment:  Wound 25 1930 Right lateral ear Other (Comments) (Active)   Wound Image   25 1317   Dressing Appearance open to air 25 1317   Closure  None 03/18/25 1317   Base dry;red;scab 03/18/25 1317   Periwound intact;dry;redness 03/18/25 1317   Periwound Temperature warm 03/18/25 1317   Periwound Skin Turgor soft 03/18/25 1317   Edges rolled/closed 03/18/25 1317   Drainage Amount none 03/18/25 1317   Care, Wound cleansed with;sterile normal saline 03/18/25 1317   Dressing Care open to air 03/18/25 1317       Wound 03/17/25 1930 Left lateral abdomen (Active)   Wound Image   03/18/25 0255   Dressing Appearance intact;dry 03/18/25 1317   Closure None 03/18/25 1317   Base red;moist;slough;yellow 03/18/25 1317   Red (%), Wound Tissue Color 90 03/18/25 1317   Yellow (%), Wound Tissue Color 10 03/18/25 1317   Periwound intact;dry;pink 03/18/25 1317   Periwound Temperature warm 03/18/25 1317   Periwound Skin Turgor soft 03/18/25 1317   Edges open 03/18/25 1317   Wound Length (cm) 0.7 cm 03/18/25 1317   Wound Width (cm) 0.7 cm 03/18/25 1317   Wound Depth (cm) 0.2 cm 03/18/25 1317   Wound Surface Area (cm^2) 0.38 cm^2 03/18/25 1317   Wound Volume (cm^3) 0.051 cm^3 03/18/25 1317   Drainage Characteristics/Odor yellow 03/18/25 1317   Drainage Amount small 03/18/25 1317   Care, Wound cleansed with;sterile normal saline 03/18/25 1317   Dressing Care dressing applied;silver impregnated;hydrofiber;silicone border foam 03/18/25 1317   Periwound Care absorptive dressing applied 03/18/25 1317     Left medial foot     Right anterior foot     Left upper gluteal aspect      Wound Check / Follow-up: Patient was seen today for a wound consult.  Patient is awake, alert and oriented at the time of assessment; patient was agreeable to the visit.    Patient has scattered scabs all over her body including the tragus to her right ear; majority noted to her face and upper torso.  Patient reports that she tends to pick at the scabbing areas but believes it is usually when she is sleeping.  No open or active drainage to any of the areas of scabbing.  Recommending simethicone moisturizer to  the entire body to assist with the dry skin and itching as well as providing diligent quality skin care and bathing with soap and water.    Patient does have an open wound to the anterior abdomen from unknown etiology.  She does report that she has been applying topical Neosporin to the wound and covering with a Band-Aid.  Wound base is pink and moist with areas of yellow tissue present.  Periwound and wound edges are reddened and soft.  Recommending daily dressing changes with silver impregnated Hydrofiber to the base of the wound.    No wounds present to buttocks.    Impression: Scattered scabs all over body, open wound to abdomen    Short term goals: Regain skin integrity, skin protection, moisture prevention, pressure reduction, topical treatment, diligent quality skin care.    Anne Donovan RN    3/18/2025    13:19 EDT

## 2025-03-18 NOTE — PROGRESS NOTES
[facility]     Cardiology Progress Note    Patient Name: Desiree Garcia  : 1960  MRN: [id number]  Primary Care Physician:  Sandra Kaba MD  Date of admission: 3/17/2025    Subjective   Subjective     Chief Complaint: Follow-up visit for congestive heart failure    Interval HPI:    Patient reports some improvement in shortness of breath.  Pedal edema improved.  She is reporting pain of the legs and severe fatigue.  No chest pain.    Review of Systems   All systems were reviewed and negative except for: Shortness of breath, cough, itching, severe fatigue, pain of the lower extremities.  Negative for chest pain    Objective   Objective     Vitals:   Temp:  [98.1 °F (36.7 °C)-98.3 °F (36.8 °C)] 98.1 °F (36.7 °C)  Heart Rate:  [] 79  Resp:  [18] 18  BP: ()/(55-79) 111/73  Physical Exam      General : Alert, awake, no acute distress  CVS : Regular rate and rhythm, no murmur, rubs or gallops  Lungs: Bilateral basal crackles heard, no wheezing  Abdomen: Soft, nontender, bowel sounds heard in all 4 quadrants  Extremities: Warm, well-perfused, trace edema bilaterally    Scheduled Meds:aspirin, 81 mg, Oral, Daily  atorvastatin, 40 mg, Oral, Nightly  carvedilol, 3.125 mg, Oral, BID With Meals  clopidogrel, 75 mg, Oral, Daily  furosemide, 40 mg, Intravenous, Q12H  insulin glargine, 10 Units, Subcutaneous, Daily  insulin regular, 2-9 Units, Subcutaneous, TID With Meals  potassium chloride, 10 mEq, Oral, TID With Meals  sodium chloride, 10 mL, Intravenous, Q12H  spironolactone, 25 mg, Oral, Daily      Continuous Infusions:Pharmacy to Dose enoxaparin (LOVENOX),            Result Review    Result Review:  I have personally reviewed the results from the time of this admission to 3/18/2025 08:37 EDT and agree with these findings:  [x]  Laboratory  []  Microbiology  [x]  Radiology  [x]  EKG/Telemetry   [x]  Cardiology/Vascular   []  Pathology  []  Old records  []  Other:  Most notable findings  include:     CBC          10/6/2024    12:50 3/17/2025    14:32 3/18/2025    06:02   CBC   WBC 12.01  13.12  15.86    RBC 5.46  4.61  4.72    Hemoglobin 14.9  12.7  13.3    Hematocrit 46.6  40.8  41.4    MCV 85.3  88.5  87.7    MCH 27.3  27.5  28.2    MCHC 32.0  31.1  32.1    RDW 13.3  14.0  13.7    Platelets 269  319  339      CMP          3/13/2025    15:55 3/17/2025    14:32 3/18/2025    06:02   CMP   Glucose 256  270  240    BUN 5  7  9    Creatinine 0.86  0.81  0.92    EGFR 75.5  81.2  69.7    Sodium 139  139  135    Potassium 3.7  3.1  3.7    Chloride 99  97  94    Calcium 9.5  9.3  9.2    Total Protein 6.5  6.9     Albumin 3.1  3.4     Globulin 3.4  3.5     Total Bilirubin 0.4  0.4     Alkaline Phosphatase 144  141     AST (SGOT) 17  16     ALT (SGPT) 17  11     Albumin/Globulin Ratio 0.9  1.0     BUN/Creatinine Ratio 5.8  8.6  9.8    Anion Gap 11.9  9.2  10.2       CARDIAC LABS:     Latest Reference Range & Units 03/17/25 14:32 03/17/25 18:56 03/18/25 06:02   Creatine Kinase 20 - 180 U/L   30   HS Troponin T <14 ng/L 172 (C) 189 (C)    Troponin T % Delta Abnormal if >/= 20%   10    Troponin T Numeric Delta ng/L  17    proBNP 0.0 - 900.0 pg/mL 3,226.0 (H)         Assessment & Plan   Assessment / Plan     Brief Patient Summary:  Desiree Garcia is a 65 y.o. female with coronary artery disease, previous STEMI, status post primary angioplasty, hyperlipidemia, diabetes, COPD and hypertension.  She was admitted with generalized weakness, lower extremity edema and leg pains.  She was noted to be volume overloaded.    Active Hospital Problems:  Active Hospital Problems    Diagnosis     **Acute diastolic CHF (congestive heart failure)     Elevated troponin     History of ST elevation myocardial infarction (STEMI)      Acute diastolic heart failure : Clinically volume overloaded on admission, started on IV diuretics.  Pedal edema improved.  Urine output chart not well-maintained.    Elevated troponin : No chest  pain.  Mildly elevated high since her troponin with a normal CPK.  Likely type II MI related to heart failure exacerbation.    Coronary artery disease : With acute coronary syndrome/STEMI in 2021.  On Plavix    Chronic obstructive pulmonary disease    Diabetes mellitus    Plan:     Continue IV Lasix every 12 hours with close monitoring electrolytes and renal functions  Will add potassium supplementation  Echocardiogram to be done today to evaluate LV function, wall motion abnormalities  Discontinue heparin drip per ACS protocol, starting Lovenox for DVT prophylaxis  Continue aspirin, Plavix, statins, low-dose beta-blockers    PT/OT  Repeat labs a.m.  Further management plans based on clinical course and test results  We will follow.       CODE STATUS:   Code Status (Patient has no pulse and is not breathing): CPR (Attempt to Resuscitate)  Medical Interventions (Patient has pulse or is breathing): Full Support      Electronically signed by Sidney Xiao MD, 03/18/25, 8:34 AM EDT.

## 2025-03-18 NOTE — PROGRESS NOTES
[facility]   Hospitalist Progress Note  Date: 3/18/2025  Patient Name: Desiree Garcia  : 1960  MRN: [id number]  Date of admission: 3/17/2025  Room/Bed: [facility]/      Subjective   Subjective     Chief Complaint: Lower extremity edema, weakness     Summary:Desiree Garcia is a 65 y.o. female with history of CAD, STEMI s/p PCI on , hyperlipidemia, hypertension, diabetes melitis, COPD, sciatica and bilateral hip arthritis who presented with generalized weakness, lower extremity edema and pain.  Patient stated she has been having lower extremity pain and swelling since few months which gradually worsened in past few days.  Complaining of lower extremity edema and pain, does have history of sciatica and bilateral hip arthritis; following orthopedic surgeon.  She is also planned to undergo colonoscopy on 3/25/2025 as outpatient.  Denied any chest pain or shortness of breath.  Patient stated her sister asked her to come to ER as she was not feeling well for past couple of days. On presentation to ER, vital stable.  Lab work showed hypokalemia of 3.1, hyperglycemia of 270, elevated ALP of 141 and leukocytosis of 13.12.  Lactic acid of 2.5. EKG on presentation showed possible new or worsening ischemia or infarction.  On-call cardiologist Dr. Dre Castro was reached out by ER physician; recommended to start her on heparin drip and agreed on consulting the patient.  Patient is being admitted for possible NSTEMI versus CHF exacerbation.  Cardiologist on board and following the patient.    Interval Followup: No acute events overnight.  Patient stated she feels better today.  Denies any chest pain or difficulty breathing.  Lower extremity edema improving.  Diuresing well.  2.9 L output today.  Laying in bed, no active complaints.    Review of Systems    All systems reviewed and negative except for what is outlined above.      Objective   Objective     Vitals:   Temp:  [98.1 °F (36.7 °C)-98.3 °F (36.8 °C)] 98.1  °F (36.7 °C)  Heart Rate:  [] 79  Resp:  [18] 18  BP: ()/(55-79) 111/73    Physical Exam   General: Awake, alert, NAD  Cardiovascular: RRR, no murmurs   Pulmonary: CTA bilaterally; no wheezes; no conversational dyspnea  Gastrointestinal: S/ND/NT, +BS  Extremities: Trace bilateral edema  Neuro: Alert, awake, oriented x 3; speech clear      Result Review    Result Review:  I have personally reviewed these results:  [x]  Laboratory      Lab 03/18/25  0602 03/18/25  0050 03/17/25  2217 03/17/25  1856 03/17/25  1432   WBC 15.86*  --   --   --  13.12*   HEMOGLOBIN 13.3  --   --   --  12.7   HEMATOCRIT 41.4  --   --   --  40.8   PLATELETS 339  --   --   --  319   NEUTROS ABS 10.75*  --   --   --  8.88*   IMMATURE GRANS (ABS) 0.10*  --   --   --  0.11*   LYMPHS ABS 3.93*  --   --   --  3.01   MONOS ABS 0.82  --   --   --  0.88   EOS ABS 0.11  --   --   --  0.11   MCV 87.7  --   --   --  88.5   PROCALCITONIN  --   --   --   --  0.12   LACTATE 2.0 2.7* 3.4*   < > 2.5*   PROTIME  --   --   --   --  14.0   APTT 54.5*  --  47.3*  --  28.7*    < > = values in this interval not displayed.         Lab 03/18/25  0602 03/17/25  1432 03/13/25  1555   SODIUM 135* 139 139   POTASSIUM 3.7 3.1* 3.7   CHLORIDE 94* 97* 99   CO2 30.8* 32.8* 28.1   ANION GAP 10.2 9.2 11.9   BUN 9 7* 5*   CREATININE 0.92 0.81 0.86   EGFR 69.7 81.2 75.5   GLUCOSE 240* 270* 256*   CALCIUM 9.2 9.3 9.5   MAGNESIUM 2.2 1.8  --    PHOSPHORUS 4.2  --   --    HEMOGLOBIN A1C  --   --  9.30*         Lab 03/17/25  1432 03/13/25  1555   TOTAL PROTEIN 6.9 6.5   ALBUMIN 3.4* 3.1*   GLOBULIN 3.5 3.4   ALT (SGPT) 11 17   AST (SGOT) 16 17   BILIRUBIN 0.4 0.4   ALK PHOS 141* 144*         Lab 03/17/25  1856 03/17/25  1432   PROBNP  --  3,226.0*   HSTROP T 189* 172*   PROTIME  --  14.0   INR  --  1.04                 Brief Urine Lab Results  (Last result in the past 365 days)        Color   Clarity   Blood   Leuk Est   Nitrite   Protein   CREAT   Urine HCG         03/13/25 1555             94.8               [x]  Microbiology   Microbiology Results (last 10 days)       Procedure Component Value - Date/Time    COVID PRE-OP / PRE-PROCEDURE SCREENING ORDER (NO ISOLATION) - Swab, Nasopharynx [[id number]]  (Normal) Collected: 03/17/25 1404    Lab Status: Final result Specimen: Swab from Nasopharynx Updated: 03/17/25 1458    Narrative:      The following orders were created for panel order COVID PRE-OP / PRE-PROCEDURE SCREENING ORDER (NO ISOLATION) - Swab, Nasopharynx.  Procedure                               Abnormality         Status                     ---------                               -----------         ------                     COVID-19, FLU A/B, RSV P...[[id number]]  Normal              Final result                 Please view results for these tests on the individual orders.    COVID-19, FLU A/B, RSV PCR 1 HR TAT - Swab, Nasopharynx [[id number]]  (Normal) Collected: 03/17/25 1404    Lab Status: Final result Specimen: Swab from Nasopharynx Updated: 03/17/25 1458     COVID19 Not Detected     Influenza A PCR Not Detected     Influenza B PCR Not Detected     RSV, PCR Not Detected    Narrative:      Fact sheet for providers: https://www.fda.gov/media/917467/download    Fact sheet for patients: https://www.fda.gov/media/155559/download    Test performed by PCR.    Enteric Parasite Panel - Stool, Per Rectum [[id number]]  (Normal) Collected: 03/13/25 1440    Lab Status: Final result Specimen: Stool from Per Rectum Updated: 03/15/25 1451     Giardia lamblia Not Detected     Cryptosporidium Not Detected     Entamoeba histolytica Not Detected    Enteric Bacterial Panel - Stool, Per Rectum [[id number]]  (Normal) Collected: 03/13/25 1440    Lab Status: Final result Specimen: Stool from Per Rectum Updated: 03/14/25 1113     Salmonella Not Detected     Campylobacter Not Detected     Shigella/Enteroinvasive E. coli (EIEC) Not Detected     Shiga-like toxin-producing E. coli (STEC)  stx1/stx2 Not Detected    Clostridioides difficile Toxin, PCR - Stool, Per Rectum [[id number]] Collected: 03/13/25 1440    Lab Status: Final result Specimen: Stool from Per Rectum Updated: 03/13/25 1537     Toxigenic C. difficile by PCR Negative     027 Toxin Presumptive Negative    Narrative:      The result indicates the absence of toxigenic C. difficile from stool specimen.           [x]  Radiology  XR Pelvis 1 or 2 View  Result Date: 3/17/2025  Impression: Pelvis: Severe arthritis of the hips. Osteopenia. Questionable irregularity at the lateral left femoral neck. Dedicated left hip radiograph recommended for better evaluation to exclude fracture. Correlate for site of pain. Knees: Osteopenia. Osteoarthritis as described. Small right knee joint effusion. No fractures or dislocations. Electronically Signed: Tran Robles MD  3/17/2025 4:58 PM EDT  Workstation ID: [id number]    XR Knee 3 View Right  Result Date: 3/17/2025  Impression: Pelvis: Severe arthritis of the hips. Osteopenia. Questionable irregularity at the lateral left femoral neck. Dedicated left hip radiograph recommended for better evaluation to exclude fracture. Correlate for site of pain. Knees: Osteopenia. Osteoarthritis as described. Small right knee joint effusion. No fractures or dislocations. Electronically Signed: Tran Robles MD  3/17/2025 4:58 PM EDT  Workstation ID: [id number]    XR Knee 3 View Left  Result Date: 3/17/2025  Impression: Pelvis: Severe arthritis of the hips. Osteopenia. Questionable irregularity at the lateral left femoral neck. Dedicated left hip radiograph recommended for better evaluation to exclude fracture. Correlate for site of pain. Knees: Osteopenia. Osteoarthritis as described. Small right knee joint effusion. No fractures or dislocations. Electronically Signed: Tran Robles MD  3/17/2025 4:58 PM EDT  Workstation ID: [id number]    XR Chest 1 View  Result Date: 3/17/2025  Impression: No acute cardiopulmonary disease.  Electronically Signed: Escobar Veronica MD  3/17/2025 1:26 PM EDT  Workstation ID: [id number]    []  EKG/Telemetry   []  Cardiology/Vascular   []  Pathology  []  Old records  []  Other:    Assessment & Plan   Assessment / Plan     Assessment:  Acute CHF exacerbation, HFpEF  Elevated proBNP, likely from above  Elevated troponin, likely from above  Hypokalemia, repleted  Lactic acidosis  Leukocytosis  History of CAD,  History of STEMI s/p PCI on 2021  Hyperlipidemia  Hypertension  Diabetes mellitis  COPD  Sciatica and bilateral hip arthritis    Plan:  Patient is being managed in medicine service.  Presented with lower extremity swelling, pain and fatigue.  Has history of STEMI s/p PCI and CAD.  Follows Dr. Xiao as outpatient.  Found to have EKG changes on presentation with elevated troponin 172 with repeat troponin 189.  Heparin drip discontinued given less concern for NSTEMI.  Elevated proBNP; likely due to CHF exacerbation.  Last echo on 08/2024 showing normal EF.  Possible congestive heart failure with reduced ejection fraction.  On IV Lasix 40 mg twice daily.  Continue.  Lactic acidosis resolved.  Leukocytosis, likely due to UTI.  Started on IV ceftriaxone.  Urine culture pending.  Blood culture ordered.  Monitor input and output.  Cardiologist on board.  Appreciate further input.  Continue cardiac monitoring.  Strict input and output.  Labs in AM.  Clinical course to dictate further management.     Discussed with RN.    VTE Prophylaxis:  Pharmacologic VTE prophylaxis orders are present.        CODE STATUS:   Code Status (Patient has no pulse and is not breathing): CPR (Attempt to Resuscitate)  Medical Interventions (Patient has pulse or is breathing): Full Support      Electronically signed by Rubin Urena MD, 03/18/25, 8:26 AM EDT.

## 2025-03-18 NOTE — NURSING NOTE
Patient Desiree Garcia admitted to 4MTU from the ED. Patient is alert and oriented to person, place, time, and situation. Patient oriented to unit, call light system and bed alarm use, teaching effective. Patient agreed to bed alarm use. Candida Auris screening revealed patient will NOT need tested, contact isolation and bleach cleaning due to no risk factors. Patient currently is not a concern for an active CDIFF infection.    4 eyes skin assessment completed with Kemi Gordon RN. Per policy, the following skin interventions were put in place as a result of the skin assessment below: Wound care/nutrition consulted for present wound    Skin Assessments (most recent)      Flowsheet Row Most Recent Value   Skin WDL .WDL except, integrity   Skin Integrity bruised (ecchymotic), scab   Sensory Perception 3-->slightly limited   Moisture 3-->occasionally moist   Activity 3-->walks occasionally   Mobility 3-->slightly limited   Nutrition 3-->adequate   Friction and Shear 3-->no apparent problem   Abraham Score 18   Pressure Reduction Devices pressure-redistributing mattress utilized   Pressure Reduction Techniques frequent weight shift encouraged            Fall assessment completed. Per policy, the patient was determined be a Low fall risk due to [employer] score 49 or less (only used after first 24 hours of admission). Patient assessed to need the following mobility assistance: Standby Assist with the following assistive devices: None, and will be using the bathroom for toileting. Per policy, the following fall interventions were put in place: Room close to nurse's station.     Patient's belongings that were sent with family: None  Patient's belongings that were sent to security: None  Patient's belongings locked in safe box in room: None  Patient's belongings that were sent to pharmacy: None  Patient's belongings kept at bedside per patient: Money in the amount of:, Purse/Wallet, Dentures, Glasses,  Clothing, Other Jewelry, and Keys

## 2025-03-18 NOTE — PLAN OF CARE
Goal Outcome Evaluation:              Outcome Evaluation: Patient alert and oriented with intermittent confusion, on room air, NSR-sinus tachycardia on monitor, X1 to BSC, bed alert in place, no other changes

## 2025-03-19 ENCOUNTER — APPOINTMENT (OUTPATIENT)
Dept: CARDIOLOGY | Facility: HOSPITAL | Age: 65
End: 2025-03-19
Payer: MEDICARE

## 2025-03-19 ENCOUNTER — APPOINTMENT (OUTPATIENT)
Dept: GENERAL RADIOLOGY | Facility: HOSPITAL | Age: 65
End: 2025-03-19
Payer: MEDICARE

## 2025-03-19 ENCOUNTER — TELEPHONE (OUTPATIENT)
Dept: CASE MANAGEMENT | Facility: OTHER | Age: 65
End: 2025-03-19
Payer: MEDICARE

## 2025-03-19 LAB
ANION GAP SERPL CALCULATED.3IONS-SCNC: 7.1 MMOL/L (ref 5–15)
AORTIC DIMENSIONLESS INDEX: 0.69 (DI)
AV MEAN PRESS GRAD SYS DOP V1V2: 3.7 MMHG
AV VMAX SYS DOP: 130.4 CM/SEC
BASOPHILS # BLD AUTO: 0.19 10*3/MM3 (ref 0–0.2)
BASOPHILS NFR BLD AUTO: 1.3 % (ref 0–1.5)
BH CV ECHO MEAS - AO MAX PG: 6.8 MMHG
BH CV ECHO MEAS - AO ROOT DIAM: 2.8 CM
BH CV ECHO MEAS - AO V2 VTI: 18.3 CM
BH CV ECHO MEAS - AVA(I,D): 2.38 CM2
BH CV ECHO MEAS - EDV(MOD-SP2): 71 ML
BH CV ECHO MEAS - EDV(MOD-SP4): 53.4 ML
BH CV ECHO MEAS - EF(MOD-SP2): 48 %
BH CV ECHO MEAS - EF(MOD-SP4): 54.3 %
BH CV ECHO MEAS - ESV(MOD-SP2): 36.9 ML
BH CV ECHO MEAS - ESV(MOD-SP4): 24.4 ML
BH CV ECHO MEAS - IVS/LVPW: 1.09 CM
BH CV ECHO MEAS - IVSD: 1.2 CM
BH CV ECHO MEAS - LA DIMENSION: 3.9 CM
BH CV ECHO MEAS - LAT PEAK E' VEL: 8.4 CM/SEC
BH CV ECHO MEAS - LV MAX PG: 3.2 MMHG
BH CV ECHO MEAS - LV MEAN PG: 1.8 MMHG
BH CV ECHO MEAS - LV V1 MAX: 90 CM/SEC
BH CV ECHO MEAS - LV V1 VTI: 12.6 CM
BH CV ECHO MEAS - LVIDD: 3.4 CM
BH CV ECHO MEAS - LVIDS: 2.5 CM
BH CV ECHO MEAS - LVOT AREA: 3.5 CM2
BH CV ECHO MEAS - LVOT DIAM: 2.1 CM
BH CV ECHO MEAS - LVPWD: 1.1 CM
BH CV ECHO MEAS - MED PEAK E' VEL: 7.9 CM/SEC
BH CV ECHO MEAS - MV A MAX VEL: 107.4 CM/SEC
BH CV ECHO MEAS - MV E MAX VEL: 54.6 CM/SEC
BH CV ECHO MEAS - MV E/A: 0.51
BH CV ECHO MEAS - SV(LVOT): 43.6 ML
BH CV ECHO MEAS - SV(MOD-SP2): 34.1 ML
BH CV ECHO MEAS - SV(MOD-SP4): 29 ML
BH CV ECHO MEAS - TAPSE (>1.6): 1.21 CM
BH CV ECHO MEASUREMENTS AVERAGE E/E' RATIO: 6.7
BH CV XLRA - TDI S': 9.6 CM/SEC
BUN SERPL-MCNC: 11 MG/DL (ref 8–23)
BUN/CREAT SERPL: 12.5 (ref 7–25)
CALCIUM SPEC-SCNC: 9.6 MG/DL (ref 8.6–10.5)
CHLORIDE SERPL-SCNC: 93 MMOL/L (ref 98–107)
CO2 SERPL-SCNC: 30.9 MMOL/L (ref 22–29)
CREAT SERPL-MCNC: 0.88 MG/DL (ref 0.57–1)
DEPRECATED RDW RBC AUTO: 42.2 FL (ref 37–54)
EGFRCR SERPLBLD CKD-EPI 2021: 73.5 ML/MIN/1.73
EOSINOPHIL # BLD AUTO: 0.17 10*3/MM3 (ref 0–0.4)
EOSINOPHIL NFR BLD AUTO: 1.1 % (ref 0.3–6.2)
ERYTHROCYTE [DISTWIDTH] IN BLOOD BY AUTOMATED COUNT: 13.7 % (ref 12.3–15.4)
GLUCOSE BLDC GLUCOMTR-MCNC: 189 MG/DL (ref 70–99)
GLUCOSE BLDC GLUCOMTR-MCNC: 205 MG/DL (ref 70–99)
GLUCOSE BLDC GLUCOMTR-MCNC: 218 MG/DL (ref 70–99)
GLUCOSE BLDC GLUCOMTR-MCNC: 258 MG/DL (ref 70–99)
GLUCOSE SERPL-MCNC: 211 MG/DL (ref 65–99)
HCT VFR BLD AUTO: 44.3 % (ref 34–46.6)
HGB BLD-MCNC: 14.9 G/DL (ref 12–15.9)
IMM GRANULOCYTES # BLD AUTO: 0.13 10*3/MM3 (ref 0–0.05)
IMM GRANULOCYTES NFR BLD AUTO: 0.9 % (ref 0–0.5)
LEFT ATRIUM VOLUME INDEX: 18 ML/M2
LYMPHOCYTES # BLD AUTO: 3.82 10*3/MM3 (ref 0.7–3.1)
LYMPHOCYTES NFR BLD AUTO: 25.7 % (ref 19.6–45.3)
MAGNESIUM SERPL-MCNC: 2 MG/DL (ref 1.6–2.4)
MCH RBC QN AUTO: 29 PG (ref 26.6–33)
MCHC RBC AUTO-ENTMCNC: 33.6 G/DL (ref 31.5–35.7)
MCV RBC AUTO: 86.4 FL (ref 79–97)
MONOCYTES # BLD AUTO: 1.21 10*3/MM3 (ref 0.1–0.9)
MONOCYTES NFR BLD AUTO: 8.1 % (ref 5–12)
NEUTROPHILS NFR BLD AUTO: 62.9 % (ref 42.7–76)
NEUTROPHILS NFR BLD AUTO: 9.37 10*3/MM3 (ref 1.7–7)
NRBC BLD AUTO-RTO: 0 /100 WBC (ref 0–0.2)
PHOSPHATE SERPL-MCNC: 4.1 MG/DL (ref 2.5–4.5)
PLATELET # BLD AUTO: 370 10*3/MM3 (ref 140–450)
PMV BLD AUTO: 10.1 FL (ref 6–12)
POTASSIUM SERPL-SCNC: 3.4 MMOL/L (ref 3.5–5.2)
QT INTERVAL: 385 MS
QTC INTERVAL: 477 MS
RBC # BLD AUTO: 5.13 10*6/MM3 (ref 3.77–5.28)
SODIUM SERPL-SCNC: 131 MMOL/L (ref 136–145)
WBC NRBC COR # BLD AUTO: 14.89 10*3/MM3 (ref 3.4–10.8)

## 2025-03-19 PROCEDURE — 25510000001 PERFLUTREN 6.52 MG/ML SUSPENSION 2 ML VIAL: Performed by: INTERNAL MEDICINE

## 2025-03-19 PROCEDURE — 85025 COMPLETE CBC W/AUTO DIFF WBC: CPT | Performed by: STUDENT IN AN ORGANIZED HEALTH CARE EDUCATION/TRAINING PROGRAM

## 2025-03-19 PROCEDURE — 80048 BASIC METABOLIC PNL TOTAL CA: CPT | Performed by: STUDENT IN AN ORGANIZED HEALTH CARE EDUCATION/TRAINING PROGRAM

## 2025-03-19 PROCEDURE — 25010000002 ENOXAPARIN PER 10 MG: Performed by: INTERNAL MEDICINE

## 2025-03-19 PROCEDURE — 84100 ASSAY OF PHOSPHORUS: CPT | Performed by: STUDENT IN AN ORGANIZED HEALTH CARE EDUCATION/TRAINING PROGRAM

## 2025-03-19 PROCEDURE — 25010000002 CEFTRIAXONE PER 250 MG: Performed by: STUDENT IN AN ORGANIZED HEALTH CARE EDUCATION/TRAINING PROGRAM

## 2025-03-19 PROCEDURE — 99232 SBSQ HOSP IP/OBS MODERATE 35: CPT | Performed by: STUDENT IN AN ORGANIZED HEALTH CARE EDUCATION/TRAINING PROGRAM

## 2025-03-19 PROCEDURE — 36415 COLL VENOUS BLD VENIPUNCTURE: CPT | Performed by: STUDENT IN AN ORGANIZED HEALTH CARE EDUCATION/TRAINING PROGRAM

## 2025-03-19 PROCEDURE — 82948 REAGENT STRIP/BLOOD GLUCOSE: CPT

## 2025-03-19 PROCEDURE — 83735 ASSAY OF MAGNESIUM: CPT | Performed by: STUDENT IN AN ORGANIZED HEALTH CARE EDUCATION/TRAINING PROGRAM

## 2025-03-19 PROCEDURE — 82948 REAGENT STRIP/BLOOD GLUCOSE: CPT | Performed by: STUDENT IN AN ORGANIZED HEALTH CARE EDUCATION/TRAINING PROGRAM

## 2025-03-19 PROCEDURE — 25010000002 FUROSEMIDE PER 20 MG: Performed by: STUDENT IN AN ORGANIZED HEALTH CARE EDUCATION/TRAINING PROGRAM

## 2025-03-19 PROCEDURE — 99232 SBSQ HOSP IP/OBS MODERATE 35: CPT | Performed by: INTERNAL MEDICINE

## 2025-03-19 PROCEDURE — 93306 TTE W/DOPPLER COMPLETE: CPT | Performed by: INTERNAL MEDICINE

## 2025-03-19 PROCEDURE — 63710000001 INSULIN GLARGINE PER 5 UNITS: Performed by: STUDENT IN AN ORGANIZED HEALTH CARE EDUCATION/TRAINING PROGRAM

## 2025-03-19 PROCEDURE — 73502 X-RAY EXAM HIP UNI 2-3 VIEWS: CPT

## 2025-03-19 PROCEDURE — 63710000001 INSULIN REGULAR HUMAN PER 5 UNITS: Performed by: FAMILY MEDICINE

## 2025-03-19 PROCEDURE — 93306 TTE W/DOPPLER COMPLETE: CPT

## 2025-03-19 PROCEDURE — 97161 PT EVAL LOW COMPLEX 20 MIN: CPT

## 2025-03-19 RX ORDER — FUROSEMIDE 10 MG/ML
40 INJECTION INTRAMUSCULAR; INTRAVENOUS DAILY
Status: DISCONTINUED | OUTPATIENT
Start: 2025-03-20 | End: 2025-03-20

## 2025-03-19 RX ORDER — POTASSIUM CHLORIDE 750 MG/1
40 CAPSULE, EXTENDED RELEASE ORAL ONCE
Status: DISCONTINUED | OUTPATIENT
Start: 2025-03-19 | End: 2025-03-20 | Stop reason: HOSPADM

## 2025-03-19 RX ADMIN — CARVEDILOL 3.12 MG: 3.12 TABLET, FILM COATED ORAL at 17:38

## 2025-03-19 RX ADMIN — ASPIRIN 81 MG: 81 TABLET, COATED ORAL at 08:11

## 2025-03-19 RX ADMIN — FUROSEMIDE 40 MG: 10 INJECTION, SOLUTION INTRAMUSCULAR; INTRAVENOUS at 06:18

## 2025-03-19 RX ADMIN — CEFTRIAXONE SODIUM 1000 MG: 1 INJECTION, POWDER, FOR SOLUTION INTRAMUSCULAR; INTRAVENOUS at 20:50

## 2025-03-19 RX ADMIN — Medication 10 ML: at 08:12

## 2025-03-19 RX ADMIN — ATORVASTATIN CALCIUM 40 MG: 40 TABLET, FILM COATED ORAL at 20:50

## 2025-03-19 RX ADMIN — Medication 1 APPLICATION: at 12:09

## 2025-03-19 RX ADMIN — POTASSIUM CHLORIDE 10 MEQ: 750 CAPSULE, EXTENDED RELEASE ORAL at 11:26

## 2025-03-19 RX ADMIN — POTASSIUM CHLORIDE 10 MEQ: 750 CAPSULE, EXTENDED RELEASE ORAL at 08:11

## 2025-03-19 RX ADMIN — HUMAN INSULIN 4 UNITS: 100 INJECTION, SOLUTION SUBCUTANEOUS at 08:11

## 2025-03-19 RX ADMIN — SPIRONOLACTONE 25 MG: 25 TABLET ORAL at 08:11

## 2025-03-19 RX ADMIN — CLOPIDOGREL BISULFATE 75 MG: 75 TABLET, FILM COATED ORAL at 08:11

## 2025-03-19 RX ADMIN — POTASSIUM CHLORIDE 10 MEQ: 750 CAPSULE, EXTENDED RELEASE ORAL at 17:38

## 2025-03-19 RX ADMIN — SODIUM CHLORIDE 1 ML: 9 INJECTION INTRAMUSCULAR; INTRAVENOUS; SUBCUTANEOUS at 09:49

## 2025-03-19 RX ADMIN — Medication 1 APPLICATION: at 08:12

## 2025-03-19 RX ADMIN — CARVEDILOL 3.12 MG: 3.12 TABLET, FILM COATED ORAL at 08:11

## 2025-03-19 RX ADMIN — Medication 1 APPLICATION: at 17:39

## 2025-03-19 RX ADMIN — Medication 10 ML: at 20:51

## 2025-03-19 RX ADMIN — HUMAN INSULIN 4 UNITS: 100 INJECTION, SOLUTION SUBCUTANEOUS at 17:38

## 2025-03-19 RX ADMIN — ENOXAPARIN SODIUM 40 MG: 100 INJECTION SUBCUTANEOUS at 11:26

## 2025-03-19 RX ADMIN — INSULIN GLARGINE 10 UNITS: 100 INJECTION, SOLUTION SUBCUTANEOUS at 08:12

## 2025-03-19 RX ADMIN — HUMAN INSULIN 6 UNITS: 100 INJECTION, SOLUTION SUBCUTANEOUS at 12:08

## 2025-03-19 NOTE — THERAPY EVALUATION
Acute Care - Physical Therapy Initial Evaluation  [facility]     Patient Name: Desiree Garcia  : 1960  MRN: [id number]  Today's Date: 3/19/2025      Visit Dx:     ICD-10-CM ICD-9-CM   1. NSTEMI (non-ST elevated myocardial infarction)  I21.4 410.70   2. Difficulty walking  R26.2 719.7     Patient Active Problem List   Diagnosis    Type 2 diabetes mellitus, without long-term current use of insulin    COPD (chronic obstructive pulmonary disease)    Depression with anxiety    Esophageal reflux    Forgetfulness    Leg paresthesia    Lumbar spinal stenosis    Migraines    Night sweat    Sciatica    Dyspnea on exertion    Thoracic or lumbosacral neuritis or radiculitis    Left wrist pain    Sprain of left wrist    Arthritis of knee, left    Contusion of left knee    Nondisplaced fracture of trapezium bone of left wrist with routine healing    Chronic left shoulder pain    Coronary artery disease involving native heart without angina pectoris    Hypertension, essential    Mixed hyperlipidemia    Cigarette nicotine dependence with nicotine-induced disorder    Altered bowel habits    Diarrhea    Rectal bleeding    Abdominal bloating    Allergic rhinitis due to food    Elevated troponin    Acute diastolic CHF (congestive heart failure)    History of ST elevation myocardial infarction (STEMI)     Past Medical History:   Diagnosis Date    Arthritis     Cervical cancer screening     COPD (chronic obstructive pulmonary disease)     Coronary artery disease involving native heart without angina pectoris 2021    primary angioplasty and stent placement to mid right coronary artery with good angiographic results on 2021 after STEMI    Depression with anxiety     Diabetes mellitus     Forgetfulness     Gastric reflux     Hypertension     Leg paresthesia 2017    Right    Limb swelling     Lumbago 2017    Low back pain    Lumbar spinal stenosis 2017    L4/5 moderate to severe central canal stenosis     Lumbosacral radiculopathy 2017    Right    Migraines     Night sweat     Reflux esophagitis     Shortness of breath     ST elevation myocardial infarction (STEMI) (CMS/HCC) 2021    Stomach disorder      Past Surgical History:   Procedure Laterality Date    ABDOMINAL SURGERY      3 C-SECTIONS    CARDIAC CATHETERIZATION      CARDIAC CATHETERIZATION N/A 2021    Procedure: Left Heart Cath;  Surgeon: Sidney Xiao MD;  Location: formerly [facility] CATH INVASIVE LOCATION;  Service: Cardiology;  Laterality: N/A;     SECTION      x 3    CHOLECYSTECTOMY      COLONOSCOPY      CORONARY STENT PLACEMENT      ENDOSCOPY      2007    FOOT SURGERY Right     HAND SURGERY      HYSTERECTOMY  1985     PT Assessment (Last 12 Hours)       PT Evaluation and Treatment       Row Name 25 1100          Physical Therapy Time and Intention    Subjective Information no complaints  -DP     Document Type evaluation  -DP     Mode of Treatment individual therapy;physical therapy  -DP     Patient Effort good  -DP       Row Name 25 1100          General Information    Patient Profile Reviewed yes  -DP     Patient Observations alert;cooperative;agree to therapy  -DP     Prior Level of Function independent:;gait;transfer;bed mobility;ADL's  -DP     Equipment Currently Used at Home none  -DP     Existing Precautions/Restrictions no known precautions/restrictions  -DP     Barriers to Rehab none identified  -DP       Row Name 25 1100          Living Environment    Current Living Arrangements home  -DP     People in Home alone  -DP       Row Name 25 1100          Cognition    Orientation Status (Cognition) oriented x 3  -DP       Row Name 25 1100          Range of Motion Comprehensive    General Range of Motion bilateral lower extremity ROM WFL  -DP       Row Name 25 1100          Strength (Manual Muscle Testing)    Strength (Manual Muscle Testing) bilateral lower extremities;strength is WFL   -DP       Row Name 03/19/25 1100          Bed Mobility    Bed Mobility supine-sit-supine  -DP     Supine-Sit-Supine [place] (Bed Mobility) independent  -DP       Row Name 03/19/25 1100          Transfers    Transfers sit-stand transfer  -DP       Row Name 03/19/25 1100          Sit-Stand Transfer    Sit-Stand [place] (Transfers) independent  -DP       Row Name 03/19/25 1100          Gait/Stairs (Locomotion)    Gait/Stairs Locomotion gait/ambulation independence  -DP     [place] Level (Gait) modified independence  -DP     Comment, (Gait/Stairs) pt is able to ambulate ad roberto i nher room  -DP       Row Name 03/19/25 1100          Balance    Balance Assessment standing dynamic balance  -DP     Dynamic Standing Balance supervision  -DP       Row Name             Wound 03/17/25 1930 Right lateral ear Other (Comments)    Wound - Properties Group Placement Date: 03/17/25  -MB Placement Time: 1930  -MB Present on Original Admission: Y  -MB Side: Right  -MB Orientation: lateral  -MB Location: ear  -MB Primary Wound Type: Other  -MB Secondary Wound Type - Other: --  -MB, scab     Retired Wound - Properties Group Placement Date: 03/17/25  -MB Placement Time: 1930  -MB Present on Original Admission: Y  -MB Side: Right  -MB Orientation: lateral  -MB Location: ear  -MB    Retired Wound - Properties Group Placement Date: 03/17/25  -MB Placement Time: 1930  -MB Present on Original Admission: Y  -MB Side: Right  -MB Orientation: lateral  -MB Location: ear  -MB    Retired Wound - Properties Group Date first assessed: 03/17/25  -MB Time first assessed: 1930  -MB Present on Original Admission: Y  -MB Side: Right  -MB Location: ear  -MB      Row Name             Wound 03/17/25 1930 Left lateral abdomen    Wound - Properties Group Placement Date: 03/17/25  -MB Placement Time: 1930  -MB Present on Original Admission: Y  -MB Side: Left  -MB Orientation: lateral  -MB Location: abdomen  -MB Additional Comments: picked area by  patient  -MB    Retired Wound - Properties Group Placement Date: 03/17/25  -MB Placement Time: 1930  -MB Present on Original Admission: Y  -MB Side: Left  -MB Orientation: lateral  -MB Location: abdomen  -MB Additional Comments: picked area by patient  -MB    Retired Wound - Properties Group Placement Date: 03/17/25  -MB Placement Time: 1930  -MB Present on Original Admission: Y  -MB Side: Left  -MB Orientation: lateral  -MB Location: abdomen  -MB Additional Comments: picked area by patient  -MB    Retired Wound - Properties Group Date first assessed: 03/17/25  -MB Time first assessed: 1930 -MB Present on Original Admission: Y  -MB Side: Left  -MB Location: abdomen  -MB Additional Comments: picked area by patient  -MB      Row Name 03/19/25 1100          Plan of Care Review    Plan of Care Reviewed With patient  -DP     Outcome Evaluation Patient is able to complete all transfers and bed mobilities indepedently in the room. Pt is able to ambulate ad roberto in the room. Pt does not need inpatient PT services. Recommend DC home.  -DP       Row Name 03/19/25 1100          Therapy Assessment/Plan (PT)    Criteria for Skilled Interventions Met (PT) no problems identified which require skilled intervention  -DP     Therapy Frequency (PT) evaluation only  -DP       Row Name 03/19/25 1100          PT Evaluation Complexity    History, PT Evaluation Complexity no personal factors and/or comorbidities  -DP     Examination of Body Systems (PT Eval Complexity) total of 4 or more elements  -DP     Clinical Presentation (PT Evaluation Complexity) stable  -DP     Clinical Decision Making (PT Evaluation Complexity) low complexity  -DP     Overall Complexity (PT Evaluation Complexity) low complexity  -DP       Row Name 03/19/25 1100          Physical Therapy Goals    Problem Specific Goal Selection (PT) problem specific goal 1, PT  -DP       Row Name 03/19/25 1100          Problem Specific Goal 1 (PT)    Problem Specific Goal 1 (PT)  Complete PT evaluation.  -DP     Time Frame (Problem Specific Goal 1, PT) 1 day  -DP     Progress/Outcome (Problem Specific Goal 1, PT) goal met  -DP               User Key  (r) = Recorded By, (t) = Taken By, (c) = Cosigned By      Initials Name Provider Type    Ministerio Naranjo PT Physical Therapist    Vilma Griffin RN Registered Nurse                      PT Recommendation and Plan  Anticipated Discharge Disposition (PT): home  Therapy Frequency (PT): evaluation only  Plan of Care Reviewed With: patient  Outcome Evaluation: Patient is able to complete all transfers and bed mobilities indepedently in the room. Pt is able to ambulate ad roberto in the room. Pt does not need inpatient PT services. Recommend DC home.   Outcome Measures       Row Name 03/19/25 1100             How much help from another person do you currently need...    Turning from your back to your side while in flat bed without using bedrails? 4  -DP      Moving from lying on back to sitting on the side of a flat bed without bedrails? 4  -DP      Moving to and from a bed to a chair (including a wheelchair)? 4  -DP      Standing up from a chair using your arms (e.g., wheelchair, bedside chair)? 4  -DP      Climbing 3-5 steps with a railing? 3  -DP      To walk in hospital room? 4  -DP      AM-PAC 6 Clicks Score (PT) 23  -DP         Functional Assessment    Outcome Measure Options AM-PAC 6 Clicks Basic Mobility (PT)  -DP                User Key  (r) = Recorded By, (t) = Taken By, (c) = Cosigned By      Initials Name Provider Type    Ministerio Naranjo PT Physical Therapist                     Time Calculation:    PT Charges       Row Name 03/19/25 1118             Time Calculation    PT Received On 03/19/25  -DP         Untimed Charges    PT Eval/Re-eval Minutes 15  -DP         Total Minutes    Untimed Charges Total Minutes 15  -DP       Total Minutes 15  -DP                User Key  (r) = Recorded By, (t) = Taken By, (c) = Cosigned By       Initials Name Provider Type    DP Ministerio Tuttle PT Physical Therapist                      PT G-Codes  Outcome Measure Options: AM-PAC 6 Clicks Basic Mobility (PT)  AM-PAC 6 Clicks Score (PT): 23    Ministerio Tuttle, PT  3/19/2025

## 2025-03-19 NOTE — TELEPHONE ENCOUNTER
UTR. Phone VM remains full and unable to leave message Patient in hospital. Will call next week.    Marisol THOMPSON RN  Ambulatory

## 2025-03-19 NOTE — PLAN OF CARE
Goal Outcome Evaluation:  Plan of Care Reviewed With: patient           Outcome Evaluation: Patient is able to complete all transfers and bed mobilities indepedently in the room. Pt is able to ambulate ad roberto in the room. Pt does not need inpatient PT services. Recommend DC home.    Anticipated Discharge Disposition (PT): home

## 2025-03-19 NOTE — PROGRESS NOTES
[facility]     Cardiology Progress Note    Patient Name: Desiree Garcia  : 1960  MRN: [id number]  Primary Care Physician:  Sandra Kaba MD  Date of admission: 3/17/2025    Subjective   Subjective     Chief Complaint: Follow-up visit for congestive heart failure    Interval HPI:    Patient reports improvement in shortness of breath.  Pedal edema subsided.  She has no chest pain.  She has not ambulated since admission.  She is requesting to be discharged home today.    Review of Systems   All systems were reviewed and negative except for: Shortness of breath, cough, itching, severe fatigue, pain of the lower extremities.  Negative for chest pain    Objective   Objective     Vitals:   Temp:  [98.2 °F (36.8 °C)-99.3 °F (37.4 °C)] 98.4 °F (36.9 °C)  Heart Rate:  [83-94] 92  Resp:  [17-18] 18  BP: ()/(53-75) 115/75  Physical Exam      General : Alert, awake, no acute distress  CVS : Regular rate and rhythm, no murmur, rubs or gallops  Lungs: Bilateral basal crackles heard, no wheezing  Abdomen: Soft, nontender, bowel sounds heard in all 4 quadrants  Extremities: Warm, well-perfused, no edema    Scheduled Meds:aspirin, 81 mg, Oral, Daily  atorvastatin, 40 mg, Oral, Nightly  carvedilol, 3.125 mg, Oral, BID With Meals  cefTRIAXone, 1,000 mg, Intravenous, Q24H  clopidogrel, 75 mg, Oral, Daily  dimethicone, 1 Application, Topical, 4x Daily  enoxaparin sodium, 40 mg, Subcutaneous, Q24H  [START ON 3/20/2025] furosemide, 40 mg, Intravenous, Daily  insulin glargine, 10 Units, Subcutaneous, Daily  insulin regular, 2-9 Units, Subcutaneous, TID With Meals  potassium chloride, 10 mEq, Oral, TID With Meals  sodium chloride, 10 mL, Intravenous, Q12H  spironolactone, 25 mg, Oral, Daily         Result Review    Result Review:  I have personally reviewed the results from the time of this admission to 3/19/2025 09:43 EDT and agree with these findings:  [x]  Laboratory  []  Microbiology  [x]  Radiology  [x]   EKG/Telemetry   [x]  Cardiology/Vascular   []  Pathology  []  Old records  []  Other:  Most notable findings include:     CBC          3/17/2025    14:32 3/18/2025    06:02 3/19/2025    05:26   CBC   WBC 13.12  15.86  14.89    RBC 4.61  4.72  5.13    Hemoglobin 12.7  13.3  14.9    Hematocrit 40.8  41.4  44.3    MCV 88.5  87.7  86.4    MCH 27.5  28.2  29.0    MCHC 31.1  32.1  33.6    RDW 14.0  13.7  13.7    Platelets 319  339  370      CMP          3/17/2025    14:32 3/18/2025    06:02 3/19/2025    05:26   CMP   Glucose 270  240  211    BUN 7  9  11    Creatinine 0.81  0.92  0.88    EGFR 81.2  69.7  73.5    Sodium 139  135  131    Potassium 3.1  3.7  3.4    Chloride 97  94  93    Calcium 9.3  9.2  9.6    Total Protein 6.9      Albumin 3.4      Globulin 3.5      Total Bilirubin 0.4      Alkaline Phosphatase 141      AST (SGOT) 16      ALT (SGPT) 11      Albumin/Globulin Ratio 1.0      BUN/Creatinine Ratio 8.6  9.8  12.5    Anion Gap 9.2  10.2  7.1       CARDIAC LABS:     Latest Reference Range & Units 03/17/25 14:32 03/17/25 18:56 03/18/25 06:02   Creatine Kinase 20 - 180 U/L   30   HS Troponin T <14 ng/L 172 (C) 189 (C)    Troponin T % Delta Abnormal if >/= 20%   10    Troponin T Numeric Delta ng/L  17    proBNP 0.0 - 900.0 pg/mL 3,226.0 (H)         Assessment & Plan   Assessment / Plan     Brief Patient Summary:  Desiree Garcia is a 65 y.o. female with coronary artery disease, previous STEMI, status post primary angioplasty, hyperlipidemia, diabetes, COPD and hypertension.  She was admitted with generalized weakness, lower extremity edema and leg pains.  She was noted to be volume overloaded.    Active Hospital Problems:  Active Hospital Problems    Diagnosis     **Acute diastolic CHF (congestive heart failure)     Elevated troponin     History of ST elevation myocardial infarction (STEMI)      Acute diastolic heart failure : Clinically volume overloaded on admission, started on IV diuretics.  Pedal edema  improved.  Good urine output during the past 24 hours.    Elevated troponin : No chest pain.  Mildly elevated high since her troponin with a normal CPK.  Likely type II MI related to heart failure exacerbation.    Coronary artery disease : With acute coronary syndrome/STEMI in 2021.  On Plavix    Chronic obstructive pulmonary disease    Diabetes mellitus    Plan:     Decrease IV Lasix to 40 mg daily, next dose tomorrow morning.  Will change to oral form on discharge  Echocardiogram to be done today to evaluate LV function, wall motion abnormalities : Pending at this time.  She agreed to stay for echocardiogram.    Continue aspirin, Plavix, statins, low-dose beta-blockers    PT/OT         CODE STATUS:   Code Status (Patient has no pulse and is not breathing): CPR (Attempt to Resuscitate)  Medical Interventions (Patient has pulse or is breathing): Full Support

## 2025-03-19 NOTE — PROGRESS NOTES
[facility]   Hospitalist Progress Note  Date: 3/19/2025  Patient Name: Desiree Garcia  : 1960  MRN: [id number]  Date of admission: 3/17/2025  Room/Bed: 402/1      Subjective   Subjective     Chief Complaint: Lower extremity edema, weakness     Summary:Desiree Garcia is a 65 y.o. female with history of CAD, STEMI s/p PCI on , hyperlipidemia, hypertension, diabetes melitis, COPD, sciatica and bilateral hip arthritis who presented with generalized weakness, lower extremity edema and pain.  Patient stated she has been having lower extremity pain and swelling since few months which gradually worsened in past few days.  Complaining of lower extremity edema and pain, does have history of sciatica and bilateral hip arthritis; following orthopedic surgeon.  She is also planned to undergo colonoscopy on 3/25/2025 as outpatient.  Denied any chest pain or shortness of breath.  Patient stated her sister asked her to come to ER as she was not feeling well for past couple of days. On presentation to ER, vital stable.  Lab work showed hypokalemia of 3.1, hyperglycemia of 270, elevated ALP of 141 and leukocytosis of 13.12.  Lactic acid of 2.5. EKG on presentation showed possible new or worsening ischemia or infarction.  On-call cardiologist Dr. Dre Castro was reached out by ER physician; recommended to start her on heparin drip and agreed on consulting the patient.  Patient is being admitted for possible NSTEMI versus CHF exacerbation.  Cardiologist on board and following the patient.    Interval Followup: No acute events overnight.  Patient stated she feels better today.  Denies any chest pain or difficulty breathing.  Lower extremity edema improving.  Diuresing well. Net -3.8 L output since admission.  Laying in bed, no active complaints.    Review of Systems    All systems reviewed and negative except for what is outlined above.      Objective   Objective     Vitals:   Temp:  [98.2 °F (36.8 °C)-99.3 °F  (37.4 °C)] 98.4 °F (36.9 °C)  Heart Rate:  [83-94] 92  Resp:  [17-18] 18  BP: ()/(53-75) 115/75    Physical Exam   General: Awake, alert, NAD  Cardiovascular: RRR, no murmurs   Pulmonary: CTA bilaterally; no wheezes; no conversational dyspnea  Gastrointestinal: S/ND/NT, +BS  Extremities: Trace bilateral edema  Neuro: Alert, awake, oriented x 3; speech clear      Result Review    Result Review:  I have personally reviewed these results:  [x]  Laboratory      Lab 03/19/25 0526 03/18/25  0602 03/18/25  0050 03/17/25  2217 03/17/25  1856 03/17/25  1432   WBC 14.89* 15.86*  --   --   --  13.12*   HEMOGLOBIN 14.9 13.3  --   --   --  12.7   HEMATOCRIT 44.3 41.4  --   --   --  40.8   PLATELETS 370 339  --   --   --  319   NEUTROS ABS 9.37* 10.75*  --   --   --  8.88*   IMMATURE GRANS (ABS) 0.13* 0.10*  --   --   --  0.11*   LYMPHS ABS 3.82* 3.93*  --   --   --  3.01   MONOS ABS 1.21* 0.82  --   --   --  0.88   EOS ABS 0.17 0.11  --   --   --  0.11   MCV 86.4 87.7  --   --   --  88.5   PROCALCITONIN  --   --   --   --   --  0.12   LACTATE  --  2.0 2.7* 3.4*   < > 2.5*   PROTIME  --   --   --   --   --  14.0   APTT  --  54.5*  --  47.3*  --  28.7*    < > = values in this interval not displayed.         Lab 03/19/25  0526 03/18/25  0602 03/17/25  1432 03/13/25  1555   SODIUM 131* 135* 139 139   POTASSIUM 3.4* 3.7 3.1* 3.7   CHLORIDE 93* 94* 97* 99   CO2 30.9* 30.8* 32.8* 28.1   ANION GAP 7.1 10.2 9.2 11.9   BUN 11 9 7* 5*   CREATININE 0.88 0.92 0.81 0.86   EGFR 73.5 69.7 81.2 75.5   GLUCOSE 211* 240* 270* 256*   CALCIUM 9.6 9.2 9.3 9.5   MAGNESIUM 2.0 2.2 1.8  --    PHOSPHORUS 4.1 4.2  --   --    HEMOGLOBIN A1C  --   --   --  9.30*         Lab 03/17/25  1432 03/13/25  1555   TOTAL PROTEIN 6.9 6.5   ALBUMIN 3.4* 3.1*   GLOBULIN 3.5 3.4   ALT (SGPT) 11 17   AST (SGOT) 16 17   BILIRUBIN 0.4 0.4   ALK PHOS 141* 144*         Lab 03/18/25  0602 03/17/25  1856 03/17/25  1432   PROBNP 3,223.0*  --  3,226.0*   HSTROP T  --  189*  172*   PROTIME  --   --  14.0   INR  --   --  1.04                 Brief Urine Lab Results  (Last result in the past 365 days)        Color   Clarity   Blood   Leuk Est   Nitrite   Protein   CREAT   Urine HCG        03/18/25 1002 Yellow   Clear   Negative   Small (1+)   Positive   Negative                 [x]  Microbiology   Microbiology Results (last 10 days)       Procedure Component Value - Date/Time    COVID PRE-OP / PRE-PROCEDURE SCREENING ORDER (NO ISOLATION) - Swab, Nasopharynx [[id number]]  (Normal) Collected: 03/17/25 1404    Lab Status: Final result Specimen: Swab from Nasopharynx Updated: 03/17/25 1458    Narrative:      The following orders were created for panel order COVID PRE-OP / PRE-PROCEDURE SCREENING ORDER (NO ISOLATION) - Swab, Nasopharynx.  Procedure                               Abnormality         Status                     ---------                               -----------         ------                     COVID-19, FLU A/B, RSV P...[[id number]]  Normal              Final result                 Please view results for these tests on the individual orders.    COVID-19, FLU A/B, RSV PCR 1 HR TAT - Swab, Nasopharynx [[id number]]  (Normal) Collected: 03/17/25 1404    Lab Status: Final result Specimen: Swab from Nasopharynx Updated: 03/17/25 1458     COVID19 Not Detected     Influenza A PCR Not Detected     Influenza B PCR Not Detected     RSV, PCR Not Detected    Narrative:      Fact sheet for providers: https://www.fda.gov/media/516852/download    Fact sheet for patients: https://www.fda.gov/media/477894/download    Test performed by PCR.    Enteric Parasite Panel - Stool, Per Rectum [[id number]]  (Normal) Collected: 03/13/25 1440    Lab Status: Final result Specimen: Stool from Per Rectum Updated: 03/15/25 1451     Giardia lamblia Not Detected     Cryptosporidium Not Detected     Entamoeba histolytica Not Detected    Enteric Bacterial Panel - Stool, Per Rectum [[id number]]  (Normal) Collected:  03/13/25 1440    Lab Status: Final result Specimen: Stool from Per Rectum Updated: 03/14/25 1113     Salmonella Not Detected     Campylobacter Not Detected     Shigella/Enteroinvasive E. coli (EIEC) Not Detected     Shiga-like toxin-producing E. coli (STEC) stx1/stx2 Not Detected    Clostridioides difficile Toxin, PCR - Stool, Per Rectum [[id number]] Collected: 03/13/25 1440    Lab Status: Final result Specimen: Stool from Per Rectum Updated: 03/13/25 1537     Toxigenic C. difficile by PCR Negative     027 Toxin Presumptive Negative    Narrative:      The result indicates the absence of toxigenic C. difficile from stool specimen.           [x]  Radiology  XR Pelvis 1 or 2 View  Result Date: 3/17/2025  Impression: Pelvis: Severe arthritis of the hips. Osteopenia. Questionable irregularity at the lateral left femoral neck. Dedicated left hip radiograph recommended for better evaluation to exclude fracture. Correlate for site of pain. Knees: Osteopenia. Osteoarthritis as described. Small right knee joint effusion. No fractures or dislocations. Electronically Signed: Tran Robles MD  3/17/2025 4:58 PM EDT  Workstation ID: [id number]    XR Knee 3 View Right  Result Date: 3/17/2025  Impression: Pelvis: Severe arthritis of the hips. Osteopenia. Questionable irregularity at the lateral left femoral neck. Dedicated left hip radiograph recommended for better evaluation to exclude fracture. Correlate for site of pain. Knees: Osteopenia. Osteoarthritis as described. Small right knee joint effusion. No fractures or dislocations. Electronically Signed: Tran Robles MD  3/17/2025 4:58 PM EDT  Workstation ID: [id number]    XR Knee 3 View Left  Result Date: 3/17/2025  Impression: Pelvis: Severe arthritis of the hips. Osteopenia. Questionable irregularity at the lateral left femoral neck. Dedicated left hip radiograph recommended for better evaluation to exclude fracture. Correlate for site of pain. Knees: Osteopenia. Osteoarthritis as  described. Small right knee joint effusion. No fractures or dislocations. Electronically Signed: Tran Robles MD  3/17/2025 4:58 PM EDT  Workstation ID: [id number]    XR Chest 1 View  Result Date: 3/17/2025  Impression: No acute cardiopulmonary disease. Electronically Signed: Escobar Veronica MD  3/17/2025 1:26 PM EDT  Workstation ID: [id number]    []  EKG/Telemetry   []  Cardiology/Vascular   []  Pathology  []  Old records  []  Other:    Assessment & Plan   Assessment / Plan     Assessment:  Acute CHF exacerbation, HFpEF  Elevated proBNP, likely from above  Elevated troponin, likely from above  Hypokalemia, repleted  Lactic acidosis  Leukocytosis  History of CAD,  History of STEMI s/p PCI on 2021  Hyperlipidemia  Hypertension  Diabetes mellitis  COPD  Sciatica and bilateral hip arthritis    Plan:  Patient is being managed in medicine service.  Presented with lower extremity swelling, pain and fatigue.  Has history of STEMI s/p PCI and CAD.  Follows Dr. Xiao as outpatient.  Found to have EKG changes on presentation with elevated troponin 172 with repeat troponin 189.  Heparin drip discontinued given less concern for NSTEMI.  Elevated proBNP; likely due to CHF exacerbation.  Last echo on 08/2024 showing normal EF.  Possible congestive heart failure with reduced ejection fraction.  On IV Lasix 40 mg twice daily.  Continue.  Lactic acidosis resolved.  Leukocytosis, likely due to UTI.  Started on IV ceftriaxone.  Urine culture preliminary result showing E coli.  Blood culture showing no growth in 24 hours.  Monitor input and output.  Cardiologist on board.  Appreciate further input.  Continue cardiac monitoring.  Strict input and output.  Labs in AM.  Clinical course to dictate further management.     Discussed with RN.    VTE Prophylaxis:  Pharmacologic VTE prophylaxis orders are present.        CODE STATUS:   Code Status (Patient has no pulse and is not breathing): CPR (Attempt to Resuscitate)  Medical Interventions  (Patient has pulse or is breathing): Full Support      Electronically signed by Rubin Urena MD, 03/19/25, 8:26 AM EDT.

## 2025-03-19 NOTE — PLAN OF CARE
Goal Outcome Evaluation:  Plan of Care Reviewed With: patient        Progress: no change  Outcome Evaluation: patient has been alert and oriented, on room air with no signs of distress, patient has been noncompliant with no sugar drinks, family brought her a large cocacola, educated patient and she refused to follow, bed alarm in place, patient has been yelling and has been verbally hateful.

## 2025-03-19 NOTE — PLAN OF CARE
Goal Outcome Evaluation:  Plan of Care Reviewed With: patient        Progress: improving  Outcome Evaluation: Patient is alert and oriented on room air. Bharathi had echo and hip xray completed this shift. VSS. Has been ambulating in hallway with staff and family. No complaints of pain. Will continue with POC.

## 2025-03-20 ENCOUNTER — TELEPHONE (OUTPATIENT)
Dept: CARDIOLOGY | Facility: CLINIC | Age: 65
End: 2025-03-20

## 2025-03-20 ENCOUNTER — READMISSION MANAGEMENT (OUTPATIENT)
Dept: CALL CENTER | Facility: HOSPITAL | Age: 65
End: 2025-03-20
Payer: MEDICARE

## 2025-03-20 VITALS
WEIGHT: 167.99 LBS | HEART RATE: 85 BPM | HEIGHT: 66 IN | OXYGEN SATURATION: 96 % | TEMPERATURE: 97.9 F | RESPIRATION RATE: 18 BRPM | BODY MASS INDEX: 27 KG/M2 | DIASTOLIC BLOOD PRESSURE: 69 MMHG | SYSTOLIC BLOOD PRESSURE: 117 MMHG

## 2025-03-20 LAB
ANION GAP SERPL CALCULATED.3IONS-SCNC: 9.5 MMOL/L (ref 5–15)
ANISOCYTOSIS BLD QL: NORMAL
BACTERIA SPEC AEROBE CULT: ABNORMAL
BASOPHILS # BLD AUTO: 0.21 10*3/MM3 (ref 0–0.2)
BASOPHILS NFR BLD AUTO: 1.3 % (ref 0–1.5)
BUN SERPL-MCNC: 12 MG/DL (ref 8–23)
BUN/CREAT SERPL: 13.6 (ref 7–25)
CALCIUM SPEC-SCNC: 9.6 MG/DL (ref 8.6–10.5)
CHLORIDE SERPL-SCNC: 95 MMOL/L (ref 98–107)
CO2 SERPL-SCNC: 29.5 MMOL/L (ref 22–29)
CREAT SERPL-MCNC: 0.88 MG/DL (ref 0.57–1)
DEPRECATED RDW RBC AUTO: 42.2 FL (ref 37–54)
EGFRCR SERPLBLD CKD-EPI 2021: 73.5 ML/MIN/1.73
EOSINOPHIL # BLD AUTO: 0.13 10*3/MM3 (ref 0–0.4)
EOSINOPHIL NFR BLD AUTO: 0.8 % (ref 0.3–6.2)
ERYTHROCYTE [DISTWIDTH] IN BLOOD BY AUTOMATED COUNT: 13.6 % (ref 12.3–15.4)
GLUCOSE BLDC GLUCOMTR-MCNC: 209 MG/DL (ref 70–99)
GLUCOSE BLDC GLUCOMTR-MCNC: 210 MG/DL (ref 70–99)
GLUCOSE SERPL-MCNC: 204 MG/DL (ref 65–99)
HCT VFR BLD AUTO: 44.9 % (ref 34–46.6)
HGB BLD-MCNC: 14.6 G/DL (ref 12–15.9)
IMM GRANULOCYTES # BLD AUTO: 0.2 10*3/MM3 (ref 0–0.05)
IMM GRANULOCYTES NFR BLD AUTO: 1.3 % (ref 0–0.5)
LARGE PLATELETS: NORMAL
LYMPHOCYTES # BLD AUTO: 4.17 10*3/MM3 (ref 0.7–3.1)
LYMPHOCYTES NFR BLD AUTO: 26.6 % (ref 19.6–45.3)
MAGNESIUM SERPL-MCNC: 2 MG/DL (ref 1.6–2.4)
MCH RBC QN AUTO: 28.1 PG (ref 26.6–33)
MCHC RBC AUTO-ENTMCNC: 32.5 G/DL (ref 31.5–35.7)
MCV RBC AUTO: 86.5 FL (ref 79–97)
MONOCYTES # BLD AUTO: 0.99 10*3/MM3 (ref 0.1–0.9)
MONOCYTES NFR BLD AUTO: 6.3 % (ref 5–12)
NEUTROPHILS NFR BLD AUTO: 63.7 % (ref 42.7–76)
NEUTROPHILS NFR BLD AUTO: 9.99 10*3/MM3 (ref 1.7–7)
NRBC BLD AUTO-RTO: 0 /100 WBC (ref 0–0.2)
NT-PROBNP SERPL-MCNC: 3398 PG/ML (ref 0–900)
PHOSPHATE SERPL-MCNC: 3.7 MG/DL (ref 2.5–4.5)
PLATELET # BLD AUTO: 351 10*3/MM3 (ref 140–450)
PMV BLD AUTO: 10.4 FL (ref 6–12)
POTASSIUM SERPL-SCNC: 4.1 MMOL/L (ref 3.5–5.2)
RBC # BLD AUTO: 5.19 10*6/MM3 (ref 3.77–5.28)
SMALL PLATELETS BLD QL SMEAR: ADEQUATE
SODIUM SERPL-SCNC: 134 MMOL/L (ref 136–145)
WBC MORPH BLD: NORMAL
WBC NRBC COR # BLD AUTO: 15.69 10*3/MM3 (ref 3.4–10.8)

## 2025-03-20 PROCEDURE — 80048 BASIC METABOLIC PNL TOTAL CA: CPT | Performed by: STUDENT IN AN ORGANIZED HEALTH CARE EDUCATION/TRAINING PROGRAM

## 2025-03-20 PROCEDURE — 83880 ASSAY OF NATRIURETIC PEPTIDE: CPT | Performed by: INTERNAL MEDICINE

## 2025-03-20 PROCEDURE — 63710000001 INSULIN GLARGINE PER 5 UNITS: Performed by: STUDENT IN AN ORGANIZED HEALTH CARE EDUCATION/TRAINING PROGRAM

## 2025-03-20 PROCEDURE — 63710000001 INSULIN REGULAR HUMAN PER 5 UNITS: Performed by: FAMILY MEDICINE

## 2025-03-20 PROCEDURE — 85007 BL SMEAR W/DIFF WBC COUNT: CPT | Performed by: STUDENT IN AN ORGANIZED HEALTH CARE EDUCATION/TRAINING PROGRAM

## 2025-03-20 PROCEDURE — 82948 REAGENT STRIP/BLOOD GLUCOSE: CPT | Performed by: STUDENT IN AN ORGANIZED HEALTH CARE EDUCATION/TRAINING PROGRAM

## 2025-03-20 PROCEDURE — 99232 SBSQ HOSP IP/OBS MODERATE 35: CPT | Performed by: INTERNAL MEDICINE

## 2025-03-20 PROCEDURE — 84100 ASSAY OF PHOSPHORUS: CPT | Performed by: STUDENT IN AN ORGANIZED HEALTH CARE EDUCATION/TRAINING PROGRAM

## 2025-03-20 PROCEDURE — 99239 HOSP IP/OBS DSCHRG MGMT >30: CPT | Performed by: STUDENT IN AN ORGANIZED HEALTH CARE EDUCATION/TRAINING PROGRAM

## 2025-03-20 PROCEDURE — 83735 ASSAY OF MAGNESIUM: CPT | Performed by: STUDENT IN AN ORGANIZED HEALTH CARE EDUCATION/TRAINING PROGRAM

## 2025-03-20 PROCEDURE — 85025 COMPLETE CBC W/AUTO DIFF WBC: CPT | Performed by: STUDENT IN AN ORGANIZED HEALTH CARE EDUCATION/TRAINING PROGRAM

## 2025-03-20 PROCEDURE — 25010000002 FUROSEMIDE PER 20 MG: Performed by: INTERNAL MEDICINE

## 2025-03-20 PROCEDURE — 25010000002 ENOXAPARIN PER 10 MG: Performed by: INTERNAL MEDICINE

## 2025-03-20 PROCEDURE — 36415 COLL VENOUS BLD VENIPUNCTURE: CPT | Performed by: STUDENT IN AN ORGANIZED HEALTH CARE EDUCATION/TRAINING PROGRAM

## 2025-03-20 RX ORDER — POTASSIUM CHLORIDE 750 MG/1
10 CAPSULE, EXTENDED RELEASE ORAL DAILY
Status: DISCONTINUED | OUTPATIENT
Start: 2025-03-21 | End: 2025-03-20 | Stop reason: HOSPADM

## 2025-03-20 RX ORDER — SPIRONOLACTONE 25 MG/1
25 TABLET ORAL DAILY
Qty: 30 TABLET | Refills: 0 | Status: SHIPPED | OUTPATIENT
Start: 2025-03-21 | End: 2025-04-20

## 2025-03-20 RX ORDER — FUROSEMIDE 20 MG/1
20 TABLET ORAL DAILY
Qty: 30 TABLET | Refills: 0 | Status: SHIPPED | OUTPATIENT
Start: 2025-03-20 | End: 2025-04-19

## 2025-03-20 RX ORDER — POTASSIUM CHLORIDE 750 MG/1
10 CAPSULE, EXTENDED RELEASE ORAL DAILY
Qty: 30 CAPSULE | Refills: 0 | Status: SHIPPED | OUTPATIENT
Start: 2025-03-21 | End: 2025-04-20

## 2025-03-20 RX ORDER — FUROSEMIDE 40 MG/1
40 TABLET ORAL DAILY
Status: DISCONTINUED | OUTPATIENT
Start: 2025-03-21 | End: 2025-03-20 | Stop reason: HOSPADM

## 2025-03-20 RX ADMIN — POTASSIUM CHLORIDE 10 MEQ: 750 CAPSULE, EXTENDED RELEASE ORAL at 08:09

## 2025-03-20 RX ADMIN — INSULIN GLARGINE 10 UNITS: 100 INJECTION, SOLUTION SUBCUTANEOUS at 08:09

## 2025-03-20 RX ADMIN — Medication 10 ML: at 08:09

## 2025-03-20 RX ADMIN — CARVEDILOL 3.12 MG: 3.12 TABLET, FILM COATED ORAL at 08:09

## 2025-03-20 RX ADMIN — ENOXAPARIN SODIUM 40 MG: 100 INJECTION SUBCUTANEOUS at 10:27

## 2025-03-20 RX ADMIN — FUROSEMIDE 40 MG: 10 INJECTION, SOLUTION INTRAMUSCULAR; INTRAVENOUS at 08:09

## 2025-03-20 RX ADMIN — HUMAN INSULIN 4 UNITS: 100 INJECTION, SOLUTION SUBCUTANEOUS at 12:04

## 2025-03-20 RX ADMIN — Medication 1 APPLICATION: at 08:10

## 2025-03-20 RX ADMIN — ASPIRIN 81 MG: 81 TABLET, COATED ORAL at 08:09

## 2025-03-20 RX ADMIN — HUMAN INSULIN 4 UNITS: 100 INJECTION, SOLUTION SUBCUTANEOUS at 08:18

## 2025-03-20 RX ADMIN — CLOPIDOGREL BISULFATE 75 MG: 75 TABLET, FILM COATED ORAL at 08:09

## 2025-03-20 RX ADMIN — SPIRONOLACTONE 25 MG: 25 TABLET ORAL at 08:09

## 2025-03-20 NOTE — TELEPHONE ENCOUNTER
Caller: [facility]    Relationship to patient: Provider    Best call back number:   Telephone Information:   Mobile [phone]       New or established patient?  [] New  [x] Established    Date of discharge: 3-20-25    Facility discharged from: [facility]    Diagnosis/Symptoms: CHF    Length of stay (If applicable): 3 DAYS    Specialty Only: Did you see a [facility] provider?    [x] Yes  [] No  If so, who? DR. KNOX    Additional Details: NO TIMEFRAME GIVEN

## 2025-03-20 NOTE — DISCHARGE INSTR - LAB
Follow up with Dr. Kaba's office on Tuesday, March 25th at 3:45 pm.    Dr. Xiao's office will call you to schedule appointment.

## 2025-03-20 NOTE — PRE-PROCEDURE INSTRUCTIONS
"Instructed on date and arrival time of 0930. Instructed that arrival time is not their procedure time but allows time to prepare for procedure.  Come to entrance \"C\". Must have  over age 18 to drive home.  May have two visitors; however, children under 12 must stay in waiting room.  Discussed clear liquid diet (no red or purple), bowel prep, and NPO.  May take medications as usual except for blood thinners, diabetic medications, and weight loss medications.  Verbalized understanding of instructions given.  Instructed to call for questions or concerns.  Hold Plavix for 5 days prior to procedure.  Cardiac and blood thinner clearances noted in chart.  "

## 2025-03-20 NOTE — DISCHARGE SUMMARY
[facility]         HOSPITALIST  DISCHARGE SUMMARY    Patient Name: Desiree Garcia  : 1960  MRN: [id number]    Date of Admission: 3/17/2025  Date of Discharge:  3/20/2025  Primary Care Physician: Sandra Kaba MD    Consults       Date and Time Order Name Status Description    3/17/2025  3:47 PM Inpatient Cardiology Consult      3/17/2025  3:38 PM Inpatient Hospitalist Consult              Active and Resolved Hospital Problems:  Active Hospital Problems    Diagnosis POA    **Acute diastolic CHF (congestive heart failure) [I50.31] Yes    Elevated troponin [R79.89] Yes    History of ST elevation myocardial infarction (STEMI) [I25.2] Not Applicable      Resolved Hospital Problems   No resolved problems to display.       Hospital Course     Hospital Course:  Desiree Garcia is a 65 y.o. female with history of CAD, STEMI s/p PCI on , hyperlipidemia, hypertension, diabetes melitis, COPD, sciatica and bilateral hip arthritis who presented with generalized weakness, lower extremity edema and pain. Patient stated she has been having lower extremity pain and swelling since few months which gradually worsened in past few days. Complaining of lower extremity edema and pain, does have history of sciatica and bilateral hip arthritis; following orthopedic surgeon. She is also planned to undergo colonoscopy on 3/25/2025 as outpatient. Denied any chest pain or shortness of breath. Patient stated her sister asked her to come to ER as she was not feeling well for past couple of days. On presentation to ER, vital stable. Lab work showed hypokalemia of 3.1, hyperglycemia of 270, elevated ALP of 141 and leukocytosis of 13.12. Lactic acid of 2.5. EKG on presentation showed possible new or worsening ischemia or infarction. On-call cardiologist Dr. Dre Castro was reached out by ER physician; recommended to start her on heparin drip and agreed on consulting the patient. Patient is being admitted for  possible NSTEMI versus CHF exacerbation. Cardiologist on board and following the patient. NSTEMI was ruled out and she was thought to have CHF exacerbation. She was started on IV lasix. Cardiologist followed the patient throughout the hospitalization. Diuresed well. Clinically improved. Okay to be discharged from Cardiology standpoint as well.  Holding lisinopril at the time of discharge.  Started on spironolactone 25 mg once daily along with potassium supplementation 10 mEq daily.  IV Lasix switched to oral Lasix 20 mg daily from tomorrow.  Advised to continue aspirin, Plavix, statins and low-dose beta-blockers as per cardiology.  Patient to follow-up with cardiology in 1 to 2 weeks considering stress test as outpatient if patient continues to be symptomatic.    Patient is being discharged home today.  She is stable at the time of discharge.  She will follow-up with PCP Andrea Santanas, needs CBC and chemistries trended during follow-up.  Follow-up with cardiology in 1 to 2 weeks.  Advised to be compliant with medications and diet.  There has been issues with noncompliance as per the patient.  Fall precautions.    DISCHARGE Follow Up Recommendations for labs and diagnostics: as mentioned above.      Day of Discharge     Vital Signs:  Temp:  [97.9 °F (36.6 °C)-99 °F (37.2 °C)] 98.4 °F (36.9 °C)  Heart Rate:  [89-96] 92  Resp:  [18] 18  BP: (110-133)/(59-79) 118/71  Physical Exam:   General: Awake, alert, NAD  Cardiovascular: RRR, no murmurs   Pulmonary: CTA bilaterally; no wheezes; no conversational dyspnea  Gastrointestinal: S/ND/NT, +BS  Extremities: No bilateral edema  Neuro: Alert, awake, oriented x 3; speech clear     Discharge Details        Discharge Medications        New Medications        Instructions Start Date   dimethicone 1.3 % lotion lotion  Commonly known as: AVEENO   1 Application, Topical, 4 Times Daily      potassium chloride 10 MEQ CR capsule  Commonly known as: MICRO-K   10 mEq, Oral, Daily   Start  Date: March 21, 2025     spironolactone 25 MG tablet  Commonly known as: ALDACTONE   25 mg, Oral, Daily   Start Date: March 21, 2025            Changes to Medications        Instructions Start Date   albuterol (2.5 MG/3ML) 0.083% nebulizer solution  Commonly known as: PROVENTIL  What changed: Another medication with the same name was removed. Continue taking this medication, and follow the directions you see here.   2.5 mg, Every 4 Hours PRN      Aspirin Low Dose 81 MG EC tablet  Generic drug: aspirin  What changed: See the new instructions.   TAKE ONE TABLET BY MOUTH DAILY AT 9 AM FOR ACUTE HEART ATTACK      atorvastatin 40 MG tablet  Commonly known as: LIPITOR  What changed: See the new instructions.   TAKE ONE TABLET BY MOUTH DAILY AT 9 PM AT NIGHT      clopidogrel 75 MG tablet  Commonly known as: PLAVIX  What changed: See the new instructions.   TAKE ONE TABLET BY MOUTH DAILY AT 9 AM      Trintellix 10 MG tablet tablet  Generic drug: Vortioxetine HBr  What changed: See the new instructions.   TAKE ONE TABLET BY MOUTH DAILY AT 9 AM  DAYS             Continue These Medications        Instructions Start Date   carvedilol 3.125 MG tablet  Commonly known as: COREG   3.125 mg, 2 Times Daily      Clenpiq 10-3.5-12 MG-GM -GM/175ML solution  Generic drug: Sod Picosulfate-Mag Ox-Cit Acd   1 (One) Time.      furosemide 20 MG tablet  Commonly known as: Lasix   20 mg, Oral, Daily      Jardiance 25 MG tablet tablet  Generic drug: empagliflozin   25 mg, Daily      metFORMIN 500 MG tablet  Commonly known as: GLUCOPHAGE   500 mg, 2 Times Daily With Meals      Trelegy Ellipta 100-62.5-25 MCG/ACT inhaler  Generic drug: Fluticasone-Umeclidin-Vilant   1 puff, Inhalation, Daily      vitamin D3 125 MCG (5000 UT) capsule capsule   5,000 Units, Oral, Daily             Stop These Medications      lisinopril 10 MG tablet  Commonly known as: PRINIVIL,ZESTRIL              Allergies   Allergen Reactions    Tramadol Itching        Discharge Disposition:  Home or Self Care    Diet:  Hospital:  Diet Order   Procedures    Diet: Cardiac, Diabetic; Healthy Heart (2-3 Na+); Consistent Carbohydrate; Fluid Consistency: Thin (IDDSI 0)       Discharge Activity:       CODE STATUS:  Code Status and Medical Interventions: CPR (Attempt to Resuscitate); Full Support   Ordered at: 03/17/25 1556     Code Status (Patient has no pulse and is not breathing):    CPR (Attempt to Resuscitate)     Medical Interventions (Patient has pulse or is breathing):    Full Support       Future Appointments   Date Time Provider Department Center   8/6/2025  1:30 PM Edith Alcocer APRN [facility]       Additional Instructions for the Follow-ups that You Need to Schedule       Discharge Follow-up with PCP   As directed       Currently Documented PCP:    Sandra Kaba MD    PCP Phone Number:    [phone]     Follow Up Details: In 3-7 days; needs CBC and chemistries trended during follow up.                Pertinent  and/or Most Recent Results     PROCEDURES:       LAB RESULTS:      Lab 03/20/25  0507 03/19/25  0526 03/18/25  0602 03/18/25  0050 03/17/25  2217 03/17/25  1856 03/17/25  1432   WBC 15.69* 14.89* 15.86*  --   --   --  13.12*   HEMOGLOBIN 14.6 14.9 13.3  --   --   --  12.7   HEMATOCRIT 44.9 44.3 41.4  --   --   --  40.8   PLATELETS 351 370 339  --   --   --  319   NEUTROS ABS 9.99* 9.37* 10.75*  --   --   --  8.88*   IMMATURE GRANS (ABS) 0.20* 0.13* 0.10*  --   --   --  0.11*   LYMPHS ABS 4.17* 3.82* 3.93*  --   --   --  3.01   MONOS ABS 0.99* 1.21* 0.82  --   --   --  0.88   EOS ABS 0.13 0.17 0.11  --   --   --  0.11   MCV 86.5 86.4 87.7  --   --   --  88.5   PROCALCITONIN  --   --   --   --   --   --  0.12   LACTATE  --   --  2.0 2.7* 3.4* 2.3* 2.5*   PROTIME  --   --   --   --   --   --  14.0   APTT  --   --  54.5*  --  47.3*  --  28.7*         Lab 03/20/25  0507 03/19/25  0526 03/18/25  0602 03/17/25  1432 03/13/25  1555   SODIUM 134* 131* 135*  139 139   POTASSIUM 4.1 3.4* 3.7 3.1* 3.7   CHLORIDE 95* 93* 94* 97* 99   CO2 29.5* 30.9* 30.8* 32.8* 28.1   ANION GAP 9.5 7.1 10.2 9.2 11.9   BUN 12 11 9 7* 5*   CREATININE 0.88 0.88 0.92 0.81 0.86   EGFR 73.5 73.5 69.7 81.2 75.5   GLUCOSE 204* 211* 240* 270* 256*   CALCIUM 9.6 9.6 9.2 9.3 9.5   MAGNESIUM 2.0 2.0 2.2 1.8  --    PHOSPHORUS 3.7 4.1 4.2  --   --    HEMOGLOBIN A1C  --   --   --   --  9.30*         Lab 03/17/25  1432 03/13/25  1555   TOTAL PROTEIN 6.9 6.5   ALBUMIN 3.4* 3.1*   GLOBULIN 3.5 3.4   ALT (SGPT) 11 17   AST (SGOT) 16 17   BILIRUBIN 0.4 0.4   ALK PHOS 141* 144*         Lab 03/20/25  0507 03/18/25  0602 03/17/25  1856 03/17/25  1432   PROBNP 3,398.0* 3,223.0*  --  3,226.0*   HSTROP T  --   --  189* 172*   PROTIME  --   --   --  14.0   INR  --   --   --  1.04                 Brief Urine Lab Results  (Last result in the past 365 days)        Color   Clarity   Blood   Leuk Est   Nitrite   Protein   CREAT   Urine HCG        03/18/25 1002 Yellow   Clear   Negative   Small (1+)   Positive   Negative                 Microbiology Results (last 10 days)       Procedure Component Value - Date/Time    Blood Culture - Blood, Arm, Left [[id number]]  (Normal) Collected: 03/18/25 1949    Lab Status: Preliminary result Specimen: Blood from Arm, Left Updated: 03/19/25 2000     Blood Culture No growth at 24 hours    Narrative:      Less than seven (7) mL's of blood was collected.  Insufficient quantity may yield false negative results.    Blood Culture - Blood, Hand, Right [[id number]]  (Normal) Collected: 03/18/25 1948    Lab Status: Preliminary result Specimen: Blood from Hand, Right Updated: 03/19/25 2000     Blood Culture No growth at 24 hours    Narrative:      Less than seven (7) mL's of blood was collected.  Insufficient quantity may yield false negative results.    Urine Culture - Urine, Urine, Clean Catch [[id number]]  (Abnormal)  (Susceptibility) Collected: 03/18/25 1002    Lab Status: Final result  Specimen: Urine, Clean Catch Updated: 03/20/25 1128     Urine Culture >100,000 CFU/mL Escherichia coli    Narrative:      Colonization of the urinary tract without infection is common. Treatment is discouraged unless the patient is symptomatic, pregnant, or undergoing an invasive urologic procedure.    Susceptibility        Escherichia coli      CHEY      Amoxicillin + Clavulanate Susceptible      Ampicillin Susceptible      Ampicillin + Sulbactam Susceptible      Cefazolin (Urine) Susceptible      Cefepime Susceptible      Ceftazidime Susceptible      Ceftriaxone Susceptible      Gentamicin Susceptible      Levofloxacin Susceptible      Nitrofurantoin Susceptible      Piperacillin + Tazobactam Susceptible      Trimethoprim + Sulfamethoxazole Susceptible                           COVID PRE-OP / PRE-PROCEDURE SCREENING ORDER (NO ISOLATION) - Swab, Nasopharynx [[id number]]  (Normal) Collected: 03/17/25 1404    Lab Status: Final result Specimen: Swab from Nasopharynx Updated: 03/17/25 1458    Narrative:      The following orders were created for panel order COVID PRE-OP / PRE-PROCEDURE SCREENING ORDER (NO ISOLATION) - Swab, Nasopharynx.  Procedure                               Abnormality         Status                     ---------                               -----------         ------                     COVID-19, FLU A/B, RSV P...[[id number]]  Normal              Final result                 Please view results for these tests on the individual orders.    COVID-19, FLU A/B, RSV PCR 1 HR TAT - Swab, Nasopharynx [[id number]]  (Normal) Collected: 03/17/25 1404    Lab Status: Final result Specimen: Swab from Nasopharynx Updated: 03/17/25 1458     COVID19 Not Detected     Influenza A PCR Not Detected     Influenza B PCR Not Detected     RSV, PCR Not Detected    Narrative:      Fact sheet for providers: https://www.fda.gov/media/927045/download    Fact sheet for patients: https://www.fda.gov/media/646259/download    Test  performed by PCR.    Enteric Parasite Panel - Stool, Per Rectum [[id number]]  (Normal) Collected: 03/13/25 1440    Lab Status: Final result Specimen: Stool from Per Rectum Updated: 03/15/25 1451     Giardia lamblia Not Detected     Cryptosporidium Not Detected     Entamoeba histolytica Not Detected    Enteric Bacterial Panel - Stool, Per Rectum [[id number]]  (Normal) Collected: 03/13/25 1440    Lab Status: Final result Specimen: Stool from Per Rectum Updated: 03/14/25 1113     Salmonella Not Detected     Campylobacter Not Detected     Shigella/Enteroinvasive E. coli (EIEC) Not Detected     Shiga-like toxin-producing E. coli (STEC) stx1/stx2 Not Detected    Clostridioides difficile Toxin, PCR - Stool, Per Rectum [[id number]] Collected: 03/13/25 1440    Lab Status: Final result Specimen: Stool from Per Rectum Updated: 03/13/25 1537     Toxigenic C. difficile by PCR Negative     027 Toxin Presumptive Negative    Narrative:      The result indicates the absence of toxigenic C. difficile from stool specimen.             XR Hip With or Without Pelvis 2 - 3 View Left  Result Date: 3/19/2025  Impression: 1.No evidence of acute fracture or dislocation. 2.Advanced degenerative changes in both hips. Electronically Signed: Jose Inman MD  3/19/2025 11:00 AM EDT  Workstation ID: [id number]    XR Pelvis 1 or 2 View  Result Date: 3/17/2025  Impression: Pelvis: Severe arthritis of the hips. Osteopenia. Questionable irregularity at the lateral left femoral neck. Dedicated left hip radiograph recommended for better evaluation to exclude fracture. Correlate for site of pain. Knees: Osteopenia. Osteoarthritis as described. Small right knee joint effusion. No fractures or dislocations. Electronically Signed: Tran Robles MD  3/17/2025 4:58 PM EDT  Workstation ID: [id number]    XR Knee 3 View Right  Result Date: 3/17/2025  Impression: Pelvis: Severe arthritis of the hips. Osteopenia. Questionable irregularity at the lateral left femoral neck.  Dedicated left hip radiograph recommended for better evaluation to exclude fracture. Correlate for site of pain. Knees: Osteopenia. Osteoarthritis as described. Small right knee joint effusion. No fractures or dislocations. Electronically Signed: Tran Robles MD  3/17/2025 4:58 PM EDT  Workstation ID: [id number]    XR Knee 3 View Left  Result Date: 3/17/2025  Impression: Pelvis: Severe arthritis of the hips. Osteopenia. Questionable irregularity at the lateral left femoral neck. Dedicated left hip radiograph recommended for better evaluation to exclude fracture. Correlate for site of pain. Knees: Osteopenia. Osteoarthritis as described. Small right knee joint effusion. No fractures or dislocations. Electronically Signed: Tran Robles MD  3/17/2025 4:58 PM EDT  Workstation ID: [id number]    XR Chest 1 View  Result Date: 3/17/2025  Impression: No acute cardiopulmonary disease. Electronically Signed: Escobar Veronica MD  3/17/2025 1:26 PM EDT  Workstation ID: [id number]       Results for orders placed during the hospital encounter of 12/13/24    Duplex Venous Lower Extremity - Right CAR    Interpretation Summary    Normal right lower extremity venous duplex scan.      Results for orders placed during the hospital encounter of 12/13/24    Duplex Venous Lower Extremity - Right CAR    Interpretation Summary    Normal right lower extremity venous duplex scan.      Results for orders placed during the hospital encounter of 03/17/25    Adult Transthoracic Echo Complete w/ Color, Spectral and Contrast if necessary per protocol    Interpretation Summary    Left ventricular systolic function is low normal. Left ventricular ejection fraction appears to be 51 - 55%.    Left ventricular wall thickness is consistent with borderline concentric hypertrophy.    Left ventricular diastolic function is consistent with (grade I) impaired relaxation.    There are no hemodynamically significant valvular abnormalities.      Labs Pending at  Discharge:  Pending Labs       Order Current Status    Blood Culture - Blood, Arm, Left Preliminary result    Blood Culture - Blood, Hand, Right Preliminary result              Time spent on Discharge including face to face service:  35 minutes    Electronically signed by Rubin Urena MD, 03/20/25, 9:10 AM EDT.

## 2025-03-20 NOTE — OUTREACH NOTE
Prep Survey      Flowsheet Row Responses   [facility] patient discharged from? [facility]   Is LACE score < 7 ? No   Eligibility [facility]   Date of Admission 03/17/25   Date of Discharge 03/20/25   Discharge Disposition Home or Self Care   Discharge diagnosis a/c CHF   Does the patient have one of the following disease processes/diagnoses(primary or secondary)? CHF   Does the patient have Home health ordered? No   Is there a DME ordered? No   Prep survey completed? Yes            JOSEPH A - Registered Nurse

## 2025-03-20 NOTE — PROGRESS NOTES
[facility]     Cardiology Progress Note    Patient Name: Desiree Garcia  : 1960  MRN: [id number]  Primary Care Physician:  Sandra Kaba MD  Date of admission: 3/17/2025    Subjective   Subjective     Chief Complaint: Follow-up visit for congestive heart failure    Interval HPI:    Patient reports feeling much better today.  Shortness of breath improved.  Fatigue improved.  Still reporting pain of both lower extremities with weakness.  She ambulated in the hallways yesterday.  She is again requesting to be discharged home today.    Review of Systems   All systems were reviewed and negative except for: Shortness of breath, cough, itching, severe fatigue, pain of the lower extremities.  Negative for chest pain    Objective   Objective     Vitals:   Temp:  [97.9 °F (36.6 °C)-99 °F (37.2 °C)] 98.2 °F (36.8 °C)  Heart Rate:  [89-96] 92  Resp:  [18] 18  BP: (110-133)/(59-84) 133/79  Physical Exam      General : Alert, awake, no acute distress  CVS : Regular rate and rhythm, no murmur, rubs or gallops  Lungs: Bilateral basal crackles heard, no wheezing  Abdomen: Soft, nontender, bowel sounds heard in all 4 quadrants  Extremities: Warm, well-perfused, no edema    Scheduled Meds:aspirin, 81 mg, Oral, Daily  atorvastatin, 40 mg, Oral, Nightly  carvedilol, 3.125 mg, Oral, BID With Meals  cefTRIAXone, 1,000 mg, Intravenous, Q24H  clopidogrel, 75 mg, Oral, Daily  dimethicone, 1 Application, Topical, 4x Daily  enoxaparin sodium, 40 mg, Subcutaneous, Q24H  [START ON 3/21/2025] furosemide, 40 mg, Oral, Daily  insulin glargine, 10 Units, Subcutaneous, Daily  insulin regular, 2-9 Units, Subcutaneous, TID With Meals  potassium chloride, 40 mEq, Oral, Once  [START ON 3/21/2025] potassium chloride, 10 mEq, Oral, Daily  sodium chloride, 10 mL, Intravenous, Q12H  spironolactone, 25 mg, Oral, Daily         Result Review    Result Review:  I have personally reviewed the results from the time of this admission to  3/20/2025 08:43 EDT and agree with these findings:  [x]  Laboratory  []  Microbiology  [x]  Radiology  [x]  EKG/Telemetry   [x]  Cardiology/Vascular   []  Pathology  []  Old records  []  Other:  Most notable findings include:     CBC          3/18/2025    06:02 3/19/2025    05:26 3/20/2025    05:07   CBC   WBC 15.86  14.89  15.69    RBC 4.72  5.13  5.19    Hemoglobin 13.3  14.9  14.6    Hematocrit 41.4  44.3  44.9    MCV 87.7  86.4  86.5    MCH 28.2  29.0  28.1    MCHC 32.1  33.6  32.5    RDW 13.7  13.7  13.6    Platelets 339  370  351      CMP          3/18/2025    06:02 3/19/2025    05:26 3/20/2025    05:07   CMP   Glucose 240  211  204    BUN 9  11  12    Creatinine 0.92  0.88  0.88    EGFR 69.7  73.5  73.5    Sodium 135  131  134    Potassium 3.7  3.4  4.1    Chloride 94  93  95    Calcium 9.2  9.6  9.6    BUN/Creatinine Ratio 9.8  12.5  13.6    Anion Gap 10.2  7.1  9.5       CARDIAC LABS:     Latest Reference Range & Units 03/17/25 14:32 03/17/25 18:56 03/18/25 06:02   Creatine Kinase 20 - 180 U/L   30   HS Troponin T <14 ng/L 172 (C) 189 (C)    Troponin T % Delta Abnormal if >/= 20%   10    Troponin T Numeric Delta ng/L  17    proBNP 0.0 - 900.0 pg/mL 3,226.0 (H)       Results for orders placed during the hospital encounter of 03/17/25    Adult Transthoracic Echo Complete w/ Color, Spectral and Contrast if necessary per protocol    Interpretation Summary    Left ventricular systolic function is low normal. Left ventricular ejection fraction appears to be 51 - 55%.    Left ventricular wall thickness is consistent with borderline concentric hypertrophy.    Left ventricular diastolic function is consistent with (grade I) impaired relaxation.    There are no hemodynamically significant valvular abnormalities.       Assessment & Plan   Assessment / Plan     Brief Patient Summary:  Desiree Garcia is a 65 y.o. female with coronary artery disease, previous STEMI, status post primary angioplasty, hyperlipidemia,  diabetes, COPD and hypertension.  She was admitted with generalized weakness, lower extremity edema and leg pains.  She was noted to be volume overloaded.    Active Hospital Problems:  Active Hospital Problems    Diagnosis     **Acute diastolic CHF (congestive heart failure)     Elevated troponin     History of ST elevation myocardial infarction (STEMI)      Acute diastolic heart failure : Clinically volume overloaded on admission, started on IV diuretics.  Pedal edema improved.  Urine output charted not maintained for the past 24 hours.    Elevated troponin : No chest pain.  Mildly elevated high since her troponin with a normal CPK.  Likely type II MI related to heart failure exacerbation.    Coronary artery disease : With acute coronary syndrome/STEMI in 2021.  On Plavix    Chronic obstructive pulmonary disease    Diabetes mellitus    Plan:   Received morning dose of IV Lasix today.  Will change to oral Lasix 20 mg daily from tomorrow  Continue spironolactone 25 mg once daily  Decrease potassium supplementation to 10 mEq daily  Continue aspirin, Plavix, statins, low-dose beta-blockers    Follow-up in cardiology clinic in 1 to 2 weeks after discharge  Will consider stress test as outpatient if the patient continues to be symptomatic.  PT/OT         CODE STATUS:   Code Status (Patient has no pulse and is not breathing): CPR (Attempt to Resuscitate)  Medical Interventions (Patient has pulse or is breathing): Full Support

## 2025-03-20 NOTE — PLAN OF CARE
Goal Outcome Evaluation:           Patient discharging home via family.

## 2025-03-21 ENCOUNTER — TRANSITIONAL CARE MANAGEMENT TELEPHONE ENCOUNTER (OUTPATIENT)
Dept: CALL CENTER | Facility: HOSPITAL | Age: 65
End: 2025-03-21
Payer: MEDICARE

## 2025-03-21 NOTE — OUTREACH NOTE
Call Center TCM Note      Flowsheet Row Responses   [facility] facility patient discharged from? [facility]   Does the patient have one of the following disease processes/diagnoses(primary or secondary)? CHF   TCM attempt successful? No   Unsuccessful attempts Attempt 1             Janell Mcfadden RN    3/21/2025, 10:16 EDT

## 2025-03-21 NOTE — OUTREACH NOTE
Call Center TCM Note      Flowsheet Row Responses   [facility] facility patient discharged from? [facility]   Does the patient have one of the following disease processes/diagnoses(primary or secondary)? CHF   TCM attempt successful? No   Unsuccessful attempts Attempt 2             Janell Mcfadden RN    3/21/2025, 12:33 EDT

## 2025-03-22 ENCOUNTER — TRANSITIONAL CARE MANAGEMENT TELEPHONE ENCOUNTER (OUTPATIENT)
Dept: CALL CENTER | Facility: HOSPITAL | Age: 65
End: 2025-03-22
Payer: MEDICARE

## 2025-03-22 NOTE — OUTREACH NOTE
Call Center TCM Note      Flowsheet Row Responses   [facility] facility patient discharged from? [facility]   Does the patient have one of the following disease processes/diagnoses(primary or secondary)? CHF   TCM attempt successful? No   Unsuccessful attempts Attempt 3             Gladis Patel RN    3/22/2025, 12:23 EDT

## 2025-03-23 LAB
BACTERIA SPEC AEROBE CULT: NORMAL
BACTERIA SPEC AEROBE CULT: NORMAL

## 2025-03-24 ENCOUNTER — ANESTHESIA EVENT (OUTPATIENT)
Dept: GASTROENTEROLOGY | Facility: HOSPITAL | Age: 65
End: 2025-03-24
Payer: MEDICARE

## 2025-03-24 NOTE — ANESTHESIA PREPROCEDURE EVALUATION
" Anesthesia Evaluation                  Airway   Mallampati: II  TM distance: >3 FB  Neck ROM: full  No difficulty expected  Dental    (+) edentulous    Pulmonary     breath sounds clear to auscultation  (+) a smoker, COPD,shortness of breath  Cardiovascular     ECG reviewed  PT is on anticoagulation therapy  Rhythm: regular  Rate: normal    (+) hypertension, past MI , CAD, CHF , hyperlipidemia      Neuro/Psych  (+) headaches, numbness, psychiatric history Depression and Anxiety  GI/Hepatic/Renal/Endo    (+) GERD, GI bleeding , diabetes mellitus type 2    Musculoskeletal     Abdominal    Substance History      OB/GYN          Other   arthritis,     ROS/Med Hx Other: Admitted 3/17/25 Due to CHF Exacerbation - ruled out NSTEMI.  Discussed on phone with Dr. Xiao who stated patient was still cleared from cardiology with moderate risk.   EKG done day of procedure - flagged as \"Acute MI\" but no changes from 3/17/25 and Dr Xiao confirmed no acute MI      Altered bowel habits, diarrhea    ECHO 03/19/25:   Left ventricular systolic function is low normal. Left ventricular ejection fraction appears to be 51 - 55%.  Left ventricular wall thickness is consistent with borderline concentric hypertrophy.  Left ventricular diastolic function is consistent with (grade I) impaired relaxation.  There are no hemodynamically significant valvular abnormalities.    ESinus rhythm  Probable  left atrial enlargement  Abnormal R-wave progression, early transition  Inferior  infarct, recent  KG 03/17/25: HR 92,     last dose Plavix 3/17/25    Cards clearance per Dr. Xiao with moderate risks on 03/07/25                 Anesthesia Plan    ASA 3     general   total IV anesthesia  (Total IV Anesthesia    Patient understands anesthesia not responsible for dental damage.      Discussed risks with pt including aspiration, allergic reactions, apnea, advanced airway placement. Pt verbalized understanding. All questions answered. "   )  intravenous induction     Anesthetic plan, risks, benefits, and alternatives have been provided, discussed and informed consent has been obtained with: patient.    Plan discussed with CRNA.      CODE STATUS:

## 2025-03-25 ENCOUNTER — HOSPITAL ENCOUNTER (OUTPATIENT)
Facility: HOSPITAL | Age: 65
Setting detail: HOSPITAL OUTPATIENT SURGERY
Discharge: HOME | End: 2025-03-25
Attending: INTERNAL MEDICINE | Admitting: INTERNAL MEDICINE
Payer: MEDICARE

## 2025-03-25 ENCOUNTER — ANESTHESIA (OUTPATIENT)
Dept: GASTROENTEROLOGY | Facility: HOSPITAL | Age: 65
End: 2025-03-25
Payer: MEDICARE

## 2025-03-25 VITALS
RESPIRATION RATE: 19 BRPM | TEMPERATURE: 97.2 F | HEART RATE: 89 BPM | DIASTOLIC BLOOD PRESSURE: 68 MMHG | WEIGHT: 155.65 LBS | BODY MASS INDEX: 25.12 KG/M2 | SYSTOLIC BLOOD PRESSURE: 112 MMHG | OXYGEN SATURATION: 97 %

## 2025-03-25 DIAGNOSIS — K62.5 RECTAL BLEEDING: ICD-10-CM

## 2025-03-25 DIAGNOSIS — R19.7 DIARRHEA, UNSPECIFIED TYPE: ICD-10-CM

## 2025-03-25 DIAGNOSIS — R14.0 ABDOMINAL BLOATING: ICD-10-CM

## 2025-03-25 DIAGNOSIS — R19.4 ALTERED BOWEL HABITS: ICD-10-CM

## 2025-03-25 DIAGNOSIS — J30.5 ALLERGIC RHINITIS DUE TO FOOD: ICD-10-CM

## 2025-03-25 LAB
GLUCOSE BLDC GLUCOMTR-MCNC: 198 MG/DL (ref 70–99)
QT INTERVAL: 367 MS
QTC INTERVAL: 430 MS

## 2025-03-25 PROCEDURE — 25010000002 PROPOFOL 10 MG/ML EMULSION: Performed by: NURSE ANESTHETIST, CERTIFIED REGISTERED

## 2025-03-25 PROCEDURE — 25810000003 LACTATED RINGERS PER 1000 ML

## 2025-03-25 PROCEDURE — 25010000002 LIDOCAINE PF 2% 2 % SOLUTION: Performed by: NURSE ANESTHETIST, CERTIFIED REGISTERED

## 2025-03-25 PROCEDURE — 82948 REAGENT STRIP/BLOOD GLUCOSE: CPT

## 2025-03-25 PROCEDURE — 93005 ELECTROCARDIOGRAM TRACING: CPT | Performed by: MARRIAGE & FAMILY THERAPIST

## 2025-03-25 PROCEDURE — 45380 COLONOSCOPY AND BIOPSY: CPT | Performed by: INTERNAL MEDICINE

## 2025-03-25 PROCEDURE — 45385 COLONOSCOPY W/LESION REMOVAL: CPT | Performed by: INTERNAL MEDICINE

## 2025-03-25 PROCEDURE — 88305 TISSUE EXAM BY PATHOLOGIST: CPT | Performed by: INTERNAL MEDICINE

## 2025-03-25 RX ORDER — COLESTIPOL HYDROCHLORIDE 1 G/1
2 TABLET ORAL 2 TIMES DAILY
Qty: 120 TABLET | Refills: 5 | Status: SHIPPED | OUTPATIENT
Start: 2025-03-25

## 2025-03-25 RX ORDER — LIDOCAINE HYDROCHLORIDE 20 MG/ML
INJECTION, SOLUTION EPIDURAL; INFILTRATION; INTRACAUDAL; PERINEURAL AS NEEDED
Status: DISCONTINUED | OUTPATIENT
Start: 2025-03-25 | End: 2025-03-25 | Stop reason: SURG

## 2025-03-25 RX ORDER — SODIUM CHLORIDE, SODIUM LACTATE, POTASSIUM CHLORIDE, CALCIUM CHLORIDE 600; 310; 30; 20 MG/100ML; MG/100ML; MG/100ML; MG/100ML
30 INJECTION, SOLUTION INTRAVENOUS CONTINUOUS
Status: DISCONTINUED | OUTPATIENT
Start: 2025-03-25 | End: 2025-03-25 | Stop reason: HOSPADM

## 2025-03-25 RX ORDER — PROPOFOL 10 MG/ML
VIAL (ML) INTRAVENOUS AS NEEDED
Status: DISCONTINUED | OUTPATIENT
Start: 2025-03-25 | End: 2025-03-25 | Stop reason: SURG

## 2025-03-25 RX ADMIN — SODIUM CHLORIDE, SODIUM LACTATE, POTASSIUM CHLORIDE, CALCIUM CHLORIDE 30 ML/HR: 20; 30; 600; 310 INJECTION, SOLUTION INTRAVENOUS at 11:03

## 2025-03-25 RX ADMIN — PROPOFOL 100 MG: 10 INJECTION, EMULSION INTRAVENOUS at 11:12

## 2025-03-25 RX ADMIN — LIDOCAINE HYDROCHLORIDE 100 MG: 20 INJECTION, SOLUTION EPIDURAL; INFILTRATION; INTRACAUDAL; PERINEURAL at 11:12

## 2025-03-25 RX ADMIN — PROPOFOL 200 MCG/KG/MIN: 10 INJECTION, EMULSION INTRAVENOUS at 11:12

## 2025-03-25 NOTE — ANESTHESIA POSTPROCEDURE EVALUATION
Patient: Desiree Garcia    Procedure Summary       Date: 03/25/25 Room / Location: [facility] ENDOSCOPY 2 / [facility]COPY    Anesthesia Start: 1109 Anesthesia Stop: 1135    Procedure: COLONOSCOPY WITH BIOPSIES AND COLD AND HOT SNARE POLYPECTOMIES Diagnosis:       Altered bowel habits      Diarrhea, unspecified type      Rectal bleeding      Abdominal bloating      Allergic rhinitis due to food      (Altered bowel habits [R19.4])      (Diarrhea, unspecified type [R19.7])      (Rectal bleeding [K62.5])      (Abdominal bloating [R14.0])      (Allergic rhinitis due to food [J30.5])    Surgeons: Heber Najera MD Provider: Tyrese Cortez CRNA    Anesthesia Type: general ASA Status: 3            Anesthesia Type: general    Vitals  Vitals Value Taken Time   /68 03/25/25 11:49   Temp 36.2 °C (97.2 °F) 03/25/25 11:49   Pulse 89 03/25/25 11:49   Resp 19 03/25/25 11:49   SpO2 97 % 03/25/25 11:49           Post Anesthesia Care and Evaluation    Post-procedure mental status: acceptable.  Pain management: satisfactory to patient    Airway patency: patent  Anesthetic complications: No anesthetic complications    Cardiovascular status: acceptable  Respiratory status: acceptable    Comments: Per chart review

## 2025-03-25 NOTE — PROGRESS NOTES
Chief Complaint  Bilateral ankle/feet swelling    Subjective        Desiree Garcia presents to [facility]  History of Present Illness  The patient presents for evaluation of leg pain.    She reports persistent severe leg pain, which she attempted to alleviate last night by elevating her legs and applying an ice pack. Despite these measures, the pain did not subside. She has previously sought medical attention for this issue to rule out the possibility of a blood clot. She also mentions that her feet feel unusually large.    She is scheduled for a colonoscopy next week. She has not had any blood tests done today.    Supplemental Information  She is on Prolia for her hip. She was doing okay at first, but when she got home, she could not sit down or stand up. She called 911 and was taken to the hospital.    MEDICATIONS  Current: Prolia        Medical History: has a past medical history of Arthritis, Cervical cancer screening, COPD (chronic obstructive pulmonary disease), Coronary artery disease involving native heart without angina pectoris (2021), Depression with anxiety, Diabetes mellitus, Forgetfulness, Gastric reflux, Hypertension, Leg paresthesia (2017), Limb swelling, Lumbago (2017), Lumbar spinal stenosis (2017), Lumbosacral radiculopathy (2017), Migraines, Night sweat, Reflux esophagitis, Shortness of breath, ST elevation myocardial infarction (STEMI) (CMS/[facility]) (2021), and Stomach disorder.   Surgical History: has a past surgical history that includes Abdominal surgery; Cardiac catheterization;  section; Cholecystectomy; Colonoscopy (); Esophagogastroduodenoscopy; Foot surgery (Right); Hand surgery; Cardiac catheterization (N/A, 2021); Coronary stent placement; and Hysterectomy ().   Family History: family history includes Arthritis in her brother, father, mother, and sister; Cancer in her mother and sister; Diabetes  "in her brother and father; Heart attack in her father; Heart disease in her brother and father; Stroke in her brother, father, mother, and sister.   Social History: reports that she has been smoking cigarettes. She started smoking about 58 years ago. She has a 58.2 pack-year smoking history. She has been exposed to tobacco smoke. She has never used smokeless tobacco. She reports that she does not drink alcohol and does not use drugs.  Immunization History   Administered Date(s) Administered    Influenza, Unspecified 10/09/2020    Tdap 11/10/2022       Objective   Vital Signs:  /84   Pulse 107   Temp 98.9 °F (37.2 °C)   Ht 167.6 cm (66\")   Wt 76.2 kg (168 lb)   SpO2 93%   BMI 27.12 kg/m²   Estimated body mass index is 27.12 kg/m² as calculated from the following:    Height as of this encounter: 167.6 cm (66\").    Weight as of this encounter: 76.2 kg (168 lb).             ROS:  Review of Systems   Constitutional:  Negative for fatigue and fever.   HENT:  Negative for congestion, ear pain and sinus pressure.    Respiratory:  Negative for cough, chest tightness and shortness of breath.    Cardiovascular:  Negative for chest pain, palpitations and leg swelling.   Gastrointestinal:  Negative for abdominal pain and diarrhea.   Genitourinary:  Negative for dysuria and frequency.   Neurological:  Negative for speech difficulty, headache and confusion.   Psychiatric/Behavioral:  Negative for agitation and behavioral problems.       Physical Exam  Vitals reviewed.   Constitutional:       Appearance: Normal appearance.   HENT:      Right Ear: Tympanic membrane normal.      Left Ear: Tympanic membrane normal.      Nose: Nose normal.   Eyes:      Extraocular Movements: Extraocular movements intact.      Conjunctiva/sclera: Conjunctivae normal.      Pupils: Pupils are equal, round, and reactive to light.   Cardiovascular:      Rate and Rhythm: Normal rate and regular rhythm.   Pulmonary:      Effort: Pulmonary effort " is normal.      Breath sounds: Normal breath sounds.   Abdominal:      General: Bowel sounds are normal.   Musculoskeletal:         General: Normal range of motion.      Cervical back: Normal range of motion.   Skin:     General: Skin is warm and dry.   Neurological:      General: No focal deficit present.      Mental Status: She is alert and oriented to person, place, and time.   Psychiatric:         Mood and Affect: Mood normal.         Behavior: Behavior normal.       Physical Exam  Lungs were auscultated.  There is significant swelling in the lower extremities.      Result Review     The following data was reviewed by: Sandra Kaba MD on 03/13/2025:  Common labs          3/18/2025    06:02 3/19/2025    05:26 3/20/2025    05:07   Common Labs   Glucose 240  211  204    BUN 9  11  12    Creatinine 0.92  0.88  0.88    Sodium 135  131  134    Potassium 3.7  3.4  4.1    Chloride 94  93  95    Calcium 9.2  9.6  9.6    WBC 15.86  14.89  15.69    Hemoglobin 13.3  14.9  14.6    Hematocrit 41.4  44.3  44.9    Platelets 339  370  351      Results  Laboratory Studies  Blood sugar is high.             Assessment and Plan     Diagnoses and all orders for this visit:    1. Type 2 diabetes mellitus without complication, without long-term current use of insulin (Primary)  -     Hemoglobin A1c  -     Microalbumin / Creatinine Urine Ratio - Urine, Clean Catch  -     Comprehensive Metabolic Panel    2. Hypertension, essential    3. Peripheral edema  -     Discontinue: furosemide (Lasix) 20 MG tablet; Take 1 tablet by mouth Daily for 7 days.  Dispense: 7 tablet; Refill: 0      Assessment & Plan  1. Lower extremity edema.  Her lower extremities are significantly swollen, with a weight gain of 6 pounds since January 2025, likely due to fluid retention. Her cardiac function is robust, as evaluated in August 2024, suggesting that the edema is not a result of heart failure. Blood pressure is within normal limits. A prescription for  Lasix 20 mg, to be taken once daily in the morning for a duration of 1 week, has been provided to help reduce the excess fluid in her legs. An A1c test will be conducted to monitor her blood glucose levels. Additionally, a urinalysis will be performed to assess for potential proteinuria and evaluate renal function.    2. Health maintenance.  She is scheduled for a colonoscopy next week.       I spent 35 minutes caring for Desiree on this date of service. This time includes time spent by me in the following activities:reviewing tests  Follow Up  Return in about 4 weeks (around 4/10/2025).  Patient was given instructions and counseling regarding her condition or for health maintenance advice. Please see specific information pulled into the AVS if appropriate.   Patient or patient representative verbalized consent for the use of Ambient Listening during the visit with  Sandra Kaba MD for chart documentation. 3/24/2025  20:24 EDT    Sandra Kaba MD

## 2025-03-25 NOTE — TELEPHONE ENCOUNTER
[place]er: [facility] [place] - [place], [place] - [address] - [phone] [facility] [phone] FX    Relationship: Pharmacy    Best call back number: [phone]     Requested Prescriptions:   Requested Prescriptions     Pending Prescriptions Disp Refills    metFORMIN (GLUCOPHAGE) 500 MG tablet 180 tablet 2        Pharmacy where request should be sent: [facility] - [place], [place] - [address] - [phone] [facility] [phone] FX     Last office visit with prescribing clinician: 3/13/2025   Last telemedicine visit with prescribing clinician: Visit date not found   Next office visit with prescribing clinician: 4/1/2025       Does the patient have less than a 3 day supply:  [] Yes  [] No    Would you like a call back once the refill request has been completed: [] Yes [] No    If the office needs to give you a call back, can they leave a voicemail: [] Yes [] No    Edie Khoury Rep   03/25/25 14:32 EDT

## 2025-03-26 ENCOUNTER — OFFICE VISIT (OUTPATIENT)
Dept: CARDIOLOGY | Facility: CLINIC | Age: 65
End: 2025-03-26
Payer: MEDICARE

## 2025-03-26 VITALS
HEIGHT: 66 IN | DIASTOLIC BLOOD PRESSURE: 76 MMHG | SYSTOLIC BLOOD PRESSURE: 117 MMHG | BODY MASS INDEX: 24.91 KG/M2 | HEART RATE: 80 BPM | WEIGHT: 155 LBS

## 2025-03-26 DIAGNOSIS — I10 HYPERTENSION, ESSENTIAL: ICD-10-CM

## 2025-03-26 DIAGNOSIS — E78.2 MIXED HYPERLIPIDEMIA: ICD-10-CM

## 2025-03-26 DIAGNOSIS — I25.10 CORONARY ARTERY DISEASE INVOLVING NATIVE CORONARY ARTERY OF NATIVE HEART WITHOUT ANGINA PECTORIS: ICD-10-CM

## 2025-03-26 DIAGNOSIS — I50.31 ACUTE DIASTOLIC CHF (CONGESTIVE HEART FAILURE): Primary | ICD-10-CM

## 2025-03-26 LAB
CYTO UR: NORMAL
LAB AP CASE REPORT: NORMAL
LAB AP CLINICAL INFORMATION: NORMAL
PATH REPORT.FINAL DX SPEC: NORMAL
PATH REPORT.GROSS SPEC: NORMAL

## 2025-03-26 NOTE — PROGRESS NOTES
Chief Complaint  Follow-up, Coronary Artery Disease (/), and Fatigue    Subjective        History of Present Illness  Desiree Garcia presents to [facility]     History of Present Illness  The patient is a 65-year-old female coming in today for hospital follow-up.  She initially presented to the hospital with generalized weakness, and lower extremity edema.  She was treated for CHF exacerbation and diuresed.  Lisinopril was held at discharge, she was started on spironolactone, and continued on furosemide.    She did question whether or not she should be taking lisinopril, she also had questions regarding her diabetes medications.        Past Medical History:   Diagnosis Date    Arthritis     Cervical cancer screening     COPD (chronic obstructive pulmonary disease)     Coronary artery disease involving native heart without angina pectoris 09/30/2021    Depression with anxiety     Diabetes mellitus     Forgetfulness     Gastric reflux     Hypertension     Leg paresthesia 01/20/2017    Limb swelling     Lumbago 01/20/2017    Lumbar spinal stenosis 02/23/2017    Lumbosacral radiculopathy 01/20/2017    Migraines     Night sweat     Reflux esophagitis     Shortness of breath     ST elevation myocardial infarction (STEMI) (CMS/[facility]) 06/14/2021    Stomach disorder        Allergies   Allergen Reactions    Tramadol Itching     High severity per pt        Past Surgical History:   Procedure Laterality Date    ABDOMINAL SURGERY      3 C-SECTIONS    ARTERIOGRAM LOWER EXTREMITY Left 4/12/2025    Procedure: ARTERIOGRAM LOWER EXTREMITY;  Surgeon: Misael Lawton MD;  Location: [facility] HYBRID OR;  Service: Vascular;  Laterality: Left;    CARDIAC CATHETERIZATION      CARDIAC CATHETERIZATION N/A 06/14/2021    Procedure: Left Heart Cath;  Surgeon: Sidney Xiao MD;  Location: [facility] CATH INVASIVE LOCATION;  Service: Cardiology;  Laterality: N/A;    CARDIAC CATHETERIZATION N/A 4/8/2025    Procedure:  "Left Heart Cath;  Surgeon: James Verdugo MD;  Location: [facility] CATH INVASIVE LOCATION;  Service: Cardiology;  Laterality: N/A;     SECTION      x 3    CHOLECYSTECTOMY      COLONOSCOPY      COLONOSCOPY N/A 3/25/2025    Procedure: COLONOSCOPY WITH BIOPSIES AND COLD AND HOT SNARE POLYPECTOMIES;  Surgeon: Heber Najera MD;  Location: [facility] ENDOSCOPY;  Service: Gastroenterology;  Laterality: N/A;  COLON POLYPS    CORONARY STENT PLACEMENT      EMBOLECTOMY Left 2025    Procedure: EMBOLECTOMY MECHANICAL, FOUR COMPARTMENT FASCIOTOMY;  Surgeon: Misael Lawton MD;  Location: [facility] OR;  Service: Vascular;  Laterality: Left;    ENDOSCOPY      2007    FOOT SURGERY Right     HAND SURGERY      HYSTERECTOMY  1985        Social History  She  reports that she has been smoking cigarettes. She started smoking about 58 years ago. She has a 58.3 pack-year smoking history. She has been exposed to tobacco smoke. She has never used smokeless tobacco. She reports that she does not currently use drugs after having used the following drugs: Methamphetamines. She reports that she does not drink alcohol.    Family History  Her family history includes Arthritis in her brother, father, mother, and sister; Breast cancer in her sister; Diabetes in her brother and father; Heart attack in her father; Heart disease in her brother and father; Lung cancer in her mother; Stroke in her brother, father, mother, and sister.       No current facility-administered medications on file prior to visit.     Current Outpatient Medications on File Prior to Visit   Medication Sig    Jardiance 25 MG tablet tablet Take 1 tablet by mouth Daily.         Review of Systems     Objective   Vitals:    25 1305   BP: 117/76   Pulse: 80   Weight: 70.3 kg (155 lb)   Height: 167.6 cm (66\")         Physical Exam  General : Alert, awake, no acute distress  Neck : Supple, no carotid bruit, no jugular venous distention  CVS : Regular " "rate and rhythm, no murmur, no rubs or gallops  Lungs: Clear to auscultation bilaterally, no crackles or rhonchi  Abdomen: Soft, nontender, bowel sounds active  Extremities: Warm, well-perfused, no pedal edema      The following data was reviewed by ALVIN Castelan  proBNP   Date Value Ref Range Status   03/20/2025 3,398.0 (H) 0.0 - 900.0 pg/mL Final     CMP          3/18/2025    06:02 3/19/2025    05:26 3/20/2025    05:07   CMP   Glucose 240  211  204    BUN 9  11  12    Creatinine 0.92  0.88  0.88    EGFR 69.7  73.5  73.5    Sodium 135  131  134    Potassium 3.7  3.4  4.1    Chloride 94  93  95    Calcium 9.2  9.6  9.6    BUN/Creatinine Ratio 9.8  12.5  13.6    Anion Gap 10.2  7.1  9.5      CBC w/diff          3/18/2025    06:02 3/19/2025    05:26 3/20/2025    05:07   CBC w/Diff   WBC 15.86  14.89  15.69    RBC 4.72  5.13  5.19    Hemoglobin 13.3  14.9  14.6    Hematocrit 41.4  44.3  44.9    MCV 87.7  86.4  86.5    MCH 28.2  29.0  28.1    MCHC 32.1  33.6  32.5    RDW 13.7  13.7  13.6    Platelets 339  370  351    Neutrophil Rel % 67.8  62.9  63.7    Immature Granulocyte Rel % 0.6  0.9  1.3    Lymphocyte Rel % 24.8  25.7  26.6    Monocyte Rel % 5.2  8.1  6.3    Eosinophil Rel % 0.7  1.1  0.8    Basophil Rel % 0.9  1.3  1.3       Lab Results   Component Value Date    TSH 4.000 08/08/2024      Lab Results   Component Value Date    FREET4 1.22 11/04/2021      No results found for: \"DDIMERQUANT\"  Magnesium   Date Value Ref Range Status   03/20/2025 2.0 1.6 - 2.4 mg/dL Final      No results found for: \"DIGOXIN\"   Lab Results   Component Value Date    TROPONINT 189 (C) 03/17/2025                    Lipid Panel          1/30/2025    13:50   Lipid Panel   Total Cholesterol 161    Triglycerides 167    HDL Cholesterol 38    VLDL Cholesterol 29    LDL Cholesterol  94    LDL/HDL Ratio 2.36          Results for orders placed during the hospital encounter of 03/17/25    Adult Transthoracic Echo Complete w/ Color, Spectral " and Contrast if necessary per protocol    Interpretation Summary    Left ventricular systolic function is low normal. Left ventricular ejection fraction appears to be 51 - 55%.    Left ventricular wall thickness is consistent with borderline concentric hypertrophy.    Left ventricular diastolic function is consistent with (grade I) impaired relaxation.    There are no hemodynamically significant valvular abnormalities.             Assessment and Plan   Diagnoses and all orders for this visit:    1.  Chronic diastolic CHF (congestive heart failure) (Primary)  -     Basic Metabolic Panel; Future    2. Coronary artery disease involving native coronary artery of native heart without angina pectoris    3. Hypertension, essential    4. Mixed hyperlipidemia          Assessment & Plan   Congestive heart failure.-Overall fairly euvolemic today, recommend to continue current regiment with spironolactone, furosemide, carvedilol and Jardiance.  Did instruct her regarding her other diabetic medications to follow-up with her PCP.  Recheck BMP in 2 weeks to reevaluate her electrolytes specifically potassium, with spironolactone she may not require continuing.  Discussed monitoring weight, and low-sodium diet.  CAD-stable without anginal symptoms.  Hypertension-blood pressure is well-controlled, lisinopril stopped during recent hospitalization, we will continue to monitor her blood pressure and can reintroduce as needed.  Hyperlipidemia-LDL slightly above goal, continue current dose atorvastatin, work on dietary changes                  Follow Up   Return for As already scheduled.    Patient was given instructions and counseling regarding her condition or for health maintenance advice. Please see specific information pulled into the AVS if appropriate.     Signed,  ALVIN Castelan  03/26/2025     Patient or patient representative verbalized consent for the use of Ambient Listening during the visit with  ALVIN Castelan  for chart documentation 03/26/2025  13:10 EDT    Dictated Utilizing Dragon Dictation: Please note that portions of this note were completed with a voice recognition program.  Part of this note may be an electronic transcription/translation of spoken language to printed text using the Dragon Dictation System.

## 2025-03-28 ENCOUNTER — TELEPHONE (OUTPATIENT)
Dept: CASE MANAGEMENT | Facility: OTHER | Age: 65
End: 2025-03-28
Payer: MEDICARE

## 2025-03-28 DIAGNOSIS — I25.10 CORONARY ARTERY DISEASE INVOLVING NATIVE CORONARY ARTERY OF NATIVE HEART WITHOUT ANGINA PECTORIS: ICD-10-CM

## 2025-03-28 DIAGNOSIS — E11.69 TYPE 2 DIABETES MELLITUS WITH OTHER SPECIFIED COMPLICATION, WITHOUT LONG-TERM CURRENT USE OF INSULIN: Primary | ICD-10-CM

## 2025-03-28 NOTE — TELEPHONE ENCOUNTER
Mailbox full and unable to leave message.     Will try again in 1 week.     Marisol THOMPSON RN  Ambulatory

## 2025-04-01 ENCOUNTER — OFFICE VISIT (OUTPATIENT)
Dept: FAMILY MEDICINE CLINIC | Facility: CLINIC | Age: 65
End: 2025-04-01
Payer: MEDICARE

## 2025-04-01 VITALS
DIASTOLIC BLOOD PRESSURE: 78 MMHG | HEIGHT: 66 IN | OXYGEN SATURATION: 94 % | TEMPERATURE: 98.6 F | WEIGHT: 155.5 LBS | HEART RATE: 102 BPM | SYSTOLIC BLOOD PRESSURE: 120 MMHG | BODY MASS INDEX: 24.99 KG/M2

## 2025-04-01 DIAGNOSIS — G62.9 NEUROPATHY: Primary | ICD-10-CM

## 2025-04-01 DIAGNOSIS — E78.2 MIXED HYPERLIPIDEMIA: ICD-10-CM

## 2025-04-01 DIAGNOSIS — I50.31 ACUTE DIASTOLIC CHF (CONGESTIVE HEART FAILURE): ICD-10-CM

## 2025-04-01 DIAGNOSIS — I25.10 CORONARY ARTERY DISEASE INVOLVING NATIVE CORONARY ARTERY OF NATIVE HEART WITHOUT ANGINA PECTORIS: ICD-10-CM

## 2025-04-01 PROCEDURE — 80048 BASIC METABOLIC PNL TOTAL CA: CPT | Performed by: FAMILY MEDICINE

## 2025-04-01 RX ORDER — GABAPENTIN 100 MG/1
100 CAPSULE ORAL NIGHTLY
Qty: 30 CAPSULE | Refills: 2 | Status: SHIPPED | OUTPATIENT
Start: 2025-04-01 | End: 2025-04-10

## 2025-04-01 RX ORDER — CLOPIDOGREL BISULFATE 75 MG/1
75 TABLET ORAL DAILY
Qty: 90 TABLET | Refills: 3 | Status: SHIPPED | OUTPATIENT
Start: 2025-04-01 | End: 2025-04-10 | Stop reason: HOSPADM

## 2025-04-01 RX ORDER — ATORVASTATIN CALCIUM 40 MG/1
40 TABLET, FILM COATED ORAL NIGHTLY
Qty: 90 TABLET | Refills: 3 | Status: ON HOLD | OUTPATIENT
Start: 2025-04-01 | End: 2025-06-30

## 2025-04-01 NOTE — PROGRESS NOTES
Chief Complaint  Hospital Follow Up Visit    Subjective        Desiree Garcia presents to [facility]  History of Present Illness  The patient presents for evaluation of leg pain and swelling.    She reports persistent leg swelling, which has not responded to diuretic therapy. Despite the application of ice, the swelling remains unaltered. She was previously on Jardiance. She was recently hospitalized due to fluid accumulation in her legs, a condition that also affected her brother. During her hospital stay, she was informed that she had not experienced a myocardial infarction but was experiencing cardiac strain. She was administered intravenous fluids and advised to continue her regimen of aspirin, Plavix, statins, and a low-dose beta-blocker. A follow-up appointment with her cardiologist was scheduled within 1 to 2 weeks. Her cardiac function was evaluated during her hospital stay, revealing an ejection fraction of 51% to 55%. She was also diagnosed with a urinary tract infection. An EKG performed during her hospital stay showed no changes from previous readings.    She describes severe leg pain that has been ongoing for the past 3 months, significantly impairing her mobility. The pain is characterized by intermittent shooting sensations. She does not believe she requires a hip replacement but acknowledges the need for some form of intervention. She also reports experiencing knots in her back. She has previously taken gabapentin for her leg pain at night, which resulted in itching. She has a history of carpal tunnel syndrome, for which she underwent surgical intervention.    FAMILY HISTORY  Her brother had issues with swelling.    MEDICATIONS  Current: Metformin, potassium, aspirin, Plavix, statins, low dose beta blocker  Past: Gabapentin        Medical History: has a past medical history of Arthritis, Cervical cancer screening, COPD (chronic obstructive pulmonary disease),  "Coronary artery disease involving native heart without angina pectoris (2021), Depression with anxiety, Diabetes mellitus, Forgetfulness, Gastric reflux, Hypertension, Leg paresthesia (2017), Limb swelling, Lumbago (2017), Lumbar spinal stenosis (2017), Lumbosacral radiculopathy (2017), Migraines, Night sweat, Reflux esophagitis, Shortness of breath, ST elevation myocardial infarction (STEMI) (CMS/[facility]) (2021), and Stomach disorder.   Surgical History: has a past surgical history that includes Abdominal surgery; Cardiac catheterization;  section; Cholecystectomy; Colonoscopy (); Esophagogastroduodenoscopy; Foot surgery (Right); Hand surgery; Cardiac catheterization (N/A, 2021); Coronary stent placement; Hysterectomy (); Colonoscopy (N/A, 3/25/2025); Cardiac catheterization (N/A, 2025); Embolectomy (Left, 2025); and THORACOSCOPY WITH BIOPSY (Left, 2025).   Family History: family history includes Arthritis in her brother, father, mother, and sister; Breast cancer in her sister; Diabetes in her brother and father; Heart attack in her father; Heart disease in her brother and father; Lung cancer in her mother; Stroke in her brother, father, mother, and sister.   Social History: reports that she has been smoking cigarettes. She started smoking about 58 years ago. She has a 58.3 pack-year smoking history. She has been exposed to tobacco smoke. She has never used smokeless tobacco. She reports that she does not currently use drugs after having used the following drugs: Methamphetamines. She reports that she does not drink alcohol.  Immunization History   Administered Date(s) Administered    Influenza, Unspecified 10/09/2020    Tdap 11/10/2022       Objective   Vital Signs:  /78   Pulse 102   Temp 98.6 °F (37 °C) (Oral)   Ht 167.6 cm (66\")   Wt 70.5 kg (155 lb 8 oz)   SpO2 94%   BMI 25.10 kg/m²   Estimated body mass index is 25.1 kg/m² as " "calculated from the following:    Height as of this encounter: 167.6 cm (66\").    Weight as of this encounter: 70.5 kg (155 lb 8 oz).             ROS:  Review of Systems   Constitutional:  Negative for fatigue and fever.   HENT:  Negative for congestion, ear pain and sinus pressure.    Respiratory:  Negative for cough, chest tightness and shortness of breath.    Cardiovascular:  Negative for chest pain, palpitations and leg swelling.   Gastrointestinal:  Negative for abdominal pain and diarrhea.   Genitourinary:  Negative for dysuria and frequency.   Neurological:  Negative for speech difficulty, headache and confusion.   Psychiatric/Behavioral:  Negative for agitation and behavioral problems.       Physical Exam  Vitals reviewed.   Constitutional:       Appearance: Normal appearance.   HENT:      Right Ear: Tympanic membrane normal.      Left Ear: Tympanic membrane normal.      Nose: Nose normal.   Eyes:      Extraocular Movements: Extraocular movements intact.      Conjunctiva/sclera: Conjunctivae normal.      Pupils: Pupils are equal, round, and reactive to light.   Cardiovascular:      Rate and Rhythm: Normal rate and regular rhythm.   Pulmonary:      Effort: Pulmonary effort is normal.      Breath sounds: Normal breath sounds.   Abdominal:      General: Bowel sounds are normal.   Musculoskeletal:         General: Normal range of motion.      Cervical back: Normal range of motion.   Skin:     General: Skin is warm and dry.   Neurological:      General: No focal deficit present.      Mental Status: She is alert and oriented to person, place, and time.   Psychiatric:         Mood and Affect: Mood normal.         Behavior: Behavior normal.       Physical Exam        Result Review     The following data was reviewed by: Sandra Kaba MD on 04/01/2025:  Common labs          4/15/2025    00:18 4/15/2025    06:14 4/16/2025    04:35 4/16/2025    17:46 4/17/2025    04:35   Common Labs   Glucose  128  109   113    BUN "  14  10   7    Creatinine  0.76  0.69   0.64    Sodium  139  139   138    Potassium  3.7  3.4  3.9  4.1    Chloride  105  104   102    Calcium  8.1  8.1   8.2    Albumin  2.7  2.4   2.8    Total Bilirubin   0.6      Alkaline Phosphatase   139      AST (SGOT)   52      ALT (SGPT)   11      WBC 17.46  18.20  22.45   26.91    Hemoglobin 8.2  8.6  8.5   7.9    Hematocrit 25.9  25.9  26.6   24.3    Platelets 317  325  346   399      Results  Imaging  Ejection fraction was around 51% to 55%.             Assessment and Plan     Diagnoses and all orders for this visit:    1. Neuropathy (Primary)  -     Discontinue: gabapentin (NEURONTIN) 100 MG capsule; Take 1 capsule by mouth Every Night for 30 days.  Dispense: 30 capsule; Refill: 2    2. Coronary artery disease involving native coronary artery of native heart without angina pectoris  -     Discontinue: clopidogrel (PLAVIX) 75 MG tablet; Take 1 tablet by mouth Daily for 90 days.  Dispense: 90 tablet; Refill: 3  -     atorvastatin (LIPITOR) 40 MG tablet; Take 1 tablet by mouth Every Night for 90 days.  Dispense: 90 tablet; Refill: 3    3. Mixed hyperlipidemia  -     atorvastatin (LIPITOR) 40 MG tablet; Take 1 tablet by mouth Every Night for 90 days.  Dispense: 90 tablet; Refill: 3    4. Acute diastolic CHF (congestive heart failure)  -     Basic Metabolic Panel      Assessment & Plan  1. Leg pain.  The symptoms suggest a possible diagnosis of neuropathy, characterized by nerve pain in the lower extremities. Gabapentin will be reintroduced into her treatment regimen, to be taken at night before bedtime, to alleviate the leg pain.    2. Leg swelling.  The leg swelling is likely due to fluid retention. She was previously given a diuretic (water pill) to manage this condition. She is advised to continue taking the diuretic daily to help reduce the fluid buildup.    PROCEDURE  The patient underwent surgical intervention for carpal tunnel syndrome in the past.       I spent 35  minutes caring for Desiree on this date of service. This time includes time spent by me in the following activities:reviewing tests  Follow Up   Return in about 4 weeks (around 4/29/2025).  Patient was given instructions and counseling regarding her condition or for health maintenance advice. Please see specific information pulled into the AVS if appropriate.   Patient or patient representative verbalized consent for the use of Ambient Listening during the visit with  Sandra Kaba MD for chart documentation. 4/17/2025  23:00 EDT    Sandra Kaba MD

## 2025-04-02 ENCOUNTER — TELEPHONE (OUTPATIENT)
Dept: CASE MANAGEMENT | Facility: OTHER | Age: 65
End: 2025-04-02
Payer: MEDICARE

## 2025-04-02 LAB
ANION GAP SERPL CALCULATED.3IONS-SCNC: 13.8 MMOL/L (ref 5–15)
BUN SERPL-MCNC: 6 MG/DL (ref 8–23)
BUN/CREAT SERPL: 6.3 (ref 7–25)
CALCIUM SPEC-SCNC: 10.2 MG/DL (ref 8.6–10.5)
CHLORIDE SERPL-SCNC: 97 MMOL/L (ref 98–107)
CO2 SERPL-SCNC: 26.2 MMOL/L (ref 22–29)
CREAT SERPL-MCNC: 0.96 MG/DL (ref 0.57–1)
EGFRCR SERPLBLD CKD-EPI 2021: 66.2 ML/MIN/1.73
GLUCOSE SERPL-MCNC: 167 MG/DL (ref 65–99)
POTASSIUM SERPL-SCNC: 3.6 MMOL/L (ref 3.5–5.2)
SODIUM SERPL-SCNC: 137 MMOL/L (ref 136–145)

## 2025-04-02 NOTE — TELEPHONE ENCOUNTER
UTR. No answer, unable to leave message.     Will try again in 1 week.     Marisol THOMPSON RN  Ambulatory    (4) walks frequently

## 2025-04-04 ENCOUNTER — RESULTS FOLLOW-UP (OUTPATIENT)
Dept: CARDIOLOGY | Facility: CLINIC | Age: 65
End: 2025-04-04
Payer: MEDICARE

## 2025-04-04 NOTE — TELEPHONE ENCOUNTER
Called pt. To report results. Pt. Voiced understanding and will callback with any other questions.

## 2025-04-08 ENCOUNTER — APPOINTMENT (OUTPATIENT)
Dept: CT IMAGING | Facility: HOSPITAL | Age: 65
End: 2025-04-08
Payer: MEDICARE

## 2025-04-08 ENCOUNTER — APPOINTMENT (OUTPATIENT)
Dept: GENERAL RADIOLOGY | Facility: HOSPITAL | Age: 65
End: 2025-04-08
Payer: MEDICARE

## 2025-04-08 ENCOUNTER — HOSPITAL ENCOUNTER (INPATIENT)
Facility: HOSPITAL | Age: 65
LOS: 2 days | End: 2025-04-10
Attending: EMERGENCY MEDICINE | Admitting: INTERNAL MEDICINE
Payer: MEDICARE

## 2025-04-08 DIAGNOSIS — R10.9 ABDOMINAL PAIN, UNSPECIFIED ABDOMINAL LOCATION: ICD-10-CM

## 2025-04-08 DIAGNOSIS — I21.3 ST ELEVATION MYOCARDIAL INFARCTION (STEMI), UNSPECIFIED ARTERY: Primary | ICD-10-CM

## 2025-04-08 LAB
ACT BLD: 291 SECONDS (ref 89–137)
ACT BLD: 291 SECONDS (ref 89–137)
ACT BLD: 325 SECONDS (ref 89–137)
ALBUMIN SERPL-MCNC: 4 G/DL (ref 3.5–5.2)
ALBUMIN/GLOB SERPL: 0.9 G/DL
ALP SERPL-CCNC: 159 U/L (ref 39–117)
ALT SERPL W P-5'-P-CCNC: 16 U/L (ref 1–33)
ANION GAP SERPL CALCULATED.3IONS-SCNC: 15.2 MMOL/L (ref 5–15)
APTT PPP: 55.8 SECONDS (ref 78–95.9)
AST SERPL-CCNC: 45 U/L (ref 1–32)
BASOPHILS # BLD AUTO: 0.08 10*3/MM3 (ref 0–0.2)
BASOPHILS NFR BLD AUTO: 0.4 % (ref 0–1.5)
BILIRUB SERPL-MCNC: 1.1 MG/DL (ref 0–1.2)
BUN SERPL-MCNC: 9 MG/DL (ref 8–23)
BUN/CREAT SERPL: 12.2 (ref 7–25)
CALCIUM SPEC-SCNC: 10.1 MG/DL (ref 8.6–10.5)
CHLORIDE SERPL-SCNC: 90 MMOL/L (ref 98–107)
CO2 SERPL-SCNC: 25.8 MMOL/L (ref 22–29)
CREAT SERPL-MCNC: 0.74 MG/DL (ref 0.57–1)
D-LACTATE SERPL-SCNC: 2 MMOL/L (ref 0.5–2)
DEPRECATED RDW RBC AUTO: 42.5 FL (ref 37–54)
EGFRCR SERPLBLD CKD-EPI 2021: 90.5 ML/MIN/1.73
EOSINOPHIL # BLD AUTO: 0 10*3/MM3 (ref 0–0.4)
EOSINOPHIL NFR BLD AUTO: 0 % (ref 0.3–6.2)
ERYTHROCYTE [DISTWIDTH] IN BLOOD BY AUTOMATED COUNT: 13.7 % (ref 12.3–15.4)
GEN 5 1HR TROPONIN T REFLEX: 971 NG/L
GLOBULIN UR ELPH-MCNC: 4.3 GM/DL
GLUCOSE SERPL-MCNC: 294 MG/DL (ref 65–99)
HCT VFR BLD AUTO: 46.3 % (ref 34–46.6)
HGB BLD-MCNC: 15.4 G/DL (ref 12–15.9)
HOLD SPECIMEN: NORMAL
HOLD SPECIMEN: NORMAL
IMM GRANULOCYTES # BLD AUTO: 0.1 10*3/MM3 (ref 0–0.05)
IMM GRANULOCYTES NFR BLD AUTO: 0.5 % (ref 0–0.5)
LIPASE SERPL-CCNC: 73 U/L (ref 13–60)
LYMPHOCYTES # BLD AUTO: 1.93 10*3/MM3 (ref 0.7–3.1)
LYMPHOCYTES NFR BLD AUTO: 8.8 % (ref 19.6–45.3)
MAGNESIUM SERPL-MCNC: 1.7 MG/DL (ref 1.6–2.4)
MCH RBC QN AUTO: 28.4 PG (ref 26.6–33)
MCHC RBC AUTO-ENTMCNC: 33.3 G/DL (ref 31.5–35.7)
MCV RBC AUTO: 85.3 FL (ref 79–97)
MONOCYTES # BLD AUTO: 1.55 10*3/MM3 (ref 0.1–0.9)
MONOCYTES NFR BLD AUTO: 7.1 % (ref 5–12)
NEUTROPHILS NFR BLD AUTO: 18.27 10*3/MM3 (ref 1.7–7)
NEUTROPHILS NFR BLD AUTO: 83.2 % (ref 42.7–76)
NRBC BLD AUTO-RTO: 0 /100 WBC (ref 0–0.2)
NT-PROBNP SERPL-MCNC: ABNORMAL PG/ML (ref 0–900)
PLATELET # BLD AUTO: 325 10*3/MM3 (ref 140–450)
PMV BLD AUTO: 10.6 FL (ref 6–12)
POTASSIUM SERPL-SCNC: 3.7 MMOL/L (ref 3.5–5.2)
PROCALCITONIN SERPL-MCNC: 0.13 NG/ML (ref 0–0.25)
PROT SERPL-MCNC: 8.3 G/DL (ref 6–8.5)
RBC # BLD AUTO: 5.43 10*6/MM3 (ref 3.77–5.28)
SODIUM SERPL-SCNC: 131 MMOL/L (ref 136–145)
TROPONIN T % DELTA: 16
TROPONIN T NUMERIC DELTA: 136 NG/L
TROPONIN T SERPL HS-MCNC: 835 NG/L
WBC NRBC COR # BLD AUTO: 21.93 10*3/MM3 (ref 3.4–10.8)
WHOLE BLOOD HOLD COAG: NORMAL
WHOLE BLOOD HOLD SPECIMEN: NORMAL

## 2025-04-08 PROCEDURE — C1769 GUIDE WIRE: HCPCS | Performed by: INTERNAL MEDICINE

## 2025-04-08 PROCEDURE — C1894 INTRO/SHEATH, NON-LASER: HCPCS | Performed by: INTERNAL MEDICINE

## 2025-04-08 PROCEDURE — 25010000002 LIDOCAINE 2% SOLUTION: Performed by: INTERNAL MEDICINE

## 2025-04-08 PROCEDURE — C1887 CATHETER, GUIDING: HCPCS | Performed by: INTERNAL MEDICINE

## 2025-04-08 PROCEDURE — 99223 1ST HOSP IP/OBS HIGH 75: CPT | Performed by: INTERNAL MEDICINE

## 2025-04-08 PROCEDURE — 94799 UNLISTED PULMONARY SVC/PX: CPT

## 2025-04-08 PROCEDURE — C1725 CATH, TRANSLUMIN NON-LASER: HCPCS | Performed by: INTERNAL MEDICINE

## 2025-04-08 PROCEDURE — 99152 MOD SED SAME PHYS/QHP 5/>YRS: CPT | Performed by: INTERNAL MEDICINE

## 2025-04-08 PROCEDURE — 83605 ASSAY OF LACTIC ACID: CPT | Performed by: INTERNAL MEDICINE

## 2025-04-08 PROCEDURE — B240ZZ3 ULTRASONOGRAPHY OF SINGLE CORONARY ARTERY, INTRAVASCULAR: ICD-10-PCS | Performed by: INTERNAL MEDICINE

## 2025-04-08 PROCEDURE — 25510000001 IOPAMIDOL PER 1 ML: Performed by: INTERNAL MEDICINE

## 2025-04-08 PROCEDURE — 94640 AIRWAY INHALATION TREATMENT: CPT

## 2025-04-08 PROCEDURE — 99153 MOD SED SAME PHYS/QHP EA: CPT | Performed by: INTERNAL MEDICINE

## 2025-04-08 PROCEDURE — 92941 PRQ TRLML REVSC TOT OCCL AMI: CPT | Performed by: INTERNAL MEDICINE

## 2025-04-08 PROCEDURE — 25010000002 ONDANSETRON PER 1 MG: Performed by: INTERNAL MEDICINE

## 2025-04-08 PROCEDURE — 027035Z DILATION OF CORONARY ARTERY, ONE ARTERY WITH TWO DRUG-ELUTING INTRALUMINAL DEVICES, PERCUTANEOUS APPROACH: ICD-10-PCS | Performed by: INTERNAL MEDICINE

## 2025-04-08 PROCEDURE — 87040 BLOOD CULTURE FOR BACTERIA: CPT | Performed by: INTERNAL MEDICINE

## 2025-04-08 PROCEDURE — 25010000002 HEPARIN (PORCINE) PER 1000 UNITS: Performed by: INTERNAL MEDICINE

## 2025-04-08 PROCEDURE — 92978 ENDOLUMINL IVUS OCT C 1ST: CPT | Performed by: INTERNAL MEDICINE

## 2025-04-08 PROCEDURE — 4A023N7 MEASUREMENT OF CARDIAC SAMPLING AND PRESSURE, LEFT HEART, PERCUTANEOUS APPROACH: ICD-10-PCS | Performed by: INTERNAL MEDICINE

## 2025-04-08 PROCEDURE — 83690 ASSAY OF LIPASE: CPT | Performed by: EMERGENCY MEDICINE

## 2025-04-08 PROCEDURE — 93010 ELECTROCARDIOGRAM REPORT: CPT | Performed by: INTERNAL MEDICINE

## 2025-04-08 PROCEDURE — 25510000001 IOPAMIDOL PER 1 ML: Performed by: EMERGENCY MEDICINE

## 2025-04-08 PROCEDURE — 85025 COMPLETE CBC W/AUTO DIFF WBC: CPT | Performed by: EMERGENCY MEDICINE

## 2025-04-08 PROCEDURE — 93458 L HRT ARTERY/VENTRICLE ANGIO: CPT | Performed by: INTERNAL MEDICINE

## 2025-04-08 PROCEDURE — 25010000002 ADENOSINE PER 6 MG: Performed by: INTERNAL MEDICINE

## 2025-04-08 PROCEDURE — 99291 CRITICAL CARE FIRST HOUR: CPT

## 2025-04-08 PROCEDURE — 02C03ZZ EXTIRPATION OF MATTER FROM CORONARY ARTERY, ONE ARTERY, PERCUTANEOUS APPROACH: ICD-10-PCS | Performed by: INTERNAL MEDICINE

## 2025-04-08 PROCEDURE — 25010000002 FENTANYL CITRATE (PF) 100 MCG/2ML SOLUTION: Performed by: INTERNAL MEDICINE

## 2025-04-08 PROCEDURE — 83735 ASSAY OF MAGNESIUM: CPT | Performed by: EMERGENCY MEDICINE

## 2025-04-08 PROCEDURE — C9460 INJECTION, CANGRELOR: HCPCS | Performed by: INTERNAL MEDICINE

## 2025-04-08 PROCEDURE — B2151ZZ FLUOROSCOPY OF LEFT HEART USING LOW OSMOLAR CONTRAST: ICD-10-PCS | Performed by: INTERNAL MEDICINE

## 2025-04-08 PROCEDURE — 85347 COAGULATION TIME ACTIVATED: CPT

## 2025-04-08 PROCEDURE — 84484 ASSAY OF TROPONIN QUANT: CPT | Performed by: EMERGENCY MEDICINE

## 2025-04-08 PROCEDURE — 92973 PRQ TRLUML C MCHN ASP THRMBC: CPT | Performed by: INTERNAL MEDICINE

## 2025-04-08 PROCEDURE — C1874 STENT, COATED/COV W/DEL SYS: HCPCS | Performed by: INTERNAL MEDICINE

## 2025-04-08 PROCEDURE — 84145 PROCALCITONIN (PCT): CPT | Performed by: INTERNAL MEDICINE

## 2025-04-08 PROCEDURE — 83880 ASSAY OF NATRIURETIC PEPTIDE: CPT | Performed by: EMERGENCY MEDICINE

## 2025-04-08 PROCEDURE — 85730 THROMBOPLASTIN TIME PARTIAL: CPT | Performed by: INTERNAL MEDICINE

## 2025-04-08 PROCEDURE — C1753 CATH, INTRAVAS ULTRASOUND: HCPCS | Performed by: INTERNAL MEDICINE

## 2025-04-08 PROCEDURE — 74174 CTA ABD&PLVS W/CONTRAST: CPT

## 2025-04-08 PROCEDURE — 25010000002 MIDAZOLAM PER 1MG: Performed by: INTERNAL MEDICINE

## 2025-04-08 PROCEDURE — 99223 1ST HOSP IP/OBS HIGH 75: CPT | Performed by: STUDENT IN AN ORGANIZED HEALTH CARE EDUCATION/TRAINING PROGRAM

## 2025-04-08 PROCEDURE — 25010000002 CANGRELOR TETRASODIUM 50 MG RECONSTITUTED SOLUTION 1 EACH VIAL: Performed by: INTERNAL MEDICINE

## 2025-04-08 PROCEDURE — 36415 COLL VENOUS BLD VENIPUNCTURE: CPT

## 2025-04-08 PROCEDURE — C9600 PERC DRUG-EL COR STENT SING: HCPCS | Performed by: INTERNAL MEDICINE

## 2025-04-08 PROCEDURE — B2111ZZ FLUOROSCOPY OF MULTIPLE CORONARY ARTERIES USING LOW OSMOLAR CONTRAST: ICD-10-PCS | Performed by: INTERNAL MEDICINE

## 2025-04-08 PROCEDURE — 93005 ELECTROCARDIOGRAM TRACING: CPT | Performed by: EMERGENCY MEDICINE

## 2025-04-08 PROCEDURE — 71275 CT ANGIOGRAPHY CHEST: CPT

## 2025-04-08 PROCEDURE — 80053 COMPREHEN METABOLIC PANEL: CPT | Performed by: EMERGENCY MEDICINE

## 2025-04-08 PROCEDURE — C1757 CATH, THROMBECTOMY/EMBOLECT: HCPCS | Performed by: INTERNAL MEDICINE

## 2025-04-08 DEVICE — XIENCE SKYPOINT™ EVEROLIMUS ELUTING CORONARY STENT SYSTEM 2.75 MM X 28 MM / RAPID-EXCHANGE
Type: IMPLANTABLE DEVICE | Status: FUNCTIONAL
Brand: XIENCE SKYPOINT™

## 2025-04-08 DEVICE — XIENCE SKYPOINT™ EVEROLIMUS ELUTING CORONARY STENT SYSTEM 3.50 MM X 23 MM / RAPID-EXCHANGE
Type: IMPLANTABLE DEVICE | Status: FUNCTIONAL
Brand: XIENCE SKYPOINT™

## 2025-04-08 RX ORDER — FENTANYL CITRATE 50 UG/ML
INJECTION, SOLUTION INTRAMUSCULAR; INTRAVENOUS
Status: DISCONTINUED | OUTPATIENT
Start: 2025-04-08 | End: 2025-04-08 | Stop reason: HOSPADM

## 2025-04-08 RX ORDER — VERAPAMIL HYDROCHLORIDE 2.5 MG/ML
INJECTION INTRAVENOUS
Status: DISCONTINUED | OUTPATIENT
Start: 2025-04-08 | End: 2025-04-08 | Stop reason: HOSPADM

## 2025-04-08 RX ORDER — ADENOSINE 3 MG/ML
INJECTION, SOLUTION INTRAVENOUS
Status: DISCONTINUED | OUTPATIENT
Start: 2025-04-08 | End: 2025-04-08 | Stop reason: HOSPADM

## 2025-04-08 RX ORDER — CARVEDILOL 3.12 MG/1
3.12 TABLET ORAL 2 TIMES DAILY
Status: DISCONTINUED | OUTPATIENT
Start: 2025-04-08 | End: 2025-04-09

## 2025-04-08 RX ORDER — BUDESONIDE 0.5 MG/2ML
0.5 INHALANT ORAL
Status: DISCONTINUED | OUTPATIENT
Start: 2025-04-08 | End: 2025-04-10 | Stop reason: HOSPADM

## 2025-04-08 RX ORDER — SPIRONOLACTONE 25 MG/1
25 TABLET ORAL DAILY
Status: DISCONTINUED | OUTPATIENT
Start: 2025-04-09 | End: 2025-04-10 | Stop reason: HOSPADM

## 2025-04-08 RX ORDER — ALBUTEROL SULFATE 0.83 MG/ML
2.5 SOLUTION RESPIRATORY (INHALATION) EVERY 6 HOURS PRN
Status: DISCONTINUED | OUTPATIENT
Start: 2025-04-08 | End: 2025-04-10 | Stop reason: HOSPADM

## 2025-04-08 RX ORDER — LIDOCAINE HYDROCHLORIDE 20 MG/ML
INJECTION, SOLUTION INFILTRATION; PERINEURAL
Status: DISCONTINUED | OUTPATIENT
Start: 2025-04-08 | End: 2025-04-08 | Stop reason: HOSPADM

## 2025-04-08 RX ORDER — FUROSEMIDE 20 MG/1
20 TABLET ORAL DAILY
Status: DISCONTINUED | OUTPATIENT
Start: 2025-04-09 | End: 2025-04-10 | Stop reason: HOSPADM

## 2025-04-08 RX ORDER — ATORVASTATIN CALCIUM 40 MG/1
40 TABLET, FILM COATED ORAL NIGHTLY
Status: DISCONTINUED | OUTPATIENT
Start: 2025-04-08 | End: 2025-04-10 | Stop reason: HOSPADM

## 2025-04-08 RX ORDER — ARFORMOTEROL TARTRATE 15 UG/2ML
15 SOLUTION RESPIRATORY (INHALATION)
Status: DISCONTINUED | OUTPATIENT
Start: 2025-04-08 | End: 2025-04-10 | Stop reason: HOSPADM

## 2025-04-08 RX ORDER — MIDAZOLAM HYDROCHLORIDE 2 MG/2ML
INJECTION, SOLUTION INTRAMUSCULAR; INTRAVENOUS
Status: DISCONTINUED | OUTPATIENT
Start: 2025-04-08 | End: 2025-04-08 | Stop reason: HOSPADM

## 2025-04-08 RX ORDER — SODIUM CHLORIDE 0.9 % (FLUSH) 0.9 %
10 SYRINGE (ML) INJECTION AS NEEDED
Status: DISCONTINUED | OUTPATIENT
Start: 2025-04-08 | End: 2025-04-10 | Stop reason: HOSPADM

## 2025-04-08 RX ORDER — NITROGLYCERIN 0.4 MG/1
0.4 TABLET SUBLINGUAL
Status: DISCONTINUED | OUTPATIENT
Start: 2025-04-08 | End: 2025-04-10 | Stop reason: HOSPADM

## 2025-04-08 RX ORDER — HEPARIN SODIUM 10000 [USP'U]/100ML
12 INJECTION, SOLUTION INTRAVENOUS
Status: DISCONTINUED | OUTPATIENT
Start: 2025-04-08 | End: 2025-04-10

## 2025-04-08 RX ORDER — GABAPENTIN 100 MG/1
100 CAPSULE ORAL NIGHTLY
Status: DISCONTINUED | OUTPATIENT
Start: 2025-04-08 | End: 2025-04-10 | Stop reason: HOSPADM

## 2025-04-08 RX ORDER — IOPAMIDOL 755 MG/ML
100 INJECTION, SOLUTION INTRAVASCULAR
Status: COMPLETED | OUTPATIENT
Start: 2025-04-08 | End: 2025-04-08

## 2025-04-08 RX ORDER — ONDANSETRON 2 MG/ML
INJECTION INTRAMUSCULAR; INTRAVENOUS
Status: DISCONTINUED | OUTPATIENT
Start: 2025-04-08 | End: 2025-04-08 | Stop reason: HOSPADM

## 2025-04-08 RX ORDER — HEPARIN SODIUM 1000 [USP'U]/ML
INJECTION, SOLUTION INTRAVENOUS; SUBCUTANEOUS
Status: DISCONTINUED | OUTPATIENT
Start: 2025-04-08 | End: 2025-04-08 | Stop reason: HOSPADM

## 2025-04-08 RX ORDER — IOPAMIDOL 755 MG/ML
INJECTION, SOLUTION INTRAVASCULAR
Status: DISCONTINUED | OUTPATIENT
Start: 2025-04-08 | End: 2025-04-08 | Stop reason: HOSPADM

## 2025-04-08 RX ORDER — ACETAMINOPHEN 325 MG/1
650 TABLET ORAL EVERY 4 HOURS PRN
Status: DISCONTINUED | OUTPATIENT
Start: 2025-04-08 | End: 2025-04-10 | Stop reason: HOSPADM

## 2025-04-08 RX ORDER — ASPIRIN 81 MG/1
324 TABLET, CHEWABLE ORAL ONCE
Status: COMPLETED | OUTPATIENT
Start: 2025-04-08 | End: 2025-04-08

## 2025-04-08 RX ADMIN — GABAPENTIN 100 MG: 100 CAPSULE ORAL at 22:08

## 2025-04-08 RX ADMIN — ARFORMOTEROL TARTRATE 15 MCG: 15 SOLUTION RESPIRATORY (INHALATION) at 21:22

## 2025-04-08 RX ADMIN — BUDESONIDE 0.5 MG: 0.5 SUSPENSION RESPIRATORY (INHALATION) at 21:22

## 2025-04-08 RX ADMIN — ACETAMINOPHEN 650 MG: 325 TABLET ORAL at 22:14

## 2025-04-08 RX ADMIN — IOPAMIDOL 100 ML: 755 INJECTION, SOLUTION INTRAVENOUS at 17:51

## 2025-04-08 RX ADMIN — CARVEDILOL 3.12 MG: 3.12 TABLET, FILM COATED ORAL at 22:08

## 2025-04-08 RX ADMIN — ATORVASTATIN CALCIUM 40 MG: 40 TABLET, FILM COATED ORAL at 22:08

## 2025-04-08 RX ADMIN — ASPIRIN 324 MG: 81 TABLET, CHEWABLE ORAL at 17:45

## 2025-04-08 NOTE — Clinical Note
First balloon inflation max pressure = 4 carol. First balloon inflation duration = 10 seconds. 2nd Inflation of balloon - Duration = 10 seconds.

## 2025-04-08 NOTE — Clinical Note
Hemostasis started on the right subclavian artery. R-Band was used in achieving hemostasis. Radial compression device applied to vessel. Hemostasis achieved successfully. Closure device additional comment: 15ml of air

## 2025-04-08 NOTE — Clinical Note
A 6 fr sheath was successfully inserted with ultrasound guidance into the right subclavian artery. Sheath insertion not delayed.

## 2025-04-08 NOTE — Clinical Note
First balloon inflation max pressure = 12 carol. First balloon inflation duration = 10 seconds. Second inflation of balloon - Max pressure = 10 carol. 2nd Inflation of balloon - Duration = 10 seconds. Third inflation of balloon - Max pressure = 14 carol. 3rd Inflation of balloon - Duration = 10 seconds.

## 2025-04-08 NOTE — Clinical Note
First balloon inflation max pressure = 8 carol. First balloon inflation duration = 10 seconds. Second inflation of balloon - Max pressure = 8 carol. 2nd Inflation of balloon - Duration = 10 seconds. The balloon is positioned in the Proximal segment of the vessel. Third inflation of balloon - Max pressure = 12 carol. 3rd Inflation of balloon - Duration = 10 seconds. Fourth inflation of balloon - Max pressure = 12 carol. 4th Inflation of balloon  - Duration = 10 seconds.

## 2025-04-08 NOTE — Clinical Note
First balloon inflation max pressure = 6 carol. First balloon inflation duration = 10 seconds. Second inflation of balloon - Max pressure = 10 carol. 2nd Inflation of balloon - Duration = 10 seconds. Third inflation of balloon - Max pressure = 10 carol. 3rd Inflation of balloon - Duration = 10 seconds. Fourth inflation of balloon - Max pressure = 12 carol. 4th Inflation of balloon - Duration = 10 seconds.

## 2025-04-08 NOTE — Clinical Note
First balloon inflation max pressure = 14 carol. First balloon inflation duration = 10 seconds. Second inflation of balloon - Max pressure = 14 carol. 2nd Inflation of balloon - Duration = 10 seconds. The balloon is positioned in the Mid segment of the vessel. Third inflation of balloon - Max pressure = 14 carol. 3rd Inflation of balloon - Duration = 10 seconds.

## 2025-04-08 NOTE — Clinical Note
First balloon inflation max pressure = 13 carol. First balloon inflation duration = 10 seconds. Second inflation of balloon - Max pressure = 15 carol. 2nd Inflation of balloon - Duration = 10 seconds. Third inflation of balloon - Max pressure = 12 carol. 3rd Inflation of balloon - Duration = 10 seconds. Fourth inflation of balloon - Max pressure = 14 carol. 4th Inflation of balloon - Duration = 10 seconds.

## 2025-04-08 NOTE — ED PROVIDER NOTES
Time: 5:28 PM EDT  Date of encounter:  4/8/2025  Independent Historian/Clinical History and Information was obtained by:   Patient    History is limited by: N/A    Chief Complaint: Abdominal pain, does not feel well      History of Present Illness:  Patient is a 65 y.o. year old female who presents to the emergency department for evaluation of abdominal pain and not feeling well.  States that she has not felt well for the last 12 to 24 hours.  States that she is having belly pain as well as reported heartburn.  Does have a history of coronary artery disease, COPD, diabetes.  States that her pain is mainly in her upper abdomen.  She does report she just has not felt well.      Patient Care Team  Primary Care Provider: Sandra Kaba MD    Past Medical History:     Allergies   Allergen Reactions    Tramadol Itching     Past Medical History:   Diagnosis Date    Arthritis     Cervical cancer screening     COPD (chronic obstructive pulmonary disease)     Coronary artery disease involving native heart without angina pectoris 09/30/2021    primary angioplasty and stent placement to mid right coronary artery with good angiographic results on 6/14/2021 after STEMI    Depression with anxiety     Diabetes mellitus     Forgetfulness     Gastric reflux     Hypertension     Leg paresthesia 01/20/2017    Right    Limb swelling     Lumbago 01/20/2017    Low back pain    Lumbar spinal stenosis 02/23/2017    L4/5 moderate to severe central canal stenosis    Lumbosacral radiculopathy 01/20/2017    Right    Migraines     Night sweat     Reflux esophagitis     Shortness of breath     ST elevation myocardial infarction (STEMI) (CMS/[facility]) 06/14/2021    Stomach disorder      Past Surgical History:   Procedure Laterality Date    ABDOMINAL SURGERY      3 C-SECTIONS    CARDIAC CATHETERIZATION      CARDIAC CATHETERIZATION N/A 06/14/2021    Procedure: Left Heart Cath;  Surgeon: Sidney Xiao MD;  Location: [facility] CATH INVASIVE LOCATION;   Service: Cardiology;  Laterality: N/A;     SECTION      x 3    CHOLECYSTECTOMY      COLONOSCOPY      COLONOSCOPY N/A 3/25/2025    Procedure: COLONOSCOPY WITH BIOPSIES AND COLD AND HOT SNARE POLYPECTOMIES;  Surgeon: Heber Najera MD;  Location: [facility] ENDOSCOPY;  Service: Gastroenterology;  Laterality: N/A;  COLON POLYPS    CORONARY STENT PLACEMENT      ENDOSCOPY      2007    FOOT SURGERY Right     HAND SURGERY      HYSTERECTOMY  1985     Family History   Problem Relation Age of Onset    Stroke Mother     Lung cancer Mother         Unspecified    Arthritis Mother     Stroke Father     Heart disease Father     Diabetes Father         Unspecified type    Arthritis Father     Heart attack Father     Stroke Sister     Arthritis Sister     Breast cancer Sister     Stroke Brother     Heart disease Brother     Diabetes Brother         Unspecified type    Arthritis Brother     Colon cancer Neg Hx        Home Medications:  Prior to Admission medications    Medication Sig Start Date End Date Taking? Authorizing Provider   albuterol (PROVENTIL) (2.5 MG/3ML) 0.083% nebulizer solution Take 2.5 mg by nebulization Every 4 (Four) Hours As Needed. 24   Mara Hinojosa MD   atorvastatin (LIPITOR) 40 MG tablet Take 1 tablet by mouth Every Night for 90 days. 25  Sandra Kaba MD   carvedilol (COREG) 3.125 MG tablet Take 1 tablet by mouth 2 (Two) Times a Day. 3/7/25   Mara Hinojosa MD   clopidogrel (PLAVIX) 75 MG tablet Take 1 tablet by mouth Daily for 90 days. 25  Sandra Kaba MD   colestipol (COLESTID) 1 g tablet Take 2 tablets by mouth 2 (Two) Times a Day. 3/25/25   Heber Najera MD   furosemide (Lasix) 20 MG tablet Take 1 tablet by mouth Daily for 30 days. 3/20/25 4/19/25  Rubin Urena MD   gabapentin (NEURONTIN) 100 MG capsule Take 1 capsule by mouth Every Night for 30 days. 25  Sandra Kaba MD   Jardiance 25 MG tablet tablet  Take 1 tablet by mouth Daily. 1/7/25   ProviderMara MD   metFORMIN (GLUCOPHAGE) 500 MG tablet TAKE ONE TABLET BY MOUTH TWICE DAILY @9AM-5PM WITH MEALS 3/24/25   Sandra Kaba MD   potassium chloride (MICRO-K) 10 MEQ CR capsule Take 1 capsule by mouth Daily for 30 days. 3/21/25 4/20/25  Rubin Urena MD   spironolactone (ALDACTONE) 25 MG tablet Take 1 tablet by mouth Daily for 30 days. 3/21/25 4/20/25  Rubin Urena MD   Trelegy Ellipta 100-62.5-25 MCG/ACT inhaler INHALE 1 PUFF BY MOUTH DAILY 1/15/25   Sandra Kaba MD   Trintellix 10 MG tablet tablet TAKE ONE TABLET BY MOUTH DAILY AT 9 AM  DAYS 8/13/24   Sandra Kaba MD        Social History:   Social History     Tobacco Use    Smoking status: Every Day     Current packs/day: 1.00     Average packs/day: 1 pack/day for 58.2 years (58.2 ttl pk-yrs)     Types: Cigarettes     Start date: 1/14/1967     Passive exposure: Current    Smokeless tobacco: Never    Tobacco comments:     Smoked 21-30 years   Vaping Use    Vaping status: Never Used   Substance Use Topics    Alcohol use: Never     Comment: Does not drink    Drug use: Never         Review of Systems:  Review of Systems     Physical Exam:  /94 (BP Location: Left arm, Patient Position: Lying)   Pulse 98   Temp 98.2 °F (36.8 °C) (Oral)   Resp 21   Wt 69.1 kg (152 lb 5.4 oz)   SpO2 95%   BMI 24.59 kg/m²     Physical Exam  Vitals and nursing note reviewed.   Constitutional:       Appearance: Normal appearance. She is ill-appearing.   HENT:      Head: Normocephalic and atraumatic.   Eyes:      General: No scleral icterus.  Cardiovascular:      Rate and Rhythm: Normal rate and regular rhythm.      Heart sounds: Normal heart sounds.   Pulmonary:      Effort: Pulmonary effort is normal.      Breath sounds: Normal breath sounds.   Abdominal:      Palpations: Abdomen is soft.      Tenderness: There is abdominal tenderness in the epigastric area.   Musculoskeletal:          General: Normal range of motion.      Cervical back: Normal range of motion.   Skin:     Findings: No rash.   Neurological:      General: No focal deficit present.      Mental Status: She is alert.                    Medical Decision Making:      Comorbidities that affect care:    COPD, coronary disease, obesity, diabetes    External Notes reviewed:          The following orders were placed and all results were independently analyzed by me:  Orders Placed This Encounter   Procedures    Blood Culture - Blood,    Blood Culture - Blood,    CT Angiogram Abdomen Pelvis    CT Angiogram Chest    [place] Draw    High Sensitivity Troponin T    Comprehensive Metabolic Panel    Lipase    BNP    Magnesium    CBC Auto Differential    High Sensitivity Troponin T 1Hr    CBC (No Diff)    Basic Metabolic Panel    Hemoglobin A1c    Lipid Panel    Lactic Acid, Plasma    Procalcitonin    Diet: Cardiac, Diabetic; Healthy Heart (2-3 Na+); Consistent Carbohydrate; Fluid Consistency: Thin (IDDSI 0)    Undress & Gown    Vital Signs, Site Check & Distal Extremity Check for Warmth, Color, Sensation & Pulses    Maintain IV Access    Telemetry - Place Orders & Notify Provider of Results When Patient Experiences Acute Chest Pain, Dysrhythmia or Respiratory Distress    Change Site Dressing    Encourage Fluids    Strict Intake & Output    Continuous Pulse Oximetry    Advance Diet As Tolerated -    Notify MD if platelet count is less than 100,000, is less than 1/2 baseline, or if Hgb drops by more than 3mg/dl.    Notify MD of hypotension (SBP less than 95), bleeding, or dysrythmia and follow Sheath Removal Policy if needed.    Hold metFORMIN (GLUCOPHAGE) for 48 Hours    Keep Affected Arm Straight & Elevated    Activity As Tolerated    NS not Preferred or IV Hydration not Needed (MD to Place IV Fluid Order Outside of Order Set if NS not Preferred)    Code Status and Medical Interventions: CPR (Attempt to Resuscitate); Full Support    Cardiac Rehab  Evaluation and Enrollment    Oxygen Therapy- Nasal Cannula; Titrate 1-6 LPM Per SpO2; 90 - 95%    Oxygen Therapy- Nasal Cannula; Titrate 1-6 LPM Per SpO2; 90 - 95%    Incentive Spirometry    POC Activated Clotting Time    POC Activated Clotting Time    POC Activated Clotting Time    POC Activated Clotting Time    POC Activated Clotting Time    POC Activated Clotting Time    ECG 12 Lead ED Triage Standing Order; Chest Pain    ECG 12 Lead ED Triage Standing Order; Chest Pain    ECG 12 Lead Dyspnea    Insert Peripheral IV    Inpatient Admission    CBC & Differential    Green Top (Gel)    Lavender Top    Gold Top - SST    Light Blue Top       Medications Given in the Emergency Department:  Medications   sodium chloride 0.9 % flush 10 mL ( Intravenous MAR Unhold 4/8/25 2056)   sodium chloride 0.9 % bolus 1,000 mL ( Intravenous MAR Unhold 4/8/25 2056)   empagliflozin (JARDIANCE) tablet 25 mg (has no administration in time range)   albuterol (PROVENTIL) nebulizer solution 0.083% 2.5 mg/3mL (has no administration in time range)   carvedilol (COREG) tablet 3.125 mg (has no administration in time range)   furosemide (LASIX) tablet 20 mg (has no administration in time range)   spironolactone (ALDACTONE) tablet 25 mg (has no administration in time range)   gabapentin (NEURONTIN) capsule 100 mg (has no administration in time range)   atorvastatin (LIPITOR) tablet 40 mg (has no administration in time range)   arformoterol (BROVANA) nebulizer solution 15 mcg (has no administration in time range)     And   budesonide (PULMICORT) nebulizer solution 0.5 mg (has no administration in time range)     And   revefenacin (YUPELRI) nebulizer solution 175 mcg (175 mcg Nebulization Not Given 4/8/25 2109)   nitroglycerin (NITROSTAT) SL tablet 0.4 mg (has no administration in time range)   acetaminophen (TYLENOL) tablet 650 mg (has no administration in time range)   ticagrelor (BRILINTA) tablet 90 mg (has no administration in time range)    aspirin chewable tablet 324 mg ( Oral MAR Unhold 4/8/25 2056)   iopamidol (ISOVUE-370) 76 % injection 100 mL (100 mL Intravenous Given 4/8/25 1751)   cangrelor 50 mg in 250 ml (200 mcg/ml) IV infusion (4 mcg/kg/min × 69.1 kg Intravenous New Bag 4/8/25 1826)        ED Course:    ED Course as of 04/08/25 2113 Tue Apr 08, 2025 1726 Spoke with Dr. Verdugo about EKGs.  Will come evaluate. [MA]   1741 EKG interpreted by me   time: 1723  Heart rate 102  There is ST elevation inferiorly with some reciprocal changes in 1 and aVL as well as in V2. [MA]   1755 Pt. Was taken to the cath lab by Dr. Verdugo  [MA]   1805 Spoke with radiology who thinks there probably is some infarction of the kidney as well as the spleen.  I did update cardiology who is currently in the Cath Lab. [MA]      ED Course User Index  [MA] Misael Levine MD       Labs:    Lab Results (last 24 hours)       Procedure Component Value Units Date/Time    High Sensitivity Troponin T [[id number]]  (Abnormal) Collected: 04/08/25 1728    Specimen: Blood from Arm, Right Updated: 04/08/25 1816     HS Troponin T 835 ng/L     Narrative:      High Sensitive Troponin T Reference Range:  <14.0 ng/L- Negative Female for AMI  <22.0 ng/L- Negative Male for AMI  >=14 - Abnormal Female indicating possible myocardial injury.  >=22 - Abnormal Male indicating possible myocardial injury.   Clinicians would have to utilize clinical acumen, EKG, Troponin, and serial changes to determine if it is an Acute Myocardial Infarction or myocardial injury due to an underlying chronic condition.         CBC & Differential [[id number]]  (Abnormal) Collected: 04/08/25 1728    Specimen: Blood from Arm, Right Updated: 04/08/25 1802    Narrative:      The following orders were created for panel order CBC & Differential.  Procedure                               Abnormality         Status                     ---------                               -----------         ------                      CBC Auto Differential[[id number]]        Abnormal            Final result               Scan Slide[[id number]]                                                                    Please view results for these tests on the individual orders.    Comprehensive Metabolic Panel [[id number]]  (Abnormal) Collected: 04/08/25 1728    Specimen: Blood from Arm, Right Updated: 04/08/25 1753     Glucose 294 mg/dL      BUN 9 mg/dL      Creatinine 0.74 mg/dL      Sodium 131 mmol/L      Potassium 3.7 mmol/L      Chloride 90 mmol/L      CO2 25.8 mmol/L      Calcium 10.1 mg/dL      Total Protein 8.3 g/dL      Albumin 4.0 g/dL      ALT (SGPT) 16 U/L      AST (SGOT) 45 U/L      Alkaline Phosphatase 159 U/L      Total Bilirubin 1.1 mg/dL      Globulin 4.3 gm/dL      A/G Ratio 0.9 g/dL      BUN/Creatinine Ratio 12.2     Anion Gap 15.2 mmol/L      eGFR 90.5 mL/min/1.73     Narrative:      GFR Categories in Chronic Kidney Disease (CKD)      GFR Category          GFR (mL/min/1.73)    Interpretation  G1                     90 or greater         Normal or high (1)  G2                      60-89                Mild decrease (1)  G3a                   45-59                Mild to moderate decrease  G3b                   30-44                Moderate to severe decrease  G4                    15-29                Severe decrease  G5                    14 or less           Kidney failure          (1)In the absence of evidence of kidney disease, neither GFR category G1 or G2 fulfill the criteria for CKD.    eGFR calculation 2021 CKD-EPI creatinine equation, which does not include race as a factor    Lipase [[id number]]  (Abnormal) Collected: 04/08/25 1728    Specimen: Blood from Arm, Right Updated: 04/08/25 1753     Lipase 73 U/L     BNP [[id number]]  (Abnormal) Collected: 04/08/25 1728    Specimen: Blood from Arm, Right Updated: 04/08/25 1751     proBNP 15,432.0 pg/mL     Narrative:      This assay is used as an aid in the diagnosis of individuals  suspected of having heart failure. It can be used as an aid in the diagnosis of acute decompensated heart failure (ADHF) in patients presenting with signs and symptoms of ADHF to the emergency department (ED). In addition, NT-proBNP of <300 pg/mL indicates ADHF is not likely.    Age Range Result Interpretation  NT-proBNP Concentration (pg/mL:      <50             Positive            >450                   Gray                 300-450                    Negative             <300    50-75           Positive            >900                  Gray                300-900                  Negative            <300      >75             Positive            >1800                  Gray                300-1800                  Negative            <300    Magnesium [[id number]]  (Normal) Collected: 04/08/25 1728    Specimen: Blood from Arm, Right Updated: 04/08/25 1753     Magnesium 1.7 mg/dL     CBC Auto Differential [[id number]]  (Abnormal) Collected: 04/08/25 1728    Specimen: Blood from Arm, Right Updated: 04/08/25 1802     WBC 21.93 10*3/mm3      RBC 5.43 10*6/mm3      Hemoglobin 15.4 g/dL      Hematocrit 46.3 %      MCV 85.3 fL      MCH 28.4 pg      MCHC 33.3 g/dL      RDW 13.7 %      RDW-SD 42.5 fl      MPV 10.6 fL      Platelets 325 10*3/mm3      Neutrophil % 83.2 %      Lymphocyte % 8.8 %      Monocyte % 7.1 %      Eosinophil % 0.0 %      Basophil % 0.4 %      Immature Grans % 0.5 %      Neutrophils, Absolute 18.27 10*3/mm3      Lymphocytes, Absolute 1.93 10*3/mm3      Monocytes, Absolute 1.55 10*3/mm3      Eosinophils, Absolute 0.00 10*3/mm3      Basophils, Absolute 0.08 10*3/mm3      Immature Grans, Absolute 0.10 10*3/mm3      nRBC 0.0 /100 WBC     POC Activated Clotting Time [[id number]]  (Abnormal) Collected: 04/08/25 1847    Specimen: Blood Updated: 04/08/25 1851     Activated Clotting Time  325 Seconds      Comment: Serial Number: [id number]:  [id number]       POC Activated Clotting Time [[id number]]  (Abnormal)  Collected: 25    Specimen: Blood Updated: 25     Activated Clotting Time  291 Seconds      Comment: Serial Number: [id number]:  [id number]       POC Activated Clotting Time [[id number]]  (Abnormal) Collected: 25    Specimen: Blood Updated: 25     Activated Clotting Time  291 Seconds      Comment: Serial Number: [id number]:  [id number]       High Sensitivity Troponin T 1Hr [[id number]] Collected: 25    Specimen: Blood from Hand, Left Updated: 25    Procalcitonin [[id number]] Collected: 25    Specimen: Blood from Hand, Left Updated: 25    Blood Culture - Blood, Hand, Left [[id number]] Collected: 25    Specimen: Blood from Hand, Left Updated: 25    Lactic Acid, Plasma [[id number]] Collected: 25    Specimen: Blood from Hand, Left Updated: 25    Blood Culture - Blood, Hand, Left [[id number]] Collected: 25    Specimen: Blood from Hand, Left Updated: 25             Imaging:    Cardiac Catheterization/Vascular Study  Result Date: 2025  [facility] CARDIAC CATHETERIZATION PROCEDURE REPORT Patient: Desiree Garcia : 1960 MRN: [id number] Procedure Date: 25 Referring Physician: ER Interventional Cardiologist: James Verdugo MD, [facility] Indication: Inferior STEMI seen on EKG Tachycardia Abdominal pain Clinical Presentation: Patient is a 61-year-old female with history of CAD status post PCI to mid RCA in , hypertension, hyperlipidemia, type 2 diabetes, who presents to the ER with complaints of abdominal pain that started since yesterday.  Patient was found to have sinus tachycardia with EKG showing inferior STEMI with reciprocal changes in the lateral leads.  Urgent CT chest and abdomen was done which showed suspected renal and splenic infarct.  Patient was brought to the Cath Lab urgently given inferior STEMI. Procedure performed: Diagnostic Left  Heart Catheterization Selective Coronary Angiography Left ventricle pressure measurement Successful percutaneous coronary intervention to mid to distal RCA with drug-eluting stent x 2 Ultrasound-guided right radial artery access IVUS of RCA Mechanical aspiration thrombectomy using CAT Rx device of RCA Moderate sedation Access Site(s): Right radial artery Findings: 1. Coronary Artery Anatomy: Dominance: Right coronary artery Left Main: No significant stenosis Left Anterior Descendin to 40% proximal LAD stenosis with diffuse luminal irregularities without significant obstructive lesion Left Circumflex Artery: Luminal irregularities, distalmost left circumflex is 100% occluded, likely acute however small vessel size and territory. Right Coronary Artery: Proximal RCA is 100% acute thrombotic occlusion was seen. 2. Hemodynamics:  The left ventricular end-diastolic pressure is 24 mmHg. There was no gradient across aortic valve on pullback  3. Left Ventriculogram: Not done given suspected intracardiac thrombus. 4. Percutaneous Intervention: Location: Proximal right coronary artery all the way to distal right coronary artery Treatment: PTCA, penumbra aspiration thrombectomy, IVUS, PCI, post dilation Pre-stenosis: 100% Post-stenosis: 0% Lesion Type C: Yes ABILIO Flow Pre: 0 ABILIO Flow Post: 3, distalmost RCA bifurcation has ABILIO II flow secondary to thromboembolism. Bifurcation: No Severe Calcium: No Dissection: No Details of the procedure: Informed consent was obtained with an explanation of the risks, benefits and alternatives of the procedure. The patient was brought to the Cardiac Catheterization Laboratory and was prepped and draped in a standard sterile fashion. Moderate sedation with Fentanyl and Versed was administered by the circulating nurse. Lidocaine 2% was used to anesthetize the right radial artery and a 6 Slender sheath was placed.  Tiger 5 Kazakh catheter was used to engage the right and left coronary  arteries with diagnostic angiography obtained in all appropriate projections.  We then exchanged for an angled pigtail catheter and enter the left ventricle to measure hemodynamics and perform a left ventriculogram.  The catheter was then removed over a guidewire. Details of angioplasty: Patient was noted to have 100% acute thrombotic occlusion of the proximal RCA.  JR4 guide catheter was used to engage the right coronary artery followed by Runthrough wire to cross the lesion and parked distally.  3.0 mm NC balloon was used predilate.  Extensive thrombus burden was noted.  Penumbra CAT Rx was used for 2 passes.  A decent size clot was retrieved.  Significant episode mid to distal RCA had clot burden.  Further predilation was done with a 2.5 and 3.0 mm NC balloon.  We deployed 2.75 X28 and 3.5X 23 mm drug-eluting stent in the mid to distal RCA and postdilated with 3.0 and 3.5 mm balloon and high-pressure.  The right PDA had thromboembolism, for which PTCA was done in the ostial part of right PDA with 2.0 mm NC balloon.  Adenosine was used.  Final angiographic images showed ABILIO II-III flow. Heparin was used for anticoagulation throughout the procedure with frequent checking of ACT.  Patient was already on aspirin.  Brilinta 180 mg was given.  Cangrelor drip was used..  At the end of the procedure, the radial artery sheath was removed and TR band was applied for hemostasis.  Patient tolerated procedure well without any complications.  The results of the test were explained in detail to the patient. Complications: None. Estimated Blood Loss: Minimal. Conclusions: Patient with extensive clot burden causing 100% proximal to distal RCA thrombosis, status post mechanical aspiration thrombectomy, IVUS and PTCA/PCI with drug-eluting stent x 2 with 2.75 X28 and 3.5X 23 mm drug-eluting stent in the mid to distal RCA resulting in ABILOI II-III flow. Distalmost left circumflex with 100% occlusion, small territory and a small  vessel.  Appears to be thrombotic in nature as well. No aortic stenosis on pullback gradient Review of the CT scan chest and abdomen shows infarct of renal artery, splenic artery, concerning for thrombotic showering, need to rule out cardiac cause of thromboembolism. Patient was supposed to be on aspirin and Plavix, suspected medical noncompliance Recommendations: Aspirin 81 and Brilinta 90 mg twice daily uninterrupted for at least 1 year.  High intensity statin, beta-blocker, ACE/ARB as tolerated.  Aggressive risk factor management with aggressive diabetes and blood pressure control. Patient with extensive clot burden, resume heparin without bolus 2 hours after TR band removal.  Heparin to be continued overnight. Patient with renal and splenic infarct.  Continue monitoring for now. Will obtain echocardiogram. Procedure Status: Emergent Electronically signed by James Verdugo MD, 04/08/25, 8:20 PM EDT.     CT Angiogram Abdomen Pelvis  Result Date: 4/8/2025  CT ANGIOGRAM ABDOMEN PELVIS, CT ANGIOGRAM CHEST Date of Exam: 4/8/2025 5:39 PM EDT Indication: Chest pain abdominal pain. Comparison: CT abdomen and pelvis 10/6/2024 Technique: CTA of the chest, abdomen and pelvis was performed after the uneventful intravenous administration of iodinated contrast. Reconstructed coronal and sagittal images were also obtained. In addition, a 3-D volume rendered image was created for interpretation. Automated exposure control and iterative reconstruction methods were used. Findings: Vascular: The visualized supra aortic great vessels are normal. The thoracic aorta is normal in caliber without evidence of dissection or penetrating ulcer. The visualized pulmonary arteries are patent. The thoracic aorta is unremarkable. The celiac axis, the SMA and proximal bilateral renal arteries are patent. There is occlusion of the distal aspect of the left renal artery. There is also occlusion of the very distal aspect of the splenic artery.  Complete occlusion of the ONEIDA with reconstitution distally. The bilateral common iliac, external iliac and common femoral arteries are patent. There is evidence of complete occlusion of the distal branches of the left internal iliac artery. The right internal iliac artery is patent. The bilateral profunda femoris are patent. The right superficial femoral artery is patent. There is complete occlusion of the left superficial femoral artery. Nonvascular: Lungs and Pleura: Scattered pulmonary nodules, the largest of which measures 0.6 cm (image 105 of series 3). Otherwise the lungs are clear. The central airways are patent. No pleural effusion or pneumothorax Mediastinum/Hanna: No lymphadenopathy. No suspicious mass. Heart: Normal in size. No pericardial effusion. Normal RV/LV ratio. Liver: Liver is normal in size and CT density. No focal lesions. Gallbladder: Status post cholecystectomy Bile Ducts: No billiary dcutal dilation. Spleen: Areas of hypoenhancement noted within the spleen, most significantly anteriorly, concerning for splenic infarcts. Pancreas: Pancreas is normal. There is no evidence of pancreatic mass or peripancreatic fluid. Adrenals: Adrenal glands are unremarkable. Kidneys: Hypoenhancement of the inferior aspect of the left kidney, consistent with renal infarct. The right kidney is normal.. Gastrointestinal: Multiple prominent fluid-filled loops of small bowel noted throughout the abdomen. No significant distention to suggest bowel obstruction. Otherwise unremarkable. Normal appendix. Bladder: The bladder is normal. Pelvis:  No suspecious mass. Peritoneum/Mesentery: No fluid collection, ascities, or free air.   Lymph Nodes: No lymphadenopathy. Vasculature: Unremarkable Soft tissue:: Unremarkable Bony Structures: No acute osseous abnormality. Chronic deformity of the sternum. Degenerative changes noted throughout the spine.     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 4.Areas of hypoenhancement noted within the spleen, most significantly anteriorly, concerning for splenic infarcts. 5.Hypoenhancement of the inferior aspect of the left kidney, consistent with renal infarct. 6.Complete occlusion of the ONEIDA with reconstitution distally. 7.Multiple prominent fluid-filled loops of small bowel noted throughout the abdomen. No significant distention to suggest bowel obstruction. 8.Scattered pulmonary nodules, the largest of which measures 0.6 cm. Multiple indeterminate pulmonary nodules. The largest one is solid and measures 6 mm. For multiple nodules, with the most suspicious nodule being solid and measuring 6-8 mm, recommend CT at 3-6 months. Then, for a low-risk patient, consider CT at 18-24 months. For a high-risk patient, if the nodule is stable at 3-6 months, recommend CT at 18-24 months. Follow-up intervals may vary according to size and risk. Electronically Signed: Julian Rosenthal DO  4/8/2025 7:11 PM EDT  Workstation ID: [id number]    CT Angiogram Chest  Result Date: 4/8/2025  CT ANGIOGRAM ABDOMEN PELVIS, CT ANGIOGRAM CHEST Date of Exam: 4/8/2025 5:39 PM EDT Indication: Chest pain abdominal pain. Comparison: CT abdomen and pelvis 10/6/2024 Technique: CTA of the chest, abdomen and pelvis was performed after the uneventful intravenous administration of iodinated contrast. Reconstructed coronal and sagittal images were also obtained. In addition, a 3-D volume rendered image was created for interpretation. Automated exposure control and iterative reconstruction methods were used. Findings: Vascular: The visualized supra aortic great vessels are normal. The thoracic aorta is normal in caliber without evidence of dissection or penetrating ulcer. The visualized pulmonary arteries are patent. The thoracic aorta is unremarkable. The celiac axis, the SMA  and proximal bilateral renal arteries are patent. There is occlusion of the distal aspect of the left renal artery. There is also occlusion of the very distal aspect of the splenic artery. Complete occlusion of the ONEIDA with reconstitution distally. The bilateral common iliac, external iliac and common femoral arteries are patent. There is evidence of complete occlusion of the distal branches of the left internal iliac artery. The right internal iliac artery is patent. The bilateral profunda femoris are patent. The right superficial femoral artery is patent. There is complete occlusion of the left superficial femoral artery. Nonvascular: Lungs and Pleura: Scattered pulmonary nodules, the largest of which measures 0.6 cm (image 105 of series 3). Otherwise the lungs are clear. The central airways are patent. No pleural effusion or pneumothorax Mediastinum/Hanna: No lymphadenopathy. No suspicious mass. Heart: Normal in size. No pericardial effusion. Normal RV/LV ratio. Liver: Liver is normal in size and CT density. No focal lesions. Gallbladder: Status post cholecystectomy Bile Ducts: No billiary dcutal dilation. Spleen: Areas of hypoenhancement noted within the spleen, most significantly anteriorly, concerning for splenic infarcts. Pancreas: Pancreas is normal. There is no evidence of pancreatic mass or peripancreatic fluid. Adrenals: Adrenal glands are unremarkable. Kidneys: Hypoenhancement of the inferior aspect of the left kidney, consistent with renal infarct. The right kidney is normal.. Gastrointestinal: Multiple prominent fluid-filled loops of small bowel noted throughout the abdomen. No significant distention to suggest bowel obstruction. Otherwise unremarkable. Normal appendix. Bladder: The bladder is normal. Pelvis:  No suspecious mass. Peritoneum/Mesentery: No fluid collection, ascities, or free air.   Lymph Nodes: No lymphadenopathy. Vasculature: Unremarkable Soft tissue:: Unremarkable Bony Structures: No  acute osseous abnormality. Chronic deformity of the sternum. Degenerative changes noted throughout the spine.     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 4.Areas of hypoenhancement noted within the spleen, most significantly anteriorly, concerning for splenic infarcts. 5.Hypoenhancement of the inferior aspect of the left kidney, consistent with renal infarct. 6.Complete occlusion of the ONEIDA with reconstitution distally. 7.Multiple prominent fluid-filled loops of small bowel noted throughout the abdomen. No significant distention to suggest bowel obstruction. 8.Scattered pulmonary nodules, the largest of which measures 0.6 cm. Multiple indeterminate pulmonary nodules. The largest one is solid and measures 6 mm. For multiple nodules, with the most suspicious nodule being solid and measuring 6-8 mm, recommend CT at 3-6 months. Then, for a low-risk patient, consider CT at 18-24 months. For a high-risk patient, if the nodule is stable at 3-6 months, recommend CT at 18-24 months. Follow-up intervals may vary according to size and risk. Electronically Signed: Julian Rosenthal DO  4/8/2025 7:11 PM EDT  Workstation ID: [id number]        Differential Diagnosis and Discussion:    Abdominal Pain: Based on the patient's signs and symptoms, I considered abdominal aortic aneurysm, small bowel obstruction, pancreatitis, acute cholecystitis, acute appendecitis, peptic ulcer disease, gastritis, colitis, endocrine disorders, irritable bowel syndrome and other differential diagnosis an etiology of the patient's abdominal pain.  Chest Pain:  Based on the patient's signs and symptoms, I considered aortic dissection, myocardial infaction, pulmonary embolism, cardiac tamponade, pericarditis, pneumothorax, musculoskeletal chest pain and other differential diagnosis as an  etiology of the patient's chest pain.     PROCEDURES:    Labs were collected in the emergency department and all labs were reviewed and interpreted by me.  X-ray were performed in the emergency department and all X-ray impressions were independently interpreted by me.  An EKG was performed and the EKG was interpreted by me.  CT scan was performed in the emergency department and the CT scan radiology impression was interpreted by me.    ECG 12 Lead ED Triage Standing Order; Chest Pain   Preliminary Result   HEART CKDM=401  bpm   RR Dxvcjaik=574  ms   LA Rsstkgpr=675  ms   P Horizontal Axis=-45  deg   P Front Axis=56  deg   QRSD Qkfomkne=940  ms   QT Posdqvvf=119  ms   BBmP=495  ms   QRS Axis=-21  deg   T Wave Axis=84  deg   - ABNORMAL ECG -   Sinus tachycardia   Left atrial enlargement   LVH with secondary repolarization abnormality   Inferoposterior infarct, acute (RCA)   Date and Time of Study:2025-04-08 17:23:18          Procedures    MDM     Amount and/or Complexity of Data Reviewed  Decide to obtain previous medical records or to obtain history from someone other than the patient: yes       Patient is a 65-year-old who presents with complaints of belly pain with an abnormal EKG.  EKG is concerning for possible STEMI.  Did have significant belly pain as well.  A CT of the chest and belly were ordered as well as cardiology was consulted.  Cardiology came and evaluated the patient after she received her CT and took her to the Cath Lab.  I did speak with radiology who thought there was multiple infarcts within the spleen as well as the kidney but was waiting on final reads.  The patient was taken to the Cath Lab and evaluated there by interventional cardiology.  She was admitted for further workup management.  Labs and imaging were pending at time prior to being sent to the Cath Lab.      Total Critical Care time of 33 minutes. Total critical care time documented does not include time spent on separately billed  procedures for services of nurses or physician assistants. I personally saw and examined the patient. I have reviewed all diagnostic interpretations and treatment plans as written. I was present for the key portions of any procedures performed and the inclusive time noted in any critical care statement. Critical care time includes patient management by me, time spent at the patients bedside,  time to review lab and imaging results, discussing patient care, documentation in the medical record, and time spent with family or caregiver.          Patient Care Considerations:          Consultants/Shared Management Plan:    Consultant: I have discussed the case with Dr. Verdugo who states who came and evaluated the patient and then took to the Cath Lab    Social Determinants of Health:          Disposition and Care Coordination:    Admit:   Through independent evaluation of the patient's history, physical, and imperical data, the patient meets criteria for inpatient admission to the hospital.        Final diagnoses:   ST elevation myocardial infarction (STEMI), unspecified artery   Abdominal pain, unspecified abdominal location        ED Disposition       ED Disposition   Decision to Admit    Condition   --    Comment   --               This medical record created using voice recognition software.             Misael Levine MD  04/08/25 [id number]

## 2025-04-08 NOTE — Clinical Note
First balloon inflation max pressure = 12 carol. First balloon inflation duration = 10 seconds. Second inflation of balloon - Max pressure = 14 carol. 2nd Inflation of balloon - Duration = 10 seconds. Third inflation of balloon - Max pressure = 14 carol. 3rd Inflation of balloon - Duration = 10 seconds. Fourth inflation of balloon - Max pressure = 14 carol. 4th Inflation of balloon - Duration = 10 seconds.

## 2025-04-08 NOTE — Clinical Note
First balloon inflation max pressure = 14 carol. First balloon inflation duration = 10 seconds. Second inflation of balloon - Max pressure = 14 carol. 2nd Inflation of balloon - Duration = 10 seconds. The balloon is positioned in the Proximal segment of the vessel.

## 2025-04-08 NOTE — Clinical Note
First balloon inflation max pressure = 6 carol. First balloon inflation duration = 10 seconds. Second inflation of balloon - Max pressure = 10 carol. 2nd Inflation of balloon - Duration = 10 seconds. Third inflation of balloon - Max pressure = 8 carol. 3rd Inflation of balloon - Duration = 10 seconds.

## 2025-04-08 NOTE — Clinical Note
First balloon inflation max pressure = 12 carol. First balloon inflation duration = 10 seconds. Second inflation of balloon - Max pressure = 12 carol. 2nd Inflation of balloon - Duration = 10 seconds. Third inflation of balloon - Max pressure = 14 carol. 3rd Inflation of balloon - Duration = 10 seconds. Fourth inflation of balloon - Max pressure = 14 carol. 4th Inflation of balloon - Duration = 10 seconds.

## 2025-04-09 ENCOUNTER — APPOINTMENT (OUTPATIENT)
Dept: CARDIOLOGY | Facility: HOSPITAL | Age: 65
End: 2025-04-09
Payer: MEDICARE

## 2025-04-09 LAB
ANION GAP SERPL CALCULATED.3IONS-SCNC: 12.3 MMOL/L (ref 5–15)
APTT PPP: 39 SECONDS (ref 78–95.9)
APTT PPP: 50.8 SECONDS (ref 78–95.9)
APTT PPP: 79.1 SECONDS (ref 78–95.9)
BH CV ECHO MEAS - EDV(MOD-SP2): 145 ML
BH CV ECHO MEAS - EDV(MOD-SP4): 113 ML
BH CV ECHO MEAS - EF(MOD-SP2): 46.3 %
BH CV ECHO MEAS - EF(MOD-SP4): 23.6 %
BH CV ECHO MEAS - ESV(MOD-SP2): 77.8 ML
BH CV ECHO MEAS - ESV(MOD-SP4): 86.3 ML
BH CV ECHO MEAS - LV DIASTOLIC VOL/BSA (35-75): 63.5 CM2
BH CV ECHO MEAS - LV SYSTOLIC VOL/BSA (12-30): 48.5 CM2
BH CV ECHO MEAS - SV(MOD-SP2): 67.2 ML
BH CV ECHO MEAS - SV(MOD-SP4): 26.7 ML
BH CV ECHO MEAS - SVI(MOD-SP2): 37.8 ML/M2
BH CV ECHO MEAS - SVI(MOD-SP4): 15 ML/M2
BUN SERPL-MCNC: 10 MG/DL (ref 8–23)
BUN/CREAT SERPL: 12.5 (ref 7–25)
CALCIUM SPEC-SCNC: 9.7 MG/DL (ref 8.6–10.5)
CHLORIDE SERPL-SCNC: 92 MMOL/L (ref 98–107)
CHOLEST SERPL-MCNC: 147 MG/DL (ref 0–200)
CO2 SERPL-SCNC: 26.7 MMOL/L (ref 22–29)
CREAT SERPL-MCNC: 0.8 MG/DL (ref 0.57–1)
D-LACTATE SERPL-SCNC: 1.6 MMOL/L (ref 0.5–2)
DEPRECATED RDW RBC AUTO: 42.9 FL (ref 37–54)
EGFRCR SERPLBLD CKD-EPI 2021: 82.4 ML/MIN/1.73
ERYTHROCYTE [DISTWIDTH] IN BLOOD BY AUTOMATED COUNT: 13.9 % (ref 12.3–15.4)
GLUCOSE BLDC GLUCOMTR-MCNC: 219 MG/DL (ref 70–99)
GLUCOSE BLDC GLUCOMTR-MCNC: 224 MG/DL (ref 70–99)
GLUCOSE BLDC GLUCOMTR-MCNC: 248 MG/DL (ref 70–99)
GLUCOSE BLDC GLUCOMTR-MCNC: 258 MG/DL (ref 70–99)
GLUCOSE BLDC GLUCOMTR-MCNC: 299 MG/DL (ref 70–99)
GLUCOSE SERPL-MCNC: 248 MG/DL (ref 65–99)
HBA1C MFR BLD: 9.4 % (ref 4.8–5.6)
HCT VFR BLD AUTO: 45 % (ref 34–46.6)
HDLC SERPL-MCNC: 42 MG/DL (ref 40–60)
HGB BLD-MCNC: 14.8 G/DL (ref 12–15.9)
LDLC SERPL CALC-MCNC: 78 MG/DL (ref 0–100)
LDLC/HDLC SERPL: 1.75 {RATIO}
MAGNESIUM SERPL-MCNC: 1.9 MG/DL (ref 1.6–2.4)
MCH RBC QN AUTO: 27.9 PG (ref 26.6–33)
MCHC RBC AUTO-ENTMCNC: 32.9 G/DL (ref 31.5–35.7)
MCV RBC AUTO: 84.9 FL (ref 79–97)
PHOSPHATE SERPL-MCNC: 3.1 MG/DL (ref 2.5–4.5)
PLATELET # BLD AUTO: 296 10*3/MM3 (ref 140–450)
PMV BLD AUTO: 10.5 FL (ref 6–12)
POTASSIUM SERPL-SCNC: 3.6 MMOL/L (ref 3.5–5.2)
QT INTERVAL: 367 MS
QTC INTERVAL: 430 MS
RBC # BLD AUTO: 5.3 10*6/MM3 (ref 3.77–5.28)
SODIUM SERPL-SCNC: 131 MMOL/L (ref 136–145)
TRIGL SERPL-MCNC: 158 MG/DL (ref 0–150)
VLDLC SERPL-MCNC: 27 MG/DL (ref 5–40)
WBC NRBC COR # BLD AUTO: 21.49 10*3/MM3 (ref 3.4–10.8)

## 2025-04-09 PROCEDURE — 63710000001 REVEFENACIN 175 MCG/3ML SOLUTION: Performed by: INTERNAL MEDICINE

## 2025-04-09 PROCEDURE — 25010000002 SULFUR HEXAFLUORIDE MICROSPH 60.7-25 MG RECONSTITUTED SUSPENSION: Performed by: INTERNAL MEDICINE

## 2025-04-09 PROCEDURE — 85730 THROMBOPLASTIN TIME PARTIAL: CPT | Performed by: STUDENT IN AN ORGANIZED HEALTH CARE EDUCATION/TRAINING PROGRAM

## 2025-04-09 PROCEDURE — 25010000002 AMIODARONE IN DEXTROSE 5% 150-4.21 MG/100ML-% SOLUTION: Performed by: INTERNAL MEDICINE

## 2025-04-09 PROCEDURE — 93005 ELECTROCARDIOGRAM TRACING: CPT | Performed by: INTERNAL MEDICINE

## 2025-04-09 PROCEDURE — 85027 COMPLETE CBC AUTOMATED: CPT | Performed by: INTERNAL MEDICINE

## 2025-04-09 PROCEDURE — 25010000002 ONDANSETRON PER 1 MG: Performed by: STUDENT IN AN ORGANIZED HEALTH CARE EDUCATION/TRAINING PROGRAM

## 2025-04-09 PROCEDURE — 93308 TTE F-UP OR LMTD: CPT | Performed by: INTERNAL MEDICINE

## 2025-04-09 PROCEDURE — 80061 LIPID PANEL: CPT | Performed by: INTERNAL MEDICINE

## 2025-04-09 PROCEDURE — 99233 SBSQ HOSP IP/OBS HIGH 50: CPT | Performed by: INTERNAL MEDICINE

## 2025-04-09 PROCEDURE — 84100 ASSAY OF PHOSPHORUS: CPT | Performed by: INTERNAL MEDICINE

## 2025-04-09 PROCEDURE — 63710000001 INSULIN GLARGINE PER 5 UNITS: Performed by: NURSE PRACTITIONER

## 2025-04-09 PROCEDURE — 25010000002 MAGNESIUM SULFATE 2 GM/50ML SOLUTION: Performed by: NURSE PRACTITIONER

## 2025-04-09 PROCEDURE — 94664 DEMO&/EVAL PT USE INHALER: CPT

## 2025-04-09 PROCEDURE — 63710000001 INSULIN REGULAR HUMAN PER 5 UNITS: Performed by: STUDENT IN AN ORGANIZED HEALTH CARE EDUCATION/TRAINING PROGRAM

## 2025-04-09 PROCEDURE — 25010000002 AMIODARONE IN DEXTROSE 5% 360-4.14 MG/200ML-% SOLUTION: Performed by: INTERNAL MEDICINE

## 2025-04-09 PROCEDURE — 80048 BASIC METABOLIC PNL TOTAL CA: CPT | Performed by: INTERNAL MEDICINE

## 2025-04-09 PROCEDURE — 94799 UNLISTED PULMONARY SVC/PX: CPT

## 2025-04-09 PROCEDURE — 83735 ASSAY OF MAGNESIUM: CPT | Performed by: INTERNAL MEDICINE

## 2025-04-09 PROCEDURE — 83036 HEMOGLOBIN GLYCOSYLATED A1C: CPT | Performed by: INTERNAL MEDICINE

## 2025-04-09 PROCEDURE — 25010000002 MORPHINE PER 10 MG: Performed by: INTERNAL MEDICINE

## 2025-04-09 PROCEDURE — 25010000002 FAMOTIDINE 10 MG/ML SOLUTION: Performed by: NURSE PRACTITIONER

## 2025-04-09 PROCEDURE — 82948 REAGENT STRIP/BLOOD GLUCOSE: CPT

## 2025-04-09 PROCEDURE — 83605 ASSAY OF LACTIC ACID: CPT | Performed by: INTERNAL MEDICINE

## 2025-04-09 PROCEDURE — 25810000003 SODIUM CHLORIDE 0.9 % SOLUTION: Performed by: STUDENT IN AN ORGANIZED HEALTH CARE EDUCATION/TRAINING PROGRAM

## 2025-04-09 PROCEDURE — 94760 N-INVAS EAR/PLS OXIMETRY 1: CPT

## 2025-04-09 PROCEDURE — 85730 THROMBOPLASTIN TIME PARTIAL: CPT | Performed by: NURSE PRACTITIONER

## 2025-04-09 PROCEDURE — 93308 TTE F-UP OR LMTD: CPT

## 2025-04-09 PROCEDURE — 99291 CRITICAL CARE FIRST HOUR: CPT | Performed by: INTERNAL MEDICINE

## 2025-04-09 PROCEDURE — 93010 ELECTROCARDIOGRAM REPORT: CPT | Performed by: INTERNAL MEDICINE

## 2025-04-09 PROCEDURE — 85730 THROMBOPLASTIN TIME PARTIAL: CPT | Performed by: INTERNAL MEDICINE

## 2025-04-09 PROCEDURE — 82948 REAGENT STRIP/BLOOD GLUCOSE: CPT | Performed by: STUDENT IN AN ORGANIZED HEALTH CARE EDUCATION/TRAINING PROGRAM

## 2025-04-09 PROCEDURE — 99221 1ST HOSP IP/OBS SF/LOW 40: CPT | Performed by: SURGERY

## 2025-04-09 PROCEDURE — 25010000002 HEPARIN (PORCINE) 25000-0.45 UT/250ML-% SOLUTION: Performed by: INTERNAL MEDICINE

## 2025-04-09 RX ORDER — NICOTINE POLACRILEX 4 MG
15 LOZENGE BUCCAL
Status: DISCONTINUED | OUTPATIENT
Start: 2025-04-09 | End: 2025-04-09

## 2025-04-09 RX ORDER — DEXTROSE MONOHYDRATE 25 G/50ML
25 INJECTION, SOLUTION INTRAVENOUS
Status: DISCONTINUED | OUTPATIENT
Start: 2025-04-09 | End: 2025-04-09

## 2025-04-09 RX ORDER — POTASSIUM CHLORIDE 750 MG/1
40 CAPSULE, EXTENDED RELEASE ORAL ONCE
Status: COMPLETED | OUTPATIENT
Start: 2025-04-09 | End: 2025-04-09

## 2025-04-09 RX ORDER — FAMOTIDINE 10 MG/ML
20 INJECTION, SOLUTION INTRAVENOUS EVERY 12 HOURS SCHEDULED
Status: DISCONTINUED | OUTPATIENT
Start: 2025-04-09 | End: 2025-04-10 | Stop reason: HOSPADM

## 2025-04-09 RX ORDER — ASPIRIN 81 MG/1
81 TABLET ORAL DAILY
Status: DISCONTINUED | OUTPATIENT
Start: 2025-04-09 | End: 2025-04-10 | Stop reason: HOSPADM

## 2025-04-09 RX ORDER — ONDANSETRON 2 MG/ML
4 INJECTION INTRAMUSCULAR; INTRAVENOUS EVERY 6 HOURS PRN
Status: DISCONTINUED | OUTPATIENT
Start: 2025-04-09 | End: 2025-04-10 | Stop reason: HOSPADM

## 2025-04-09 RX ORDER — MORPHINE SULFATE 2 MG/ML
2 INJECTION, SOLUTION INTRAMUSCULAR; INTRAVENOUS ONCE
Status: COMPLETED | OUTPATIENT
Start: 2025-04-09 | End: 2025-04-09

## 2025-04-09 RX ORDER — OXYCODONE HYDROCHLORIDE 5 MG/1
5 TABLET ORAL EVERY 6 HOURS PRN
Refills: 0 | Status: DISCONTINUED | OUTPATIENT
Start: 2025-04-09 | End: 2025-04-10 | Stop reason: HOSPADM

## 2025-04-09 RX ORDER — MAGNESIUM SULFATE HEPTAHYDRATE 40 MG/ML
2 INJECTION, SOLUTION INTRAVENOUS ONCE
Status: COMPLETED | OUTPATIENT
Start: 2025-04-09 | End: 2025-04-09

## 2025-04-09 RX ORDER — NICOTINE POLACRILEX 4 MG
15 LOZENGE BUCCAL
Status: DISCONTINUED | OUTPATIENT
Start: 2025-04-09 | End: 2025-04-10 | Stop reason: HOSPADM

## 2025-04-09 RX ORDER — IBUPROFEN 600 MG/1
1 TABLET ORAL
Status: DISCONTINUED | OUTPATIENT
Start: 2025-04-09 | End: 2025-04-09

## 2025-04-09 RX ORDER — DEXTROSE MONOHYDRATE 25 G/50ML
25 INJECTION, SOLUTION INTRAVENOUS
Status: DISCONTINUED | OUTPATIENT
Start: 2025-04-09 | End: 2025-04-10 | Stop reason: HOSPADM

## 2025-04-09 RX ORDER — IBUPROFEN 600 MG/1
1 TABLET ORAL
Status: DISCONTINUED | OUTPATIENT
Start: 2025-04-09 | End: 2025-04-10 | Stop reason: HOSPADM

## 2025-04-09 RX ORDER — CARVEDILOL 6.25 MG/1
6.25 TABLET ORAL 2 TIMES DAILY
Status: DISCONTINUED | OUTPATIENT
Start: 2025-04-09 | End: 2025-04-10 | Stop reason: HOSPADM

## 2025-04-09 RX ADMIN — Medication 10 ML: at 20:36

## 2025-04-09 RX ADMIN — HEPARIN SODIUM 18 UNITS/KG/HR: 10000 INJECTION, SOLUTION INTRAVENOUS at 18:29

## 2025-04-09 RX ADMIN — MORPHINE SULFATE 2 MG: 2 INJECTION, SOLUTION INTRAMUSCULAR; INTRAVENOUS at 16:21

## 2025-04-09 RX ADMIN — HUMAN INSULIN 4 UNITS: 100 INJECTION, SOLUTION SUBCUTANEOUS at 11:53

## 2025-04-09 RX ADMIN — ARFORMOTEROL TARTRATE 15 MCG: 15 SOLUTION RESPIRATORY (INHALATION) at 19:39

## 2025-04-09 RX ADMIN — CARVEDILOL 3.12 MG: 3.12 TABLET, FILM COATED ORAL at 08:45

## 2025-04-09 RX ADMIN — ASPIRIN 81 MG: 81 TABLET, COATED ORAL at 12:06

## 2025-04-09 RX ADMIN — MAGNESIUM SULFATE HEPTAHYDRATE 2 G: 40 INJECTION, SOLUTION INTRAVENOUS at 08:45

## 2025-04-09 RX ADMIN — FAMOTIDINE 20 MG: 10 INJECTION INTRAVENOUS at 08:46

## 2025-04-09 RX ADMIN — CARVEDILOL 6.25 MG: 6.25 TABLET, FILM COATED ORAL at 11:53

## 2025-04-09 RX ADMIN — MUPIROCIN 1 APPLICATION: 20 OINTMENT TOPICAL at 20:35

## 2025-04-09 RX ADMIN — HUMAN INSULIN 3 UNITS: 100 INJECTION, SOLUTION SUBCUTANEOUS at 06:04

## 2025-04-09 RX ADMIN — POTASSIUM CHLORIDE 40 MEQ: 750 CAPSULE, EXTENDED RELEASE ORAL at 08:45

## 2025-04-09 RX ADMIN — ARFORMOTEROL TARTRATE 15 MCG: 15 SOLUTION RESPIRATORY (INHALATION) at 07:08

## 2025-04-09 RX ADMIN — HEPARIN SODIUM 12 UNITS/KG/HR: 10000 INJECTION, SOLUTION INTRAVENOUS at 00:17

## 2025-04-09 RX ADMIN — FAMOTIDINE 20 MG: 10 INJECTION INTRAVENOUS at 20:36

## 2025-04-09 RX ADMIN — AMIODARONE HYDROCHLORIDE 150 MG: 1.5 INJECTION, SOLUTION INTRAVENOUS at 06:42

## 2025-04-09 RX ADMIN — AMIODARONE HYDROCHLORIDE 0.5 MG/MIN: 1.8 INJECTION, SOLUTION INTRAVENOUS at 12:05

## 2025-04-09 RX ADMIN — HUMAN INSULIN 4 UNITS: 100 INJECTION, SOLUTION SUBCUTANEOUS at 01:18

## 2025-04-09 RX ADMIN — CARVEDILOL 6.25 MG: 6.25 TABLET, FILM COATED ORAL at 20:35

## 2025-04-09 RX ADMIN — INSULIN GLARGINE 10 UNITS: 100 INJECTION, SOLUTION SUBCUTANEOUS at 08:46

## 2025-04-09 RX ADMIN — HUMAN INSULIN 3 UNITS: 100 INJECTION, SOLUTION SUBCUTANEOUS at 17:37

## 2025-04-09 RX ADMIN — REVEFENACIN 175 MCG: 175 SOLUTION RESPIRATORY (INHALATION) at 07:08

## 2025-04-09 RX ADMIN — BUDESONIDE 0.5 MG: 0.5 SUSPENSION RESPIRATORY (INHALATION) at 07:08

## 2025-04-09 RX ADMIN — MUPIROCIN 1 APPLICATION: 20 OINTMENT TOPICAL at 08:46

## 2025-04-09 RX ADMIN — HUMAN INSULIN 3 UNITS: 100 INJECTION, SOLUTION SUBCUTANEOUS at 23:46

## 2025-04-09 RX ADMIN — ATORVASTATIN CALCIUM 40 MG: 40 TABLET, FILM COATED ORAL at 20:35

## 2025-04-09 RX ADMIN — ONDANSETRON 4 MG: 2 INJECTION INTRAMUSCULAR; INTRAVENOUS at 20:38

## 2025-04-09 RX ADMIN — FUROSEMIDE 20 MG: 20 TABLET ORAL at 08:46

## 2025-04-09 RX ADMIN — BUDESONIDE 0.5 MG: 0.5 SUSPENSION RESPIRATORY (INHALATION) at 19:39

## 2025-04-09 RX ADMIN — EMPAGLIFLOZIN 25 MG: 25 TABLET, FILM COATED ORAL at 08:47

## 2025-04-09 RX ADMIN — SODIUM CHLORIDE 250 ML: 900 INJECTION, SOLUTION INTRAVENOUS at 23:39

## 2025-04-09 RX ADMIN — AMIODARONE HYDROCHLORIDE 1 MG/MIN: 1.8 INJECTION, SOLUTION INTRAVENOUS at 06:48

## 2025-04-09 RX ADMIN — TICAGRELOR 90 MG: 90 TABLET ORAL at 06:04

## 2025-04-09 RX ADMIN — TICAGRELOR 90 MG: 90 TABLET ORAL at 20:35

## 2025-04-09 RX ADMIN — SULFUR HEXAFLUORIDE 5 ML: KIT at 11:13

## 2025-04-09 RX ADMIN — SPIRONOLACTONE 25 MG: 25 TABLET ORAL at 08:46

## 2025-04-09 RX ADMIN — GABAPENTIN 100 MG: 100 CAPSULE ORAL at 20:35

## 2025-04-09 NOTE — H&P
[facility]   CARDIOLOGY HISTORY AND PHYSICAL    Patient Name: Desiree Garcia  : 1960  MRN: [id number]  Primary Care Physician:  Sandra Kaba MD  Date of admission: 2025    Subjective   Subjective     Chief Complaint: Abdominal pain    HPI:  Patient is a 61-year-old female with history of CAD status post PCI to mid RCA in , hypertension, hyperlipidemia, type 2 diabetes, who presents to the ER with complaints of abdominal pain that started since yesterday.  Patient was found to have sinus tachycardia with EKG showing inferior STEMI with reciprocal changes in the lateral leads.  Urgent CT chest and abdomen was done which showed suspected renal and splenic infarct.  Patient was brought to the Cath Lab urgently given inferior STEMI.    Review of Systems  Positive for abdominal pain.    Personal History     Past Medical History:   Diagnosis Date    Arthritis     Cervical cancer screening     COPD (chronic obstructive pulmonary disease)     Coronary artery disease involving native heart without angina pectoris 2021    primary angioplasty and stent placement to mid right coronary artery with good angiographic results on 2021 after STEMI    Depression with anxiety     Diabetes mellitus     Forgetfulness     Gastric reflux     Hypertension     Leg paresthesia 2017    Right    Limb swelling     Lumbago 2017    Low back pain    Lumbar spinal stenosis 2017    L4/5 moderate to severe central canal stenosis    Lumbosacral radiculopathy 2017    Right    Migraines     Night sweat     Reflux esophagitis     Shortness of breath     ST elevation myocardial infarction (STEMI) (CMS/[facility]) 2021    Stomach disorder        Past Surgical History:   Procedure Laterality Date    ABDOMINAL SURGERY      3 C-SECTIONS    CARDIAC CATHETERIZATION      CARDIAC CATHETERIZATION N/A 2021    Procedure: Left Heart Cath;  Surgeon: Sidney Xiao MD;  Location[facility]  INVASIVE LOCATION;  Service: Cardiology;  Laterality: N/A;     SECTION      x 3    CHOLECYSTECTOMY      COLONOSCOPY      COLONOSCOPY N/A 3/25/2025    Procedure: COLONOSCOPY WITH BIOPSIES AND COLD AND HOT SNARE POLYPECTOMIES;  Surgeon: Heber Najera MD;  Location: [facility] ENDOSCOPY;  Service: Gastroenterology;  Laterality: N/A;  COLON POLYPS    CORONARY STENT PLACEMENT      ENDOSCOPY      2007    FOOT SURGERY Right     HAND SURGERY      HYSTERECTOMY  1985       Family History: family history includes Arthritis in her brother, father, mother, and sister; Breast cancer in her sister; Diabetes in her brother and father; Heart attack in her father; Heart disease in her brother and father; Lung cancer in her mother; Stroke in her brother, father, mother, and sister. Otherwise pertinent FHx was reviewed and not pertinent to current issue.    Social History:  reports that she has been smoking cigarettes. She started smoking about 58 years ago. She has a 58.2 pack-year smoking history. She has been exposed to tobacco smoke. She has never used smokeless tobacco. She reports that she does not drink alcohol and does not use drugs.    Home Medications:  Fluticasone-Umeclidin-Vilant, Vortioxetine HBr, albuterol, atorvastatin, carvedilol, clopidogrel, colestipol, empagliflozin, furosemide, gabapentin, metFORMIN, potassium chloride, and spironolactone    Allergies:  Allergies   Allergen Reactions    Tramadol Itching       Objective   Objective     Vitals:   Temp:  [98.2 °F (36.8 °C)-98.3 °F (36.8 °C)] 98.2 °F (36.8 °C)  Heart Rate:  [] 98  Resp:  [16-21] 21  BP: (154-164)/(92-96) 164/94  Physical Exam    Constitutional: Awake, alert, acute distress present.   Eyes: PERRLA, sclerae anicteric, no conjunctival injection   HENT: NCAT, mucous membranes moist   Neck: Supple, no thyromegaly, no lymphadenopathy, trachea midline, No JVD, No carotid bruit   Respiratory: Clear to auscultation bilaterally,  nonlabored respirations    Cardiovascular: RRR, no murmurs, rubs, or gallops appreciated, palpable pedal pulses bilaterally   Gastrointestinal: Abdomen is tender.   Musculoskeletal: No ankle edema, no clubbing or cyanosis to extremities   Psychiatric: Appropriate affect, cooperative   Neurologic: Oriented x 3, strength symmetric in all extremities, Cranial Nerves grossly intact, speech is clear   Skin: Warm, Dry, No rashes, No palor    Result Review    Result Review:  I have personally reviewed the results from the time of this admission to 4/8/2025 20:48 EDT and agree with these findings:  [x]  Laboratory  []  Microbiology  [x]  Radiology  [x]  EKG/Telemetry   [x]  Cardiology/Vascular   []  Pathology  []  Old records  []  Other:      ECG 12 Lead ED Triage Standing Order; Chest Pain   Preliminary Result   HEART JAIM=492  bpm   RR Yypmvzws=279  ms   AR Wapfdrab=214  ms   P Horizontal Axis=-45  deg   P Front Axis=56  deg   QRSD Hfmyzxzr=412  ms   QT Bcbawtdg=976  ms   PIiC=236  ms   QRS Axis=-21  deg   T Wave Axis=84  deg   - ABNORMAL ECG -   Sinus tachycardia   Left atrial enlargement   LVH with secondary repolarization abnormality   Inferoposterior infarct, acute (RCA)   Date and Time of Study:2025-04-08 17:23:18        Results for orders placed during the hospital encounter of 03/17/25    Adult Transthoracic Echo Complete w/ Color, Spectral and Contrast if necessary per protocol    Interpretation Summary    Left ventricular systolic function is low normal. Left ventricular ejection fraction appears to be 51 - 55%.    Left ventricular wall thickness is consistent with borderline concentric hypertrophy.    Left ventricular diastolic function is consistent with (grade I) impaired relaxation.    There are no hemodynamically significant valvular abnormalities.    Results for orders placed during the hospital encounter of 04/08/25    Cardiac Catheterization/Vascular Study    Conclusion  [facility]  CARDIAC  CATHETERIZATION PROCEDURE REPORT    Patient: Desiree Garcia  : 1960  MRN: [id number]  Procedure Date: 25    Referring Physician:  ER    Interventional Cardiologist:  James Verdugo MD, [facility]    Indication:  Inferior STEMI seen on EKG  Tachycardia  Abdominal pain    Clinical Presentation:  Patient is a 61-year-old female with history of CAD status post PCI to mid RCA in , hypertension, hyperlipidemia, type 2 diabetes, who presents to the ER with complaints of abdominal pain that started since yesterday.  Patient was found to have sinus tachycardia with EKG showing inferior STEMI with reciprocal changes in the lateral leads.  Urgent CT chest and abdomen was done which showed suspected renal and splenic infarct.  Patient was brought to the Cath Lab urgently given inferior STEMI.    Procedure performed:  Diagnostic Left Heart Catheterization  Selective Coronary Angiography  Left ventricle pressure measurement  Successful percutaneous coronary intervention to mid to distal RCA with drug-eluting stent x 2  Ultrasound-guided right radial artery access  IVUS of RCA  Mechanical aspiration thrombectomy using CAT Rx device of RCA  Moderate sedation      Access Site(s):  Right radial artery    Findings:  1. Coronary Artery Anatomy:  Dominance: Right coronary artery  Left Main: No significant stenosis  Left Anterior Descendin to 40% proximal LAD stenosis with diffuse luminal irregularities without significant obstructive lesion  Left Circumflex Artery: Luminal irregularities, distalmost left circumflex is 100% occluded, likely acute however small vessel size and territory.  Right Coronary Artery: Proximal RCA is 100% acute thrombotic occlusion was seen.    2. Hemodynamics:    The left ventricular end-diastolic pressure is 24 mmHg.  There was no gradient across aortic valve on pullback      3. Left Ventriculogram:  Not done given suspected intracardiac thrombus.    4. Percutaneous  Intervention:  Location: Proximal right coronary artery all the way to distal right coronary artery  Treatment: PTCA, penumbra aspiration thrombectomy, IVUS, PCI, post dilation  Pre-stenosis: 100%  Post-stenosis: 0%  Lesion Type C: Yes  ABILIO Flow Pre: 0  ABILIO Flow Post: 3, distalmost RCA bifurcation has ABILIO II flow secondary to thromboembolism.  Bifurcation: No  Severe Calcium: No  Dissection: No      Details of the procedure:  Informed consent was obtained with an explanation of the risks, benefits and alternatives of the procedure. The patient was brought to the Cardiac Catheterization Laboratory and was prepped and draped in a standard sterile fashion. Moderate sedation with Fentanyl and Versed was administered by the circulating nurse. Lidocaine 2% was used to anesthetize the right radial artery and a 6 Slender sheath was placed.    Tiger 5 Uzbek catheter was used to engage the right and left coronary arteries with diagnostic angiography obtained in all appropriate projections.  We then exchanged for an angled pigtail catheter and enter the left ventricle to measure hemodynamics and perform a left ventriculogram.  The catheter was then removed over a guidewire.    Details of angioplasty:    Patient was noted to have 100% acute thrombotic occlusion of the proximal RCA.  JR4 guide catheter was used to engage the right coronary artery followed by Runthrough wire to cross the lesion and parked distally.  3.0 mm NC balloon was used predilate.  Extensive thrombus burden was noted.  Penumbra CAT Rx was used for 2 passes.  A decent size clot was retrieved.  Significant episode mid to distal RCA had clot burden.  Further predilation was done with a 2.5 and 3.0 mm NC balloon.  We deployed 2.75 X28 and 3.5X 23 mm drug-eluting stent in the mid to distal RCA and postdilated with 3.0 and 3.5 mm balloon and high-pressure.  The right PDA had thromboembolism, for which PTCA was done in the ostial part of right PDA with 2.0 mm  NC balloon.  Adenosine was used.  Final angiographic images showed ABILIO II-III flow.    Heparin was used for anticoagulation throughout the procedure with frequent checking of ACT.  Patient was already on aspirin.  Brilinta 180 mg was given.  Cangrelor drip was used..  At the end of the procedure, the radial artery sheath was removed and TR band was applied for hemostasis.  Patient tolerated procedure well without any complications.  The results of the test were explained in detail to the patient.      Complications:  None.    Estimated Blood Loss:  Minimal.      Conclusions:  Patient with extensive clot burden causing 100% proximal to distal RCA thrombosis, status post mechanical aspiration thrombectomy, IVUS and PTCA/PCI with drug-eluting stent x 2 with 2.75 X28 and 3.5X 23 mm drug-eluting stent in the mid to distal RCA resulting in ABILIO II-III flow.  Distalmost left circumflex with 100% occlusion, small territory and a small vessel.  Appears to be thrombotic in nature as well.  No aortic stenosis on pullback gradient  Review of the CT scan chest and abdomen shows infarct of renal artery, splenic artery, concerning for thrombotic showering, need to rule out cardiac cause of thromboembolism.  Patient was supposed to be on aspirin and Plavix, suspected medical noncompliance    Recommendations:  Aspirin 81 and Brilinta 90 mg twice daily uninterrupted for at least 1 year.  High intensity statin, beta-blocker, ACE/ARB as tolerated.  Aggressive risk factor management with aggressive diabetes and blood pressure control.  Patient with extensive clot burden, resume heparin without bolus 2 hours after TR band removal.  Heparin to be continued overnight.  Patient with renal and splenic infarct.  Continue monitoring for now.  Will obtain echocardiogram.    Procedure Status:  Emergent    Electronically signed by James Verdugo MD, 04/08/25, 8:20 PM EDT.      Assessment & Plan   Assessment / Plan     Brief Patient  Summary:  Desiree Garcia is a 65 y.o. female who inferior STEMI and acute abdomen    Active Hospital Problems:  Active Hospital Problems    Diagnosis     **STEMI (ST elevation myocardial infarction)     Mixed hyperlipidemia     Hypertension, essential     Type 2 diabetes mellitus, without long-term current use of insulin      Patient with extensive clot burden causing 100% proximal to distal RCA thrombosis, status post mechanical aspiration thrombectomy, IVUS and PTCA/PCI with drug-eluting stent x 2 with 2.75 X28 and 3.5X 23 mm drug-eluting stent in the mid to distal RCA resulting in ABILIO II-III flow.  Distalmost left circumflex with 100% occlusion, small territory and a small vessel.  Appears to be thrombotic in nature as well.  No aortic stenosis on pullback gradient  Review of the CT scan chest and abdomen shows infarct of renal artery, splenic artery, concerning for thrombotic showering, need to rule out cardiac cause of thromboembolism.  Patient was supposed to be on aspirin and Plavix, suspected medical noncompliance    Plan:  Aspirin 81 and Brilinta 90 mg twice daily uninterrupted for at least 1 year.  High intensity statin, beta-blocker, ACE/ARB as tolerated.  Aggressive risk factor management with aggressive diabetes and blood pressure control.  Patient with extensive clot burden, resume heparin without bolus 2 hours after TR band removal.  Heparin to be continued overnight.  Patient with renal and splenic infarct.  Continue monitoring for now.  Will obtain echocardiogram.    CODE STATUS:      Full    Admission Status:  I believe this patient meets inpatient status    James Verdugo MD

## 2025-04-09 NOTE — PLAN OF CARE
Goal Outcome Evaluation:            Remains on heparin lo dose with no signs of bleeding TR band of last night at 2300 . VSS better .Panta and Eulogio aware of absent pulses to left foot.

## 2025-04-09 NOTE — CONSULTS
[facility]   VASCULAR SURGERY CONSULT    Patient Name: Desiree Garcia  : 1960  MRN: [id number]  Primary Care Physician:  Sandra Kaba MD  Date of admission: 2025    Subjective   Subjective     Chief Complaint: Left SFA occlusion, distal left renal artery occlusion, distal splenic artery occlusion    HPI:    Desiree Garcia is a 65 y.o. female who had not been feeling well for about 3 or 4 days.  When her son came to see her yesterday told her that she needed to come to the emergency room for further evaluation.  She was identified to be in a STEMI and underwent coronary intervention.  It was also identified that there were some areas of occlusive disease presumably thromboembolic events.  She is on heparin.  I have been asked to evaluate from the vascular standpoint.  She denies any foot or leg pain at this time.  She does indicate that she has been having trouble with her legs for months and she has pain when walking.    Review of Systems    Noncontributory except for the history of present illness.    Personal History     Past Medical History:   Diagnosis Date    Arthritis     Cervical cancer screening     COPD (chronic obstructive pulmonary disease)     Coronary artery disease involving native heart without angina pectoris 2021    primary angioplasty and stent placement to mid right coronary artery with good angiographic results on 2021 after STEMI    Depression with anxiety     Diabetes mellitus     Forgetfulness     Gastric reflux     Hypertension     Leg paresthesia 2017    Right    Limb swelling     Lumbago 2017    Low back pain    Lumbar spinal stenosis 2017    L4/5 moderate to severe central canal stenosis    Lumbosacral radiculopathy 2017    Right    Migraines     Night sweat     Reflux esophagitis     Shortness of breath     ST elevation myocardial infarction (STEMI) (CMS/[facility]) 2021    Stomach disorder        Past Surgical History:    Procedure Laterality Date    ABDOMINAL SURGERY      3 C-SECTIONS    CARDIAC CATHETERIZATION      CARDIAC CATHETERIZATION N/A 2021    Procedure: Left Heart Cath;  Surgeon: Sindey Xiao MD;  Location: [facility] CATH INVASIVE LOCATION;  Service: Cardiology;  Laterality: N/A;    CARDIAC CATHETERIZATION N/A 2025    Procedure: Left Heart Cath;  Surgeon: James Verdugo MD;  Location: [facility] CATH INVASIVE LOCATION;  Service: Cardiology;  Laterality: N/A;     SECTION      x 3    CHOLECYSTECTOMY      COLONOSCOPY      COLONOSCOPY N/A 3/25/2025    Procedure: COLONOSCOPY WITH BIOPSIES AND COLD AND HOT SNARE POLYPECTOMIES;  Surgeon: Heber Najera MD;  Location: [facility] ENDOSCOPY;  Service: Gastroenterology;  Laterality: N/A;  COLON POLYPS    CORONARY STENT PLACEMENT      ENDOSCOPY      2007    FOOT SURGERY Right     HAND SURGERY      HYSTERECTOMY  1985       Family History: family history includes Arthritis in her brother, father, mother, and sister; Breast cancer in her sister; Diabetes in her brother and father; Heart attack in her father; Heart disease in her brother and father; Lung cancer in her mother; Stroke in her brother, father, mother, and sister. Otherwise pertinent FHx was reviewed and not pertinent to current issue.    Social History:  reports that she has been smoking cigarettes. She started smoking about 58 years ago. She has a 58.2 pack-year smoking history. She has been exposed to tobacco smoke. She has never used smokeless tobacco. She reports that she does not drink alcohol and does not use drugs.    Home Medications:  Fluticasone-Umeclidin-Vilant, Vortioxetine HBr, albuterol, atorvastatin, carvedilol, clopidogrel, colestipol, empagliflozin, furosemide, gabapentin, metFORMIN, potassium chloride, and spironolactone      Allergies:  Allergies   Allergen Reactions    Tramadol Itching       Objective   Objective     Vitals:   Temp:  [98.2 °F (36.8 °C)-99.3 °F (37.4 °C)]  99.3 °F (37.4 °C)  Heart Rate:  [] 82  Resp:  [16-26] 17  BP: (102-170)/() 102/66  Flow (L/min) (Oxygen Therapy):  [4] 4    Physical Exam    General: Awake, alert, NAD   Eyes:  ASIA   Neck: Supple   Lungs: Clear   Heart: RRR   Abdomen: benign   Musculoskeletal:  normal motor tone, symmetric  Extremities: Slightly pale bilaterally but grossly normal temperature and motor function.   Skin: warm, normal turgor   Neuro: strength 5/5 all extremities   Pulses: Diminished bilateral pedal pulses.     Diagnostic studies: A CT angiogram of the abdomen and pelvis dated 4/8/2025 has been reviewed.  The left SFA is noted to be occluded in the visualized portions of the left thigh.  There is also occlusion of the distal left renal artery and the distal splenic artery.  Large areas of infarction in the left kidney as well as in the spleen.    Assessment & Plan   Assessment / Plan     Active Hospital Problems:  Active Hospital Problems    Diagnosis     **STEMI (ST elevation myocardial infarction)     Mixed hyperlipidemia     Hypertension, essential     Type 2 diabetes mellitus, without long-term current use of insulin        Assessment/plan:   Mrs. Garcia presents with findings concerning for thromboembolic events.  No immediate limb threatening ischemia at this time.  No indication for intervention regarding the left kidney or spleen.  Agree with anticoagulation.  Will plan on further evaluation and intervention as an outpatient once recovered from her acute event.  Recall as needed.        Electronically signed by Payam Tamez MD, 04/09/25, 11:32 AM EDT.

## 2025-04-09 NOTE — SIGNIFICANT NOTE
04/09/25 1018   Coping/Psychosocial   Observed Emotional State calm   Verbalized Emotional State anxiety   Trust Relationship/Rapport empathic listening provided   Involvement in Care interacting with patient   Additional Documentation Spiritual Care (Group)   Spiritual Care   Spiritual Care Visit Type initial   Spiritual Care Source  initiative   Receptivity to Spiritual Care visit welcomed   Spiritual Care Interventions supportive conversation provided;prayer support provided   Response to Spiritual Care receptive of support;engaged in conversation;thanks expressed   Use of Spiritual Resources prayer;spirituality for coping, indicated strong use of   Spiritual Care Follow-Up follow-up, none required as presently assessed     Length of visit: 11 min

## 2025-04-09 NOTE — PROGRESS NOTES
"INTERVENTIONAL CARDIOLOGY  INPATIENT PROGRESS NOTE        PATIENT IDENTIFICATION:  Name:  Desiree Garcia        MRN:  [id number]  65 y.o.  female            Chief Complain:   Abdominal pain    SUBJECTIVE:   Patient is doing better.  Patient had an episode of nonsustained V. tach that was 29 beats, subsequently started on amiodarone drip.  Denies chest pain.    OBJECTIVE:  Vitals:    04/09/25 1154 04/09/25 1200 04/09/25 1300 04/09/25 1310   BP:  104/60 115/77 121/79   BP Location:  Left arm Left arm    Patient Position:  Lying Lying    Pulse: 78 80 81 80   Resp: 18 20 22    Temp: 98.5 °F (36.9 °C)      TempSrc: Oral      SpO2: 95% 91% (!) 89% 95%   Weight: 69.1 kg (152 lb 5.4 oz)      Height: 167.6 cm (66\")              Body mass index is 24.59 kg/m².    Intake/Output Summary (Last 24 hours) at 4/9/2025 1357  Last data filed at 4/9/2025 0600  Gross per 24 hour   Intake 147.5 ml   Output 595 ml   Net -447.5 ml         Physical Exam  General : Alert, awake, no acute distress  Neck : No jugular venous distention  CVS : Regular rate and rhythm, no murmur  Lungs: Clear to auscultation bilaterally, no crackles or rhonchi  Abdomen: Soft, nontender  Extremities: Warm, well-perfused, no pretibial edema        Allergies   Allergen Reactions    Tramadol Itching     Scheduled meds:  arformoterol, 15 mcg, Nebulization, BID - RT   And  budesonide, 0.5 mg, Nebulization, BID - RT   And  revefenacin, 175 mcg, Nebulization, Daily - RT  aspirin, 81 mg, Oral, Daily  atorvastatin, 40 mg, Oral, Nightly  carvedilol, 6.25 mg, Oral, BID  empagliflozin, 25 mg, Oral, Daily  famotidine, 20 mg, Intravenous, Q12H  furosemide, 20 mg, Oral, Daily  gabapentin, 100 mg, Oral, Nightly  insulin glargine, 10 Units, Subcutaneous, Daily  insulin regular, 2-7 Units, Subcutaneous, Q6H  mupirocin, 1 Application, Each Nare, BID  sodium chloride, 1,000 mL, Intravenous, Once  spironolactone, 25 mg, Oral, Daily  ticagrelor, 90 mg, Oral, BID      IV meds:     " "                 amiodarone, 0.5 mg/min, Last Rate: 0.5 mg/min (04/09/25 1312)  heparin, 12 Units/kg/hr, Last Rate: 15 Units/kg/hr (04/09/25 0728)        Data Review:  CBC          3/20/2025    05:07 4/8/2025    17:28 4/9/2025    05:59   CBC   WBC 15.69  21.93  21.49    RBC 5.19  5.43  5.30    Hemoglobin 14.6  15.4  14.8    Hematocrit 44.9  46.3  45.0    MCV 86.5  85.3  84.9    MCH 28.1  28.4  27.9    MCHC 32.5  33.3  32.9    RDW 13.6  13.7  13.9    Platelets 351  325  296      CMP          4/1/2025    16:45 4/8/2025    17:28 4/9/2025    06:02   CMP   Glucose 167  294  248    BUN 6  9  10    Creatinine 0.96  0.74  0.80    EGFR 66.2  90.5  82.4    Sodium 137  131  131    Potassium 3.6  3.7  3.6    Chloride 97  90  92    Calcium 10.2  10.1  9.7    Total Protein  8.3     Albumin  4.0     Globulin  4.3     Total Bilirubin  1.1     Alkaline Phosphatase  159     AST (SGOT)  45     ALT (SGPT)  16     Albumin/Globulin Ratio  0.9     BUN/Creatinine Ratio 6.3  12.2  12.5    Anion Gap 13.8  15.2  12.3       CARDIAC LABS:      Lab 04/08/25 2034 04/08/25  1728   PROBNP  --  15,432.0*   HSTROP T 971* 835*        No results found for: \"DIGOXIN\"   Lab Results   Component Value Date    TSH 4.000 08/08/2024     Results from last 7 days   Lab Units 04/09/25  0602   CHOLESTEROL mg/dL 147   HDL CHOL mg/dL 42     Lab Results   Component Value Date    POCTROP 0.21 06/21/2021     Lab Results   Component Value Date    TROPONINT 971 (C) 04/08/2025   (  Lab Results   Component Value Date    MG 1.9 04/09/2025     Results for orders placed during the hospital encounter of 03/17/25    Adult Transthoracic Echo Complete w/ Color, Spectral and Contrast if necessary per protocol    Interpretation Summary    Left ventricular systolic function is low normal. Left ventricular ejection fraction appears to be 51 - 55%.    Left ventricular wall thickness is consistent with borderline concentric hypertrophy.    Left ventricular diastolic function is " consistent with (grade I) impaired relaxation.    There are no hemodynamically significant valvular abnormalities.           ASSESSMENT:    STEMI (ST elevation myocardial infarction)    Type 2 diabetes mellitus, without long-term current use of insulin    Hypertension, essential    Mixed hyperlipidemia        RCA clot on aspiration thrombectomy    PLAN:  - Patient status post primary PCI of RCA.  Patient had extensive clot burden in the proximal to distal RCA status post mechanical aspiration thrombectomy of proximal to mid RCA.  Distal RCA had thromboembolic events status post PCI x 2 of mid to distal RCA.  Continue amiodarone drip for at least 24 hours.  Will switch to oral amiodarone tomorrow.  Vascular surgery was consulted given thromboembolic events causing left SFA occlusion, renal and splenic infarcts.  Noted no plans for immediate interventions.  Appreciate internal medicine for taking over as primary.  - Echocardiogram was done which showed ejection fraction of 36 to 40%, no LV thrombus was found on contrast study.  - Continue heparin drip for now given extensive thromboembolic phenomenon.  Patient will benefit from being on anticoagulation on discharge, likely Eliquis 5 mg twice daily.  For now, continue triple anticoagulation with aspirin, Brilinta and heparin drip.  - Noted persistent ST elevation in inferior leads on EKG, Q waves present.  ST elevation is secondary to completed infarct rather than a new infarct, patient without chest pain, vitals are stable, this needs no new coronary intervention.  Continue monitoring.  Discussed with nursing staff.  Noted reciprocal changes are improving in lateral leads.  - Continue monitoring in ICU.    Will follow-up        James Verdugo MD, FACC  4/9/2025    13:57 EDT     I spent 40 minutes caring for this patient on this date of service. This time includes time spent by me in the following activities:preparing for the visit, reviewing tests, obtaining and/or  reviewing a separately obtained history, performing a medically appropriate examination and/or evaluation , counseling and educating the patient/family/caregiver, ordering medications, tests, or procedures, referring and communicating with other health care professionals , documenting information in the medical record, independently interpreting results and communicating that information with the patient/family/caregiver, and care coordination. The patient was seen and examined. Work by the provider also included review and/or ordering of lab tests, review and/or ordering of radiology tests, review and/or ordering of medicine tests, discussion with other physicians or providers, independent review of data, obtaining old records, review/summation of old records, and/or other review.

## 2025-04-09 NOTE — NURSING NOTE
TR band  off with no bleeding at insertion site .Bruising and a small hematoma formed above the TR band before admission to ICU. Pt much more settled now .Ate small snack but continues to complain of abdominal pain

## 2025-04-09 NOTE — H&P
[facility] HISTORY AND PHYSICAL  Date: 2025   Patient Name: Desiree Garcia  : 1960  MRN: [id number]  Primary Care Physician:  Sandra Kaba MD  Date of admission: 2025    Subjective   Subjective     Chief Complaint: Upper abdominal pain that started yesterday    HPI:    Desiree Garcia is a 65 y.o. female  with a past medical history of CAD s/p stent in  to RCA, COPD, hypertension, hyperlipidemia, type 2 diabetes mellitus, severe peripheral vascular disease presented to the ED for evaluation of abdominal pain earlier today.  EKG showed ST elevations in lead II, 3, aVF and patient underwent emergent cardiac cath by Dr. Verdugo and noted to have distal RCA thrombosis s/p mechanical aspiration thrombectomy, PCI with drug-eluting stent x 2.  Cardiac echo pending.  Labs significant for troponin 835, repeat 971, proBNP 32068, rest of the CMP with no significant findings, normal lactic acid, lipase 73, procalcitonin 0.13, WBC elevated 21.93, normal hemoglobin, platelets.  Blood cultures in process.  CTA chest abdomen pelvis showed complete occlusion of the left superficial femoral artery, distal aspect of the left renal artery, distal aspect of the splenic artery.  Areas of hypoenhancement noted within the spleen most significantly anteriorly, concerning for splenic infarcts.  Hypoenhancement in the inferior aspect of the left kidney consistent with the renal infarct.  Complete occlusion of the ONEIDA with reconstitution distally.  Multiple indeterminate pulmonary nodules the largest 1 is solid and measures 6 mm.  Recommended repeat CT scan in 3 months.  Patient is currently on heparin infusion and Brilinta.    Personal History     Past Medical History:   Diagnosis Date    Arthritis     Cervical cancer screening     COPD (chronic obstructive pulmonary disease)     Coronary artery disease involving native heart without angina pectoris 2021    primary angioplasty and stent  placement to mid right coronary artery with good angiographic results on 2021 after STEMI    Depression with anxiety     Diabetes mellitus     Forgetfulness     Gastric reflux     Hypertension     Leg paresthesia 2017    Right    Limb swelling     Lumbago 2017    Low back pain    Lumbar spinal stenosis 2017    L4/5 moderate to severe central canal stenosis    Lumbosacral radiculopathy 2017    Right    Migraines     Night sweat     Reflux esophagitis     Shortness of breath     ST elevation myocardial infarction (STEMI) (CMS/[facility]) 2021    Stomach disorder          Past Surgical History:   Procedure Laterality Date    ABDOMINAL SURGERY      3 C-SECTIONS    CARDIAC CATHETERIZATION      CARDIAC CATHETERIZATION N/A 2021    Procedure: Left Heart Cath;  Surgeon: Sidney Xiao MD;  Location: [facility] CATH INVASIVE LOCATION;  Service: Cardiology;  Laterality: N/A;     SECTION      x 3    CHOLECYSTECTOMY      COLONOSCOPY      COLONOSCOPY N/A 3/25/2025    Procedure: COLONOSCOPY WITH BIOPSIES AND COLD AND HOT SNARE POLYPECTOMIES;  Surgeon: Heber Najera MD;  Location: [facility] ENDOSCOPY;  Service: Gastroenterology;  Laterality: N/A;  COLON POLYPS    CORONARY STENT PLACEMENT      ENDOSCOPY      2007    FOOT SURGERY Right     HAND SURGERY      HYSTERECTOMY  1985         Family History   Problem Relation Age of Onset    Stroke Mother     Lung cancer Mother         Unspecified    Arthritis Mother     Stroke Father     Heart disease Father     Diabetes Father         Unspecified type    Arthritis Father     Heart attack Father     Stroke Sister     Arthritis Sister     Breast cancer Sister     Stroke Brother     Heart disease Brother     Diabetes Brother         Unspecified type    Arthritis Brother     Colon cancer Neg Hx          Social History     Socioeconomic History    Marital status: Legally    Tobacco Use    Smoking status: Every Day     Current  packs/day: 1.00     Average packs/day: 1 pack/day for 58.2 years (58.2 ttl pk-yrs)     Types: Cigarettes     Start date: 1/14/1967     Passive exposure: Current    Smokeless tobacco: Never    Tobacco comments:     Smoked 21-30 years   Vaping Use    Vaping status: Never Used   Substance and Sexual Activity    Alcohol use: Never     Comment: Does not drink    Drug use: Never    Sexual activity: Defer         Home Medications:  Fluticasone-Umeclidin-Vilant, Vortioxetine HBr, albuterol, atorvastatin, carvedilol, clopidogrel, colestipol, empagliflozin, furosemide, gabapentin, metFORMIN, potassium chloride, and spironolactone    Allergies:  Allergies   Allergen Reactions    Tramadol Itching       Objective   Objective     Vitals:   Temp:  [98.2 °F (36.8 °C)-99 °F (37.2 °C)] 99 °F (37.2 °C)  Heart Rate:  [] 95  Resp:  [16-26] 26  BP: (122-170)/() 122/79  Flow (L/min) (Oxygen Therapy):  [4] 4    Physical Exam    Constitutional: Awake, alert, no acute distress   Eyes: Pupils equal, sclerae anicteric, no conjunctival injection   HENT: NCAT, mucous membranes dry   Respiratory: Clear to auscultation bilaterally, nonlabored respirations    Cardiovascular: RRR, no murmurs, absent pedal pulses in the left foot, positive popliteal pulse on the left, diminished pedal pulses on the right foot   Gastrointestinal: soft, nontender, nondistended   Musculoskeletal: No bilateral ankle edema, no tenderness to palpation of the left foot, not cold to touch, able to move the foot   Neurologic: Oriented x 3, speech clear    Result Review    Result Review:  I have personally reviewed the results from the time of this admission to 4/9/2025 00:51 EDT and agree with these findings:  [x]  Laboratory  []  Microbiology  [x]  Radiology  [x]  EKG/Telemetry   []  Cardiology/Vascular   []  Pathology  []  Old records  []  Other:        Imaging Results (Last 24 Hours)       Procedure Component Value Units Date/Time    CT Angiogram Abdomen Pelvis  [[id number]] Collected: 04/08/25 1854     Updated: 04/08/25 1913    Narrative:      CT ANGIOGRAM ABDOMEN PELVIS, CT ANGIOGRAM CHEST    Date of Exam: 4/8/2025 5:39 PM EDT    Indication: Chest pain abdominal pain.    Comparison: CT abdomen and pelvis 10/6/2024    Technique: CTA of the chest, abdomen and pelvis was performed after the uneventful intravenous administration of iodinated contrast. Reconstructed coronal and sagittal images were also obtained. In addition, a 3-D volume rendered image was created for   interpretation. Automated exposure control and iterative reconstruction methods were used.      Findings:  Vascular: The visualized supra aortic great vessels are normal. The thoracic aorta is normal in caliber without evidence of dissection or penetrating ulcer.  The visualized pulmonary arteries are patent.  The thoracic aorta is unremarkable. The celiac axis, the SMA and proximal bilateral renal arteries are patent.  There is occlusion of the distal aspect of the left renal artery. There is also occlusion of the very distal aspect of the splenic artery.  Complete occlusion of the ONEIDA with reconstitution distally.  The bilateral common iliac, external iliac and common femoral arteries are patent. There is evidence of complete occlusion of the distal branches of the left internal iliac artery. The right internal iliac artery is patent.  The bilateral profunda femoris are patent. The right superficial femoral artery is patent.  There is complete occlusion of the left superficial femoral artery.    Nonvascular:  Lungs and Pleura: Scattered pulmonary nodules, the largest of which measures 0.6 cm (image 105 of series 3). Otherwise the lungs are clear. The central airways are patent. No pleural effusion or pneumothorax    Mediastinum/Hanna: No lymphadenopathy. No suspicious mass.    Heart: Normal in size. No pericardial effusion. Normal RV/LV ratio.    Liver: Liver is normal in size and CT density. No focal  lesions.    Gallbladder: Status post cholecystectomy    Bile Ducts: No billiary dcutal dilation.    Spleen: Areas of hypoenhancement noted within the spleen, most significantly anteriorly, concerning for splenic infarcts.    Pancreas: Pancreas is normal. There is no evidence of pancreatic mass or peripancreatic fluid.    Adrenals: Adrenal glands are unremarkable.    Kidneys: Hypoenhancement of the inferior aspect of the left kidney, consistent with renal infarct. The right kidney is normal..    Gastrointestinal: Multiple prominent fluid-filled loops of small bowel noted throughout the abdomen. No significant distention to suggest bowel obstruction. Otherwise unremarkable. Normal appendix.    Bladder: The bladder is normal.    Pelvis:  No suspecious mass.    Peritoneum/Mesentery: No fluid collection, ascities, or free air.      Lymph Nodes: No lymphadenopathy.    Vasculature: Unremarkable    Soft tissue:: Unremarkable    Bony Structures: No acute osseous abnormality. Chronic deformity of the sternum. Degenerative changes noted throughout the spine.              Impression:      Impression:  1.Complete occlusion of the left superficial femoral artery.  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  4.Areas of hypoenhancement noted within the spleen, most significantly anteriorly, concerning for splenic infarcts.  5.Hypoenhancement of the inferior aspect of the left kidney, consistent with renal infarct.  6.Complete occlusion of the ONEIDA with reconstitution distally.  7.Multiple prominent fluid-filled loops of small bowel noted throughout the abdomen. No significant distention to suggest bowel obstruction.  8.Scattered pulmonary nodules, the largest of which measures 0.6 cm.    Multiple indeterminate pulmonary nodules. The largest one is solid and measures 6 mm. For multiple nodules, with the most suspicious nodule being solid and  measuring 6-8 mm, recommend CT at 3-6 months. Then, for a low-risk patient, consider CT at 18-24   months. For a high-risk patient, if the nodule is stable at 3-6 months, recommend CT at 18-24 months. Follow-up intervals may vary according to size and risk.        Electronically Signed: Julian Rosenthal DO    4/8/2025 7:11 PM EDT    Workstation ID: [id number]    CT Angiogram Chest [[id number]] Collected: 04/08/25 1854     Updated: 04/08/25 1913    Narrative:      CT ANGIOGRAM ABDOMEN PELVIS, CT ANGIOGRAM CHEST    Date of Exam: 4/8/2025 5:39 PM EDT    Indication: Chest pain abdominal pain.    Comparison: CT abdomen and pelvis 10/6/2024    Technique: CTA of the chest, abdomen and pelvis was performed after the uneventful intravenous administration of iodinated contrast. Reconstructed coronal and sagittal images were also obtained. In addition, a 3-D volume rendered image was created for   interpretation. Automated exposure control and iterative reconstruction methods were used.      Findings:  Vascular: The visualized supra aortic great vessels are normal. The thoracic aorta is normal in caliber without evidence of dissection or penetrating ulcer.  The visualized pulmonary arteries are patent.  The thoracic aorta is unremarkable. The celiac axis, the SMA and proximal bilateral renal arteries are patent.  There is occlusion of the distal aspect of the left renal artery. There is also occlusion of the very distal aspect of the splenic artery.  Complete occlusion of the ONEIDA with reconstitution distally.  The bilateral common iliac, external iliac and common femoral arteries are patent. There is evidence of complete occlusion of the distal branches of the left internal iliac artery. The right internal iliac artery is patent.  The bilateral profunda femoris are patent. The right superficial femoral artery is patent.  There is complete occlusion of the left superficial femoral artery.    Nonvascular:  Lungs and Pleura:  Scattered pulmonary nodules, the largest of which measures 0.6 cm (image 105 of series 3). Otherwise the lungs are clear. The central airways are patent. No pleural effusion or pneumothorax    Mediastinum/Hanna: No lymphadenopathy. No suspicious mass.    Heart: Normal in size. No pericardial effusion. Normal RV/LV ratio.    Liver: Liver is normal in size and CT density. No focal lesions.    Gallbladder: Status post cholecystectomy    Bile Ducts: No billiary dcutal dilation.    Spleen: Areas of hypoenhancement noted within the spleen, most significantly anteriorly, concerning for splenic infarcts.    Pancreas: Pancreas is normal. There is no evidence of pancreatic mass or peripancreatic fluid.    Adrenals: Adrenal glands are unremarkable.    Kidneys: Hypoenhancement of the inferior aspect of the left kidney, consistent with renal infarct. The right kidney is normal..    Gastrointestinal: Multiple prominent fluid-filled loops of small bowel noted throughout the abdomen. No significant distention to suggest bowel obstruction. Otherwise unremarkable. Normal appendix.    Bladder: The bladder is normal.    Pelvis:  No suspecious mass.    Peritoneum/Mesentery: No fluid collection, ascities, or free air.      Lymph Nodes: No lymphadenopathy.    Vasculature: Unremarkable    Soft tissue:: Unremarkable    Bony Structures: No acute osseous abnormality. Chronic deformity of the sternum. Degenerative changes noted throughout the spine.              Impression:      Impression:  1.Complete occlusion of the left superficial femoral artery.  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  4.Areas of hypoenhancement noted within the spleen, most significantly anteriorly, concerning for splenic infarcts.  5.Hypoenhancement of the inferior aspect of the left kidney, consistent with renal infarct.  6.Complete occlusion of the ONEIDA with reconstitution  distally.  7.Multiple prominent fluid-filled loops of small bowel noted throughout the abdomen. No significant distention to suggest bowel obstruction.  8.Scattered pulmonary nodules, the largest of which measures 0.6 cm.    Multiple indeterminate pulmonary nodules. The largest one is solid and measures 6 mm. For multiple nodules, with the most suspicious nodule being solid and measuring 6-8 mm, recommend CT at 3-6 months. Then, for a low-risk patient, consider CT at 18-24   months. For a high-risk patient, if the nodule is stable at 3-6 months, recommend CT at 18-24 months. Follow-up intervals may vary according to size and risk.        Electronically Signed: Julian Rosenthal DO    4/8/2025 7:11 PM EDT    Workstation ID: [id number]             arformoterol, 15 mcg, Nebulization, BID - RT   And  budesonide, 0.5 mg, Nebulization, BID - RT   And  revefenacin, 175 mcg, Nebulization, Daily - RT  atorvastatin, 40 mg, Oral, Nightly  carvedilol, 3.125 mg, Oral, BID  empagliflozin, 25 mg, Oral, Daily  furosemide, 20 mg, Oral, Daily  gabapentin, 100 mg, Oral, Nightly  insulin regular, 2-7 Units, Subcutaneous, Q6H  sodium chloride, 1,000 mL, Intravenous, Once  spironolactone, 25 mg, Oral, Daily  ticagrelor, 90 mg, Oral, BID         Assessment & Plan   Assessment / Plan     Assessment/Plan:   Inferior STEMI status post PCI with JOE x 2 on 4/8/2025  Complete occlusion of the distal branches of the left internal iliac artery   Complete occlusion of the left superficial femoral artery  Complete occlusion of the distal aspect of the left renal artery  Complete occlusion of the distal aspect of the splenic artery  Concern for splenic infarcts  Hypoenhancement of the inferior aspect of the left kidney consistent with renal infract  Multiple bilateral pulmonary nodules  COPD  History of CAD status post stent in 2021  Hypertension  Hyperlipidemia  Type 2 diabetes mellitus  PAD    Plan  Admitted to ICU  Intensivist consult in the  a.m.  Cardiology following Dr. Verdugo  Cardiac echo  Continue heparin infusion  Continue Brilinta  Continue Jardiance, Lasix, spironolactone  N.p.o. after midnight except sips with meds  Vascular surgery consult in the a.m. Dr. Joi Mckinney, Pulmicort, revefenacin  Bronchodilator protocol  Albuterol every 6 hours as needed  Will defer to pulmonology/intensivist regarding the further investigation for bilateral pulmonary nodules noted on CT scan    VTE Prophylaxis:  Pharmacologic VTE prophylaxis orders are present.    CODE STATUS:    Code Status (Patient has no pulse and is not breathing): CPR (Attempt to Resuscitate)  Medical Interventions (Patient has pulse or is breathing): Full Support  Level Of Support Discussed With: Patient      Admission Status:  I believe this patient meets inpatient status.    Part of this note may be an electronic transcription/translation of spoken language to printed text using the Dragon Dictation System    Roya Mckeon MD

## 2025-04-09 NOTE — CONSULTS
Pulmonary / Critical Care Consult Note      Patient Name: Desiree Garcia  : 1960  MRN: [id number]  Primary Care Physician:  Sandra Kaba MD  Referring Physician: No Known Provider  Date of admission: 2025    Subjective   Subjective     Reason for Consult/ Chief Complaint: Coronary artery disease, MI status post stenting, arrhythmia, peripheral vascular disease with thrombosis    HPI:  Desiree Garcia is a 65 y.o. female with history of coronary artery disease status post stenting in  to RCA, COPD, hypertension, hyperlipidemia, type 2 diabetes mellitus, peripheral vascular disease, presented to the hospital with abdominal pain.  EKG showed ST elevation in leads II, III and aVF and patient underwent emergent cardiac catheterization with cardiology service, was found to have distal RCA thrombosis status post mechanical aspiration thrombectomy and PCI with 2 drug-eluting stents.  CTA of the chest abdomen and pelvis showed complete occlusion of left superficial femoral artery, distal aspect of left renal artery, distal aspect of splenic artery, areas of hypoenhancement noted within the spleen most significantly anteriorly, concerning for splenic infarcts.  There were multiple pulmonary nodules up to 6 mm in size as well.  Pulmonary critical care service was consulted for assistance with management of her acute issues postprocedure in ICU.  She is currently somnolent, arousable.  She has dopplerable lower extremity pulses.  She has had 36 runs of V. tach's this morning.  Currently on 2 L oxygen via nasal cannula.  She is currently on heparin and amiodarone drips.  Has high blood sugar.    Review of Systems  General:  Fatigue, No Fever  HEENT: No dysphagia, No Visual Changes, no rhinorrhea  Respiratory:  +cough,+Dyspnea, scant phlegm, No Pleuritic Pain, + wheezing, no hemoptysis  Cardiovascular: + chest pain, denies palpitations,+HALL, + Chest Pressure  Gastrointestinal:  No Abdominal Pain, No  Nausea, No Vomiting, No Diarrhea  Genitourinary:  No Dysuria, No Frequency, No Hesitancy  Musculoskeletal: No muscle pain or swelling  Endocrine:  No Heat Intolerance, No Cold Intolerance, Fatigue  Hematologic:  No Bleeding, No Bruising  Psychiatric:  No Anxiety, No Depression  Neurologic:  No Confusion, no Dysarthria, No Headaches  Skin:  No Rash, No Open Wounds        Personal History     Past Medical History:   Diagnosis Date    Arthritis     Cervical cancer screening     COPD (chronic obstructive pulmonary disease)     Coronary artery disease involving native heart without angina pectoris 2021    primary angioplasty and stent placement to mid right coronary artery with good angiographic results on 2021 after STEMI    Depression with anxiety     Diabetes mellitus     Forgetfulness     Gastric reflux     Hypertension     Leg paresthesia 2017    Right    Limb swelling     Lumbago 2017    Low back pain    Lumbar spinal stenosis 2017    L4/5 moderate to severe central canal stenosis    Lumbosacral radiculopathy 2017    Right    Migraines     Night sweat     Reflux esophagitis     Shortness of breath     ST elevation myocardial infarction (STEMI) (CMS/[facility]) 2021    Stomach disorder        Past Surgical History:   Procedure Laterality Date    ABDOMINAL SURGERY      3 C-SECTIONS    CARDIAC CATHETERIZATION      CARDIAC CATHETERIZATION N/A 2021    Procedure: Left Heart Cath;  Surgeon: Sidney Xiao MD;  Location: [facility] CATH INVASIVE LOCATION;  Service: Cardiology;  Laterality: N/A;     SECTION      x 3    CHOLECYSTECTOMY      COLONOSCOPY      COLONOSCOPY N/A 3/25/2025    Procedure: COLONOSCOPY WITH BIOPSIES AND COLD AND HOT SNARE POLYPECTOMIES;  Surgeon: Heber Najera MD;  Location: [facility] ENDOSCOPY;  Service: Gastroenterology;  Laterality: N/A;  COLON POLYPS    CORONARY STENT PLACEMENT      ENDOSCOPY      2007    FOOT SURGERY Right     HAND  SURGERY      HYSTERECTOMY  1985       Family History: family history includes Arthritis in her brother, father, mother, and sister; Breast cancer in her sister; Diabetes in her brother and father; Heart attack in her father; Heart disease in her brother and father; Lung cancer in her mother; Stroke in her brother, father, mother, and sister. Otherwise pertinent FHx was reviewed.     Social History:  reports that she has been smoking cigarettes. She started smoking about 58 years ago. She has a 58.2 pack-year smoking history. She has been exposed to tobacco smoke. She has never used smokeless tobacco. She reports that she does not drink alcohol and does not use drugs.    Home Medications:  Fluticasone-Umeclidin-Vilant, Vortioxetine HBr, albuterol, atorvastatin, carvedilol, clopidogrel, colestipol, empagliflozin, furosemide, gabapentin, metFORMIN, potassium chloride, and spironolactone    Allergies:  Allergies   Allergen Reactions    Tramadol Itching       Objective    Objective     Vitals:   Temp:  [98.2 °F (36.8 °C)-99 °F (37.2 °C)] 98.8 °F (37.1 °C)  Heart Rate:  [] 92  Resp:  [16-26] 17  BP: (107-170)/() 120/81  Flow (L/min) (Oxygen Therapy):  [4] 4    Physical Exam:  Vital Signs Reviewed   Somnolent, in mild distress, arousable, has conversational dyspnea  HEENT:  PERRL, EOMI.  OP, nares clear, no sinus tenderness  Neck:  Supple, no JVD, no thyromegaly  Lymph: no axillary, cervical, supraclavicular lymphadenopathy noted bilaterally  Chest: Poor aeration, scattered wheezing, no crackles, resonant percussion bilaterally  CV: RRR, no MGR, pulses 2+, equal  Abd:  Soft, NT, ND, + BS, no HSM  EXT:  no clubbing, no cyanosis, no edema, no joint tenderness, dopplerable lower extremity pulses  Neuro: Somnolent, arousable, oriented x 3, CN grossly intact, no focal deficits  Skin: No rashes or lesions noted      Result Review    Result Review:  I have personally reviewed the results from the time of this  admission to 4/9/2025 07:14 EDT and agree with these findings:  [x]  Laboratory  [x]  Microbiology  [x]  Radiology  [x]  EKG/Telemetry   [x]  Cardiology/Vascular   []  Pathology  []  Old records  []  Other:  Most notable findings include:         Lab 04/09/25  0602 04/09/25  0559 04/08/25  1728   WBC  --  21.49* 21.93*   HEMOGLOBIN  --  14.8 15.4   HEMATOCRIT  --  45.0 46.3   PLATELETS  --  296 325   SODIUM 131*  --  131*   POTASSIUM 3.6  --  3.7   CHLORIDE 92*  --  90*   CO2 26.7  --  25.8   BUN 10  --  9   CREATININE 0.80  --  0.74   GLUCOSE 248*  --  294*   CALCIUM 9.7  --  10.1   PHOSPHORUS 3.1  --   --    TOTAL PROTEIN  --   --  8.3   ALBUMIN  --   --  4.0   GLOBULIN  --   --  4.3                    Assessment & Plan   Assessment / Plan     Active Hospital Problems:  Active Hospital Problems    Diagnosis     **STEMI (ST elevation myocardial infarction)     Mixed hyperlipidemia     Hypertension, essential     Type 2 diabetes mellitus, without long-term current use of insulin          Impression:  Acute inferior wall MI  Status post drug-eluting stents x 2 to RCA  Severe peripheral vascular disease  Complete occlusion of distal branches of left internal Ilac artery, left superficial femoral artery, distal aspect of left renal artery, distal aspect of the splenic artery  Concern for splenic infarcts  Multiple bilateral pulmonary nodules  COPD  History of coronary artery disease status post stenting in 2021  Type 2 diabetes mellitus  Essential hypertension  Hyperlipidemia  Peripheral arterial disease      Plan:  Cardiology on board.  Status post stenting to the RCA x 2.  Check echocardiogram.  Continue with Brilinta 90 mg twice daily.  He was started on amiodarone drip.  Continue with it. Had nonsustained V tach.   Started on heparin drip.   Consult vascular surgery service.   Continue with Claudia Mckinney.  Continue with Yupebethanyi.  Needs to quit smoking.   Continue lipitor. Start pepcid.   Continue lasix and  aldactone, continue jardiance.  Continue carvedilol.       Patient is critically ill in ICU with Nonsustained V tach, MI s/p stenting, severe peripheral vascular disease, thrmbosis lower ext vessels and splenic infarct.   I spent 32 minutes of critical care time, excluding any procedure notes, in review, analysis, obtaining history and physical, formulating care plan, and I led multi-disciplinary critical care rounds with bedside nurse, respiratory therapist, clinical pharmacist and other allied services. I have discussed the case with primary service and other consultants as well.     Electronically signed by Mynor Leal MD, 4/9/2025, 07:14 EDT.

## 2025-04-10 ENCOUNTER — TELEPHONE (OUTPATIENT)
Dept: CARDIAC SURGERY | Facility: CLINIC | Age: 65
End: 2025-04-10
Payer: MEDICARE

## 2025-04-10 ENCOUNTER — APPOINTMENT (OUTPATIENT)
Dept: CARDIOLOGY | Facility: HOSPITAL | Age: 65
End: 2025-04-10
Payer: MEDICARE

## 2025-04-10 ENCOUNTER — HOSPITAL ENCOUNTER (INPATIENT)
Facility: HOSPITAL | Age: 65
LOS: 26 days | Discharge: NURSING, REHAB OR LONG-TERM CARE | DRG: 270 | End: 2025-05-06
Attending: INTERNAL MEDICINE | Admitting: INTERNAL MEDICINE
Payer: MEDICARE

## 2025-04-10 VITALS
TEMPERATURE: 97.8 F | BODY MASS INDEX: 24.48 KG/M2 | SYSTOLIC BLOOD PRESSURE: 110 MMHG | OXYGEN SATURATION: 94 % | DIASTOLIC BLOOD PRESSURE: 56 MMHG | RESPIRATION RATE: 20 BRPM | HEART RATE: 76 BPM | HEIGHT: 66 IN | WEIGHT: 152.34 LBS

## 2025-04-10 DIAGNOSIS — I50.31 ACUTE DIASTOLIC CHF (CONGESTIVE HEART FAILURE): ICD-10-CM

## 2025-04-10 DIAGNOSIS — I74.3 ARTERIAL EMBOLISM AND THROMBOSIS OF LOWER EXTREMITY: Primary | ICD-10-CM

## 2025-04-10 DIAGNOSIS — I24.0 ACUTE THROMBUS OF LEFT VENTRICLE: ICD-10-CM

## 2025-04-10 DIAGNOSIS — I25.10 CORONARY ARTERY DISEASE INVOLVING NATIVE CORONARY ARTERY OF NATIVE HEART WITHOUT ANGINA PECTORIS: Chronic | ICD-10-CM

## 2025-04-10 DIAGNOSIS — M48.061 SPINAL STENOSIS OF LUMBAR REGION, UNSPECIFIED WHETHER NEUROGENIC CLAUDICATION PRESENT: ICD-10-CM

## 2025-04-10 DIAGNOSIS — R20.2 LEG PARESTHESIA: ICD-10-CM

## 2025-04-10 DIAGNOSIS — M17.12 ARTHRITIS OF KNEE, LEFT: ICD-10-CM

## 2025-04-10 DIAGNOSIS — R06.09 DYSPNEA ON EXERTION: ICD-10-CM

## 2025-04-10 DIAGNOSIS — J41.1 MUCOPURULENT CHRONIC BRONCHITIS: ICD-10-CM

## 2025-04-10 PROBLEM — R57.0 CARDIOGENIC SHOCK: Status: ACTIVE | Noted: 2025-04-10

## 2025-04-10 LAB
ALBUMIN SERPL-MCNC: 2.7 G/DL (ref 3.5–5.2)
ALBUMIN/GLOB SERPL: 0.8 G/DL
ALP SERPL-CCNC: 126 U/L (ref 39–117)
ALT SERPL W P-5'-P-CCNC: 36 U/L (ref 1–33)
ANION GAP SERPL CALCULATED.3IONS-SCNC: 12.9 MMOL/L (ref 5–15)
ANION GAP SERPL CALCULATED.3IONS-SCNC: 15.6 MMOL/L (ref 5–15)
ANION GAP SERPL CALCULATED.3IONS-SCNC: 16.2 MMOL/L (ref 5–15)
AORTIC DIMENSIONLESS INDEX: 0.73 (DI)
APTT PPP: 72.5 SECONDS (ref 22.7–35.4)
APTT PPP: 83.1 SECONDS (ref 78–95.9)
APTT PPP: 96.3 SECONDS (ref 78–95.9)
ARTERIAL PATENCY WRIST A: ABNORMAL
AST SERPL-CCNC: 72 U/L (ref 1–32)
ATMOSPHERIC PRESS: 747 MMHG
AV MEAN PRESS GRAD SYS DOP V1V2: 5.6 MMHG
AV VMAX SYS DOP: 158.6 CM/SEC
BASE EXCESS BLDA CALC-SCNC: -0.1 MMOL/L (ref 0–2)
BASOPHILS # BLD MANUAL: 0 10*3/MM3 (ref 0–0.2)
BASOPHILS NFR BLD MANUAL: 0 % (ref 0–1.5)
BDY SITE: ABNORMAL
BH CV ECHO MEAS - AO MAX PG: 10.1 MMHG
BH CV ECHO MEAS - AO ROOT DIAM: 3.2 CM
BH CV ECHO MEAS - AO V2 VTI: 25 CM
BH CV ECHO MEAS - AVA(I,D): 1.67 CM2
BH CV ECHO MEAS - EF(MOD-SP4): 51.6 %
BH CV ECHO MEAS - FS: 22 %
BH CV ECHO MEAS - IVS/LVPW: 1.17 CM
BH CV ECHO MEAS - IVSD: 1.4 CM
BH CV ECHO MEAS - LA DIMENSION: 3.4 CM
BH CV ECHO MEAS - LAT PEAK E' VEL: 9.2 CM/SEC
BH CV ECHO MEAS - LV MAX PG: 6.8 MMHG
BH CV ECHO MEAS - LV MEAN PG: 3.2 MMHG
BH CV ECHO MEAS - LV V1 MAX: 130 CM/SEC
BH CV ECHO MEAS - LV V1 VTI: 18.4 CM
BH CV ECHO MEAS - LVIDD: 4.1 CM
BH CV ECHO MEAS - LVIDS: 3.2 CM
BH CV ECHO MEAS - LVOT AREA: 2.27 CM2
BH CV ECHO MEAS - LVOT DIAM: 1.7 CM
BH CV ECHO MEAS - LVPWD: 1.2 CM
BH CV ECHO MEAS - MED PEAK E' VEL: 6.9 CM/SEC
BH CV ECHO MEAS - MV A MAX VEL: 99.7 CM/SEC
BH CV ECHO MEAS - MV E MAX VEL: 60.6 CM/SEC
BH CV ECHO MEAS - MV E/A: 0.61
BH CV ECHO MEAS - RVDD: 3.2 CM
BH CV ECHO MEAS - SV(LVOT): 41.7 ML
BH CV ECHO MEAS - SVI(LVOT): 23.4 ML/M2
BH CV ECHO MEASUREMENTS AVERAGE E/E' RATIO: 7.53
BILIRUB SERPL-MCNC: 1.1 MG/DL (ref 0–1.2)
BUN SERPL-MCNC: 25 MG/DL (ref 8–23)
BUN SERPL-MCNC: 33 MG/DL (ref 8–23)
BUN SERPL-MCNC: 37 MG/DL (ref 8–23)
BUN/CREAT SERPL: 20.7 (ref 7–25)
BUN/CREAT SERPL: 23.6 (ref 7–25)
BUN/CREAT SERPL: 27.8 (ref 7–25)
CALCIUM SPEC-SCNC: 8.4 MG/DL (ref 8.6–10.5)
CALCIUM SPEC-SCNC: 8.5 MG/DL (ref 8.6–10.5)
CALCIUM SPEC-SCNC: 8.7 MG/DL (ref 8.6–10.5)
CHLORIDE SERPL-SCNC: 88 MMOL/L (ref 98–107)
CHLORIDE SERPL-SCNC: 89 MMOL/L (ref 98–107)
CHLORIDE SERPL-SCNC: 94 MMOL/L (ref 98–107)
CHLORIDE UR-SCNC: <20 MMOL/L
CO2 SERPL-SCNC: 20.8 MMOL/L (ref 22–29)
CO2 SERPL-SCNC: 21.1 MMOL/L (ref 22–29)
CO2 SERPL-SCNC: 22.4 MMOL/L (ref 22–29)
CREAT SERPL-MCNC: 1.21 MG/DL (ref 0.57–1)
CREAT SERPL-MCNC: 1.33 MG/DL (ref 0.57–1)
CREAT SERPL-MCNC: 1.4 MG/DL (ref 0.57–1)
CREAT UR-MCNC: 64.8 MG/DL
D-LACTATE SERPL-SCNC: 1.4 MMOL/L (ref 0.5–2)
DEPRECATED RDW RBC AUTO: 38.9 FL (ref 37–54)
DEPRECATED RDW RBC AUTO: 42.6 FL (ref 37–54)
DEVICE COMMENT: ABNORMAL
EGFRCR SERPLBLD CKD-EPI 2021: 42.1 ML/MIN/1.73
EGFRCR SERPLBLD CKD-EPI 2021: 44.8 ML/MIN/1.73
EGFRCR SERPLBLD CKD-EPI 2021: 50.2 ML/MIN/1.73
EOSINOPHIL # BLD MANUAL: 0 10*3/MM3 (ref 0–0.4)
EOSINOPHIL NFR BLD MANUAL: 0 % (ref 0.3–6.2)
ERYTHROCYTE [DISTWIDTH] IN BLOOD BY AUTOMATED COUNT: 13.4 % (ref 12.3–15.4)
ERYTHROCYTE [DISTWIDTH] IN BLOOD BY AUTOMATED COUNT: 13.8 % (ref 12.3–15.4)
GAS FLOW AIRWAY: 2 LPM
GLOBULIN UR ELPH-MCNC: 3.6 GM/DL
GLUCOSE BLDC GLUCOMTR-MCNC: 116 MG/DL (ref 70–130)
GLUCOSE BLDC GLUCOMTR-MCNC: 143 MG/DL (ref 70–130)
GLUCOSE BLDC GLUCOMTR-MCNC: 153 MG/DL (ref 70–130)
GLUCOSE BLDC GLUCOMTR-MCNC: 191 MG/DL (ref 70–99)
GLUCOSE BLDC GLUCOMTR-MCNC: 203 MG/DL (ref 70–99)
GLUCOSE BLDC GLUCOMTR-MCNC: 205 MG/DL (ref 70–99)
GLUCOSE SERPL-MCNC: 151 MG/DL (ref 65–99)
GLUCOSE SERPL-MCNC: 177 MG/DL (ref 65–99)
GLUCOSE SERPL-MCNC: 203 MG/DL (ref 65–99)
HCO3 BLDA-SCNC: 24.1 MMOL/L (ref 22–28)
HCT VFR BLD AUTO: 38 % (ref 34–46.6)
HCT VFR BLD AUTO: 42.4 % (ref 34–46.6)
HEMODILUTION: NO
HGB BLD-MCNC: 12.8 G/DL (ref 12–15.9)
HGB BLD-MCNC: 14.1 G/DL (ref 12–15.9)
INR PPP: 1.36 (ref 0.86–1.15)
IVRT: 69 MS
LYMPHOCYTES # BLD MANUAL: 1.4 10*3/MM3 (ref 0.7–3.1)
LYMPHOCYTES NFR BLD MANUAL: 15 % (ref 5–12)
MAGNESIUM SERPL-MCNC: 2.1 MG/DL (ref 1.6–2.4)
MAGNESIUM SERPL-MCNC: 2.3 MG/DL (ref 1.6–2.4)
MCH RBC QN AUTO: 27.5 PG (ref 26.6–33)
MCH RBC QN AUTO: 28.3 PG (ref 26.6–33)
MCHC RBC AUTO-ENTMCNC: 33.3 G/DL (ref 31.5–35.7)
MCHC RBC AUTO-ENTMCNC: 33.7 G/DL (ref 31.5–35.7)
MCV RBC AUTO: 81.5 FL (ref 79–97)
MCV RBC AUTO: 85.1 FL (ref 79–97)
MODALITY: ABNORMAL
MONOCYTES # BLD: 2.34 10*3/MM3 (ref 0.1–0.9)
NEUTROPHILS # BLD AUTO: 11.86 10*3/MM3 (ref 1.7–7)
NEUTROPHILS NFR BLD MANUAL: 76 % (ref 42.7–76)
NRBC BLD AUTO-RTO: 0 /100 WBC (ref 0–0.2)
NT-PROBNP SERPL-MCNC: 9388 PG/ML (ref 0–900)
OSMOLALITY SERPL: 281 MOSM/KG (ref 280–301)
OSMOLALITY UR: 494 MOSM/KG
PCO2 BLDA: 37 MM HG (ref 35–45)
PH BLDA: 7.42 PH UNITS (ref 7.35–7.45)
PHOSPHATE SERPL-MCNC: 4.7 MG/DL (ref 2.5–4.5)
PHOSPHATE SERPL-MCNC: 4.9 MG/DL (ref 2.5–4.5)
PLAT MORPH BLD: NORMAL
PLATELET # BLD AUTO: 258 10*3/MM3 (ref 140–450)
PLATELET # BLD AUTO: 326 10*3/MM3 (ref 140–450)
PMV BLD AUTO: 10.7 FL (ref 6–12)
PMV BLD AUTO: 11 FL (ref 6–12)
PO2 BLDA: 66.4 MM HG (ref 80–100)
POTASSIUM SERPL-SCNC: 3.4 MMOL/L (ref 3.5–5.2)
POTASSIUM SERPL-SCNC: 3.7 MMOL/L (ref 3.5–5.2)
POTASSIUM SERPL-SCNC: 3.7 MMOL/L (ref 3.5–5.2)
POTASSIUM UR-SCNC: 73.2 MMOL/L
PROT ?TM UR-MCNC: 95.5 MG/DL
PROT SERPL-MCNC: 6.3 G/DL (ref 6–8.5)
PROT/CREAT UR: 1473.8 MG/G CREA (ref 0–200)
PROTHROMBIN TIME: 17.4 SECONDS (ref 11.8–14.9)
QT INTERVAL: 435 MS
QTC INTERVAL: 486 MS
RBC # BLD AUTO: 4.66 10*6/MM3 (ref 3.77–5.28)
RBC # BLD AUTO: 4.98 10*6/MM3 (ref 3.77–5.28)
RBC MORPH BLD: NORMAL
SAO2 % BLDCOA: 93.3 % (ref 92–98.5)
SODIUM SERPL-SCNC: 125 MMOL/L (ref 136–145)
SODIUM SERPL-SCNC: 127 MMOL/L (ref 136–145)
SODIUM SERPL-SCNC: 128 MMOL/L (ref 136–145)
SODIUM UR-SCNC: <20 MMOL/L
TOTAL RATE: 20 BREATHS/MINUTE
URATE SERPL-MCNC: 6.2 MG/DL (ref 2.4–5.7)
VARIANT LYMPHS NFR BLD MANUAL: 9 % (ref 19.6–45.3)
WBC MORPH BLD: NORMAL
WBC NRBC COR # BLD AUTO: 15.61 10*3/MM3 (ref 3.4–10.8)
WBC NRBC COR # BLD AUTO: 18.86 10*3/MM3 (ref 3.4–10.8)

## 2025-04-10 PROCEDURE — 99231 SBSQ HOSP IP/OBS SF/LOW 25: CPT | Performed by: SURGERY

## 2025-04-10 PROCEDURE — 85730 THROMBOPLASTIN TIME PARTIAL: CPT | Performed by: INTERNAL MEDICINE

## 2025-04-10 PROCEDURE — 63710000001 INSULIN GLARGINE PER 5 UNITS: Performed by: NURSE PRACTITIONER

## 2025-04-10 PROCEDURE — 83735 ASSAY OF MAGNESIUM: CPT | Performed by: HOSPITALIST

## 2025-04-10 PROCEDURE — 85007 BL SMEAR W/DIFF WBC COUNT: CPT | Performed by: HOSPITALIST

## 2025-04-10 PROCEDURE — 83735 ASSAY OF MAGNESIUM: CPT | Performed by: NURSE PRACTITIONER

## 2025-04-10 PROCEDURE — 93306 TTE W/DOPPLER COMPLETE: CPT

## 2025-04-10 PROCEDURE — 25010000002 FAMOTIDINE 10 MG/ML SOLUTION: Performed by: NURSE PRACTITIONER

## 2025-04-10 PROCEDURE — 84550 ASSAY OF BLOOD/URIC ACID: CPT | Performed by: HOSPITALIST

## 2025-04-10 PROCEDURE — 94799 UNLISTED PULMONARY SVC/PX: CPT

## 2025-04-10 PROCEDURE — 83880 ASSAY OF NATRIURETIC PEPTIDE: CPT | Performed by: INTERNAL MEDICINE

## 2025-04-10 PROCEDURE — 82436 ASSAY OF URINE CHLORIDE: CPT | Performed by: HOSPITALIST

## 2025-04-10 PROCEDURE — 85610 PROTHROMBIN TIME: CPT | Performed by: NURSE PRACTITIONER

## 2025-04-10 PROCEDURE — 85025 COMPLETE CBC W/AUTO DIFF WBC: CPT | Performed by: HOSPITALIST

## 2025-04-10 PROCEDURE — 83930 ASSAY OF BLOOD OSMOLALITY: CPT | Performed by: HOSPITALIST

## 2025-04-10 PROCEDURE — 82948 REAGENT STRIP/BLOOD GLUCOSE: CPT

## 2025-04-10 PROCEDURE — 63710000001 INSULIN REGULAR HUMAN PER 5 UNITS: Performed by: HOSPITALIST

## 2025-04-10 PROCEDURE — 25010000002 POTASSIUM CHLORIDE 10 MEQ/100ML SOLUTION: Performed by: HOSPITALIST

## 2025-04-10 PROCEDURE — 99233 SBSQ HOSP IP/OBS HIGH 50: CPT | Performed by: INTERNAL MEDICINE

## 2025-04-10 PROCEDURE — 94640 AIRWAY INHALATION TREATMENT: CPT

## 2025-04-10 PROCEDURE — 84100 ASSAY OF PHOSPHORUS: CPT | Performed by: HOSPITALIST

## 2025-04-10 PROCEDURE — 94664 DEMO&/EVAL PT USE INHALER: CPT

## 2025-04-10 PROCEDURE — 85730 THROMBOPLASTIN TIME PARTIAL: CPT | Performed by: NURSE PRACTITIONER

## 2025-04-10 PROCEDURE — 63710000001 REVEFENACIN 175 MCG/3ML SOLUTION: Performed by: INTERNAL MEDICINE

## 2025-04-10 PROCEDURE — 93306 TTE W/DOPPLER COMPLETE: CPT | Performed by: INTERNAL MEDICINE

## 2025-04-10 PROCEDURE — 63710000001 INSULIN REGULAR HUMAN PER 5 UNITS: Performed by: STUDENT IN AN ORGANIZED HEALTH CARE EDUCATION/TRAINING PROGRAM

## 2025-04-10 PROCEDURE — 84100 ASSAY OF PHOSPHORUS: CPT | Performed by: NURSE PRACTITIONER

## 2025-04-10 PROCEDURE — 25010000002 MILRINONE LACTATE IN DEXTROSE 20-5 MG/100ML-% SOLUTION: Performed by: NURSE PRACTITIONER

## 2025-04-10 PROCEDURE — 25010000002 SULFUR HEXAFLUORIDE MICROSPH 60.7-25 MG RECONSTITUTED SUSPENSION: Performed by: INTERNAL MEDICINE

## 2025-04-10 PROCEDURE — 83935 ASSAY OF URINE OSMOLALITY: CPT | Performed by: HOSPITALIST

## 2025-04-10 PROCEDURE — 25010000002 FENTANYL CITRATE (PF) 50 MCG/ML SOLUTION

## 2025-04-10 PROCEDURE — 82570 ASSAY OF URINE CREATININE: CPT | Performed by: HOSPITALIST

## 2025-04-10 PROCEDURE — 84300 ASSAY OF URINE SODIUM: CPT | Performed by: HOSPITALIST

## 2025-04-10 PROCEDURE — 36600 WITHDRAWAL OF ARTERIAL BLOOD: CPT

## 2025-04-10 PROCEDURE — 25010000002 FAMOTIDINE 10 MG/ML SOLUTION: Performed by: HOSPITALIST

## 2025-04-10 PROCEDURE — 93005 ELECTROCARDIOGRAM TRACING: CPT | Performed by: INTERNAL MEDICINE

## 2025-04-10 PROCEDURE — 25010000002 AMIODARONE IN DEXTROSE 5% 360-4.14 MG/200ML-% SOLUTION: Performed by: INTERNAL MEDICINE

## 2025-04-10 PROCEDURE — 83605 ASSAY OF LACTIC ACID: CPT | Performed by: NURSE PRACTITIONER

## 2025-04-10 PROCEDURE — 99292 CRITICAL CARE ADDL 30 MIN: CPT | Performed by: INTERNAL MEDICINE

## 2025-04-10 PROCEDURE — 82803 BLOOD GASES ANY COMBINATION: CPT

## 2025-04-10 PROCEDURE — 25810000003 SODIUM CHLORIDE 0.9 % SOLUTION

## 2025-04-10 PROCEDURE — 84133 ASSAY OF URINE POTASSIUM: CPT | Performed by: HOSPITALIST

## 2025-04-10 PROCEDURE — 25810000003 SODIUM CHLORIDE 0.9 % SOLUTION: Performed by: INTERNAL MEDICINE

## 2025-04-10 PROCEDURE — 84156 ASSAY OF PROTEIN URINE: CPT | Performed by: HOSPITALIST

## 2025-04-10 PROCEDURE — 99239 HOSP IP/OBS DSCHRG MGMT >30: CPT | Performed by: INTERNAL MEDICINE

## 2025-04-10 PROCEDURE — 25010000002 AMIODARONE IN DEXTROSE 5% 360-4.14 MG/200ML-% SOLUTION: Performed by: HOSPITALIST

## 2025-04-10 PROCEDURE — 85027 COMPLETE CBC AUTOMATED: CPT | Performed by: NURSE PRACTITIONER

## 2025-04-10 PROCEDURE — 99291 CRITICAL CARE FIRST HOUR: CPT | Performed by: INTERNAL MEDICINE

## 2025-04-10 PROCEDURE — 80053 COMPREHEN METABOLIC PANEL: CPT | Performed by: NURSE PRACTITIONER

## 2025-04-10 PROCEDURE — 93010 ELECTROCARDIOGRAM REPORT: CPT | Performed by: INTERNAL MEDICINE

## 2025-04-10 PROCEDURE — 25010000002 HEPARIN (PORCINE) 25000-0.45 UT/250ML-% SOLUTION: Performed by: NURSE PRACTITIONER

## 2025-04-10 RX ORDER — ARFORMOTEROL TARTRATE 15 UG/2ML
15 SOLUTION RESPIRATORY (INHALATION)
Status: DISCONTINUED | OUTPATIENT
Start: 2025-04-10 | End: 2025-05-06 | Stop reason: HOSPADM

## 2025-04-10 RX ORDER — FENTANYL CITRATE 50 UG/ML
50 INJECTION, SOLUTION INTRAMUSCULAR; INTRAVENOUS
Refills: 0 | Status: DISCONTINUED | OUTPATIENT
Start: 2025-04-10 | End: 2025-04-11

## 2025-04-10 RX ORDER — POTASSIUM CHLORIDE 29.8 MG/ML
20 INJECTION INTRAVENOUS
Status: DISCONTINUED | OUTPATIENT
Start: 2025-04-10 | End: 2025-04-10

## 2025-04-10 RX ORDER — ARFORMOTEROL TARTRATE 15 UG/2ML
15 SOLUTION RESPIRATORY (INHALATION)
Status: ON HOLD
Start: 2025-04-10 | End: 2025-04-11

## 2025-04-10 RX ORDER — ATORVASTATIN CALCIUM 20 MG/1
40 TABLET, FILM COATED ORAL NIGHTLY
Status: DISCONTINUED | OUTPATIENT
Start: 2025-04-10 | End: 2025-05-06 | Stop reason: HOSPADM

## 2025-04-10 RX ORDER — ALBUTEROL SULFATE 0.83 MG/ML
2.5 SOLUTION RESPIRATORY (INHALATION) EVERY 6 HOURS PRN
Status: DISCONTINUED | OUTPATIENT
Start: 2025-04-10 | End: 2025-05-06 | Stop reason: HOSPADM

## 2025-04-10 RX ORDER — HEPARIN SODIUM 10000 [USP'U]/100ML
18 INJECTION, SOLUTION INTRAVENOUS
Status: DISCONTINUED | OUTPATIENT
Start: 2025-04-10 | End: 2025-04-10 | Stop reason: HOSPADM

## 2025-04-10 RX ORDER — ALUMINA, MAGNESIA, AND SIMETHICONE 2400; 2400; 240 MG/30ML; MG/30ML; MG/30ML
15 SUSPENSION ORAL EVERY 6 HOURS PRN
Status: DISCONTINUED | OUTPATIENT
Start: 2025-04-10 | End: 2025-05-04

## 2025-04-10 RX ORDER — NOREPINEPHRINE BITARTRATE 0.03 MG/ML
.02-.3 INJECTION, SOLUTION INTRAVENOUS
Status: ON HOLD
Start: 2025-04-10 | End: 2025-04-11

## 2025-04-10 RX ORDER — MILRINONE LACTATE 0.2 MG/ML
0.25 INJECTION, SOLUTION INTRAVENOUS
Status: ON HOLD
Start: 2025-04-10 | End: 2025-04-11

## 2025-04-10 RX ORDER — IBUPROFEN 600 MG/1
1 TABLET ORAL
Status: DISCONTINUED | OUTPATIENT
Start: 2025-04-10 | End: 2025-05-06 | Stop reason: HOSPADM

## 2025-04-10 RX ORDER — DEXTROSE MONOHYDRATE 25 G/50ML
25 INJECTION, SOLUTION INTRAVENOUS
Status: DISCONTINUED | OUTPATIENT
Start: 2025-04-10 | End: 2025-05-06 | Stop reason: HOSPADM

## 2025-04-10 RX ORDER — NOREPINEPHRINE BITARTRATE 0.03 MG/ML
.02-.3 INJECTION, SOLUTION INTRAVENOUS
Status: DISCONTINUED | OUTPATIENT
Start: 2025-04-10 | End: 2025-04-10

## 2025-04-10 RX ORDER — ACETAMINOPHEN 325 MG/1
650 TABLET ORAL EVERY 4 HOURS PRN
Status: DISCONTINUED | OUTPATIENT
Start: 2025-04-10 | End: 2025-05-06 | Stop reason: HOSPADM

## 2025-04-10 RX ORDER — POTASSIUM CHLORIDE 1500 MG/1
40 TABLET, EXTENDED RELEASE ORAL EVERY 4 HOURS
Status: DISPENSED | OUTPATIENT
Start: 2025-04-10 | End: 2025-04-11

## 2025-04-10 RX ORDER — FAMOTIDINE 10 MG/ML
20 INJECTION, SOLUTION INTRAVENOUS EVERY 12 HOURS SCHEDULED
Status: ON HOLD
Start: 2025-04-10 | End: 2025-04-11

## 2025-04-10 RX ORDER — ONDANSETRON 2 MG/ML
4 INJECTION INTRAMUSCULAR; INTRAVENOUS EVERY 6 HOURS PRN
Status: DISCONTINUED | OUTPATIENT
Start: 2025-04-10 | End: 2025-04-12 | Stop reason: SDUPTHER

## 2025-04-10 RX ORDER — AMOXICILLIN 250 MG
2 CAPSULE ORAL 2 TIMES DAILY
Status: DISCONTINUED | OUTPATIENT
Start: 2025-04-10 | End: 2025-05-04

## 2025-04-10 RX ORDER — NICOTINE POLACRILEX 4 MG
15 LOZENGE BUCCAL
Status: DISCONTINUED | OUTPATIENT
Start: 2025-04-10 | End: 2025-05-06 | Stop reason: HOSPADM

## 2025-04-10 RX ORDER — HEPARIN SODIUM 10000 [USP'U]/100ML
12 INJECTION, SOLUTION INTRAVENOUS
Status: DISCONTINUED | OUTPATIENT
Start: 2025-04-10 | End: 2025-04-25

## 2025-04-10 RX ORDER — DEXTROSE MONOHYDRATE 25 G/50ML
25 INJECTION, SOLUTION INTRAVENOUS
Status: ON HOLD
Start: 2025-04-10 | End: 2025-04-11

## 2025-04-10 RX ORDER — NOREPINEPHRINE BITARTRATE 0.03 MG/ML
INJECTION, SOLUTION INTRAVENOUS
Status: COMPLETED
Start: 2025-04-10 | End: 2025-04-10

## 2025-04-10 RX ORDER — BISACODYL 10 MG
10 SUPPOSITORY, RECTAL RECTAL DAILY PRN
Status: DISCONTINUED | OUTPATIENT
Start: 2025-04-10 | End: 2025-05-04

## 2025-04-10 RX ORDER — ONDANSETRON 2 MG/ML
4 INJECTION INTRAMUSCULAR; INTRAVENOUS EVERY 6 HOURS PRN
Status: ON HOLD
Start: 2025-04-10

## 2025-04-10 RX ORDER — NITROGLYCERIN 0.4 MG/1
0.4 TABLET SUBLINGUAL
Status: ON HOLD
Start: 2025-04-10

## 2025-04-10 RX ORDER — ASPIRIN 81 MG/1
81 TABLET ORAL DAILY
Status: ON HOLD
Start: 2025-04-11

## 2025-04-10 RX ORDER — BISACODYL 5 MG/1
5 TABLET, DELAYED RELEASE ORAL DAILY PRN
Status: DISCONTINUED | OUTPATIENT
Start: 2025-04-10 | End: 2025-05-04

## 2025-04-10 RX ORDER — NITROGLYCERIN 0.4 MG/1
0.4 TABLET SUBLINGUAL
Status: DISCONTINUED | OUTPATIENT
Start: 2025-04-10 | End: 2025-05-06 | Stop reason: HOSPADM

## 2025-04-10 RX ORDER — BUDESONIDE 0.5 MG/2ML
0.5 INHALANT ORAL
Status: DISCONTINUED | OUTPATIENT
Start: 2025-04-10 | End: 2025-05-06 | Stop reason: HOSPADM

## 2025-04-10 RX ORDER — FAMOTIDINE 10 MG/ML
20 INJECTION, SOLUTION INTRAVENOUS EVERY 12 HOURS SCHEDULED
Status: DISCONTINUED | OUTPATIENT
Start: 2025-04-10 | End: 2025-04-12

## 2025-04-10 RX ORDER — ACETAMINOPHEN 325 MG/1
650 TABLET ORAL EVERY 4 HOURS PRN
Status: ON HOLD
Start: 2025-04-10

## 2025-04-10 RX ORDER — NICOTINE POLACRILEX 4 MG
15 LOZENGE BUCCAL
Status: ON HOLD
Start: 2025-04-10

## 2025-04-10 RX ORDER — HEPARIN SODIUM 10000 [USP'U]/100ML
18 INJECTION, SOLUTION INTRAVENOUS
Status: ON HOLD
Start: 2025-04-10 | End: 2025-04-11

## 2025-04-10 RX ORDER — NITROGLYCERIN 0.4 MG/1
0.4 TABLET SUBLINGUAL
Status: DISCONTINUED | OUTPATIENT
Start: 2025-04-10 | End: 2025-04-10

## 2025-04-10 RX ORDER — POLYETHYLENE GLYCOL 3350 17 G/17G
17 POWDER, FOR SOLUTION ORAL DAILY PRN
Status: DISCONTINUED | OUTPATIENT
Start: 2025-04-10 | End: 2025-05-04

## 2025-04-10 RX ORDER — BUDESONIDE 0.5 MG/2ML
0.5 INHALANT ORAL
Status: ON HOLD
Start: 2025-04-10

## 2025-04-10 RX ORDER — MILRINONE LACTATE 0.2 MG/ML
0.25 INJECTION, SOLUTION INTRAVENOUS
Status: DISCONTINUED | OUTPATIENT
Start: 2025-04-10 | End: 2025-04-10

## 2025-04-10 RX ORDER — NOREPINEPHRINE BITARTRATE 0.03 MG/ML
.02-.3 INJECTION, SOLUTION INTRAVENOUS
Status: DISCONTINUED | OUTPATIENT
Start: 2025-04-10 | End: 2025-04-10 | Stop reason: HOSPADM

## 2025-04-10 RX ORDER — IBUPROFEN 600 MG/1
1 TABLET ORAL
Status: ON HOLD
Start: 2025-04-10

## 2025-04-10 RX ORDER — ONDANSETRON 4 MG/1
4 TABLET, ORALLY DISINTEGRATING ORAL EVERY 6 HOURS PRN
Status: DISCONTINUED | OUTPATIENT
Start: 2025-04-10 | End: 2025-04-12 | Stop reason: SDUPTHER

## 2025-04-10 RX ORDER — ASPIRIN 81 MG/1
81 TABLET ORAL DAILY
Status: DISCONTINUED | OUTPATIENT
Start: 2025-04-11 | End: 2025-04-11

## 2025-04-10 RX ORDER — HEPARIN SODIUM 10000 [USP'U]/100ML
12 INJECTION, SOLUTION INTRAVENOUS
Status: DISCONTINUED | OUTPATIENT
Start: 2025-04-10 | End: 2025-04-10

## 2025-04-10 RX ORDER — MILRINONE LACTATE 0.2 MG/ML
0.25 INJECTION, SOLUTION INTRAVENOUS
Status: DISCONTINUED | OUTPATIENT
Start: 2025-04-10 | End: 2025-04-10 | Stop reason: HOSPADM

## 2025-04-10 RX ORDER — NOREPINEPHRINE BITARTRATE 0.03 MG/ML
.02-.3 INJECTION, SOLUTION INTRAVENOUS
Status: DISCONTINUED | OUTPATIENT
Start: 2025-04-10 | End: 2025-04-14

## 2025-04-10 RX ORDER — ACETAMINOPHEN 650 MG/1
650 SUPPOSITORY RECTAL EVERY 4 HOURS PRN
Status: DISCONTINUED | OUTPATIENT
Start: 2025-04-10 | End: 2025-05-06 | Stop reason: HOSPADM

## 2025-04-10 RX ORDER — POTASSIUM CHLORIDE 7.45 MG/ML
10 INJECTION INTRAVENOUS
Status: DISCONTINUED | OUTPATIENT
Start: 2025-04-10 | End: 2025-04-10

## 2025-04-10 RX ORDER — ALBUTEROL SULFATE 0.83 MG/ML
2.5 SOLUTION RESPIRATORY (INHALATION) EVERY 6 HOURS PRN
Status: ON HOLD
Start: 2025-04-10

## 2025-04-10 RX ORDER — HEPARIN SODIUM 5000 [USP'U]/ML
30-57.9 INJECTION, SOLUTION INTRAVENOUS; SUBCUTANEOUS EVERY 6 HOURS PRN
Status: DISCONTINUED | OUTPATIENT
Start: 2025-04-10 | End: 2025-04-23

## 2025-04-10 RX ADMIN — HUMAN INSULIN 3 UNITS: 100 INJECTION, SOLUTION SUBCUTANEOUS at 11:34

## 2025-04-10 RX ADMIN — ATORVASTATIN CALCIUM 40 MG: 20 TABLET, FILM COATED ORAL at 20:34

## 2025-04-10 RX ADMIN — SODIUM CHLORIDE 500 ML: 900 INJECTION, SOLUTION INTRAVENOUS at 15:11

## 2025-04-10 RX ADMIN — TICAGRELOR 90 MG: 90 TABLET ORAL at 20:34

## 2025-04-10 RX ADMIN — ARFORMOTEROL TARTRATE 15 MCG: 15 SOLUTION RESPIRATORY (INHALATION) at 06:45

## 2025-04-10 RX ADMIN — TICAGRELOR 90 MG: 90 TABLET ORAL at 09:25

## 2025-04-10 RX ADMIN — AMIODARONE HYDROCHLORIDE 0.5 MG/MIN: 1.8 INJECTION, SOLUTION INTRAVENOUS at 19:15

## 2025-04-10 RX ADMIN — ARFORMOTEROL TARTRATE 15 MCG: 15 SOLUTION RESPIRATORY (INHALATION) at 19:51

## 2025-04-10 RX ADMIN — MUPIROCIN 1 APPLICATION: 20 OINTMENT TOPICAL at 18:19

## 2025-04-10 RX ADMIN — FAMOTIDINE 20 MG: 10 INJECTION, SOLUTION INTRAVENOUS at 20:00

## 2025-04-10 RX ADMIN — MUPIROCIN 1 APPLICATION: 20 OINTMENT TOPICAL at 09:25

## 2025-04-10 RX ADMIN — HEPARIN SODIUM 18 UNITS/KG/HR: 10000 INJECTION, SOLUTION INTRAVENOUS at 12:42

## 2025-04-10 RX ADMIN — INSULIN GLARGINE 10 UNITS: 100 INJECTION, SOLUTION SUBCUTANEOUS at 09:25

## 2025-04-10 RX ADMIN — POTASSIUM CHLORIDE 40 MEQ: 1500 TABLET, EXTENDED RELEASE ORAL at 20:45

## 2025-04-10 RX ADMIN — NOREPINEPHRINE BITARTRATE 0.02 MCG/KG/MIN: 0.03 INJECTION, SOLUTION INTRAVENOUS at 08:58

## 2025-04-10 RX ADMIN — SODIUM CHLORIDE 500 ML: 900 INJECTION, SOLUTION INTRAVENOUS at 12:44

## 2025-04-10 RX ADMIN — FAMOTIDINE 20 MG: 10 INJECTION INTRAVENOUS at 09:24

## 2025-04-10 RX ADMIN — MILRINONE LACTATE IN DEXTROSE 0.25 MCG/KG/MIN: 200 INJECTION, SOLUTION INTRAVENOUS at 09:25

## 2025-04-10 RX ADMIN — AMIODARONE HYDROCHLORIDE 0.5 MG/MIN: 1.8 INJECTION, SOLUTION INTRAVENOUS at 00:03

## 2025-04-10 RX ADMIN — NOREPINEPHRINE BITARTRATE 0.02 MCG/KG/MIN: 32 SOLUTION INTRAVENOUS at 08:58

## 2025-04-10 RX ADMIN — ASPIRIN 81 MG: 81 TABLET, COATED ORAL at 09:25

## 2025-04-10 RX ADMIN — Medication 10 ML: at 09:25

## 2025-04-10 RX ADMIN — BUDESONIDE 0.5 MG: 0.5 INHALANT RESPIRATORY (INHALATION) at 19:51

## 2025-04-10 RX ADMIN — FENTANYL CITRATE 50 MCG: 50 INJECTION, SOLUTION INTRAMUSCULAR; INTRAVENOUS at 22:56

## 2025-04-10 RX ADMIN — HUMAN INSULIN 3 UNITS: 100 INJECTION, SOLUTION SUBCUTANEOUS at 05:46

## 2025-04-10 RX ADMIN — EMPAGLIFLOZIN 25 MG: 25 TABLET, FILM COATED ORAL at 09:25

## 2025-04-10 RX ADMIN — SULFUR HEXAFLUORIDE 5 ML: KIT at 12:27

## 2025-04-10 RX ADMIN — INSULIN HUMAN 2 UNITS: 100 INJECTION, SOLUTION PARENTERAL at 18:19

## 2025-04-10 RX ADMIN — REVEFENACIN 175 MCG: 175 SOLUTION RESPIRATORY (INHALATION) at 06:45

## 2025-04-10 RX ADMIN — BUDESONIDE 0.5 MG: 0.5 SUSPENSION RESPIRATORY (INHALATION) at 06:45

## 2025-04-10 NOTE — PLAN OF CARE
Goal Outcome Evaluation:  Plan of Care Reviewed With: patient        Progress: declining  Outcome Evaluation: Pt arrived to [facility] CICU from [facility] around 1645. Heparin, levophed, and amiodarone infusing per MAR. EKG completed and showed acute STEMI with no complaints of chest pain, notified cardiology. Pt pedal pulses are not palpable, but are dopplerable.  Bladder scan completed, 500 ml shown. Straight cath x1, 600 ml out. Pt is lethargic and dioriented to situation. Pt updated on plan of care.

## 2025-04-10 NOTE — CONSULTS
Complex issue. Evaluating clot burden, but at present there is no urgent cardiac surgical indication.

## 2025-04-10 NOTE — DISCHARGE SUMMARY
[facility]         HOSPITALIST  DISCHARGE SUMMARY    Patient Name: Desiree Garcia  : 1960  MRN: [id number]    Date of Admission: 2025  Date of Discharge:  4/10/2025  Primary Care Physician: Sandra Kaba MD  Admitting: Medicine  Consultants: Pulm/CC; Vascular; Cardiology    Final Diagnoses:  Inferior STEMI status post PCI with JOE x 2 on 2025  Acute LV thrombus  Cardiogenic shock  Complete occlusion of the distal branches of the left internal iliac artery   Complete occlusion of the left superficial femoral artery  Complete occlusion of the distal aspect of the left renal artery  Complete occlusion of the distal aspect of the splenic artery  Concern for splenic infarcts  Hypoenhancement of the inferior aspect of the left kidney consistent with renal infract  Multiple bilateral pulmonary nodules  COPD  History of CAD status post stent in   Hypertension  Hyperlipidemia  Type 2 diabetes mellitus  PAD      Hospital Course     Hospital Course:  Desiree Garcia is a 65 y.o. female with coronary disease, COPD, diabetes, vascular disease and other issues.  She presented with abdominal pain that began earlier on the day of admission.  EKG showed ST elevation in lead to 3 and aVF.  She underwent an emergent cardiac cath by Dr. Verdugo.  Noted to have distal RCA thrombosis status post mechanical aspiration thrombectomy, PCI with drug-eluting stent x 2.  She has been admitted to the medicine service.  CTA showed multiple abnormalities.  Vascular surgery saw the patient, no intervention planned.  From a pulmonary standpoint, he continues on Brovana, Pulmicort, Yupelri.  She was seen by cardiology, found to have LV thrombus on her echo.  She remains on a heparin drip and Brilinta.  Started on Levophed, milrinone, and amiodarone.  Dr. Verdugo has discussed with Dr. Ng with CT surgery at [facility] who has accepted the patient.    Patient will transfer to [facility]  ICU      DISCHARGE Follow Up Recommendations for labs and diagnostics: per accepting team      Day of Discharge     Vital Signs:  Temp:  [97.7 °F (36.5 °C)-98.5 °F (36.9 °C)] 97.7 °F (36.5 °C)  Heart Rate:  [63-83] 72  Resp:  [20-30] 21  BP: ()/(44-81) 101/65  Flow (L/min) (Oxygen Therapy):  [2] 2  Physical Exam: no distress but looks like she feels poorly; s1s2, chest clear      Discharge Details        Discharge Medications        New Medications        Instructions Start Date   acetaminophen 325 MG tablet  Commonly known as: TYLENOL   650 mg, Oral, Every 4 Hours PRN      amiodarone in dextrose 5% infusion  Commonly known as: NEXTERONE   0.5 mg/min (0.5 mg/min), Intravenous, Continuous      amiodarone in dextrose 5% infusion  Commonly known as: NEXTERONE   0.5 mg/min (0.5 mg/min), Intravenous, Continuous      arformoterol 15 MCG/2ML nebulizer solution  Commonly known as: BROVANA   15 mcg, Nebulization, 2 Times Daily - RT      aspirin 81 MG EC tablet   81 mg, Oral, Daily   Start Date: April 11, 2025     budesonide 0.5 MG/2ML nebulizer solution  Commonly known as: PULMICORT   0.5 mg, Nebulization, 2 Times Daily - RT      dextrose 40 % gel  Commonly known as: GLUTOSE   15 g, Oral, Every 15 Minutes PRN      dextrose 50 % solution  Commonly known as: D50W   25 g, Intravenous, Every 15 Minutes PRN      famotidine 10 MG/ML solution injection  Commonly known as: PEPCID   20 mg, Intravenous, Every 12 Hours Scheduled      glucagon 1 MG injection  Commonly known as: GLUCAGEN   1 mg, Intramuscular, Every 15 Minutes PRN      Heparin (Porcine) 76631-7.45 UT/250ML-% infusion   18 Units/kg/hr (1,243 Units/hr), Intravenous, Titrated      heparin 100 unit/mL solution   50 Units/kg (3,500 Units), Intravenous, As Needed      heparin 100 unit/mL solution   25 Units/kg (1,700 Units), Intravenous, As Needed      insulin glargine 100 UNIT/ML injection  Commonly known as: LANTUS, SEMGLEE   10 Units, Subcutaneous, Daily   Start Date:  April 11, 2025     insulin regular 100 UNIT/ML injection  Commonly known as: humuLIN R,novoLIN R   2-7 Units, Subcutaneous, Every 6 Hours Scheduled      Milrinone Lactate in Dextrose 20-5 MG/100ML-% infusion   0.25 mcg/kg/min (17.275 mcg/min), Intravenous, Titrated      mupirocin 2 % nasal ointment  Commonly known as: BACTROBAN   1 Application, Each Nare, 2 Times Daily      nitroglycerin 0.4 MG SL tablet  Commonly known as: NITROSTAT   0.4 mg, Sublingual, Every 5 Minutes PRN, Take no more than 3 doses in 15 minutes.      Norepinephrine-Sodium Chloride 8-0.9 MG/250ML-% solution infusion  Commonly known as: LEVOPHED   0.02-0.3 mcg/kg/min (1.382-20.73 mcg/min), Intravenous, Titrated      ondansetron 2 mg/mL injection  Commonly known as: ZOFRAN   4 mg, Intravenous, Every 6 Hours PRN      revefenacin 175 MCG/3ML nebulizer solution  Commonly known as: YUPELRI   175 mcg, Nebulization, Daily - RT   Start Date: April 11, 2025     sodium chloride 0.9 % bolus   1,000 mL, Intravenous, Once      ticagrelor 90 MG tablet tablet  Commonly known as: BRILINTA   90 mg, Oral, 2 Times Daily             Changes to Medications        Instructions Start Date   albuterol (2.5 MG/3ML) 0.083% nebulizer solution  Commonly known as: PROVENTIL  What changed:   when to take this  reasons to take this   2.5 mg, Nebulization, Every 6 Hours PRN             Continue These Medications        Instructions Start Date   atorvastatin 40 MG tablet  Commonly known as: LIPITOR   40 mg, Oral, Nightly      Jardiance 25 MG tablet tablet  Generic drug: empagliflozin   25 mg, Daily             Stop These Medications      carvedilol 3.125 MG tablet  Commonly known as: COREG     clopidogrel 75 MG tablet  Commonly known as: PLAVIX     colestipol 1 g tablet  Commonly known as: COLESTID     furosemide 20 MG tablet  Commonly known as: Lasix     gabapentin 100 MG capsule  Commonly known as: NEURONTIN     metFORMIN 500 MG tablet  Commonly known as: GLUCOPHAGE      potassium chloride 10 MEQ CR capsule  Commonly known as: MICRO-K     spironolactone 25 MG tablet  Commonly known as: ALDACTONE     Trelegy Ellipta 100-62.5-25 MCG/ACT inhaler  Generic drug: Fluticasone-Umeclidin-Vilant     Trintellix 10 MG tablet tablet  Generic drug: Vortioxetine HBr              Allergies   Allergen Reactions    Tramadol Itching       Discharge Disposition:  Short Term Hospital (DC)    Diet:  Hospital:  Diet Order   Procedures    NPO Diet NPO Type: Strict NPO       Discharge Activity:       CODE STATUS:  Code Status and Medical Interventions: CPR (Attempt to Resuscitate); Full Support   Ordered at: 04/08/25 2056     Code Status (Patient has no pulse and is not breathing):    CPR (Attempt to Resuscitate)     Medical Interventions (Patient has pulse or is breathing):    Full Support     Level Of Support Discussed With:    Patient         Future Appointments   Date Time Provider Department Center   6/5/2025  8:15 AM Consuelo Aponte APRN [facility] [place] [facility] [place]   8/6/2025  1:30 PM Edith Alcocer APRN [facility] [place] [facility] [place]           Pertinent  and/or Most Recent Results     PROCEDURES:   Cardiac cath with stent    LAB RESULTS:      Lab 04/10/25  1244 04/10/25  0907 04/10/25  0215 04/09/25  2134 04/09/25 2039 04/09/25  1322 04/09/25  0559 04/08/25 2201 04/08/25 2059 04/08/25 2034 04/08/25  1728   WBC  --   --  18.86*  --   --   --  21.49*  --   --   --  21.93*   HEMOGLOBIN  --   --  14.1  --   --   --  14.8  --   --   --  15.4   HEMATOCRIT  --   --  42.4  --   --   --  45.0  --   --   --  46.3   PLATELETS  --   --  258  --   --   --  296  --   --   --  325   NEUTROS ABS  --   --   --   --   --   --   --   --   --   --  18.27*   IMMATURE GRANS (ABS)  --   --   --   --   --   --   --   --   --   --  0.10*   LYMPHS ABS  --   --   --   --   --   --   --   --   --   --  1.93   MONOS ABS  --   --   --   --   --   --   --   --   --   --  1.55*   EOS ABS  --   --   --   --   --   --   --   --   --   --  0.00   MCV  --    --  85.1  --   --   --  84.9  --   --   --  85.3   PROCALCITONIN  --   --   --   --   --   --   --   --   --  0.13  --    LACTATE  --  1.4  --  1.6  --   --   --   --  2.0  --   --    PROTIME 17.4*  --   --   --   --   --   --   --   --   --   --    APTT 96.3*  --  83.1  --  79.1 50.8* 39.0*   < >  --   --   --     < > = values in this interval not displayed.         Lab 04/10/25  0907 04/10/25  0215 04/09/25  0602 04/09/25  0559 04/08/25  1728   SODIUM 125* 127* 131*  --  131*   POTASSIUM 3.7 3.7 3.6  --  3.7   CHLORIDE 88* 89* 92*  --  90*   CO2 20.8* 22.4 26.7  --  25.8   ANION GAP 16.2* 15.6* 12.3  --  15.2*   BUN 33* 25* 10  --  9   CREATININE 1.40* 1.21* 0.80  --  0.74   EGFR 42.1* 50.2* 82.4  --  90.5   GLUCOSE 177* 203* 248*  --  294*   CALCIUM 8.5* 8.7 9.7  --  10.1   MAGNESIUM  --  2.3 1.9  --  1.7   PHOSPHORUS  --  4.9* 3.1  --   --    HEMOGLOBIN A1C  --   --   --  9.40*  --          Lab 04/10/25  0907 04/08/25  1728   TOTAL PROTEIN 6.3 8.3   ALBUMIN 2.7* 4.0   GLOBULIN 3.6 4.3   ALT (SGPT) 36* 16   AST (SGOT) 72* 45*   BILIRUBIN 1.1 1.1   ALK PHOS 126* 159*   LIPASE  --  73*         Lab 04/10/25  1244 04/08/25 2034 04/08/25  1728   PROBNP 9,388.0*  --  15,432.0*   HSTROP T  --  971* 835*   PROTIME 17.4*  --   --    INR 1.36*  --   --          Lab 04/09/25  0602   CHOLESTEROL 147   LDL CHOL 78   HDL CHOL 42   TRIGLYCERIDES 158*             Brief Urine Lab Results  (Last result in the past 365 days)        Color   Clarity   Blood   Leuk Est   Nitrite   Protein   CREAT   Urine HCG        03/18/25 1002 Yellow   Clear   Negative   Small (1+)   Positive   Negative                 Microbiology Results (last 10 days)       Procedure Component Value - Date/Time    Blood Culture - Blood, Hand, Left [[id number]]  (Normal) Collected: 04/08/25 2059    Lab Status: Preliminary result Specimen: Blood from Hand, Left Updated: 04/09/25 2116     Blood Culture No growth at 24 hours    Narrative:      Less than seven (7)  mL's of blood was collected.  Insufficient quantity may yield false negative results.    Blood Culture - Blood, Hand, Left [[id number]]  (Normal) Collected: 04/08/25 2056    Lab Status: Preliminary result Specimen: Blood from Hand, Left Updated: 04/09/25 2116     Blood Culture No growth at 24 hours    Narrative:      Less than seven (7) mL's of blood was collected.  Insufficient quantity may yield false negative results.            CT Angiogram Abdomen Pelvis  Result Date: 4/8/2025  Impression: 1.Complete occlusion of the left superficial femoral artery. 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 4.Areas of hypoenhancement noted within the spleen, most significantly anteriorly, concerning for splenic infarcts. 5.Hypoenhancement of the inferior aspect of the left kidney, consistent with renal infarct. 6.Complete occlusion of the ONEIDA with reconstitution distally. 7.Multiple prominent fluid-filled loops of small bowel noted throughout the abdomen. No significant distention to suggest bowel obstruction. 8.Scattered pulmonary nodules, the largest of which measures 0.6 cm. Multiple indeterminate pulmonary nodules. The largest one is solid and measures 6 mm. For multiple nodules, with the most suspicious nodule being solid and measuring 6-8 mm, recommend CT at 3-6 months. Then, for a low-risk patient, consider CT at 18-24 months. For a high-risk patient, if the nodule is stable at 3-6 months, recommend CT at 18-24 months. Follow-up intervals may vary according to size and risk. Electronically Signed: Julian Rosenthal DO  4/8/2025 7:11 PM EDT  Workstation ID: [id number]    CT Angiogram Chest  Result Date: 4/8/2025  Impression: 1.Complete occlusion of the left superficial femoral artery. 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 4.Areas of  hypoenhancement noted within the spleen, most significantly anteriorly, concerning for splenic infarcts. 5.Hypoenhancement of the inferior aspect of the left kidney, consistent with renal infarct. 6.Complete occlusion of the ONEIDA with reconstitution distally. 7.Multiple prominent fluid-filled loops of small bowel noted throughout the abdomen. No significant distention to suggest bowel obstruction. 8.Scattered pulmonary nodules, the largest of which measures 0.6 cm. Multiple indeterminate pulmonary nodules. The largest one is solid and measures 6 mm. For multiple nodules, with the most suspicious nodule being solid and measuring 6-8 mm, recommend CT at 3-6 months. Then, for a low-risk patient, consider CT at 18-24 months. For a high-risk patient, if the nodule is stable at 3-6 months, recommend CT at 18-24 months. Follow-up intervals may vary according to size and risk. Electronically Signed: Julian Rosenthal DO  4/8/2025 7:11 PM EDT  Workstation ID: [id number]       Results for orders placed during the hospital encounter of 12/13/24    Duplex Venous Lower Extremity - Right CAR    Interpretation Summary    Normal right lower extremity venous duplex scan.      Results for orders placed during the hospital encounter of 12/13/24    Duplex Venous Lower Extremity - Right CAR    Interpretation Summary    Normal right lower extremity venous duplex scan.      Results for orders placed during the hospital encounter of 04/08/25    Adult Transthoracic Echo Complete w/ Color, Spectral and Contrast if necessary per protocol    Interpretation Summary    Left ventricular systolic function is mildly decreased. Left ventricular ejection fraction appears to be 41 - 45%.    Left ventricular wall thickness is consistent with mild concentric hypertrophy.    Left ventricular diastolic dysfunction is noted.    There is a moderate sized, pedunculated, solid thrombus located in the basal inferoseptum of the left ventricle. The thrombus  measures 1.4 cm in diameter and is mobile.      Labs Pending at Discharge:  Pending Labs       Order Current Status    Blood Culture - Blood, Hand, Left Preliminary result    Blood Culture - Blood, Hand, Left Preliminary result              Time spent on Discharge including face to face service:  ~40 minutes    Electronically signed by Jose Butts MD, 04/10/25, 2:42 PM EDT.

## 2025-04-10 NOTE — H&P
[facility]  HISTORY AND PHYSICAL   [facility]        Patient Identification:  Name: Desiree Garcia  Age: 65 y.o.  Sex: female  :  1960  MRN: [id number]                     Primary Care Physician: Sandra Kaba MD    Chief Complaint:    LV thrombus, cardiogenic shock, inferior STEMI, coronary artery disease    History of Present Illness:   65-year-old female with a history of coronary artery disease, chronic obstructive pulmonary disease, diabetes mellitus, hypertension, gastroesophageal reflux disease who presented to the hospital at [facility] for abdominal pain on .    On presentation to the emergency department patient was found to have ST elevation in the inferior leads and was taken for emergent cardiac cath and noted to have a distal RCA thrombosis and underwent mechanical aspiration thrombectomy and PCI with drug-eluting stents x 2.  Also noted to have multiple abnormalities at CT angiogram with no intervention as per vascular surgery.  Echocardiogram was performed which demonstrated an LV thrombus and she was initiated on heparin and Brilinta therapy.  Also requiring Levophed, milrinone, amiodarone for cardiogenic shock.  Discussions were had and patient was transferred to [facility] for evaluation by cardiothoracic surgery Dr. Ng.    Prior to transfer, this morning patient was found to be more hypotensive requiring escalation of her vasopressors.  Also more somnolent mental status.  On evaluation of patient she is complaining of abdominal pain.  Denies any shortness of breath or cough.  No chest pain.  Is somnolent and unable to provide an adequate history.    Past Medical History:  Past Medical History:   Diagnosis Date    Arthritis     Cervical cancer screening     COPD (chronic obstructive pulmonary disease)     Coronary artery disease involving native heart without angina pectoris 2021    primary angioplasty and  stent placement to mid right coronary artery with good angiographic results on 2021 after STEMI    Depression with anxiety     Diabetes mellitus     Forgetfulness     Gastric reflux     Hypertension     Leg paresthesia 2017    Right    Limb swelling     Lumbago 2017    Low back pain    Lumbar spinal stenosis 2017    L4/5 moderate to severe central canal stenosis    Lumbosacral radiculopathy 2017    Right    Migraines     Night sweat     Reflux esophagitis     Shortness of breath     ST elevation myocardial infarction (STEMI) (CMS/[facility]) 2021    Stomach disorder      Past Surgical History:  Past Surgical History:   Procedure Laterality Date    ABDOMINAL SURGERY      3 C-SECTIONS    CARDIAC CATHETERIZATION      CARDIAC CATHETERIZATION N/A 2021    Procedure: Left Heart Cath;  Surgeon: Sidney Xiao MD;  Location: [facility] CATH INVASIVE LOCATION;  Service: Cardiology;  Laterality: N/A;    CARDIAC CATHETERIZATION N/A 2025    Procedure: Left Heart Cath;  Surgeon: James Verdugo MD;  Location: [facility] CATH INVASIVE LOCATION;  Service: Cardiology;  Laterality: N/A;     SECTION      x 3    CHOLECYSTECTOMY      COLONOSCOPY      COLONOSCOPY N/A 3/25/2025    Procedure: COLONOSCOPY WITH BIOPSIES AND COLD AND HOT SNARE POLYPECTOMIES;  Surgeon: Heber Najera MD;  Location: [facility] ENDOSCOPY;  Service: Gastroenterology;  Laterality: N/A;  COLON POLYPS    CORONARY STENT PLACEMENT      ENDOSCOPY      2007    FOOT SURGERY Right     HAND SURGERY      HYSTERECTOMY  1985      Home Meds:  Medications Prior to Admission   Medication Sig Dispense Refill Last Dose/Taking    acetaminophen (TYLENOL) 325 MG tablet Take 2 tablets by mouth Every 4 (Four) Hours As Needed for Mild Pain or Fever (temperature greater than 101F).       albuterol (PROVENTIL) (2.5 MG/3ML) 0.083% nebulizer solution Take 2.5 mg by nebulization Every 6 (Six) Hours As Needed for Wheezing or Shortness  of Air.       amiodarone in dextrose 5% (NEXTERONE) infusion Infuse 0.5 mg/min into a venous catheter Continuous.       amiodarone in dextrose 5% (NEXTERONE) infusion Infuse 0.5 mg/min into a venous catheter Continuous.       arformoterol (BROVANA) 15 MCG/2ML nebulizer solution Take 2 mL by nebulization 2 (Two) Times a Day.       [START ON 4/11/2025] aspirin 81 MG EC tablet Take 1 tablet by mouth Daily.       atorvastatin (LIPITOR) 40 MG tablet Take 1 tablet by mouth Every Night for 90 days. 90 tablet 3     budesonide (PULMICORT) 0.5 MG/2ML nebulizer solution Take 2 mL by nebulization 2 (Two) Times a Day.       dextrose (D50W) 50 % solution Infuse 50 mL into a venous catheter Every 15 (Fifteen) Minutes As Needed for Low Blood Sugar (Blood Sugar Less Than 70).       dextrose (GLUTOSE) 40 % gel Take 15 g by mouth Every 15 (Fifteen) Minutes As Needed for Low Blood Sugar (Blood sugar less than 70).       famotidine (PEPCID) 10 MG/ML solution injection Infuse 2 mL into a venous catheter Every 12 (Twelve) Hours.       glucagon (GLUCAGEN) 1 MG injection Inject 1 mg into the appropriate muscle as directed by prescriber Every 15 (Fifteen) Minutes As Needed (Blood Glucose Less Than 70).       heparin 100 unit/mL solution Infuse 34.6 mL into a venous catheter As Needed (Per Heparin Nomogram PTT less than 62 seconds). Indications: DVT/PE (active thrombosis)       heparin 100 unit/mL solution Infuse 17.3 mL into a venous catheter As Needed (Per Heparin Nomogram PTT 62-69.9 seconds). Indications: DVT/PE (active thrombosis)       Heparin, Porcine, 03123-5.45 UT/250ML-% infusion Infuse 1,243 Units/hr into a venous catheter Dose Adjusted By Provider As Needed. Indications: DVT/PE (active thrombosis)       [START ON 4/11/2025] insulin glargine (LANTUS, SEMGLEE) 100 UNIT/ML injection Inject 10 Units under the skin into the appropriate area as directed Daily.       insulin regular (humuLIN R,novoLIN R) 100 UNIT/ML injection Inject 2-7  Units under the skin into the appropriate area as directed Every 6 (Six) Hours.       Jardiance 25 MG tablet tablet Take 1 tablet by mouth Daily.       Milrinone Lactate in Dextrose 20-5 MG/100ML-% infusion Infuse 17.275 mcg/min into a venous catheter Dose Adjusted By Provider As Needed.       mupirocin (BACTROBAN) 2 % nasal ointment 1 Application by Each Nare route 2 (Two) Times a Day for 7 doses.       nitroglycerin (NITROSTAT) 0.4 MG SL tablet Place 1 tablet under the tongue Every 5 (Five) Minutes As Needed for Chest Pain (Systolic BP Greater Than 100). Take no more than 3 doses in 15 minutes.       Norepinephrine-Sodium Chloride (LEVOPHED) 8-0.9 MG/250ML-% solution infusion Infuse 1.382-20.73 mcg/min into a venous catheter Dose Adjusted By Provider As Needed.       ondansetron (ZOFRAN) 2 mg/mL injection Infuse 2 mL into a venous catheter Every 6 (Six) Hours As Needed for Nausea or Vomiting.       [START ON 4/11/2025] revefenacin (YUPELRI) 175 MCG/3ML nebulizer solution Take 3 mL by nebulization Daily.       sodium chloride 0.9 % bolus Infuse 1,000 mL into a venous catheter 1 (One) Time for 1 dose.       ticagrelor (BRILINTA) 90 MG tablet tablet Take 1 tablet by mouth 2 (Two) Times a Day.          Allergies:  Allergies   Allergen Reactions    Tramadol Itching     Immunizations:  Immunization History   Administered Date(s) Administered    Influenza, Unspecified 10/09/2020    Tdap 11/10/2022     Social History:   Social History     Social History Narrative    Lives alone     Social History     Tobacco Use    Smoking status: Every Day     Current packs/day: 1.00     Average packs/day: 1 pack/day for 58.2 years (58.2 ttl pk-yrs)     Types: Cigarettes     Start date: 1/14/1967     Passive exposure: Current    Smokeless tobacco: Never    Tobacco comments:     Smoked 21-30 years   Substance Use Topics    Alcohol use: Never     Comment: Does not drink     Family History:  Family History   Problem Relation Age of Onset     Stroke Mother     Lung cancer Mother         Unspecified    Arthritis Mother     Stroke Father     Heart disease Father     Diabetes Father         Unspecified type    Arthritis Father     Heart attack Father     Stroke Sister     Arthritis Sister     Breast cancer Sister     Stroke Brother     Heart disease Brother     Diabetes Brother         Unspecified type    Arthritis Brother     Colon cancer Neg Hx         Review of Systems  Unable to obtain due to mental status    Objective:  tMax 24 hrs: Temp (24hrs), Av.1 °F (36.7 °C), Min:97.7 °F (36.5 °C), Max:98.5 °F (36.9 °C)    Vitals Ranges:   Temp:  [97.7 °F (36.5 °C)-98.5 °F (36.9 °C)] 97.8 °F (36.6 °C)  Heart Rate:  [63-83] 76  Resp:  [18-30] 20  BP: ()/(44-81) 110/56      Exam:  There were no vitals taken for this visit.    General: Somnolent, weak appearing  HEENT: NC/AT, EOMI, MMM  Neck: Supple, trachea midline  Cardiac: RRR, no murmur, gallops, rubs  Pulmonary: Diminished bilaterally  GI: Soft, non-tender, non-distended, normal bowel sounds  Extremities: Mottled extremities  Skin: no visible rash  Neuro: CN II - XII grossly intact      Data Review:    Labs:  Results from last 7 days   Lab Units 04/10/25  0215 25  0559 25  1728   WBC 10*3/mm3 18.86* 21.49* 21.93*   HEMOGLOBIN g/dL 14.1 14.8 15.4   PLATELETS 10*3/mm3 258 296 325     Results from last 7 days   Lab Units 04/10/25  0907 04/10/25  0215 25  0602 25  1728   SODIUM mmol/L 125* 127* 131* 131*   POTASSIUM mmol/L 3.7 3.7 3.6 3.7   CHLORIDE mmol/L 88* 89* 92* 90*   CO2 mmol/L 20.8* 22.4 26.7 25.8   BUN mg/dL 33* 25* 10 9   CREATININE mg/dL 1.40* 1.21* 0.80 0.74   GLUCOSE mg/dL 177* 203* 248* 294*   CALCIUM mg/dL 8.5* 8.7 9.7 10.1   MAGNESIUM mg/dL  --  2.3 1.9 1.7   PHOSPHORUS mg/dL  --  4.9* 3.1  --    Estimated Creatinine Clearance: 44.3 mL/min (A) (by C-G formula based on SCr of 1.4 mg/dL (H)).    Results from last 7 days   Lab Units 04/10/25  0907 04/10/25  0211  04/09/25 2134 04/09/25  0559 04/08/25 2059 04/08/25 2034 04/08/25  1728   AST (SGOT) U/L 72*  --   --   --   --   --  45*   ALT (SGPT) U/L 36*  --   --   --   --   --  16   PROCALCITONIN ng/mL  --   --   --   --   --  0.13  --    LACTATE mmol/L 1.4  --  1.6  --  2.0  --   --    PLATELETS 10*3/mm3  --  258  --  296  --   --  325             Result Review:  I have personally reviewed the results from the time of this admission to 4/10/2025 17:55 EDT and agree with these findings:  [x]  Laboratory list / accordion  [x]  Microbiology  [x]  Radiology  [x]  EKG/Telemetry   [x]  Cardiology/Vascular   []  Pathology  [x]  Old records  []  Other:    Assessment / Plan:    Cardiogenic shock  Left ventricular thrombus  Acute inferior wall myocardial infarction status post drug-eluting stent x 2 to RCA  Acute hypoxic respiratory failure  Nonsustained V. tach  Severe occlusive peripheral vascular disease   Complete occlusion of distal branches of left internal iliac artery, left superficial femoral artery, distal aspect of left renal artery, distal aspect of splenic artery  Chronic obstructive pulmonary disease  Bilateral pulmonary nodules-less than 6 mm  History of coronary artery disease status post stenting in 2021  Diabetes mellitus type 2  Hypertension  Hyperlipidemia  Hyponatremia  Acute kidney injury  Somnolence  Tobacco abuse    -Patient initially presented to [facility] with abdominal pain and found to have an inferior STEMI status post PCI and stenting to the RCA x 2.  -Cardiothoracic and cardiology consulted  - Echocardiogram demonstrated LV thrombus, initiated on heparin drip.  - Brilinta 90 mg twice daily, aspirin 81 mg daily  -Cardiogenic shock on Levophed and milrinone drip  - Amiodarone drip was started by cardiology at [facility] for nonsustained V. Tach  - Evaluate by vascular surgery however was not a candidate for intervention for her severe occlusive peripheral vascular disease.  - Bronchodilator  therapy with Pulmicort, Brovana, Yupelri, albuterol.  - Supplemental oxygen for SpO2 goal 88 to 92%.  - Was actively smoking prior to admission  - Insulin titration for diabetes  - Atorvastatin 40 mg daily  - Worsening renal failure likely in the setting of cardiogenic shock.  Will consult nephrology.  - Urine studies for hyponatremia  -Somnolent on evaluation, may just be secondary to cardiogenic shock and poor perfusion, but also with significant COPD, will evaluate with ABG.  - Patient presented as outside hospital transfer in critical condition in the setting of cardiogenic shock, LV thrombus, myocardial infarction, nonsustained V. tach, severe peripheral vascular disease.  Prognosis is extremely guarded.    GI prophylaxis: Famotidine  DVT prophylaxis: Heparin drip  Storm catheter: No  Bowel regimen: Ordered  Nutrition: N.p.o.    Disposition: ICU due to critical state    Critical care: 50 minutes    Abhishek Tabares MD  [facility], [facility]  Pulmonary and Critical Care Medicine, Interventional Pulmonology    4/10/2025  16:53 EDT

## 2025-04-10 NOTE — PROGRESS NOTES
INTERVENTIONAL CARDIOLOGY  INPATIENT PROGRESS NOTE        PATIENT IDENTIFICATION:  Name:  Desiree Garcia        MRN:  [id number]  65 y.o.  female            Chief Complain:   Abdominal pain    SUBJECTIVE:   Overnight, patient had low blood pressure, was subsequently started on milrinone and Levophed drip.  Continues to be intermittently.  Denies chest pain.    OBJECTIVE:  Vitals:    04/10/25 1045 04/10/25 1100 04/10/25 1105 04/10/25 1115   BP: (!) 76/54 (!) 81/52 90/60 101/65   BP Location:   Left arm    Patient Position:   Lying    Pulse: 71 72 71 72   Resp:   21    Temp:   97.7 °F (36.5 °C)    TempSrc:   Oral    SpO2: 92% 91% 93% 91%   Weight:       Height:               Body mass index is 24.59 kg/m².    Intake/Output Summary (Last 24 hours) at 4/10/2025 1423  Last data filed at 4/10/2025 0500  Gross per 24 hour   Intake 1117 ml   Output --   Net 1117 ml         Physical Exam  General : Alert, awake, no acute distress  Neck : No jugular venous distention  CVS : Regular rate and rhythm, no murmur  Lungs: Clear to auscultation bilaterally, no crackles or rhonchi  Abdomen: Soft, nontender  Extremities: Bilateral lower extremity are cold.        Allergies   Allergen Reactions    Tramadol Itching     Scheduled meds:  arformoterol, 15 mcg, Nebulization, BID - RT   And  budesonide, 0.5 mg, Nebulization, BID - RT   And  revefenacin, 175 mcg, Nebulization, Daily - RT  aspirin, 81 mg, Oral, Daily  atorvastatin, 40 mg, Oral, Nightly  [Held by provider] carvedilol, 6.25 mg, Oral, BID  empagliflozin, 25 mg, Oral, Daily  famotidine, 20 mg, Intravenous, Q12H  [Held by provider] furosemide, 20 mg, Oral, Daily  [Held by provider] gabapentin, 100 mg, Oral, Nightly  insulin glargine, 10 Units, Subcutaneous, Daily  insulin regular, 2-7 Units, Subcutaneous, Q6H  mupirocin, 1 Application, Each Nare, BID  sodium chloride, 1,000 mL, Intravenous, Once  [Held by provider] spironolactone, 25 mg, Oral, Daily  ticagrelor, 90 mg, Oral,  "BID      IV meds:                      amiodarone, 0.5 mg/min, Last Rate: 0.5 mg/min (04/10/25 1138)  heparin, 18 Units/kg/hr, Last Rate: 18 Units/kg/hr (04/10/25 1244)  milrinone, 0.25 mcg/kg/min, Last Rate: 0.25 mcg/kg/min (04/10/25 1033)  norepinephrine, 0.02-0.3 mcg/kg/min, Last Rate: 0.28 mcg/kg/min (04/10/25 1345)        Data Review:  CBC          4/8/2025    17:28 4/9/2025    05:59 4/10/2025    02:15   CBC   WBC 21.93  21.49  18.86    RBC 5.43  5.30  4.98    Hemoglobin 15.4  14.8  14.1    Hematocrit 46.3  45.0  42.4    MCV 85.3  84.9  85.1    MCH 28.4  27.9  28.3    MCHC 33.3  32.9  33.3    RDW 13.7  13.9  13.8    Platelets 325  296  258      CMP          4/8/2025    17:28 4/9/2025    06:02 4/10/2025    02:15 4/10/2025    09:07   CMP   Glucose 294  248  203  177    BUN 9  10  25  33    Creatinine 0.74  0.80  1.21  1.40    EGFR 90.5  82.4  50.2  42.1    Sodium 131  131  127  125    Potassium 3.7  3.6  3.7  3.7    Chloride 90  92  89  88    Calcium 10.1  9.7  8.7  8.5    Total Protein 8.3    6.3    Albumin 4.0    2.7    Globulin 4.3    3.6    Total Bilirubin 1.1    1.1    Alkaline Phosphatase 159    126    AST (SGOT) 45    72    ALT (SGPT) 16    36    Albumin/Globulin Ratio 0.9    0.8    BUN/Creatinine Ratio 12.2  12.5  20.7  23.6    Anion Gap 15.2  12.3  15.6  16.2       CARDIAC LABS:      Lab 04/10/25  1244 04/08/25 2034 04/08/25  1728   PROBNP 9,388.0*  --  15,432.0*   HSTROP T  --  971* 835*   PROTIME 17.4*  --   --    INR 1.36*  --   --         No results found for: \"DIGOXIN\"   Lab Results   Component Value Date    TSH 4.000 08/08/2024     Results from last 7 days   Lab Units 04/09/25  0602   CHOLESTEROL mg/dL 147   HDL CHOL mg/dL 42     Lab Results   Component Value Date    POCTROP 0.21 06/21/2021     Lab Results   Component Value Date    TROPONINT 971 (C) 04/08/2025   (  Lab Results   Component Value Date    MG 2.3 04/10/2025     Results for orders placed during the hospital encounter of " 04/08/25    Adult Transthoracic Echo Complete w/ Color, Spectral and Contrast if necessary per protocol    Interpretation Summary    Left ventricular systolic function is mildly decreased. Left ventricular ejection fraction appears to be 41 - 45%.    Left ventricular wall thickness is consistent with mild concentric hypertrophy.    Left ventricular diastolic dysfunction is noted.    There is a moderate sized, pedunculated, solid thrombus located in the basal inferoseptum of the left ventricle. The thrombus measures 1.4 cm in diameter and is mobile.           ASSESSMENT:    STEMI (ST elevation myocardial infarction)    Type 2 diabetes mellitus, without long-term current use of insulin    Hypertension, essential    Mixed hyperlipidemia    Acute thrombus of left ventricle        PLAN:  - Patient is in critical and life-threatening condition.  Repeat echocardiogram was done today given low blood pressure and to rule out LV thrombus which was not properly visualized in previous echocardiogram despite Lumason contrast study.  Echocardiogram from today showed a 1.4 cm pedunculated LV thrombus in the basal inferoseptum.  LVEF improved to 41 to 45% with mild global hypokinesis.  - Continue heparin drip with a higher PTT target given LV thrombus.  - Blood pressure is soft, patient currently on Levophed 0.2 and milrinone 0.25.  Uptitrate as needed to keep MAP above 65.  - Hold antihypertensives and Venkat medical therapy at this time.  Continue aspirin 81 mg daily, Brilinta 90 mg twice daily.  High intensity statin.  - Will give 500 mL of bolus normal saline.  Encourage oral feeding.  - Case discussed with ICU/critical care.   -Patient is at high risk for having thromboembolic stroke and other embolic phenomenon.  Patient is not a candidate for Impella given LV thrombus.  If hemodynamics deteriorate, intra-aortic balloon pump may be placed.  Currently MAP above 65 on Levophed 0.2 and milrinone 0.25.  Patient saturating well  on 2 L oxygen.  Continue to monitor.  - Case discussed with cardiothoracic surgeon Dr. Cortez at [facility], who stated he will look at the images and let me know.        Addendum at 2.15 PM  Case discussed with cardiothoracic surgery at [facility] Dr. Ng, who reviewed the images.  We will be transferring the patient to [facility] for further care.  Discussed with patient at bedside and her Sister Yulisa over phone, and mutual decision was made to transfer to [facility].  Hospitalist and ICU critical care attending updated.      James Verdugo MD, [facility]  4/10/2025    14:23 EDT         I spent 50 minutes caring for this patient on this date of service. This time includes time spent by me in the following activities:preparing for the visit, reviewing tests, obtaining and/or reviewing a separately obtained history, performing a medically appropriate examination and/or evaluation , counseling and educating the patient/family/caregiver, ordering medications, tests, or procedures, referring and communicating with other health care professionals , documenting information in the medical record, independently interpreting results and communicating that information with the patient/family/caregiver, and care coordination. The patient was seen and examined. Work by the provider also included review and/or ordering of lab tests, review and/or ordering of radiology tests, review and/or ordering of medicine tests, discussion with other physicians or providers, independent review of data, obtaining old records, review/summation of old records, and/or other review.

## 2025-04-10 NOTE — PROGRESS NOTES
[facility]     Progress Note    Patient Name: Desiree Garcia  : 1960  MRN: [id number]  Primary Care Physician:  Sandra Kaba MD  Date of admission: 2025    Subjective   Subjective     Follow-up for left SFA occlusion.    Patient doing well, no complaints.  No problems with the left leg.    Objective   Objective     Vitals:   Temp:  [97.7 °F (36.5 °C)-98.5 °F (36.9 °C)] 97.7 °F (36.5 °C)  Heart Rate:  [63-83] 72  Resp:  [20-30] 21  BP: ()/(44-81) 101/65  Flow (L/min) (Oxygen Therapy):  [2] 2    Physical Exam   General: Alert, no acute distress.  HEENT: PERRLA  Abdomen: Benign  Extremities: Symmetric.  Bilateral feet: Grossly normal color, temperature and motor function.  Neuro: No gross deficits        Assessment & Plan   Assessment / Plan     Assessment/Plan:    Stable from the vascular surgery standpoint with evidence of occlusive peripheral vascular disease.  Follow-up with me as an outpatient in 3 to 4 weeks.    Active Hospital Problems:  Active Hospital Problems    Diagnosis     **STEMI (ST elevation myocardial infarction)     Acute thrombus of left ventricle     Mixed hyperlipidemia     Hypertension, essential     Type 2 diabetes mellitus, without long-term current use of insulin            Electronically signed by Payam Tamez MD, 04/10/25, 3:58 PM EDT.

## 2025-04-10 NOTE — PROGRESS NOTES
[facility]     Progress Note    Patient Name: Desiree Garcia  : 1960  MRN: [id number]  Primary Care Physician:  Sandra Kaba MD  Date of admission: 2025    Subjective   Subjective       Chief Complaint:   Patient is critically ill in ICU with ST elevation MI, severe peripheral vascular disease with thrombotic abdominal vessels, splenic and renal infarcts.    Critical drips: Levophed drip  Lines and tubes: Peripheral lines    Over last 24 hours, patient remained in ICU on Levophed and amiodarone drips.  Remained on heparin drip.  Cardiology is on board.  Remained on Brovana, Pulmicort and Yupelri.  Was started on oxycodone for pain control.    Overnight, no acute events.     This morning,  Patient is hypotensive, requiring escalating amount of pressors.  Has mottled extremities.  She is somnolent, not optimally responsive.  Complaining of some abdominal pain.  Serum creatinine is at 1.4.  No fever noted.  Tmax was 99.3 °F.      Review of Systems  Cannot be obtained, patient not optimally responsive.      Objective   Objective     Vitals:   Temp:  [98.3 °F (36.8 °C)-99.3 °F (37.4 °C)] 98.5 °F (36.9 °C)  Heart Rate:  [65-94] 67  Resp:  [14-30] 26  BP: ()/() 96/65  Flow (L/min) (Oxygen Therapy):  [2] 2  Physical Exam   Vital Signs Reviewed  Critically ill, somnolent, bedridden, in mild distress  HEENT:  PERRL, EOMI.  OP, nares clear, no sinus tenderness  Neck:  Supple, No JVD, no thyromegaly  Lymph: no axillary, cervical, supraclavicular lymphadenopathy noted bilaterally  Chest:  poor aeration, diminished breath sounds, resonant to percussion b/l, no increased work of breathing noted  CV: RRR, no MGR, pulses 2+, equal  Abd:  Soft, NT, ND, + BS, no HSM  EXT: Bilateral mottled extremities, dopplerable pulses in extremities  Neuro: Somnolent, has generalized weakness  Skin: No rashes or lesions noted      Result Review    Result Review:  I have personally reviewed the results from the  time of this admission to 4/10/2025 07:10 EDT and agree with these findings:  [x]  Laboratory  [x]  Microbiology  [x]  Radiology  [x]  EKG/Telemetry   [x]  Cardiology/Vascular   []  Pathology  []  Old records  []  Other:  Most notable findings include:         Lab 04/10/25  0215 04/09/25  0602 04/09/25  0559 04/08/25  1728   WBC 18.86*  --  21.49* 21.93*   HEMOGLOBIN 14.1  --  14.8 15.4   HEMATOCRIT 42.4  --  45.0 46.3   PLATELETS 258  --  296 325   SODIUM 127* 131*  --  131*   POTASSIUM 3.7 3.6  --  3.7   CHLORIDE 89* 92*  --  90*   CO2 22.4 26.7  --  25.8   BUN 25* 10  --  9   CREATININE 1.21* 0.80  --  0.74   GLUCOSE 203* 248*  --  294*   CALCIUM 8.7 9.7  --  10.1   PHOSPHORUS 4.9* 3.1  --   --    TOTAL PROTEIN  --   --   --  8.3   ALBUMIN  --   --   --  4.0   GLOBULIN  --   --   --  4.3            Assessment & Plan   Assessment / Plan     Brief Patient Summary:  Desiree Garcia is a 65 y.o. female   Acute inferior wall MI  Status post drug-eluting stents x 2 to RCA  Severe peripheral vascular disease  Complete occlusion of distal branches of left internal [place] artery, left superficial femoral artery, distal aspect of left renal artery, distal aspect of the splenic artery  Concern for splenic infarcts  Multiple bilateral pulmonary nodules  COPD  History of coronary artery disease status post stenting in 2021  Type 2 diabetes mellitus  Essential hypertension  Hyperlipidemia  Peripheral arterial disease       Active Hospital Problems:  Active Hospital Problems    Diagnosis     **STEMI (ST elevation myocardial infarction)     Mixed hyperlipidemia     Hypertension, essential     Type 2 diabetes mellitus, without long-term current use of insulin      Plan:   Cardiology on board.  Status post stenting to the RCA x 2.  Repeating echocardiogram with concern of cardioembolic thrombus.  Continue with Brilinta 90 mg twice daily.  Continue with heparin drip.  Switch the dose to DVT/PE protocol with higher PTT  target.  Was on amiodarone drip.  Per cardiology service.  Currently requiring escalating amount of pressors, currently on Levophed at 0.2 mcg/kg/min.  Starting milrinone drip.  Vascular surgery service was consulted.  Not a surgical candidate at this time.  Planning to follow-up as an outpatient.  Continue with Renny Mckinneyort.  Continue with Yupelri.  Needs to quit smoking.   Continue lipitor. Start pepcid.   Continue lasix and aldactone, continue jardiance.  Continue carvedilol.   May need central line and A-line for close hemodynamic monitoring.  Hold antihypertensives, opiate and gabapentin.    VTE Prophylaxis:  Pharmacologic VTE prophylaxis orders are present.        CODE STATUS:   Code Status (Patient has no pulse and is not breathing): CPR (Attempt to Resuscitate)  Medical Interventions (Patient has pulse or is breathing): Full Support  Level Of Support Discussed With: Patient      Patient is critically ill in ICU with concern for cardiogenic shock, cardioembolic phenomenon, worsening clinical status, nonsustained V tach, MI s/p stenting, severe peripheral vascular disease, thrmbosis lower ext vessels and splenic infarct.   I spent 108 minutes of critical care time, excluding any procedure notes, in review, analysis, obtaining history and physical, formulating care plan, and I led multi-disciplinary critical care rounds with bedside nurse, respiratory therapist, clinical pharmacist and other allied services. I have discussed the case with primary service and other consultants as well.       Electronically signed by Mynor Leal MD, 4/10/2025, 07:10 EDT.

## 2025-04-10 NOTE — TELEPHONE ENCOUNTER
Spoke called bed board spoke with Radha she will work on transferring pt from [facility] to [facility] under Dr. Pleitez for CAD to CI/CU.

## 2025-04-11 ENCOUNTER — APPOINTMENT (OUTPATIENT)
Dept: CT IMAGING | Facility: HOSPITAL | Age: 65
DRG: 270 | End: 2025-04-11
Payer: MEDICARE

## 2025-04-11 ENCOUNTER — APPOINTMENT (OUTPATIENT)
Dept: GENERAL RADIOLOGY | Facility: HOSPITAL | Age: 65
DRG: 270 | End: 2025-04-11
Payer: MEDICARE

## 2025-04-11 LAB
ANION GAP SERPL CALCULATED.3IONS-SCNC: 12.5 MMOL/L (ref 5–15)
ANION GAP SERPL CALCULATED.3IONS-SCNC: 13.8 MMOL/L (ref 5–15)
APTT PPP: 74.1 SECONDS (ref 22.7–35.4)
BASOPHILS # BLD MANUAL: 0 10*3/MM3 (ref 0–0.2)
BASOPHILS # BLD MANUAL: 0 10*3/MM3 (ref 0–0.2)
BASOPHILS NFR BLD MANUAL: 0 % (ref 0–1.5)
BASOPHILS NFR BLD MANUAL: 0 % (ref 0–1.5)
BUN SERPL-MCNC: 36 MG/DL (ref 8–23)
BUN SERPL-MCNC: 38 MG/DL (ref 8–23)
BUN/CREAT SERPL: 29 (ref 7–25)
BUN/CREAT SERPL: 29.9 (ref 7–25)
CALCIUM SPEC-SCNC: 7.8 MG/DL (ref 8.6–10.5)
CALCIUM SPEC-SCNC: 8.4 MG/DL (ref 8.6–10.5)
CHLORIDE SERPL-SCNC: 95 MMOL/L (ref 98–107)
CHLORIDE SERPL-SCNC: 97 MMOL/L (ref 98–107)
CO2 SERPL-SCNC: 20.2 MMOL/L (ref 22–29)
CO2 SERPL-SCNC: 20.5 MMOL/L (ref 22–29)
CREAT SERPL-MCNC: 1.24 MG/DL (ref 0.57–1)
CREAT SERPL-MCNC: 1.27 MG/DL (ref 0.57–1)
D-LACTATE SERPL-SCNC: 1 MMOL/L (ref 0.5–2)
D-LACTATE SERPL-SCNC: 2.2 MMOL/L (ref 0.5–2)
DEPRECATED RDW RBC AUTO: 41.2 FL (ref 37–54)
DEPRECATED RDW RBC AUTO: 42.4 FL (ref 37–54)
EGFRCR SERPLBLD CKD-EPI 2021: 47.3 ML/MIN/1.73
EGFRCR SERPLBLD CKD-EPI 2021: 48.7 ML/MIN/1.73
EOSINOPHIL # BLD MANUAL: 0 10*3/MM3 (ref 0–0.4)
EOSINOPHIL # BLD MANUAL: 0.09 10*3/MM3 (ref 0–0.4)
EOSINOPHIL NFR BLD MANUAL: 0 % (ref 0.3–6.2)
EOSINOPHIL NFR BLD MANUAL: 1 % (ref 0.3–6.2)
ERYTHROCYTE [DISTWIDTH] IN BLOOD BY AUTOMATED COUNT: 13.7 % (ref 12.3–15.4)
ERYTHROCYTE [DISTWIDTH] IN BLOOD BY AUTOMATED COUNT: 14.2 % (ref 12.3–15.4)
GLUCOSE BLDC GLUCOMTR-MCNC: 105 MG/DL (ref 70–130)
GLUCOSE BLDC GLUCOMTR-MCNC: 108 MG/DL (ref 70–130)
GLUCOSE BLDC GLUCOMTR-MCNC: 71 MG/DL (ref 70–130)
GLUCOSE BLDC GLUCOMTR-MCNC: 75 MG/DL (ref 70–130)
GLUCOSE SERPL-MCNC: 106 MG/DL (ref 65–99)
GLUCOSE SERPL-MCNC: 75 MG/DL (ref 65–99)
HCT VFR BLD AUTO: 36 % (ref 34–46.6)
HCT VFR BLD AUTO: 39 % (ref 34–46.6)
HGB BLD-MCNC: 12.1 G/DL (ref 12–15.9)
HGB BLD-MCNC: 12.8 G/DL (ref 12–15.9)
LYMPHOCYTES # BLD MANUAL: 0.86 10*3/MM3 (ref 0.7–3.1)
LYMPHOCYTES # BLD MANUAL: 0.99 10*3/MM3 (ref 0.7–3.1)
LYMPHOCYTES NFR BLD MANUAL: 21.2 % (ref 5–12)
LYMPHOCYTES NFR BLD MANUAL: 21.6 % (ref 5–12)
MAGNESIUM SERPL-MCNC: 2.3 MG/DL (ref 1.6–2.4)
MCH RBC QN AUTO: 27.5 PG (ref 26.6–33)
MCH RBC QN AUTO: 27.9 PG (ref 26.6–33)
MCHC RBC AUTO-ENTMCNC: 32.8 G/DL (ref 31.5–35.7)
MCHC RBC AUTO-ENTMCNC: 33.6 G/DL (ref 31.5–35.7)
MCV RBC AUTO: 82.9 FL (ref 79–97)
MCV RBC AUTO: 83.7 FL (ref 79–97)
METAMYELOCYTES NFR BLD MANUAL: 1 % (ref 0–0)
MONOCYTES # BLD: 1.99 10*3/MM3 (ref 0.1–0.9)
MONOCYTES # BLD: 2.08 10*3/MM3 (ref 0.1–0.9)
NEUTROPHILS # BLD AUTO: 6.36 10*3/MM3 (ref 1.7–7)
NEUTROPHILS # BLD AUTO: 6.56 10*3/MM3 (ref 1.7–7)
NEUTROPHILS NFR BLD MANUAL: 67.7 % (ref 42.7–76)
NEUTROPHILS NFR BLD MANUAL: 68 % (ref 42.7–76)
NRBC BLD AUTO-RTO: 0 /100 WBC (ref 0–0.2)
NRBC BLD AUTO-RTO: 0 /100 WBC (ref 0–0.2)
PHOSPHATE SERPL-MCNC: 4.8 MG/DL (ref 2.5–4.5)
PLAT MORPH BLD: NORMAL
PLAT MORPH BLD: NORMAL
PLATELET # BLD AUTO: 258 10*3/MM3 (ref 140–450)
PLATELET # BLD AUTO: 302 10*3/MM3 (ref 140–450)
PMV BLD AUTO: 10.8 FL (ref 6–12)
PMV BLD AUTO: 11 FL (ref 6–12)
POIKILOCYTOSIS BLD QL SMEAR: ABNORMAL
POIKILOCYTOSIS BLD QL SMEAR: ABNORMAL
POLYCHROMASIA BLD QL SMEAR: ABNORMAL
POLYCHROMASIA BLD QL SMEAR: ABNORMAL
POTASSIUM SERPL-SCNC: 3.3 MMOL/L (ref 3.5–5.2)
POTASSIUM SERPL-SCNC: 3.9 MMOL/L (ref 3.5–5.2)
PROCALCITONIN SERPL-MCNC: 1.13 NG/ML (ref 0–0.25)
QT INTERVAL: 412 MS
QTC INTERVAL: 452 MS
RBC # BLD AUTO: 4.34 10*6/MM3 (ref 3.77–5.28)
RBC # BLD AUTO: 4.66 10*6/MM3 (ref 3.77–5.28)
SODIUM SERPL-SCNC: 129 MMOL/L (ref 136–145)
SODIUM SERPL-SCNC: 130 MMOL/L (ref 136–145)
VARIANT LYMPHS NFR BLD MANUAL: 10.3 % (ref 19.6–45.3)
VARIANT LYMPHS NFR BLD MANUAL: 9.1 % (ref 19.6–45.3)
WBC MORPH BLD: NORMAL
WBC MORPH BLD: NORMAL
WBC NRBC COR # BLD AUTO: 9.4 10*3/MM3 (ref 3.4–10.8)
WBC NRBC COR # BLD AUTO: 9.64 10*3/MM3 (ref 3.4–10.8)

## 2025-04-11 PROCEDURE — 25010000002 ONDANSETRON PER 1 MG: Performed by: HOSPITALIST

## 2025-04-11 PROCEDURE — 83605 ASSAY OF LACTIC ACID: CPT

## 2025-04-11 PROCEDURE — 85007 BL SMEAR W/DIFF WBC COUNT: CPT | Performed by: INTERNAL MEDICINE

## 2025-04-11 PROCEDURE — 25010000002 METOCLOPRAMIDE PER 10 MG: Performed by: INTERNAL MEDICINE

## 2025-04-11 PROCEDURE — 25510000001 IOPAMIDOL PER 1 ML: Performed by: INTERNAL MEDICINE

## 2025-04-11 PROCEDURE — 80048 BASIC METABOLIC PNL TOTAL CA: CPT | Performed by: HOSPITALIST

## 2025-04-11 PROCEDURE — 99222 1ST HOSP IP/OBS MODERATE 55: CPT | Performed by: STUDENT IN AN ORGANIZED HEALTH CARE EDUCATION/TRAINING PROGRAM

## 2025-04-11 PROCEDURE — 99222 1ST HOSP IP/OBS MODERATE 55: CPT | Performed by: SURGERY

## 2025-04-11 PROCEDURE — 25010000002 HEPARIN (PORCINE) 25000-0.45 UT/250ML-% SOLUTION: Performed by: INTERNAL MEDICINE

## 2025-04-11 PROCEDURE — 63710000001 REVEFENACIN 175 MCG/3ML SOLUTION: Performed by: HOSPITALIST

## 2025-04-11 PROCEDURE — 85025 COMPLETE CBC W/AUTO DIFF WBC: CPT | Performed by: INTERNAL MEDICINE

## 2025-04-11 PROCEDURE — 25010000002 POTASSIUM CHLORIDE 10 MEQ/100ML SOLUTION: Performed by: HOSPITALIST

## 2025-04-11 PROCEDURE — 74018 RADEX ABDOMEN 1 VIEW: CPT

## 2025-04-11 PROCEDURE — 25010000002 FENTANYL CITRATE (PF) 50 MCG/ML SOLUTION

## 2025-04-11 PROCEDURE — 25010000002 FAMOTIDINE 10 MG/ML SOLUTION: Performed by: HOSPITALIST

## 2025-04-11 PROCEDURE — 85730 THROMBOPLASTIN TIME PARTIAL: CPT | Performed by: INTERNAL MEDICINE

## 2025-04-11 PROCEDURE — 94664 DEMO&/EVAL PT USE INHALER: CPT

## 2025-04-11 PROCEDURE — 93005 ELECTROCARDIOGRAM TRACING: CPT

## 2025-04-11 PROCEDURE — 94761 N-INVAS EAR/PLS OXIMETRY MLT: CPT

## 2025-04-11 PROCEDURE — 25010000002 FENTANYL CITRATE (PF) 50 MCG/ML SOLUTION: Performed by: STUDENT IN AN ORGANIZED HEALTH CARE EDUCATION/TRAINING PROGRAM

## 2025-04-11 PROCEDURE — 82948 REAGENT STRIP/BLOOD GLUCOSE: CPT

## 2025-04-11 PROCEDURE — 99024 POSTOP FOLLOW-UP VISIT: CPT | Performed by: SURGERY

## 2025-04-11 PROCEDURE — 83605 ASSAY OF LACTIC ACID: CPT | Performed by: INTERNAL MEDICINE

## 2025-04-11 PROCEDURE — 80048 BASIC METABOLIC PNL TOTAL CA: CPT | Performed by: SURGERY

## 2025-04-11 PROCEDURE — 94799 UNLISTED PULMONARY SVC/PX: CPT

## 2025-04-11 PROCEDURE — 83735 ASSAY OF MAGNESIUM: CPT | Performed by: HOSPITALIST

## 2025-04-11 PROCEDURE — 85007 BL SMEAR W/DIFF WBC COUNT: CPT | Performed by: SURGERY

## 2025-04-11 PROCEDURE — 84100 ASSAY OF PHOSPHORUS: CPT | Performed by: HOSPITALIST

## 2025-04-11 PROCEDURE — 84145 PROCALCITONIN (PCT): CPT | Performed by: SURGERY

## 2025-04-11 PROCEDURE — 85025 COMPLETE CBC W/AUTO DIFF WBC: CPT | Performed by: SURGERY

## 2025-04-11 PROCEDURE — 94760 N-INVAS EAR/PLS OXIMETRY 1: CPT

## 2025-04-11 PROCEDURE — 25810000003 SODIUM CHLORIDE 0.9 % SOLUTION: Performed by: INTERNAL MEDICINE

## 2025-04-11 PROCEDURE — 74174 CTA ABD&PLVS W/CONTRAST: CPT

## 2025-04-11 PROCEDURE — 93010 ELECTROCARDIOGRAM REPORT: CPT | Performed by: INTERNAL MEDICINE

## 2025-04-11 RX ORDER — ASPIRIN 300 MG/1
300 SUPPOSITORY RECTAL DAILY
Status: DISCONTINUED | OUTPATIENT
Start: 2025-04-11 | End: 2025-04-15

## 2025-04-11 RX ORDER — ASPIRIN 300 MG/1
300 SUPPOSITORY RECTAL EVERY 6 HOURS PRN
Status: DISCONTINUED | OUTPATIENT
Start: 2025-04-11 | End: 2025-04-11

## 2025-04-11 RX ORDER — SODIUM CHLORIDE 9 MG/ML
75 INJECTION, SOLUTION INTRAVENOUS CONTINUOUS
Status: DISCONTINUED | OUTPATIENT
Start: 2025-04-11 | End: 2025-04-12

## 2025-04-11 RX ORDER — POTASSIUM CHLORIDE 7.45 MG/ML
10 INJECTION INTRAVENOUS
Status: COMPLETED | OUTPATIENT
Start: 2025-04-11 | End: 2025-04-11

## 2025-04-11 RX ORDER — METOCLOPRAMIDE HYDROCHLORIDE 5 MG/ML
10 INJECTION INTRAMUSCULAR; INTRAVENOUS EVERY 6 HOURS
Status: DISCONTINUED | OUTPATIENT
Start: 2025-04-11 | End: 2025-04-16

## 2025-04-11 RX ORDER — LIDOCAINE HYDROCHLORIDE 20 MG/ML
JELLY TOPICAL ONCE
Status: COMPLETED | OUTPATIENT
Start: 2025-04-11 | End: 2025-04-11

## 2025-04-11 RX ORDER — FENTANYL CITRATE 50 UG/ML
100 INJECTION, SOLUTION INTRAMUSCULAR; INTRAVENOUS
Refills: 0 | Status: DISCONTINUED | OUTPATIENT
Start: 2025-04-11 | End: 2025-04-15

## 2025-04-11 RX ORDER — IOPAMIDOL 755 MG/ML
100 INJECTION, SOLUTION INTRAVASCULAR
Status: COMPLETED | OUTPATIENT
Start: 2025-04-11 | End: 2025-04-11

## 2025-04-11 RX ADMIN — POTASSIUM CHLORIDE 10 MEQ: 7.46 INJECTION, SOLUTION INTRAVENOUS at 18:31

## 2025-04-11 RX ADMIN — SODIUM CHLORIDE 75 ML/HR: 9 INJECTION, SOLUTION INTRAVENOUS at 23:27

## 2025-04-11 RX ADMIN — BUDESONIDE 0.5 MG: 0.5 INHALANT RESPIRATORY (INHALATION) at 19:15

## 2025-04-11 RX ADMIN — ARFORMOTEROL TARTRATE 15 MCG: 15 SOLUTION RESPIRATORY (INHALATION) at 19:14

## 2025-04-11 RX ADMIN — METOCLOPRAMIDE 10 MG: 5 INJECTION, SOLUTION INTRAMUSCULAR; INTRAVENOUS at 23:07

## 2025-04-11 RX ADMIN — FENTANYL CITRATE 100 MCG: 50 INJECTION, SOLUTION INTRAMUSCULAR; INTRAVENOUS at 01:12

## 2025-04-11 RX ADMIN — ONDANSETRON 4 MG: 2 INJECTION, SOLUTION INTRAMUSCULAR; INTRAVENOUS at 20:24

## 2025-04-11 RX ADMIN — FENTANYL CITRATE 100 MCG: 50 INJECTION, SOLUTION INTRAMUSCULAR; INTRAVENOUS at 18:20

## 2025-04-11 RX ADMIN — POTASSIUM CHLORIDE 10 MEQ: 7.46 INJECTION, SOLUTION INTRAVENOUS at 22:27

## 2025-04-11 RX ADMIN — LIDOCAINE HYDROCHLORIDE: 20 JELLY TOPICAL at 05:35

## 2025-04-11 RX ADMIN — HEPARIN SODIUM 18 UNITS/KG/HR: 10000 INJECTION, SOLUTION INTRAVENOUS at 06:22

## 2025-04-11 RX ADMIN — FAMOTIDINE 20 MG: 10 INJECTION, SOLUTION INTRAVENOUS at 20:28

## 2025-04-11 RX ADMIN — METOCLOPRAMIDE 10 MG: 5 INJECTION, SOLUTION INTRAMUSCULAR; INTRAVENOUS at 17:00

## 2025-04-11 RX ADMIN — FENTANYL CITRATE 100 MCG: 50 INJECTION, SOLUTION INTRAMUSCULAR; INTRAVENOUS at 03:04

## 2025-04-11 RX ADMIN — FENTANYL CITRATE 50 MCG: 50 INJECTION, SOLUTION INTRAMUSCULAR; INTRAVENOUS at 00:28

## 2025-04-11 RX ADMIN — FAMOTIDINE 20 MG: 10 INJECTION, SOLUTION INTRAVENOUS at 08:42

## 2025-04-11 RX ADMIN — METOCLOPRAMIDE 10 MG: 5 INJECTION, SOLUTION INTRAMUSCULAR; INTRAVENOUS at 11:47

## 2025-04-11 RX ADMIN — ONDANSETRON 4 MG: 2 INJECTION, SOLUTION INTRAMUSCULAR; INTRAVENOUS at 00:25

## 2025-04-11 RX ADMIN — FENTANYL CITRATE 50 MCG: 50 INJECTION, SOLUTION INTRAMUSCULAR; INTRAVENOUS at 14:45

## 2025-04-11 RX ADMIN — MUPIROCIN 1 APPLICATION: 20 OINTMENT TOPICAL at 20:28

## 2025-04-11 RX ADMIN — ASPIRIN 300 MG: 300 SUPPOSITORY RECTAL at 10:51

## 2025-04-11 RX ADMIN — IOPAMIDOL 95 ML: 755 INJECTION, SOLUTION INTRAVENOUS at 02:27

## 2025-04-11 RX ADMIN — ARFORMOTEROL TARTRATE 15 MCG: 15 SOLUTION RESPIRATORY (INHALATION) at 08:27

## 2025-04-11 RX ADMIN — SODIUM CHLORIDE 75 ML/HR: 9 INJECTION, SOLUTION INTRAVENOUS at 10:51

## 2025-04-11 RX ADMIN — FENTANYL CITRATE 100 MCG: 50 INJECTION, SOLUTION INTRAMUSCULAR; INTRAVENOUS at 04:17

## 2025-04-11 RX ADMIN — REVEFENACIN 175 MCG: 175 SOLUTION RESPIRATORY (INHALATION) at 08:26

## 2025-04-11 RX ADMIN — BUDESONIDE 0.5 MG: 0.5 INHALANT RESPIRATORY (INHALATION) at 08:28

## 2025-04-11 RX ADMIN — FENTANYL CITRATE 100 MCG: 50 INJECTION, SOLUTION INTRAMUSCULAR; INTRAVENOUS at 08:59

## 2025-04-11 RX ADMIN — FENTANYL CITRATE 100 MCG: 50 INJECTION, SOLUTION INTRAMUSCULAR; INTRAVENOUS at 05:35

## 2025-04-11 RX ADMIN — POTASSIUM CHLORIDE 10 MEQ: 7.46 INJECTION, SOLUTION INTRAVENOUS at 20:21

## 2025-04-11 RX ADMIN — FENTANYL CITRATE 100 MCG: 50 INJECTION, SOLUTION INTRAMUSCULAR; INTRAVENOUS at 22:26

## 2025-04-11 RX ADMIN — POTASSIUM CHLORIDE 10 MEQ: 7.46 INJECTION, SOLUTION INTRAVENOUS at 21:31

## 2025-04-11 RX ADMIN — MUPIROCIN 1 APPLICATION: 20 OINTMENT TOPICAL at 08:42

## 2025-04-11 NOTE — PLAN OF CARE
Goal Outcome Evaluation:      Pt remains in CICU on Heparin, amio, and 2L NC. Pt had increasing abdominal pain, prn pain meds given and intensivist paged. CT abdomen done. NG placed to LWS, 500 ouput. General surgery stat paged, said no new orders at this time, and the dayshift team will see her today. No arrhythmias noted. Levophed titrated off per order. Family and pt updated bedside.

## 2025-04-11 NOTE — PLAN OF CARE
Goal Outcome Evaluation:  Plan of Care Reviewed With: patient        Progress: improving  Outcome Evaluation: Pt remains in CICU. Heparin gtt infusing per MAR. Fluids started today. Amiodarone gtt d/c. Remains on 2 L NC. NG hooked to low intermittent suction, 1100 ml out. Pt and family updated on plan of care.

## 2025-04-11 NOTE — NURSING NOTE
Pt complains of severe abdominal pain after prn pain meds given. Abdomen area is very tender and firm. Noreen ESQUIVEL paged and visiting patient bedside.

## 2025-04-11 NOTE — PROGRESS NOTES
Pulm/CC note    Contacted overnight with concerns for severe abdominal pain.  On assessment, patient is guarding with rebound tenderness, pain is localized to the suprapubic region, however abdomen is taut throughout.   Patient complaining of 10 out of 10 pain despite receiving 50 mcg of fentanyl every hour.    Patient's chart was reviewed-patient has severe occlusive peripheral vascular disease and known occlusion with collaterals of her ONEIDA.    STAT lactic acid ordered to evaluate for compromised endorgan perfusion.  Stat CT angiogram abdomen pelvis ordered to evaluate for possible bowel ischemia versus perforation.    Discussed plan of care with patient's nurse, Yvonne, patient and patient's family members at bedside.    4/11/25 0522  CT angiogram of abdomen pelvis resulted-multiple abnormal findings including left renal and splenic infarcts, left SFA occlusion from origin distally concerning for arterial thrombi embolism, ONEIDA thready patency, sigmoid colon with possible ischemia, long segment extensive bowel enteritis, large bowel and probably small bowel ileus-no bowel perforation.  NG tube to low wall suction ordered.  General surgery consult placed STAT to evaluate.    Total critical care time, excluding billable procedures: 40 minutes

## 2025-04-11 NOTE — CASE MANAGEMENT/SOCIAL WORK
Discharge Planning Assessment  [facility]     Patient Name: Desiree Garcia  MRN: [id number]  Today's Date: 4/11/2025    Admit Date: 4/10/2025    Plan: Pending   Discharge Needs Assessment       Row Name 04/11/25 1341       Living Environment    People in Home child(radha), adult    Current Living Arrangements home    Potentially Unsafe Housing Conditions none    Primary Care Provided by self    Provides Primary Care For no one    Family Caregiver if Needed child(radha), adult;sibling(s)    Able to Return to Prior Arrangements yes       Resource/Environmental Concerns    Resource/Environmental Concerns none       Discharge Needs Assessment    Equipment Currently Used at Home walker, rolling;cane, straight    Concerns to be Addressed discharge planning                   Discharge Plan       Row Name 04/11/25 1342       Plan    Plan Pending    Patient/Family in Agreement with Plan yes    Plan Comments CCP spoke with patient's niece and sister Yulisa at bedside; explained role and verified facesheet. Patient uses a walker and a cane at home. She lives with her son in a 1 level home with 3 steps to enter. Patient has no history of HH or SNF. Patient was transferred from [facility]. She is currently on a heparin drip. CCP will follow for dc planning needs pending clinical course. KYLAH, CSW                  Selected Continued Care - Episodes Includes continued care and service providers with selected services from the active episodes listed below          Expected Discharge Date and Time       Expected Discharge Date Expected Discharge Time    Apr 16, 2025            Demographic Summary       Row Name 04/11/25 1341       General Information    Admission Type inpatient    Arrived From home    Referral Source admission list    Reason for Consult discharge planning    Preferred Language English       Contact Information    Permission Granted to Share Info With family/designee                   Functional  Status       Row Name 04/11/25 1341       Functional Status    Usual Activity Tolerance good       Functional Status, IADL    Medications assistive equipment    Meal Preparation assistive equipment    Housekeeping assistive equipment    Laundry assistive equipment    Shopping assistive equipment       Mental Status    General Appearance WDL WDL                   Psychosocial    No documentation.                  Abuse/Neglect    No documentation.                  Legal    No documentation.                  Substance Abuse    No documentation.                  Patient Forms    No documentation.                     JUAN JOSE Bledsoe

## 2025-04-11 NOTE — CONSULTS
Date of Hospital Visit: 25  Encounter Provider: Santi Erickson MD  Place of Service: [facility] CARDIOLOGY  Patient Name: Desiree Garcia  :1960  Referral Provider: Bryn Merino MD    Chief complaint  Abdominal pain    History of Present Illness  66 yo woman with CAD status post PCI to RCA in  in the setting of a STEMI, hypertension, hyperlipidemia, diabetes, COPD who presented to [facility] with severe abdominal pain.  On arrival there, she was noted to have inferior STEMI.  CTA abdomen pelvis also revealed complete occlusion of the left SFA, left renal artery, splenic artery, occlusion of the ONEIDA with reconstitution distally, as well as findings concerning for splenic and renal infarcts.  She was taken for emergent coronary angiogram which revealed distal circumflex occlusion, mild to moderate proximal LAD disease and a 100% thrombotic occlusion of the RCA.  There was heavy thrombus burden and she received PCI to this with mechanical thrombectomy and overlapping drug-eluting stents.  There was ABILIO I-II flow in the distal vessels post PCI.  Echocardiogram performed 4/10/2025 with an EF of about 45%.  There was a 1.4 cm mobile thrombus in the left ventricle.  She was started on heparin drip and vascular surgery was consulted given the CT findings and recommendation was to continue anticoagulation.  While admitted there, she had a run of NSVT and was started on amiodarone for this.  She was also hypotensive requiring Levophed and was also started on milrinone.  Patient was transferred here for further management.    Overnight, patient complained of severe abdominal pain.  Lactate was 1.  Repeat CT abdomen pelvis concerning for sigmoid colonic ischemia as well as long segment extensive small bowel enteritis and ileus.  Surgery was consulted.    She is now off Levophed and off milrinone.  Abdominal discomfort persist but is improved.      Past Medical  History:   Diagnosis Date    Arthritis     Cervical cancer screening     COPD (chronic obstructive pulmonary disease)     Coronary artery disease involving native heart without angina pectoris 2021    primary angioplasty and stent placement to mid right coronary artery with good angiographic results on 2021 after STEMI    Depression with anxiety     Diabetes mellitus     Forgetfulness     Gastric reflux     Hypertension     Leg paresthesia 2017    Right    Limb swelling     Lumbago 2017    Low back pain    Lumbar spinal stenosis 2017    L4/5 moderate to severe central canal stenosis    Lumbosacral radiculopathy 2017    Right    Migraines     Night sweat     Reflux esophagitis     Shortness of breath     ST elevation myocardial infarction (STEMI) (CMS/[facility]) 2021    Stomach disorder        Past Surgical History:   Procedure Laterality Date    ABDOMINAL SURGERY      3 C-SECTIONS    CARDIAC CATHETERIZATION      CARDIAC CATHETERIZATION N/A 2021    Procedure: Left Heart Cath;  Surgeon: Sidney Xiao MD;  Location: [facility] CATH INVASIVE LOCATION;  Service: Cardiology;  Laterality: N/A;    CARDIAC CATHETERIZATION N/A 2025    Procedure: Left Heart Cath;  Surgeon: James Verdugo MD;  Location: [facility] CATH INVASIVE LOCATION;  Service: Cardiology;  Laterality: N/A;     SECTION      x 3    CHOLECYSTECTOMY      COLONOSCOPY      COLONOSCOPY N/A 3/25/2025    Procedure: COLONOSCOPY WITH BIOPSIES AND COLD AND HOT SNARE POLYPECTOMIES;  Surgeon: Heber Najera MD;  Location: [facility] ENDOSCOPY;  Service: Gastroenterology;  Laterality: N/A;  COLON POLYPS    CORONARY STENT PLACEMENT      ENDOSCOPY      2007    FOOT SURGERY Right     HAND SURGERY      HYSTERECTOMY  1985       Medications Prior to Admission   Medication Sig Dispense Refill Last Dose/Taking    acetaminophen (TYLENOL) 325 MG tablet Take 2 tablets by mouth Every 4 (Four) Hours As Needed for Mild  Pain or Fever (temperature greater than 101F).       albuterol (PROVENTIL) (2.5 MG/3ML) 0.083% nebulizer solution Take 2.5 mg by nebulization Every 6 (Six) Hours As Needed for Wheezing or Shortness of Air.       amiodarone in dextrose 5% (NEXTERONE) infusion Infuse 0.5 mg/min into a venous catheter Continuous.       amiodarone in dextrose 5% (NEXTERONE) infusion Infuse 0.5 mg/min into a venous catheter Continuous.       arformoterol (BROVANA) 15 MCG/2ML nebulizer solution Take 2 mL by nebulization 2 (Two) Times a Day.       aspirin 81 MG EC tablet Take 1 tablet by mouth Daily.       atorvastatin (LIPITOR) 40 MG tablet Take 1 tablet by mouth Every Night for 90 days. 90 tablet 3     budesonide (PULMICORT) 0.5 MG/2ML nebulizer solution Take 2 mL by nebulization 2 (Two) Times a Day.       dextrose (D50W) 50 % solution Infuse 50 mL into a venous catheter Every 15 (Fifteen) Minutes As Needed for Low Blood Sugar (Blood Sugar Less Than 70).       dextrose (GLUTOSE) 40 % gel Take 15 g by mouth Every 15 (Fifteen) Minutes As Needed for Low Blood Sugar (Blood sugar less than 70).       famotidine (PEPCID) 10 MG/ML solution injection Infuse 2 mL into a venous catheter Every 12 (Twelve) Hours.       glucagon (GLUCAGEN) 1 MG injection Inject 1 mg into the appropriate muscle as directed by prescriber Every 15 (Fifteen) Minutes As Needed (Blood Glucose Less Than 70).       heparin 100 unit/mL solution Infuse 34.6 mL into a venous catheter As Needed (Per Heparin Nomogram PTT less than 62 seconds). Indications: DVT/PE (active thrombosis)       heparin 100 unit/mL solution Infuse 17.3 mL into a venous catheter As Needed (Per Heparin Nomogram PTT 62-69.9 seconds). Indications: DVT/PE (active thrombosis)       Heparin, Porcine, 14525-3.45 UT/250ML-% infusion Infuse 1,243 Units/hr into a venous catheter Dose Adjusted By Provider As Needed. Indications: DVT/PE (active thrombosis)       insulin glargine (LANTUS, SEMGLEE) 100 UNIT/ML  injection Inject 10 Units under the skin into the appropriate area as directed Daily.       insulin regular (humuLIN R,novoLIN R) 100 UNIT/ML injection Inject 2-7 Units under the skin into the appropriate area as directed Every 6 (Six) Hours.       Jardiance 25 MG tablet tablet Take 1 tablet by mouth Daily.       Milrinone Lactate in Dextrose 20-5 MG/100ML-% infusion Infuse 17.275 mcg/min into a venous catheter Dose Adjusted By Provider As Needed.       mupirocin (BACTROBAN) 2 % nasal ointment 1 Application by Each Nare route 2 (Two) Times a Day for 7 doses.       nitroglycerin (NITROSTAT) 0.4 MG SL tablet Place 1 tablet under the tongue Every 5 (Five) Minutes As Needed for Chest Pain (Systolic BP Greater Than 100). Take no more than 3 doses in 15 minutes.       Norepinephrine-Sodium Chloride (LEVOPHED) 8-0.9 MG/250ML-% solution infusion Infuse 1.382-20.73 mcg/min into a venous catheter Dose Adjusted By Provider As Needed.       ondansetron (ZOFRAN) 2 mg/mL injection Infuse 2 mL into a venous catheter Every 6 (Six) Hours As Needed for Nausea or Vomiting.       revefenacin (YUPELRI) 175 MCG/3ML nebulizer solution Take 3 mL by nebulization Daily.       [] sodium chloride 0.9 % bolus Infuse 1,000 mL into a venous catheter 1 (One) Time for 1 dose.       ticagrelor (BRILINTA) 90 MG tablet tablet Take 1 tablet by mouth 2 (Two) Times a Day.          Current Meds  Scheduled Meds:arformoterol, 15 mcg, Nebulization, BID - RT  aspirin, 81 mg, Oral, Daily  atorvastatin, 40 mg, Oral, Nightly  budesonide, 0.5 mg, Nebulization, BID - RT  [Held by provider] empagliflozin, 25 mg, Oral, Daily  famotidine, 20 mg, Intravenous, Q12H  insulin regular, 2-7 Units, Subcutaneous, Q6H  mupirocin, 1 Application, Each Nare, BID  revefenacin, 175 mcg, Nebulization, Daily - RT  senna-docusate sodium, 2 tablet, Oral, BID  ticagrelor, 90 mg, Oral, BID      Continuous Infusions:amiodarone in dextrose 5%, 0.5 mg/min, Last Rate: 0.5 mg/min  (04/10/25 1915)  heparin, 12 Units/kg/hr, Last Rate: 18 Units/kg/hr (04/11/25 0622)  Norepinephrine-Sodium Chloride, 0.02-0.3 mcg/kg/min, Last Rate: Stopped (04/11/25 0030)      PRN Meds:.  acetaminophen **OR** acetaminophen    albuterol    aluminum-magnesium hydroxide-simethicone    senna-docusate sodium **AND** polyethylene glycol **AND** bisacodyl **AND** bisacodyl    Calcium Replacement - Follow Nurse / BPA Driven Protocol    dextrose    dextrose    fentaNYL citrate (PF)    glucagon (human recombinant)    heparin (porcine)    Magnesium Standard Dose Replacement - Follow Nurse / BPA Driven Protocol    nitroglycerin    ondansetron ODT **OR** ondansetron    Phosphorus Replacement - Follow Nurse / BPA Driven Protocol    Potassium Replacement - Follow Nurse / BPA Driven Protocol    Allergies as of 04/10/2025 - Reviewed 04/10/2025   Allergen Reaction Noted    Tramadol Itching 08/16/2021       Social History     Socioeconomic History    Marital status: Legally    Tobacco Use    Smoking status: Every Day     Current packs/day: 1.00     Average packs/day: 1 pack/day for 58.2 years (58.2 ttl pk-yrs)     Types: Cigarettes     Start date: 1/14/1967     Passive exposure: Current    Smokeless tobacco: Never    Tobacco comments:     Smoked 21-30 years   Vaping Use    Vaping status: Never Used   Substance and Sexual Activity    Alcohol use: Never     Comment: Does not drink    Drug use: Not Currently     Types: Methamphetamines    Sexual activity: Defer       Family History   Problem Relation Age of Onset    Stroke Mother     Lung cancer Mother         Unspecified    Arthritis Mother     Stroke Father     Heart disease Father     Diabetes Father         Unspecified type    Arthritis Father     Heart attack Father     Stroke Sister     Arthritis Sister     Breast cancer Sister     Stroke Brother     Heart disease Brother     Diabetes Brother         Unspecified type    Arthritis Brother     Colon cancer Neg Hx         REVIEW OF SYSTEMS:   12 point ROS was performed and is negative except as outlined in HPI          Objective:   Temp:  [97.7 °F (36.5 °C)-98.8 °F (37.1 °C)] 97.7 °F (36.5 °C)  Heart Rate:  [63-79] 72  Resp:  [10-24] 14  BP: ()/(44-73) 103/51  Body mass index is 25.05 kg/m².  Flowsheet Rows      Flowsheet Row First Filed Value   Admission Height --   Admission Weight 71 kg (156 lb 8.4 oz) Documented at 04/10/2025 1700          Vitals:    04/11/25 0700   BP: 103/51   Pulse: 72   Resp:    Temp:    SpO2: 98%       GEN: Ill-appearing  HEENT: NACT, EOMI, moist mucous membranes, NG tube in place  Lungs: CTAB, no wheezes, rales or rhonchi  CV: normal rate, regular rhythm, normal S1, S2, no murmurs, +2 radial pulses b/l, no carotid bruit  Abdomen: Mild TTP, slightly distended  Extremities: no edema  Skin: no rash, warm, dry  Heme/Lymph: no bruising  Psych: organized thought, normal behavior and affect      Lab Review:   Results from last 7 days   Lab Units 04/11/25  0413 04/10/25  1814 04/10/25  0907   SODIUM mmol/L 130*   < > 125*   POTASSIUM mmol/L 3.9   < > 3.7   CHLORIDE mmol/L 97*   < > 88*   CO2 mmol/L 20.5*   < > 20.8*   BUN mg/dL 38*   < > 33*   CREATININE mg/dL 1.27*   < > 1.40*   CALCIUM mg/dL 8.4*   < > 8.5*   BILIRUBIN mg/dL  --   --  1.1   ALK PHOS U/L  --   --  126*   ALT (SGPT) U/L  --   --  36*   AST (SGOT) U/L  --   --  72*   GLUCOSE mg/dL 106*   < > 177*    < > = values in this interval not displayed.     Results from last 7 days   Lab Units 04/08/25 2034 04/08/25  1728   HSTROP T ng/L 971* 835*     Results from last 7 days   Lab Units 04/11/25  0413 04/10/25  1814 04/10/25  0215   WBC 10*3/mm3 9.40 15.61* 18.86*   HEMOGLOBIN g/dL 12.1 12.8 14.1   HEMATOCRIT % 36.0 38.0 42.4   PLATELETS 10*3/mm3 258 326 258     Results from last 7 days   Lab Units 04/11/25  0413 04/10/25  1742 04/10/25  1244   INR   --   --  1.36*   APTT seconds 74.1* 72.5* 96.3*     Results from last 7 days   Lab Units  04/11/25  0413   MAGNESIUM mg/dL 2.3         I personally viewed and interpreted the patient's EKG/Telemetry data)      Cardiogenic shock    Assessment and Plan:  #Inferior STEMI  #LV thrombus  #Acute HFrEF  #Splenic infarct  #Left renal infarct  #Left SFA occlusion  #Ileus  #NIA    66 yo woman with CAD status post PCI to RCA in 2021 in the setting of a STEMI, hypertension, hyperlipidemia, diabetes, COPD who presented to [facility] with severe abdominal pain.  On arrival there, she was noted to have inferior STEMI.  CTA abdomen pelvis also revealed complete occlusion of the left SFA, left renal artery, splenic artery, occlusion of the ONEIDA with reconstitution distally, as well as findings concerning for splenic and renal infarcts.  She was taken for emergent coronary angiogram which revealed distal circumflex occlusion, mild to moderate proximal LAD disease and a 100% thrombotic occlusion of the RCA.  There was heavy thrombus burden in the RCA and she received PCI to this with mechanical thrombectomy and overlapping drug-eluting stents.  There was ABILIO I-II flow in the distal vessels post PCI.  Echocardiogram performed 4/10/2025 with an EF of about 45%.  There was a 1.4 cm mobile thrombus in the left ventricle.  She was started on heparin drip and vascular surgery was consulted given the CT findings and recommendation was to continue anticoagulation.  While admitted there, she had a run of NSVT and was started on amiodarone for this.  She was also hypotensive requiring Levophed and was also started on milrinone.  Patient was transferred here for further management.    Overnight, patient complained of severe abdominal pain.  Lactate was 1.  Repeat CT abdomen pelvis concerning for sigmoid colonic ischemia as well as long segment extensive small bowel enteritis and ileus.  Surgery was consulted.    She is improving clinically, now off pressors and milrinone.    - Given ileus and inability to give p.o. the  setting of recent stents, will continue heparin drip and start HI aspirin.  - Unclear if NSVT was in the setting of milrinone, will stop amiodarone and reassess  - Continue statin    Santi Howard MD, [facility], [facility]  04/11/25  08:07 EDT.

## 2025-04-11 NOTE — CONSULTS
[facility] Consult Note      Patient Name: Desiree Garcia  : 1960  MRN: [id number]  Primary Care Physician:  Sandra Kaba MD  Referring Physician: Bryn Merino MD  Date of admission: 4/10/2025    Subjective     Reason for Consult: NIA    HPI:   Desiree Garcia is a 65 y.o. female with normal renal function at baseline, CAD, COPD, type II DM, HTN, and prior STEMI, who initially presented to [facility] for nausea, vomiting, and abdominal pain on 25, found to have ST elevation in the inferior leads leading to heart cath, noted to have a distal RCA thrombosis and underwent mechanical aspiration thrombectomy and PCI with drug-eluting stents x 2.  At that time, CTA abdomen/pelvis suggested complete occlusion of ONEIDA, and L superficial femoral artery/distal L renal artery/distal splenic artery consistent with splenic/renal infarct, evaluated by vascular surgery, no immediate intervention needed.  Patient also had an echo which demonstrated an LV thrombosis, patient was initiated on IV heparin drip and Brilinta, though developed cardiogenic shock requiring Levophed, milrinone, and amiodarone, then was transferred to [facility] yesterday for cardiothoracic evaluation by Dr. Ng.    Last night, patient developed severe abdominal pain with tightness, CTA this morning showed L renal and splenic infarcts, left SFA occlusion concerning for arterial thrombi embolism, sigmoid colonic possible ischemia, and small bowel ileus.  Patient was placed on NG tube with suction.    On 25, creatinine normal at 0.7-0.8, then up to 1.2-1.4 in the past 4 days.  Sodium was 131 on admission at [facility], down to 125 yesterday, slightly up to 130 today.    Patient had dizziness/lightheadedness, nausea, vomiting, and abdominal pain x 3 to 4 days prior to admission to [facility].  Oral intake has been very poor in the past few days due to n.p.o. status.   Denies chest pain, SOA, NSAID use, or urinary symptoms.    Review of Systems:   14 point review of systems is otherwise negative except for mentioned above on HPI    Personal History     Past Medical History:   Diagnosis Date   • Arthritis    • Cervical cancer screening    • COPD (chronic obstructive pulmonary disease)    • Coronary artery disease involving native heart without angina pectoris 2021    primary angioplasty and stent placement to mid right coronary artery with good angiographic results on 2021 after STEMI   • Depression with anxiety    • Diabetes mellitus    • Forgetfulness    • Gastric reflux    • Hypertension    • Leg paresthesia 2017    Right   • Limb swelling    • Lumbago 2017    Low back pain   • Lumbar spinal stenosis 2017    L4/5 moderate to severe central canal stenosis   • Lumbosacral radiculopathy 2017    Right   • Migraines    • Night sweat    • Reflux esophagitis    • Shortness of breath    • ST elevation myocardial infarction (STEMI) (CMS/formerly [facility]) 2021   • Stomach disorder        Past Surgical History:   Procedure Laterality Date   • ABDOMINAL SURGERY      3 C-SECTIONS   • CARDIAC CATHETERIZATION     • CARDIAC CATHETERIZATION N/A 2021    Procedure: Left Heart Cath;  Surgeon: Sidney Xiao MD;  Location: [facility] CATH INVASIVE LOCATION;  Service: Cardiology;  Laterality: N/A;   • CARDIAC CATHETERIZATION N/A 2025    Procedure: Left Heart Cath;  Surgeon: James Verdugo MD;  Location: [facility] CATH INVASIVE LOCATION;  Service: Cardiology;  Laterality: N/A;   •  SECTION      x 3   • CHOLECYSTECTOMY     • COLONOSCOPY     • COLONOSCOPY N/A 3/25/2025    Procedure: COLONOSCOPY WITH BIOPSIES AND COLD AND HOT SNARE POLYPECTOMIES;  Surgeon: Heber Najera MD;  Location: [facility] ENDOSCOPY;  Service: Gastroenterology;  Laterality: N/A;  COLON POLYPS   • CORONARY STENT PLACEMENT     • ENDOSCOPY      2007   • FOOT SURGERY Right     • HAND SURGERY     • HYSTERECTOMY  1985       Family History: family history includes Arthritis in her brother, father, mother, and sister; Breast cancer in her sister; Diabetes in her brother and father; Heart attack in her father; Heart disease in her brother and father; Lung cancer in her mother; Stroke in her brother, father, mother, and sister.    Social History:  reports that she has been smoking cigarettes. She started smoking about 58 years ago. She has a 58.2 pack-year smoking history. She has been exposed to tobacco smoke. She has never used smokeless tobacco. She reports that she does not currently use drugs after having used the following drugs: Methamphetamines. She reports that she does not drink alcohol.    Home Medications:  Prior to Admission medications    Medication Sig Start Date End Date Taking? Authorizing Provider   acetaminophen (TYLENOL) 325 MG tablet Take 2 tablets by mouth Every 4 (Four) Hours As Needed for Mild Pain or Fever (temperature greater than 101F). 4/10/25   Jose Butts MD   albuterol (PROVENTIL) (2.5 MG/3ML) 0.083% nebulizer solution Take 2.5 mg by nebulization Every 6 (Six) Hours As Needed for Wheezing or Shortness of Air. 4/10/25   Jose Butts MD   amiodarone in dextrose 5% (NEXTERONE) infusion Infuse 0.5 mg/min into a venous catheter Continuous. 4/10/25   Jose Butst MD   amiodarone in dextrose 5% (NEXTERONE) infusion Infuse 0.5 mg/min into a venous catheter Continuous. 4/10/25   Jose Butts MD   arformoterol (BROVANA) 15 MCG/2ML nebulizer solution Take 2 mL by nebulization 2 (Two) Times a Day. 4/10/25   Jose Butts MD   aspirin 81 MG EC tablet Take 1 tablet by mouth Daily. 4/11/25   Jose Butts MD   atorvastatin (LIPITOR) 40 MG tablet Take 1 tablet by mouth Every Night for 90 days. 4/1/25 6/30/25  Sandra Kaba MD   budesonide (PULMICORT) 0.5 MG/2ML nebulizer solution Take 2 mL by nebulization 2 (Two) Times a Day. 4/10/25   Jose Butts MD    dextrose (D50W) 50 % solution Infuse 50 mL into a venous catheter Every 15 (Fifteen) Minutes As Needed for Low Blood Sugar (Blood Sugar Less Than 70). 4/10/25   Jose Butts MD   dextrose (GLUTOSE) 40 % gel Take 15 g by mouth Every 15 (Fifteen) Minutes As Needed for Low Blood Sugar (Blood sugar less than 70). 4/10/25   Jose Butts MD   famotidine (PEPCID) 10 MG/ML solution injection Infuse 2 mL into a venous catheter Every 12 (Twelve) Hours. 4/10/25   Jose Butts MD   glucagon (GLUCAGEN) 1 MG injection Inject 1 mg into the appropriate muscle as directed by prescriber Every 15 (Fifteen) Minutes As Needed (Blood Glucose Less Than 70). 4/10/25   Jose Butts MD   heparin 100 unit/mL solution Infuse 34.6 mL into a venous catheter As Needed (Per Heparin Nomogram PTT less than 62 seconds). Indications: DVT/PE (active thrombosis) 4/10/25   Jose Butts MD   heparin 100 unit/mL solution Infuse 17.3 mL into a venous catheter As Needed (Per Heparin Nomogram PTT 62-69.9 seconds). Indications: DVT/PE (active thrombosis) 4/10/25   Jose Butts MD   Heparin, Porcine, 81543-5.45 UT/250ML-% infusion Infuse 1,243 Units/hr into a venous catheter Dose Adjusted By Provider As Needed. Indications: DVT/PE (active thrombosis) 4/10/25   Jose Butts MD   insulin glargine (LANTUS, SEMGLEE) 100 UNIT/ML injection Inject 10 Units under the skin into the appropriate area as directed Daily. 4/11/25   Jose Butts MD   insulin regular (humuLIN R,novoLIN R) 100 UNIT/ML injection Inject 2-7 Units under the skin into the appropriate area as directed Every 6 (Six) Hours. 4/10/25   Jose Butts MD   Jardiance 25 MG tablet tablet Take 1 tablet by mouth Daily. 1/7/25   ProviderMara MD   Milrinone Lactate in Dextrose 20-5 MG/100ML-% infusion Infuse 17.275 mcg/min into a venous catheter Dose Adjusted By Provider As Needed. 4/10/25   Jose Butts MD   mupirocin (BACTROBAN) 2 % nasal ointment 1 Application by Each Nare route  2 (Two) Times a Day for 7 doses. 4/10/25 4/14/25  Jose Butts MD   nitroglycerin (NITROSTAT) 0.4 MG SL tablet Place 1 tablet under the tongue Every 5 (Five) Minutes As Needed for Chest Pain (Systolic BP Greater Than 100). Take no more than 3 doses in 15 minutes. 4/10/25   Jose Butts MD   Norepinephrine-Sodium Chloride (LEVOPHED) 8-0.9 MG/250ML-% solution infusion Infuse 1.382-20.73 mcg/min into a venous catheter Dose Adjusted By Provider As Needed. 4/10/25   Jose Butts MD   ondansetron (ZOFRAN) 2 mg/mL injection Infuse 2 mL into a venous catheter Every 6 (Six) Hours As Needed for Nausea or Vomiting. 4/10/25   Jose Butts MD   revefenacin (YUPELRI) 175 MCG/3ML nebulizer solution Take 3 mL by nebulization Daily. 4/11/25   Jose Butts MD   sodium chloride 0.9 % bolus Infuse 1,000 mL into a venous catheter 1 (One) Time for 1 dose. 4/10/25 4/10/25  Jose Butts MD   ticagrelor (BRILINTA) 90 MG tablet tablet Take 1 tablet by mouth 2 (Two) Times a Day. 4/10/25   Jose Butts MD       Allergies:  Allergies   Allergen Reactions   • Tramadol Itching       Objective     Vitals:   Temp:  [97.7 °F (36.5 °C)-98.8 °F (37.1 °C)] 97.7 °F (36.5 °C)  Heart Rate:  [66-79] 74  Resp:  [10-24] 16  BP: ()/(46-73) 106/59  Flow (L/min) (Oxygen Therapy):  [2] 2    Intake/Output Summary (Last 24 hours) at 4/11/2025 0927  Last data filed at 4/11/2025 0622  Gross per 24 hour   Intake 705 ml   Output 1600 ml   Net -895 ml       Physical Exam:   Constitutional: Awake, chronically ill, no acute distress.  HEENT: Sclera anicteric, no conjunctival injection  Neck: No JVD, NG tube in place  Respiratory: Clear to auscultation bilaterally, breathing effort nonlabored  Cardiovascular: RRR, no murmurs, no rubs, no carotid bruit  Gastrointestinal: Hypoactive bowels, abdomen is felt, tender and distended  : No palpable bladder  Musculoskeletal: No edema, no clubbing or cyanosis  Psychiatric: Flat affect, cooperative  Neurologic:  Oriented x3, moving all extremities, normal speech and mental status  Skin: Warm and dry       Scheduled Meds:     arformoterol, 15 mcg, Nebulization, BID - RT  aspirin, 300 mg, Rectal, Daily  atorvastatin, 40 mg, Oral, Nightly  budesonide, 0.5 mg, Nebulization, BID - RT  [Held by provider] empagliflozin, 25 mg, Oral, Daily  famotidine, 20 mg, Intravenous, Q12H  insulin regular, 2-7 Units, Subcutaneous, Q6H  mupirocin, 1 Application, Each Nare, BID  revefenacin, 175 mcg, Nebulization, Daily - RT  senna-docusate sodium, 2 tablet, Oral, BID      IV Meds:   amiodarone in dextrose 5%, 0.5 mg/min, Last Rate: 0.5 mg/min (04/10/25 1915)  heparin, 12 Units/kg/hr, Last Rate: 18 Units/kg/hr (04/11/25 0622)  Norepinephrine-Sodium Chloride, 0.02-0.3 mcg/kg/min, Last Rate: Stopped (04/11/25 0030)        Results Reviewed:   I have personally reviewed the results from the time of this admission to 4/11/2025 09:27 EDT     Lab Results   Component Value Date    GLUCOSE 106 (H) 04/11/2025    CALCIUM 8.4 (L) 04/11/2025     (L) 04/11/2025    K 3.9 04/11/2025    CO2 20.5 (L) 04/11/2025    CL 97 (L) 04/11/2025    BUN 38 (H) 04/11/2025    CREATININE 1.27 (H) 04/11/2025    EGFRIFNONA 96 11/04/2021    BCR 29.9 (H) 04/11/2025    ANIONGAP 12.5 04/11/2025      Lab Results   Component Value Date    MG 2.3 04/11/2025    PHOS 4.8 (H) 04/11/2025    ALBUMIN 2.7 (L) 04/10/2025           Assessment / Plan       Cardiogenic shock      ASSESSMENT:  Nonoliguric acute kidney injury, normal renal function at baseline, creatinine up to 1.2-1.4 in the past few days, multifactorial: Prerenal azotemia secondary to nausea/vomiting and prolonged NPO status, cardiogenic shock (hypotension requiring vasopressors), left renal infarct, contrast exposure (4/8/25 & 4/11/25), and impaired renal autoregulation with SGLT2 receptor antagonist.  Hypovolemic; electrolytes within acceptable range except hyponatremia sodium 130, which has improved since yesterday total  CO2 20.5, and phosphorus 4.8.  Urine protein to creatinine ratio 1.47 mg/g  Hyponatremia, in association with hypovolemia (urine osmolality 494, urine sodium <20), cardiogenic shock, and poor oral solute intake.  Sodium today 130  Cardiogenic shock, requiring vasopressors up until yesterday, discontinue today.  Blood pressure soft  Acute inferior wall myocardial infarction, s/p drug-eluting stents x 2 to RCA, followed by cardiology and cardiothoracic surgery  Left ventricular thrombus, s/p thrombectomy, on heparin drip, followed by cardiology and cardiothoracic surgery  Severe occlusive peripheral vascular disease, CTA showed complete occlusion of left internal iliac artery, left superficial femoral artery, distal aspect of the left renal artery, and the distal aspect of splenic artery, IV heparin drip  Possible sigmoid colon ischemia, identified on CTA, awaiting general surgery evaluation  Type II DM, managed by primary team  History of hypertension, has been hypotensive due to cardiogenic shock and hypovolemia, off pressors, blood pressure continues to be soft  History of CAD, s/p stenting in 2021  Bilateral pulmonary nodules, followed by pulmonology  COPD    PLAN:  Start IV NS at 75 mL/h  Discontinue the SGLT2 receptors antagonist  For now no need to place the patient on Tricor given the abnormal kidney function her lipid management could be resumed when renal function is back to baseline  Surveillance labs    I reviewed the chart and other providers notes and I reviewed labs.  I discussed the case with the patient and family at bedside.    Thank you for involving us in the care of Desiree Garcia.  Please feel free to call with any questions.    Nixon Garcia MD  04/11/25  09:27 EDT    [facility]  [phone]      Please note that portions of this note were completed with a voice recognition program.

## 2025-04-11 NOTE — PROGRESS NOTES
[facility] INPATIENT PROGRESS NOTE         [facility] CARDIAC INTENSIVE CARE    2025      PATIENT IDENTIFICATION:  Name: Desiree Garcia ADMIT: 4/10/2025   : 1960  PCP: Sandra Kaba MD    MRN: [id number] LOS: 1 days   AGE/SEX: 65 y.o. female  ROOM: [facility]                     LOS 1    Reason for visit: LV thrombus, cardiogenic shock, inferior ST elevation MI      SUBJECTIVE:      Complains of crampy abdominal discomfort.  NG tube due to low wall suction.  Hypoactive bowel sounds.  Distended abdomen.  Tender.  No rebound or guarding.  Discussed with family at bedside.  Patient on heparin drip and amiodarone. I am seeing the patient for the first time today.  All patient problems are new to me.      Objective   OBJECTIVE:    Vital Sign Min/Max for last 24 hours  Temp  Min: 97.7 °F (36.5 °C)  Max: 98.8 °F (37.1 °C)   BP  Min: 65/44  Max: 139/73   Pulse  Min: 63  Max: 79   Resp  Min: 10  Max: 24   SpO2  Min: 89 %  Max: 100 %   No data recorded   Weight  Min: 70.4 kg (155 lb 3.3 oz)  Max: 71 kg (156 lb 8.4 oz)    Vitals:    25 0700 25 0826 25 0828 25 0837   BP: 103/51      BP Location:       Patient Position:       Pulse: 72 72 73 72   Resp:  16     Temp:       TempSrc:       SpO2: 98% 94% 97% 99%   Weight:                04/10/25  1700 25  0600   Weight: 71 kg (156 lb 8.4 oz) 70.4 kg (155 lb 3.3 oz)       Body mass index is 25.05 kg/m².                          Body mass index is 25.05 kg/m².    Intake/Output Summary (Last 24 hours) at 2025 0846  Last data filed at 2025 0622  Gross per 24 hour   Intake 705 ml   Output 1600 ml   Net -895 ml         Exam:  GEN:  No distress, appears stated age  EYES:   PERRL, anicteric sclerae  ENT:    External ears/nose normal, OP clear  NECK:  No adenopathy, midline trachea  LUNGS: Normal chest on inspection, palpation and auscultation  CV:  Normal S1S2, without murmur  ABD:  Mildly tender, distended, no  hepatosplenomegaly, hypoactive +BS.  No rebounding or guarding.  EXT:  No cyanosis or clubbing.  No mottling and normal cap refill.    Assessment     Scheduled meds:  arformoterol, 15 mcg, Nebulization, BID - RT  aspirin, 81 mg, Oral, Daily  atorvastatin, 40 mg, Oral, Nightly  budesonide, 0.5 mg, Nebulization, BID - RT  [Held by provider] empagliflozin, 25 mg, Oral, Daily  famotidine, 20 mg, Intravenous, Q12H  insulin regular, 2-7 Units, Subcutaneous, Q6H  mupirocin, 1 Application, Each Nare, BID  revefenacin, 175 mcg, Nebulization, Daily - RT  senna-docusate sodium, 2 tablet, Oral, BID  [Held by provider] ticagrelor, 90 mg, Oral, BID      IV meds:                      amiodarone in dextrose 5%, 0.5 mg/min, Last Rate: 0.5 mg/min (04/10/25 1915)  heparin, 12 Units/kg/hr, Last Rate: 18 Units/kg/hr (04/11/25 0622)  Norepinephrine-Sodium Chloride, 0.02-0.3 mcg/kg/min, Last Rate: Stopped (04/11/25 0030)      Data Review:  Results from last 7 days   Lab Units 04/11/25  0413 04/10/25  1814 04/10/25  0907 04/10/25  0215 04/09/25  0602   SODIUM mmol/L 130* 128* 125* 127* 131*   POTASSIUM mmol/L 3.9 3.4* 3.7 3.7 3.6   CHLORIDE mmol/L 97* 94* 88* 89* 92*   CO2 mmol/L 20.5* 21.1* 20.8* 22.4 26.7   BUN mg/dL 38* 37* 33* 25* 10   CREATININE mg/dL 1.27* 1.33* 1.40* 1.21* 0.80   GLUCOSE mg/dL 106* 151* 177* 203* 248*   CALCIUM mg/dL 8.4* 8.4* 8.5* 8.7 9.7         Estimated Creatinine Clearance: 49.7 mL/min (A) (by C-G formula based on SCr of 1.27 mg/dL (H)).  Results from last 7 days   Lab Units 04/11/25  0413 04/10/25  1814 04/10/25  0215 04/09/25  0559 04/08/25  1728   WBC 10*3/mm3 9.40 15.61* 18.86* 21.49* 21.93*   HEMOGLOBIN g/dL 12.1 12.8 14.1 14.8 15.4   PLATELETS 10*3/mm3 258 326 258 296 325     Results from last 7 days   Lab Units 04/10/25  1244   INR  1.36*     Results from last 7 days   Lab Units 04/10/25  0907 04/08/25  1728   ALT (SGPT) U/L 36* 16   AST (SGOT) U/L 72* 45*     Results from last 7 days   Lab Units  04/10/25  1958   PH, ARTERIAL pH units 7.422   PO2 ART mm Hg 66.4*   PCO2, ARTERIAL mm Hg 37.0   HCO3 ART mmol/L 24.1     Results from last 7 days   Lab Units 04/11/25  0104 04/10/25  0907 04/09/25  2134 04/08/25 2059 04/08/25 2034   PROCALCITONIN ng/mL  --   --   --   --  0.13   LACTATE mmol/L 1.0 1.4 1.6 2.0  --          Hemoglobin A1C   Date/Time Value Ref Range Status   04/09/2025 0559 9.40 (H) 4.80 - 5.60 % Final     Glucose   Date/Time Value Ref Range Status   04/11/2025 0624 108 70 - 130 mg/dL Final   04/10/2025 2258 116 70 - 130 mg/dL Final   04/10/2025 2010 143 (H) 70 - 130 mg/dL Final   04/10/2025 1813 153 (H) 70 - 130 mg/dL Final   04/10/2025 1129 205 (H) 70 - 99 mg/dL Final     Comment:     Serial Number: [id number]:  [id number]   04/10/2025 0731 191 (H) 70 - 99 mg/dL Final     Comment:     Serial Number: [id number]:  [id number]   04/10/2025 0535 203 (H) 70 - 99 mg/dL Final     Comment:     Serial Number: [id number]:  [id number]         Imaging reviewed  CT abdomen pelvis 4/11 reviewed:  IMPRESSION:       1.  Left renal and splenic infarcts , left SFA occlusion from origin   distally concerning for arterial thrombi embolism ONEIDA thready patency,   diminutive.  2.  Recommend evaluation for arterial thrombi embolism.  3.  Recommend vascular IR consultation  3.  Concerning for sigmoid colonic possible ischemia; correlate with   presentation.  4.  Long segment extensive small bowel enteritis. Large bowel and   probably small bowel ileus.  5  No bowel perforation or portal venous gas.  6.  See additional findings above.      Microbiology reviewed  NGTD          Active Hospital Problems    Diagnosis  POA    **Cardiogenic shock [R57.0]  Yes      Resolved Hospital Problems   No resolved problems to display.         ASSESSMENT:  Cardiogenic shock  Left ventricular thrombus  Acute inferior wall myocardial infarction status post drug-eluting stent x 2 to RCA  Left renal and splenic infarcts, left  SFA occlusion concerning for arterial thrombi embolism  Acute hypoxic respiratory failure  Nonsustained V. tach  Severe occlusive peripheral vascular disease  Complete occlusion of distal branches of left internal iliac artery, left superficial femoral artery, distal aspect of left renal artery, distal aspect of splenic artery  Chronic obstructive pulmonary disease  Bilateral pulmonary nodules-less than 6 mm  History of coronary artery disease status post stenting in 2021  Diabetes mellitus type 2  Hypertension  Hyperlipidemia  Hyponatremia  Acute kidney injury  Somnolence  Tobacco abuse      PLAN:  Cardiology and cardiothoracic surgery consulted.  Heparin drip.  Aspirin and Brilinta.  Pressors for blood pressure support with Levophed, amiodarone.  Consult vascular surgery for abnormalities found on CT of the abdomen.  Continue nebulized bronchodilators for obstructive lung disease symptoms.  Control glucose.  Correct electrolytes as needed.    Discussed with multidisciplinary ICU team on rounds this morning.      Discussed with family at bedside.  Questions answered to their satisfaction.        CCT: 35 min    Bryn Merino MD  Pulmonary and Critical Care Medicine  [facility]  4/11/2025    08:46 EDT

## 2025-04-11 NOTE — CONSULTS
Name: Desiree Garcia ADMIT: 4/10/2025   : 1960  PCP: Sandra Kaba MD    MRN: [id number] LOS: 1 days   AGE/SEX: 65 y.o. female  ROOM: [facility] 3010/1     Inpatient Vascular Surgery Consult  Consult performed by: Darnell Howard MD  Consult ordered by: Bryn Merino MD        CC: Abdominal pain  Subjective     History of Present Illness  65 y.o. female with coronary artery disease who was transferred here from [facility] with severe abdominal pain.  Her medical records there was reviewed.  CT angiogram was done showing left renal infarct, splenic artery infarct, left SFA occlusion.  She was seen by vascular surgery there who documented symptoms consistent with claudication prior to this.  She is a heavy smoker.  Her legs were viable without obvious acute limb ischemia and no intervention was planned urgently.  Follow-up was recommended.  We were asked to see her here for the same.  Currently, the patient has just been given pain medicine so she is resting but has been complaining of abdominal pain.  General surgery is seeing her.      Review of Systems    History Review Reviewed Comments   Past Medical History:  [x]  COPD, diabetes, heart disease   Past Surgical History: [x]  Cardiac cath,   Family History: [x]  Multiple cancers, heart disease, lung disease,   Social History: [x]  1 pack/day     Scheduled Meds:     arformoterol, 15 mcg, Nebulization, BID - RT  aspirin, 300 mg, Rectal, Daily  atorvastatin, 40 mg, Oral, Nightly  budesonide, 0.5 mg, Nebulization, BID - RT  famotidine, 20 mg, Intravenous, Q12H  insulin regular, 2-7 Units, Subcutaneous, Q6H  metoclopramide, 10 mg, Intravenous, Q6H  mupirocin, 1 Application, Each Nare, BID  revefenacin, 175 mcg, Nebulization, Daily - RT  senna-docusate sodium, 2 tablet, Oral, BID      IV Meds:   heparin, 12 Units/kg/hr, Last Rate: 18 Units/kg/hr (25 0622)  Norepinephrine-Sodium Chloride, 0.02-0.3 mcg/kg/min, Last Rate:  Stopped (04/11/25 0030)  sodium chloride, 75 mL/hr, Last Rate: 75 mL/hr (04/11/25 1051)        Allergies: Tramadol    Objective   Temp:  [97.7 °F (36.5 °C)-98.8 °F (37.1 °C)] 98.7 °F (37.1 °C)  Heart Rate:  [68-79] 77  Resp:  [10-20] 16  BP: ()/(51-73) 105/53    I/O this shift:  In: -   Out: 1000 [Emesis/NG output:1000]    Physical Exam  Patient resting in bed and somewhat slow to answer questions.  Appears older than stated age.  Disheveled.  Radial pulses are palpable.  I do not appreciate carotid bruits.  Abdomen is tender to palpation without rebound or guarding.  Bilateral femoral pulses are palpable.  Temperature of the bilateral lower extremities are symmetric.  She has Doppler signals at AT and PT locations bilaterally.  None of the pedal pulses are dopplerable.  She denies any pain.  Sensory and motor seems normal.    Data Reviewed:  CBC    Results from last 7 days   Lab Units 04/11/25  0413 04/10/25  1814 04/10/25  0215 04/09/25  0559 04/08/25  1728   WBC 10*3/mm3 9.40 15.61* 18.86* 21.49* 21.93*   HEMOGLOBIN g/dL 12.1 12.8 14.1 14.8 15.4   PLATELETS 10*3/mm3 258 326 258 296 325     BMP   Results from last 7 days   Lab Units 04/11/25  0413 04/10/25  1814 04/10/25  0907 04/10/25  0215 04/09/25  0602 04/08/25  1728   SODIUM mmol/L 130* 128* 125* 127* 131* 131*   POTASSIUM mmol/L 3.9 3.4* 3.7 3.7 3.6 3.7   CHLORIDE mmol/L 97* 94* 88* 89* 92* 90*   CO2 mmol/L 20.5* 21.1* 20.8* 22.4 26.7 25.8   BUN mg/dL 38* 37* 33* 25* 10 9   CREATININE mg/dL 1.27* 1.33* 1.40* 1.21* 0.80 0.74   GLUCOSE mg/dL 106* 151* 177* 203* 248* 294*   MAGNESIUM mg/dL 2.3 2.1  --  2.3 1.9 1.7   PHOSPHORUS mg/dL 4.8* 4.7*  --  4.9* 3.1  --      Coag   Results from last 7 days   Lab Units 04/11/25  0413 04/10/25  1742 04/10/25  1244 04/10/25  0215 04/09/25  2039 04/09/25  1322 04/09/25  0559   INR   --   --  1.36*  --   --   --   --    APTT seconds 74.1* 72.5* 96.3* 83.1 79.1 50.8* 39.0*     HbA1C   Lab Results   Component Value Date     HGBA1C 9.40 (H) 04/09/2025    HGBA1C 9.30 (H) 03/13/2025    HGBA1C 9.40 (H) 12/23/2024     Infection   Results from last 7 days   Lab Units 04/11/25  1059 04/08/25 2059 04/08/25 2056 04/08/25 2034   BLOODCX   --  No growth at 2 days No growth at 2 days  --    PROCALCITONIN ng/mL 1.13*  --   --  0.13     Radiology(recent) CT Angiogram Abdomen Pelvis  Addendum Date: 4/11/2025  ADDENDUM: 04 11 25 05:21 Call Doctor Regarding Above results, called NP Karlene Eldridge  on 04 11 05:21 (-04:00)     Result Date: 4/11/2025  Communications:  Call Doctor Above results Electronically signed by Jose Albarran MD on 04-11-25 at 0511     Labs significant for: White blood cell count is normal now at 9.4.  Hemoglobin 12.1.  Creatinine is elevated at 1.27.  PTT elevated at 74.  She is on a heparin drip.  Lactic acid was 1.6 on admission there and is 1.0 here today.    Imaging Studies:  CT angiograms reviewed.  Patient has splenic infarcts as well as a left renal infarct.  Additionally has a left SFA occlusion at the ostium.  She has atherosclerotic disease.    Active Hospital Problems    Diagnosis  POA   • **Cardiogenic shock [R57.0]  Yes      Resolved Hospital Problems   No resolved problems to display.       Data Points:  During this visit the following were done:  Labs Reviewed [x]  []  []  Labs Ordered []  []  []  Radiology Reports Reviewed [x]    Radiology Ordered []    EKG, echo, and/or stress test reviewed []    Vascular lab results reviewed  []    Vascular lab images reviewed and interpreted per myself   []    Referring Provider Records Reviewed []    ER Records Reviewed []    Hospital Records Reviewed/Summarized [x]    History Obtained From Family []    Radiological images view and Interpreted per myself []    Case Discussed with referring provider []     Decision to obtain and request outside records  []      Risk: The patient has extensive arterial clot and is at high risk for progression with endorgan ischemia, limb  ischemia, and even death    Active Hospital Problems:   Active Hospital Problems    Diagnosis  POA   • **Cardiogenic shock [R57.0]  Yes      Resolved Hospital Problems   No resolved problems to display.      Assessment & Plan   Assessment / Plan     65 y.o. female smoker with extensive coronary artery disease who presented to the hospital with an inferior STEMI.  She was taken for urgent coronary angiogram and had heavy thrombus burden in the RCA and she received PCI and mechanical thrombectomy with overlapping drug-eluting stents.  She was noted to have mobile thrombus in the left ventricle.  She was started on a heparin drip there.  CT angiogram was done for abdominal pain that shows that her left kidney and spleen have infarctions.  The left SFA is also occluded.  She may have an occlusion of the ONEIDA as it is of not seen on those studies.  Her complaints are entirely abdominal in nature right now.  General surgery is seeing her and she certainly has at least picture consistent with an ileus with distended loops of bowel.  No pneumatosis.  Lactic acid remains normal.  The patient was actually seen by Dr. Tamez from [facility] vascular surgery who noted that the patient complained of a history of what sounded like claudication and that her legs were viable and she had no complaints.  Recommended expectant management until she got over this acute event.  Currently, the patient still does not complain of any lower extremity pain.  Only abdominal pain no upper extremity pain.  On exam, she has palpable radial pulses.  Palpable femoral pulses.  Lower extremity pulses are nonpalpable but she does have Doppler signals at all locations.  No loss of sensory or motor function.  No dependent rubor.  She has scattered abrasions and is somewhat disheveled but no ulcerations.    Peripheral arterial disease: The appearance of the left superficial femoral artery proximally does appear to be acute clot however I suspect  significant underlying chronic peripheral arterial disease as well.  Patient does not present with limb threatening ischemia at this time and has signals without motor or sensory complaints.  Prior to this, she was unable to walk up 2 flights of stairs because of shortness of breath and had leg pain with walking for some time.  I agree with therapeutic anticoagulation but do not expect urgent or emergent attempts at revascularization.  I will go ahead and order ABIs to see what a ne new baseline was but I see one from last year where her ABIs were 0.9 bilaterally.  She may have had bilateral emboli as the legs below the mid thigh have not been imaged.  Again no symptoms there.    Renal and Spleen infarction: Appears a bit better on follow-up CT angiogram.  Anticoagulation only.    Abdominal pain: Celiac and SMA are all patent without evidence of branch occlusion.  No role for vascular surgery intervention.  General surgery is following.    I discussed the patients findings and my recommendations with patient, family, and nursing staff.  Please call my office with any question: [phone]

## 2025-04-11 NOTE — CONSULTS
Addendum: Patient seen and examined this afternoon.  Abdominal pain has significantly improved.  She continues to have mild generalized abdominal pain.  She has not shown any worsening signs of bowel ischemia.  Likely has gone up slightly to 2.2, white blood cell count remain within normal limits.     Will continue to follow, no surgical intervention indicated at this time    Consultation note    Referring physician: Bryn Merino MD    Consulting Physician: Herminio Charles MD    Reason for consultation: Abdominal pain, rule out ischemic bowel    HPI:   The patient is a very pleasant 65 y.o. years old female that has history of coronary artery disease, chronic obstructive pulmonary disease, diabetes mellitus, hypertension that presented to [facility] with abdominal pain.  The patient reports upper abdominal pain that started on 4/7/2025.  The pain was severe in nature.  The pain did not radiate.  She then had EKG changes with ST elevations.  She was noted to have distal RCA thrombosis and underwent mechanical aspiration thrombectomy, PCI with drug-eluting stent x 2.  She was also noticed to have multiple areas of ischemia in the kidney as well as the spleen.  She was found to have left SFA occlusion.  She was seen by vascular surgery that recommended no further intervention.  The patient was found to have LV thrombus and was then transferred to [facility] for further evaluation by cardiothoracic surgery.  Patient underwent repeat CTA of the abdomen and pelvis that was done at [facility].  CTA show areas of infarction of the kidney as well as the spleen.  She was found to have changes from the small bowel consistent with enteritis.  She was found to have changes of the sigmoid colon consistent with colitis.  He was transferred to [facility] for further cardiothoracic evaluation.  The patient states that her abdominal pain has improved since admitted to [facility].  She continues to have  pain and feel very poor.  She denies any rectal bleeding.  She reports constipation for the past several days.      Past Medical History:   Diagnosis Date    Arthritis     Cervical cancer screening     COPD (chronic obstructive pulmonary disease)     Coronary artery disease involving native heart without angina pectoris 2021    primary angioplasty and stent placement to mid right coronary artery with good angiographic results on 2021 after STEMI    Depression with anxiety     Diabetes mellitus     Forgetfulness     Gastric reflux     Hypertension     Leg paresthesia 2017    Right    Limb swelling     Lumbago 2017    Low back pain    Lumbar spinal stenosis 2017    L4/5 moderate to severe central canal stenosis    Lumbosacral radiculopathy 2017    Right    Migraines     Night sweat     Reflux esophagitis     Shortness of breath     ST elevation myocardial infarction (STEMI) (CMS/[facility]) 2021    Stomach disorder        Past Surgical History:   Procedure Laterality Date    ABDOMINAL SURGERY      3 C-SECTIONS    CARDIAC CATHETERIZATION      CARDIAC CATHETERIZATION N/A 2021    Procedure: Left Heart Cath;  Surgeon: Sidney Xiao MD;  Location: [facility] CATH INVASIVE LOCATION;  Service: Cardiology;  Laterality: N/A;    CARDIAC CATHETERIZATION N/A 2025    Procedure: Left Heart Cath;  Surgeon: James Verdugo MD;  Location: [facility] CATH INVASIVE LOCATION;  Service: Cardiology;  Laterality: N/A;     SECTION      x 3    CHOLECYSTECTOMY      COLONOSCOPY      COLONOSCOPY N/A 3/25/2025    Procedure: COLONOSCOPY WITH BIOPSIES AND COLD AND HOT SNARE POLYPECTOMIES;  Surgeon: Heber Najera MD;  Location: [facility] ENDOSCOPY;  Service: Gastroenterology;  Laterality: N/A;  COLON POLYPS    CORONARY STENT PLACEMENT      ENDOSCOPY      2007    FOOT SURGERY Right     HAND SURGERY      HYSTERECTOMY  1985         Current Facility-Administered Medications:      acetaminophen (TYLENOL) tablet 650 mg, 650 mg, Oral, Q4H PRN **OR** acetaminophen (TYLENOL) suppository 650 mg, 650 mg, Rectal, Q4H PRN, Abhishek Tabares MD    albuterol (PROVENTIL) nebulizer solution 0.083% 2.5 mg/3mL, 2.5 mg, Nebulization, Q6H PRN, Abhishek Tabares MD    aluminum-magnesium hydroxide-simethicone (MAALOX MAX) 400-400-40 MG/5ML suspension 15 mL, 15 mL, Oral, Q6H PRN, Abhishek Tabares MD    arformoterol (BROVANA) nebulizer solution 15 mcg, 15 mcg, Nebulization, BID - RT, Abhishek Tabares MD, 15 mcg at 04/11/25 0827    aspirin suppository 300 mg, 300 mg, Rectal, Daily, Santi Erickson MD    atorvastatin (LIPITOR) tablet 40 mg, 40 mg, Oral, Nightly, Abhishek Tabares MD, 40 mg at 04/10/25 2034    sennosides-docusate (PERICOLACE) 8.6-50 MG per tablet 2 tablet, 2 tablet, Oral, BID **AND** polyethylene glycol (MIRALAX) packet 17 g, 17 g, Oral, Daily PRN **AND** bisacodyl (DULCOLAX) EC tablet 5 mg, 5 mg, Oral, Daily PRN **AND** bisacodyl (DULCOLAX) suppository 10 mg, 10 mg, Rectal, Daily PRN, Abhishek Tabares MD    budesonide (PULMICORT) nebulizer solution 0.5 mg, 0.5 mg, Nebulization, BID - RT, Abhishek Tabares MD, 0.5 mg at 04/11/25 0828    Calcium Replacement - Follow Nurse / BPA Driven Protocol, , Not Applicable, PRN, Abhishek Tabares MD    dextrose (D50W) (25 g/50 mL) IV injection 25 g, 25 g, Intravenous, Q15 Min PRN, Abhishek Tabares MD    dextrose (GLUTOSE) oral gel 15 g, 15 g, Oral, Q15 Min PRN, Abhishek Tabares MD    famotidine (PEPCID) injection 20 mg, 20 mg, Intravenous, Q12H, Abhishek Tabares MD, 20 mg at 04/11/25 0842    fentaNYL citrate (PF) (SUBLIMAZE) injection 100 mcg, 100 mcg, Intravenous, Q1H PRN, Carlos Huff DO, 100 mcg at 04/11/25 0859    glucagon (GLUCAGEN) injection 1 mg, 1 mg, Intramuscular, Q15 Min PRN, Abhishek Tabares MD    heparin (porcine) 5000 UNIT/ML injection 2,100-4,000 Units, 30-57.9 Units/kg, Intravenous, Q6H PRN, Bryn Merino MD     heparin 29339 units/250 mL (100 units/mL) in 0.45 % NaCl infusion, 12 Units/kg/hr, Intravenous, Titrated, Bryn Merino MD, Last Rate: 12.78 mL/hr at 04/11/25 0622, 18 Units/kg/hr at 04/11/25 0622    insulin regular (humuLIN R,novoLIN R) injection 2-7 Units, 2-7 Units, Subcutaneous, Q6H, Abhishek Tabares MD, 2 Units at 04/10/25 1819    Magnesium Standard Dose Replacement - Follow Nurse / BPA Driven Protocol, , Not Applicable, PRN, Abhishek Tabares MD    metoclopramide (REGLAN) injection 10 mg, 10 mg, Intravenous, Q6H, Bryn Merino MD    mupirocin (BACTROBAN) 2 % nasal ointment 1 Application, 1 Application, Each Nare, BID, Abhishek Tabares MD, 1 Application at 04/11/25 0842    nitroglycerin (NITROSTAT) SL tablet 0.4 mg, 0.4 mg, Sublingual, Q5 Min PRN, Abhishek Tabares MD    norepinephrine (LEVOPHED) 8 mg in 250 mL NS infusion (premix), 0.02-0.3 mcg/kg/min, Intravenous, Titrated, Abhishek Tabares MD, Stopped at 04/11/25 0030    ondansetron ODT (ZOFRAN-ODT) disintegrating tablet 4 mg, 4 mg, Oral, Q6H PRN **OR** ondansetron (ZOFRAN) injection 4 mg, 4 mg, Intravenous, Q6H PRN, Abhishek Tabares MD, 4 mg at 04/11/25 0025    Phosphorus Replacement - Follow Nurse / BPA Driven Protocol, , Not Applicable, PRNRayshawn Sandeep K, MD    Potassium Replacement - Follow Nurse / BPA Driven Protocol, , Not Applicable, Rayshawn LOPEZ Sandeep K, MD    revefenacin (YUPELRI) nebulizer solution 175 mcg, 175 mcg, Nebulization, Daily - RT, Abhishek Tabares MD, 175 mcg at 04/11/25 0826    sodium chloride 0.9 % infusion, 75 mL/hr, Intravenous, Continuous, Radha, Nixon Burgess MD    Allergies   Allergen Reactions    Tramadol Itching       Social History     Socioeconomic History    Marital status: Legally    Tobacco Use    Smoking status: Every Day     Current packs/day: 1.00     Average packs/day: 1 pack/day for 58.2 years (58.2 ttl pk-yrs)     Types: Cigarettes     Start date: 1/14/1967     Passive  exposure: Current    Smokeless tobacco: Never    Tobacco comments:     Smoked 21-30 years   Vaping Use    Vaping status: Never Used   Substance and Sexual Activity    Alcohol use: Never     Comment: Does not drink    Drug use: Not Currently     Types: Methamphetamines    Sexual activity: Defer       Family History   Problem Relation Age of Onset    Stroke Mother     Lung cancer Mother         Unspecified    Arthritis Mother     Stroke Father     Heart disease Father     Diabetes Father         Unspecified type    Arthritis Father     Heart attack Father     Stroke Sister     Arthritis Sister     Breast cancer Sister     Stroke Brother     Heart disease Brother     Diabetes Brother         Unspecified type    Arthritis Brother     Colon cancer Neg Hx        ROS:   As per HPI    Physical Exam:   Vitals:    04/11/25 0900   BP: 106/59   Pulse: 74   Resp:    Temp:    SpO2: 94%     GENERAL: Chronically ill-appearing, no distress  HEENT: normochephalic, atraumatic  NECK: Supple   CHEST: Breathing comfortable  CARDIAC: regular rate and rhythm    ABDOMEN: Abdomen soft, tender throughout, mild peritoneal irritation throughout.  EXTREMITIES: no cyanosis, clubbing or edema    NEURO: alert and oriented, normal speech   SKIN: Moist, warm, no rashes.    Diagnostic workup:   CTA abdomen that was done today show the small bowel consistent with enteritis.  Left renal and splenic infarcts with left SFA occlusion.  There is thickening of the wall of the sigmoid colon.  I see minimal stranding.  I did not see any pneumatosis.    White blood cell count 9.4, neutrophils 67%, hemoglobin 12.1, sodium 130, chloride 97, CO2 20.5, creatinine 1.27,  Lactic acid 1.1, procalcitonin 1.1    Assessment and plan:   The patient is a very pleasant 65 y.o. years old female with multiple medical problems, MI, multiple emboli into the kidney and spleen as well as likely small bowel and colon.  She is very tender on exam that it's a little bit concerning.   She is not requiring any pressors, white blood cell count and lactic acid are normal, CO2 slightly under normal limits.  I have reviewed the CT scan from April 8 as well as this morning.  There has been worsening thickening of large segment of the small bowel and new colitis of the sigmoid colon that does not look very impressive to me but is present.  There is no pneumatosis.  She got NG tube and lots of fluid are coming from the tube and would likely provide some relief of her abdominal discomfort.  Will continue to monitor her with serial abdominal exams today.  If any worsening sign then she will need to have exploratory laparotomy.  Will get procalcitonin levels    Continue heparin drip for now, will continue to follow      Case was discussed with Bryn Merino MD and patient.    Risk and benefits of the current recommended treatment were explained to the patient that had time to ask questions that where answered, verbalized understanding and agreed with the plan.     Herminio Charles MD  General, Minimally Invasive and Endoscopic Surgery  [facility]    [address], [address]  [place], [place], [address]  P: [phone]  F: [phone]

## 2025-04-12 ENCOUNTER — APPOINTMENT (OUTPATIENT)
Dept: GENERAL RADIOLOGY | Facility: HOSPITAL | Age: 65
DRG: 270 | End: 2025-04-12
Payer: MEDICARE

## 2025-04-12 ENCOUNTER — ANESTHESIA EVENT (OUTPATIENT)
Dept: PERIOP | Facility: HOSPITAL | Age: 65
End: 2025-04-12
Payer: MEDICARE

## 2025-04-12 ENCOUNTER — ANESTHESIA (OUTPATIENT)
Dept: PERIOP | Facility: HOSPITAL | Age: 65
End: 2025-04-12
Payer: MEDICARE

## 2025-04-12 ENCOUNTER — APPOINTMENT (OUTPATIENT)
Dept: CT IMAGING | Facility: HOSPITAL | Age: 65
DRG: 270 | End: 2025-04-12
Payer: MEDICARE

## 2025-04-12 PROBLEM — I74.3 ARTERIAL EMBOLISM AND THROMBOSIS OF LOWER EXTREMITY: Status: ACTIVE | Noted: 2025-04-10

## 2025-04-12 LAB
ABO GROUP BLD: NORMAL
ANION GAP SERPL CALCULATED.3IONS-SCNC: 11.7 MMOL/L (ref 5–15)
ANION GAP SERPL CALCULATED.3IONS-SCNC: 13.4 MMOL/L (ref 5–15)
APTT PPP: 68.9 SECONDS (ref 22.7–35.4)
APTT PPP: 98.9 SECONDS (ref 22.7–35.4)
APTT PPP: >200 SECONDS (ref 22.7–35.4)
BASOPHILS # BLD MANUAL: 0 10*3/MM3 (ref 0–0.2)
BASOPHILS # BLD MANUAL: 0.09 10*3/MM3 (ref 0–0.2)
BASOPHILS NFR BLD MANUAL: 0 % (ref 0–1.5)
BASOPHILS NFR BLD MANUAL: 1 % (ref 0–1.5)
BLD GP AB SCN SERPL QL: NEGATIVE
BUN SERPL-MCNC: 26 MG/DL (ref 8–23)
BUN SERPL-MCNC: 32 MG/DL (ref 8–23)
BUN/CREAT SERPL: 24.5 (ref 7–25)
BUN/CREAT SERPL: 29.6 (ref 7–25)
CALCIUM SPEC-SCNC: 7.5 MG/DL (ref 8.6–10.5)
CALCIUM SPEC-SCNC: 7.7 MG/DL (ref 8.6–10.5)
CHLORIDE SERPL-SCNC: 102 MMOL/L (ref 98–107)
CHLORIDE SERPL-SCNC: 98 MMOL/L (ref 98–107)
CO2 SERPL-SCNC: 19.3 MMOL/L (ref 22–29)
CO2 SERPL-SCNC: 19.6 MMOL/L (ref 22–29)
CREAT SERPL-MCNC: 1.06 MG/DL (ref 0.57–1)
CREAT SERPL-MCNC: 1.08 MG/DL (ref 0.57–1)
D-LACTATE SERPL-SCNC: 1.8 MMOL/L (ref 0.5–2)
DEPRECATED RDW RBC AUTO: 42.7 FL (ref 37–54)
DEPRECATED RDW RBC AUTO: 44 FL (ref 37–54)
EGFRCR SERPLBLD CKD-EPI 2021: 57.5 ML/MIN/1.73
EGFRCR SERPLBLD CKD-EPI 2021: 58.8 ML/MIN/1.73
EOSINOPHIL # BLD MANUAL: 0.09 10*3/MM3 (ref 0–0.4)
EOSINOPHIL # BLD MANUAL: 0.1 10*3/MM3 (ref 0–0.4)
EOSINOPHIL NFR BLD MANUAL: 1 % (ref 0.3–6.2)
EOSINOPHIL NFR BLD MANUAL: 1 % (ref 0.3–6.2)
ERYTHROCYTE [DISTWIDTH] IN BLOOD BY AUTOMATED COUNT: 13.9 % (ref 12.3–15.4)
ERYTHROCYTE [DISTWIDTH] IN BLOOD BY AUTOMATED COUNT: 14.2 % (ref 12.3–15.4)
GLUCOSE BLDC GLUCOMTR-MCNC: 131 MG/DL (ref 70–130)
GLUCOSE BLDC GLUCOMTR-MCNC: 75 MG/DL (ref 70–130)
GLUCOSE BLDC GLUCOMTR-MCNC: 78 MG/DL (ref 70–130)
GLUCOSE BLDC GLUCOMTR-MCNC: 78 MG/DL (ref 70–130)
GLUCOSE BLDC GLUCOMTR-MCNC: 88 MG/DL (ref 70–130)
GLUCOSE SERPL-MCNC: 74 MG/DL (ref 65–99)
GLUCOSE SERPL-MCNC: 90 MG/DL (ref 65–99)
HCT VFR BLD AUTO: 30.3 % (ref 34–46.6)
HCT VFR BLD AUTO: 33.2 % (ref 34–46.6)
HGB BLD-MCNC: 10.2 G/DL (ref 12–15.9)
HGB BLD-MCNC: 10.5 G/DL (ref 12–15.9)
LYMPHOCYTES # BLD MANUAL: 0.4 10*3/MM3 (ref 0.7–3.1)
LYMPHOCYTES # BLD MANUAL: 0.62 10*3/MM3 (ref 0.7–3.1)
LYMPHOCYTES NFR BLD MANUAL: 14 % (ref 5–12)
LYMPHOCYTES NFR BLD MANUAL: 25 % (ref 5–12)
MAGNESIUM SERPL-MCNC: 2.3 MG/DL (ref 1.6–2.4)
MCH RBC QN AUTO: 26.7 PG (ref 26.6–33)
MCH RBC QN AUTO: 28.2 PG (ref 26.6–33)
MCHC RBC AUTO-ENTMCNC: 31.6 G/DL (ref 31.5–35.7)
MCHC RBC AUTO-ENTMCNC: 33.7 G/DL (ref 31.5–35.7)
MCV RBC AUTO: 83.7 FL (ref 79–97)
MCV RBC AUTO: 84.5 FL (ref 79–97)
METAMYELOCYTES NFR BLD MANUAL: 1 % (ref 0–0)
METAMYELOCYTES NFR BLD MANUAL: 2 % (ref 0–0)
MONOCYTES # BLD: 1.42 10*3/MM3 (ref 0.1–0.9)
MONOCYTES # BLD: 2.2 10*3/MM3 (ref 0.1–0.9)
NEUTROPHILS # BLD AUTO: 5.73 10*3/MM3 (ref 1.7–7)
NEUTROPHILS # BLD AUTO: 7.99 10*3/MM3 (ref 1.7–7)
NEUTROPHILS NFR BLD MANUAL: 65 % (ref 42.7–76)
NEUTROPHILS NFR BLD MANUAL: 79 % (ref 42.7–76)
NRBC BLD AUTO-RTO: 0 /100 WBC (ref 0–0.2)
NRBC SPEC MANUAL: 1 /100 WBC (ref 0–0.2)
PHOSPHATE SERPL-MCNC: 3 MG/DL (ref 2.5–4.5)
PLAT MORPH BLD: NORMAL
PLAT MORPH BLD: NORMAL
PLATELET # BLD AUTO: 283 10*3/MM3 (ref 140–450)
PLATELET # BLD AUTO: 341 10*3/MM3 (ref 140–450)
PMV BLD AUTO: 10.5 FL (ref 6–12)
PMV BLD AUTO: 10.8 FL (ref 6–12)
POTASSIUM SERPL-SCNC: 3.1 MMOL/L (ref 3.5–5.2)
POTASSIUM SERPL-SCNC: 3.4 MMOL/L (ref 3.5–5.2)
POTASSIUM SERPL-SCNC: 3.6 MMOL/L (ref 3.5–5.2)
POTASSIUM SERPL-SCNC: 3.8 MMOL/L (ref 3.5–5.2)
RBC # BLD AUTO: 3.62 10*6/MM3 (ref 3.77–5.28)
RBC # BLD AUTO: 3.93 10*6/MM3 (ref 3.77–5.28)
RBC MORPH BLD: NORMAL
RBC MORPH BLD: NORMAL
RH BLD: POSITIVE
SODIUM SERPL-SCNC: 131 MMOL/L (ref 136–145)
SODIUM SERPL-SCNC: 133 MMOL/L (ref 136–145)
T&S EXPIRATION DATE: NORMAL
TOXIC GRANULATION: ABNORMAL
VARIANT LYMPHS NFR BLD MANUAL: 4 % (ref 19.6–45.3)
VARIANT LYMPHS NFR BLD MANUAL: 7 % (ref 19.6–45.3)
WBC MORPH BLD: NORMAL
WBC NRBC COR # BLD AUTO: 10.11 10*3/MM3 (ref 3.4–10.8)
WBC NRBC COR # BLD AUTO: 8.81 10*3/MM3 (ref 3.4–10.8)

## 2025-04-12 PROCEDURE — 82803 BLOOD GASES ANY COMBINATION: CPT

## 2025-04-12 PROCEDURE — 25010000002 VASOPRESSIN 20 UNIT/ML SOLUTION: Performed by: ANESTHESIOLOGY

## 2025-04-12 PROCEDURE — 85007 BL SMEAR W/DIFF WBC COUNT: CPT | Performed by: SURGERY

## 2025-04-12 PROCEDURE — 94799 UNLISTED PULMONARY SVC/PX: CPT

## 2025-04-12 PROCEDURE — 74018 RADEX ABDOMEN 1 VIEW: CPT

## 2025-04-12 PROCEDURE — 99232 SBSQ HOSP IP/OBS MODERATE 35: CPT | Performed by: SURGERY

## 2025-04-12 PROCEDURE — 04CL0ZZ EXTIRPATION OF MATTER FROM LEFT FEMORAL ARTERY, OPEN APPROACH: ICD-10-PCS | Performed by: SURGERY

## 2025-04-12 PROCEDURE — 25010000002 HYDROMORPHONE PER 4 MG: Performed by: STUDENT IN AN ORGANIZED HEALTH CARE EDUCATION/TRAINING PROGRAM

## 2025-04-12 PROCEDURE — 25810000003 SODIUM CHLORIDE 0.9 % SOLUTION: Performed by: SURGERY

## 2025-04-12 PROCEDURE — 82948 REAGENT STRIP/BLOOD GLUCOSE: CPT

## 2025-04-12 PROCEDURE — 34201 REMOVAL OF ARTERY CLOT: CPT | Performed by: SURGERY

## 2025-04-12 PROCEDURE — 25010000002 HEPARIN (PORCINE) 25000-0.45 UT/250ML-% SOLUTION: Performed by: INTERNAL MEDICINE

## 2025-04-12 PROCEDURE — 25010000002 HYDROMORPHONE 1 MG/ML SOLUTION: Performed by: SURGERY

## 2025-04-12 PROCEDURE — 25010000002 PHENYLEPHRINE 10 MG/ML SOLUTION 5 ML VIAL: Performed by: ANESTHESIOLOGY

## 2025-04-12 PROCEDURE — 86900 BLOOD TYPING SEROLOGIC ABO: CPT | Performed by: SURGERY

## 2025-04-12 PROCEDURE — 83735 ASSAY OF MAGNESIUM: CPT

## 2025-04-12 PROCEDURE — 85007 BL SMEAR W/DIFF WBC COUNT: CPT

## 2025-04-12 PROCEDURE — 25010000002 POTASSIUM CHLORIDE 10 MEQ/100ML SOLUTION: Performed by: INTERNAL MEDICINE

## 2025-04-12 PROCEDURE — 75635 CT ANGIO ABDOMINAL ARTERIES: CPT

## 2025-04-12 PROCEDURE — 34201 REMOVAL OF ARTERY CLOT: CPT | Performed by: SPECIALIST/TECHNOLOGIST, OTHER

## 2025-04-12 PROCEDURE — 86901 BLOOD TYPING SEROLOGIC RH(D): CPT

## 2025-04-12 PROCEDURE — 25010000002 CEFAZOLIN PER 500 MG: Performed by: SURGERY

## 2025-04-12 PROCEDURE — 88304 TISSUE EXAM BY PATHOLOGIST: CPT | Performed by: SURGERY

## 2025-04-12 PROCEDURE — 80048 BASIC METABOLIC PNL TOTAL CA: CPT | Performed by: SURGERY

## 2025-04-12 PROCEDURE — 94664 DEMO&/EVAL PT USE INHALER: CPT

## 2025-04-12 PROCEDURE — 27602 DECOMPRESSION OF LOWER LEG: CPT | Performed by: SPECIALIST/TECHNOLOGIST, OTHER

## 2025-04-12 PROCEDURE — 25010000002 LIDOCAINE 2% SOLUTION: Performed by: ANESTHESIOLOGY

## 2025-04-12 PROCEDURE — 85730 THROMBOPLASTIN TIME PARTIAL: CPT | Performed by: INTERNAL MEDICINE

## 2025-04-12 PROCEDURE — 94761 N-INVAS EAR/PLS OXIMETRY MLT: CPT

## 2025-04-12 PROCEDURE — 25010000002 FENTANYL CITRATE (PF) 50 MCG/ML SOLUTION: Performed by: ANESTHESIOLOGY

## 2025-04-12 PROCEDURE — 27602 DECOMPRESSION OF LOWER LEG: CPT | Performed by: SURGERY

## 2025-04-12 PROCEDURE — 85025 COMPLETE CBC W/AUTO DIFF WBC: CPT

## 2025-04-12 PROCEDURE — 85014 HEMATOCRIT: CPT

## 2025-04-12 PROCEDURE — 84100 ASSAY OF PHOSPHORUS: CPT

## 2025-04-12 PROCEDURE — 25010000002 CALCIUM GLUCONATE 2-0.675 GM/100ML-% SOLUTION: Performed by: INTERNAL MEDICINE

## 2025-04-12 PROCEDURE — 04CD0ZZ EXTIRPATION OF MATTER FROM LEFT COMMON ILIAC ARTERY, OPEN APPROACH: ICD-10-PCS | Performed by: SURGERY

## 2025-04-12 PROCEDURE — 25510000001 IOPAMIDOL PER 1 ML: Performed by: INTERNAL MEDICINE

## 2025-04-12 PROCEDURE — 25010000002 METOCLOPRAMIDE PER 10 MG: Performed by: SURGERY

## 2025-04-12 PROCEDURE — 0KNT0ZZ RELEASE LEFT LOWER LEG MUSCLE, OPEN APPROACH: ICD-10-PCS | Performed by: SURGERY

## 2025-04-12 PROCEDURE — 25010000002 POTASSIUM CHLORIDE 10 MEQ/100ML SOLUTION: Performed by: HOSPITALIST

## 2025-04-12 PROCEDURE — 84132 ASSAY OF SERUM POTASSIUM: CPT | Performed by: INTERNAL MEDICINE

## 2025-04-12 PROCEDURE — 94760 N-INVAS EAR/PLS OXIMETRY 1: CPT

## 2025-04-12 PROCEDURE — 25010000002 HEPARIN (PORCINE) PER 1000 UNITS: Performed by: STUDENT IN AN ORGANIZED HEALTH CARE EDUCATION/TRAINING PROGRAM

## 2025-04-12 PROCEDURE — 86901 BLOOD TYPING SEROLOGIC RH(D): CPT | Performed by: SURGERY

## 2025-04-12 PROCEDURE — 25010000002 HEPARIN (PORCINE) PER 1000 UNITS: Performed by: SURGERY

## 2025-04-12 PROCEDURE — 63710000001 REVEFENACIN 175 MCG/3ML SOLUTION: Performed by: HOSPITALIST

## 2025-04-12 PROCEDURE — C1769 GUIDE WIRE: HCPCS | Performed by: SURGERY

## 2025-04-12 PROCEDURE — 047N3EZ DILATION OF LEFT POPLITEAL ARTERY WITH TWO INTRALUMINAL DEVICES, PERCUTANEOUS APPROACH: ICD-10-PCS | Performed by: SURGERY

## 2025-04-12 PROCEDURE — C1894 INTRO/SHEATH, NON-LASER: HCPCS | Performed by: SURGERY

## 2025-04-12 PROCEDURE — 85025 COMPLETE CBC W/AUTO DIFF WBC: CPT | Performed by: SURGERY

## 2025-04-12 PROCEDURE — C1874 STENT, COATED/COV W/DEL SYS: HCPCS | Performed by: SURGERY

## 2025-04-12 PROCEDURE — C1757 CATH, THROMBECTOMY/EMBOLECT: HCPCS | Performed by: SURGERY

## 2025-04-12 PROCEDURE — 25010000002 FENTANYL CITRATE (PF) 50 MCG/ML SOLUTION: Performed by: STUDENT IN AN ORGANIZED HEALTH CARE EDUCATION/TRAINING PROGRAM

## 2025-04-12 PROCEDURE — 75710 ARTERY X-RAYS ARM/LEG: CPT | Performed by: SURGERY

## 2025-04-12 PROCEDURE — 85347 COAGULATION TIME ACTIVATED: CPT

## 2025-04-12 PROCEDURE — 87040 BLOOD CULTURE FOR BACTERIA: CPT | Performed by: SURGERY

## 2025-04-12 PROCEDURE — 25010000002 POTASSIUM CHLORIDE PER 2 MEQ

## 2025-04-12 PROCEDURE — C1725 CATH, TRANSLUMIN NON-LASER: HCPCS | Performed by: SURGERY

## 2025-04-12 PROCEDURE — 25810000003 SODIUM CHLORIDE 0.9 % SOLUTION

## 2025-04-12 PROCEDURE — 25010000002 PIPERACILLIN SOD-TAZOBACTAM PER 1 G: Performed by: SURGERY

## 2025-04-12 PROCEDURE — 99233 SBSQ HOSP IP/OBS HIGH 50: CPT | Performed by: SURGERY

## 2025-04-12 PROCEDURE — 37226 PR REVSC OPN/PRQ FEM/POP W/STNT/ANGIOP SM VSL: CPT | Performed by: SURGERY

## 2025-04-12 PROCEDURE — 85730 THROMBOPLASTIN TIME PARTIAL: CPT

## 2025-04-12 PROCEDURE — 25010000002 CALCIUM GLUCONATE-NACL 1-0.675 GM/50ML-% SOLUTION: Performed by: INTERNAL MEDICINE

## 2025-04-12 PROCEDURE — 25010000002 PROPOFOL 10 MG/ML EMULSION: Performed by: ANESTHESIOLOGY

## 2025-04-12 PROCEDURE — 25810000003 LACTATED RINGERS PER 1000 ML: Performed by: INTERNAL MEDICINE

## 2025-04-12 PROCEDURE — 85018 HEMOGLOBIN: CPT

## 2025-04-12 PROCEDURE — 25010000002 SUGAMMADEX 200 MG/2ML SOLUTION: Performed by: STUDENT IN AN ORGANIZED HEALTH CARE EDUCATION/TRAINING PROGRAM

## 2025-04-12 PROCEDURE — 25010000002 VANCOMYCIN 1 G RECONSTITUTED SOLUTION

## 2025-04-12 PROCEDURE — 86923 COMPATIBILITY TEST ELECTRIC: CPT

## 2025-04-12 PROCEDURE — 25810000003 SODIUM CHLORIDE 0.9 % SOLUTION 250 ML FLEX CONT: Performed by: ANESTHESIOLOGY

## 2025-04-12 PROCEDURE — 83605 ASSAY OF LACTIC ACID: CPT

## 2025-04-12 PROCEDURE — 82947 ASSAY GLUCOSE BLOOD QUANT: CPT

## 2025-04-12 PROCEDURE — 86900 BLOOD TYPING SEROLOGIC ABO: CPT

## 2025-04-12 PROCEDURE — 80048 BASIC METABOLIC PNL TOTAL CA: CPT

## 2025-04-12 PROCEDURE — 86850 RBC ANTIBODY SCREEN: CPT | Performed by: SURGERY

## 2025-04-12 PROCEDURE — 25010000002 FAMOTIDINE 10 MG/ML SOLUTION: Performed by: HOSPITALIST

## 2025-04-12 PROCEDURE — 25010000002 METOCLOPRAMIDE PER 10 MG: Performed by: INTERNAL MEDICINE

## 2025-04-12 DEVICE — FLOSEAL WITH RECOTHROM - 10ML.
Type: IMPLANTABLE DEVICE | Site: GROIN | Status: FUNCTIONAL
Brand: FLOSEAL HEMOSTATIC MATRIX

## 2025-04-12 DEVICE — IMPLANTABLE DEVICE: Type: IMPLANTABLE DEVICE | Site: ARTERY FEMORAL | Status: FUNCTIONAL

## 2025-04-12 DEVICE — STNT PERIPH ZILVER PTX 6F 7X140MM 125CM BX/1: Type: IMPLANTABLE DEVICE | Site: ARTERY FEMORAL | Status: FUNCTIONAL

## 2025-04-12 DEVICE — LIGACLIP MCA MULTIPLE CLIP APPLIERS, 20 MEDIUM CLIPS
Type: IMPLANTABLE DEVICE | Site: GROIN | Status: FUNCTIONAL
Brand: LIGACLIP

## 2025-04-12 RX ORDER — HEPARIN SODIUM 1000 [USP'U]/ML
INJECTION, SOLUTION INTRAVENOUS; SUBCUTANEOUS AS NEEDED
Status: DISCONTINUED | OUTPATIENT
Start: 2025-04-12 | End: 2025-04-12 | Stop reason: SURG

## 2025-04-12 RX ORDER — LABETALOL HYDROCHLORIDE 5 MG/ML
5 INJECTION, SOLUTION INTRAVENOUS
Status: DISCONTINUED | OUTPATIENT
Start: 2025-04-12 | End: 2025-04-12

## 2025-04-12 RX ORDER — ASPIRIN 81 MG/1
81 TABLET ORAL DAILY
Status: DISCONTINUED | OUTPATIENT
Start: 2025-04-13 | End: 2025-04-15

## 2025-04-12 RX ORDER — POTASSIUM CHLORIDE 29.8 MG/ML
20 INJECTION INTRAVENOUS
Status: COMPLETED | OUTPATIENT
Start: 2025-04-12 | End: 2025-04-13

## 2025-04-12 RX ORDER — PANTOPRAZOLE SODIUM 40 MG/10ML
40 INJECTION, POWDER, LYOPHILIZED, FOR SOLUTION INTRAVENOUS 2 TIMES DAILY
Status: DISCONTINUED | OUTPATIENT
Start: 2025-04-12 | End: 2025-04-15

## 2025-04-12 RX ORDER — CALCIUM GLUCONATE 20 MG/ML
2000 INJECTION, SOLUTION INTRAVENOUS ONCE
Status: COMPLETED | OUTPATIENT
Start: 2025-04-12 | End: 2025-04-12

## 2025-04-12 RX ORDER — PROMETHAZINE HYDROCHLORIDE 25 MG/1
25 SUPPOSITORY RECTAL ONCE AS NEEDED
Status: DISCONTINUED | OUTPATIENT
Start: 2025-04-12 | End: 2025-04-12

## 2025-04-12 RX ORDER — CLOPIDOGREL BISULFATE 75 MG/1
75 TABLET ORAL DAILY
Status: DISCONTINUED | OUTPATIENT
Start: 2025-04-13 | End: 2025-05-06 | Stop reason: HOSPADM

## 2025-04-12 RX ORDER — NITROGLYCERIN 0.4 MG/1
0.4 TABLET SUBLINGUAL
Status: DISCONTINUED | OUTPATIENT
Start: 2025-04-12 | End: 2025-04-14 | Stop reason: SDUPTHER

## 2025-04-12 RX ORDER — ONDANSETRON 2 MG/ML
4 INJECTION INTRAMUSCULAR; INTRAVENOUS ONCE AS NEEDED
Status: DISCONTINUED | OUTPATIENT
Start: 2025-04-12 | End: 2025-04-12

## 2025-04-12 RX ORDER — ACETAMINOPHEN 160 MG/5ML
650 SOLUTION ORAL EVERY 4 HOURS PRN
Status: DISCONTINUED | OUTPATIENT
Start: 2025-04-12 | End: 2025-05-06 | Stop reason: HOSPADM

## 2025-04-12 RX ORDER — SODIUM CHLORIDE, SODIUM LACTATE, POTASSIUM CHLORIDE, CALCIUM CHLORIDE 600; 310; 30; 20 MG/100ML; MG/100ML; MG/100ML; MG/100ML
50 INJECTION, SOLUTION INTRAVENOUS CONTINUOUS
Status: DISCONTINUED | OUTPATIENT
Start: 2025-04-12 | End: 2025-04-13

## 2025-04-12 RX ORDER — POTASSIUM CHLORIDE 7.45 MG/ML
10 INJECTION INTRAVENOUS
Status: DISCONTINUED | OUTPATIENT
Start: 2025-04-12 | End: 2025-04-12

## 2025-04-12 RX ORDER — DEXTROSE MONOHYDRATE 25 G/50ML
25 INJECTION, SOLUTION INTRAVENOUS ONCE
Status: COMPLETED | OUTPATIENT
Start: 2025-04-12 | End: 2025-04-12

## 2025-04-12 RX ORDER — LIDOCAINE HYDROCHLORIDE 20 MG/ML
INJECTION, SOLUTION INFILTRATION; PERINEURAL AS NEEDED
Status: DISCONTINUED | OUTPATIENT
Start: 2025-04-12 | End: 2025-04-12 | Stop reason: SURG

## 2025-04-12 RX ORDER — PHENYLEPHRINE HCL IN 0.9% NACL 0.5 MG/5ML
.5-3 SYRINGE (ML) INTRAVENOUS
Status: DISCONTINUED | OUTPATIENT
Start: 2025-04-12 | End: 2025-04-14

## 2025-04-12 RX ORDER — CALCIUM GLUCONATE 20 MG/ML
1000 INJECTION, SOLUTION INTRAVENOUS ONCE
Status: COMPLETED | OUTPATIENT
Start: 2025-04-12 | End: 2025-04-12

## 2025-04-12 RX ORDER — IPRATROPIUM BROMIDE AND ALBUTEROL SULFATE 2.5; .5 MG/3ML; MG/3ML
3 SOLUTION RESPIRATORY (INHALATION) ONCE AS NEEDED
Status: DISCONTINUED | OUTPATIENT
Start: 2025-04-12 | End: 2025-04-12

## 2025-04-12 RX ORDER — DROPERIDOL 2.5 MG/ML
0.62 INJECTION, SOLUTION INTRAMUSCULAR; INTRAVENOUS
Status: DISCONTINUED | OUTPATIENT
Start: 2025-04-12 | End: 2025-04-12

## 2025-04-12 RX ORDER — FENTANYL CITRATE 50 UG/ML
INJECTION, SOLUTION INTRAMUSCULAR; INTRAVENOUS AS NEEDED
Status: DISCONTINUED | OUTPATIENT
Start: 2025-04-12 | End: 2025-04-12 | Stop reason: SURG

## 2025-04-12 RX ORDER — FENTANYL CITRATE 50 UG/ML
50 INJECTION, SOLUTION INTRAMUSCULAR; INTRAVENOUS
Status: DISCONTINUED | OUTPATIENT
Start: 2025-04-12 | End: 2025-04-12

## 2025-04-12 RX ORDER — FLUMAZENIL 0.1 MG/ML
0.2 INJECTION INTRAVENOUS AS NEEDED
Status: DISCONTINUED | OUTPATIENT
Start: 2025-04-12 | End: 2025-04-12

## 2025-04-12 RX ORDER — ACETAMINOPHEN 325 MG/1
650 TABLET ORAL EVERY 4 HOURS PRN
Status: DISCONTINUED | OUTPATIENT
Start: 2025-04-12 | End: 2025-05-06 | Stop reason: HOSPADM

## 2025-04-12 RX ORDER — ONDANSETRON 2 MG/ML
4 INJECTION INTRAMUSCULAR; INTRAVENOUS EVERY 6 HOURS PRN
Status: DISCONTINUED | OUTPATIENT
Start: 2025-04-12 | End: 2025-05-06 | Stop reason: HOSPADM

## 2025-04-12 RX ORDER — NALOXONE HCL 0.4 MG/ML
0.2 VIAL (ML) INJECTION AS NEEDED
Status: DISCONTINUED | OUTPATIENT
Start: 2025-04-12 | End: 2025-04-12

## 2025-04-12 RX ORDER — HYDROCODONE BITARTRATE AND ACETAMINOPHEN 5; 325 MG/1; MG/1
1 TABLET ORAL ONCE AS NEEDED
Status: DISCONTINUED | OUTPATIENT
Start: 2025-04-12 | End: 2025-04-12

## 2025-04-12 RX ORDER — ACETAMINOPHEN 650 MG/1
650 SUPPOSITORY RECTAL EVERY 4 HOURS PRN
Status: DISCONTINUED | OUTPATIENT
Start: 2025-04-12 | End: 2025-05-06 | Stop reason: HOSPADM

## 2025-04-12 RX ORDER — ATROPINE SULFATE 0.4 MG/ML
0.4 INJECTION, SOLUTION INTRAMUSCULAR; INTRAVENOUS; SUBCUTANEOUS ONCE AS NEEDED
Status: DISCONTINUED | OUTPATIENT
Start: 2025-04-12 | End: 2025-04-12

## 2025-04-12 RX ORDER — POTASSIUM CHLORIDE 7.45 MG/ML
10 INJECTION INTRAVENOUS
Status: COMPLETED | OUTPATIENT
Start: 2025-04-12 | End: 2025-04-12

## 2025-04-12 RX ORDER — HYDRALAZINE HYDROCHLORIDE 20 MG/ML
5 INJECTION INTRAMUSCULAR; INTRAVENOUS
Status: DISCONTINUED | OUTPATIENT
Start: 2025-04-12 | End: 2025-04-12

## 2025-04-12 RX ORDER — OXYCODONE AND ACETAMINOPHEN 7.5; 325 MG/1; MG/1
1 TABLET ORAL EVERY 4 HOURS PRN
Status: DISCONTINUED | OUTPATIENT
Start: 2025-04-12 | End: 2025-04-12

## 2025-04-12 RX ORDER — NALOXONE HCL 0.4 MG/ML
0.4 VIAL (ML) INJECTION
Status: DISCONTINUED | OUTPATIENT
Start: 2025-04-12 | End: 2025-04-22

## 2025-04-12 RX ORDER — DIPHENHYDRAMINE HYDROCHLORIDE 50 MG/ML
12.5 INJECTION, SOLUTION INTRAMUSCULAR; INTRAVENOUS
Status: DISCONTINUED | OUTPATIENT
Start: 2025-04-12 | End: 2025-04-12

## 2025-04-12 RX ORDER — VASOPRESSIN 20 [USP'U]/ML
INJECTION, SOLUTION INTRAVENOUS AS NEEDED
Status: DISCONTINUED | OUTPATIENT
Start: 2025-04-12 | End: 2025-04-12 | Stop reason: SURG

## 2025-04-12 RX ORDER — IPRATROPIUM BROMIDE AND ALBUTEROL SULFATE 2.5; .5 MG/3ML; MG/3ML
3 SOLUTION RESPIRATORY (INHALATION)
Status: COMPLETED | OUTPATIENT
Start: 2025-04-12 | End: 2025-04-14

## 2025-04-12 RX ORDER — IOPAMIDOL 510 MG/ML
200 INJECTION, SOLUTION INTRAVASCULAR
Status: COMPLETED | OUTPATIENT
Start: 2025-04-12 | End: 2025-04-12

## 2025-04-12 RX ORDER — PROPOFOL 10 MG/ML
VIAL (ML) INTRAVENOUS AS NEEDED
Status: DISCONTINUED | OUTPATIENT
Start: 2025-04-12 | End: 2025-04-12 | Stop reason: SURG

## 2025-04-12 RX ORDER — HYDROMORPHONE HYDROCHLORIDE 1 MG/ML
0.5 INJECTION, SOLUTION INTRAMUSCULAR; INTRAVENOUS; SUBCUTANEOUS
Status: DISCONTINUED | OUTPATIENT
Start: 2025-04-12 | End: 2025-04-12

## 2025-04-12 RX ORDER — ROCURONIUM BROMIDE 10 MG/ML
INJECTION, SOLUTION INTRAVENOUS AS NEEDED
Status: DISCONTINUED | OUTPATIENT
Start: 2025-04-12 | End: 2025-04-12 | Stop reason: SURG

## 2025-04-12 RX ORDER — PROMETHAZINE HYDROCHLORIDE 25 MG/1
25 TABLET ORAL ONCE AS NEEDED
Status: DISCONTINUED | OUTPATIENT
Start: 2025-04-12 | End: 2025-04-12

## 2025-04-12 RX ORDER — HYDROCODONE BITARTRATE AND ACETAMINOPHEN 7.5; 325 MG/1; MG/1
1 TABLET ORAL EVERY 6 HOURS PRN
Status: DISPENSED | OUTPATIENT
Start: 2025-04-12 | End: 2025-04-17

## 2025-04-12 RX ORDER — EPHEDRINE SULFATE 50 MG/ML
5 INJECTION, SOLUTION INTRAVENOUS ONCE AS NEEDED
Status: DISCONTINUED | OUTPATIENT
Start: 2025-04-12 | End: 2025-04-12

## 2025-04-12 RX ORDER — ONDANSETRON 4 MG/1
4 TABLET, ORALLY DISINTEGRATING ORAL EVERY 6 HOURS PRN
Status: DISCONTINUED | OUTPATIENT
Start: 2025-04-12 | End: 2025-05-06 | Stop reason: HOSPADM

## 2025-04-12 RX ORDER — SODIUM CHLORIDE 9 MG/ML
100 INJECTION, SOLUTION INTRAVENOUS CONTINUOUS
Status: DISCONTINUED | OUTPATIENT
Start: 2025-04-12 | End: 2025-04-12

## 2025-04-12 RX ORDER — BUPIVACAINE HCL/0.9 % NACL/PF 0.125 %
PLASTIC BAG, INJECTION (ML) EPIDURAL AS NEEDED
Status: DISCONTINUED | OUTPATIENT
Start: 2025-04-12 | End: 2025-04-12 | Stop reason: SURG

## 2025-04-12 RX ORDER — IOPAMIDOL 755 MG/ML
100 INJECTION, SOLUTION INTRAVASCULAR
Status: COMPLETED | OUTPATIENT
Start: 2025-04-12 | End: 2025-04-12

## 2025-04-12 RX ADMIN — BUDESONIDE 0.5 MG: 0.5 INHALANT RESPIRATORY (INHALATION) at 08:05

## 2025-04-12 RX ADMIN — IOPAMIDOL 75 ML: 510 INJECTION, SOLUTION INTRAVASCULAR at 11:10

## 2025-04-12 RX ADMIN — VASOPRESSIN 1 UNITS: 20 INJECTION INTRAVENOUS at 09:09

## 2025-04-12 RX ADMIN — HYDROMORPHONE HYDROCHLORIDE 1 MG: 1 INJECTION, SOLUTION INTRAMUSCULAR; INTRAVENOUS; SUBCUTANEOUS at 18:54

## 2025-04-12 RX ADMIN — FENTANYL CITRATE 25 MCG: 50 INJECTION, SOLUTION INTRAMUSCULAR; INTRAVENOUS at 11:11

## 2025-04-12 RX ADMIN — PROPOFOL 80 MG: 10 INJECTION, EMULSION INTRAVENOUS at 09:09

## 2025-04-12 RX ADMIN — MUPIROCIN 1 APPLICATION: 20 OINTMENT TOPICAL at 08:02

## 2025-04-12 RX ADMIN — SODIUM CHLORIDE, POTASSIUM CHLORIDE, SODIUM LACTATE AND CALCIUM CHLORIDE 50 ML/HR: 600; 310; 30; 20 INJECTION, SOLUTION INTRAVENOUS at 08:07

## 2025-04-12 RX ADMIN — FENTANYL CITRATE 50 MCG: 50 INJECTION, SOLUTION INTRAMUSCULAR; INTRAVENOUS at 09:07

## 2025-04-12 RX ADMIN — CEFAZOLIN 2000 MG: 2 INJECTION, POWDER, FOR SOLUTION INTRAMUSCULAR; INTRAVENOUS at 20:34

## 2025-04-12 RX ADMIN — ROCURONIUM 50 MG: 50 INJECTION, SOLUTION INTRAVENOUS at 09:09

## 2025-04-12 RX ADMIN — BUDESONIDE 0.5 MG: 0.5 INHALANT RESPIRATORY (INHALATION) at 20:27

## 2025-04-12 RX ADMIN — IPRATROPIUM BROMIDE AND ALBUTEROL SULFATE 3 ML: .5; 3 SOLUTION RESPIRATORY (INHALATION) at 15:35

## 2025-04-12 RX ADMIN — FAMOTIDINE 20 MG: 10 INJECTION, SOLUTION INTRAVENOUS at 08:03

## 2025-04-12 RX ADMIN — LIDOCAINE HYDROCHLORIDE 60 MG: 20 INJECTION, SOLUTION INFILTRATION; PERINEURAL at 09:09

## 2025-04-12 RX ADMIN — CALCIUM GLUCONATE 1000 MG: 20 INJECTION, SOLUTION INTRAVENOUS at 08:05

## 2025-04-12 RX ADMIN — Medication 100 MCG: at 09:09

## 2025-04-12 RX ADMIN — MUPIROCIN 1 APPLICATION: 20 OINTMENT TOPICAL at 20:25

## 2025-04-12 RX ADMIN — HYDROMORPHONE HYDROCHLORIDE 0.5 MG: 1 INJECTION, SOLUTION INTRAMUSCULAR; INTRAVENOUS; SUBCUTANEOUS at 12:04

## 2025-04-12 RX ADMIN — FENTANYL CITRATE 100 MCG: 50 INJECTION, SOLUTION INTRAMUSCULAR; INTRAVENOUS at 06:22

## 2025-04-12 RX ADMIN — HYDROMORPHONE HYDROCHLORIDE 1 MG: 1 INJECTION, SOLUTION INTRAMUSCULAR; INTRAVENOUS; SUBCUTANEOUS at 14:48

## 2025-04-12 RX ADMIN — SUGAMMADEX 200 MG: 100 INJECTION, SOLUTION INTRAVENOUS at 11:11

## 2025-04-12 RX ADMIN — SODIUM CHLORIDE 2 G: 900 INJECTION INTRAVENOUS at 09:38

## 2025-04-12 RX ADMIN — POTASSIUM CHLORIDE 10 MEQ: 7.46 INJECTION, SOLUTION INTRAVENOUS at 06:24

## 2025-04-12 RX ADMIN — IOPAMIDOL 95 ML: 755 INJECTION, SOLUTION INTRAVENOUS at 04:34

## 2025-04-12 RX ADMIN — FENTANYL CITRATE 100 MCG: 50 INJECTION, SOLUTION INTRAMUSCULAR; INTRAVENOUS at 04:14

## 2025-04-12 RX ADMIN — FENTANYL CITRATE 25 MCG: 50 INJECTION, SOLUTION INTRAMUSCULAR; INTRAVENOUS at 11:26

## 2025-04-12 RX ADMIN — POTASSIUM CHLORIDE 10 MEQ: 7.46 INJECTION, SOLUTION INTRAVENOUS at 14:45

## 2025-04-12 RX ADMIN — ARFORMOTEROL TARTRATE 15 MCG: 15 SOLUTION RESPIRATORY (INHALATION) at 08:05

## 2025-04-12 RX ADMIN — POTASSIUM CHLORIDE 10 MEQ: 7.46 INJECTION, SOLUTION INTRAVENOUS at 08:03

## 2025-04-12 RX ADMIN — POTASSIUM CHLORIDE 20 MEQ: 29.8 INJECTION INTRAVENOUS at 22:50

## 2025-04-12 RX ADMIN — PHENYLEPHRINE HYDROCHLORIDE 1 MCG/KG/MIN: 10 INJECTION, SOLUTION INTRAVENOUS at 09:07

## 2025-04-12 RX ADMIN — HEPARIN SODIUM 18 UNITS/KG/HR: 10000 INJECTION, SOLUTION INTRAVENOUS at 02:03

## 2025-04-12 RX ADMIN — IPRATROPIUM BROMIDE AND ALBUTEROL SULFATE 3 ML: .5; 3 SOLUTION RESPIRATORY (INHALATION) at 23:09

## 2025-04-12 RX ADMIN — HYDROMORPHONE HYDROCHLORIDE 1 MG: 1 INJECTION, SOLUTION INTRAMUSCULAR; INTRAVENOUS; SUBCUTANEOUS at 22:50

## 2025-04-12 RX ADMIN — CEFAZOLIN 2000 MG: 2 INJECTION, POWDER, FOR SOLUTION INTRAMUSCULAR; INTRAVENOUS at 13:18

## 2025-04-12 RX ADMIN — HEPARIN SODIUM 10000 UNITS: 1000 INJECTION, SOLUTION INTRAVENOUS; SUBCUTANEOUS at 10:05

## 2025-04-12 RX ADMIN — ARFORMOTEROL TARTRATE 15 MCG: 15 SOLUTION RESPIRATORY (INHALATION) at 20:29

## 2025-04-12 RX ADMIN — METOCLOPRAMIDE 10 MG: 5 INJECTION, SOLUTION INTRAMUSCULAR; INTRAVENOUS at 16:18

## 2025-04-12 RX ADMIN — PIPERACILLIN AND TAZOBACTAM 3.38 G: 3; .375 INJECTION, POWDER, FOR SOLUTION INTRAVENOUS at 17:06

## 2025-04-12 RX ADMIN — SODIUM CHLORIDE 100 ML/HR: 9 INJECTION, SOLUTION INTRAVENOUS at 13:16

## 2025-04-12 RX ADMIN — CALCIUM GLUCONATE 2000 MG: 20 INJECTION, SOLUTION INTRAVENOUS at 16:29

## 2025-04-12 RX ADMIN — REVEFENACIN 175 MCG: 175 SOLUTION RESPIRATORY (INHALATION) at 08:06

## 2025-04-12 RX ADMIN — METOCLOPRAMIDE 10 MG: 5 INJECTION, SOLUTION INTRAMUSCULAR; INTRAVENOUS at 05:45

## 2025-04-12 RX ADMIN — PANTOPRAZOLE SODIUM 40 MG: 40 INJECTION, POWDER, FOR SOLUTION INTRAVENOUS at 20:26

## 2025-04-12 RX ADMIN — DEXTROSE MONOHYDRATE 25 ML: 25 INJECTION, SOLUTION INTRAVENOUS at 12:10

## 2025-04-12 RX ADMIN — ASPIRIN 300 MG: 300 SUPPOSITORY RECTAL at 08:14

## 2025-04-12 RX ADMIN — POTASSIUM CHLORIDE 10 MEQ: 7.46 INJECTION, SOLUTION INTRAVENOUS at 13:43

## 2025-04-12 NOTE — ANESTHESIA PROCEDURE NOTES
Arterial Line    Pre-sedation assessment completed: 4/12/2025 9:16 AM    Patient reassessed immediately prior to procedure    Patient location during procedure: holding area  Start time: 4/12/2025 9:17 AM  Stop Time:4/12/2025 9:20 AM       Line placed for hemodynamic monitoring and ABGs/Labs/ISTAT.  Performed By   Anesthesiologist: Garcia Patrick DO   Preanesthetic Checklist  Completed: patient identified, IV checked, site marked, risks and benefits discussed, surgical consent, monitors and equipment checked, pre-op evaluation and timeout performed  Arterial Line Prep    Sterile Tech: gloves, mask and cap  Prep: ChloraPrep  Patient monitoring: blood pressure monitoring, continuous pulse oximetry and EKG  Arterial Line Procedure   Laterality:left  Location:  radial artery  Catheter size: 20 G   Guidance: ultrasound guided  PROCEDURE NOTE/ULTRASOUND INTERPRETATION.  Using ultrasound guidance the potential vascular sites for insertion of the catheter were visualized to determine the patency of the vessel to be used for vascular access.  After selecting the appropriate site for insertion, the needle was visualized under ultrasound being inserted into the radial artery, followed by ultrasound confirmation of wire and catheter placement. There were no abnormalities seen on ultrasound; an image was taken; and the patient tolerated the procedure with no complications.   Number of attempts: 1  Successful placement: yes Images: still images obtained, printed/placed on the chart  Post Assessment   Dressing Type: occlusive dressing applied, secured with tape and wrist guard applied.   Complications no  Circ/Move/Sens Assessment: normal and unchanged.   Patient Tolerance: patient tolerated the procedure well with no apparent complications  Additional Notes  Using ultrasound guidance the potential vascular sites for insertion of the catheter were visualized to determine the patency of the vessel to be used for vascular  access.  After selecting the appropriate site for insertion, the needle was visualized under ultrasound being inserted into the radial artery, followed by ultrasound confirmation of wire and catheter placement.  There were no abnormalities seen on ultrasound; an image was taken/ and the patient tolerated the procedure with no complications.

## 2025-04-12 NOTE — PROGRESS NOTES
I spoke with Dr. Lawton this morning regarding the patient.  I reviewed the chart I have not seen her before.  She had a recent MI involving the RCA status post PCI.  She has a known LV thrombus.  Last night she lost pulses found to have embolization to the left leg consistent with acute critical limb ischemia.  We discussed her overall preoperative risk.  She had a recent MI which inherently implies an elevated risk for cardiovascular morbidity and mortality, however, this is a emergency procedure that without it would put her limb at risk. Therefore, I felt while high risk she does not have a good alternative option.   Katheryn Stern MD

## 2025-04-12 NOTE — OP NOTE
Operative Note  Date of Admission:  4/10/2025  OR Date: 4/12/2025    Pre-op Diagnosis:   Critical left leg ischemia with embolism to the left femoral site    Post-Op Diagnosis Codes:     * 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 [I74.3]    Procedure:   1) open left iliofemoral, profunda femoris and superficial femoral artery thromboembolectomy   2) left common femoral endarterectomy and primary repair  3) Four compartment fasciotomy left lower extremity  3) left lower extremity arteriogram with  left femoral-popliteal angioplasty and stent placement x 2    Surgeon: Yaakov Lawton MD    Assistant:  Sada WOOTEN CSA and they provided critical assistance during the case including suctioning, exposure, retraction, and reduction of blood loss.    Anesthesia: General    Staff:   Cell Saver : Larisa Vasquez  Circulator: Laura Stearns RN  Scrub Person: Joy Molina  Assistant: Sada James CSA  Vascular Radiology Technician: Fallon Curran    Estimated Blood Loss: 200ml    Specimens:   Order Name Source Comment Collection Info Order Time   TYPE AND SCREEN Blood, Arterial Line  Collected By: Misael Lawton MD 4/12/2025  9:52 AM     Release to patient   Routine Release        TISSUE PATHOLOGY EXAM Leg, Left  Collected By: Misael Lawton MD 4/12/2025 10:03 AM     Release to patient   Routine Release             Complications: None    Findings: Return of signals to the foot, specimens were sent separately from the common femoral artery which had much more organized thrombus in the SFA    Indications:    The patient is an 65 y.o. female seen for evaluation Dr Whalen ischemic left leg that is progressed starting 4 AM this morning with loss of sensation and motor.  Patient had known SFA occlusion but CT angiogram confirmed progression to the common femoral.  Patient and family understand risk benefits complications and agreed to the procedure.       Procedure:    Patient prepped and  draped sterilely under general anesthetic.  Make an incision on the left groin exposing strolls common femoral superficial femoral and profunda femoris arteries with Vesseloops.  Patient was fully heparinized prior to incision but got a bolus of heparin on top of it and the we made a transverse arteriotomy at the level of the distal common femoral and exposed a large amount of white fibrous clot that was removed with 4 Lara thrombectomy catheter passed proximally up into the iliac systems.  3 Lara passed down the profunda and the SFA removed large amounts of red fresh clot.  Specimens were sent separately due to the discrepant nature of the clots.  Once these were cleared it became apparent that we could not pass the 3 Lara all the way down the SFA and catheters 6 Persian and eventually Persian sheath was placed down the SFA for imaging finding a chronic total occlusion of the distal SFA and proximal popliteal which was crossed with a straight stiff wire.  Using a 6 x 40 balloon we were able to open this area up with marked improvement in flow but a significant dissection throughout the proximal portion of the SFA.  Using a 7 mm x 14 cm Zilver PTX stent we deployed it antegrade down the leg but it was a much shorter looking stent than anticipated and a second 7 mm x 12 cm Zilver PTX was chosen and completed for the deployment of the SFA.  With this completed there is no significant residual stenosis or dissection.  With the runoff views confirming wide patency down the leg with two-vessel runoff to the foot.  We then closed the femoral arteriotomy with running 5-0 Prolene sutures.  This is a complex closure due to the macerated areas of plaque.  Endarterectomy of the plaque had to be performed to allow primary closure.  With backbleeding appropriate, flow was established on the leg and there is excellent signals at the posterior tibial and anterior tibial levels.  Following this the patient had no reversal of  heparin.  PRP with vancomycin followed by Floseal was applied to the groin.  Attention was turned to the lower leg where 4 compartment fasciotomies were performed with incising the skin with Bovie cutting and cautery and then incising the anterior lateral medial and deep fascia segments with Bovie cutting cautery and long Metzenbaum scissors.  Irrigation and placement of Floseal was performed.  We then closed the skin with staples at all sites.  Patient was awakened from anesthetic and sent to the recovery area stabilized    Radiographic Findings:  Angiographic portions procedure include runoff views to the left leg which showed chronic total occlusion of the mid SFA proximal popliteal level that was angioplastied with a 6 x 40 balloon following this stent placement with a 7 x 140 and a 7 x 120 Zilver PTX resolved all dissection and stenosis.  Continuous flow to the foot with the anterior tibial and posterior tibial vessels was seen at completion with no retained thrombus and successful treatment of       Active Hospital Problems    Diagnosis  POA    **Cardiogenic shock [R57.0]  Yes    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 [I74.3]  Unknown      Resolved Hospital Problems   No resolved problems to display.      Misael Lawton MD     Date: 4/12/2025  Time: 11:51 EDT

## 2025-04-12 NOTE — ANESTHESIA PROCEDURE NOTES
Airway  Reason: elective    Date/Time: 4/12/2025 9:11 AM  End Time:4/12/2025 9:11 AM  Airway not difficult    General Information and Staff    Patient location during procedure: OR  Anesthesiologist: Garcia Patrick DO    Indications and Patient Condition  Indications for airway management: airway protection    Preoxygenated: yes    Mask difficulty assessment: 2 - vent by mask + OA or adjuvant +/- NMBA    Final Airway Details    Final airway type: endotracheal airway      Successful airway: ETT and Microcuff Subglottic Suctioning ETT  Cuffed: yes   Successful intubation technique: direct laryngoscopy  Endotracheal tube insertion site: oral  Blade: Brayan  Blade size: 3  ETT size (mm): 7.0  Cormack-Lehane Classification: grade IIa - partial view of glottis  Placement verified by: capnometry   Cuff volume (mL): 8  Measured from: lips  ETT/EBT  to lips (cm): 21  Number of attempts at approach: 1  Assessment: lips, teeth, and gum same as pre-op and atraumatic intubation

## 2025-04-12 NOTE — ANESTHESIA POSTPROCEDURE EVALUATION
Patient: Desiree Garcia    Procedure Summary       Date: 04/12/25 Room / [facility]    Anesthesia Start: 0853 Anesthesia Stop: 1135    Procedures:       EMBOLECTOMY MECHANICAL, FOUR COMPARTMENT FASCIOTOMY (Left)      ARTERIOGRAM LOWER EXTREMITY (Left: Thigh) Diagnosis:       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      (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 [I74.3])    Surgeons: Misael Lawton MD Provider: Robert Alexander MD    Anesthesia Type: Not recorded ASA Status: 4 - Emergent            Anesthesia Type: No value filed.    Vitals  Vitals Value Taken Time   /63 04/12/25 12:15   Temp 36.7 °C (98.1 °F) 04/12/25 12:15   Pulse 83 04/12/25 12:28   Resp 16 04/12/25 12:15   SpO2 96 % 04/12/25 12:28   Vitals shown include unfiled device data.        Post Anesthesia Care and Evaluation    Patient location during evaluation: PACU  Patient participation: complete - patient participated  Level of consciousness: awake  Pain management: adequate    Airway patency: patent  Anesthetic complications: No anesthetic complications  PONV Status: none  Cardiovascular status: acceptable  Respiratory status: acceptable  Hydration status: acceptable    Comments: Patient seen and examined postoperatively; vital signs stable; SpO2 greater than or equal to 90%; cardiopulmonary status stable; nausea/vomiting adequately controlled; pain adequately controlled; no apparent anesthesia complications; patient discharged from anesthesia care when discharge criteria were met

## 2025-04-12 NOTE — PROGRESS NOTES
FULL CONSULT TO FOLLOW TOMORROW.    Chart reviewed.   We were asked if this is a heparin failure.  Left ventricular thrombus.   On heparin with therapeutic inr for several days.  Acute left leg ischemia at 4am today despite heparin.    Cardiogenic shock and recently repaired coronaries after MI.    Plt have been normal so not consistent with HIT, so no indication for direct thrombin inhibitors.    This does not seem to be a case of heparin failure.  Embolization can occur from LV thrombus despite anticoagulation.    If anticoagulation is switched, options could include lovenox or DOACs.  However, if she needs procedures these longer acting anticoagulants can be problematic.

## 2025-04-12 NOTE — ANESTHESIA PREPROCEDURE EVALUATION
Anesthesia Evaluation     NPO Solid Status: > 8 hours  NPO Liquid Status: > 4 hours           Airway   Dental      Pulmonary    (+) COPD,  Cardiovascular     (+) hypertension, past MI , CAD, CHF , PVD, hyperlipidemia      Neuro/Psych  (+) headaches, numbness, psychiatric history  GI/Hepatic/Renal/Endo    (+) GERD, GI bleeding , diabetes mellitus    Musculoskeletal     Abdominal    Substance History      OB/GYN          Other   arthritis,     ROS/Med Hx Other: Copied from nephrology note    1. Non olig NIA, with normal renal function at baseline, multifactorial: Prerenal azotemia secondary to nausea/vomiting and prolonged NPO status, cardiogenic shock (hypotension requiring vasopressors), left renal infarct, contrast exposure (4/8/25 & 4/11/25), and impaired renal autoregulation with SGLT2 receptor antagonist.  Cr improved to 1.1 this AM (peak 1.4).  K low  2. Hyponatremia, in association with hypovolemia, improved some with IVF, Na now leveling off ~ 130 (vs 125 two days ago)  3. Cardiogenic shock, now off pressors  4. Hx CAD, Inferior STEMI s/p PCI to RCA, followed by cardiology and CTS  5. Left ventricular thrombus, s/p thrombectomy, on heparin drip   6. Severe occlusive peripheral vascular disease, CTA showed complete occlusion of left internal iliac artery, left superficial femoral artery, distal aspect of the left renal artery, and the distal aspect of splenic artery, IV heparin drip  7. Possible sigmoid colon ischemia, seen on CTA, note GEN SURG eval  8. Type II DM, managed by primary team  9. Metabolic acidosis, mild, bicarb down to 19, AG 14, with NS IVF  10. Hypocalcemia                 Anesthesia Plan    ASA 4 - emergent     intravenous induction   Postoperative Plan: Expected vent after surgery    CODE STATUS:    Code Status (Patient has no pulse and is not breathing): CPR (Attempt to Resuscitate)  Medical Interventions (Patient has pulse or is breathing): Full Support

## 2025-04-12 NOTE — BRIEF OP NOTE
EMBOLECTOMY MECHANICAL, ARTERIOGRAM LOWER EXTREMITY  Progress Note    Desiree Garcia  4/12/2025    Pre-op Diagnosis:   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 [I74.3]       Post-Op Diagnosis Codes:     * 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 [I74.3]    Procedure(s):      Procedure(s):  EMBOLECTOMY MECHANICAL, FOUR COMPARTMENT FASCIOTOMY  ARTERIOGRAM LOWER EXTREMITY with left femoral-popliteal stent placement              Surgeon(s):  Misael Lawton MD    Anesthesia: General    Staff:   Cell Saver : Larisa Vasquez  Circulator: Laura Stearns RN  Scrub Person: Joy Molina  Assistant: Sada James CSA  Vascular Radiology Technician: Fallon Curran  Assistant: Sada James CSA    Estimated Blood Loss: 200ml    Urine Voided: * No values recorded between 4/12/2025  8:53 AM and 4/12/2025 11:11 AM *    Specimens:                Specimens       ID Source Type Tests Collected By Collected At Frozen?    1 Blood, Arterial Line Blood TYPE AND SCREEN   Misael Lawton MD 4/12/25 0951     A Leg, Left Tissue TISSUE PATHOLOGY EXAM   Misael Lawton MD 4/12/25 1003 No    Description: LEFT COMMON FEMORAL ARTERY THROMBUS    B Leg, Left Tissue TISSUE PATHOLOGY EXAM   Misael Lawton MD 4/12/25 1007 No    Description: LEFT SUPERFICIAL FEMORAL ARTERY THROMBUS              Drains:   NG/OG Tube Nasogastric Left nostril (Active)   Site Assessment Clean;Dry;Intact 04/12/25 0800   Securement anchored to nostril center with adhesive device 04/12/25 0800   Secured at (cm) 60 04/12/25 0800   NG/OG Site Interventions Site assessed;Nasal/Oral care performed 04/12/25 0800   Dressing Intervention Dressing changed 04/12/25 0400   Status Suction-low intermittent 04/12/25 0800   Drainage Appearance Yellow;Brown;Green 04/12/25 0800   Surrounding Skin Dry;Intact 04/12/25 0800   Output (mL) 800 mL 04/12/25 0459       Urethral Catheter Silicone 16 Fr. (Active)       [REMOVED]  External Urinary Catheter (Removed)   Daily Indications Strict bedrest 04/12/25 0754   Site Assessment Clean;Skin intact 04/12/25 0754   Application/Removal external catheter changed;skin care provided 04/12/25 0754   Collection Container Wall suction 04/12/25 0754   Wall suction (mmHG) 120 mmHG 04/12/25 0754   Securement Method Securing device 04/12/25 0754   Catheter care complete Yes 04/12/25 0754   Output (mL) 400 mL 04/12/25 0606       Findings: Return of signals at DP and PT      Complications: None    Assistant: Sada James CSA  was responsible for performing the following activities: Retraction, Suction, Irrigation, Suturing, Closing, Placing Dressing, and wiring catheter management  and their skilled assistance was necessary for the success of this case.    Misael Lawton MD     Date: 4/12/2025  Time: 11:17 EDT

## 2025-04-12 NOTE — PLAN OF CARE
Goal Outcome Evaluation:              Outcome Evaluation: Patient remain in CICU, switching between roomair and 2L NC. Heparin gtt titrated per protocol. BMx2. Around 0400, patient reported increased pain in left lower exremity. Unable to doppler pulses, extremity cooler, and numbness reported. Vascular made aware and CTA aif with runoff ordered. Provider made aware of results and STAT WEST ordered. Pain treated with PRN meds. Lactic improved. 800 output via NG. No reports of abdominal or rt lower extremity pain. K replaced per protocol. Continue to monitor.

## 2025-04-12 NOTE — PROGRESS NOTES
[facility]   Surgical Progress Note    Patient Name: Desiree Garcia  : 1960  MRN: [id number]  Date of admission: 4/10/2025  Surgical Procedures Since Admission:  Procedure(s):  EMBOLECTOMY MECHANICAL  Surgeon:  Misael Lawton MD  Status:  * Day of Surgery *  -------------------    Subjective   Subjective     Chief Complaint: Critical left leg ischemia    History of Present Illness   65-year-old woman with cardiogenic shock and recently repaired coronary system after MI.  She has a known left ventricular thrombus but had been stabilized on heparin over the last several days.  She is known to have embolized her lower extremities but they were stable with clot below the profunda femoris artery on the left and right with adequate perfusion to the legs to remain asymptomatic at rest.  Given her critical nature it was felt that conservative care in this area was best choice but now things have changed rapidly at 4 AM when she developed severe rest pain and numbness and paresthesias to the left leg consistent with change in her ischemia.  She is now critical with limb threatening ischemia due to either extension of the clot at the femoral level to now cover the profunda or new embolism.    Review of Systems severe left leg pain numbness and immobility    Objective   Objective     Vitals:   Temp:  [97 °F (36.1 °C)-98.7 °F (37.1 °C)] 98 °F (36.7 °C)  Heart Rate:  [72-93] 80  Resp:  [16-18] 18  BP: ()/(47-75) 105/62  Flow (L/min) (Oxygen Therapy):  [2] 2  Output by Drain (mL) 25 0701 - 25 1900 25 1901 - 25 0700 25 0701 - 25 0823 Range Total   NG/OG Tube Nasogastric Left nostril 1100 800  1900   External Urinary Catheter 800 400  1200       Physical Exam      Result Review    Result Review:  I have personally reviewed the results from the time of this admission to 2025 08:23 EDT and agree with these findings:  []  Laboratory list / accordion  []   Microbiology  []  Radiology  []  EKG/Telemetry   []  Cardiology/Vascular   []  Pathology  []  Old records  []  Other:  Most notable findings include: Stable creatinine and hemoglobin with some mild decreased potassium.      Results from last 7 days   Lab Units 04/12/25  0454 04/11/25  1711 04/11/25  0413 04/10/25  1814 04/10/25  0215 04/09/25  0559 04/08/25  1728   WBC 10*3/mm3 8.81 9.64 9.40 15.61* 18.86* 21.49* 21.93*   HEMOGLOBIN g/dL 10.5* 12.8 12.1 12.8 14.1 14.8 15.4   PLATELETS 10*3/mm3 283 302 258 326 258 296 325     Results from last 7 days   Lab Units 04/12/25  0454 04/11/25  1711 04/11/25  0413 04/10/25  1814 04/10/25  0907 04/10/25  0215 04/09/25  0602 04/08/25  1728   SODIUM mmol/L 131* 129* 130* 128* 125* 127* 131* 131*   POTASSIUM mmol/L 3.1* 3.3* 3.9 3.4* 3.7 3.7 3.6 3.7   CHLORIDE mmol/L 98 95* 97* 94* 88* 89* 92* 90*   CO2 mmol/L 19.6* 20.2* 20.5* 21.1* 20.8* 22.4 26.7 25.8   BUN mg/dL 32* 36* 38* 37* 33* 25* 10 9   CREATININE mg/dL 1.08* 1.24* 1.27* 1.33* 1.40* 1.21* 0.80 0.74   GLUCOSE mg/dL 74 75 106* 151* 177* 203* 248* 294*   MAGNESIUM mg/dL 2.3  --  2.3 2.1  --  2.3 1.9 1.7   PHOSPHORUS mg/dL 3.0  --  4.8* 4.7*  --  4.9* 3.1  --    Estimated Creatinine Clearance: 58.2 mL/min (A) (by C-G formula based on SCr of 1.08 mg/dL (H)).  Results from last 7 days   Lab Units 04/10/25  1244   PROTIME Seconds 17.4*   INR  1.36*     Lab Results   Component Value Date    HGBA1C 9.40 (H) 04/09/2025    HGBA1C 9.30 (H) 03/13/2025    HGBA1C 9.40 (H) 12/23/2024     Glucose   Date/Time Value Ref Range Status   04/12/2025 0548 75 70 - 130 mg/dL Final   04/11/2025 2312 71 70 - 130 mg/dL Final   04/11/2025 1815 75 70 - 130 mg/dL Final   04/11/2025 1151 105 70 - 130 mg/dL Final   04/11/2025 0624 108 70 - 130 mg/dL Final   04/10/2025 2258 116 70 - 130 mg/dL Final   04/10/2025 2010 143 (H) 70 - 130 mg/dL Final   04/10/2025 1813 153 (H) 70 - 130 mg/dL Final       Assessment & Plan   Assessment / Plan     Brief Patient  Summary:  Desiree Garcia is a 65 y.o. female who has developed critical ischemia of the left leg progressing from stable ischemia to now critical ischemia with limb threatening changes.  Immediate intervention is needed to salvage the limb and her life.  I do not believe she would tolerate even an amputation better than thrombectomy and I believe that attempted open thrombectomy are best choice here.  Her heart is obviously in high risk state from recent MI that has been treated.  Discussion with cardiology leads me to believe her ejection fraction is recently preserved and while she is high risk her heart is likely going to be able to handle this anesthetic.    Active Hospital Problems:   Active Hospital Problems    Diagnosis    • **Cardiogenic shock    • 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      Plan:   Emergent left thromboembolectomy at the femoral level with 4 compartment fasciotomy.  This is a critical high risk situation with the patient and family understand and agree to.    VTE Prophylaxis:  Pharmacologic & mechanical VTE prophylaxis orders are present.        Misael Lawton MD

## 2025-04-12 NOTE — PLAN OF CARE
Goal Outcome Evaluation:  Plan of Care Reviewed With: patient        Progress: no change  Outcome Evaluation: pt remains in CICU pt here with cardiogenic shock. Pt went emergently to OR this AM for SFA thromboembolectomy and four compartment fasciotomy to the LLE after LLE pain and absent pulses to LLE. Pt now with dopplar-able pulses to LLE. Pt with anterior and medical island dressing to LLE and one left groin site island dressing. All oozing from site, vascular surgery aware and okay for staff to re-inforce sites as needed. Anterior dressing oozing the worst. Pt is a/o x4. She is hallucinating some she reports bugs on ceiling however she knows that she is hallucinating and knows that bugs are not there. She reports she is very fatigued and has not got much rest between testing last night and all procedures today. Able to follow commands. Is NSR on tele. After OR pt was on tico gtt for a few hours, currently on standby. Pt has a left radial art line in place. Pt has 3 PIVs in place, all infusing. Per nephrology continue LR at 50ml/hr for metabolic acidosis. K and Calcium replaced today. Pt is strict NPO. NG tube in place to intermittent low wall suction 650 cc out this shift. 350cc noted to be bloody after OR, general surgery aware. Pt switched to protonix BID. Pt had a loose BM today, not bloody, green-dustin. Accuchecks q6h. Storm in place for hourly i/o's, approx 65 cc/hr. Total of 460 out for me today. Blood cultures sets x2 today, and started on zosyn per general surgery today. K recheck is due around 8p and PTT recheck is due around 945p.

## 2025-04-12 NOTE — PROGRESS NOTES
Pulm/CC note    Called to bedside to assess patient's left lower extremity.  On exam, left lower extremity is significantly cooler, unable to palpate or Doppler  DP or PT pulses.  Patient complaining of 10 out of 10 pain, decreased/absent sensation from middle of the shin down.     Heparin drip continues.  Vascular surgery to be updated by RN regarding cool extremity with absent pulses.

## 2025-04-12 NOTE — NURSING NOTE
Dr Lawton at bedside evaluating patient. Dr Lawton request this RN to call cardiology and update them that patient needs to go emergently to OR for thrombectomy due to patient hsitory of cardiac disease. Lana cardiology rounding RN called and updated on pt status. Lana reports she will update Dr. Stern of patient needing emergent thrombectomy.

## 2025-04-12 NOTE — PROGRESS NOTES
[facility] Clinical Pharmacy Services: Piperacillin-Tazobactam Consult    Pt Name: Desiree Garcia   : 1960    Indication: Intra-Abdominal Infection    Relevant clinical data and objective history reviewed:    Past Medical History:   Diagnosis Date    Arthritis     Cervical cancer screening     COPD (chronic obstructive pulmonary disease)     Coronary artery disease involving native heart without angina pectoris 2021    primary angioplasty and stent placement to mid right coronary artery with good angiographic results on 2021 after STEMI    Depression with anxiety     Diabetes mellitus     Forgetfulness     Gastric reflux     Hypertension     Leg paresthesia 2017    Right    Limb swelling     Lumbago 2017    Low back pain    Lumbar spinal stenosis 2017    L4/5 moderate to severe central canal stenosis    Lumbosacral radiculopathy 2017    Right    Migraines     Night sweat     Reflux esophagitis     Shortness of breath     ST elevation myocardial infarction (STEMI) (CMS/[facility]) 2021    Stomach disorder      Creatinine   Date Value Ref Range Status   2025 1.06 (H) 0.57 - 1.00 mg/dL Final   2025 1.08 (H) 0.57 - 1.00 mg/dL Final   2025 1.24 (H) 0.57 - 1.00 mg/dL Final     BUN   Date Value Ref Range Status   2025 26 (H) 8 - 23 mg/dL Final     Estimated Creatinine Clearance: 59.3 mL/min (A) (by C-G formula based on SCr of 1.06 mg/dL (H)).    Lab Results   Component Value Date    WBC 10.11 2025     Temp Readings from Last 3 Encounters:   25 98.5 °F (36.9 °C) (Oral)   04/10/25 97.8 °F (36.6 °C) (Oral)   25 98.6 °F (37 °C) (Oral)      Assessment/Plan  Estimated CrCl >20 mL/min at this time; BMI 24.94 kg/m2  Will start piperacillin-tazobactam 3.375 g IV every 8 hours     Pharmacy will continue to follow daily while on piperacillin-tazobactam and adjust as needed. Thank you for this consult.    Janna Boucher, PharmD  Clinical  Pharmacist

## 2025-04-12 NOTE — PROGRESS NOTES
Cc: Ischemic colitis    S: Patient underwent mechanical embolectomy and 4 compartment fasciotomy with left femoral-popliteal stent placement for cold leg.  The patient reports she is feeling much better.  Denies any abdominal pain nausea or vomiting.  She is having loose bowel movements.  No rectal bleeding.    O:   Vitals:    04/12/25 1535 04/12/25 1545 04/12/25 1600 04/12/25 1617   BP:   110/62 113/50   BP Location:       Patient Position:       Pulse: 84 89 87 88   Resp: 20      Temp:       TempSrc:       SpO2: 99% 99% 98%    Weight:          NG with bloody stained fluid  Alert, no acute distress  Chronically ill-appearing  Abdomen soft, mildly distended, nontender, no rebound or guarding no peritoneal sign    While cell count 10.1, hemoglobin 10.2, stable, CO2 19.3, lactate normalized.    CTA abdomen that was done this morning showed persistent thickening of the small bowel loops with surrounding inflammation.  No pneumatosis.  There is thickening of the sigmoid colon, no pneumatosis or worsening findings from yesterday.    Assessment and plan    65-year-old female with multiple medical problems status post MI and stent placement, now status post treatment for cold leg with embolectomy and stent placement.  Patient abdominal exam is benign today.  She does not have any abdominal pain.  NG tube with bloody stained fluid.  Abdominal CT scan showed no progression of the ischemic changes in the small bowel and colon.  She is not having rectal bleeding.    Will continue NG tube, start Protonix 40 mg IV twice daily  Awaiting return of bowel function  No need for surgical intervention for now  Start Zosyn due to risk of bacterial translocation due to small bowel and colonic inflammation.    Herminio Charles MD, FACS  General, Minimally Invasive and Endoscopic Surgery  [facility]    [address], [address]  [place], [place], [address]  P: [phone]  F: [phone]

## 2025-04-12 NOTE — PROGRESS NOTES
[facility] Progress Note      Patient Name: Desiree Garcia  : 1960  MRN: [id number]  Primary Care Physician:  Sandra Kaba MD  Date of admission: 4/10/2025    Subjective     Interval History:   F/u NIA    Review of Systems:   I/O 2.2/3.1 (UOP 1200, NGT 1900)  Going to OR shortly for LLE ischemia (with sig pain overnight)  On heparin drip   On NC O2     Objective     Vitals:   Temp:  [97 °F (36.1 °C)-98.7 °F (37.1 °C)] 97.7 °F (36.5 °C)  Heart Rate:  [72-93] 83  Resp:  [16] 16  BP: ()/(47-75) 105/62  Flow (L/min) (Oxygen Therapy):  [2] 2    Intake/Output Summary (Last 24 hours) at 2025 0733  Last data filed at 2025 0624  Gross per 24 hour   Intake 2220 ml   Output 3100 ml   Net -880 ml       Physical Exam:    General Appearance: frail uncomfortable WF on NC O2 with NGT  Neck: supple, no JVD  Lungs: CTA bilat no rales  Heart: RRR, normal S1 and S2  Abdomen: soft, nontender, nondistended  Extremities: no edema    Scheduled Meds:     arformoterol, 15 mcg, Nebulization, BID - RT  aspirin, 300 mg, Rectal, Daily  atorvastatin, 40 mg, Oral, Nightly  budesonide, 0.5 mg, Nebulization, BID - RT  famotidine, 20 mg, Intravenous, Q12H  insulin regular, 2-7 Units, Subcutaneous, Q6H  metoclopramide, 10 mg, Intravenous, Q6H  mupirocin, 1 Application, Each Nare, BID  potassium chloride, 10 mEq, Intravenous, Q1H  revefenacin, 175 mcg, Nebulization, Daily - RT  senna-docusate sodium, 2 tablet, Oral, BID      IV Meds:   heparin, 12 Units/kg/hr, Last Rate: 16 Units/kg/hr (25 8166)  Norepinephrine-Sodium Chloride, 0.02-0.3 mcg/kg/min, Last Rate: Stopped (25 0030)        Results Reviewed:   I have personally reviewed the results from the time of this admission to 2025 07:33 EDT     Results from last 7 days   Lab Units 25  0454 25  1711 25  0413 04/10/25  1814 04/10/25  0907 25  0602 25  1728   SODIUM mmol/L 131* 129* 130*   < >  125*   < > 131*   POTASSIUM mmol/L 3.1* 3.3* 3.9   < > 3.7   < > 3.7   CHLORIDE mmol/L 98 95* 97*   < > 88*   < > 90*   CO2 mmol/L 19.6* 20.2* 20.5*   < > 20.8*   < > 25.8   BUN mg/dL 32* 36* 38*   < > 33*   < > 9   CREATININE mg/dL 1.08* 1.24* 1.27*   < > 1.40*   < > 0.74   CALCIUM mg/dL 7.5* 7.8* 8.4*   < > 8.5*   < > 10.1   BILIRUBIN mg/dL  --   --   --   --  1.1  --  1.1   ALK PHOS U/L  --   --   --   --  126*  --  159*   ALT (SGPT) U/L  --   --   --   --  36*  --  16   AST (SGOT) U/L  --   --   --   --  72*  --  45*   GLUCOSE mg/dL 74 75 106*   < > 177*   < > 294*    < > = values in this interval not displayed.     Estimated Creatinine Clearance: 58.2 mL/min (A) (by C-G formula based on SCr of 1.08 mg/dL (H)).  Results from last 7 days   Lab Units 04/12/25  0454 04/11/25  0413 04/10/25  1814   MAGNESIUM mg/dL 2.3 2.3 2.1   PHOSPHORUS mg/dL 3.0 4.8* 4.7*     Results from last 7 days   Lab Units 04/10/25  1814   URIC ACID mg/dL 6.2*     Results from last 7 days   Lab Units 04/12/25  0454 04/11/25  1711 04/11/25  0413 04/10/25  1814 04/10/25  0215   WBC 10*3/mm3 8.81 9.64 9.40 15.61* 18.86*   HEMOGLOBIN g/dL 10.5* 12.8 12.1 12.8 14.1   PLATELETS 10*3/mm3 283 302 258 326 258     Results from last 7 days   Lab Units 04/10/25  1244   INR  1.36*       Assessment / Plan     ASSESSMENT:  Non olig NIA, with normal renal function at baseline, multifactorial: Prerenal azotemia secondary to nausea/vomiting and prolonged NPO status, cardiogenic shock (hypotension requiring vasopressors), left renal infarct, contrast exposure (4/8/25 & 4/11/25), and impaired renal autoregulation with SGLT2 receptor antagonist.  Cr improved to 1.1 this AM (peak 1.4).  K low  Hyponatremia, in association with hypovolemia, improved some with IVF, Na now leveling off ~ 130 (vs 125 two days ago)  Cardiogenic shock, now off pressors  Hx CAD, Inferior STEMI s/p PCI to RCA, followed by cardiology and CTS  Left ventricular thrombus, s/p thrombectomy,  on heparin drip   Severe occlusive peripheral vascular disease, CTA showed complete occlusion of left internal iliac artery, left superficial femoral artery, distal aspect of the left renal artery, and the distal aspect of splenic artery, IV heparin drip  Possible sigmoid colon ischemia, seen on CTA, note GEN SURG eval  Type II DM, managed by primary team  Metabolic acidosis, mild, bicarb down to 19, AG 14, with NS IVF  Hypocalcemia     PLAN:  K replaced, recheck post op   Give Ca gluconate  OR this AM per vascular surg   Will change IVF to LR given acidosis  D/W ERNESTO Goff MD  04/12/25  07:33 EDT    [facility]  [phone]

## 2025-04-13 LAB
ANION GAP SERPL CALCULATED.3IONS-SCNC: 11 MMOL/L (ref 5–15)
APTT PPP: 62.7 SECONDS (ref 22.7–35.4)
APTT PPP: 63.2 SECONDS (ref 22.7–35.4)
APTT PPP: 65.8 SECONDS (ref 22.7–35.4)
APTT PPP: 75.6 SECONDS (ref 22.7–35.4)
BACTERIA SPEC AEROBE CULT: NORMAL
BACTERIA SPEC AEROBE CULT: NORMAL
BASOPHILS # BLD MANUAL: 0 10*3/MM3 (ref 0–0.2)
BASOPHILS NFR BLD MANUAL: 0 % (ref 0–1.5)
BUN SERPL-MCNC: 22 MG/DL (ref 8–23)
BUN/CREAT SERPL: 19.6 (ref 7–25)
CALCIUM SPEC-SCNC: 8 MG/DL (ref 8.6–10.5)
CHLORIDE SERPL-SCNC: 104 MMOL/L (ref 98–107)
CO2 SERPL-SCNC: 18 MMOL/L (ref 22–29)
CREAT SERPL-MCNC: 1.12 MG/DL (ref 0.57–1)
DEPRECATED RDW RBC AUTO: 42.6 FL (ref 37–54)
DEPRECATED RDW RBC AUTO: 43 FL (ref 37–54)
DEPRECATED RDW RBC AUTO: 43.2 FL (ref 37–54)
EGFRCR SERPLBLD CKD-EPI 2021: 55 ML/MIN/1.73
EOSINOPHIL # BLD MANUAL: 0 10*3/MM3 (ref 0–0.4)
EOSINOPHIL NFR BLD MANUAL: 0 % (ref 0.3–6.2)
ERYTHROCYTE [DISTWIDTH] IN BLOOD BY AUTOMATED COUNT: 14 % (ref 12.3–15.4)
ERYTHROCYTE [DISTWIDTH] IN BLOOD BY AUTOMATED COUNT: 14 % (ref 12.3–15.4)
ERYTHROCYTE [DISTWIDTH] IN BLOOD BY AUTOMATED COUNT: 14.2 % (ref 12.3–15.4)
GLUCOSE BLDC GLUCOMTR-MCNC: 100 MG/DL (ref 70–130)
GLUCOSE BLDC GLUCOMTR-MCNC: 128 MG/DL (ref 70–130)
GLUCOSE BLDC GLUCOMTR-MCNC: 134 MG/DL (ref 70–130)
GLUCOSE SERPL-MCNC: 102 MG/DL (ref 65–99)
HCT VFR BLD AUTO: 23.7 % (ref 34–46.6)
HCT VFR BLD AUTO: 25.6 % (ref 34–46.6)
HCT VFR BLD AUTO: 30 % (ref 34–46.6)
HGB BLD-MCNC: 10 G/DL (ref 12–15.9)
HGB BLD-MCNC: 7.7 G/DL (ref 12–15.9)
HGB BLD-MCNC: 8.4 G/DL (ref 12–15.9)
LYMPHOCYTES # BLD MANUAL: 0.68 10*3/MM3 (ref 0.7–3.1)
LYMPHOCYTES # BLD MANUAL: 0.8 10*3/MM3 (ref 0.7–3.1)
LYMPHOCYTES NFR BLD MANUAL: 12 % (ref 5–12)
LYMPHOCYTES NFR BLD MANUAL: 18 % (ref 5–12)
MAGNESIUM SERPL-MCNC: 2.3 MG/DL (ref 1.6–2.4)
MCH RBC QN AUTO: 27.3 PG (ref 26.6–33)
MCH RBC QN AUTO: 27.9 PG (ref 26.6–33)
MCH RBC QN AUTO: 27.9 PG (ref 26.6–33)
MCHC RBC AUTO-ENTMCNC: 32.5 G/DL (ref 31.5–35.7)
MCHC RBC AUTO-ENTMCNC: 32.8 G/DL (ref 31.5–35.7)
MCHC RBC AUTO-ENTMCNC: 33.3 G/DL (ref 31.5–35.7)
MCV RBC AUTO: 83.6 FL (ref 79–97)
MCV RBC AUTO: 84 FL (ref 79–97)
MCV RBC AUTO: 85 FL (ref 79–97)
METAMYELOCYTES NFR BLD MANUAL: 1 % (ref 0–0)
METAMYELOCYTES NFR BLD MANUAL: 1 % (ref 0–0)
MONOCYTES # BLD: 1.37 10*3/MM3 (ref 0.1–0.9)
MONOCYTES # BLD: 2.05 10*3/MM3 (ref 0.1–0.9)
MYELOCYTES NFR BLD MANUAL: 2 % (ref 0–0)
MYELOCYTES NFR BLD MANUAL: 2 % (ref 0–0)
NEUTROPHILS # BLD AUTO: 8.22 10*3/MM3 (ref 1.7–7)
NEUTROPHILS # BLD AUTO: 8.9 10*3/MM3 (ref 1.7–7)
NEUTROPHILS NFR BLD MANUAL: 72 % (ref 42.7–76)
NEUTROPHILS NFR BLD MANUAL: 78 % (ref 42.7–76)
NRBC BLD AUTO-RTO: 0 /100 WBC (ref 0–0.2)
PHOSPHATE SERPL-MCNC: 3 MG/DL (ref 2.5–4.5)
PLAT MORPH BLD: NORMAL
PLAT MORPH BLD: NORMAL
PLATELET # BLD AUTO: 300 10*3/MM3 (ref 140–450)
PLATELET # BLD AUTO: 316 10*3/MM3 (ref 140–450)
PLATELET # BLD AUTO: 321 10*3/MM3 (ref 140–450)
PMV BLD AUTO: 10.6 FL (ref 6–12)
PMV BLD AUTO: 10.7 FL (ref 6–12)
PMV BLD AUTO: 10.9 FL (ref 6–12)
POTASSIUM SERPL-SCNC: 4.2 MMOL/L (ref 3.5–5.2)
PROMYELOCYTES NFR BLD MANUAL: 1 % (ref 0–0)
RBC # BLD AUTO: 2.82 10*6/MM3 (ref 3.77–5.28)
RBC # BLD AUTO: 3.01 10*6/MM3 (ref 3.77–5.28)
RBC # BLD AUTO: 3.59 10*6/MM3 (ref 3.77–5.28)
RBC MORPH BLD: NORMAL
RBC MORPH BLD: NORMAL
SODIUM SERPL-SCNC: 133 MMOL/L (ref 136–145)
VARIANT LYMPHS NFR BLD MANUAL: 6 % (ref 19.6–45.3)
VARIANT LYMPHS NFR BLD MANUAL: 7 % (ref 19.6–45.3)
WBC MORPH BLD: NORMAL
WBC NRBC COR # BLD AUTO: 11.41 10*3/MM3 (ref 3.4–10.8)
WBC NRBC COR # BLD AUTO: 12.58 10*3/MM3 (ref 3.4–10.8)
WBC NRBC COR # BLD AUTO: 16.75 10*3/MM3 (ref 3.4–10.8)

## 2025-04-13 PROCEDURE — 85730 THROMBOPLASTIN TIME PARTIAL: CPT | Performed by: INTERNAL MEDICINE

## 2025-04-13 PROCEDURE — 85730 THROMBOPLASTIN TIME PARTIAL: CPT | Performed by: SURGERY

## 2025-04-13 PROCEDURE — 99024 POSTOP FOLLOW-UP VISIT: CPT | Performed by: SURGERY

## 2025-04-13 PROCEDURE — 82948 REAGENT STRIP/BLOOD GLUCOSE: CPT

## 2025-04-13 PROCEDURE — 36430 TRANSFUSION BLD/BLD COMPNT: CPT

## 2025-04-13 PROCEDURE — 94799 UNLISTED PULMONARY SVC/PX: CPT

## 2025-04-13 PROCEDURE — 25010000002 POTASSIUM CHLORIDE PER 2 MEQ

## 2025-04-13 PROCEDURE — 25010000002 HEPARIN (PORCINE) 25000-0.45 UT/250ML-% SOLUTION: Performed by: SURGERY

## 2025-04-13 PROCEDURE — 25010000002 HEPARIN (PORCINE) PER 1000 UNITS: Performed by: SURGERY

## 2025-04-13 PROCEDURE — 99232 SBSQ HOSP IP/OBS MODERATE 35: CPT | Performed by: INTERNAL MEDICINE

## 2025-04-13 PROCEDURE — 80048 BASIC METABOLIC PNL TOTAL CA: CPT | Performed by: SURGERY

## 2025-04-13 PROCEDURE — 25810000003 LACTATED RINGERS PER 1000 ML: Performed by: INTERNAL MEDICINE

## 2025-04-13 PROCEDURE — 94760 N-INVAS EAR/PLS OXIMETRY 1: CPT

## 2025-04-13 PROCEDURE — 99232 SBSQ HOSP IP/OBS MODERATE 35: CPT | Performed by: SURGERY

## 2025-04-13 PROCEDURE — 83735 ASSAY OF MAGNESIUM: CPT | Performed by: SURGERY

## 2025-04-13 PROCEDURE — 84100 ASSAY OF PHOSPHORUS: CPT | Performed by: SURGERY

## 2025-04-13 PROCEDURE — 25010000002 PIPERACILLIN SOD-TAZOBACTAM PER 1 G: Performed by: SURGERY

## 2025-04-13 PROCEDURE — 99222 1ST HOSP IP/OBS MODERATE 55: CPT | Performed by: INTERNAL MEDICINE

## 2025-04-13 PROCEDURE — 94761 N-INVAS EAR/PLS OXIMETRY MLT: CPT

## 2025-04-13 PROCEDURE — 85025 COMPLETE CBC W/AUTO DIFF WBC: CPT | Performed by: SURGERY

## 2025-04-13 PROCEDURE — 25010000002 METOCLOPRAMIDE PER 10 MG: Performed by: SURGERY

## 2025-04-13 PROCEDURE — 94664 DEMO&/EVAL PT USE INHALER: CPT

## 2025-04-13 PROCEDURE — 86900 BLOOD TYPING SEROLOGIC ABO: CPT

## 2025-04-13 PROCEDURE — 25010000002 HYDROMORPHONE 1 MG/ML SOLUTION: Performed by: SURGERY

## 2025-04-13 PROCEDURE — 63710000001 REVEFENACIN 175 MCG/3ML SOLUTION: Performed by: SURGERY

## 2025-04-13 PROCEDURE — 85007 BL SMEAR W/DIFF WBC COUNT: CPT | Performed by: SURGERY

## 2025-04-13 PROCEDURE — P9016 RBC LEUKOCYTES REDUCED: HCPCS

## 2025-04-13 PROCEDURE — 25010000002 EPOETIN ALFA-EPBX 40000 UNIT/ML SOLUTION: Performed by: SURGERY

## 2025-04-13 PROCEDURE — 85027 COMPLETE CBC AUTOMATED: CPT | Performed by: INTERNAL MEDICINE

## 2025-04-13 RX ORDER — SODIUM CHLORIDE, SODIUM LACTATE, POTASSIUM CHLORIDE, CALCIUM CHLORIDE 600; 310; 30; 20 MG/100ML; MG/100ML; MG/100ML; MG/100ML
50 INJECTION, SOLUTION INTRAVENOUS CONTINUOUS
Status: ACTIVE | OUTPATIENT
Start: 2025-04-13 | End: 2025-04-14

## 2025-04-13 RX ADMIN — MUPIROCIN 1 APPLICATION: 20 OINTMENT TOPICAL at 21:37

## 2025-04-13 RX ADMIN — IPRATROPIUM BROMIDE AND ALBUTEROL SULFATE 3 ML: .5; 3 SOLUTION RESPIRATORY (INHALATION) at 22:59

## 2025-04-13 RX ADMIN — IPRATROPIUM BROMIDE AND ALBUTEROL SULFATE 3 ML: .5; 3 SOLUTION RESPIRATORY (INHALATION) at 15:27

## 2025-04-13 RX ADMIN — ARFORMOTEROL TARTRATE 15 MCG: 15 SOLUTION RESPIRATORY (INHALATION) at 19:00

## 2025-04-13 RX ADMIN — PIPERACILLIN AND TAZOBACTAM 3.38 G: 3; .375 INJECTION, POWDER, FOR SOLUTION INTRAVENOUS at 07:51

## 2025-04-13 RX ADMIN — METOCLOPRAMIDE 10 MG: 5 INJECTION, SOLUTION INTRAMUSCULAR; INTRAVENOUS at 11:28

## 2025-04-13 RX ADMIN — PANTOPRAZOLE SODIUM 40 MG: 40 INJECTION, POWDER, FOR SOLUTION INTRAVENOUS at 21:37

## 2025-04-13 RX ADMIN — ARFORMOTEROL TARTRATE 15 MCG: 15 SOLUTION RESPIRATORY (INHALATION) at 08:29

## 2025-04-13 RX ADMIN — PIPERACILLIN AND TAZOBACTAM 3.38 G: 3; .375 INJECTION, POWDER, FOR SOLUTION INTRAVENOUS at 00:05

## 2025-04-13 RX ADMIN — METOCLOPRAMIDE 10 MG: 5 INJECTION, SOLUTION INTRAMUSCULAR; INTRAVENOUS at 04:03

## 2025-04-13 RX ADMIN — REVEFENACIN 175 MCG: 175 SOLUTION RESPIRATORY (INHALATION) at 08:28

## 2025-04-13 RX ADMIN — BUDESONIDE 0.5 MG: 0.5 INHALANT RESPIRATORY (INHALATION) at 08:28

## 2025-04-13 RX ADMIN — HYDROMORPHONE HYDROCHLORIDE 1 MG: 1 INJECTION, SOLUTION INTRAMUSCULAR; INTRAVENOUS; SUBCUTANEOUS at 09:48

## 2025-04-13 RX ADMIN — PIPERACILLIN AND TAZOBACTAM 3.38 G: 3; .375 INJECTION, POWDER, FOR SOLUTION INTRAVENOUS at 15:06

## 2025-04-13 RX ADMIN — HEPARIN SODIUM 2100 UNITS: 5000 INJECTION INTRAVENOUS; SUBCUTANEOUS at 09:23

## 2025-04-13 RX ADMIN — ACETAMINOPHEN 650 MG: 650 SUPPOSITORY RECTAL at 09:23

## 2025-04-13 RX ADMIN — HEPARIN SODIUM 13 UNITS/KG/HR: 10000 INJECTION, SOLUTION INTRAVENOUS at 04:03

## 2025-04-13 RX ADMIN — BUDESONIDE 0.5 MG: 0.5 INHALANT RESPIRATORY (INHALATION) at 19:01

## 2025-04-13 RX ADMIN — POTASSIUM CHLORIDE 20 MEQ: 29.8 INJECTION INTRAVENOUS at 00:10

## 2025-04-13 RX ADMIN — MUPIROCIN 1 APPLICATION: 20 OINTMENT TOPICAL at 09:22

## 2025-04-13 RX ADMIN — PANTOPRAZOLE SODIUM 40 MG: 40 INJECTION, POWDER, FOR SOLUTION INTRAVENOUS at 09:26

## 2025-04-13 RX ADMIN — METOCLOPRAMIDE 10 MG: 5 INJECTION, SOLUTION INTRAMUSCULAR; INTRAVENOUS at 16:08

## 2025-04-13 RX ADMIN — ASPIRIN 300 MG: 300 SUPPOSITORY RECTAL at 09:23

## 2025-04-13 RX ADMIN — EPOETIN ALFA-EPBX 40000 UNITS: 40000 INJECTION, SOLUTION INTRAVENOUS; SUBCUTANEOUS at 15:04

## 2025-04-13 RX ADMIN — METOCLOPRAMIDE 10 MG: 5 INJECTION, SOLUTION INTRAMUSCULAR; INTRAVENOUS at 23:59

## 2025-04-13 RX ADMIN — PIPERACILLIN AND TAZOBACTAM 3.38 G: 3; .375 INJECTION, POWDER, FOR SOLUTION INTRAVENOUS at 23:59

## 2025-04-13 RX ADMIN — METOCLOPRAMIDE 10 MG: 5 INJECTION, SOLUTION INTRAMUSCULAR; INTRAVENOUS at 00:05

## 2025-04-13 RX ADMIN — HYDROMORPHONE HYDROCHLORIDE 1 MG: 1 INJECTION, SOLUTION INTRAMUSCULAR; INTRAVENOUS; SUBCUTANEOUS at 21:44

## 2025-04-13 RX ADMIN — SODIUM CHLORIDE, POTASSIUM CHLORIDE, SODIUM LACTATE AND CALCIUM CHLORIDE 50 ML/HR: 600; 310; 30; 20 INJECTION, SOLUTION INTRAVENOUS at 07:52

## 2025-04-13 RX ADMIN — IPRATROPIUM BROMIDE AND ALBUTEROL SULFATE 3 ML: .5; 3 SOLUTION RESPIRATORY (INHALATION) at 08:26

## 2025-04-13 RX ADMIN — HYDROMORPHONE HYDROCHLORIDE 1 MG: 1 INJECTION, SOLUTION INTRAMUSCULAR; INTRAVENOUS; SUBCUTANEOUS at 16:08

## 2025-04-13 NOTE — PROGRESS NOTES
[facility] Progress Note      Patient Name: Desiree Garcia  : 1960  MRN: [id number]  Primary Care Physician:  Sandra Kaba MD  Date of admission: 4/10/2025    Subjective     Interval History:   F/u NIA     Review of Systems:   I/O 4.8/1.2  OR yesterday for open LLE thromboembolectomy and endarterectomy/repair and fasciotomy, arteriogram, stent x2  NPO with NGT  Denies dyspnea     Objective     Vitals:   Temp:  [97.5 °F (36.4 °C)-99.4 °F (37.4 °C)] 97.5 °F (36.4 °C)  Heart Rate:  [80-96] 91  Resp:  [10-20] 15  BP: ()/(46-70) 99/62  Arterial Line BP: (102-151)/(43-62) 107/43  Flow (L/min) (Oxygen Therapy):  [2-4] 2    Intake/Output Summary (Last 24 hours) at 2025 1016  Last data filed at 2025 0925  Gross per 24 hour   Intake 4683.84 ml   Output 900 ml   Net 3783.84 ml       Physical Exam:    General Appearance: frail uncomfortable WF on RA with NGT  Neck: supple, no JVD  Lungs: CTA bilat no rales  Heart: RRR, normal S1 and S2  Abdomen: soft, nontender, nondistended  Extremities: 1+ LLE edema, wrapped distally, no RLE edema  +torres    Scheduled Meds:     arformoterol, 15 mcg, Nebulization, BID - RT  aspirin, 81 mg, Oral, Daily  aspirin, 300 mg, Rectal, Daily  atorvastatin, 40 mg, Oral, Nightly  budesonide, 0.5 mg, Nebulization, BID - RT  clopidogrel, 75 mg, Oral, Daily  insulin regular, 2-7 Units, Subcutaneous, Q6H  ipratropium-albuterol, 3 mL, Nebulization, Q8H - RT  metoclopramide, 10 mg, Intravenous, Q6H  mupirocin, 1 Application, Each Nare, BID  pantoprazole, 40 mg, Intravenous, BID  piperacillin-tazobactam, 3.375 g, Intravenous, Q8H  revefenacin, 175 mcg, Nebulization, Daily - RT  senna-docusate sodium, 2 tablet, Oral, BID      IV Meds:   heparin, 12 Units/kg/hr, Last Rate: 14 Units/kg/hr (25 0925)  niCARdipine, 5-15 mg/hr, Last Rate: Stopped (25 1312)  Norepinephrine-Sodium Chloride, 0.02-0.3 mcg/kg/min, Last Rate: Stopped (25  0030)  phenylephrine, 0.5-3 mcg/kg/min, Last Rate: Stopped (04/12/25 1617)        Results Reviewed:   I have personally reviewed the results from the time of this admission to 4/13/2025 10:16 EDT     Results from last 7 days   Lab Units 04/13/25  0514 04/12/25  2153 04/12/25  1532 04/12/25  1302 04/12/25  0454 04/10/25  1814 04/10/25  0907 04/09/25  0602 04/08/25  1728   SODIUM mmol/L 133*  --  133*  --  131*   < > 125*   < > 131*   POTASSIUM mmol/L 4.2 3.6 3.8   < > 3.1*   < > 3.7   < > 3.7   CHLORIDE mmol/L 104  --  102  --  98   < > 88*   < > 90*   CO2 mmol/L 18.0*  --  19.3*  --  19.6*   < > 20.8*   < > 25.8   BUN mg/dL 22  --  26*  --  32*   < > 33*   < > 9   CREATININE mg/dL 1.12*  --  1.06*  --  1.08*   < > 1.40*   < > 0.74   CALCIUM mg/dL 8.0*  --  7.7*  --  7.5*   < > 8.5*   < > 10.1   BILIRUBIN mg/dL  --   --   --   --   --   --  1.1  --  1.1   ALK PHOS U/L  --   --   --   --   --   --  126*  --  159*   ALT (SGPT) U/L  --   --   --   --   --   --  36*  --  16   AST (SGOT) U/L  --   --   --   --   --   --  72*  --  45*   GLUCOSE mg/dL 102*  --  90  --  74   < > 177*   < > 294*    < > = values in this interval not displayed.     Estimated Creatinine Clearance: 52.2 mL/min (A) (by C-G formula based on SCr of 1.12 mg/dL (H)).  Results from last 7 days   Lab Units 04/13/25 0514 04/12/25  0454 04/11/25  0413   MAGNESIUM mg/dL 2.3 2.3 2.3   PHOSPHORUS mg/dL 3.0 3.0 4.8*     Results from last 7 days   Lab Units 04/10/25  1814   URIC ACID mg/dL 6.2*     Results from last 7 days   Lab Units 04/13/25  0514 04/12/25  1532 04/12/25  0454 04/11/25  1711 04/11/25  0413   WBC 10*3/mm3 11.41* 10.11 8.81 9.64 9.40   HEMOGLOBIN g/dL 8.4* 10.2* 10.5* 12.8 12.1   PLATELETS 10*3/mm3 316 341 283 302 258     Results from last 7 days   Lab Units 04/10/25  1244   INR  1.36*       Assessment / Plan     ASSESSMENT:  Non olig NIA, multifactorial: prerenal azotemia secondary to nausea/vomiting, cardiogenic shock, left renal infarct,  contrast exposure and SGLT2 inh use.  Cr stable 1.1 today (peak 1.4) and did receive intra-op contrast yesterday with arteriogram.  K repleted   Hypovolemic hyponatremia, improved, Na up 133  Severe PVD + Critical LLE ischemia POD1 open left iliofemoral profunda and SFA thrombectomy, fasciotomy, left common femoral endarterectomy / stent x2  Hx CAD, Inferior STEMI s/p PCI to RCA, followed by cardiology and CTS  Left ventricular thrombus, s/p thrombectomy, on heparin drip   HFrEF, EF 41 to 45%.  Compensated.    Possible sigmoid colon ischemia, seen on CTA, note GEN SURG eval  Type II DM, managed by primary team  Metabolic acidosis, mild, bicarb 18, AG 11, changed IVF from NS to LR  Hypocalcemia, Ca up to 8.0, corrects higher for low alb  Anemia of ABL, hgb down 8.4 post op  Nutrition: NPO with NGT     PLAN:  Continue LR IVF 50 cc/hr while NPO given metabolic acidosis  D/w RN, family       Jose Daniel Goff MD  04/13/25  10:16 EDT    [facility]  [phone]

## 2025-04-13 NOTE — PLAN OF CARE
Goal Outcome Evaluation:  Plan of Care Reviewed With: patient        Progress: no change  Outcome Evaluation: pt remains in CICU pt here with cardiogenic shock. Pt is s/p emergent OR for SFA thromboembolectomy and four compartment fasciotomy to the LLE after LLE pain and absent pulses to LLE. Pt now with dopplar-able pulses to LLE. Pt with anterior and medical island dressing to LLE and one left groin site island dressing. All oozing from site, vascular surgery aware and okay for staff to re-inforce sites as needed. Anterior and groin dressing oozing the worst an has been reinforced. Per Vascular Surgery pt is to remain on Heparin gtt. Drop in HGB this shift for 7.7, per vascular surgery replaced with 2 units PRBCs. Pt is a/o x4. Able to follow commands. Is NSR on tele. Currently on Lionel gtt to maintain MAP > 65. Pt has a left radial art line in place. Pt has 3 PIVs in place, all infusing. Per nephrology continue LR at 50ml/hr for metabolic acidosis. Pt is NPO and can have Ice chips per dr. lynn with general surgery.. NG tube in place to intermittent low wall suction, 1,300 out.  Accuchecks q6h. Storm in place for hourly i/o's, Total of 400 out so far for me today. Heparin therapeutic x1 next PTT recheck is due around 2200. CBC q6h, current cbc is pending, next recheck at 0000.

## 2025-04-13 NOTE — PROGRESS NOTES
"    Patient Name: Desiree Garcia  :1960  65 y.o.      Patient Care Team:  Sandra Kaba MD as PCP - General (Family Medicine)  Marisol Nazario, RN as Ambulatory  ([facility])    Chief Complaint: inferior STEMI ischemic CM, LV thrombus PAD    Interval History: continued pain in left leg. Dopplerable DP and PT pulses though faint. Warm but edematous. Tender to touch. No angina     Objective   Vital Signs  Temp:  [97.5 °F (36.4 °C)-99.4 °F (37.4 °C)] 97.5 °F (36.4 °C)  Heart Rate:  [80-96] 91  Resp:  [10-20] 15  BP: ()/(46-70) 99/62  Arterial Line BP: (102-151)/(43-62) 107/43    Intake/Output Summary (Last 24 hours) at 2025 0958  Last data filed at 2025 0925  Gross per 24 hour   Intake 4683.84 ml   Output 900 ml   Net 3783.84 ml     Flowsheet Rows      Flowsheet Row First Filed Value   Admission Height 167.6 cm (65.98\") Documented at 2025 1714   Admission Weight 71 kg (156 lb 8.4 oz) Documented at 04/10/2025 1700            Physical Exam:   General Appearance:    Alert, cooperative, in no acute distress   Lungs:     Clear to auscultation.  Normal respiratory effort and rate.      Heart:    Regular rhythm and normal rate, normal S1 and S2, no murmurs, gallops or rubs.     Chest Wall:    No abnormalities observed   Abdomen:     Soft, nontender, positive bowel sounds.     Extremities:   Faint doppler pulses on DP and PT LLE. Warm, tender, edematous LLE.        Results Review:    Results from last 7 days   Lab Units 25  0514   SODIUM mmol/L 133*   POTASSIUM mmol/L 4.2   CHLORIDE mmol/L 104   CO2 mmol/L 18.0*   BUN mg/dL 22   CREATININE mg/dL 1.12*   GLUCOSE mg/dL 102*   CALCIUM mg/dL 8.0*     Results from last 7 days   Lab Units 25  1728   HSTROP T ng/L 971* 835*     Results from last 7 days   Lab Units 25  0514   WBC 10*3/mm3 11.41*   HEMOGLOBIN g/dL 8.4*   HEMATOCRIT % 25.6*   PLATELETS 10*3/mm3 316     Results from last 7 days "   Lab Units 04/13/25  0802 04/13/25  0514 04/12/25  2153 04/10/25  1742 04/10/25  1244   INR   --   --   --   --  1.36*   APTT seconds 62.7* 63.2* 68.9*   < > 96.3*    < > = values in this interval not displayed.     Results from last 7 days   Lab Units 04/13/25  0514   MAGNESIUM mg/dL 2.3     Results from last 7 days   Lab Units 04/09/25  0602   CHOLESTEROL mg/dL 147   TRIGLYCERIDES mg/dL 158*   HDL CHOL mg/dL 42   LDL CHOL mg/dL 78               Medication Review:   arformoterol, 15 mcg, Nebulization, BID - RT  aspirin, 81 mg, Oral, Daily  aspirin, 300 mg, Rectal, Daily  atorvastatin, 40 mg, Oral, Nightly  budesonide, 0.5 mg, Nebulization, BID - RT  clopidogrel, 75 mg, Oral, Daily  insulin regular, 2-7 Units, Subcutaneous, Q6H  ipratropium-albuterol, 3 mL, Nebulization, Q8H - RT  metoclopramide, 10 mg, Intravenous, Q6H  mupirocin, 1 Application, Each Nare, BID  pantoprazole, 40 mg, Intravenous, BID  piperacillin-tazobactam, 3.375 g, Intravenous, Q8H  revefenacin, 175 mcg, Nebulization, Daily - RT  senna-docusate sodium, 2 tablet, Oral, BID         heparin, 12 Units/kg/hr, Last Rate: 14 Units/kg/hr (04/13/25 0925)  niCARdipine, 5-15 mg/hr, Last Rate: Stopped (04/12/25 1312)  Norepinephrine-Sodium Chloride, 0.02-0.3 mcg/kg/min, Last Rate: Stopped (04/11/25 0030)  phenylephrine, 0.5-3 mcg/kg/min, Last Rate: Stopped (04/12/25 1617)        Assessment & Plan   Inferior STEMI s/p PCI  LV thrombus with embolism to gut and LLE  Critical limb ischemia s/p open left iliofemoral prfounda and SFA thrombectomy, fasciotomy, left common femoral endarterectomy  Ischemic CM appears euvolemic on exam today  COPD stable  Continue current medical therapy. Stable though quite ill. Tolerated surgery from CV perspective fairly well    Katheryn Stern MD  [facility]  04/13/25  09:58 EDT

## 2025-04-13 NOTE — PROGRESS NOTES
Cc: Ischemic bowel    S: No events overnight.  Complaining of left lower extremity pain.  He is hungry and wants to eat.  Reports minimal abdominal pain.  She is not having bowel function    O:   Vitals:    04/13/25 0900 04/13/25 0915 04/13/25 0930 04/13/25 0945   BP: 99/62      BP Location: Right arm      Patient Position: Lying      Pulse: 90 91 91 91   Resp: 15      Temp:       TempSrc:       SpO2: 97% 97% 100% 94%   Weight:       Height:          NG output 1.2 L, bilious  Alert, no acute distress, chronically ill-appearing  Breathing comfortable  Regular rate rhythm  Abdomen soft, mildly distended, tender throughout, no rebound or guarding no peritoneal signs    White blood cell count 11.4, hemoglobin 8.4  CO2 18, creatinine 1.1, slightly up    Assessment and plan    65-year-old female with multiple comorbidities, LV thrombus, showering of emboli to multiple intra-abdominal organs, likely ischemic enteritis and colitis.  This has been stable and although she is tender there has not been getting worse.  She is in a very tenuous situation and may require surgery in the future if symptoms worsen.  She is hungry and wants to eat.  She continues to have ileus with high NG output.    - Continue n.p.o., IV fluids  - Continue Protonix 40 mg IV twice daily  - Continue NG tube in place  - No need for surgical intervention for now    Herminio Charles MD, FACS  General, Minimally Invasive and Endoscopic Surgery  [facility]    [address], [address]  [place], [address]  P: [phone]  F: [phone]     EEG DONE 

PATIENT NO DISTRESS , EXPLAINED PLAN OF CARE BP DOWN /64  AFTER CLONIDINE GIVEN

## 2025-04-13 NOTE — CONSULTS
.     REASON FOR CONSULTATION:       Possible failure of heparin with extension of clot versus recurrent emboli     Provide an opinion on any further workup or treatment                             REQUESTING PHYSICIAN: Bryn Merino MD  RECORDS OBTAINED:  Records of the patient's history including those from the electronic medical record were reviewed and summarized in detail.    HISTORY OF PRESENT ILLNESS:  The patient is a 65 y.o. year old female  who is here for follow-up with the above-mentioned history.    Chart reviewed.  Details below.  Summary is LV thrombus with embolization to multiple areas including to the leg despite therapeutic dose heparin.  Remains on heparin.    Past Medical History:   Diagnosis Date    Arthritis     Cervical cancer screening     COPD (chronic obstructive pulmonary disease)     Coronary artery disease involving native heart without angina pectoris 09/30/2021    primary angioplasty and stent placement to mid right coronary artery with good angiographic results on 6/14/2021 after STEMI    Depression with anxiety     Diabetes mellitus     Forgetfulness     Gastric reflux     Hypertension     Leg paresthesia 01/20/2017    Right    Limb swelling     Lumbago 01/20/2017    Low back pain    Lumbar spinal stenosis 02/23/2017    L4/5 moderate to severe central canal stenosis    Lumbosacral radiculopathy 01/20/2017    Right    Migraines     Night sweat     Reflux esophagitis     Shortness of breath     ST elevation myocardial infarction (STEMI) (CMS/HCC) 06/14/2021    Stomach disorder      Past Surgical History:   Procedure Laterality Date    ABDOMINAL SURGERY      3 C-SECTIONS    CARDIAC CATHETERIZATION      CARDIAC CATHETERIZATION N/A 06/14/2021    Procedure: Left Heart Cath;  Surgeon: Sidney Xiao MD;  Location: [facility] INVASIVE LOCATION;  Service: Cardiology;  Laterality: N/A;    CARDIAC CATHETERIZATION N/A 4/8/2025    Procedure: Left Heart Cath;  Surgeon: James Verdugo  MD Ash;  Location: [facility] CATH INVASIVE LOCATION;  Service: Cardiology;  Laterality: N/A;     SECTION      x 3    CHOLECYSTECTOMY      COLONOSCOPY      COLONOSCOPY N/A 3/25/2025    Procedure: COLONOSCOPY WITH BIOPSIES AND COLD AND HOT SNARE POLYPECTOMIES;  Surgeon: Heber Najera MD;  Location: [facility] ENDOSCOPY;  Service: Gastroenterology;  Laterality: N/A;  COLON POLYPS    CORONARY STENT PLACEMENT      ENDOSCOPY      2007    FOOT SURGERY Right     HAND SURGERY      HYSTERECTOMY  1985       MEDICATIONS    Current Facility-Administered Medications:     acetaminophen (TYLENOL) tablet 650 mg, 650 mg, Oral, Q4H PRN **OR** acetaminophen (TYLENOL) 160 MG/5ML oral solution 650 mg, 650 mg, Oral, Q4H PRN **OR** acetaminophen (TYLENOL) suppository 650 mg, 650 mg, Rectal, Q4H PRN, Misael Lawton MD, 650 mg at 25    acetaminophen (TYLENOL) tablet 650 mg, 650 mg, Oral, Q4H PRN **OR** acetaminophen (TYLENOL) suppository 650 mg, 650 mg, Rectal, Q4H PRN, Misael Lawton MD    albuterol (PROVENTIL) nebulizer solution 0.083% 2.5 mg/3mL, 2.5 mg, Nebulization, Q6H PRN, Misael Lawton MD    aluminum-magnesium hydroxide-simethicone (MAALOX MAX) 400-400-40 MG/5ML suspension 15 mL, 15 mL, Oral, Q6H PRN, Miseal Lawton MD    arformoterol (BROVANA) nebulizer solution 15 mcg, 15 mcg, Nebulization, BID - RT, Misael Lawton MD, 15 mcg at 25    aspirin EC tablet 81 mg, 81 mg, Oral, Daily, Misael Lawton MD    aspirin suppository 300 mg, 300 mg, Rectal, Daily, Misael Lawton MD, 300 mg at 25    atorvastatin (LIPITOR) tablet 40 mg, 40 mg, Oral, Nightly, Misael Lawton MD, 40 mg at 04/10/25 2034    sennosides-docusate (PERICOLACE) 8.6-50 MG per tablet 2 tablet, 2 tablet, Oral, BID **AND** polyethylene glycol (MIRALAX) packet 17 g, 17 g, Oral, Daily PRN **AND** bisacodyl (DULCOLAX) EC tablet 5 mg, 5 mg, Oral, Daily PRN **AND** bisacodyl (DULCOLAX) suppository 10 mg, 10 mg,  Rectal, Daily PRN, Misael Lawton MD    budesonide (PULMICORT) nebulizer solution 0.5 mg, 0.5 mg, Nebulization, BID - RT, Misael Lawton MD, 0.5 mg at 04/13/25 0828    Calcium Replacement - Follow Nurse / BPA Driven Protocol, , Not Applicable, PRN, Misael Lawton MD    clopidogrel (PLAVIX) tablet 75 mg, 75 mg, Oral, Daily, Misael Lawton MD    dextrose (D50W) (25 g/50 mL) IV injection 25 g, 25 g, Intravenous, Q15 Min PRN, Misael Lawton MD    dextrose (GLUTOSE) oral gel 15 g, 15 g, Oral, Q15 Min PRN, Misael Lawton MD    fentaNYL citrate (PF) (SUBLIMAZE) injection 100 mcg, 100 mcg, Intravenous, Q1H PRN, Misael Lawton MD, 100 mcg at 04/12/25 0622    glucagon (GLUCAGEN) injection 1 mg, 1 mg, Intramuscular, Q15 Min PRN, Misael Lawton MD    heparin (porcine) 5000 UNIT/ML injection 2,100-4,000 Units, 30-57.9 Units/kg, Intravenous, Q6H PRN, Misael Lawton MD, 2,100 Units at 04/13/25 0923    heparin 54858 units/250 mL (100 units/mL) in 0.45 % NaCl infusion, 12 Units/kg/hr, Intravenous, Titrated, Misael Lawton MD, Last Rate: 9.94 mL/hr at 04/13/25 0925, 14 Units/kg/hr at 04/13/25 0925    HYDROcodone-acetaminophen (NORCO) 7.5-325 MG per tablet 1 tablet, 1 tablet, Oral, Q6H PRN, Misael Lawton MD    HYDROmorphone (DILAUDID) injection 1 mg, 1 mg, Intravenous, Q3H PRN, 1 mg at 04/13/25 0948 **AND** naloxone (NARCAN) injection 0.4 mg, 0.4 mg, Intravenous, Q5 Min PRN, Misael Lawton MD    insulin regular (humuLIN R,novoLIN R) injection 2-7 Units, 2-7 Units, Subcutaneous, Q6H, Misael Lawton MD, 2 Units at 04/10/25 1819    ipratropium-albuterol (DUO-NEB) nebulizer solution 3 mL, 3 mL, Nebulization, Q8H - RT, Misael Lawton MD, 3 mL at 04/13/25 0826    lactated ringers infusion, 50 mL/hr, Intravenous, Continuous, Jose Daniel Goff MD, Last Rate: 50 mL/hr at 04/13/25 1039, 50 mL/hr at 04/13/25 1039    Magnesium Standard Dose Replacement - Follow Nurse / BPA Driven Protocol, , Not Applicable,  PRN, Misael Lawton MD    metoclopramide (REGLAN) injection 10 mg, 10 mg, Intravenous, Q6H, Misael Lawton MD, 10 mg at 04/13/25 0403    mupirocin (BACTROBAN) 2 % nasal ointment 1 Application, 1 Application, Each Nare, BID, Misael Lawton MD, 1 Application at 04/13/25 0922    niCARdipine (CARDENE) 20 mg in 200 mL NS infusion, 5-15 mg/hr, Intravenous, Titrated, Misael Lawton MD, Held at 04/12/25 1312    nitroglycerin (NITROSTAT) SL tablet 0.4 mg, 0.4 mg, Sublingual, Q5 Min PRN, Misael Lawton MD    nitroglycerin (NITROSTAT) SL tablet 0.4 mg, 0.4 mg, Sublingual, Q5 Min PRN, Misael Lawton MD    nitroglycerin (NITROSTAT) SL tablet 0.4 mg, 0.4 mg, Sublingual, Q5 Min PRN, Misael Lawton MD    norepinephrine (LEVOPHED) 8 mg in 250 mL NS infusion (premix), 0.02-0.3 mcg/kg/min, Intravenous, Titrated, Misael Lawton MD, Stopped at 04/11/25 0030    ondansetron ODT (ZOFRAN-ODT) disintegrating tablet 4 mg, 4 mg, Oral, Q6H PRN **OR** ondansetron (ZOFRAN) injection 4 mg, 4 mg, Intravenous, Q6H PRN, Misael Lawton MD    pantoprazole (PROTONIX) injection 40 mg, 40 mg, Intravenous, BID, Herminio Charles MD, 40 mg at 04/13/25 0926    phenylephrine (BRIDGER-SYNEPHRINE) 50 mg in 250 mL NS infusion, 0.5-3 mcg/kg/min, Intravenous, Titrated, Misael Lawton MD, Held at 04/12/25 1617    Phosphorus Replacement - Follow Nurse / BPA Driven Protocol, , Not Applicable, PRN, Misael Lawton MD    piperacillin-tazobactam (ZOSYN) 3.375 g IVPB in 100 mL NS MBP (CD), 3.375 g, Intravenous, Q8H, Herminio Charles MD, 3.375 g at 04/13/25 0751    Potassium Replacement - Follow Nurse / BPA Driven Protocol, , Not Applicable, PRN, Misael Lawton MD    revefenacin (YUPELRI) nebulizer solution 175 mcg, 175 mcg, Nebulization, Daily - RT, Misael Lawton MD, 175 mcg at 04/13/25 0828    ALLERGIES:     Allergies   Allergen Reactions    Tramadol Itching       SOCIAL HISTORY:       Social History     Socioeconomic History     Marital status: Legally    Tobacco Use    Smoking status: Every Day     Current packs/day: 1.00     Average packs/day: 1 pack/day for 58.2 years (58.2 ttl pk-yrs)     Types: Cigarettes     Start date: 1/14/1967     Passive exposure: Current    Smokeless tobacco: Never    Tobacco comments:     Smoked 21-30 years   Vaping Use    Vaping status: Never Used   Substance and Sexual Activity    Alcohol use: Never     Comment: Does not drink    Drug use: Not Currently     Types: Methamphetamines    Sexual activity: Defer         FAMILY HISTORY:  Family History   Problem Relation Age of Onset    Stroke Mother     Lung cancer Mother         Unspecified    Arthritis Mother     Stroke Father     Heart disease Father     Diabetes Father         Unspecified type    Arthritis Father     Heart attack Father     Stroke Sister     Arthritis Sister     Breast cancer Sister     Stroke Brother     Heart disease Brother     Diabetes Brother         Unspecified type    Arthritis Brother     Colon cancer Neg Hx        REVIEW OF SYSTEMS:  Review of Systems   Constitutional:  Negative for activity change.   HENT:  Negative for nosebleeds and trouble swallowing.    Respiratory:  Positive for shortness of breath. Negative for wheezing.    Cardiovascular:  Negative for chest pain and palpitations.   Gastrointestinal:  Negative for constipation, diarrhea and nausea.   Genitourinary:  Negative for dysuria and hematuria.   Musculoskeletal:  Negative for arthralgias and myalgias.   Skin:  Negative for rash and wound.   Neurological:  Negative for seizures and syncope.   Hematological:  Negative for adenopathy. Does not bruise/bleed easily.   Psychiatric/Behavioral:  Negative for confusion.               Vitals:    04/13/25 0945 04/13/25 1000 04/13/25 1015 04/13/25 1030   BP:  119/57     BP Location:  Right arm     Patient Position:  Lying     Pulse: 91 89 92 86   Resp:       Temp:       TempSrc:       SpO2: 94% 99% 96% 98%   Weight:        Height:              No data to display               PHYSICAL EXAM:    CONSTITUTIONAL:  Vital signs reviewed.  No distress, looks comfortable.  EYES:  Conjunctivae and lids unremarkable.  PERRLA  EARS, NOSE, MOUTH, THROAT:  Ears and nose appear unremarkable.  Lips, teeth, gums appear unremarkable.  RESPIRATORY:  Normal respiratory effort.  Lungs clear to auscultation bilaterally.  CARDIOVASCULAR:  Normal S1, S2.  No murmurs, rubs or gallops.  No significant lower extremity edema.  GASTROINTESTINAL: Abdomen appears unremarkable.  Nontender.  No hepatomegaly.  No splenomegaly.  NEURO: Cranial nerves 2-12 grossly intact.  No focal deficits.  Appears to have equal strength all 4 extremities.  MUSCULOSKELETAL:  Unremarkable digits/nails.  No cyanosis or clubbing.  SKIN:  Warm.  No rashes.  PSYCHIATRIC:  Normal judgment and insight.  Normal mood and affect.       RECENT LABS:        WBC   Date Value Ref Range Status   04/13/2025 11.41 (H) 3.40 - 10.80 10*3/mm3 Final   04/12/2025 10.11 3.40 - 10.80 10*3/mm3 Final   04/12/2025 8.81 3.40 - 10.80 10*3/mm3 Final   04/11/2025 9.64 3.40 - 10.80 10*3/mm3 Final   04/11/2025 9.40 3.40 - 10.80 10*3/mm3 Final   04/10/2025 15.61 (H) 3.40 - 10.80 10*3/mm3 Final     Hemoglobin   Date Value Ref Range Status   04/13/2025 8.4 (L) 12.0 - 15.9 g/dL Final   04/12/2025 10.2 (L) 12.0 - 15.9 g/dL Final   04/12/2025 10.5 (L) 12.0 - 15.9 g/dL Final   04/11/2025 12.8 12.0 - 15.9 g/dL Final   04/11/2025 12.1 12.0 - 15.9 g/dL Final   04/10/2025 12.8 12.0 - 15.9 g/dL Final     Platelets   Date Value Ref Range Status   04/13/2025 316 140 - 450 10*3/mm3 Final   04/12/2025 341 140 - 450 10*3/mm3 Final   04/12/2025 283 140 - 450 10*3/mm3 Final   04/11/2025 302 140 - 450 10*3/mm3 Final   04/11/2025 258 140 - 450 10*3/mm3 Final   04/10/2025 326 140 - 450 10*3/mm3 Final       Assessment & Plan   Desiree Garcia [unfilled]   *LV thrombus with showering of emboli to multiple intra-abdominal organs, critical  limb ischemia status post open left iliofemoral profunda and SFA thrombectomy, fasciotomy, left common femoral endarterectomy, and suspected ischemic enteritis and colitis  We were consulted for an opinion if this is a heparin failure.  She was on heparin with therapeutic INR for several days.  Acute left leg ischemia at 4 AM on 4/ 12/25 despite heparin.  Platelets were normal so not consistent with HIT.  Therefore, no indication for direct thrombin inhibitors.  No clear evidence of heparin failure.  Embolization can occur from LV thrombus despite anticoagulation.  If anticoagulation is switched options could include Lovenox or DOAC's.  However, if she needs procedures these longer acting anticoagulants can be problematic    *Anemia  Hb 8.4 on 4/ 13/25 from 10.5 on 4/ 12/25 from 12.8 on 4/ 11/25  Noted the dropping hemoglobin.  No schistocytes.  No drop in platelets.  Suspect it is at least in part related to the vascular surgery yesterday and all her body has been through recently.  Monitor.  Transfuse as needed.    *Circulating myelocytes metamyelocytes and promyelocytes  Her WBC improved from 22 on 4/ 8/25 down to 9-10 on 4/11/2025 and 4/12/25 and is 11.4 on 4/ 13/25.  Unremarkable RBC PLT and WBC morphology.  I suspect she is having a strong marrow response to all her body has been through.  I do not expect this to reflect a myeloid malignancy.    *Cardiogenic shock    *Inferior ST JAVIER status post PCI    Plan  Transfuse PRBC as needed  Agree with heparin  We will follow

## 2025-04-13 NOTE — PLAN OF CARE
Goal Outcome Evaluation:      Pt remained in CICU overnight. Pt vitals remained stable and vasopressors were not restarted. Heparin gtt, LR, and antibiotics given per order. Pt had oozing from all surgical sites requiring reinforcement of bandages. Pain medication and position adjustments to help with pain level. Pt was able to close eyes for most of the night. Family stayed with pt and was updated on her care plan.

## 2025-04-13 NOTE — PROGRESS NOTES
[facility]   Surgical Progress Note    Patient Name: Desiree Garcia  : 1960  MRN: [id number]  Date of admission: 4/10/2025  Surgical Procedures Since Admission:  Procedure(s):  EMBOLECTOMY MECHANICAL, FOUR COMPARTMENT FASCIOTOMY  ARTERIOGRAM LOWER EXTREMITY  Surgeon:  Misael Lawton MD  Status:  1 Day Post-Op  -------------------    Subjective   Subjective     Chief Complaint: Postop day 1 status post left leg thrombectomy and 4 compartment fasciotomies    History of Present Illness   65-year-old woman with multifocal thromboemboli.  Left leg either extended or her new clot that became critically ischemic yesterday.  This occurred despite full heparin on board.  Currently continued.    Review of Systems denies pain in the leg but still numb.  Able to move it.    Objective   Objective     Vitals:   Temp:  [97.5 °F (36.4 °C)-99.4 °F (37.4 °C)] 97.9 °F (36.6 °C)  Heart Rate:  [80-96] 89  Resp:  [10-20] 15  BP: ()/(46-70) 105/51  Arterial Line BP: ()/(41-62) 97/43  Flow (L/min) (Oxygen Therapy):  [2-4] 2  Output by Drain (mL) 25 0701 - 25 1900 25 1901 - 25 0700 25 0701 - 25 1133 Range Total   NG/OG Tube Nasogastric Left nostril 350 550  900       Physical Exam    Incision sites with mild strikethrough.  Overall left leg warm and viable with good borders but still diminished sensation.  Excellent signals at the DP and PT  Result Review    Result Review:  I have personally reviewed the results from the time of this admission to 2025 11:33 EDT and agree with these findings:  [x]  Laboratory list / accordion  []  Microbiology  []  Radiology  []  EKG/Telemetry   []  Cardiology/Vascular   []  Pathology  []  Old records  []  Other:  Most notable findings include: Hemoglobin noted to be low.      Results from last 7 days   Lab Units 25  0514 25  1532 25  0454 25  1711 25  0413 04/10/25  1814 04/10/25  0215   WBC 10*3/mm3  11.41* 10.11 8.81 9.64 9.40 15.61* 18.86*   HEMOGLOBIN g/dL 8.4* 10.2* 10.5* 12.8 12.1 12.8 14.1   PLATELETS 10*3/mm3 316 341 283 302 258 326 258     Results from last 7 days   Lab Units 04/13/25  0514 04/12/25  2153 04/12/25  1532 04/12/25  1302 04/12/25  0454 04/11/25  1711 04/11/25  0413 04/10/25  1814 04/10/25  0907 04/10/25  0215 04/09/25  0602 04/08/25  1728   SODIUM mmol/L 133*  --  133*  --  131* 129* 130* 128* 125* 127* 131* 131*   POTASSIUM mmol/L 4.2 3.6 3.8 3.4* 3.1* 3.3* 3.9 3.4* 3.7 3.7 3.6 3.7   CHLORIDE mmol/L 104  --  102  --  98 95* 97* 94* 88* 89* 92* 90*   CO2 mmol/L 18.0*  --  19.3*  --  19.6* 20.2* 20.5* 21.1* 20.8* 22.4 26.7 25.8   BUN mg/dL 22  --  26*  --  32* 36* 38* 37* 33* 25* 10 9   CREATININE mg/dL 1.12*  --  1.06*  --  1.08* 1.24* 1.27* 1.33* 1.40* 1.21* 0.80 0.74   GLUCOSE mg/dL 102*  --  90  --  74 75 106* 151* 177* 203* 248* 294*   MAGNESIUM mg/dL 2.3  --   --   --  2.3  --  2.3 2.1  --  2.3 1.9 1.7   PHOSPHORUS mg/dL 3.0  --   --   --  3.0  --  4.8* 4.7*  --  4.9* 3.1  --    Estimated Creatinine Clearance: 52.2 mL/min (A) (by C-G formula based on SCr of 1.12 mg/dL (H)).  Results from last 7 days   Lab Units 04/10/25  1244   PROTIME Seconds 17.4*   INR  1.36*     Lab Results   Component Value Date    HGBA1C 9.40 (H) 04/09/2025    HGBA1C 9.30 (H) 03/13/2025    HGBA1C 9.40 (H) 12/23/2024     Glucose   Date/Time Value Ref Range Status   04/13/2025 1125 128 70 - 130 mg/dL Final   04/13/2025 0024 100 70 - 130 mg/dL Final   04/12/2025 1919 78 70 - 130 mg/dL Final   04/12/2025 1725 88 70 - 130 mg/dL Final   04/12/2025 1300 131 (H) 70 - 130 mg/dL Final   04/12/2025 1136 78 70 - 130 mg/dL Final   04/12/2025 0548 75 70 - 130 mg/dL Final   04/11/2025 2312 71 70 - 130 mg/dL Final       Assessment & Plan   Assessment / Plan     Brief Patient Summary:  Desiree Garcia is a 65 y.o. female who progress thrombus despite the full use of heparin.  Concerns for long-term recurrence of clot have  been raised and family is aware.  Still unclear as to whether the Apollo will survive this event.  No macrovascular clot at that level.    Active Hospital Problems:   Active Hospital Problems    Diagnosis    • **Cardiogenic shock    • 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      Plan:   Continue use of heparin as necessary even though the patient has oozing from several sites.  Will need to keep up with hemoglobin as her risk for further cardiac issue goes up if she has to pump harder.  Currently the leg looks very good.  Unclear what can happen with the intestines.  Will continue to follow    VTE Prophylaxis:  Pharmacologic & mechanical VTE prophylaxis orders are present.        Misael Lawton MD

## 2025-04-13 NOTE — PROGRESS NOTES
PROGRESS NOTE  Patient Name: Desiree Garcia  Age/Sex: 65 y.o. female  : 1960  MRN: [id number]    Date of Admission: 4/10/2025  Date of Encounter Visit: 25   LOS: 3 days   Patient Care Team:  Sandra Kaba MD as PCP - General (Family Medicine)  Marisol Nazario, RN as Ambulatory  ([facility] [place])    Chief Complaint: Acute left lower extremity ischemia, acute MI, left ventricular thrombus    Hospital course: Patient had her surgery yesterday with fasciotomy and thrombectomy, she is back on the heparin drip, hemoglobin is dropping but there is no visible large bleeding.  She is not on any pressors at this point  Complaining of the dry mouth and abdominal pain and lower extremity discomfort and pain      REVIEW OF SYSTEMS:   CONSTITUTIONAL: no fever or chills  Currently complaining of dry mouth and abdominal discomfort and leg pain    Ventilator/Non-Invasive Ventilation Settings (From admission, onward)      2 L/min current oxygen              Vital Signs  Temp:  [98 °F (36.7 °C)-99.4 °F (37.4 °C)] 99.4 °F (37.4 °C)  Heart Rate:  [78-96] 86  Resp:  [10-20] 10  BP: ()/(46-70) 110/58  Arterial Line BP: (102-151)/(47-62) 117/48  SpO2:  [87 %-100 %] 98 %  on  Flow (L/min) (Oxygen Therapy):  [2-4] 2 Device (Oxygen Therapy): nasal cannula    Intake/Output Summary (Last 24 hours) at 2025 0744  Last data filed at 2025 0600  Gross per 24 hour   Intake 4786.84 ml   Output 1265 ml   Net 3521.84 ml     Flowsheet Rows      Flowsheet Row First Filed Value   Admission Height --   Admission Weight 71 kg (156 lb 8.4 oz) Documented at 04/10/2025 1700          Body mass index is 26.77 kg/m².      25  0600 25  0300 25  1714   Weight: 70.4 kg (155 lb 3.3 oz) 70.1 kg (154 lb 8.7 oz) 75.2 kg (165 lb 12.6 oz)       Physical Exam:  GEN:   Sick looking, hard of hearing, slightly confused but responsive  EYES:   Sclerae clear. No icterus. PERRL. Normal EOM  ENT:    External ears/nose normal, no oral lesions, no thrush, slightly dry membranes  NECK:  Supple, midline trachea, no JVD  LUNGS: Normal chest on inspection, CTAB but diminished, no wheezes. No rhonchi. No crackles. Respirations regular, even and unlabored.  On 2 L/min nasal cannula oxygen  CV:  Regular rhythm and rate. Normal S1/S2. No murmurs, gallops, or rubs noted.  ABD:  Soft, tender, non-stented, hypoactive bowel sounds. No guarding  EXT:  Moves all extremities well. No cyanosis. No redness. No edema.  Much warmer left lower extremity compared to yesterday with dopplerable pulses  Skin: Dry, intact, no bleeding    Results Review:    Results From Last 14 Days   Lab Units 04/12/25  0014 04/11/25  1711 04/11/25  0104 04/09/25  2134 04/09/25  0602   LACTATE mmol/L 1.8 2.2* 1.0   < >  --    TRIGLYCERIDES mg/dL  --   --   --   --  158*    < > = values in this interval not displayed.     Results from last 7 days   Lab Units 04/13/25  0514 04/12/25  2153 04/12/25  1532 04/12/25  1302 04/12/25  0454 04/11/25  1711 04/11/25  0413 04/10/25  1814 04/10/25  0907 04/09/25  0602 04/08/25  1728   SODIUM mmol/L 133*  --  133*  --  131* 129* 130* 128* 125*   < > 131*   POTASSIUM mmol/L 4.2 3.6 3.8 3.4* 3.1* 3.3* 3.9 3.4* 3.7   < > 3.7   CHLORIDE mmol/L 104  --  102  --  98 95* 97* 94* 88*   < > 90*   CO2 mmol/L 18.0*  --  19.3*  --  19.6* 20.2* 20.5* 21.1* 20.8*   < > 25.8   BUN mg/dL 22  --  26*  --  32* 36* 38* 37* 33*   < > 9   CREATININE mg/dL 1.12*  --  1.06*  --  1.08* 1.24* 1.27* 1.33* 1.40*   < > 0.74   CALCIUM mg/dL 8.0*  --  7.7*  --  7.5* 7.8* 8.4* 8.4* 8.5*   < > 10.1   AST (SGOT) U/L  --   --   --   --   --   --   --   --  72*  --  45*   ALT (SGPT) U/L  --   --   --   --   --   --   --   --  36*  --  16   ANION GAP mmol/L 11.0  --  11.7  --  13.4 13.8 12.5 12.9 16.2*   < > 15.2*   ALBUMIN g/dL  --   --   --   --   --   --   --   --  2.7*  --  4.0    < > = values in this interval not displayed.     Results from last 7  days   Lab Units 04/08/25 2034 04/08/25  1728   HSTROP T ng/L 971* 835*         Results from last 7 days   Lab Units 04/10/25  1244 04/08/25  1728   PROBNP pg/mL 9,388.0* 15,432.0*     Results from last 7 days   Lab Units 04/13/25  0514 04/12/25  1532 04/12/25  0454 04/11/25  1711 04/11/25  0413 04/10/25  1814 04/10/25  0215 04/09/25  0559 04/08/25  1728   WBC 10*3/mm3 11.41* 10.11 8.81 9.64 9.40 15.61* 18.86*   < > 21.93*   HEMOGLOBIN g/dL 8.4* 10.2* 10.5* 12.8 12.1 12.8 14.1   < > 15.4   HEMATOCRIT % 25.6* 30.3* 33.2* 39.0 36.0 38.0 42.4   < > 46.3   PLATELETS 10*3/mm3 316 341 283 302 258 326 258   < > 325   MCV fL 85.0 83.7 84.5 83.7 82.9 81.5 85.1   < > 85.3   NEUTROPHIL % %  --   --   --   --   --   --   --   --  83.2*   LYMPHOCYTE % %  --   --   --   --   --   --   --   --  8.8*   MONOCYTES % %  --   --   --   --   --   --   --   --  7.1   EOSINOPHIL % %  --   --   --   --   --   --   --   --  0.0*   BASOPHIL % %  --   --   --   --   --   --   --   --  0.4   IMM GRAN % %  --   --   --   --   --   --   --   --  0.5    < > = values in this interval not displayed.     Results from last 7 days   Lab Units 04/13/25 0514 04/12/25  2153 04/12/25  1409 04/12/25  0454 04/11/25  0413 04/10/25  1742 04/10/25  1244   INR   --   --   --   --   --   --  1.36*   APTT seconds 63.2* 68.9* >200.0* 98.9* 74.1* 72.5* 96.3*     Results from last 7 days   Lab Units 04/13/25  0514 04/12/25  0454 04/11/25  0413 04/10/25  1814 04/10/25  0215 04/09/25  0602 04/08/25  1728   MAGNESIUM mg/dL 2.3 2.3 2.3 2.1 2.3 1.9 1.7     Results from last 7 days   Lab Units 04/09/25  0602   CHOLESTEROL mg/dL 147   TRIGLYCERIDES mg/dL 158*   HDL CHOL mg/dL 42     Results from last 7 days   Lab Units 04/10/25  1958   PH, ARTERIAL pH units 7.422   PCO2, ARTERIAL mm Hg 37.0   PO2 ART mm Hg 66.4*   HCO3 ART mmol/L 24.1     Results from last 7 days   Lab Units 04/09/25  0559   HEMOGLOBIN A1C % 9.40*     Glucose   Date/Time Value Ref Range Status    04/13/2025 0024 100 70 - 130 mg/dL Final   04/12/2025 1919 78 70 - 130 mg/dL Final   04/12/2025 1725 88 70 - 130 mg/dL Final   04/12/2025 1300 131 (H) 70 - 130 mg/dL Final   04/12/2025 1136 78 70 - 130 mg/dL Final   04/12/2025 0548 75 70 - 130 mg/dL Final   04/11/2025 2312 71 70 - 130 mg/dL Final   04/11/2025 1815 75 70 - 130 mg/dL Final     Results from last 7 days   Lab Units 04/12/25  0014 04/11/25  1711 04/11/25  1059 04/11/25  0104 04/10/25  0907 04/09/25  2134 04/08/25 2059 04/08/25 2034   PROCALCITONIN ng/mL  --   --  1.13*  --   --   --   --  0.13   LACTATE mmol/L 1.8 2.2*  --  1.0 1.4 1.6 2.0  --      Results from last 7 days   Lab Units 04/08/25 2059 04/08/25 2056   BLOODCX  No growth at 4 days No growth at 4 days             Results from last 7 days   Lab Units 04/10/25  1829 04/10/25  1814   SODIUM UR mmol/L <20  --    CREATININE UR mg/dL 64.8  --    CHLORIDE UR mmol/L <20  --    OSMOLALITY UR mOsm/kg 494  --    PROTEIN TOTAL URINE mg/dL 95.5  --    URIC ACID mg/dL  --  6.2*           Imaging:   Imaging Results (All)               I reviewed the patient's new clinical results.  I personally viewed and interpreted the patient's imaging results:        Medication Review:   arformoterol, 15 mcg, Nebulization, BID - RT  aspirin, 81 mg, Oral, Daily  aspirin, 300 mg, Rectal, Daily  atorvastatin, 40 mg, Oral, Nightly  budesonide, 0.5 mg, Nebulization, BID - RT  clopidogrel, 75 mg, Oral, Daily  insulin regular, 2-7 Units, Subcutaneous, Q6H  ipratropium-albuterol, 3 mL, Nebulization, Q8H - RT  metoclopramide, 10 mg, Intravenous, Q6H  mupirocin, 1 Application, Each Nare, BID  pantoprazole, 40 mg, Intravenous, BID  piperacillin-tazobactam, 3.375 g, Intravenous, Q8H  revefenacin, 175 mcg, Nebulization, Daily - RT  senna-docusate sodium, 2 tablet, Oral, BID        heparin, 12 Units/kg/hr, Last Rate: 13 Units/kg/hr (04/13/25 0403)  lactated ringers, 50 mL/hr, Last Rate: Stopped (04/12/25 1125)  niCARdipine, 5-15  mg/hr, Last Rate: Stopped (04/12/25 1312)  Norepinephrine-Sodium Chloride, 0.02-0.3 mcg/kg/min, Last Rate: Stopped (04/11/25 0030)  phenylephrine, 0.5-3 mcg/kg/min, Last Rate: Stopped (04/12/25 1617)        ASSESSMENT:   Cardiogenic shock  Arterial embolism and thrombosis of the lower extremity, status post fasciotomy and thrombectomy with revascularization 4/12/2025  Diabetes mellitus type 2  Coronary artery disease with prior angioplasties with acute inferior wall MI status post repeat angioplasty  Left ventricular intramural thrombus likely the origin of the above  Bilateral pulmonary nodules  Severe peripheral arterial disease  Acute hypoxemic respiratory failure  Nonsustained ventricular tachycardia  Hyperlipidemia  Hyponatremia  Acute kidney injury, improved  Tobacco abuse  Small bowel enteritis was suspected bowel ischemia from the embolic events  COPD, no exacerbation  Nonsustained V. tach  History of E. coli UTI on 3/18/2025 with negative blood culture on this admission, patient is on Zosyn for possible intra-abdominal infection from bowel ischemia    PLAN:  Patient is on low-flow oxygen supplementation with no significant changes from yesterday, continue to encourage to work on the deep breathing and pulmonary hygiene  Hemoglobin is down, she had a surgery yesterday including fistulotomy and vascular thrombectomy, she is on the heparin drip, her hemoglobin is 8 and she is not on any pressors and we will continue to monitor and transfuse if hemodynamically unstable or if hemoglobin drops below 7.  Will repeat another hemoglobin this afternoon and continue with the daily morning CBCs  The left lower extremity is warm on exam was evidence of good reperfusion  She does not have any evidence to suggest COPD exacerbation  The kidney function seems to be doing great and patient is maintaining good hemodynamics and off the pressors.  Consider de-escalation on antibiotics if blood cultures are negative at 72 hours  with no obvious source of sepsis  Discussed with the son at the bedside  Surgical note reviewed  Oncology note reviewed, patient is on heparin, this is not considered heparin failure, people with LV thrombus can still throw blood clots despite being on heparin    Summary of recommendations:  Continue to monitor in the CICU  Repeat hemoglobin at noon and continue with the daily labs and consider transfusion if hemoglobin less than 7  Consider de-escalation of antibiotic in a.m. if blood cultures remain negative with no obvious source of infection  Follow-up on renal function maintain MAP above 65 and avoid nephrotoxic agents when possible  Continue anticoagulation    Labs/Notes/films were independently reviewed and pertinent results are summarized above  The copied texts in this note were reviewed and they are accurate as of 04/13/25    Disposition: Monitor in CICU    Zuri Grey MD  04/13/25  07:44 EDT      Time: Critical care 33 min      Dictated utilizing Dragon dictation

## 2025-04-14 LAB
ALBUMIN SERPL-MCNC: 2.6 G/DL (ref 3.5–5.2)
ANION GAP SERPL CALCULATED.3IONS-SCNC: 10.9 MMOL/L (ref 5–15)
ANISOCYTOSIS BLD QL: ABNORMAL
APTT PPP: 61 SECONDS (ref 22.7–35.4)
APTT PPP: 61.8 SECONDS (ref 22.7–35.4)
APTT PPP: 72.5 SECONDS (ref 22.7–35.4)
BASOPHILS # BLD MANUAL: 0 10*3/MM3 (ref 0–0.2)
BASOPHILS NFR BLD MANUAL: 0 % (ref 0–1.5)
BH BB BLOOD EXPIRATION DATE: NORMAL
BH BB BLOOD EXPIRATION DATE: NORMAL
BH BB BLOOD TYPE BARCODE: 6200
BH BB BLOOD TYPE BARCODE: 6200
BH BB DISPENSE STATUS: NORMAL
BH BB DISPENSE STATUS: NORMAL
BH BB PRODUCT CODE: NORMAL
BH BB PRODUCT CODE: NORMAL
BH BB UNIT NUMBER: NORMAL
BH BB UNIT NUMBER: NORMAL
BUN SERPL-MCNC: 19 MG/DL (ref 8–23)
BUN/CREAT SERPL: 21.8 (ref 7–25)
CALCIUM SPEC-SCNC: 8.1 MG/DL (ref 8.6–10.5)
CHLORIDE SERPL-SCNC: 105 MMOL/L (ref 98–107)
CO2 SERPL-SCNC: 22.1 MMOL/L (ref 22–29)
CREAT SERPL-MCNC: 0.87 MG/DL (ref 0.57–1)
CROSSMATCH INTERPRETATION: NORMAL
CROSSMATCH INTERPRETATION: NORMAL
DEPRECATED RDW RBC AUTO: 42 FL (ref 37–54)
DEPRECATED RDW RBC AUTO: 42.5 FL (ref 37–54)
DEPRECATED RDW RBC AUTO: 44.5 FL (ref 37–54)
DEPRECATED RDW RBC AUTO: 45.2 FL (ref 37–54)
EGFRCR SERPLBLD CKD-EPI 2021: 74.5 ML/MIN/1.73
EOSINOPHIL # BLD MANUAL: 0 10*3/MM3 (ref 0–0.4)
EOSINOPHIL NFR BLD MANUAL: 0 % (ref 0.3–6.2)
ERYTHROCYTE [DISTWIDTH] IN BLOOD BY AUTOMATED COUNT: 14.2 % (ref 12.3–15.4)
ERYTHROCYTE [DISTWIDTH] IN BLOOD BY AUTOMATED COUNT: 14.4 % (ref 12.3–15.4)
ERYTHROCYTE [DISTWIDTH] IN BLOOD BY AUTOMATED COUNT: 14.5 % (ref 12.3–15.4)
ERYTHROCYTE [DISTWIDTH] IN BLOOD BY AUTOMATED COUNT: 14.6 % (ref 12.3–15.4)
GLUCOSE BLDC GLUCOMTR-MCNC: 102 MG/DL (ref 70–130)
GLUCOSE BLDC GLUCOMTR-MCNC: 107 MG/DL (ref 70–130)
GLUCOSE BLDC GLUCOMTR-MCNC: 153 MG/DL (ref 70–130)
GLUCOSE BLDC GLUCOMTR-MCNC: 81 MG/DL (ref 70–130)
GLUCOSE SERPL-MCNC: 108 MG/DL (ref 65–99)
HCT VFR BLD AUTO: 25.8 % (ref 34–46.6)
HCT VFR BLD AUTO: 26.2 % (ref 34–46.6)
HCT VFR BLD AUTO: 28.2 % (ref 34–46.6)
HCT VFR BLD AUTO: 30.5 % (ref 34–46.6)
HGB BLD-MCNC: 8.6 G/DL (ref 12–15.9)
HGB BLD-MCNC: 8.7 G/DL (ref 12–15.9)
HGB BLD-MCNC: 9.5 G/DL (ref 12–15.9)
HGB BLD-MCNC: 9.8 G/DL (ref 12–15.9)
LYMPHOCYTES # BLD MANUAL: 1.82 10*3/MM3 (ref 0.7–3.1)
LYMPHOCYTES NFR BLD MANUAL: 15.3 % (ref 5–12)
MAGNESIUM SERPL-MCNC: 2.2 MG/DL (ref 1.6–2.4)
MCH RBC QN AUTO: 27.5 PG (ref 26.6–33)
MCH RBC QN AUTO: 27.6 PG (ref 26.6–33)
MCH RBC QN AUTO: 27.9 PG (ref 26.6–33)
MCH RBC QN AUTO: 28.1 PG (ref 26.6–33)
MCHC RBC AUTO-ENTMCNC: 32.1 G/DL (ref 31.5–35.7)
MCHC RBC AUTO-ENTMCNC: 33.2 G/DL (ref 31.5–35.7)
MCHC RBC AUTO-ENTMCNC: 33.3 G/DL (ref 31.5–35.7)
MCHC RBC AUTO-ENTMCNC: 33.7 G/DL (ref 31.5–35.7)
MCV RBC AUTO: 82.7 FL (ref 79–97)
MCV RBC AUTO: 83.2 FL (ref 79–97)
MCV RBC AUTO: 84.3 FL (ref 79–97)
MCV RBC AUTO: 85.4 FL (ref 79–97)
METAMYELOCYTES NFR BLD MANUAL: 1 % (ref 0–0)
MONOCYTES # BLD: 2.28 10*3/MM3 (ref 0.1–0.9)
MYELOCYTES NFR BLD MANUAL: 5.1 % (ref 0–0)
NEUTROPHILS # BLD AUTO: 9.89 10*3/MM3 (ref 1.7–7)
NEUTROPHILS NFR BLD MANUAL: 66.3 % (ref 42.7–76)
NRBC BLD AUTO-RTO: 0 /100 WBC (ref 0–0.2)
PHOSPHATE SERPL-MCNC: 2.3 MG/DL (ref 2.5–4.5)
PLAT MORPH BLD: NORMAL
PLATELET # BLD AUTO: 287 10*3/MM3 (ref 140–450)
PLATELET # BLD AUTO: 288 10*3/MM3 (ref 140–450)
PLATELET # BLD AUTO: 301 10*3/MM3 (ref 140–450)
PLATELET # BLD AUTO: 312 10*3/MM3 (ref 140–450)
PMV BLD AUTO: 10.2 FL (ref 6–12)
PMV BLD AUTO: 10.4 FL (ref 6–12)
PMV BLD AUTO: 10.4 FL (ref 6–12)
PMV BLD AUTO: 10.6 FL (ref 6–12)
POIKILOCYTOSIS BLD QL SMEAR: ABNORMAL
POLYCHROMASIA BLD QL SMEAR: ABNORMAL
POTASSIUM SERPL-SCNC: 3.9 MMOL/L (ref 3.5–5.2)
QT INTERVAL: 335 MS
QT INTERVAL: 357 MS
QT INTERVAL: 408 MS
QTC INTERVAL: 411 MS
QTC INTERVAL: 437 MS
QTC INTERVAL: 522 MS
RBC # BLD AUTO: 3.06 10*6/MM3 (ref 3.77–5.28)
RBC # BLD AUTO: 3.15 10*6/MM3 (ref 3.77–5.28)
RBC # BLD AUTO: 3.41 10*6/MM3 (ref 3.77–5.28)
RBC # BLD AUTO: 3.57 10*6/MM3 (ref 3.77–5.28)
SODIUM SERPL-SCNC: 138 MMOL/L (ref 136–145)
UNIT  ABO: NORMAL
UNIT  ABO: NORMAL
UNIT  RH: NORMAL
UNIT  RH: NORMAL
VARIANT LYMPHS NFR BLD MANUAL: 12.2 % (ref 19.6–45.3)
WBC MORPH BLD: NORMAL
WBC NRBC COR # BLD AUTO: 14.46 10*3/MM3 (ref 3.4–10.8)
WBC NRBC COR # BLD AUTO: 14.91 10*3/MM3 (ref 3.4–10.8)
WBC NRBC COR # BLD AUTO: 15.49 10*3/MM3 (ref 3.4–10.8)
WBC NRBC COR # BLD AUTO: 16.22 10*3/MM3 (ref 3.4–10.8)

## 2025-04-14 PROCEDURE — 94799 UNLISTED PULMONARY SVC/PX: CPT

## 2025-04-14 PROCEDURE — 99232 SBSQ HOSP IP/OBS MODERATE 35: CPT | Performed by: INTERNAL MEDICINE

## 2025-04-14 PROCEDURE — 83735 ASSAY OF MAGNESIUM: CPT

## 2025-04-14 PROCEDURE — 85027 COMPLETE CBC AUTOMATED: CPT | Performed by: INTERNAL MEDICINE

## 2025-04-14 PROCEDURE — 25010000002 HYDROMORPHONE 1 MG/ML SOLUTION: Performed by: SURGERY

## 2025-04-14 PROCEDURE — 85007 BL SMEAR W/DIFF WBC COUNT: CPT | Performed by: SURGERY

## 2025-04-14 PROCEDURE — 94761 N-INVAS EAR/PLS OXIMETRY MLT: CPT

## 2025-04-14 PROCEDURE — 85730 THROMBOPLASTIN TIME PARTIAL: CPT | Performed by: INTERNAL MEDICINE

## 2025-04-14 PROCEDURE — 82948 REAGENT STRIP/BLOOD GLUCOSE: CPT

## 2025-04-14 PROCEDURE — 99232 SBSQ HOSP IP/OBS MODERATE 35: CPT | Performed by: STUDENT IN AN ORGANIZED HEALTH CARE EDUCATION/TRAINING PROGRAM

## 2025-04-14 PROCEDURE — 63710000001 REVEFENACIN 175 MCG/3ML SOLUTION: Performed by: SURGERY

## 2025-04-14 PROCEDURE — 94664 DEMO&/EVAL PT USE INHALER: CPT

## 2025-04-14 PROCEDURE — 25010000002 HEPARIN (PORCINE) PER 1000 UNITS: Performed by: SURGERY

## 2025-04-14 PROCEDURE — 85025 COMPLETE CBC W/AUTO DIFF WBC: CPT | Performed by: SURGERY

## 2025-04-14 PROCEDURE — 25810000003 LACTATED RINGERS PER 1000 ML: Performed by: INTERNAL MEDICINE

## 2025-04-14 PROCEDURE — 99233 SBSQ HOSP IP/OBS HIGH 50: CPT | Performed by: STUDENT IN AN ORGANIZED HEALTH CARE EDUCATION/TRAINING PROGRAM

## 2025-04-14 PROCEDURE — 25010000002 HEPARIN (PORCINE) 25000-0.45 UT/250ML-% SOLUTION: Performed by: SURGERY

## 2025-04-14 PROCEDURE — 80069 RENAL FUNCTION PANEL: CPT | Performed by: INTERNAL MEDICINE

## 2025-04-14 PROCEDURE — 99024 POSTOP FOLLOW-UP VISIT: CPT | Performed by: SURGERY

## 2025-04-14 PROCEDURE — 85730 THROMBOPLASTIN TIME PARTIAL: CPT | Performed by: SURGERY

## 2025-04-14 PROCEDURE — 25010000002 METOCLOPRAMIDE PER 10 MG: Performed by: SURGERY

## 2025-04-14 PROCEDURE — 25010000002 PIPERACILLIN SOD-TAZOBACTAM PER 1 G: Performed by: SURGERY

## 2025-04-14 RX ADMIN — REVEFENACIN 175 MCG: 175 SOLUTION RESPIRATORY (INHALATION) at 07:21

## 2025-04-14 RX ADMIN — HYDROMORPHONE HYDROCHLORIDE 1 MG: 1 INJECTION, SOLUTION INTRAMUSCULAR; INTRAVENOUS; SUBCUTANEOUS at 10:25

## 2025-04-14 RX ADMIN — HYDROMORPHONE HYDROCHLORIDE 1 MG: 1 INJECTION, SOLUTION INTRAMUSCULAR; INTRAVENOUS; SUBCUTANEOUS at 05:14

## 2025-04-14 RX ADMIN — PANTOPRAZOLE SODIUM 40 MG: 40 INJECTION, POWDER, FOR SOLUTION INTRAVENOUS at 21:12

## 2025-04-14 RX ADMIN — HYDROCODONE BITARTRATE AND ACETAMINOPHEN 1 TABLET: 7.5; 325 TABLET ORAL at 21:12

## 2025-04-14 RX ADMIN — PIPERACILLIN AND TAZOBACTAM 3.38 G: 3; .375 INJECTION, POWDER, FOR SOLUTION INTRAVENOUS at 08:50

## 2025-04-14 RX ADMIN — PIPERACILLIN AND TAZOBACTAM 3.38 G: 3; .375 INJECTION, POWDER, FOR SOLUTION INTRAVENOUS at 17:09

## 2025-04-14 RX ADMIN — SODIUM CHLORIDE, POTASSIUM CHLORIDE, SODIUM LACTATE AND CALCIUM CHLORIDE 50 ML/HR: 600; 310; 30; 20 INJECTION, SOLUTION INTRAVENOUS at 05:05

## 2025-04-14 RX ADMIN — HEPARIN SODIUM 2100 UNITS: 5000 INJECTION INTRAVENOUS; SUBCUTANEOUS at 18:46

## 2025-04-14 RX ADMIN — IPRATROPIUM BROMIDE AND ALBUTEROL SULFATE 3 ML: .5; 3 SOLUTION RESPIRATORY (INHALATION) at 07:16

## 2025-04-14 RX ADMIN — METOCLOPRAMIDE 10 MG: 5 INJECTION, SOLUTION INTRAMUSCULAR; INTRAVENOUS at 05:16

## 2025-04-14 RX ADMIN — ATORVASTATIN CALCIUM 40 MG: 20 TABLET, FILM COATED ORAL at 21:12

## 2025-04-14 RX ADMIN — ASPIRIN 300 MG: 300 SUPPOSITORY RECTAL at 08:50

## 2025-04-14 RX ADMIN — MUPIROCIN 1 APPLICATION: 20 OINTMENT TOPICAL at 08:50

## 2025-04-14 RX ADMIN — ARFORMOTEROL TARTRATE 15 MCG: 15 SOLUTION RESPIRATORY (INHALATION) at 19:20

## 2025-04-14 RX ADMIN — HEPARIN SODIUM 15 UNITS/KG/HR: 10000 INJECTION, SOLUTION INTRAVENOUS at 05:17

## 2025-04-14 RX ADMIN — HEPARIN SODIUM 2100 UNITS: 5000 INJECTION INTRAVENOUS; SUBCUTANEOUS at 05:09

## 2025-04-14 RX ADMIN — SENNOSIDES AND DOCUSATE SODIUM 2 TABLET: 50; 8.6 TABLET ORAL at 21:12

## 2025-04-14 RX ADMIN — HYDROMORPHONE HYDROCHLORIDE 1 MG: 1 INJECTION, SOLUTION INTRAMUSCULAR; INTRAVENOUS; SUBCUTANEOUS at 15:05

## 2025-04-14 RX ADMIN — PANTOPRAZOLE SODIUM 40 MG: 40 INJECTION, POWDER, FOR SOLUTION INTRAVENOUS at 08:50

## 2025-04-14 RX ADMIN — HYDROMORPHONE HYDROCHLORIDE 1 MG: 1 INJECTION, SOLUTION INTRAMUSCULAR; INTRAVENOUS; SUBCUTANEOUS at 22:10

## 2025-04-14 RX ADMIN — ARFORMOTEROL TARTRATE 15 MCG: 15 SOLUTION RESPIRATORY (INHALATION) at 07:19

## 2025-04-14 RX ADMIN — METOCLOPRAMIDE 10 MG: 5 INJECTION, SOLUTION INTRAMUSCULAR; INTRAVENOUS at 17:09

## 2025-04-14 RX ADMIN — BUDESONIDE 0.5 MG: 0.5 INHALANT RESPIRATORY (INHALATION) at 19:23

## 2025-04-14 RX ADMIN — MUPIROCIN 1 APPLICATION: 20 OINTMENT TOPICAL at 21:12

## 2025-04-14 RX ADMIN — BUDESONIDE 0.5 MG: 0.5 INHALANT RESPIRATORY (INHALATION) at 07:20

## 2025-04-14 RX ADMIN — METOCLOPRAMIDE 10 MG: 5 INJECTION, SOLUTION INTRAMUSCULAR; INTRAVENOUS at 10:25

## 2025-04-14 NOTE — PROGRESS NOTES
Spoke with RN. Patient tolerated NGT clamping trial with minimal residual output. Will discontinue NGT and start clear liquids. Instructed nurse to monitor for new or worsening GI symptoms and notify me of any changes.    Yulisa Parisi M.D.  General, Robotic, and Endoscopic Surgery  [facility]    [address], [address]  [place], [place], [address]  P: [phone]  F: [phone]

## 2025-04-14 NOTE — PROGRESS NOTES
CHIEF COMPLAINT:  LV thrombus with multifocal emboli, anemia    HISTORY OF PRESENT ILLNESS:   This is a 65-year-old lady with multiple medical comorbidities undergoing treatment for in LV thrombus with embolization and cardiogenic shock.  Imaging has shown occlusion of the left superficial femoral artery, distal left renal artery, distal splenic artery, splenic infarcts, left renal infarcts, occlusion of the ONEIDA with reconstitution distally.  On 4/12/2025 she underwent a mechanical embolectomy and 4 compartment fasciotomy left lower extremity.    Patient states she is feeling little better today.  She denies uncontrolled pain.  She remains on heparin with PTT 72.5.  She has some oozing from surgical wounds left lower extremity.      Past Medical History, Past Surgical History, Social History, Family History have been reviewed and are without significant changes except as mentioned.    Review of Systems   A comprehensive 14 point review of systems was performed and was negative except as mentioned.    Medications:  The current medication list was reviewed in the EMR    ALLERGIES:    Allergies   Allergen Reactions    Tramadol Itching     High severity per pt       Objective      Vitals:    04/14/25 1130   BP:    Pulse: 82   Resp:    Temp:    SpO2: 93%          Physical Exam    CONSTITUTIONAL: pleasant well-developed woman lying in bed a little drowsy after pain medication  HEENT: no icterus, no thrush, moist membranes  CV: RRR, S1S2, no murmur  RESP: cta bilat, no wheezing, no rales  GI: soft, nontender, no splenomegaly, +BS  NEURO: alert and oriented x3, mild global weakness  PSYCH: normal mood and affect   Skin: Surgical wounds left lower extremity with oozing  RECENT LABS:  Hematology Results from last 7 days   Lab Units 04/14/25  0416 04/14/25  0004 04/13/25  1730   WBC 10*3/mm3 14.46* 14.91* 16.75*   HEMOGLOBIN g/dL 9.5* 9.8* 10.0*   HEMATOCRIT % 28.2* 30.5* 30.0*   PLATELETS 10*3/mm3 288 301 321     2  Lab  Results   Component Value Date    GLUCOSE 108 (H) 04/14/2025    BUN 19 04/14/2025    CREATININE 0.87 04/14/2025    EGFRIFNONA 96 11/04/2021    BCR 21.8 04/14/2025    CO2 22.1 04/14/2025    CALCIUM 8.1 (L) 04/14/2025    ALBUMIN 2.6 (L) 04/14/2025    AST 72 (H) 04/10/2025    ALT 36 (H) 04/10/2025           Lab Results   Component Value Date    [id number] 344 08/08/2024              Assessment & Plan   *LV thrombus with multiple sites of emboli  *Left lower extremity embolectomy/fasciotomies 4/12/25  *Bilateral pulmonary nodules  *Acute blood loss anemia-hemoglobin 9.5  *Leukocytosis likely reactive process  *Coronary artery disease, peripheral artery disease  *COPD    Hematology plan/recommendations:  Monitor hemoglobin on anticoagulation  Agree with heparin until adequately taking p.o. then transition to NOAC              4/14/2025      CC:

## 2025-04-14 NOTE — PROGRESS NOTES
[facility] Progress Note      Patient Name: Desiree Garcia  : 1960  MRN: [id number]  Primary Care Physician:  Sandra Kaba MD  Date of admission: 4/10/2025    Subjective     Interval History:   F/u NIA   The patient had open left lower extremity thrombectomy and endarterectomy and repair and fasciotomy.  She had an arteriogram and stents x 2.,  No chest pain, no nausea or  Review of Systems:   As noted    Objective     Vitals:   Temp:  [97.4 °F (36.3 °C)-98.5 °F (36.9 °C)] 97.8 °F (36.6 °C)  Heart Rate:  [83-94] 83  Resp:  [10-21] 18  BP: ()/(42-70) 118/56  Arterial Line BP: ()/(34-63) 129/51  Flow (L/min) (Oxygen Therapy):  [2] 2    Intake/Output Summary (Last 24 hours) at 2025 0741  Last data filed at 2025 0530  Gross per 24 hour   Intake 3318.52 ml   Output 2450 ml   Net 868.52 ml       Physical Exam:    General Appearance: Awake, alert, chronically ill, feels uncomfortable  Neck: No JVD  Lungs: Bilateral rhonchi, breathing effort not labored  Heart: RRR, normal S1 and S2  Abdomen: soft, nontender, nondistended  Extremities: 1+ LLE edema, wrapped distally, no RLE edema  : Storm catheter anchored in    Scheduled Meds:     arformoterol, 15 mcg, Nebulization, BID - RT  aspirin, 81 mg, Oral, Daily  aspirin, 300 mg, Rectal, Daily  atorvastatin, 40 mg, Oral, Nightly  budesonide, 0.5 mg, Nebulization, BID - RT  clopidogrel, 75 mg, Oral, Daily  insulin regular, 2-7 Units, Subcutaneous, Q6H  metoclopramide, 10 mg, Intravenous, Q6H  mupirocin, 1 Application, Each Nare, BID  pantoprazole, 40 mg, Intravenous, BID  piperacillin-tazobactam, 3.375 g, Intravenous, Q8H  revefenacin, 175 mcg, Nebulization, Daily - RT  senna-docusate sodium, 2 tablet, Oral, BID      IV Meds:   heparin, 12 Units/kg/hr, Last Rate: 15 Units/kg/hr (25 7262)  lactated ringers, 50 mL/hr, Last Rate: 50 mL/hr (25 5422)  niCARdipine, 5-15 mg/hr, Last Rate: Stopped (25  1312)  Norepinephrine-Sodium Chloride, 0.02-0.3 mcg/kg/min, Last Rate: Stopped (04/11/25 0030)  phenylephrine, 0.5-3 mcg/kg/min, Last Rate: Stopped (04/13/25 2207)        Results Reviewed:   I have personally reviewed the results from the time of this admission to 4/14/2025 07:41 EDT     Results from last 7 days   Lab Units 04/14/25  0416 04/13/25  0514 04/12/25  2153 04/12/25  1532 04/10/25  1814 04/10/25  0907 04/09/25  0602 04/08/25  1728   SODIUM mmol/L 138 133*  --  133*   < > 125*   < > 131*   POTASSIUM mmol/L 3.9 4.2 3.6 3.8   < > 3.7   < > 3.7   CHLORIDE mmol/L 105 104  --  102   < > 88*   < > 90*   CO2 mmol/L 22.1 18.0*  --  19.3*   < > 20.8*   < > 25.8   BUN mg/dL 19 22  --  26*   < > 33*   < > 9   CREATININE mg/dL 0.87 1.12*  --  1.06*   < > 1.40*   < > 0.74   CALCIUM mg/dL 8.1* 8.0*  --  7.7*   < > 8.5*   < > 10.1   BILIRUBIN mg/dL  --   --   --   --   --  1.1  --  1.1   ALK PHOS U/L  --   --   --   --   --  126*  --  159*   ALT (SGPT) U/L  --   --   --   --   --  36*  --  16   AST (SGOT) U/L  --   --   --   --   --  72*  --  45*   GLUCOSE mg/dL 108* 102*  --  90   < > 177*   < > 294*    < > = values in this interval not displayed.     Estimated Creatinine Clearance: 66.7 mL/min (by C-G formula based on SCr of 0.87 mg/dL).  Results from last 7 days   Lab Units 04/14/25  0416 04/13/25  0514 04/12/25  0454   MAGNESIUM mg/dL 2.2 2.3 2.3   PHOSPHORUS mg/dL 2.3* 3.0 3.0     Results from last 7 days   Lab Units 04/10/25  1814   URIC ACID mg/dL 6.2*     Results from last 7 days   Lab Units 04/14/25  0416 04/14/25  0004 04/13/25  1730 04/13/25  1127 04/13/25  0514   WBC 10*3/mm3 14.46* 14.91* 16.75* 12.58* 11.41*   HEMOGLOBIN g/dL 9.5* 9.8* 10.0* 7.7* 8.4*   PLATELETS 10*3/mm3 288 301 321 300 316     Results from last 7 days   Lab Units 04/10/25  1244   INR  1.36*       Assessment / Plan     ASSESSMENT:  Non olig NIA, multifactorial: prerenal azotemia secondary to nausea/vomiting, cardiogenic shock, left renal  infarct, contrast exposure and SGLT2 inh use.  Cr stable 1.1 today (peak 1.4) and did receive intra-op contrast, creatinine down to 0.87, electrolytes within acceptable range.  Hypovolemic hyponatremia, resolved up to 138  Severe PVD + Critical LLE ischemia POD1 open left iliofemoral profunda and SFA thrombectomy, fasciotomy, left common femoral endarterectomy / stent x2  Hx CAD, Inferior STEMI s/p PCI to RCA, followed by cardiology and CTS  Left ventricular thrombus, s/p thrombectomy, on heparin drip   HFrEF, EF 41 to 45%.  Compensated.    Possible sigmoid colon ischemia, seen on CTA, note GEN SURG eval  Type II DM, managed by primary team  Metabolic acidosis, resolved  Anemia of ABL, hemoglobin today up to 9.5      PLAN:  There is nothing else to add From the renal standpoint.  I will sign off and I will be happy to see again if needed    I reviewed the chart after providers notes, I reviewed labs.  I discussed the case with Dr. Grey  Copied text in this note has been reviewed and is accurate as of 04/14/25.         Nixon Garcia MD  04/14/25  07:41 EDT    [facility]  [phone]

## 2025-04-14 NOTE — PROGRESS NOTES
[facility]   Surgical Progress Note    Patient Name: Desiree Garcia  : 1960  MRN: [id number]  Date of admission: 4/10/2025  Surgical Procedures Since Admission:  Procedure(s):  EMBOLECTOMY MECHANICAL, FOUR COMPARTMENT FASCIOTOMY  ARTERIOGRAM LOWER EXTREMITY  Surgeon:  Misael Lawton MD  Status:  2 Days Post-Op  -------------------    Subjective   Subjective     Chief Complaint: Postop day 2 status post left leg thrombectomy and 4 compartment fasciotomies    History of Present Illness   65-year-old woman with multifocal thromboemboli.  Left leg either extended or her new clot that became critically ischemic yesterday.  This occurred despite full heparin on board.  Currently continued.  Awaiting recovery of gout before starting any type of oral anticoagulant.    Review of Systems denies pain in the foot but still numb.  Able to move it.  States belly feels better as well.    Objective   Objective     Vitals:   Temp:  [97.4 °F (36.3 °C)-98.5 °F (36.9 °C)] 98 °F (36.7 °C)  Heart Rate:  [83-94] 83  Resp:  [10-21] 18  BP: ()/(42-70) 118/56  Arterial Line BP: ()/(34-63) 129/51  Flow (L/min) (Oxygen Therapy):  [2] 2  Output by Drain (mL) 25 0701 - 25 1900 25 1901 - 25 0700 25 0701 - 25 0828 Range Total   NG/OG Tube Nasogastric Left nostril 1300 700  2000   Urethral Catheter Silicone 16 Fr.  450  450       Physical Exam    Incision sites with mild strikethrough.  Overall left leg warm and viable with good borders but still diminished sensation.  Excellent signals at the DP and PT.  Some strikethrough on dressings but overall stable.  Result Review    Result Review:  I have personally reviewed the results from the time of this admission to 2025 08:28 EDT and agree with these findings:  [x]  Laboratory list / accordion  []  Microbiology  []  Radiology  []  EKG/Telemetry   []  Cardiology/Vascular   []  Pathology  []  Old records  []  Other:  Most notable  findings include: Hemoglobin noted to be stable after blood.  White count still elevated.  Creatinine improved      Results from last 7 days   Lab Units 04/14/25  0416 04/14/25  0004 04/13/25  1730 04/13/25  1127 04/13/25  0514 04/12/25  1532 04/12/25  0454   WBC 10*3/mm3 14.46* 14.91* 16.75* 12.58* 11.41* 10.11 8.81   HEMOGLOBIN g/dL 9.5* 9.8* 10.0* 7.7* 8.4* 10.2* 10.5*   PLATELETS 10*3/mm3 288 301 321 300 316 341 283     Results from last 7 days   Lab Units 04/14/25  0416 04/13/25  0514 04/12/25  2153 04/12/25  1532 04/12/25  1302 04/12/25  0454 04/11/25  1711 04/11/25  0413 04/10/25  1814 04/10/25  0907 04/10/25  0215 04/09/25  0602   SODIUM mmol/L 138 133*  --  133*  --  131* 129* 130* 128*   < > 127* 131*   POTASSIUM mmol/L 3.9 4.2 3.6 3.8 3.4* 3.1* 3.3* 3.9 3.4*   < > 3.7 3.6   CHLORIDE mmol/L 105 104  --  102  --  98 95* 97* 94*   < > 89* 92*   CO2 mmol/L 22.1 18.0*  --  19.3*  --  19.6* 20.2* 20.5* 21.1*   < > 22.4 26.7   BUN mg/dL 19 22  --  26*  --  32* 36* 38* 37*   < > 25* 10   CREATININE mg/dL 0.87 1.12*  --  1.06*  --  1.08* 1.24* 1.27* 1.33*   < > 1.21* 0.80   GLUCOSE mg/dL 108* 102*  --  90  --  74 75 106* 151*   < > 203* 248*   MAGNESIUM mg/dL 2.2 2.3  --   --   --  2.3  --  2.3 2.1  --  2.3 1.9   PHOSPHORUS mg/dL 2.3* 3.0  --   --   --  3.0  --  4.8* 4.7*  --  4.9* 3.1    < > = values in this interval not displayed.   Estimated Creatinine Clearance: 66.7 mL/min (by C-G formula based on SCr of 0.87 mg/dL).  Results from last 7 days   Lab Units 04/10/25  1244   PROTIME Seconds 17.4*   INR  1.36*     Lab Results   Component Value Date    HGBA1C 9.40 (H) 04/09/2025    HGBA1C 9.30 (H) 03/13/2025    HGBA1C 9.40 (H) 12/23/2024     Glucose   Date/Time Value Ref Range Status   04/14/2025 0004 107 70 - 130 mg/dL Final   04/13/2025 1729 134 (H) 70 - 130 mg/dL Final   04/13/2025 1125 128 70 - 130 mg/dL Final   04/13/2025 0024 100 70 - 130 mg/dL Final   04/12/2025 1919 78 70 - 130 mg/dL Final   04/12/2025 1725  88 70 - 130 mg/dL Final   04/12/2025 1300 131 (H) 70 - 130 mg/dL Final   04/12/2025 1136 78 70 - 130 mg/dL Final       Assessment & Plan   Assessment / Plan     Brief Patient Summary:  Desiree Garcia is a 65 y.o. female who progress thrombus despite the full use of heparin.  Concerns for long-term recurrence of clot have been raised and family is aware.  Still unclear as to whether this was new embolism versus extension of clot.  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  Hopefully moving away from the need for abdominal surgery.  VTE Prophylaxis:  Pharmacologic & mechanical VTE prophylaxis orders are present.        Misael Lawton MD

## 2025-04-14 NOTE — PROGRESS NOTES
"General Surgery Progress Note    Chief complaint: ischemic enteritis/colitis, ileus    Interval history: Patient denies nausea or abdominal pain. Patient reports passing gas.  She had a medium bowel movement on 4/12 per RN.  No rectal bleeding noted.  Patient states she is hungry and would like to get up and walk. She has been eating ice chips and reports about 3 cups yesterday, and RN reports suspicion that she may have been eating more than this. Received 2U PRBC yesterday.    Vital signs:  /56   Pulse 83   Temp 98 °F (36.7 °C) (Axillary)   Resp 18   Ht 167.6 cm (65.98\")   Wt 72.7 kg (160 lb 4.4 oz)   SpO2 100%   BMI 25.88 kg/m²     Intake/output:  Intake & Output (last day)         04/13 0701  04/14 0700 04/14 0701  04/15 0700    P.O.      I.V. (mL/kg) 1825.5 (25.1)     Blood 783     Other 710     NG/GT      IV Piggyback      Total Intake(mL/kg) 3318.5 (45.6)     Urine (mL/kg/hr) 450 (0.3)     Emesis/NG output 2000     Total Output 2450     Net +868.5                   Physical exam:  General: chronically ill-appearing, resting comfortably in bed  NEURO: awake, alert, no gross focal deficits, moves all extremities  PSYCH: interactive, cooperative, answering questions appropriately  PULM: unlabored respirations on 2LNC  CV: regular rate, SBP 140s per A-line, off pressors, +LLE swelling, L femoral access site with small amount of bloody drainage on bandage, LLE fasciotomy wound dressed w clean gauze  GI: eating ice chips prior to exam, NGT with 300cc thin bilious output in cannister with less bilious output in tubing, abdomen soft, mildly distended, tender only in the left lower quadrant near her groin, no guarding  : torres in place draining clear yellow urine    Diagnostics:  Results from last 7 days   Lab Units 04/14/25  0416 04/14/25  0004 04/13/25  1730 04/13/25  1127 04/13/25  0514 04/12/25  1532   WBC 10*3/mm3 14.46* 14.91* 16.75*   < > 11.41* 10.11   HEMOGLOBIN g/dL 9.5* 9.8* 10.0*   < > 8.4* " 10.2*   HEMATOCRIT % 28.2* 30.5* 30.0*   < > 25.6* 30.3*   PLATELETS 10*3/mm3 288 301 321   < > 316 341   MONOCYTES % %  --  15.3*  --   --  12.0  18.0* 14.0*   EOSINOPHIL % %  --  0.0*  --   --  0.0* 1.0    < > = values in this interval not displayed.     Results from last 7 days   Lab Units 04/14/25 0416 04/13/25  0514 04/12/25  2153 04/12/25  1532 04/10/25  1814 04/10/25  0907 04/09/25  0602 04/08/25  1728   SODIUM mmol/L 138 133*  --  133*   < > 125*   < > 131*   POTASSIUM mmol/L 3.9 4.2 3.6 3.8   < > 3.7   < > 3.7   CHLORIDE mmol/L 105 104  --  102   < > 88*   < > 90*   CO2 mmol/L 22.1 18.0*  --  19.3*   < > 20.8*   < > 25.8   BUN mg/dL 19 22  --  26*   < > 33*   < > 9   CREATININE mg/dL 0.87 1.12*  --  1.06*   < > 1.40*   < > 0.74   CALCIUM mg/dL 8.1* 8.0*  --  7.7*   < > 8.5*   < > 10.1   BILIRUBIN mg/dL  --   --   --   --   --  1.1  --  1.1   ALK PHOS U/L  --   --   --   --   --  126*  --  159*   ALT (SGPT) U/L  --   --   --   --   --  36*  --  16   AST (SGOT) U/L  --   --   --   --   --  72*  --  45*   GLUCOSE mg/dL 108* 102*  --  90   < > 177*   < > 294*    < > = values in this interval not displayed.     Results from last 7 days   Lab Units 04/14/25 0416 04/13/25  2204 04/13/25  1527 04/10/25  1742 04/10/25  1244   INR   --   --   --   --  1.36*   APTT seconds 61.8* 65.8* 75.6*   < > 96.3*    < > = values in this interval not displayed.   Blood cultures 4/8 no growth at 5 days  Blood cultures 4/12 no growth at 24 hours  Magnesium 2.2    Assessment/Plan:  65-year-old lady with multiple comorbidities, left ventricular thrombus, showering of emboli to multiple intra-abdominal organs, likely ischemic enteritis and colitis, with ileus.  She appears to be improving today with no reported abdominal pain and decreased tenderness on exam.  She has had 2 L NG tube output however has been eating ice chips frequently.  She has passed gas and did have a bowel movement 2 days ago.     -Recommend NG tube clamping  trial.  If she tolerates clamping with low residual, I will discontinue her NG tube and start clear liquid diet. If she fails clamping trial would start TPN.  -Ok to discontinue zosyn as she remains afebrile with downtrending WBC and improving abdominal exam.  -Continue PPI   -Heparin drip    Yulisa Parisi MD  General, Robotic and Endoscopic Surgery  [facility]    [address], [address]  [place], [place], [address]  P: [phone]  F: [phone]

## 2025-04-14 NOTE — PROGRESS NOTES
"    Patient Name: Desiree Garcia  :1960  65 y.o.      Patient Care Team:  Sandra Kaba MD as PCP - General (Family Medicine)  Marisol Nazario, RN as Ambulatory  ([facility])    Chief Complaint:   Inferior STEMI, LV thrombus, PAD, ischemic limb    Interval History:   NAEO, feels ok, less abd pain.    Objective   Vital Signs  Temp:  [97.4 °F (36.3 °C)-98.5 °F (36.9 °C)] 98 °F (36.7 °C)  Heart Rate:  [83-94] 83  Resp:  [10-21] 18  BP: ()/(42-70) 118/56  Arterial Line BP: ()/(34-63) 129/51    Intake/Output Summary (Last 24 hours) at 2025 0958  Last data filed at 2025 0530  Gross per 24 hour   Intake 2971.52 ml   Output 2450 ml   Net 521.52 ml     Flowsheet Rows      Flowsheet Row First Filed Value   Admission Height 167.6 cm (65.98\") Documented at 2025 1714   Admission Weight 71 kg (156 lb 8.4 oz) Documented at 04/10/2025 1700            GEN: no distress, alert and oriented  HEENT: NACT, EOMI, moist mucous membranes, NGT in place  Lungs: CTAB, no wheezes, rales or rhonchi  CV: normal rate, regular rhythm, normal S1, S2, no murmurs, +2 radial pulses b/l, no carotid bruit  Abdomen: soft, mild TTP RLQ, mildly distended  Extremities: bloody dressing over left leg  Skin: no rash, warm, dry  Heme/Lymph: no bruising  Psych: organized thought, normal behavior and affect    Results Review:    Results from last 7 days   Lab Units 25  0416   SODIUM mmol/L 138   POTASSIUM mmol/L 3.9   CHLORIDE mmol/L 105   CO2 mmol/L 22.1   BUN mg/dL 19   CREATININE mg/dL 0.87   GLUCOSE mg/dL 108*   CALCIUM mg/dL 8.1*     Results from last 7 days   Lab Units 25  1728   HSTROP T ng/L 971* 835*     Results from last 7 days   Lab Units 25  0416   WBC 10*3/mm3 14.46*   HEMOGLOBIN g/dL 9.5*   HEMATOCRIT % 28.2*   PLATELETS 10*3/mm3 288     Results from last 7 days   Lab Units 25  0416 25  2204 25  1527 04/10/25  1742 04/10/25  1244 "   INR   --   --   --   --  1.36*   APTT seconds 61.8* 65.8* 75.6*   < > 96.3*    < > = values in this interval not displayed.     Results from last 7 days   Lab Units 04/14/25  0416   MAGNESIUM mg/dL 2.2     Results from last 7 days   Lab Units 04/09/25  0602   CHOLESTEROL mg/dL 147   TRIGLYCERIDES mg/dL 158*   HDL CHOL mg/dL 42   LDL CHOL mg/dL 78               Medication Review:   arformoterol, 15 mcg, Nebulization, BID - RT  aspirin, 81 mg, Oral, Daily  aspirin, 300 mg, Rectal, Daily  atorvastatin, 40 mg, Oral, Nightly  budesonide, 0.5 mg, Nebulization, BID - RT  clopidogrel, 75 mg, Oral, Daily  insulin regular, 2-7 Units, Subcutaneous, Q6H  metoclopramide, 10 mg, Intravenous, Q6H  mupirocin, 1 Application, Each Nare, BID  pantoprazole, 40 mg, Intravenous, BID  piperacillin-tazobactam, 3.375 g, Intravenous, Q8H  revefenacin, 175 mcg, Nebulization, Daily - RT  senna-docusate sodium, 2 tablet, Oral, BID         heparin, 12 Units/kg/hr, Last Rate: 15 Units/kg/hr (04/14/25 0517)  lactated ringers, 50 mL/hr, Last Rate: 50 mL/hr (04/14/25 4095)  phenylephrine, 0.5-3 mcg/kg/min, Last Rate: Stopped (04/13/25 2207)        Assessment & Plan   #Inferior STEMI  #LV thrombus  #critical limb ischemia s/p open left iliofemoral profunda and SFA thrombectomy, fasciotomy, left common femoral endarterectomy   #Acute HFrEF  #Splenic infarct  #Left renal infarct  #Left SFA occlusion  #Ileus  #NIA     66 yo woman with CAD status post PCI to RCA in 2021 in the setting of a STEMI, hypertension, hyperlipidemia, diabetes, COPD who presented to [facility] with severe abdominal pain.  On arrival there, she was noted to have inferior STEMI.  CTA abdomen pelvis also revealed complete occlusion of the left SFA, left renal artery, splenic artery, occlusion of the ONEIDA with reconstitution distally, as well as findings concerning for splenic and renal infarcts.  She was taken for emergent coronary angiogram which revealed distal  circumflex occlusion, mild to moderate proximal LAD disease and a 100% thrombotic occlusion of the RCA.  There was heavy thrombus burden in the RCA and she received PCI to this with mechanical thrombectomy and overlapping drug-eluting stents.  There was ABILIO I-II flow in the distal vessels post PCI.  Echocardiogram performed 4/10/2025 with an EF of about 45%.  There was a 1.4 cm mobile thrombus in the left ventricle.  She was started on heparin drip and vascular surgery was consulted given the CT findings and recommendation was to continue anticoagulation.  While admitted there, she had a run of NSVT and was started on amiodarone for this.  She was also hypotensive requiring Levophed and was also started on milrinone.  Patient was transferred here for further management.     Overnight, patient complained of severe abdominal pain.  Lactate was 1.  Repeat CT abdomen pelvis concerning for sigmoid colonic ischemia as well as long segment extensive small bowel enteritis and ileus.  Surgery was consulted.    Over the weekend, had critical limb ischemia of the left leg and underwent thrombectomy and fasciotomy.    - continue aspirin 81 mg daily, clopidogrel 75 mg daily, atorvastatin 40 mg daily    Santi Howard MD, FAC, [facility]  04/14/25  09:58 EDT

## 2025-04-14 NOTE — CASE MANAGEMENT/SOCIAL WORK
Continued Stay Note  [facility]     Patient Name: Desiree Garcia  MRN: [id number]  Today's Date: 4/14/2025    Admit Date: 4/10/2025    Plan: Pending   Discharge Plan       Row Name 04/14/25 1059       Plan    Plan Pending    Plan Comments Patient remains in CICU. Per surgery, they are going to attempt NG tube clamping trial. If she fails, they will start TPN. Patient still on helparin drip. CCP will cotninue to follow for dc planning needs pending clinical course. KYLAH, CSW                   Discharge Codes    No documentation.                 Expected Discharge Date and Time       Expected Discharge Date Expected Discharge Time    Apr 16, 2025               JUAN JOSE Bledsoe

## 2025-04-14 NOTE — PLAN OF CARE
Goal Outcome Evaluation:      Patient in CICU overnight. Able to stop Lionel at beginning of shift and monitoring of BP with luz stable overnight. Remains on 2L. Patient did not complain of abdominal pain; pain mainly in LLE. Patient expressed desire for food and drink; provided ice chips with NG to LIS overnight with 800 out. F/c patency with adequate urine output. Hygiene promoted. Heparin gtt administered via protocol.

## 2025-04-14 NOTE — PROGRESS NOTES
PROGRESS NOTE  Patient Name: Desiree Garcia  Age/Sex: 65 y.o. female  : 1960  MRN: [id number]    Date of Admission: 4/10/2025  Date of Encounter Visit: 25   LOS: 4 days   Patient Care Team:  Sandra Kaba MD as PCP - General (Family Medicine)  Marisol Nazario, RN as Ambulatory  ([facility])    Chief Complaint: Acute left lower extremity ischemia, acute MI, left ventricular thrombus    Hospital course: Patient is doing better, hemodynamically she is well compensated and she is on no pressors  She still on the heparin drip and she is still oozing from the fasciotomy site but her hemoglobin has been holding steady around 9-10  She was consuming too much ice chips, her sodium is better today, and she is having some evaluation to see if the nasogastric tube can be discontinued  Decreasing oxygen requirement      REVIEW OF SYSTEMS:   CONSTITUTIONAL: no fever or chills  Currently complaining of dry mouth and abdominal discomfort and leg pain    Ventilator/Non-Invasive Ventilation Settings (From admission, onward)      2 L/min current oxygen              Vital Signs  Temp:  [97.4 °F (36.3 °C)-98.5 °F (36.9 °C)] 98 °F (36.7 °C)  Heart Rate:  [83-94] 84  Resp:  [10-21] 18  BP: ()/(42-70) 117/56  Arterial Line BP: ()/(34-63) 143/48  SpO2:  [89 %-100 %] 97 %  on  Flow (L/min) (Oxygen Therapy):  [2] 2 Device (Oxygen Therapy): nasal cannula    Intake/Output Summary (Last 24 hours) at 2025 1029  Last data filed at 2025 0530  Gross per 24 hour   Intake 2921.52 ml   Output 2450 ml   Net 471.52 ml     Flowsheet Rows      Flowsheet Row First Filed Value   Admission Height --   Admission Weight 71 kg (156 lb 8.4 oz) Documented at 04/10/2025 1700          Body mass index is 25.88 kg/m².      25  1714 25  0600 25  0500   Weight: 75.2 kg (165 lb 12.6 oz) 74 kg (163 lb 2.3 oz) 72.7 kg (160 lb 4.4 oz)       Physical Exam:  GEN:   Sick looking, hard of  hearing, slightly confused but responsive  EYES:   Sclerae clear. No icterus. PERRL. Normal EOM  ENT:   External ears/nose normal, no oral lesions, no thrush, slightly dry membranes  NECK:  Supple, midline trachea, no JVD  LUNGS: Normal chest on inspection, CTAB but diminished, no wheezes. No rhonchi. No crackles. Respirations regular, even and unlabored.  On 1 L/min nasal cannula oxygen  CV:  Regular rhythm and rate. Normal S1/S2. No murmurs, gallops, or rubs noted.  ABD:  Soft, tender, non-stented, hypoactive bowel sounds. No guarding  EXT:  Moves all extremities well. No cyanosis. No redness. No edema.  Much warmer left lower extremity compared to yesterday with dopplerable pulses.  She is weeping some blood from the fasciotomy site  Skin: Dry, intact, no bleeding    Results Review:    Results From Last 14 Days   Lab Units 04/12/25  0014 04/11/25  1711 04/11/25  0104 04/09/25  2134 04/09/25  0602   LACTATE mmol/L 1.8 2.2* 1.0   < >  --    TRIGLYCERIDES mg/dL  --   --   --   --  158*    < > = values in this interval not displayed.     Results from last 7 days   Lab Units 04/14/25  0416 04/13/25  0514 04/12/25  2153 04/12/25  1532 04/12/25  1302 04/12/25  0454 04/11/25  1711 04/11/25  0413 04/10/25  1814 04/10/25  0907 04/09/25  0602 04/08/25  1728   SODIUM mmol/L 138 133*  --  133*  --  131* 129* 130* 128* 125*   < > 131*   POTASSIUM mmol/L 3.9 4.2 3.6 3.8 3.4* 3.1* 3.3* 3.9 3.4* 3.7   < > 3.7   CHLORIDE mmol/L 105 104  --  102  --  98 95* 97* 94* 88*   < > 90*   CO2 mmol/L 22.1 18.0*  --  19.3*  --  19.6* 20.2* 20.5* 21.1* 20.8*   < > 25.8   BUN mg/dL 19 22  --  26*  --  32* 36* 38* 37* 33*   < > 9   CREATININE mg/dL 0.87 1.12*  --  1.06*  --  1.08* 1.24* 1.27* 1.33* 1.40*   < > 0.74   CALCIUM mg/dL 8.1* 8.0*  --  7.7*  --  7.5* 7.8* 8.4* 8.4* 8.5*   < > 10.1   AST (SGOT) U/L  --   --   --   --   --   --   --   --   --  72*  --  45*   ALT (SGPT) U/L  --   --   --   --   --   --   --   --   --  36*  --  16   ANION  GAP mmol/L 10.9 11.0  --  11.7  --  13.4 13.8 12.5 12.9 16.2*   < > 15.2*   ALBUMIN g/dL 2.6*  --   --   --   --   --   --   --   --  2.7*  --  4.0    < > = values in this interval not displayed.     Results from last 7 days   Lab Units 04/08/25 2034 04/08/25  1728   HSTROP T ng/L 971* 835*         Results from last 7 days   Lab Units 04/10/25  1244 04/08/25  1728   PROBNP pg/mL 9,388.0* 15,432.0*     Results from last 7 days   Lab Units 04/14/25  0416 04/14/25  0004 04/13/25  1730 04/13/25  1127 04/13/25  0514 04/12/25  1532 04/12/25  0454 04/09/25  0559 04/08/25  1728   WBC 10*3/mm3 14.46* 14.91* 16.75* 12.58* 11.41* 10.11 8.81   < > 21.93*   HEMOGLOBIN g/dL 9.5* 9.8* 10.0* 7.7* 8.4* 10.2* 10.5*   < > 15.4   HEMATOCRIT % 28.2* 30.5* 30.0* 23.7* 25.6* 30.3* 33.2*   < > 46.3   PLATELETS 10*3/mm3 288 301 321 300 316 341 283   < > 325   MCV fL 82.7 85.4 83.6 84.0 85.0 83.7 84.5   < > 85.3   NEUTROPHIL % %  --   --   --   --   --   --   --   --  83.2*   LYMPHOCYTE % %  --   --   --   --   --   --   --   --  8.8*   MONOCYTES % %  --   --   --   --   --   --   --   --  7.1   EOSINOPHIL % %  --   --   --   --   --   --   --   --  0.0*   BASOPHIL % %  --   --   --   --   --   --   --   --  0.4   IMM GRAN % %  --   --   --   --   --   --   --   --  0.5    < > = values in this interval not displayed.     Results from last 7 days   Lab Units 04/14/25  0416 04/13/25  2204 04/13/25  1527 04/13/25  0802 04/13/25  0514 04/12/25  2153 04/12/25  1409 04/10/25  1742 04/10/25  1244   INR   --   --   --   --   --   --   --   --  1.36*   APTT seconds 61.8* 65.8* 75.6* 62.7* 63.2* 68.9* >200.0*   < > 96.3*    < > = values in this interval not displayed.     Results from last 7 days   Lab Units 04/14/25  0416 04/13/25  0514 04/12/25  0454 04/11/25  0413 04/10/25  1814 04/10/25  0215 04/09/25  0602   MAGNESIUM mg/dL 2.2 2.3 2.3 2.3 2.1 2.3 1.9     Results from last 7 days   Lab Units 04/09/25  0602   CHOLESTEROL mg/dL 147   TRIGLYCERIDES  mg/dL 158*   HDL CHOL mg/dL 42     Results from last 7 days   Lab Units 04/10/25  1958   PH, ARTERIAL pH units 7.422   PCO2, ARTERIAL mm Hg 37.0   PO2 ART mm Hg 66.4*   HCO3 ART mmol/L 24.1     Results from last 7 days   Lab Units 04/09/25  0559   HEMOGLOBIN A1C % 9.40*     Glucose   Date/Time Value Ref Range Status   04/14/2025 0004 107 70 - 130 mg/dL Final   04/13/2025 1729 134 (H) 70 - 130 mg/dL Final   04/13/2025 1125 128 70 - 130 mg/dL Final   04/13/2025 0024 100 70 - 130 mg/dL Final   04/12/2025 1919 78 70 - 130 mg/dL Final   04/12/2025 1725 88 70 - 130 mg/dL Final   04/12/2025 1300 131 (H) 70 - 130 mg/dL Final   04/12/2025 1136 78 70 - 130 mg/dL Final     Results from last 7 days   Lab Units 04/12/25  0014 04/11/25  1711 04/11/25  1059 04/11/25  0104 04/10/25  0907 04/09/25  2134 04/08/25 2059 04/08/25 2034   PROCALCITONIN ng/mL  --   --  1.13*  --   --   --   --  0.13   LACTATE mmol/L 1.8 2.2*  --  1.0 1.4 1.6 2.0  --      Results from last 7 days   Lab Units 04/12/25  1702 04/12/25  1652 04/08/25 2059 04/08/25 2056   BLOODCX  No growth at 24 hours No growth at 24 hours No growth at 5 days No growth at 5 days             Results from last 7 days   Lab Units 04/10/25  1829 04/10/25  1814   SODIUM UR mmol/L <20  --    CREATININE UR mg/dL 64.8  --    CHLORIDE UR mmol/L <20  --    OSMOLALITY UR mOsm/kg 494  --    PROTEIN TOTAL URINE mg/dL 95.5  --    URIC ACID mg/dL  --  6.2*           Imaging:   Imaging Results (All)               I reviewed the patient's new clinical results.  I personally viewed and interpreted the patient's imaging results:        Medication Review:   arformoterol, 15 mcg, Nebulization, BID - RT  aspirin, 81 mg, Oral, Daily  aspirin, 300 mg, Rectal, Daily  atorvastatin, 40 mg, Oral, Nightly  budesonide, 0.5 mg, Nebulization, BID - RT  clopidogrel, 75 mg, Oral, Daily  insulin regular, 2-7 Units, Subcutaneous, Q6H  metoclopramide, 10 mg, Intravenous, Q6H  mupirocin, 1 Application, Each  Nare, BID  pantoprazole, 40 mg, Intravenous, BID  piperacillin-tazobactam, 3.375 g, Intravenous, Q8H  revefenacin, 175 mcg, Nebulization, Daily - RT  senna-docusate sodium, 2 tablet, Oral, BID        heparin, 12 Units/kg/hr, Last Rate: 15 Units/kg/hr (04/14/25 7017)  lactated ringers, 50 mL/hr, Last Rate: 50 mL/hr (04/14/25 1385)  phenylephrine, 0.5-3 mcg/kg/min, Last Rate: Stopped (04/13/25 6480)        ASSESSMENT:   Cardiogenic shock  Arterial embolism and thrombosis of the lower extremity, status post fasciotomy and thrombectomy with revascularization 4/12/2025  Diabetes mellitus type 2  Coronary artery disease with prior angioplasties with acute inferior wall MI status post repeat angioplasty  Left ventricular intramural thrombus likely the origin of the above  Bilateral pulmonary nodules  Severe peripheral arterial disease  Acute hypoxemic respiratory failure  Nonsustained ventricular tachycardia  Hyperlipidemia  Hyponatremia  Acute kidney injury, improved  Tobacco abuse  Small bowel enteritis was suspected bowel ischemia from the embolic events  COPD, no exacerbation  Nonsustained V. tach  History of E. coli UTI on 3/18/2025 with negative blood culture on this admission, patient is on Zosyn for possible intra-abdominal infection from bowel ischemia    PLAN:  Patient overall doing better, will continue to follow-up on the hemoglobin level, transfuse as needed, she is oozing blood from oozing from the fasciotomy site while on the heparin drip but the blood loss is within acceptable range and the hemoglobin is only slowly trending down.  Sodium level is better  Nasogastric tube removal per the surgical team  Currently off the IV pressors will continue to monitor.    Discussed with the patient and with the CICU rounding team, we will continue to monitor in the CICU until cleared for transfer out by the surgical team    Labs/Notes/films were independently reviewed and pertinent results are summarized above  The  copied texts in this note were reviewed and they are accurate as of 04/14/25    Disposition: Monitor in CICU    Zuri Grey MD  04/14/25  10:29 EDT      Time: Critical care 33 min      Dictated utilizing Dragon dictation

## 2025-04-15 PROBLEM — I74.3 ARTERIAL EMBOLISM AND THROMBOSIS OF LOWER EXTREMITY: Status: RESOLVED | Noted: 2025-04-10 | Resolved: 2025-04-15

## 2025-04-15 PROBLEM — R57.0 CARDIOGENIC SHOCK: Status: RESOLVED | Noted: 2025-04-10 | Resolved: 2025-04-15

## 2025-04-15 LAB
ACT BLD: 153 SECONDS (ref 82–152)
ALBUMIN SERPL-MCNC: 2.7 G/DL (ref 3.5–5.2)
ANION GAP SERPL CALCULATED.3IONS-SCNC: 9 MMOL/L (ref 5–15)
APTT PPP: 59.8 SECONDS (ref 22.7–35.4)
APTT PPP: 67.3 SECONDS (ref 22.7–35.4)
APTT PPP: 72.3 SECONDS (ref 22.7–35.4)
APTT PPP: 90.5 SECONDS (ref 22.7–35.4)
BASOPHILS # BLD MANUAL: 0.17 10*3/MM3 (ref 0–0.2)
BASOPHILS NFR BLD MANUAL: 1 % (ref 0–1.5)
BUN SERPL-MCNC: 14 MG/DL (ref 8–23)
BUN/CREAT SERPL: 18.4 (ref 7–25)
CALCIUM SPEC-SCNC: 8.1 MG/DL (ref 8.6–10.5)
CHLORIDE SERPL-SCNC: 105 MMOL/L (ref 98–107)
CO2 SERPL-SCNC: 25 MMOL/L (ref 22–29)
CREAT SERPL-MCNC: 0.76 MG/DL (ref 0.57–1)
DEPRECATED RDW RBC AUTO: 43.7 FL (ref 37–54)
DEPRECATED RDW RBC AUTO: 44.6 FL (ref 37–54)
EGFRCR SERPLBLD CKD-EPI 2021: 87.6 ML/MIN/1.73
EOSINOPHIL # BLD MANUAL: 0.35 10*3/MM3 (ref 0–0.4)
EOSINOPHIL NFR BLD MANUAL: 2 % (ref 0.3–6.2)
ERYTHROCYTE [DISTWIDTH] IN BLOOD BY AUTOMATED COUNT: 14.4 % (ref 12.3–15.4)
ERYTHROCYTE [DISTWIDTH] IN BLOOD BY AUTOMATED COUNT: 14.5 % (ref 12.3–15.4)
GLUCOSE BLDC GLUCOMTR-MCNC: 135 MG/DL (ref 70–130)
GLUCOSE BLDC GLUCOMTR-MCNC: 137 MG/DL (ref 70–130)
GLUCOSE BLDC GLUCOMTR-MCNC: 140 MG/DL (ref 70–130)
GLUCOSE BLDC GLUCOMTR-MCNC: 288 MG/DL (ref 70–130)
GLUCOSE SERPL-MCNC: 128 MG/DL (ref 65–99)
HCT VFR BLD AUTO: 25.9 % (ref 34–46.6)
HCT VFR BLD AUTO: 25.9 % (ref 34–46.6)
HGB BLD-MCNC: 8.2 G/DL (ref 12–15.9)
HGB BLD-MCNC: 8.6 G/DL (ref 12–15.9)
LYMPHOCYTES # BLD MANUAL: 1.57 10*3/MM3 (ref 0.7–3.1)
LYMPHOCYTES NFR BLD MANUAL: 10 % (ref 5–12)
MAGNESIUM SERPL-MCNC: 2.1 MG/DL (ref 1.6–2.4)
MCH RBC QN AUTO: 26.9 PG (ref 26.6–33)
MCH RBC QN AUTO: 27.9 PG (ref 26.6–33)
MCHC RBC AUTO-ENTMCNC: 31.7 G/DL (ref 31.5–35.7)
MCHC RBC AUTO-ENTMCNC: 33.2 G/DL (ref 31.5–35.7)
MCV RBC AUTO: 84.1 FL (ref 79–97)
MCV RBC AUTO: 84.9 FL (ref 79–97)
METAMYELOCYTES NFR BLD MANUAL: 2 % (ref 0–0)
MONOCYTES # BLD: 1.75 10*3/MM3 (ref 0.1–0.9)
MYELOCYTES NFR BLD MANUAL: 2 % (ref 0–0)
NEUTROPHILS # BLD AUTO: 12.92 10*3/MM3 (ref 1.7–7)
NEUTROPHILS NFR BLD MANUAL: 74 % (ref 42.7–76)
NRBC BLD AUTO-RTO: 0.1 /100 WBC (ref 0–0.2)
PHOSPHATE SERPL-MCNC: 2.2 MG/DL (ref 2.5–4.5)
PHOSPHATE SERPL-MCNC: 2.8 MG/DL (ref 2.5–4.5)
PLAT MORPH BLD: NORMAL
PLATELET # BLD AUTO: 317 10*3/MM3 (ref 140–450)
PLATELET # BLD AUTO: 325 10*3/MM3 (ref 140–450)
PMV BLD AUTO: 10.2 FL (ref 6–12)
PMV BLD AUTO: 10.6 FL (ref 6–12)
POTASSIUM SERPL-SCNC: 3.7 MMOL/L (ref 3.5–5.2)
RBC # BLD AUTO: 3.05 10*6/MM3 (ref 3.77–5.28)
RBC # BLD AUTO: 3.08 10*6/MM3 (ref 3.77–5.28)
RBC MORPH BLD: NORMAL
SODIUM SERPL-SCNC: 139 MMOL/L (ref 136–145)
VARIANT LYMPHS NFR BLD MANUAL: 9 % (ref 19.6–45.3)
WBC MORPH BLD: NORMAL
WBC NRBC COR # BLD AUTO: 17.46 10*3/MM3 (ref 3.4–10.8)
WBC NRBC COR # BLD AUTO: 18.2 10*3/MM3 (ref 3.4–10.8)

## 2025-04-15 PROCEDURE — 94664 DEMO&/EVAL PT USE INHALER: CPT

## 2025-04-15 PROCEDURE — 25010000002 HEPARIN (PORCINE) PER 1000 UNITS: Performed by: SURGERY

## 2025-04-15 PROCEDURE — 82948 REAGENT STRIP/BLOOD GLUCOSE: CPT

## 2025-04-15 PROCEDURE — 97162 PT EVAL MOD COMPLEX 30 MIN: CPT

## 2025-04-15 PROCEDURE — 97530 THERAPEUTIC ACTIVITIES: CPT

## 2025-04-15 PROCEDURE — 63710000001 REVEFENACIN 175 MCG/3ML SOLUTION: Performed by: SURGERY

## 2025-04-15 PROCEDURE — 85730 THROMBOPLASTIN TIME PARTIAL: CPT | Performed by: SURGERY

## 2025-04-15 PROCEDURE — 85007 BL SMEAR W/DIFF WBC COUNT: CPT | Performed by: SURGERY

## 2025-04-15 PROCEDURE — 25810000003 SODIUM CHLORIDE 0.9 % SOLUTION: Performed by: INTERNAL MEDICINE

## 2025-04-15 PROCEDURE — 25010000002 HEPARIN (PORCINE) 25000-0.45 UT/250ML-% SOLUTION: Performed by: SURGERY

## 2025-04-15 PROCEDURE — 97166 OT EVAL MOD COMPLEX 45 MIN: CPT

## 2025-04-15 PROCEDURE — 84100 ASSAY OF PHOSPHORUS: CPT | Performed by: INTERNAL MEDICINE

## 2025-04-15 PROCEDURE — 63710000001 INSULIN REGULAR HUMAN PER 5 UNITS: Performed by: SURGERY

## 2025-04-15 PROCEDURE — 85730 THROMBOPLASTIN TIME PARTIAL: CPT | Performed by: INTERNAL MEDICINE

## 2025-04-15 PROCEDURE — 99024 POSTOP FOLLOW-UP VISIT: CPT | Performed by: SURGERY

## 2025-04-15 PROCEDURE — 99232 SBSQ HOSP IP/OBS MODERATE 35: CPT | Performed by: STUDENT IN AN ORGANIZED HEALTH CARE EDUCATION/TRAINING PROGRAM

## 2025-04-15 PROCEDURE — 25010000002 METOCLOPRAMIDE PER 10 MG: Performed by: SURGERY

## 2025-04-15 PROCEDURE — 85027 COMPLETE CBC AUTOMATED: CPT | Performed by: INTERNAL MEDICINE

## 2025-04-15 PROCEDURE — 94799 UNLISTED PULMONARY SVC/PX: CPT

## 2025-04-15 PROCEDURE — 85025 COMPLETE CBC W/AUTO DIFF WBC: CPT | Performed by: SURGERY

## 2025-04-15 PROCEDURE — 99232 SBSQ HOSP IP/OBS MODERATE 35: CPT | Performed by: INTERNAL MEDICINE

## 2025-04-15 PROCEDURE — 94761 N-INVAS EAR/PLS OXIMETRY MLT: CPT

## 2025-04-15 PROCEDURE — 83735 ASSAY OF MAGNESIUM: CPT | Performed by: SURGERY

## 2025-04-15 PROCEDURE — 80069 RENAL FUNCTION PANEL: CPT | Performed by: INTERNAL MEDICINE

## 2025-04-15 PROCEDURE — 25010000002 HYDROMORPHONE 1 MG/ML SOLUTION: Performed by: SURGERY

## 2025-04-15 PROCEDURE — 25010000002 PIPERACILLIN SOD-TAZOBACTAM PER 1 G: Performed by: SURGERY

## 2025-04-15 RX ORDER — PANTOPRAZOLE SODIUM 40 MG/1
40 TABLET, DELAYED RELEASE ORAL
Status: DISCONTINUED | OUTPATIENT
Start: 2025-04-15 | End: 2025-04-25

## 2025-04-15 RX ORDER — BISACODYL 10 MG
10 SUPPOSITORY, RECTAL RECTAL ONCE
Status: COMPLETED | OUTPATIENT
Start: 2025-04-15 | End: 2025-04-15

## 2025-04-15 RX ORDER — ASPIRIN 81 MG/1
81 TABLET ORAL DAILY
Status: DISCONTINUED | OUTPATIENT
Start: 2025-04-16 | End: 2025-05-02

## 2025-04-15 RX ORDER — ASPIRIN 300 MG/1
300 SUPPOSITORY RECTAL DAILY
Status: DISCONTINUED | OUTPATIENT
Start: 2025-04-16 | End: 2025-05-02

## 2025-04-15 RX ORDER — FENTANYL/ROPIVACAINE/NS/PF 2-625MCG/1
15 PLASTIC BAG, INJECTION (ML) EPIDURAL ONCE
Status: COMPLETED | OUTPATIENT
Start: 2025-04-15 | End: 2025-04-15

## 2025-04-15 RX ADMIN — SENNOSIDES AND DOCUSATE SODIUM 2 TABLET: 50; 8.6 TABLET ORAL at 20:59

## 2025-04-15 RX ADMIN — ASPIRIN 81 MG: 81 TABLET, COATED ORAL at 08:25

## 2025-04-15 RX ADMIN — METOCLOPRAMIDE 10 MG: 5 INJECTION, SOLUTION INTRAMUSCULAR; INTRAVENOUS at 18:04

## 2025-04-15 RX ADMIN — METOCLOPRAMIDE 10 MG: 5 INJECTION, SOLUTION INTRAMUSCULAR; INTRAVENOUS at 04:55

## 2025-04-15 RX ADMIN — HYDROMORPHONE HYDROCHLORIDE 1 MG: 1 INJECTION, SOLUTION INTRAMUSCULAR; INTRAVENOUS; SUBCUTANEOUS at 11:18

## 2025-04-15 RX ADMIN — ARFORMOTEROL TARTRATE 15 MCG: 15 SOLUTION RESPIRATORY (INHALATION) at 08:12

## 2025-04-15 RX ADMIN — BUDESONIDE 0.5 MG: 0.5 INHALANT RESPIRATORY (INHALATION) at 08:14

## 2025-04-15 RX ADMIN — ATORVASTATIN CALCIUM 40 MG: 20 TABLET, FILM COATED ORAL at 20:59

## 2025-04-15 RX ADMIN — INSULIN HUMAN 4 UNITS: 100 INJECTION, SOLUTION PARENTERAL at 13:06

## 2025-04-15 RX ADMIN — MUPIROCIN 1 APPLICATION: 20 OINTMENT TOPICAL at 08:24

## 2025-04-15 RX ADMIN — ARFORMOTEROL TARTRATE 15 MCG: 15 SOLUTION RESPIRATORY (INHALATION) at 19:44

## 2025-04-15 RX ADMIN — BISACODYL 10 MG: 10 SUPPOSITORY RECTAL at 13:08

## 2025-04-15 RX ADMIN — PIPERACILLIN AND TAZOBACTAM 3.38 G: 3; .375 INJECTION, POWDER, FOR SOLUTION INTRAVENOUS at 08:24

## 2025-04-15 RX ADMIN — SENNOSIDES AND DOCUSATE SODIUM 2 TABLET: 50; 8.6 TABLET ORAL at 08:25

## 2025-04-15 RX ADMIN — HYDROMORPHONE HYDROCHLORIDE 1 MG: 1 INJECTION, SOLUTION INTRAMUSCULAR; INTRAVENOUS; SUBCUTANEOUS at 04:58

## 2025-04-15 RX ADMIN — HYDROMORPHONE HYDROCHLORIDE 1 MG: 1 INJECTION, SOLUTION INTRAMUSCULAR; INTRAVENOUS; SUBCUTANEOUS at 23:12

## 2025-04-15 RX ADMIN — PANTOPRAZOLE SODIUM 40 MG: 40 INJECTION, POWDER, FOR SOLUTION INTRAVENOUS at 08:24

## 2025-04-15 RX ADMIN — POTASSIUM PHOSPHATES 15 MMOL: 236; 224 INJECTION, SOLUTION INTRAVENOUS at 09:55

## 2025-04-15 RX ADMIN — HYDROCODONE BITARTRATE AND ACETAMINOPHEN 1 TABLET: 7.5; 325 TABLET ORAL at 21:11

## 2025-04-15 RX ADMIN — REVEFENACIN 175 MCG: 175 SOLUTION RESPIRATORY (INHALATION) at 08:11

## 2025-04-15 RX ADMIN — METOCLOPRAMIDE 10 MG: 5 INJECTION, SOLUTION INTRAMUSCULAR; INTRAVENOUS at 11:18

## 2025-04-15 RX ADMIN — CLOPIDOGREL BISULFATE 75 MG: 75 TABLET, FILM COATED ORAL at 08:25

## 2025-04-15 RX ADMIN — HYDROMORPHONE HYDROCHLORIDE 1 MG: 1 INJECTION, SOLUTION INTRAMUSCULAR; INTRAVENOUS; SUBCUTANEOUS at 08:25

## 2025-04-15 RX ADMIN — METOCLOPRAMIDE 10 MG: 5 INJECTION, SOLUTION INTRAMUSCULAR; INTRAVENOUS at 00:12

## 2025-04-15 RX ADMIN — PIPERACILLIN AND TAZOBACTAM 3.38 G: 3; .375 INJECTION, POWDER, FOR SOLUTION INTRAVENOUS at 00:12

## 2025-04-15 RX ADMIN — HYDROMORPHONE HYDROCHLORIDE 1 MG: 1 INJECTION, SOLUTION INTRAMUSCULAR; INTRAVENOUS; SUBCUTANEOUS at 18:04

## 2025-04-15 RX ADMIN — HYDROCODONE BITARTRATE AND ACETAMINOPHEN 1 TABLET: 7.5; 325 TABLET ORAL at 09:55

## 2025-04-15 RX ADMIN — BUDESONIDE 0.5 MG: 0.5 INHALANT RESPIRATORY (INHALATION) at 19:41

## 2025-04-15 RX ADMIN — HEPARIN SODIUM 14 UNITS/KG/HR: 10000 INJECTION, SOLUTION INTRAVENOUS at 07:20

## 2025-04-15 RX ADMIN — HEPARIN SODIUM 2100 UNITS: 5000 INJECTION INTRAVENOUS; SUBCUTANEOUS at 15:03

## 2025-04-15 RX ADMIN — AMOXICILLIN AND CLAVULANATE POTASSIUM 1 TABLET: 875; 125 TABLET, COATED ORAL at 22:38

## 2025-04-15 RX ADMIN — AMOXICILLIN AND CLAVULANATE POTASSIUM 1 TABLET: 875; 125 TABLET, COATED ORAL at 11:18

## 2025-04-15 RX ADMIN — PANTOPRAZOLE SODIUM 40 MG: 40 TABLET, DELAYED RELEASE ORAL at 09:58

## 2025-04-15 RX ADMIN — METOCLOPRAMIDE 10 MG: 5 INJECTION, SOLUTION INTRAMUSCULAR; INTRAVENOUS at 22:38

## 2025-04-15 NOTE — THERAPY EVALUATION
Patient Name: Desiree Garcia  : 1960    MRN: [id number]                              Today's Date: 4/15/2025       Admit Date: 4/10/2025    Visit Dx:     ICD-10-CM ICD-9-CM   1. 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  I74.3 444.22     Patient Active Problem List   Diagnosis    Type 2 diabetes mellitus, without long-term current use of insulin    COPD (chronic obstructive pulmonary disease)    Depression with anxiety    Esophageal reflux    Forgetfulness    Leg paresthesia    Lumbar spinal stenosis    Migraines    Night sweat    Sciatica    Dyspnea on exertion    Thoracic or lumbosacral neuritis or radiculitis    Left wrist pain    Sprain of left wrist    Arthritis of knee, left    Contusion of left knee    Nondisplaced fracture of trapezium bone of left wrist with routine healing    Chronic left shoulder pain    Coronary artery disease involving native heart without angina pectoris    Hypertension, essential    Mixed hyperlipidemia    Cigarette nicotine dependence with nicotine-induced disorder    Altered bowel habits    Diarrhea    Rectal bleeding    Abdominal bloating    Allergic rhinitis due to food    Elevated troponin    Acute diastolic CHF (congestive heart failure)    History of ST elevation myocardial infarction (STEMI)    STEMI (ST elevation myocardial infarction)    Acute thrombus of left ventricle     Past Medical History:   Diagnosis Date    Arthritis     Cervical cancer screening     COPD (chronic obstructive pulmonary disease)     Coronary artery disease involving native heart without angina pectoris 2021    primary angioplasty and stent placement to mid right coronary artery with good angiographic results on 2021 after STEMI    Depression with anxiety     Diabetes mellitus     Forgetfulness     Gastric reflux     Hypertension     Leg paresthesia 2017    Right    Limb swelling     Lumbago 2017    Low back pain    Lumbar spinal stenosis 2017    L4/5  moderate to severe central canal stenosis    Lumbosacral radiculopathy 2017    Right    Migraines     Night sweat     Reflux esophagitis     Shortness of breath     ST elevation myocardial infarction (STEMI) (CMS/HCC) 2021    Stomach disorder      Past Surgical History:   Procedure Laterality Date    ABDOMINAL SURGERY      3 C-SECTIONS    ARTERIOGRAM LOWER EXTREMITY Left 2025    Procedure: ARTERIOGRAM LOWER EXTREMITY;  Surgeon: Misael Lawton MD;  Location: [facility] OR;  Service: Vascular;  Laterality: Left;    CARDIAC CATHETERIZATION      CARDIAC CATHETERIZATION N/A 2021    Procedure: Left Heart Cath;  Surgeon: Sidney Xiao MD;  Location: [facility] CATH INVASIVE LOCATION;  Service: Cardiology;  Laterality: N/A;    CARDIAC CATHETERIZATION N/A 2025    Procedure: Left Heart Cath;  Surgeon: James Verdugo MD;  Location: [facility] CATH INVASIVE LOCATION;  Service: Cardiology;  Laterality: N/A;     SECTION      x 3    CHOLECYSTECTOMY      COLONOSCOPY      COLONOSCOPY N/A 3/25/2025    Procedure: COLONOSCOPY WITH BIOPSIES AND COLD AND HOT SNARE POLYPECTOMIES;  Surgeon: Heber Najera MD;  Location: [facility] ENDOSCOPY;  Service: Gastroenterology;  Laterality: N/A;  COLON POLYPS    CORONARY STENT PLACEMENT      EMBOLECTOMY Left 2025    Procedure: EMBOLECTOMY MECHANICAL, FOUR COMPARTMENT FASCIOTOMY;  Surgeon: Misael Lawton MD;  Location: [facility] OR;  Service: Vascular;  Laterality: Left;    ENDOSCOPY      2007    FOOT SURGERY Right     HAND SURGERY      HYSTERECTOMY  1985      General Information       Row Name 04/15/25 1031          OT Time and Intention    Document Type evaluation  -JW     Mode of Treatment occupational therapy;co-treatment  -JW       Row Name 04/15/25 1031          General Information    Patient Profile Reviewed yes  -JW     Prior Level of Function independent:;ADL's;all household mobility  independent with ADLs, typically no AD for  mobility but has a walker  -     Existing Precautions/Restrictions fall  -     Barriers to Rehab medically complex  -       Row Name 04/15/25 1031          Living Environment    Current Living Arrangements home  -     People in Home child(radha), adult  -       Row Name 04/15/25 1031          Cognition    Orientation Status (Cognition) oriented x 3  -       Row Name 04/15/25 1031          Safety Issues/Impairments Affecting Functional Mobility    Safety Issues Affecting Function (Mobility) insight into deficits/self-awareness;judgment;problem-solving;sequencing abilities  -     Impairments Affecting Function (Mobility) endurance/activity tolerance;pain;strength  -     Comment, Safety Issues/Impairments (Mobility) Pt is co-tx appropriate d/t decreased activity tolerance and to maximize pt participation and safety with functional activity.  -               User Key  (r) = Recorded By, (t) = Taken By, (c) = Cosigned By      Initials Name Provider Type     Katerine Grady OT Occupational Therapist                     Mobility/ADL's       Row Name 04/15/25 1032          Bed Mobility    Bed Mobility supine-sit;sit-supine  -     Supine-Sit [place] (Bed Mobility) minimum assist (75% patient effort)  -     Sit-Supine [place] (Bed Mobility) moderate assist (50% patient effort)  -     Assistive Device (Bed Mobility) head of bed elevated;bed rails  -       Row Name 04/15/25 1032          Transfers    Transfers sit-stand transfer  -       Row Name 04/15/25 1032          Sit-Stand Transfer    Sit-Stand [place] (Transfers) moderate assist (50% patient effort);2 person assist  -     Assistive Device (Sit-Stand Transfers) walker, front-wheeled  -     Comment, (Sit-Stand Transfer) weight bearing through RLE. Difficulty getting into full upright position  -       Row Name 04/15/25 1032          Activities of Daily Living    BADL Assessment/Intervention lower body dressing;feeding  -        Row Name 04/15/25 1032          Lower Body Dressing Assessment/Training    [place] Level (Lower Body Dressing) don;doff;socks;maximum assist (25% patient effort)  -     Position (Lower Body Dressing) edge of bed sitting  -     Comment, (Lower Body Dressing) dec fxl reach and pain limiting  -JW       Row Name 04/15/25 1032          Self-Feeding Assessment/Training    [place] Level (Feeding) feeding skills;liquids to mouth;independent  -     Position (Feeding) edge of bed sitting  -               User Key  (r) = Recorded By, (t) = Taken By, (c) = Cosigned By      Initials Name Provider Type     Katerine Grady OT Occupational Therapist                   Obj/Interventions       [facility] Name 04/15/25 1035          Vision Assessment/Intervention    Visual Impairment/Limitations WNL  -JW       Row Name 04/15/25 1035          Range of Motion Comprehensive    General Range of Motion bilateral upper extremity ROM L  -JW       Row Name 04/15/25 1035          Strength Comprehensive (MMT)    Comment, General Manual Muscle Testing (MMT) Assessment generalized weakness, UEs 4-/5 grossly.  -[facility] Name 04/15/25 1035          Motor Skills    Motor Skills functional endurance  -     Functional Endurance quick to fatigue  -JW       Row Name 04/15/25 1035          Balance    Balance Assessment sitting static balance;standing static balance;sitting dynamic balance  -     Static Sitting Balance standby assist  -     Dynamic Sitting Balance standby assist;contact guard  -     Position, Sitting Balance sitting edge of bed  -     Static Standing Balance moderate assist  -     Position/Device Used, Standing Balance supported  -               User Key  (r) = Recorded By, (t) = Taken By, (c) = Cosigned By      Initials Name Provider Type    Katerine Petersen OT Occupational Therapist                   Goals/Plan       Row Name 04/15/25 1045          Transfer Goal 1 (OT)    Activity/Assistive Device  (Transfer Goal 1, OT) transfers, all  -JW     [place] Level/Cues Needed (Transfer Goal 1, OT) minimum assist (75% or more patient effort)  -JW     Time Frame (Transfer Goal 1, OT) 2 weeks  -JW     Progress/Outcome (Transfer Goal 1, OT) goal ongoing  -       Row Name 04/15/25 1045          Bathing Goal 1 (OT)    Activity/Device (Bathing Goal 1, OT) bathing skills, all  -JW     [place] Level/Cues Needed (Bathing Goal 1, OT) standby assist;set-up required  -JW     Time Frame (Bathing Goal 1, OT) 2 weeks  -JW     Strategies/Barriers (Bathing Goal 1, OT) use of AE as needed  -JW     Progress/Outcomes (Bathing Goal 1, OT) goal ongoing  -       Row Name 04/15/25 1045          Dressing Goal 1 (OT)    Activity/Device (Dressing Goal 1, OT) lower body dressing  -JW     [place]/Cues Needed (Dressing Goal 1, OT) minimum assist (75% or more patient effort)  -JW     Time Frame (Dressing Goal 1, OT) 2 weeks  -JW     Strategies/Barriers (Dressing Goal 1, OT) use of LB AE as needed  -JW     Progress/Outcome (Dressing Goal 1, OT) goal ongoing  -       Row Name 04/15/25 1045          Toileting Goal 1 (OT)    Activity/Device (Toileting Goal 1, OT) toileting skills, all  -JW     [place] Level/Cues Needed (Toileting Goal 1, OT) supervision required;standby assist  -JW     Time Frame (Toileting Goal 1, OT) 2 weeks  -JW     Progress/Outcome (Toileting Goal 1, OT) goal ongoing  -       Row Name 04/15/25 1045          Grooming Goal 1 (OT)    Activity/Device (Grooming Goal 1, OT) grooming skills, all  -JW     [place] (Grooming Goal 1, OT) independent  -JW     Time Frame (Grooming Goal 1, OT) 2 weeks  -JW     Progress/Outcome (Grooming Goal 1, OT) goal ongoing  -       Row Name 04/15/25 1045          Strength Goal 1 (OT)    Strength Goal 1 (OT) Pt will participate in UE gen therex to promote strength for ADLs/mobility  -JW     Time Frame (Strength Goal 1, OT) 2 weeks  -JW     Progress/Outcome (Strength Goal 1,  OT) goal ongoing  -[facility] Name 04/15/25 1045          Therapy Assessment/Plan (OT)    Planned Therapy Interventions (OT) activity tolerance training;functional balance retraining;BADL retraining;patient/caregiver education/training;transfer/mobility retraining;strengthening exercise;occupation/activity based interventions  -               User Key  (r) = Recorded By, (t) = Taken By, (c) = Cosigned By      Initials Name Provider Type     Katerine Grady OT Occupational Therapist                   Clinical Impression       [facility] Name 04/15/25 1036          Pain Assessment    Pain Location extremity  -     Pain Side/Orientation left;lower  -     Pain Management Interventions positioning techniques utilized  -     Response to Pain Interventions activity participation with tolerable pain  -     Pre/Posttreatment Pain Comment reports LLE pain with movement  -[facility] Name 04/15/25 1036          Plan of Care Review    Plan of Care Reviewed With patient  -     Outcome Evaluation Pt admitted with severe PVD, acute LLE ischemia, acute MI, cardiogenic shock, L ventricular thrombus. S/p emergent fasciotomy and revascularization LLE after absent pulses. Pt lives with her son and reports typically being independent with ADLs and mobility w/o AD but has a walker. Today she presents with generalized weakness, LLE pain and dec activity tolerance. She is able to sit EOB with [place] to support LLE. MaxA for LB dressing and anticipate maxA for toileting, [place] for UB ADLs. Stands with modAx2 but unable to safely TF, weight through RLE in standing. Pt will benefit from OT services to address ADLs, TF and strengthening. Recommending d/c rehab  -[facility] Name 04/15/25 1036          Therapy Assessment/Plan (OT)    Therapy Frequency (OT) 5 times/wk  -[facility] Name 04/15/25 1036          Therapy Plan Review/Discharge Plan (OT)    Anticipated Discharge Disposition (OT) inpatient rehabilitation facility  -       [facility]  Name 04/15/25 1036          Vital Signs    Pre Systolic BP Rehab 119  -JW     Post Systolic BP Rehab 130  -JW       Row Name 04/15/25 1036          Positioning and Restraints    Pre-Treatment Position in bed  -JW     Post Treatment Position bed  -JW     In Bed fowlers;call light within reach;encouraged to call for assist;exit alarm on;LLE elevated  -JW               User Key  (r) = Recorded By, (t) = Taken By, (c) = Cosigned By      Initials Name Provider Type    Katerine Petersen OT Occupational Therapist                   Outcome Measures       Row Name 04/15/25 1046          How much help from another is currently needed...    Putting on and taking off regular lower body clothing? 1  -JW     Bathing (including washing, rinsing, and drying) 2  -JW     Toileting (which includes using toilet bed pan or urinal) 1  -JW     Putting on and taking off regular upper body clothing 3  -JW     Taking care of personal grooming (such as brushing teeth) 3  -JW     Eating meals 4  -JW     AM-PAC 6 Clicks Score (OT) 14  -JW       Row Name 04/15/25 0825          How much help from another person do you currently need...    Turning from your back to your side while in flat bed without using bedrails? 3  -AG     Moving from lying on back to sitting on the side of a flat bed without bedrails? 3  -AG     Moving to and from a bed to a chair (including a wheelchair)? 1  -AG     Standing up from a chair using your arms (e.g., wheelchair, bedside chair)? 1  -AG     Climbing 3-5 steps with a railing? 1  -AG     To walk in hospital room? 1  -AG     AM-PAC 6 Clicks Score (PT) 10  -AG       Row Name 04/15/25 1046          Functional Assessment    Outcome Measure Options AM-PAC 6 Clicks Daily Activity (OT)  -               User Key  (r) = Recorded By, (t) = Taken By, (c) = Cosigned By      Initials Name Provider Type    Katerine Petersen OT Occupational Therapist    Maddy Ambrosio, RN Registered Nurse                    Occupational Therapy  Education       Title: PT OT SLP Therapies (In Progress)       Topic: Occupational Therapy (In Progress)       Point: ADL training (Done)       Learning Progress Summary            Patient Acceptance, E, VU,NR by  at 4/15/2025 1047                      Point: Precautions (Done)       Learning Progress Summary            Patient Acceptance, E, VU,NR by  at 4/15/2025 1047                      Point: Body mechanics (Done)       Learning Progress Summary            Patient Acceptance, E, VU,NR by  at 4/15/2025 1047                                      User Key       Initials Effective Dates Name Provider Type Discipline     06/10/21 -  Katerine Grady OT Occupational Therapist OT                  OT Recommendation and Plan  Planned Therapy Interventions (OT): activity tolerance training, functional balance retraining, BADL retraining, patient/caregiver education/training, transfer/mobility retraining, strengthening exercise, occupation/activity based interventions  Therapy Frequency (OT): 5 times/wk  Plan of Care Review  Plan of Care Reviewed With: patient  Outcome Evaluation: Pt admitted with severe PVD, acute LLE ischemia, acute MI, cardiogenic shock, L ventricular thrombus. S/p emergent fasciotomy and revascularization LLE after absent pulses. Pt lives with her son and reports typically being independent with ADLs and mobility w/o AD but has a walker. Today she presents with generalized weakness, LLE pain and dec activity tolerance. She is able to sit EOB with [place] to support LLE. MaxA for LB dressing and anticipate maxA for toileting, [place] for UB ADLs. Stands with modAx2 but unable to safely TF, weight through RLE in standing. Pt will benefit from OT services to address ADLs, TF and strengthening. Recommending d/c rehab     Time Calculation:   Evaluation Complexity (OT)  Review Occupational Profile/Medical/Therapy History Complexity: expanded/moderate complexity  Assessment, Occupational  Performance/Identification of Deficit Complexity: 3-5 performance deficits  Clinical Decision Making Complexity (OT): detailed assessment/moderate complexity  Overall Complexity of Evaluation (OT): moderate complexity     Time Calculation- OT       Row Name 04/15/25 1047             Time Calculation- OT    OT Start Time 0840  -JW      OT Stop Time 0904  -JW      OT Time Calculation (min) 24 min  -JW      Total Timed Code Minutes- OT 14 minute(s)  -JW      OT Received On 04/15/25  -JW      OT - Next Appointment 04/16/25  -      OT Goal Re-Cert Due Date 04/29/25  -JW         Timed Charges    99653 - OT Therapeutic Activity Minutes 14  -JW         Untimed Charges    OT Eval/Re-eval Minutes 10  -JW         Total Minutes    Timed Charges Total Minutes 14  -JW      Untimed Charges Total Minutes 10  -JW       Total Minutes 24  -JW                User Key  (r) = Recorded By, (t) = Taken By, (c) = Cosigned By      Initials Name Provider Type    JW Katerine Grady OT Occupational Therapist                  Therapy Charges for Today       Code Description Service Date Service Provider Modifiers Qty    [id number]  OT THERAPEUTIC ACT EA 15 MIN 4/15/2025 Katerine Grady OT GO 1    [id number]  OT EVAL MOD COMPLEXITY 3 4/15/2025 Katerine Grady OT GO 1                 Katerine Grady OT  4/15/2025

## 2025-04-15 NOTE — PLAN OF CARE
Goal Outcome Evaluation:  Pt remains in CICU with transfer orders. Dr. Lawton at bedside to evacuate hematoma on L leg and redress with betadine w/d, kerlex and ACE wrap. L groin site with betadine and island dressing. Pain treated per MAR. Clear liquid diet. Suppository given per orders - BM x2. Removed F/C and ART line per orders. Heparin gtt infusing per orders. Plan to switch to oral anticoagulation tomorrow. Replace phos. Family updated at bedside.

## 2025-04-15 NOTE — PLAN OF CARE
Goal Outcome Evaluation:      Patient in CICU overnight. VSS stable. On minimal 02 overnight as patient slept. Some frustration/agitation at beginning of shift; offered reasurance and answered questions. Family at bedside overnight.

## 2025-04-15 NOTE — PLAN OF CARE
Goal Outcome Evaluation:  Plan of Care Reviewed With: patient           Outcome Evaluation: Pt admitted with severe PVD, acute LLE ischemia, acute MI, cardiogenic shock, L ventricular thrombus. S/p emergent fasciotomy and revascularization LLE after absent pulses. Pt lives with her son and reports typically being independent with ADLs and mobility w/o AD but has a walker. Today she presents with generalized weakness, LLE pain and dec activity tolerance. She is able to sit EOB with [place]marie to support LLE. MaxA for LB dressing and anticipate maxA for toileting, [place] for UB ADLs. Stands with modAx2 but unable to safely TF, weight through RLE in standing. Pt will benefit from OT services to address ADLs, TF and strengthening. Recommending d/c rehab

## 2025-04-15 NOTE — PROGRESS NOTES
[facility]   Surgical Progress Note    Patient Name: Desiree Garcia  : 1960  MRN: [id number]  Date of admission: 4/10/2025  Surgical Procedures Since Admission:  Procedure(s):  EMBOLECTOMY MECHANICAL, FOUR COMPARTMENT FASCIOTOMY  ARTERIOGRAM LOWER EXTREMITY  Surgeon:  Misael Lawton MD  Status:  3 Days Post-Op  -------------------    Subjective   Subjective     Chief Complaint: Postop day 3 status post left leg thrombectomy and 4 compartment fasciotomies    History of Present Illness   65-year-old woman with multifocal thromboemboli.  Left leg either extended or threw new clot that became critically ischemic.  This occurred despite full heparin on board.  Currently continued.  Awaiting recovery of gut before starting any type of oral anticoagulant.    Review of Systems denies pain in the foot but still numb.  Able to move it.  States belly feels better as well.  Tolerating full liquids    Objective   Objective     Vitals:   Temp:  [97.7 °F (36.5 °C)-98.2 °F (36.8 °C)] 97.7 °F (36.5 °C)  Heart Rate:  [78-95] 85  Resp:  [10-20] 16  BP: (100-133)/(55-69) 116/62  Arterial Line BP: (120-159)/(45-71) 135/58  Flow (L/min) (Oxygen Therapy):  [2] 2  Output by Drain (mL) 25 0701 - 25 1900 25 1901 - 04/15/25 0700 04/15/25 0701 - 04/15/25 1341 Range Total   Urethral Catheter Silicone 16 Fr.        Physical Exam    Incision sites groin and medial calf stable.  Lateral calf with hematoma and oozing.  Staples removed and blood removed from subcu tissue.  This will likely help the skin improve its turgor.  Left foot was positive motor but decreased sensation.  No evidence of compartment syndrome at this time.      Result Review    Result Review:  I have personally reviewed the results from the time of this admission to 4/15/2025 13:41 EDT and agree with these findings:  [x]  Laboratory list / accordion  []  Microbiology  []  Radiology  []  EKG/Telemetry   []  Cardiology/Vascular    []  Pathology  []  Old records  []  Other:  Most notable findings include: Hemoglobin noted to be stable after blood.  White count still elevated.  Creatinine improved      Results from last 7 days   Lab Units 04/15/25  0614 04/15/25  0018 04/14/25  1852 04/14/25  1225 04/14/25  0416 04/14/25  0004 04/13/25  1730   WBC 10*3/mm3 18.20* 17.46* 16.22* 15.49* 14.46* 14.91* 16.75*   HEMOGLOBIN g/dL 8.6* 8.2* 8.7* 8.6* 9.5* 9.8* 10.0*   PLATELETS 10*3/mm3 325 317 312 287 288 301 321     Results from last 7 days   Lab Units 04/15/25  0614 04/14/25  0416 04/13/25  0514 04/12/25  2153 04/12/25  1532 04/12/25  1302 04/12/25  0454 04/11/25  1711 04/11/25  0413 04/10/25  1814 04/10/25  0907 04/10/25  0215   SODIUM mmol/L 139 138 133*  --  133*  --  131* 129* 130* 128*   < > 127*   POTASSIUM mmol/L 3.7 3.9 4.2 3.6 3.8 3.4* 3.1* 3.3* 3.9 3.4*   < > 3.7   CHLORIDE mmol/L 105 105 104  --  102  --  98 95* 97* 94*   < > 89*   CO2 mmol/L 25.0 22.1 18.0*  --  19.3*  --  19.6* 20.2* 20.5* 21.1*   < > 22.4   BUN mg/dL 14 19 22  --  26*  --  32* 36* 38* 37*   < > 25*   CREATININE mg/dL 0.76 0.87 1.12*  --  1.06*  --  1.08* 1.24* 1.27* 1.33*   < > 1.21*   GLUCOSE mg/dL 128* 108* 102*  --  90  --  74 75 106* 151*   < > 203*   MAGNESIUM mg/dL 2.1 2.2 2.3  --   --   --  2.3  --  2.3 2.1  --  2.3   PHOSPHORUS mg/dL 2.2* 2.3* 3.0  --   --   --  3.0  --  4.8* 4.7*  --  4.9*    < > = values in this interval not displayed.   Estimated Creatinine Clearance: 76.7 mL/min (by C-G formula based on SCr of 0.76 mg/dL).  Results from last 7 days   Lab Units 04/10/25  1244   PROTIME Seconds 17.4*   INR  1.36*     Lab Results   Component Value Date    HGBA1C 9.40 (H) 04/09/2025    HGBA1C 9.30 (H) 03/13/2025    HGBA1C 9.40 (H) 12/23/2024     Glucose   Date/Time Value Ref Range Status   04/15/2025 1158 288 (H) 70 - 130 mg/dL Final   04/15/2025 0614 135 (H) 70 - 130 mg/dL Final   04/15/2025 0017 140 (H) 70 - 130 mg/dL Final   04/14/2025 1647 81 70 - 130  mg/dL Final   04/14/2025 1144 102 70 - 130 mg/dL Final   04/14/2025 0004 107 70 - 130 mg/dL Final   04/13/2025 1729 134 (H) 70 - 130 mg/dL Final   04/13/2025 1125 128 70 - 130 mg/dL Final       Assessment & Plan   Assessment / Plan     Brief Patient Summary:  Desiree Garcia is a 65 y.o. female who progress thrombus despite the full use of heparin.  Concerns for long-term recurrence of clot have been raised and family is aware.  Still unclear as to whether this was new embolism versus extension of clot.  The character of the clot was definitely different and I am inclined to believe this was recurrent embolized.  Awaiting plan for anticoagulants which appears to be Eliquis starting tomorrow.      Active Hospital Problems:   There are no active hospital problems to display for this patient.    Plan:   Continue heparin for 24 more hours then consider Eliquis.  From our standpoint she is stable for this transition and will likely need rehab prior to going home.  Further surgeries anticipated.  Stable for removal distally allowed hematoma to be evacuated at bedside.  VTE Prophylaxis:  Pharmacologic & mechanical VTE prophylaxis orders are present.        Misael Lawton MD

## 2025-04-15 NOTE — PROGRESS NOTES
CHIEF COMPLAINT:  LV thrombus with multifocal emboli, anemia    HISTORY OF PRESENT ILLNESS:   This is a 65-year-old lady with multiple medical comorbidities undergoing treatment for in LV thrombus with embolization and cardiogenic shock.  Imaging has shown occlusion of the left superficial femoral artery, distal left renal artery, distal splenic artery, splenic infarcts, left renal infarcts, occlusion of the [place] with reconstitution distally.  On 4/12/2025 she underwent a mechanical embolectomy and 4 compartment fasciotomy left lower extremity.    Patient states she is feeling little better today-little drowsy from pain medication.  She still oozing some from the left fasciotomy wounds but hemoglobin is pretty stable.  She denies worsening shortness of breath.      Past Medical History, Past Surgical History, Social History, Family History have been reviewed and are without significant changes except as mentioned.    Review of Systems   A comprehensive 14 point review of systems was performed and was negative except as mentioned.    Medications:  The current medication list was reviewed in the EMR    ALLERGIES:    Allergies   Allergen Reactions    Tramadol Itching     High severity per pt       Objective      Vitals:    04/15/25 1155   BP:    Pulse:    Resp:    Temp: 97.7 °F (36.5 °C)   SpO2:           Physical Exam    CONSTITUTIONAL: pleasant well-developed woman lying in bed a little drowsy after pain medication  HEENT: no icterus, no thrush, moist membranes  CV: RRR, S1S2, no murmur  RESP: cta bilat, no wheezing, no rales  GI: soft, nontender, no splenomegaly, +BS  NEURO: alert and oriented x3, mild global weakness  PSYCH: normal mood and affect   Skin: Surgical wounds left lower extremity with dressed wounds and dried blood  RECENT LABS:  Hematology Results from last 7 days   Lab Units 04/15/25  0614 04/15/25  0018 04/14/25  1852   WBC 10*3/mm3 18.20* 17.46* 16.22*   HEMOGLOBIN g/dL 8.6* 8.2* 8.7*   HEMATOCRIT %  25.9* 25.9* 26.2*   PLATELETS 10*3/mm3 325 317 312     2  Lab Results   Component Value Date    GLUCOSE 128 (H) 04/15/2025    BUN 14 04/15/2025    CREATININE 0.76 04/15/2025    EGFRIFNONA 96 11/04/2021    BCR 18.4 04/15/2025    CO2 25.0 04/15/2025    CALCIUM 8.1 (L) 04/15/2025    ALBUMIN 2.7 (L) 04/15/2025    AST 72 (H) 04/10/2025    ALT 36 (H) 04/10/2025           Lab Results   Component Value Date    [id number] 344 08/08/2024              Assessment & Plan   *LV thrombus with multiple sites of emboli  *Left lower extremity embolectomy/fasciotomies 4/12/25  *Bilateral pulmonary nodules  *Acute blood loss anemia-hemoglobin 8.6  *Leukocytosis likely reactive process  *Coronary artery disease, peripheral artery disease  *COPD    Hematology plan/recommendations:  Monitor hemoglobin on anticoagulation, transfuse as needed  Today is day 6 of unfractionated heparin-would continue 1 additional day to make total of 7 days so that loading dose of Eliquis will not be required since she is already on dual antiplatelet therapy              4/15/2025      CC:

## 2025-04-15 NOTE — THERAPY EVALUATION
Patient Name: Desiree Garcia  : 1960    MRN: [id number]                              Today's Date: 4/15/2025       Admit Date: 4/10/2025    Visit Dx:     ICD-10-CM ICD-9-CM   1. 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  I74.3 444.22     Patient Active Problem List   Diagnosis    Type 2 diabetes mellitus, without long-term current use of insulin    COPD (chronic obstructive pulmonary disease)    Depression with anxiety    Esophageal reflux    Forgetfulness    Leg paresthesia    Lumbar spinal stenosis    Migraines    Night sweat    Sciatica    Dyspnea on exertion    Thoracic or lumbosacral neuritis or radiculitis    Left wrist pain    Sprain of left wrist    Arthritis of knee, left    Contusion of left knee    Nondisplaced fracture of trapezium bone of left wrist with routine healing    Chronic left shoulder pain    Coronary artery disease involving native heart without angina pectoris    Hypertension, essential    Mixed hyperlipidemia    Cigarette nicotine dependence with nicotine-induced disorder    Altered bowel habits    Diarrhea    Rectal bleeding    Abdominal bloating    Allergic rhinitis due to food    Elevated troponin    Acute diastolic CHF (congestive heart failure)    History of ST elevation myocardial infarction (STEMI)    STEMI (ST elevation myocardial infarction)    Acute thrombus of left ventricle     Past Medical History:   Diagnosis Date    Arthritis     Cervical cancer screening     COPD (chronic obstructive pulmonary disease)     Coronary artery disease involving native heart without angina pectoris 2021    primary angioplasty and stent placement to mid right coronary artery with good angiographic results on 2021 after STEMI    Depression with anxiety     Diabetes mellitus     Forgetfulness     Gastric reflux     Hypertension     Leg paresthesia 2017    Right    Limb swelling     Lumbago 2017    Low back pain    Lumbar spinal stenosis 2017    L4/5  moderate to severe central canal stenosis    Lumbosacral radiculopathy 2017    Right    Migraines     Night sweat     Reflux esophagitis     Shortness of breath     ST elevation myocardial infarction (STEMI) (CMS/HCC) 2021    Stomach disorder      Past Surgical History:   Procedure Laterality Date    ABDOMINAL SURGERY      3 C-SECTIONS    ARTERIOGRAM LOWER EXTREMITY Left 2025    Procedure: ARTERIOGRAM LOWER EXTREMITY;  Surgeon: Misael Lawton MD;  Location: [facility] OR;  Service: Vascular;  Laterality: Left;    CARDIAC CATHETERIZATION      CARDIAC CATHETERIZATION N/A 2021    Procedure: Left Heart Cath;  Surgeon: Sidney Xiao MD;  Location: [facility] CATH INVASIVE LOCATION;  Service: Cardiology;  Laterality: N/A;    CARDIAC CATHETERIZATION N/A 2025    Procedure: Left Heart Cath;  Surgeon: James Verdugo MD;  Location: [facility] CATH INVASIVE LOCATION;  Service: Cardiology;  Laterality: N/A;     SECTION      x 3    CHOLECYSTECTOMY      COLONOSCOPY      COLONOSCOPY N/A 3/25/2025    Procedure: COLONOSCOPY WITH BIOPSIES AND COLD AND HOT SNARE POLYPECTOMIES;  Surgeon: Heber Najera MD;  Location: [facility] ENDOSCOPY;  Service: Gastroenterology;  Laterality: N/A;  COLON POLYPS    CORONARY STENT PLACEMENT      EMBOLECTOMY Left 2025    Procedure: EMBOLECTOMY MECHANICAL, FOUR COMPARTMENT FASCIOTOMY;  Surgeon: Misael Lawton MD;  Location: [facility] OR;  Service: Vascular;  Laterality: Left;    ENDOSCOPY      2007    FOOT SURGERY Right     HAND SURGERY      HYSTERECTOMY  1985      General Information       Row Name 04/15/25 1142          Physical Therapy Time and Intention    Document Type evaluation  -DJ     Mode of Treatment co-treatment;physical therapy;occupational therapy  cotx appropriate due to amount of assist required for safe mobility and poor activity tolerance  -DJ       Row Name 04/15/25 1142          General Information    Patient Profile  Reviewed yes  -DJ     Prior Level of Function independent:;ADL's;all household mobility;driving  -DJ     Existing Precautions/Restrictions fall  -DJ     Barriers to Rehab medically complex  -DJ       Row Name 04/15/25 1142          Living Environment    Current Living Arrangements home  -DJ     People in Home child(radha), adult  -DJ       Row Name 04/15/25 1142          Home Main Entrance    Number of Stairs, Main Entrance three;two  -DJ       Row Name 04/15/25 1142          Safety Issues/Impairments Affecting Functional Mobility    Safety Issues Affecting Function (Mobility) judgment;safety precaution awareness;sequencing abilities  -DJ     Impairments Affecting Function (Mobility) balance;endurance/activity tolerance;pain;strength  -DJ     Comment, Safety Issues/Impairments (Mobility) gt belt, nonskid socks  -DJ               User Key  (r) = Recorded By, (t) = Taken By, (c) = Cosigned By      Initials Name Provider Type    Meka Garcia, PT Physical Therapist                   Mobility       Row Name 04/15/25 1300          Bed Mobility    Bed Mobility supine-sit;sit-supine  -DJ     Supine-Sit [place] (Bed Mobility) minimum assist (75% patient effort);verbal cues  -DJ     Sit-Supine [place] (Bed Mobility) moderate assist (50% patient effort);2 person assist;verbal cues  -DJ     Assistive Device (Bed Mobility) head of bed elevated;bed rails  -DJ     Comment, (Bed Mobility) assist to move L LE  -DJ       Row Name 04/15/25 1300          Transfers    Comment, (Transfers) sit/stand from EOB  -DJ       Row Name 04/15/25 1300          Bed-Chair Transfer    Bed-Chair [place] (Transfers) unable to assess  -DJ       Row Name 04/15/25 1300          Sit-Stand Transfer    Sit-Stand [place] (Transfers) moderate assist (50% patient effort);2 person assist;verbal cues  -DJ     Assistive Device (Sit-Stand Transfers) walker, front-wheeled  -DJ     Comment, (Sit-Stand Transfer) difficulty taking side steps and  moving L LE  -DJ       Row Name 04/15/25 1300          Gait/Stairs (Locomotion)    [place] Level (Gait) unable to assess  -DJ     Distance in Feet (Gait) 0  -DJ     Comment, (Gait/Stairs) Pt unable to take any steps  -DJ               User Key  (r) = Recorded By, (t) = Taken By, (c) = Cosigned By      Initials Name Provider Type    Meka Garcia, TERRENCE Physical Therapist                   Obj/Interventions       Row Name 04/15/25 1303          Range of Motion Comprehensive    Comment, General Range of Motion L LE deferred but elsewhere WFL  -DJ       Row Name 04/15/25 1303          Strength Comprehensive (MMT)    Comment, General Manual Muscle Testing (MMT) Assessment generally weak - LEs grossly 3-/5  -DJ       Row Name 04/15/25 1303          Motor Skills    Motor Skills functional endurance  -DJ     Functional Endurance poor - fatigues quickly  -DJ       Row Name 04/15/25 1303          Advanced Stepping/Walking Interventions    Stepping/Walking Interventions side stepping  -DJ     Side Stepping (Stepping/Walking Interventions) unable to take any sidesteps due to difficulty moving L LE  -DJ       Row Name 04/15/25 1303          Balance    Balance Assessment standing static balance;standing dynamic balance  -DJ     Static Standing Balance moderate assist;2-person assist;verbal cues  -DJ     Dynamic Standing Balance moderate assist;2-person assist;verbal cues  -DJ     Position/Device Used, Standing Balance supported;walker, rolling  -DJ     Balance Interventions sitting;standing;sit to stand;supported;weight shifting activity  -DJ     Comment, Balance unsteady  -DJ       Row Name 04/15/25 1303          Sensory Assessment (Somatosensory)    Sensory Assessment (Somatosensory) not tested  -DJ               User Key  (r) = Recorded By, (t) = Taken By, (c) = Cosigned By      Initials Name Provider Type    Meka Garcia, PT Physical Therapist                   Goals/Plan       Row Name 04/15/25 1322          Bed  Mobility Goal 1 (PT)    Activity/Assistive Device (Bed Mobility Goal 1, PT) sit to supine;supine to sit  -DJ     [place] Level/Cues Needed (Bed Mobility Goal 1, PT) standby assist;verbal cues required  -DJ     Time Frame (Bed Mobility Goal 1, PT) 10 days  -DJ       Row Name 04/15/25 1322          Transfer Goal 1 (PT)    Activity/Assistive Device (Transfer Goal 1, PT) sit-to-stand/stand-to-sit  -DJ     [place] Level/Cues Needed (Transfer Goal 1, PT) contact guard required;verbal cues required  -DJ     Time Frame (Transfer Goal 1, PT) 10 days  -DJ       Row Name 04/15/25 1322          Gait Training Goal 1 (PT)    Activity/Assistive Device (Gait Training Goal 1, PT) gait (walking locomotion);assistive device use;improve balance and speed;increase endurance/gait distance;increase energy conservation;walker, rolling  -DJ     [place] Level (Gait Training Goal 1, PT) contact guard required  -DJ     Distance (Gait Training Goal 1, PT) 100'  -DJ     Time Frame (Gait Training Goal 1, PT) 10 days  -DJ       Row Name 04/15/25 1322          Patient Education Goal (PT)    Activity (Patient Education Goal, PT) HEP  -DJ     [place]/Cues/Accuracy (Memory Goal 2, PT) demonstrates adequately;verbalizes understanding  -DJ     Time Frame (Patient Education Goal, PT) 5 days  -DJ       Row Name 04/15/25 1322          Therapy Assessment/Plan (PT)    Planned Therapy Interventions (PT) balance training;bed mobility training;gait training;home exercise program;strengthening;stair training;postural re-education;patient/family education;transfer training  -DJ               User Key  (r) = Recorded By, (t) = Taken By, (c) = Cosigned By      Initials Name Provider Type    Meka Garcia, PT Physical Therapist                   Clinical Impression       Row Name 04/15/25 1306          Pain    Pain Location extremity  -DJ     Pain Side/Orientation left;lower  -DJ     Pain Management Interventions exercise or physical activity  utilized  -DJ     Response to Pain Interventions activity participation with increased pain  -DJ     Pre/Posttreatment Pain Comment L LE pain that increases with mobility  -DJ       Row Name 04/15/25 1306          Plan of Care Review    Plan of Care Reviewed With patient  -DJ     Outcome Evaluation 64yo obese white female admitted 4/10/25 in cardiogenic shock and abd pain, blood clot. Pt now s/p L LE 4 compartment fasciotomy and embolectomy 4/12/25. PMH includes diabetes, depression and anxiety, sciatica, CAD, hbp, heart failure. PLOF: Pt lives at home with son with 3 JARED. She reports she was independent with ADLs and amb without AD. She was also driving. She is limited now by generalized weakness and decreased activity tolerance as well as L LE discomfort. Today, she was found resting in bed, L LE with dressing covered with brief due to increased bleeding/drainage. She is pleasant and cooperative and eager to move. She c/o L post calf pain. She req min A to sit EOB to move L LE. She stood from EOB with mod A of 2 using r wx. She had difficulty moving L LE and was unable to take any steps. She req mod A of 2 to return supine and was dependent to reposition. She would benefit from skilled PT services to address functional deficits and prepare for d/c as appropriate  -DJ       Row Name 04/15/25 5018          Therapy Assessment/Plan (PT)    Patient/Family Therapy Goals Statement (PT) rehab  -DJ     Rehab Potential (PT) fair  -DJ     Criteria for Skilled Interventions Met (PT) yes;meets criteria;skilled treatment is necessary  -DJ     Therapy Frequency (PT) 6 times/wk  -DJ       Row Name 04/15/25 130          Vital Signs    O2 Delivery Pre Treatment room air  -DJ     O2 Delivery Intra Treatment room air  -DJ     O2 Delivery Post Treatment room air  -DJ     Pre Patient Position Supine  -DJ     Intra Patient Position Standing  -DJ     Post Patient Position Supine  -DJ       Row Name 04/15/25 9035          Positioning and  Restraints    Pre-Treatment Position in bed  -DJ     Post Treatment Position bed  -DJ     In Bed notified nsg;supine;call light within reach;encouraged to call for assist;exit alarm on;heels elevated  -DJ               User Key  (r) = Recorded By, (t) = Taken By, (c) = Cosigned By      Initials Name Provider Type    Meka Garcia, PT Physical Therapist                   Outcome Measures       Row Name 04/15/25 1323 04/15/25 0825       How much help from another person do you currently need...    Turning from your back to your side while in flat bed without using bedrails? 3  -DJ 3  -AG    Moving from lying on back to sitting on the side of a flat bed without bedrails? 3  -DJ 3  -AG    Moving to and from a bed to a chair (including a wheelchair)? 1  -DJ 1  -AG    Standing up from a chair using your arms (e.g., wheelchair, bedside chair)? 2  -DJ 1  -AG    Climbing 3-5 steps with a railing? 1  -DJ 1  -AG    To walk in hospital room? 2  -DJ 1  -AG    AM-PAC 6 Clicks Score (PT) 12  -DJ 10  -AG    Highest Level of Mobility Goal 4 --> Transfer to chair/commode  -DJ 4 --> Transfer to chair/commode  -AG      Row Name 04/15/25 1323 04/15/25 1046       Functional Assessment    Outcome Measure Options AM-PAC 6 Clicks Basic Mobility (PT)  -DJ AM-PAC 6 Clicks Daily Activity (OT)  -              User Key  (r) = Recorded By, (t) = Taken By, (c) = Cosigned By      Initials Name Provider Type    Meka Garcia, TERRENCE Physical Therapist    Katerine Petersen, OT Occupational Therapist    Maddy Ambrosio, RN Registered Nurse                                 Physical Therapy Education       Title: PT OT SLP Therapies (In Progress)       Topic: Physical Therapy (In Progress)       Point: Mobility training (In Progress)       Learning Progress Summary            Patient Acceptance, E, NR by MARISSA at 4/15/2025 1324                      Point: Home exercise program (Not Started)       Learner Progress:  Not documented in this visit.               Point: Body mechanics (In Progress)       Learning Progress Summary            Patient Acceptance, E, NR by MARISSA at 4/15/2025 1324                      Point: Precautions (In Progress)       Learning Progress Summary            Patient Acceptance, E, NR by MARISSA at 4/15/2025 1324                                      User Key       Initials Effective Dates Name Provider Type Discipline    MARISSA 10/25/19 -  Meka Araiza, PT Physical Therapist PT                  PT Recommendation and Plan  Planned Therapy Interventions (PT): balance training, bed mobility training, gait training, home exercise program, strengthening, stair training, postural re-education, patient/family education, transfer training  Outcome Evaluation: 64yo obese white female admitted 4/10/25 in cardiogenic shock and abd pain, blood clot. Pt now s/p L LE 4 compartment fasciotomy and embolectomy 4/12/25. PMH includes diabetes, depression and anxiety, sciatica, CAD, hbp, heart failure. PLOF: Pt lives at home with son with 3 JARED. She reports she was independent with ADLs and amb without AD. She was also driving. She is limited now by generalized weakness and decreased activity tolerance as well as L LE discomfort. Today, she was found resting in bed, L LE with dressing covered with brief due to increased bleeding/drainage. She is pleasant and cooperative and eager to move. She c/o L post calf pain. She req min A to sit EOB to move L LE. She stood from EOB with mod A of 2 using r wx. She had difficulty moving L LE and was unable to take any steps. She req mod A of 2 to return supine and was dependent to reposition. She would benefit from skilled PT services to address functional deficits and prepare for d/c as appropriate     Time Calculation:   PT Evaluation Complexity  History, PT Evaluation Complexity: 3 or more personal factors and/or comorbidities  Examination of Body Systems (PT Eval Complexity): total of 4 or more elements  Clinical Presentation (PT  Evaluation Complexity): unstable  Clinical Decision Making (PT Evaluation Complexity): moderate complexity  Overall Complexity (PT Evaluation Complexity): moderate complexity     PT Charges       Row Name 04/15/25 1326             Time Calculation    Start Time 0847  -DJ      Stop Time 0907  -DJ      Time Calculation (min) 20 min  -DJ      PT Non-Billable Time (min) 10 min  -DJ      PT Received On 04/15/25  -DJ      PT - Next Appointment 04/16/25  -      PT Goal Re-Cert Due Date 04/25/25  -                User Key  (r) = Recorded By, (t) = Taken By, (c) = Cosigned By      Initials Name Provider Type    Meka Garcia, PT Physical Therapist                  Therapy Charges for Today       Code Description Service Date Service Provider Modifiers Qty    98468495371 HC PT EVAL MOD COMPLEXITY 3 4/15/2025 Meka Araiza, PT GP 1    64305508826 HC PT THERAPEUTIC ACT EA 15 MIN 4/15/2025 Meka Araiza, PT GP 1            PT G-Codes  Outcome Measure Options: AM-PAC 6 Clicks Basic Mobility (PT)  AM-PAC 6 Clicks Score (PT): 12  AM-PAC 6 Clicks Score (OT): 14  PT Discharge Summary  Anticipated Discharge Disposition (PT): inpatient rehabilitation facility, sub acute care setting    Meka Araiza PT  4/15/2025

## 2025-04-15 NOTE — PLAN OF CARE
Goal Outcome Evaluation:  Plan of Care Reviewed With: patient           Outcome Evaluation: 64yo obese white female admitted 4/10/25 in cardiogenic shock and abd pain, blood clot. Pt now s/p L LE 4 compartment fasciotomy and embolectomy 4/12/25. PMH includes diabetes, depression and anxiety, sciatica, CAD, hbp, heart failure. PLOF: Pt lives at home with son with 3 JARED. She reports she was independent with ADLs and amb without AD. She was also driving. She is limited now by generalized weakness and decreased activity tolerance as well as L LE discomfort. Today, she was found resting in bed, L LE with dressing covered with brief due to increased bleeding/drainage. She is pleasant and cooperative and eager to move. She c/o L post calf pain. She req min A to sit EOB to move L LE. She stood from EOB with mod A of 2 using r wx. She had difficulty moving L LE and was unable to take any steps. She req mod A of 2 to return supine and was dependent to reposition. She would benefit from skilled PT services to address functional deficits and prepare for d/c as appropriate    Anticipated Discharge Disposition (PT): inpatient rehabilitation facility, sub acute care setting

## 2025-04-15 NOTE — PROGRESS NOTES
PROGRESS NOTE  Patient Name: Desiree Garcia  Age/Sex: 65 y.o. female  : 1960  MRN: [id number]    Date of Admission: 4/10/2025  Date of Encounter Visit: 04/15/25   LOS: 5 days   Patient Care Team:  Sandra Kaba MD as PCP - General (Family Medicine)  Marisol Nazario, RN as Ambulatory  ([facility])    Chief Complaint: Acute left lower extremity ischemia, acute MI, left ventricular thrombus    Hospital course: Patient is doing better, she is on room air, hemodynamically stable on no pressors  Still on the heparin drip, still oozing some blood from the fasciotomy area but her hemoglobin is relatively okay despite the trend downward.  She is tolerating p.o. with no more nasogastric tube in position  Pain in the left leg is still present but it is better      REVIEW OF SYSTEMS:   CONSTITUTIONAL: no fever or chills  No nausea or vomiting, tolerating p.o.  Denies shortness of breath  Positive pain in the leg  Ventilator/Non-Invasive Ventilation Settings (From admission, onward)      Room air              Vital Signs  Temp:  [97.7 °F (36.5 °C)-98.9 °F (37.2 °C)] 98.2 °F (36.8 °C)  Heart Rate:  [78-95] 85  Resp:  [10-20] 16  BP: (100-133)/(55-71) 116/62  Arterial Line BP: (120-159)/(45-71) 135/58  SpO2:  [92 %-100 %] 100 %  on  Flow (L/min) (Oxygen Therapy):  [2] 2 Device (Oxygen Therapy): room air    Intake/Output Summary (Last 24 hours) at 4/15/2025 0942  Last data filed at 4/15/2025 0600  Gross per 24 hour   Intake 1794.46 ml   Output 1280 ml   Net 514.46 ml     Flowsheet Rows      Flowsheet Row First Filed Value   Admission Height --   Admission Weight 71 kg (156 lb 8.4 oz) Documented at 04/10/2025 1700          Body mass index is 26.2 kg/m².      25  0600 25  0500 04/15/25  0600   Weight: 74 kg (163 lb 2.3 oz) 72.7 kg (160 lb 4.4 oz) 73.6 kg (162 lb 4.1 oz)       Physical Exam:  GEN:   Awake and alert, looking better, in no distress  EYES:   Sclerae clear. No icterus.  PERRL. Normal EOM  ENT:   External ears/nose normal, no oral lesions, no thrush, moist mucous membrane  NECK:  Supple, midline trachea, no JVD  LUNGS: Normal chest on inspection, CTAB but diminished, no wheezes. No rhonchi. No crackles. Respirations regular, even and unlabored.  On room air  CV:  Regular rhythm and rate. Normal S1/S2. No murmurs, gallops, or rubs noted.  ABD:  Soft, nontender, non-stented, improved bowel sounds. No guarding  EXT:  Moves all extremities well. No cyanosis. No redness. No edema.  warmer left lower extremity compared to the right with dopplerable pulses.  She is weeping some blood from the fasciotomy site  Skin: Dry, intact, no bleeding    Results Review:    Results From Last 14 Days   Lab Units 04/12/25  0014 04/11/25  1711 04/11/25  0104 04/09/25  2134 04/09/25  0602   LACTATE mmol/L 1.8 2.2* 1.0   < >  --    TRIGLYCERIDES mg/dL  --   --   --   --  158*    < > = values in this interval not displayed.     Results from last 7 days   Lab Units 04/15/25  0614 04/14/25  0416 04/13/25  0514 04/12/25  2153 04/12/25  1532 04/12/25  1302 04/12/25  0454 04/11/25  1711 04/11/25  0413 04/10/25  1814 04/10/25  0907 04/09/25  0602 04/08/25  1728   SODIUM mmol/L 139 138 133*  --  133*  --  131* 129* 130*   < > 125*   < > 131*   POTASSIUM mmol/L 3.7 3.9 4.2 3.6 3.8 3.4* 3.1* 3.3* 3.9   < > 3.7   < > 3.7   CHLORIDE mmol/L 105 105 104  --  102  --  98 95* 97*   < > 88*   < > 90*   CO2 mmol/L 25.0 22.1 18.0*  --  19.3*  --  19.6* 20.2* 20.5*   < > 20.8*   < > 25.8   BUN mg/dL 14 19 22  --  26*  --  32* 36* 38*   < > 33*   < > 9   CREATININE mg/dL 0.76 0.87 1.12*  --  1.06*  --  1.08* 1.24* 1.27*   < > 1.40*   < > 0.74   CALCIUM mg/dL 8.1* 8.1* 8.0*  --  7.7*  --  7.5* 7.8* 8.4*   < > 8.5*   < > 10.1   AST (SGOT) U/L  --   --   --   --   --   --   --   --   --   --  72*  --  45*   ALT (SGPT) U/L  --   --   --   --   --   --   --   --   --   --  36*  --  16   ANION GAP mmol/L 9.0 10.9 11.0  --  11.7  --   13.4 13.8 12.5   < > 16.2*   < > 15.2*   ALBUMIN g/dL 2.7* 2.6*  --   --   --   --   --   --   --   --  2.7*  --  4.0    < > = values in this interval not displayed.     Results from last 7 days   Lab Units 04/08/25 2034 04/08/25  1728   HSTROP T ng/L 971* 835*         Results from last 7 days   Lab Units 04/10/25  1244 04/08/25  1728   PROBNP pg/mL 9,388.0* 15,432.0*     Results from last 7 days   Lab Units 04/15/25  0614 04/15/25  0018 04/14/25  1852 04/14/25  1225 04/14/25  0416 04/14/25  0004 04/13/25  1730 04/09/25  0559 04/08/25  1728   WBC 10*3/mm3 18.20* 17.46* 16.22* 15.49* 14.46* 14.91* 16.75*   < > 21.93*   HEMOGLOBIN g/dL 8.6* 8.2* 8.7* 8.6* 9.5* 9.8* 10.0*   < > 15.4   HEMATOCRIT % 25.9* 25.9* 26.2* 25.8* 28.2* 30.5* 30.0*   < > 46.3   PLATELETS 10*3/mm3 325 317 312 287 288 301 321   < > 325   MCV fL 84.1 84.9 83.2 84.3 82.7 85.4 83.6   < > 85.3   NEUTROPHIL % %  --   --   --   --   --   --   --   --  83.2*   LYMPHOCYTE % %  --   --   --   --   --   --   --   --  8.8*   MONOCYTES % %  --   --   --   --   --   --   --   --  7.1   EOSINOPHIL % %  --   --   --   --   --   --   --   --  0.0*   BASOPHIL % %  --   --   --   --   --   --   --   --  0.4   IMM GRAN % %  --   --   --   --   --   --   --   --  0.5    < > = values in this interval not displayed.     Results from last 7 days   Lab Units 04/15/25  0614 04/15/25  0018 04/14/25  1717 04/14/25  1100 04/14/25  0416 04/13/25  2204 04/13/25  1527 04/10/25  1742 04/10/25  1244   INR   --   --   --   --   --   --   --   --  1.36*   APTT seconds 72.3* 90.5* 61.0* 72.5* 61.8* 65.8* 75.6*   < > 96.3*    < > = values in this interval not displayed.     Results from last 7 days   Lab Units 04/15/25  0614 04/14/25  0416 04/13/25  0514 04/12/25  0454 04/11/25  0413 04/10/25  1814 04/10/25  0215   MAGNESIUM mg/dL 2.1 2.2 2.3 2.3 2.3 2.1 2.3     Results from last 7 days   Lab Units 04/09/25  0602   CHOLESTEROL mg/dL 147   TRIGLYCERIDES mg/dL 158*   HDL CHOL mg/dL 42      Results from last 7 days   Lab Units 04/10/25  1958   PH, ARTERIAL pH units 7.422   PCO2, ARTERIAL mm Hg 37.0   PO2 ART mm Hg 66.4*   HCO3 ART mmol/L 24.1     Results from last 7 days   Lab Units 04/09/25  0559   HEMOGLOBIN A1C % 9.40*     Glucose   Date/Time Value Ref Range Status   04/15/2025 0614 135 (H) 70 - 130 mg/dL Final   04/15/2025 0017 140 (H) 70 - 130 mg/dL Final   04/14/2025 1647 81 70 - 130 mg/dL Final   04/14/2025 1144 102 70 - 130 mg/dL Final   04/14/2025 0004 107 70 - 130 mg/dL Final   04/13/2025 1729 134 (H) 70 - 130 mg/dL Final   04/13/2025 1125 128 70 - 130 mg/dL Final   04/13/2025 0024 100 70 - 130 mg/dL Final     Results from last 7 days   Lab Units 04/12/25  0014 04/11/25  1711 04/11/25  1059 04/11/25  0104 04/10/25  0907 04/09/25  2134 04/08/25 2059 04/08/25 2034   PROCALCITONIN ng/mL  --   --  1.13*  --   --   --   --  0.13   LACTATE mmol/L 1.8 2.2*  --  1.0 1.4 1.6 2.0  --      Results from last 7 days   Lab Units 04/12/25  1702 04/12/25  1652 04/08/25 2059 04/08/25 2056   BLOODCX  No growth at 2 days No growth at 2 days No growth at 5 days No growth at 5 days             Results from last 7 days   Lab Units 04/10/25  1829 04/10/25  1814   SODIUM UR mmol/L <20  --    CREATININE UR mg/dL 64.8  --    CHLORIDE UR mmol/L <20  --    OSMOLALITY UR mOsm/kg 494  --    PROTEIN TOTAL URINE mg/dL 95.5  --    URIC ACID mg/dL  --  6.2*           Imaging:   Imaging Results (All)               I reviewed the patient's new clinical results.  I personally viewed and interpreted the patient's imaging results:        Medication Review:   arformoterol, 15 mcg, Nebulization, BID - RT  aspirin, 81 mg, Oral, Daily  aspirin, 300 mg, Rectal, Daily  atorvastatin, 40 mg, Oral, Nightly  budesonide, 0.5 mg, Nebulization, BID - RT  clopidogrel, 75 mg, Oral, Daily  insulin regular, 2-7 Units, Subcutaneous, Q6H  metoclopramide, 10 mg, Intravenous, Q6H  pantoprazole, 40 mg, Intravenous, BID  piperacillin-tazobactam,  3.375 g, Intravenous, Q8H  potassium phosphate, 15 mmol, Intravenous, Once  revefenacin, 175 mcg, Nebulization, Daily - RT  senna-docusate sodium, 2 tablet, Oral, BID        heparin, 12 Units/kg/hr, Last Rate: 14 Units/kg/hr (04/15/25 0720)        ASSESSMENT:   Cardiogenic shock, resolved  Arterial embolism and thrombosis of the lower extremity, status post fasciotomy and thrombectomy with revascularization 4/12/2025  Diabetes mellitus type 2  Coronary artery disease with prior angioplasties with acute inferior wall MI status post repeat angioplasty  Left ventricular intramural thrombus likely the origin of the above  Bilateral pulmonary nodules  Severe peripheral arterial disease  Acute hypoxemic respiratory failure  Nonsustained ventricular tachycardia  Hyperlipidemia  Hyponatremia  Acute kidney injury, improved  Tobacco abuse  Small bowel enteritis was suspected bowel ischemia from the embolic events  COPD, no exacerbation  Nonsustained V. tach  History of E. coli UTI on 3/18/2025 with negative blood culture on this admission, patient is on Zosyn for possible intra-abdominal infection from bowel ischemia    PLAN:  Patient overall doing better, will continue to follow-up on the hemoglobin level, transfuse as needed, she is oozing blood from the fasciotomy site while on the heparin drip but the blood loss is within acceptable range and the hemoglobin is only slowly trending down.  Sodium level is in normal range  Nasogastric tube removed, tolerating p.o.  Has been off the IV pressors for more than 1 day  Transition to oral anticoagulation once okay with hematology  Patient is off pressors, will discontinue the arterial line and transfer out of the intensive care unit  Advance diet as tolerated per surgery  Transition to oral antibiotic with Augmentin      Discussed with the patient and with the nursing staff at the bedside    Labs/Notes/films were independently reviewed and pertinent results are summarized  above  The copied texts in this note were reviewed and they are accurate as of 04/15/25    Disposition: Stepdown unit if okay with other consultants    Zuri Grey MD  04/15/25  09:42 EDT          Dictated utilizing Dragon dictation

## 2025-04-15 NOTE — PROGRESS NOTES
"    Patient Name: Desiree Garcia  :1960  65 y.o.      Patient Care Team:  Sandra Kaba MD as PCP - General (Family Medicine)  Marisol Nazario, RN as Ambulatory  ([facility] [place] [facility] [place]    Chief Complaint:   Inferior STEMI, LV thrombus, PAD, ischemic limb    Interval History:   Tolerated clamp trial, NGT removed. Feels slightly improved.    Objective   Vital Signs  Temp:  [97.7 °F (36.5 °C)-98.9 °F (37.2 °C)] 98.2 °F (36.8 °C)  Heart Rate:  [78-95] 85  Resp:  [10-20] 16  BP: (100-133)/(55-71) 112/62  Arterial Line BP: (120-159)/(45-65) 150/60    Intake/Output Summary (Last 24 hours) at 4/15/2025 0840  Last data filed at 4/15/2025 0600  Gross per 24 hour   Intake 1794.46 ml   Output 1280 ml   Net 514.46 ml     Flowsheet Rows      Flowsheet Row First Filed Value   Admission Height 167.6 cm (65.98\") Documented at 2025 1714   Admission Weight 71 kg (156 lb 8.4 oz) Documented at 04/10/2025 1700            GEN: no distress, alert and oriented  HEENT: NACT, EOMI, moist mucous membranes  Lungs: CTAB, no wheezes, rales or rhonchi  CV: normal rate, regular rhythm, normal S1, S2, no murmurs, +2 radial pulses b/l, no carotid bruit  Abdomen: soft, distended  Extremities: left leg with overlying dressing  Skin: no rash, warm, dry  Heme/Lymph: no bruising  Psych: organized thought, normal behavior and affect    Results Review:    Results from last 7 days   Lab Units 04/15/25  0614   SODIUM mmol/L 139   POTASSIUM mmol/L 3.7   CHLORIDE mmol/L 105   CO2 mmol/L 25.0   BUN mg/dL 14   CREATININE mg/dL 0.76   GLUCOSE mg/dL 128*   CALCIUM mg/dL 8.1*     Results from last 7 days   Lab Units 254 25  1728   HSTROP T ng/L 971* 835*     Results from last 7 days   Lab Units 04/15/25  0614   WBC 10*3/mm3 18.20*   HEMOGLOBIN g/dL 8.6*   HEMATOCRIT % 25.9*   PLATELETS 10*3/mm3 325     Results from last 7 days   Lab Units 04/15/25  0614 04/15/25  0018 25  1717 04/10/25  1742 04/10/25  1244 "   INR   --   --   --   --  1.36*   APTT seconds 72.3* 90.5* 61.0*   < > 96.3*    < > = values in this interval not displayed.     Results from last 7 days   Lab Units 04/15/25  0614   MAGNESIUM mg/dL 2.1     Results from last 7 days   Lab Units 04/09/25  0602   CHOLESTEROL mg/dL 147   TRIGLYCERIDES mg/dL 158*   HDL CHOL mg/dL 42   LDL CHOL mg/dL 78               Medication Review:   arformoterol, 15 mcg, Nebulization, BID - RT  aspirin, 81 mg, Oral, Daily  aspirin, 300 mg, Rectal, Daily  atorvastatin, 40 mg, Oral, Nightly  budesonide, 0.5 mg, Nebulization, BID - RT  clopidogrel, 75 mg, Oral, Daily  insulin regular, 2-7 Units, Subcutaneous, Q6H  metoclopramide, 10 mg, Intravenous, Q6H  pantoprazole, 40 mg, Intravenous, BID  piperacillin-tazobactam, 3.375 g, Intravenous, Q8H  revefenacin, 175 mcg, Nebulization, Daily - RT  senna-docusate sodium, 2 tablet, Oral, BID         heparin, 12 Units/kg/hr, Last Rate: 14 Units/kg/hr (04/15/25 0720)        Assessment & Plan   #Inferior STEMI  #LV thrombus  #critical limb ischemia s/p open left iliofemoral profunda and SFA thrombectomy, fasciotomy, left common femoral endarterectomy   #Acute HFrEF  #Splenic infarct  #Left renal infarct  #Left SFA occlusion  #Ileus  #NIA     64 yo woman with CAD status post PCI to RCA in 2021 in the setting of a STEMI, hypertension, hyperlipidemia, diabetes, COPD who presented to [facility] with severe abdominal pain.  On arrival there, she was noted to have inferior STEMI.  CTA abdomen pelvis also revealed complete occlusion of the left SFA, left renal artery, splenic artery, occlusion of the ONEIDA with reconstitution distally, as well as findings concerning for splenic and renal infarcts.  She was taken for emergent coronary angiogram which revealed distal circumflex occlusion, mild to moderate proximal LAD disease and a 100% thrombotic occlusion of the RCA.  There was heavy thrombus burden in the RCA and she received PCI to this with  mechanical thrombectomy and overlapping drug-eluting stents.  There was ABILIO I-II flow in the distal vessels post PCI.  Echocardiogram performed 4/10/2025 with an EF of about 45%.  There was a 1.4 cm mobile thrombus in the left ventricle.  She was started on heparin drip and vascular surgery was consulted given the CT findings and recommendation was to continue anticoagulation.  While admitted there, she had a run of NSVT and was started on amiodarone for this.  She was also hypotensive requiring Levophed and was also started on milrinone.  Patient was transferred here for further management. Pressors and inotropes were discontinued on arrival.  During admission, patient complained of severe abdominal pain.  Lactate was 1.  Repeat CT abdomen pelvis concerning for sigmoid colonic ischemia as well as long segment extensive small bowel enteritis and ileus.  Surgery was consulted. Over the weekend, had critical limb ischemia of the left leg and underwent thrombectomy and fasciotomy.    - continue aspirin 81 mg daily, clopidogrel 75 mg daily, atorvastatin 40 mg daily  - continue hep gtt, plan is to transition to DOAC once able to tolerate p.o.  I would not continue triple therapy and instead continue dual therapy with Plavix and discontinue aspirin    Santi Howard MD, FACC, [facility]  04/15/25  08:40 EDT

## 2025-04-15 NOTE — PROGRESS NOTES
"General Surgery Progress Note    Chief complaint:  ischemic enteritis/colitis, ileus     Interval history: Patient states she is feeling ok today. She tolerated her clear liquids without nausea or vomiting. She reports persistent abdominal pain \"only when you press on it.\"  She has been passing gas but has not had a bowel movement. She reportedly got out of bed with OT today.     Vital signs:  /62   Pulse 85   Temp 97.7 °F (36.5 °C) (Oral)   Resp 16   Ht 167.6 cm (65.98\")   Wt 73.6 kg (162 lb 4.1 oz)   SpO2 100%   BMI 26.20 kg/m²     Intake/output:  Intake & Output (last day)         04/14 0701  04/15 0700 04/15 0701 04/16 0700    P.O. 540 600    I.V. (mL/kg) 1154.5 (15.7)     Blood      Other      IV Piggyback 100     Total Intake(mL/kg) 1794.5 (24.4) 600 (8.2)    Urine (mL/kg/hr) 1530 (0.9)     Emesis/NG output      Total Output 1530     Net +264.5 +600                  Physical exam:  NEURO: awake, alert, no gross focal deficits, moves all extremities  PSYCH: interactive, cooperative  PULM: unlabored respirations on room air  CV: regular rate, normotensive on monitor, left leg dressing with small amount of dried serosanguinous fluid, left groin incision soft with bruising and some blood on dressing  GI: abdomen soft, moderately distended, mildly tender in the left lower quadrant without guarding  : torres in place with clear yellow urine draining    Diagnostics:  Results from last 7 days   Lab Units 04/15/25  0614 04/15/25  0018 04/14/25  1852 04/14/25  0416 04/14/25  0004 04/13/25  1127 04/13/25  0514   WBC 10*3/mm3 18.20* 17.46* 16.22*   < > 14.91*   < > 11.41*   HEMOGLOBIN g/dL 8.6* 8.2* 8.7*   < > 9.8*   < > 8.4*   HEMATOCRIT % 25.9* 25.9* 26.2*   < > 30.5*   < > 25.6*   PLATELETS 10*3/mm3 325 317 312   < > 301   < > 316   MONOCYTES % %  --  10.0  --   --  15.3*  --  12.0  18.0*   EOSINOPHIL % %  --  2.0  --   --  0.0*  --  0.0*    < > = values in this interval not displayed.     Results from " last 7 days   Lab Units 04/15/25  0614 04/14/25  0416 04/13/25  0514 04/10/25  1814 04/10/25  0907 04/09/25  0602 04/08/25  1728   SODIUM mmol/L 139 138 133*   < > 125*   < > 131*   POTASSIUM mmol/L 3.7 3.9 4.2   < > 3.7   < > 3.7   CHLORIDE mmol/L 105 105 104   < > 88*   < > 90*   CO2 mmol/L 25.0 22.1 18.0*   < > 20.8*   < > 25.8   BUN mg/dL 14 19 22   < > 33*   < > 9   CREATININE mg/dL 0.76 0.87 1.12*   < > 1.40*   < > 0.74   CALCIUM mg/dL 8.1* 8.1* 8.0*   < > 8.5*   < > 10.1   BILIRUBIN mg/dL  --   --   --   --  1.1  --  1.1   ALK PHOS U/L  --   --   --   --  126*  --  159*   ALT (SGPT) U/L  --   --   --   --  36*  --  16   AST (SGOT) U/L  --   --   --   --  72*  --  45*   GLUCOSE mg/dL 128* 108* 102*   < > 177*   < > 294*    < > = values in this interval not displayed.     Results from last 7 days   Lab Units 04/15/25  0614 04/15/25  0018 04/14/25  1717 04/10/25  1742 04/10/25  1244   INR   --   --   --   --  1.36*   APTT seconds 72.3* 90.5* 61.0*   < > 96.3*    < > = values in this interval not displayed.       Assessment/Plan:  65-year-old lady with multiple comorbidities, left ventricular thrombus, showering of emboli to multiple intra-abdominal organs, likely ischemic enteritis and colitis, with ileus.  Tolerated NGT removal and clear liquids without nausea. Abdominal exam is slightly more distended but without guarding, and she is passing gas.  Hgb stable. She did have an increase in her WBC but has been afebrile.     -Continue clear liquids for now and monitor bowel function. Will give dulcolax suppository today to encourage BM.  -Continue PPI.   -Remains on heparin drip.    Yulisa Parisi MD  General, Robotic and Endoscopic Surgery  [facility]    [address], [address]  [place], [place], [address]  P: [phone]  F: [phone]

## 2025-04-16 ENCOUNTER — APPOINTMENT (OUTPATIENT)
Dept: CT IMAGING | Facility: HOSPITAL | Age: 65
DRG: 270 | End: 2025-04-16
Payer: MEDICARE

## 2025-04-16 LAB
ALBUMIN SERPL-MCNC: 2.4 G/DL (ref 3.5–5.2)
ALP SERPL-CCNC: 139 U/L (ref 39–117)
ALT SERPL W P-5'-P-CCNC: 11 U/L (ref 1–33)
ANION GAP SERPL CALCULATED.3IONS-SCNC: 10.8 MMOL/L (ref 5–15)
APTT PPP: 55.3 SECONDS (ref 22.7–35.4)
APTT PPP: 82 SECONDS (ref 22.7–35.4)
APTT PPP: 91.1 SECONDS (ref 22.7–35.4)
AST SERPL-CCNC: 52 U/L (ref 1–32)
BASOPHILS # BLD MANUAL: 0.22 10*3/MM3 (ref 0–0.2)
BASOPHILS NFR BLD MANUAL: 1 % (ref 0–1.5)
BILIRUB CONJ SERPL-MCNC: 0.3 MG/DL (ref 0–0.3)
BILIRUB INDIRECT SERPL-MCNC: 0.3 MG/DL
BILIRUB SERPL-MCNC: 0.6 MG/DL (ref 0–1.2)
BUN SERPL-MCNC: 10 MG/DL (ref 8–23)
BUN/CREAT SERPL: 14.5 (ref 7–25)
CALCIUM SPEC-SCNC: 8.1 MG/DL (ref 8.6–10.5)
CHLORIDE SERPL-SCNC: 104 MMOL/L (ref 98–107)
CO2 SERPL-SCNC: 24.2 MMOL/L (ref 22–29)
CREAT SERPL-MCNC: 0.69 MG/DL (ref 0.57–1)
CYTO UR: NORMAL
DEPRECATED RDW RBC AUTO: 43.4 FL (ref 37–54)
EGFRCR SERPLBLD CKD-EPI 2021: 97.1 ML/MIN/1.73
EOSINOPHIL # BLD MANUAL: 0 10*3/MM3 (ref 0–0.4)
EOSINOPHIL NFR BLD MANUAL: 0 % (ref 0.3–6.2)
ERYTHROCYTE [DISTWIDTH] IN BLOOD BY AUTOMATED COUNT: 14.1 % (ref 12.3–15.4)
GLUCOSE BLDC GLUCOMTR-MCNC: 104 MG/DL (ref 70–130)
GLUCOSE BLDC GLUCOMTR-MCNC: 132 MG/DL (ref 70–130)
GLUCOSE BLDC GLUCOMTR-MCNC: 136 MG/DL (ref 70–130)
GLUCOSE BLDC GLUCOMTR-MCNC: 143 MG/DL (ref 70–130)
GLUCOSE BLDC GLUCOMTR-MCNC: 144 MG/DL (ref 70–130)
GLUCOSE SERPL-MCNC: 109 MG/DL (ref 65–99)
HCT VFR BLD AUTO: 26.6 % (ref 34–46.6)
HGB BLD-MCNC: 8.5 G/DL (ref 12–15.9)
LAB AP CASE REPORT: NORMAL
LYMPHOCYTES # BLD MANUAL: 1.59 10*3/MM3 (ref 0.7–3.1)
LYMPHOCYTES NFR BLD MANUAL: 8.1 % (ref 5–12)
MAGNESIUM SERPL-MCNC: 2 MG/DL (ref 1.6–2.4)
MCH RBC QN AUTO: 27.5 PG (ref 26.6–33)
MCHC RBC AUTO-ENTMCNC: 32 G/DL (ref 31.5–35.7)
MCV RBC AUTO: 86.1 FL (ref 79–97)
METAMYELOCYTES NFR BLD MANUAL: 4 % (ref 0–0)
MONOCYTES # BLD: 1.82 10*3/MM3 (ref 0.1–0.9)
NEUTROPHILS # BLD AUTO: 17.92 10*3/MM3 (ref 1.7–7)
NEUTROPHILS NFR BLD MANUAL: 79.8 % (ref 42.7–76)
NRBC BLD AUTO-RTO: 0.5 /100 WBC (ref 0–0.2)
PATH REPORT.FINAL DX SPEC: NORMAL
PATH REPORT.GROSS SPEC: NORMAL
PHOSPHATE SERPL-MCNC: 3 MG/DL (ref 2.5–4.5)
PLAT MORPH BLD: NORMAL
PLATELET # BLD AUTO: 346 10*3/MM3 (ref 140–450)
PMV BLD AUTO: 10.2 FL (ref 6–12)
POTASSIUM SERPL-SCNC: 3.4 MMOL/L (ref 3.5–5.2)
POTASSIUM SERPL-SCNC: 3.9 MMOL/L (ref 3.5–5.2)
PROT SERPL-MCNC: 5.8 G/DL (ref 6–8.5)
RBC # BLD AUTO: 3.09 10*6/MM3 (ref 3.77–5.28)
RBC MORPH BLD: NORMAL
SODIUM SERPL-SCNC: 139 MMOL/L (ref 136–145)
VARIANT LYMPHS NFR BLD MANUAL: 7.1 % (ref 19.6–45.3)
WBC MORPH BLD: NORMAL
WBC NRBC COR # BLD AUTO: 22.45 10*3/MM3 (ref 3.4–10.8)

## 2025-04-16 PROCEDURE — 85730 THROMBOPLASTIN TIME PARTIAL: CPT | Performed by: SURGERY

## 2025-04-16 PROCEDURE — 25510000002 DIATRIZOATE MEGLUMINE & SODIUM PER 1 ML

## 2025-04-16 PROCEDURE — 85025 COMPLETE CBC W/AUTO DIFF WBC: CPT | Performed by: SURGERY

## 2025-04-16 PROCEDURE — 25010000002 METOCLOPRAMIDE PER 10 MG: Performed by: SURGERY

## 2025-04-16 PROCEDURE — 63710000001 REVEFENACIN 175 MCG/3ML SOLUTION: Performed by: SURGERY

## 2025-04-16 PROCEDURE — 25510000001 IOPAMIDOL 61 % SOLUTION: Performed by: INTERNAL MEDICINE

## 2025-04-16 PROCEDURE — 80048 BASIC METABOLIC PNL TOTAL CA: CPT | Performed by: INTERNAL MEDICINE

## 2025-04-16 PROCEDURE — 99024 POSTOP FOLLOW-UP VISIT: CPT | Performed by: SURGERY

## 2025-04-16 PROCEDURE — 74177 CT ABD & PELVIS W/CONTRAST: CPT

## 2025-04-16 PROCEDURE — 94799 UNLISTED PULMONARY SVC/PX: CPT

## 2025-04-16 PROCEDURE — 84100 ASSAY OF PHOSPHORUS: CPT | Performed by: INTERNAL MEDICINE

## 2025-04-16 PROCEDURE — 80076 HEPATIC FUNCTION PANEL: CPT | Performed by: INTERNAL MEDICINE

## 2025-04-16 PROCEDURE — 85007 BL SMEAR W/DIFF WBC COUNT: CPT | Performed by: SURGERY

## 2025-04-16 PROCEDURE — 25010000002 HEPARIN (PORCINE) 25000-0.45 UT/250ML-% SOLUTION: Performed by: SURGERY

## 2025-04-16 PROCEDURE — 25010000002 HEPARIN (PORCINE) PER 1000 UNITS: Performed by: SURGERY

## 2025-04-16 PROCEDURE — 99232 SBSQ HOSP IP/OBS MODERATE 35: CPT | Performed by: INTERNAL MEDICINE

## 2025-04-16 PROCEDURE — 82948 REAGENT STRIP/BLOOD GLUCOSE: CPT

## 2025-04-16 PROCEDURE — 84132 ASSAY OF SERUM POTASSIUM: CPT

## 2025-04-16 PROCEDURE — 99232 SBSQ HOSP IP/OBS MODERATE 35: CPT | Performed by: STUDENT IN AN ORGANIZED HEALTH CARE EDUCATION/TRAINING PROGRAM

## 2025-04-16 PROCEDURE — 94761 N-INVAS EAR/PLS OXIMETRY MLT: CPT

## 2025-04-16 PROCEDURE — 85730 THROMBOPLASTIN TIME PARTIAL: CPT

## 2025-04-16 PROCEDURE — 83735 ASSAY OF MAGNESIUM: CPT | Performed by: SURGERY

## 2025-04-16 PROCEDURE — 99233 SBSQ HOSP IP/OBS HIGH 50: CPT | Performed by: STUDENT IN AN ORGANIZED HEALTH CARE EDUCATION/TRAINING PROGRAM

## 2025-04-16 PROCEDURE — 25010000002 HYDROMORPHONE 1 MG/ML SOLUTION: Performed by: SURGERY

## 2025-04-16 PROCEDURE — 94664 DEMO&/EVAL PT USE INHALER: CPT

## 2025-04-16 RX ORDER — POTASSIUM CHLORIDE 1500 MG/1
40 TABLET, EXTENDED RELEASE ORAL EVERY 4 HOURS
Status: COMPLETED | OUTPATIENT
Start: 2025-04-16 | End: 2025-04-16

## 2025-04-16 RX ORDER — METOCLOPRAMIDE HYDROCHLORIDE 5 MG/ML
5 INJECTION INTRAMUSCULAR; INTRAVENOUS EVERY 6 HOURS SCHEDULED
Status: DISCONTINUED | OUTPATIENT
Start: 2025-04-16 | End: 2025-04-16

## 2025-04-16 RX ORDER — DIATRIZOATE MEGLUMINE AND DIATRIZOATE SODIUM 660; 100 MG/ML; MG/ML
SOLUTION ORAL; RECTAL
Status: COMPLETED
Start: 2025-04-16 | End: 2025-04-16

## 2025-04-16 RX ORDER — METOPROLOL TARTRATE 25 MG/1
25 TABLET, FILM COATED ORAL EVERY 12 HOURS SCHEDULED
Status: DISCONTINUED | OUTPATIENT
Start: 2025-04-16 | End: 2025-05-06 | Stop reason: HOSPADM

## 2025-04-16 RX ORDER — IOPAMIDOL 612 MG/ML
100 INJECTION, SOLUTION INTRAVASCULAR
Status: COMPLETED | OUTPATIENT
Start: 2025-04-16 | End: 2025-04-16

## 2025-04-16 RX ADMIN — ATORVASTATIN CALCIUM 40 MG: 20 TABLET, FILM COATED ORAL at 20:19

## 2025-04-16 RX ADMIN — POTASSIUM CHLORIDE 40 MEQ: 1500 TABLET, EXTENDED RELEASE ORAL at 12:27

## 2025-04-16 RX ADMIN — PANTOPRAZOLE SODIUM 40 MG: 40 TABLET, DELAYED RELEASE ORAL at 05:28

## 2025-04-16 RX ADMIN — DIATRIZOATE MEGLUMINE AND DIATRIZOATE SODIUM 30 ML: 600; 100 SOLUTION ORAL; RECTAL at 09:34

## 2025-04-16 RX ADMIN — AMOXICILLIN AND CLAVULANATE POTASSIUM 1 TABLET: 875; 125 TABLET, COATED ORAL at 09:34

## 2025-04-16 RX ADMIN — METOPROLOL TARTRATE 25 MG: 25 TABLET, FILM COATED ORAL at 20:19

## 2025-04-16 RX ADMIN — HYDROCODONE BITARTRATE AND ACETAMINOPHEN 1 TABLET: 7.5; 325 TABLET ORAL at 20:22

## 2025-04-16 RX ADMIN — HYDROMORPHONE HYDROCHLORIDE 1 MG: 1 INJECTION, SOLUTION INTRAMUSCULAR; INTRAVENOUS; SUBCUTANEOUS at 22:26

## 2025-04-16 RX ADMIN — REVEFENACIN 175 MCG: 175 SOLUTION RESPIRATORY (INHALATION) at 06:35

## 2025-04-16 RX ADMIN — HEPARIN SODIUM 2100 UNITS: 5000 INJECTION INTRAVENOUS; SUBCUTANEOUS at 06:15

## 2025-04-16 RX ADMIN — HYDROMORPHONE HYDROCHLORIDE 1 MG: 1 INJECTION, SOLUTION INTRAMUSCULAR; INTRAVENOUS; SUBCUTANEOUS at 04:21

## 2025-04-16 RX ADMIN — HYDROMORPHONE HYDROCHLORIDE 1 MG: 1 INJECTION, SOLUTION INTRAMUSCULAR; INTRAVENOUS; SUBCUTANEOUS at 07:56

## 2025-04-16 RX ADMIN — SENNOSIDES AND DOCUSATE SODIUM 2 TABLET: 50; 8.6 TABLET ORAL at 20:19

## 2025-04-16 RX ADMIN — ARFORMOTEROL TARTRATE 15 MCG: 15 SOLUTION RESPIRATORY (INHALATION) at 06:30

## 2025-04-16 RX ADMIN — CLOPIDOGREL BISULFATE 75 MG: 75 TABLET, FILM COATED ORAL at 09:34

## 2025-04-16 RX ADMIN — IOPAMIDOL 85 ML: 612 INJECTION, SOLUTION INTRAVENOUS at 11:24

## 2025-04-16 RX ADMIN — HYDROMORPHONE HYDROCHLORIDE 1 MG: 1 INJECTION, SOLUTION INTRAMUSCULAR; INTRAVENOUS; SUBCUTANEOUS at 11:44

## 2025-04-16 RX ADMIN — BUDESONIDE 0.5 MG: 0.5 INHALANT RESPIRATORY (INHALATION) at 06:32

## 2025-04-16 RX ADMIN — HYDROMORPHONE HYDROCHLORIDE 1 MG: 1 INJECTION, SOLUTION INTRAMUSCULAR; INTRAVENOUS; SUBCUTANEOUS at 15:53

## 2025-04-16 RX ADMIN — HYDROMORPHONE HYDROCHLORIDE 1 MG: 1 INJECTION, SOLUTION INTRAMUSCULAR; INTRAVENOUS; SUBCUTANEOUS at 18:54

## 2025-04-16 RX ADMIN — HEPARIN SODIUM 16 UNITS/KG/HR: 10000 INJECTION, SOLUTION INTRAVENOUS at 09:39

## 2025-04-16 RX ADMIN — ASPIRIN 81 MG CHEWABLE TABLET 81 MG: 81 TABLET CHEWABLE at 09:34

## 2025-04-16 RX ADMIN — POTASSIUM CHLORIDE 40 MEQ: 1500 TABLET, EXTENDED RELEASE ORAL at 07:53

## 2025-04-16 RX ADMIN — BUDESONIDE 0.5 MG: 0.5 INHALANT RESPIRATORY (INHALATION) at 21:21

## 2025-04-16 RX ADMIN — ARFORMOTEROL TARTRATE 15 MCG: 15 SOLUTION RESPIRATORY (INHALATION) at 21:22

## 2025-04-16 RX ADMIN — METOPROLOL TARTRATE 25 MG: 25 TABLET, FILM COATED ORAL at 12:27

## 2025-04-16 RX ADMIN — METOCLOPRAMIDE 10 MG: 5 INJECTION, SOLUTION INTRAMUSCULAR; INTRAVENOUS at 05:28

## 2025-04-16 RX ADMIN — AMOXICILLIN AND CLAVULANATE POTASSIUM 1 TABLET: 875; 125 TABLET, COATED ORAL at 20:19

## 2025-04-16 NOTE — PROGRESS NOTES
[facility]   Surgical Progress Note    Patient Name: Desiree Garcia  : 1960  MRN: [id number]  Date of admission: 4/10/2025  Surgical Procedures Since Admission:  Procedure(s):  EMBOLECTOMY MECHANICAL, FOUR COMPARTMENT FASCIOTOMY  ARTERIOGRAM LOWER EXTREMITY  Surgeon:  Misael Lawton MD  Status:  4 Days Post-Op  -------------------    Subjective   Subjective     Chief Complaint: Postop day 4 status post left leg thrombectomy and 4 compartment fasciotomies    History of Present Illness   65-year-old woman with multifocal thromboemboli.  Left leg either extended or threw new clot that became critically ischemic.  This occurred despite full heparin on board.  Currently continued.  Unfortunately patient has been oozing in her left fasciotomy site.  We decompress the hematoma yesterday and again this morning.  She may need this washed out and packed with the wound VAC.  Will however await workup of white count.  I am concerned that she is significant occult infectious issues that likely would be in the belly as the leg wounds do not appear to be grossly infected.  Would recommend against starting oral anticoagulants until the white count issue is settled.  May actually put her on the schedule for Friday for washout of the wound hematoma    Review of Systems denies pain in the foot but still numb.  Able to move it.  Still having abdominal pain but eating clear liquids  Objective   Objective     Vitals:   Temp:  [97.7 °F (36.5 °C)-99.1 °F (37.3 °C)] 98.1 °F (36.7 °C)  Heart Rate:  [76-91] 82  Resp:  [14-18] 18  BP: (107-130)/(54-67) 107/54  Arterial Line BP: (116-153)/(47-72) 125/47  Output by Drain (mL) 04/15/25 0701 - 04/15/25 1900 04/15/25 1901 - 25 0700 25 0701 - 25 0816 Range Total   Requested LDAs do not have output data documented.       Physical Exam    Incision sites groin and medial calf stable.  Lateral calf with hematoma and oozing.  Slow venous ooze in this area still put  under pressure.  Will plan washout.  Left foot was positive motor but decreased sensation.  No evidence of compartment syndrome at this time.  Abdomen is  at all sites to palpation with some mild rebound    Result Review    Result Review:  I have personally reviewed the results from the time of this admission to 4/16/2025 08:16 EDT and agree with these findings:  [x]  Laboratory list / accordion  []  Microbiology  []  Radiology  []  EKG/Telemetry   []  Cardiology/Vascular   []  Pathology  []  Old records  []  Other:  Most notable findings include: Hemoglobin 8.5.  White count elevated to 22.45 steadily.  This is alarming creatinine stable    Results from last 7 days   Lab Units 04/16/25  0435 04/15/25  0614 04/15/25  0018 04/14/25  1852 04/14/25  1225 04/14/25  0416 04/14/25  0004   WBC 10*3/mm3 22.45* 18.20* 17.46* 16.22* 15.49* 14.46* 14.91*   HEMOGLOBIN g/dL 8.5* 8.6* 8.2* 8.7* 8.6* 9.5* 9.8*   PLATELETS 10*3/mm3 346 325 317 312 287 288 301     Results from last 7 days   Lab Units 04/16/25  0435 04/15/25  2119 04/15/25  0614 04/14/25  0416 04/13/25  0514 04/12/25  2153 04/12/25  1532 04/12/25  1302 04/12/25  0454 04/11/25  1711 04/11/25  0413 04/10/25  1814   SODIUM mmol/L 139  --  139 138 133*  --  133*  --  131* 129* 130* 128*   POTASSIUM mmol/L 3.4*  --  3.7 3.9 4.2 3.6 3.8 3.4* 3.1* 3.3* 3.9 3.4*   CHLORIDE mmol/L 104  --  105 105 104  --  102  --  98 95* 97* 94*   CO2 mmol/L 24.2  --  25.0 22.1 18.0*  --  19.3*  --  19.6* 20.2* 20.5* 21.1*   BUN mg/dL 10  --  14 19 22  --  26*  --  32* 36* 38* 37*   CREATININE mg/dL 0.69  --  0.76 0.87 1.12*  --  1.06*  --  1.08* 1.24* 1.27* 1.33*   GLUCOSE mg/dL 109*  --  128* 108* 102*  --  90  --  74 75 106* 151*   MAGNESIUM mg/dL 2.0  --  2.1 2.2 2.3  --   --   --  2.3  --  2.3 2.1   PHOSPHORUS mg/dL 3.0 2.8 2.2* 2.3* 3.0  --   --   --  3.0  --  4.8* 4.7*   Estimated Creatinine Clearance: 85.4 mL/min (by C-G formula based on SCr of 0.69 mg/dL).  Results from  last 7 days   Lab Units 04/10/25  1244   PROTIME Seconds 17.4*   INR  1.36*     Lab Results   Component Value Date    HGBA1C 9.40 (H) 04/09/2025    HGBA1C 9.30 (H) 03/13/2025    HGBA1C 9.40 (H) 12/23/2024     Glucose   Date/Time Value Ref Range Status   04/16/2025 0528 104 70 - 130 mg/dL Final   04/15/2025 2356 143 (H) 70 - 130 mg/dL Final   04/15/2025 1556 137 (H) 70 - 130 mg/dL Final   04/15/2025 1158 288 (H) 70 - 130 mg/dL Final   04/15/2025 0614 135 (H) 70 - 130 mg/dL Final   04/15/2025 0017 140 (H) 70 - 130 mg/dL Final   04/14/2025 1647 81 70 - 130 mg/dL Final   04/14/2025 1144 102 70 - 130 mg/dL Final       Assessment & Plan   Assessment / Plan     Brief Patient Summary:  Desiree Garcia is a 65 y.o. female who progress thrombus despite the full use of heparin.  Concerns for long-term recurrence of clot have been raised and family is aware.  Still unclear as to whether this was new embolism versus extension of clot.  The character of the clot was definitely different and I am inclined to believe this was recurrent embolized.  Unfortunately white count is not 22 and I would recommend against starting Eliquis until we sort the source of this out.  I do not believe is coming from the leg but I will preliminarily put her on the schedule for Friday for a washout depending on what is discovered with the belly.    Active Hospital Problems:   Active Hospital Problems    Diagnosis    • 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    • Acute thrombus of left ventricle    • Altered bowel habits    • Leg paresthesia      Plan:   Would not start Eliquis at this time as I believe there is still a major issue at hand given the white count.  Will plan on washout of the leg pending findings on the belly.  Would recommend repeat CT of the pelvis with oral and IV contrast and will order.  VTE Prophylaxis:  Mechanical VTE prophylaxis orders are signed & held. Pharmacologic & mechanical VTE prophylaxis orders are  present.        Misael Lawton MD

## 2025-04-16 NOTE — PLAN OF CARE
Goal Outcome Evaluation:           Progress: no change   Pt remains in CICU on RA. VSS. Heparin gtt infusing per MAR. PRN pain medication given. Voiding function intact. Dressing CDI. Family updated at bedside.

## 2025-04-16 NOTE — PROGRESS NOTES
"    Patient Name: Desiree Garcia  :1960  65 y.o.      Patient Care Team:  Sandra Kaba MD as PCP - General (Family Medicine)  Marisol Nazario, RN as Ambulatory  ([facility])    Chief Complaint:   Inferior STEMI, LV thrombus, PAD, ischemic limb    Interval History:   Feeling improved, plan for CT abd to eval source of leukocytosis.    Objective   Vital Signs  Temp:  [97.7 °F (36.5 °C)-99.1 °F (37.3 °C)] 98.1 °F (36.7 °C)  Heart Rate:  [76-91] 82  Resp:  [14-18] 18  BP: (107-130)/(54-67) 107/54  Arterial Line BP: (116-151)/(47-72) 125/47    Intake/Output Summary (Last 24 hours) at 2025 0842  Last data filed at 2025 0400  Gross per 24 hour   Intake 1533 ml   Output 375 ml   Net 1158 ml     Flowsheet Rows      Flowsheet Row First Filed Value   Admission Height 167.6 cm (65.98\") Documented at 2025 1714   Admission Weight 71 kg (156 lb 8.4 oz) Documented at 04/10/2025 1700            GEN: no distress, alert and oriented  HEENT: NACT, EOMI, moist mucous membranes  Lungs: CTAB, no wheezes, rales or rhonchi  CV: normal rate, regular rhythm, normal S1, S2, no murmurs, +2 radial pulses b/l, no carotid bruit  Abdomen: soft, distended, mlid TTP in lower quadrants  Extremities: left leg with overlying dressing  Skin: no rash, warm, dry  Heme/Lymph: no bruising  Psych: organized thought, normal behavior and affect    Results Review:    Results from last 7 days   Lab Units 25  0435   SODIUM mmol/L 139   POTASSIUM mmol/L 3.4*   CHLORIDE mmol/L 104   CO2 mmol/L 24.2   BUN mg/dL 10   CREATININE mg/dL 0.69   GLUCOSE mg/dL 109*   CALCIUM mg/dL 8.1*           Results from last 7 days   Lab Units 25  0435   WBC 10*3/mm3 22.45*   HEMOGLOBIN g/dL 8.5*   HEMATOCRIT % 26.6*   PLATELETS 10*3/mm3 346     Results from last 7 days   Lab Units 25  0435 04/15/25  2119 04/15/25  1307 04/10/25  1742 04/10/25  1244   INR   --   --   --   --  1.36*   APTT seconds 55.3* 67.3* " 59.8*   < > 96.3*    < > = values in this interval not displayed.     Results from last 7 days   Lab Units 04/16/25  0435   MAGNESIUM mg/dL 2.0                     Medication Review:   amoxicillin-clavulanate, 1 tablet, Oral, Q12H  arformoterol, 15 mcg, Nebulization, BID - RT  aspirin, 81 mg, Oral, Daily   Or  aspirin, 300 mg, Rectal, Daily  atorvastatin, 40 mg, Oral, Nightly  budesonide, 0.5 mg, Nebulization, BID - RT  clopidogrel, 75 mg, Oral, Daily  diatrizoate meglumine-sodium, , ,   insulin regular, 2-7 Units, Subcutaneous, Q6H  metoclopramide, 10 mg, Intravenous, Q6H  pantoprazole, 40 mg, Oral, Q AM  potassium chloride ER, 40 mEq, Oral, Q4H  revefenacin, 175 mcg, Nebulization, Daily - RT  senna-docusate sodium, 2 tablet, Oral, BID         heparin, 12 Units/kg/hr, Last Rate: 16 Units/kg/hr (04/16/25 0615)        Assessment & Plan   #Inferior STEMI  #LV thrombus  #critical limb ischemia s/p open left iliofemoral profunda and SFA thrombectomy, fasciotomy, left common femoral endarterectomy   #Acute HFrEF  #Splenic infarct  #Left renal infarct  #Left SFA occlusion  #Ileus  #NIA     64 yo woman with CAD status post PCI to RCA in 2021 in the setting of a STEMI, hypertension, hyperlipidemia, diabetes, COPD who presented to [facility] with severe abdominal pain.  On arrival there, she was noted to have inferior STEMI.  CTA abdomen pelvis also revealed complete occlusion of the left SFA, left renal artery, splenic artery, occlusion of the ONEIDA with reconstitution distally, as well as findings concerning for splenic and renal infarcts.  She was taken for emergent coronary angiogram which revealed distal circumflex occlusion, mild to moderate proximal LAD disease and a 100% thrombotic occlusion of the RCA.  There was heavy thrombus burden in the RCA and she received PCI to this with mechanical thrombectomy and overlapping drug-eluting stents.  There was ABILIO I-II flow in the distal vessels post PCI.   Echocardiogram performed 4/10/2025 with an EF of about 45%.  There was a 1.4 cm mobile thrombus in the left ventricle.  She was started on heparin drip and vascular surgery was consulted given the CT findings and recommendation was to continue anticoagulation.  While admitted there, she had a run of NSVT and was started on amiodarone for this.  She was also hypotensive requiring Levophed and was also started on milrinone.  Patient was transferred here for further management. Pressors and inotropes were discontinued on arrival.  During admission, patient complained of severe abdominal pain.  Lactate was 1.  Repeat CT abdomen pelvis concerning for sigmoid colonic ischemia as well as long segment extensive small bowel enteritis and ileus.  Surgery was consulted. Over the weekend, had critical limb ischemia of the left leg and underwent thrombectomy and fasciotomy. She continues to have leukocytosis of unknown origin, plan is for CT to further eval.    - continue aspirin 81 mg daily, clopidogrel 75 mg daily, atorvastatin 40 mg daily  - start metoprolol tartrate 25 mg BID  - continue hep gtt, plan is to transition to DOAC once able to tolerate p.o.  I would not continue triple therapy and instead continue dual therapy with Plavix and discontinue aspirin    Santi Howard MD, FACC, [facility]  04/16/25  08:42 EDT

## 2025-04-16 NOTE — NURSING NOTE
Pt transferred from ICU to floor at 1530. Pt A/Ox4, follows commands but intermittently forgetful, on room air, SR on telemetry. Complains of 7/10 pain to L leg, medicated per MAR. Purewick in place and pt passing gas, abdomen distended/rounded but no complaints of abdominal pain. Remains on clear liquid diet. Heparin gtt infusing. L groin dressing and L leg dressing CDI. Doppler BLE pulses. Sister at bedside. Call light in reach and safety precautions in place. Pending further plan of care.

## 2025-04-16 NOTE — CASE MANAGEMENT/SOCIAL WORK
Continued Stay Note  [facility]     Patient Name: Desiree Garcia  MRN: [id number]  Today's Date: 4/16/2025    Admit Date: 4/10/2025    Plan: Acute vs Subacute rehab   Discharge Plan       Row Name 04/16/25 1025       Plan    Plan Acute vs Subacute rehab    Patient/Family in Agreement with Plan yes    Plan Comments CCP spoke with patient and sister at bedside regarding updates on the dc plan. PT & OT are recommending acute vs subacute rehab. CCP explained this to patient and sister and provided them with acute and subacute rehab lists. Patient still not ready for dc. CCP will follow on progress with PT/OT to discuss acute vs subacute rehab further. JUAN JOSE MONTERO                   Discharge Codes    No documentation.                 Expected Discharge Date and Time       Expected Discharge Date Expected Discharge Time    Apr 21, 2025               JUAN JOSE Bledsoe

## 2025-04-16 NOTE — PROGRESS NOTES
PROGRESS NOTE  Patient Name: Desiree Garcia  Age/Sex: 65 y.o. female  : 1960  MRN: [id number]    Date of Admission: 4/10/2025  Date of Encounter Visit: 25   LOS: 6 days   Patient Care Team:  Sandra Kaba MD as PCP - General (Family Medicine)  Marisol Nazario, RN as Ambulatory  ([facility] [place] [facility] [place]    Chief Complaint: Acute left lower extremity ischemia, acute MI, left ventricular thrombus    Hospital course: Patient is doing better, she is on room air, hemodynamically stable on no pressors  Still on the heparin drip, still oozing some blood from the fasciotomy area but her hemoglobin is relatively okay despite the trend downward.  She is tolerating p.o. with no more nasogastric tube in position  Pain in the left leg is still present but it is better      REVIEW OF SYSTEMS:   CONSTITUTIONAL: no fever or chills  No nausea or vomiting, tolerating p.o., good bowel movement  Denies shortness of breath  Positive pain in the leg  Ventilator/Non-Invasive Ventilation Settings (From admission, onward)      Room air              Vital Signs  Temp:  [97.7 °F (36.5 °C)-99.1 °F (37.3 °C)] 98.1 °F (36.7 °C)  Heart Rate:  [76-90] 89  Resp:  [14-18] 18  BP: (107-120)/(54-67) 120/59  Arterial Line BP: (116-151)/(47-72) 125/47  SpO2:  [89 %-100 %] 97 %  on    Device (Oxygen Therapy): room air    Intake/Output Summary (Last 24 hours) at 2025 1132  Last data filed at 2025 0400  Gross per 24 hour   Intake 933 ml   Output 375 ml   Net 558 ml     Flowsheet Rows      Flowsheet Row First Filed Value   Admission Height --   Admission Weight 71 kg (156 lb 8.4 oz) Documented at 04/10/2025 1700          Body mass index is 26.84 kg/m².      25  0500 04/15/25  0600 25  0400   Weight: 72.7 kg (160 lb 4.4 oz) 73.6 kg (162 lb 4.1 oz) 75.4 kg (166 lb 3.6 oz)       Physical Exam:  GEN:   Awake and alert, looking better, in no distress  EYES:   Sclerae clear. No icterus. PERRL. Normal  EOM  ENT:   External ears/nose normal, no oral lesions, no thrush, moist mucous membrane  NECK:  Supple, midline trachea, no JVD  LUNGS: Normal chest on inspection, CTAB but diminished, no wheezes. No rhonchi. No crackles. Respirations regular, even and unlabored.  On room air  CV:  Regular rhythm and rate. Normal S1/S2. No murmurs, gallops, or rubs noted.  ABD:  Soft, nontender, non-stented, hyperactive bowel sounds. No guarding  EXT:  Moves all extremities well. No cyanosis. No redness. No edema.  warmer left lower extremity compared to the right with dopplerable pulses.  She is weeping some blood from the fasciotomy site  Skin: Dry, intact, no bleeding    Results Review:    Results From Last 14 Days   Lab Units 04/12/25  0014 04/11/25  1711 04/11/25  0104 04/09/25  2134 04/09/25  0602   LACTATE mmol/L 1.8 2.2* 1.0   < >  --    TRIGLYCERIDES mg/dL  --   --   --   --  158*    < > = values in this interval not displayed.     Results from last 7 days   Lab Units 04/16/25  0435 04/15/25  0614 04/14/25  0416 04/13/25  0514 04/12/25  2153 04/12/25  1532 04/12/25  1302 04/12/25  0454 04/11/25  1711 04/10/25  1814 04/10/25  0907   SODIUM mmol/L 139 139 138 133*  --  133*  --  131* 129*   < > 125*   POTASSIUM mmol/L 3.4* 3.7 3.9 4.2 3.6 3.8 3.4* 3.1* 3.3*   < > 3.7   CHLORIDE mmol/L 104 105 105 104  --  102  --  98 95*   < > 88*   CO2 mmol/L 24.2 25.0 22.1 18.0*  --  19.3*  --  19.6* 20.2*   < > 20.8*   BUN mg/dL 10 14 19 22  --  26*  --  32* 36*   < > 33*   CREATININE mg/dL 0.69 0.76 0.87 1.12*  --  1.06*  --  1.08* 1.24*   < > 1.40*   CALCIUM mg/dL 8.1* 8.1* 8.1* 8.0*  --  7.7*  --  7.5* 7.8*   < > 8.5*   AST (SGOT) U/L 52*  --   --   --   --   --   --   --   --   --  72*   ALT (SGPT) U/L 11  --   --   --   --   --   --   --   --   --  36*   ANION GAP mmol/L 10.8 9.0 10.9 11.0  --  11.7  --  13.4 13.8   < > 16.2*   ALBUMIN g/dL 2.4* 2.7* 2.6*  --   --   --   --   --   --   --  2.7*    < > = values in this interval not  "displayed.               Results from last 7 days   Lab Units 04/10/25  1244   PROBNP pg/mL 9,388.0*     Results from last 7 days   Lab Units 04/16/25  0435 04/15/25  0614 04/15/25  0018 04/14/25  1852 04/14/25  1225 04/14/25  0416 04/14/25  0004   WBC 10*3/mm3 22.45* 18.20* 17.46* 16.22* 15.49* 14.46* 14.91*   HEMOGLOBIN g/dL 8.5* 8.6* 8.2* 8.7* 8.6* 9.5* 9.8*   HEMATOCRIT % 26.6* 25.9* 25.9* 26.2* 25.8* 28.2* 30.5*   PLATELETS 10*3/mm3 346 325 317 312 287 288 301   MCV fL 86.1 84.1 84.9 83.2 84.3 82.7 85.4     Results from last 7 days   Lab Units 04/16/25  0435 04/15/25  2119 04/15/25  1307 04/15/25  0614 04/15/25  0018 04/14/25  1717 04/14/25  1100 04/10/25  1742 04/10/25  1244   INR   --   --   --   --   --   --   --   --  1.36*   APTT seconds 55.3* 67.3* 59.8* 72.3* 90.5* 61.0* 72.5*   < > 96.3*    < > = values in this interval not displayed.     Results from last 7 days   Lab Units 04/16/25  0435 04/15/25  0614 04/14/25  0416 04/13/25  0514 04/12/25  0454 04/11/25  0413 04/10/25  1814   MAGNESIUM mg/dL 2.0 2.1 2.2 2.3 2.3 2.3 2.1           Invalid input(s): \"LDLCALC\"    Results from last 7 days   Lab Units 04/10/25  1958   PH, ARTERIAL pH units 7.422   PCO2, ARTERIAL mm Hg 37.0   PO2 ART mm Hg 66.4*   HCO3 ART mmol/L 24.1           Glucose   Date/Time Value Ref Range Status   04/16/2025 0528 104 70 - 130 mg/dL Final   04/15/2025 2356 143 (H) 70 - 130 mg/dL Final   04/15/2025 1556 137 (H) 70 - 130 mg/dL Final   04/15/2025 1158 288 (H) 70 - 130 mg/dL Final   04/15/2025 0614 135 (H) 70 - 130 mg/dL Final   04/15/2025 0017 140 (H) 70 - 130 mg/dL Final   04/14/2025 1647 81 70 - 130 mg/dL Final   04/14/2025 1144 102 70 - 130 mg/dL Final     Results from last 7 days   Lab Units 04/12/25  0014 04/11/25  1711 04/11/25  1059 04/11/25  0104 04/10/25  0907 04/09/25  2134   PROCALCITONIN ng/mL  --   --  1.13*  --   --   --    LACTATE mmol/L 1.8 2.2*  --  1.0 1.4 1.6     Results from last 7 days   Lab Units 04/12/25  1702 " 04/12/25  1652   BLOODCX  No growth at 3 days No growth at 3 days             Results from last 7 days   Lab Units 04/10/25  1829 04/10/25  1814   SODIUM UR mmol/L <20  --    CREATININE UR mg/dL 64.8  --    CHLORIDE UR mmol/L <20  --    OSMOLALITY UR mOsm/kg 494  --    PROTEIN TOTAL URINE mg/dL 95.5  --    URIC ACID mg/dL  --  6.2*           Imaging:   Imaging Results (All)               I reviewed the patient's new clinical results.  I personally viewed and interpreted the patient's imaging results:        Medication Review:   amoxicillin-clavulanate, 1 tablet, Oral, Q12H  arformoterol, 15 mcg, Nebulization, BID - RT  aspirin, 81 mg, Oral, Daily   Or  aspirin, 300 mg, Rectal, Daily  atorvastatin, 40 mg, Oral, Nightly  budesonide, 0.5 mg, Nebulization, BID - RT  clopidogrel, 75 mg, Oral, Daily  insulin regular, 2-7 Units, Subcutaneous, Q6H  metoprolol tartrate, 25 mg, Oral, Q12H  pantoprazole, 40 mg, Oral, Q AM  potassium chloride ER, 40 mEq, Oral, Q4H  revefenacin, 175 mcg, Nebulization, Daily - RT  senna-docusate sodium, 2 tablet, Oral, BID        heparin, 12 Units/kg/hr, Last Rate: 16 Units/kg/hr (04/16/25 0939)        ASSESSMENT:   Cardiogenic shock, resolved  Arterial embolism and thrombosis of the lower extremity, status post fasciotomy and thrombectomy with revascularization 4/12/2025  Diabetes mellitus type 2  Coronary artery disease with prior angioplasties with acute inferior wall MI status post repeat angioplasty  Left ventricular intramural thrombus likely the origin of the above  Bilateral pulmonary nodules  Severe peripheral arterial disease  Acute hypoxemic respiratory failure requiring oxygen supplementation at some point up to 4 L/min just to maintain adequate oxygenation, that was multifactorial but mostly due to atelectasis and prolonged bedrest.  Nonsustained ventricular tachycardia  Hyperlipidemia  Hyponatremia  Acute kidney injury, improved  Tobacco abuse  Small bowel enteritis was suspected  bowel ischemia from the embolic events  COPD, no exacerbation  Nonsustained V. tach  History of E. coli UTI on 3/18/2025 with negative blood culture on this admission, patient is on Zosyn for possible intra-abdominal infection from bowel ischemia  Worsening leukocytosis    PLAN:  Patient is having hyperactive bowel sounds, had a bowel movement and her ileus has resolved, we will discontinue the Reglan  She is a telemetry overflow at this point and patient is hemodynamically stable  Finish the course of antibiotic as ordered with oral Augmentin  Heparin drip per vascular surgery/hematology with the plan to be transition to oral anticoagulation according to their preference.  Advance feeding as tolerated  As for the white blood cell count, the patient will be monitored by the surgical team, that is one of the main reason why they elected to hold on the transition to oral anticoagulation just in case debridement is necessary.        Discussed with the patient and with the nursing staff at the bedside  Discussed with the CICU rounding team    Labs/Notes/films were independently reviewed and pertinent results are summarized above  The copied texts in this note were reviewed and they are accurate as of 04/16/25    Disposition: Telemetry    Zuri Grey MD  04/16/25  11:32 EDT          Dictated utilizing Dragon dictation

## 2025-04-16 NOTE — PROGRESS NOTES
"General Surgery Progress Note    Chief complaint:  ischemic enteritis/colitis, ileus     Interval history: Patient reports her leg is hurting today. She says her abdominal pain is a little bit better, still in the left side and absent \"until you push on it.\" She had two nonbloody bowel movements yesterday following suppository. Denies diarrhea, fever, chills, or dysuria.  She is tolerating her clear liquids and asking for a Coke.       Vital signs:  /54 (BP Location: Right arm, Patient Position: Lying)   Pulse 82   Temp 98.1 °F (36.7 °C) (Oral)   Resp 18   Ht 167.6 cm (65.98\")   Wt 75.4 kg (166 lb 3.6 oz)   SpO2 100%   BMI 26.84 kg/m²     Intake/output:  Intake & Output (last day)         04/15 0701  04/16 0700 04/16 0701  04/17 0700    P.O. 840     I.V. (mL/kg) 693 (9.2)     IV Piggyback      Total Intake(mL/kg) 1533 (20.3)     Urine (mL/kg/hr) 575 (0.3)     Stool 0     Total Output 575     Net +958           Urine Unmeasured Occurrence 1 x     Stool Unmeasured Occurrence 2 x             Physical exam:  Sitting up drinking clear liquid breakfast prior to exam, appears comfortable   NEURO: awake, alert, no gross focal deficits, moves all extremities  PSYCH: interactive, cooperative  PULM: unlabored respirations on room air  CV: HR 80s, normotensive on monitor, left leg and groin dressed  GI: abdomen moderately distended but soft, mildly tender in the left lower quadrant without guarding  : purewick in place    Diagnostics:  Results from last 7 days   Lab Units 04/16/25  0435 04/15/25  0614 04/15/25  0018 04/14/25  0416 04/14/25  0004   WBC 10*3/mm3 22.45* 18.20* 17.46*   < > 14.91*   HEMOGLOBIN g/dL 8.5* 8.6* 8.2*   < > 9.8*   HEMATOCRIT % 26.6* 25.9* 25.9*   < > 30.5*   PLATELETS 10*3/mm3 346 325 317   < > 301   MONOCYTES % % 8.1  --  10.0  --  15.3*   EOSINOPHIL % % 0.0*  --  2.0  --  0.0*    < > = values in this interval not displayed.     Results from last 7 days   Lab Units 04/16/25  0435 " 04/15/25  0614 04/14/25  0416 04/10/25  1814 04/10/25  0907   SODIUM mmol/L 139 139 138   < > 125*   POTASSIUM mmol/L 3.4* 3.7 3.9   < > 3.7   CHLORIDE mmol/L 104 105 105   < > 88*   CO2 mmol/L 24.2 25.0 22.1   < > 20.8*   BUN mg/dL 10 14 19   < > 33*   CREATININE mg/dL 0.69 0.76 0.87   < > 1.40*   CALCIUM mg/dL 8.1* 8.1* 8.1*   < > 8.5*   BILIRUBIN mg/dL 0.6  --   --   --  1.1   ALK PHOS U/L 139*  --   --   --  126*   ALT (SGPT) U/L 11  --   --   --  36*   AST (SGOT) U/L 52*  --   --   --  72*   GLUCOSE mg/dL 109* 128* 108*   < > 177*    < > = values in this interval not displayed.     Results from last 7 days   Lab Units 04/16/25  0435 04/15/25  2119 04/15/25  1307 04/10/25  1742 04/10/25  1244   INR   --   --   --   --  1.36*   APTT seconds 55.3* 67.3* 59.8*   < > 96.3*    < > = values in this interval not displayed.       Assessment/Plan:  65-year-old lady with tobacco use, COPD, and severe peripheral vascular disease who developed left ventricular thrombus and showering of emboli left kidney, spleen, suspected to bowel.  This admission she has undergone emergent heart cath with PCI, left lower extremity thromboembolectomy, revascularization, and fasciotomy.  She was noted to have possible ischemic enteritis and colitis and developed an ileus.  She underwent NGT decompression and serial abdominal exams. Her NGT has been removed and she was started on clear liquids two days ago.  She had two nonbloody bowel movements yesterday after a suppository.    Her WBC is climbing today to 22. She has been AVSS without worsening of her abdominal pain or exam, and it is reassuring that she is tolerating her diet and having bowel movements.  Embolization of her spleen may also be contributing to her leukocytosis.  No evidence for UTI, pneumonia, or other clear infectious source at this time.  Dr. Lawton has ordered a CT and I will follow up the results to evaluate if she has any evidence of intraabdominal pathology to explain  her white count.  At present she may continue her diet as ordered.     Yulisa Parisi MD  General, Robotic and Endoscopic Surgery  [facility]    [address], [address]  [place], [place], [address]  P: [phone]  F: [phone]

## 2025-04-16 NOTE — PLAN OF CARE
Goal Outcome Evaluation:           Problem: Adult Inpatient Plan of Care  Goal: Plan of Care Review  Outcome: Progressing     Problem: Adult Inpatient Plan of Care  Goal: Absence of Hospital-Acquired Illness or Injury  Intervention: Identify and Manage Fall Risk  Recent Flowsheet Documentation  Taken 4/16/2025 1526 by Mary Mclaughlin RN  Safety Promotion/Fall Prevention:   activity supervised   safety round/check completed     Problem: Adult Inpatient Plan of Care  Goal: Absence of Hospital-Acquired Illness or Injury  Intervention: Prevent Skin Injury  Recent Flowsheet Documentation  Taken 4/16/2025 1526 by Mary Mclaughlin RN  Body Position:   turned   supine  Skin Protection: incontinence pads utilized     Problem: Adult Inpatient Plan of Care  Goal: Optimal Comfort and Wellbeing  Intervention: Monitor Pain and Promote Comfort  Recent Flowsheet Documentation  Taken 4/16/2025 1526 by Mary Mclaughlin RN  Pain Management Interventions: pain medication given

## 2025-04-16 NOTE — SIGNIFICANT NOTE
04/16/25 1029   OTHER   Discipline physical therapist   Rehab Time/Intention   Session Not Performed patient unavailable for treatment;other (see comments)  (Pt to have stat CT scan abd and then possible transfer to another unit. f/u 4/17/25 if appropriate. Nurse aware)   Recommendation   PT - Next Appointment 04/17/25

## 2025-04-16 NOTE — PROGRESS NOTES
CHIEF COMPLAINT:  LV thrombus with multifocal emboli, anemia    HISTORY OF PRESENT ILLNESS:   This is a 65-year-old lady with multiple medical comorbidities undergoing treatment for in LV thrombus with embolization and cardiogenic shock.  Imaging has shown occlusion of the left superficial femoral artery, distal left renal artery, distal splenic artery, splenic infarcts, left renal infarcts, occlusion of the ONEIDA with reconstitution distally.  On 4/12/2025 she underwent a mechanical embolectomy and 4 compartment fasciotomy left lower extremity.    Interval history-patient complains of abdominal distention/mild discomfort going for CT evaluation.  Per nurse oozing at the surgical site left lower extremity stable to improved.  Hemoglobin is stable 8.5.  White count has elevated over the previous 2 days 15 up to 22 with neutrophilia      Past Medical History, Past Surgical History, Social History, Family History have been reviewed and are without significant changes except as mentioned.    Review of Systems   A comprehensive 14 point review of systems was performed and was negative except as mentioned.    Medications:  The current medication list was reviewed in the EMR    ALLERGIES:    Allergies   Allergen Reactions    Tramadol Itching     High severity per pt       Objective      Vitals:    04/16/25 1100   BP:    Pulse: 89   Resp:    Temp:    SpO2: 97%          Physical Exam    CONSTITUTIONAL: pleasant well-developed woman lying in bed a bit uncomfortable looking  HEENT: no icterus, no thrush, moist membranes  CV: RRR, S1S2, no murmur  RESP: cta bilat, no wheezing, no rales  GI: A bit more distended than yesterday no rebound or guarding, no splenomegaly, +BS  NEURO: alert and oriented x3, mild to moderate global weakness  PSYCH: normal mood and affect   Skin: Surgical wounds left lower extremity with dressed wounds and clean/dry  RECENT LABS:  Hematology Results from last 7 days   Lab Units 04/16/25  7470  04/15/25  0614 04/15/25  0018   WBC 10*3/mm3 22.45* 18.20* 17.46*   HEMOGLOBIN g/dL 8.5* 8.6* 8.2*   HEMATOCRIT % 26.6* 25.9* 25.9*   PLATELETS 10*3/mm3 346 325 317     2  Lab Results   Component Value Date    GLUCOSE 109 (H) 04/16/2025    BUN 10 04/16/2025    CREATININE 0.69 04/16/2025    EGFRIFNONA 96 11/04/2021    BCR 14.5 04/16/2025    CO2 24.2 04/16/2025    CALCIUM 8.1 (L) 04/16/2025    ALBUMIN 2.4 (L) 04/16/2025    AST 52 (H) 04/16/2025    ALT 11 04/16/2025           Lab Results   Component Value Date    [id number] 344 08/08/2024              Assessment & Plan   *LV thrombus with multiple sites of emboli-currently on unfractionated heparin  *Left lower extremity embolectomy/fasciotomies 4/12/25  *Bilateral pulmonary nodules  *Acute blood loss anemia-hemoglobin stable 8.5  *Leukocytosis-increased 22,000 today with neutrophilia, mild monocytosis, minimal basophilia  *Coronary artery disease, peripheral artery disease  *COPD    Hematology plan/recommendations:  Monitor hemoglobin on anticoagulation, transfuse as needed  Note plan for possible OR later in the week, patient to remain on UFH              4/16/2025      CC:

## 2025-04-17 ENCOUNTER — ANESTHESIA EVENT (OUTPATIENT)
Dept: PERIOP | Facility: HOSPITAL | Age: 65
End: 2025-04-17
Payer: MEDICARE

## 2025-04-17 ENCOUNTER — TELEPHONE (OUTPATIENT)
Dept: CARDIOLOGY | Facility: CLINIC | Age: 65
End: 2025-04-17
Payer: MEDICARE

## 2025-04-17 LAB
ALBUMIN SERPL-MCNC: 2.8 G/DL (ref 3.5–5.2)
ANION GAP SERPL CALCULATED.3IONS-SCNC: 8 MMOL/L (ref 5–15)
APTT PPP: 81.5 SECONDS (ref 22.7–35.4)
APTT PPP: 85.2 SECONDS (ref 22.7–35.4)
BACTERIA SPEC AEROBE CULT: NORMAL
BACTERIA SPEC AEROBE CULT: NORMAL
BUN SERPL-MCNC: 7 MG/DL (ref 8–23)
BUN/CREAT SERPL: 10.9 (ref 7–25)
CALCIUM SPEC-SCNC: 8.2 MG/DL (ref 8.6–10.5)
CHLORIDE SERPL-SCNC: 102 MMOL/L (ref 98–107)
CO2 SERPL-SCNC: 28 MMOL/L (ref 22–29)
CREAT SERPL-MCNC: 0.64 MG/DL (ref 0.57–1)
DEPRECATED RDW RBC AUTO: 42.4 FL (ref 37–54)
EGFRCR SERPLBLD CKD-EPI 2021: 98.8 ML/MIN/1.73
EOSINOPHIL # BLD MANUAL: 0.54 10*3/MM3 (ref 0–0.4)
EOSINOPHIL NFR BLD MANUAL: 2 % (ref 0.3–6.2)
ERYTHROCYTE [DISTWIDTH] IN BLOOD BY AUTOMATED COUNT: 13.9 % (ref 12.3–15.4)
GLUCOSE BLDC GLUCOMTR-MCNC: 121 MG/DL (ref 70–130)
GLUCOSE BLDC GLUCOMTR-MCNC: 142 MG/DL (ref 70–130)
GLUCOSE BLDC GLUCOMTR-MCNC: 176 MG/DL (ref 70–130)
GLUCOSE SERPL-MCNC: 113 MG/DL (ref 65–99)
HCT VFR BLD AUTO: 24.3 % (ref 34–46.6)
HGB BLD-MCNC: 7.9 G/DL (ref 12–15.9)
HYPOCHROMIA BLD QL: ABNORMAL
LYMPHOCYTES # BLD MANUAL: 2.15 10*3/MM3 (ref 0.7–3.1)
LYMPHOCYTES NFR BLD MANUAL: 2 % (ref 5–12)
MAGNESIUM SERPL-MCNC: 1.9 MG/DL (ref 1.6–2.4)
MCH RBC QN AUTO: 27.8 PG (ref 26.6–33)
MCHC RBC AUTO-ENTMCNC: 32.5 G/DL (ref 31.5–35.7)
MCV RBC AUTO: 85.6 FL (ref 79–97)
METAMYELOCYTES NFR BLD MANUAL: 3 % (ref 0–0)
MONOCYTES # BLD: 0.54 10*3/MM3 (ref 0.1–0.9)
MYELOCYTES NFR BLD MANUAL: 2 % (ref 0–0)
NEUTROPHILS # BLD AUTO: 22.07 10*3/MM3 (ref 1.7–7)
NEUTROPHILS NFR BLD MANUAL: 82 % (ref 42.7–76)
NRBC BLD AUTO-RTO: 1.2 /100 WBC (ref 0–0.2)
PHOSPHATE SERPL-MCNC: 2.7 MG/DL (ref 2.5–4.5)
PLAT MORPH BLD: NORMAL
PLATELET # BLD AUTO: 399 10*3/MM3 (ref 140–450)
PMV BLD AUTO: 9.9 FL (ref 6–12)
POTASSIUM SERPL-SCNC: 4.1 MMOL/L (ref 3.5–5.2)
PROMYELOCYTES NFR BLD MANUAL: 1 % (ref 0–0)
RBC # BLD AUTO: 2.84 10*6/MM3 (ref 3.77–5.28)
SODIUM SERPL-SCNC: 138 MMOL/L (ref 136–145)
VARIANT LYMPHS NFR BLD MANUAL: 8 % (ref 19.6–45.3)
WBC NRBC COR # BLD AUTO: 26.91 10*3/MM3 (ref 3.4–10.8)

## 2025-04-17 PROCEDURE — 85025 COMPLETE CBC W/AUTO DIFF WBC: CPT | Performed by: SURGERY

## 2025-04-17 PROCEDURE — 82948 REAGENT STRIP/BLOOD GLUCOSE: CPT

## 2025-04-17 PROCEDURE — 85730 THROMBOPLASTIN TIME PARTIAL: CPT | Performed by: INTERNAL MEDICINE

## 2025-04-17 PROCEDURE — 99232 SBSQ HOSP IP/OBS MODERATE 35: CPT | Performed by: STUDENT IN AN ORGANIZED HEALTH CARE EDUCATION/TRAINING PROGRAM

## 2025-04-17 PROCEDURE — 83735 ASSAY OF MAGNESIUM: CPT | Performed by: SURGERY

## 2025-04-17 PROCEDURE — 99233 SBSQ HOSP IP/OBS HIGH 50: CPT | Performed by: STUDENT IN AN ORGANIZED HEALTH CARE EDUCATION/TRAINING PROGRAM

## 2025-04-17 PROCEDURE — 99223 1ST HOSP IP/OBS HIGH 75: CPT | Performed by: INTERNAL MEDICINE

## 2025-04-17 PROCEDURE — 25010000002 HEPARIN (PORCINE) 25000-0.45 UT/250ML-% SOLUTION: Performed by: SURGERY

## 2025-04-17 PROCEDURE — 63710000001 INSULIN REGULAR HUMAN PER 5 UNITS: Performed by: SURGERY

## 2025-04-17 PROCEDURE — 94799 UNLISTED PULMONARY SVC/PX: CPT

## 2025-04-17 PROCEDURE — 97535 SELF CARE MNGMENT TRAINING: CPT

## 2025-04-17 PROCEDURE — 63710000001 REVEFENACIN 175 MCG/3ML SOLUTION: Performed by: SURGERY

## 2025-04-17 PROCEDURE — 80069 RENAL FUNCTION PANEL: CPT | Performed by: INTERNAL MEDICINE

## 2025-04-17 PROCEDURE — 85007 BL SMEAR W/DIFF WBC COUNT: CPT | Performed by: SURGERY

## 2025-04-17 PROCEDURE — 94760 N-INVAS EAR/PLS OXIMETRY 1: CPT

## 2025-04-17 PROCEDURE — 99232 SBSQ HOSP IP/OBS MODERATE 35: CPT | Performed by: INTERNAL MEDICINE

## 2025-04-17 PROCEDURE — 94761 N-INVAS EAR/PLS OXIMETRY MLT: CPT

## 2025-04-17 PROCEDURE — 94664 DEMO&/EVAL PT USE INHALER: CPT

## 2025-04-17 PROCEDURE — 99024 POSTOP FOLLOW-UP VISIT: CPT | Performed by: SURGERY

## 2025-04-17 PROCEDURE — 97530 THERAPEUTIC ACTIVITIES: CPT

## 2025-04-17 PROCEDURE — 25010000002 HYDROMORPHONE 1 MG/ML SOLUTION: Performed by: SURGERY

## 2025-04-17 PROCEDURE — G0545 PR INHERENT VISIT TO INPT: HCPCS | Performed by: INTERNAL MEDICINE

## 2025-04-17 RX ADMIN — CLOPIDOGREL BISULFATE 75 MG: 75 TABLET, FILM COATED ORAL at 09:06

## 2025-04-17 RX ADMIN — BUDESONIDE 0.5 MG: 0.5 INHALANT RESPIRATORY (INHALATION) at 19:40

## 2025-04-17 RX ADMIN — ACETAMINOPHEN 650 MG: 325 TABLET, FILM COATED ORAL at 20:41

## 2025-04-17 RX ADMIN — ASPIRIN 81 MG CHEWABLE TABLET 81 MG: 81 TABLET CHEWABLE at 09:06

## 2025-04-17 RX ADMIN — PANTOPRAZOLE SODIUM 40 MG: 40 TABLET, DELAYED RELEASE ORAL at 05:01

## 2025-04-17 RX ADMIN — ARFORMOTEROL TARTRATE 15 MCG: 15 SOLUTION RESPIRATORY (INHALATION) at 08:52

## 2025-04-17 RX ADMIN — METOPROLOL TARTRATE 25 MG: 25 TABLET, FILM COATED ORAL at 20:41

## 2025-04-17 RX ADMIN — SENNOSIDES AND DOCUSATE SODIUM 2 TABLET: 50; 8.6 TABLET ORAL at 09:06

## 2025-04-17 RX ADMIN — ARFORMOTEROL TARTRATE 15 MCG: 15 SOLUTION RESPIRATORY (INHALATION) at 19:41

## 2025-04-17 RX ADMIN — HYDROMORPHONE HYDROCHLORIDE 1 MG: 1 INJECTION, SOLUTION INTRAMUSCULAR; INTRAVENOUS; SUBCUTANEOUS at 09:06

## 2025-04-17 RX ADMIN — HYDROMORPHONE HYDROCHLORIDE 1 MG: 1 INJECTION, SOLUTION INTRAMUSCULAR; INTRAVENOUS; SUBCUTANEOUS at 03:17

## 2025-04-17 RX ADMIN — AMOXICILLIN AND CLAVULANATE POTASSIUM 1 TABLET: 875; 125 TABLET, COATED ORAL at 09:06

## 2025-04-17 RX ADMIN — REVEFENACIN 175 MCG: 175 SOLUTION RESPIRATORY (INHALATION) at 08:56

## 2025-04-17 RX ADMIN — ACETAMINOPHEN 650 MG: 325 TABLET, FILM COATED ORAL at 12:23

## 2025-04-17 RX ADMIN — BUDESONIDE 0.5 MG: 0.5 INHALANT RESPIRATORY (INHALATION) at 08:54

## 2025-04-17 RX ADMIN — HEPARIN SODIUM 14 UNITS/KG/HR: 10000 INJECTION, SOLUTION INTRAVENOUS at 11:29

## 2025-04-17 RX ADMIN — HYDROCODONE BITARTRATE AND ACETAMINOPHEN 1 TABLET: 7.5; 325 TABLET ORAL at 05:01

## 2025-04-17 RX ADMIN — METOPROLOL TARTRATE 25 MG: 25 TABLET, FILM COATED ORAL at 09:06

## 2025-04-17 RX ADMIN — ATORVASTATIN CALCIUM 40 MG: 20 TABLET, FILM COATED ORAL at 20:41

## 2025-04-17 RX ADMIN — INSULIN HUMAN 2 UNITS: 100 INJECTION, SOLUTION PARENTERAL at 17:01

## 2025-04-17 NOTE — TELEPHONE ENCOUNTER
[facility], pt's pharmacy called and needs clarification on two medications, Carvedilol and Lisinopril.  They stated they have rec'd two different scripts by two different providers, Edith Rodriguez and Dr. Xiao.       I see pt is currently hospitalized.      They need clarification for dose and directions on both medications, Carvedilol and Lisinopril.  I do not see either medication on pt's current med list.     Please advise, thank you.

## 2025-04-17 NOTE — THERAPY TREATMENT NOTE
Patient Name: Desiree Garcia  : 1960    MRN: [id number]                              Today's Date: 2025       Admit Date: 4/10/2025    Visit Dx:     ICD-10-CM ICD-9-CM   1. 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  I74.3 444.22     Patient Active Problem List   Diagnosis    Type 2 diabetes mellitus, without long-term current use of insulin    COPD (chronic obstructive pulmonary disease)    Depression with anxiety    Esophageal reflux    Forgetfulness    Leg paresthesia    Lumbar spinal stenosis    Migraines    Night sweat    Sciatica    Dyspnea on exertion    Thoracic or lumbosacral neuritis or radiculitis    Left wrist pain    Sprain of left wrist    Arthritis of knee, left    Contusion of left knee    Nondisplaced fracture of trapezium bone of left wrist with routine healing    Chronic left shoulder pain    Coronary artery disease involving native heart without angina pectoris    Hypertension, essential    Mixed hyperlipidemia    Cigarette nicotine dependence with nicotine-induced disorder    Altered bowel habits    Diarrhea    Rectal bleeding    Abdominal bloating    Allergic rhinitis due to food    Elevated troponin    Acute diastolic CHF (congestive heart failure)    History of ST elevation myocardial infarction (STEMI)    STEMI (ST elevation myocardial infarction)    Acute thrombus of left ventricle    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     Past Medical History:   Diagnosis Date    Arthritis     Cervical cancer screening     COPD (chronic obstructive pulmonary disease)     Coronary artery disease involving native heart without angina pectoris 2021    primary angioplasty and stent placement to mid right coronary artery with good angiographic results on 2021 after STEMI    Depression with anxiety     Diabetes mellitus     Forgetfulness     Gastric reflux     Hypertension     Leg paresthesia 2017    Right    Limb swelling     Lumbago 2017    Low  back pain    Lumbar spinal stenosis 2017    L4/5 moderate to severe central canal stenosis    Lumbosacral radiculopathy 2017    Right    Migraines     Night sweat     Reflux esophagitis     Shortness of breath     ST elevation myocardial infarction (STEMI) (CMS/[facility]) 2021    Stomach disorder      Past Surgical History:   Procedure Laterality Date    ABDOMINAL SURGERY      3 C-SECTIONS    ARTERIOGRAM LOWER EXTREMITY Left 2025    Procedure: ARTERIOGRAM LOWER EXTREMITY;  Surgeon: Misael Lawton MD;  Location: [facility] OR;  Service: Vascular;  Laterality: Left;    CARDIAC CATHETERIZATION      CARDIAC CATHETERIZATION N/A 2021    Procedure: Left Heart Cath;  Surgeon: Sidney Xiao MD;  Location: [facility] CATH INVASIVE LOCATION;  Service: Cardiology;  Laterality: N/A;    CARDIAC CATHETERIZATION N/A 2025    Procedure: Left Heart Cath;  Surgeon: James Verdugo MD;  Location: [facility] CATH INVASIVE LOCATION;  Service: Cardiology;  Laterality: N/A;     SECTION      x 3    CHOLECYSTECTOMY      COLONOSCOPY      COLONOSCOPY N/A 3/25/2025    Procedure: COLONOSCOPY WITH BIOPSIES AND COLD AND HOT SNARE POLYPECTOMIES;  Surgeon: Heber Najera MD;  Location: [facility] ENDOSCOPY;  Service: Gastroenterology;  Laterality: N/A;  COLON POLYPS    CORONARY STENT PLACEMENT      EMBOLECTOMY Left 2025    Procedure: EMBOLECTOMY MECHANICAL, FOUR COMPARTMENT FASCIOTOMY;  Surgeon: Misael Lawton MD;  Location: [facility] OR;  Service: Vascular;  Laterality: Left;    ENDOSCOPY      2007    FOOT SURGERY Right     HAND SURGERY      HYSTERECTOMY  1985      General Information       Row Name 25 1555 25 1552       OT Time and Intention    Subjective Information no complaints  -RB no complaints  -RB    Document Type therapy note (daily note)  -RB therapy note (daily note)  -RB    Mode of Treatment co-treatment;physical therapy;occupational therapy  -RB  co-treatment;occupational therapy;physical therapy  -RB    Patient Effort good  -RB good  -RB      Row Name 04/17/25 1555 04/17/25 1552       General Information    Patient Profile Reviewed yes  -RB yes  -RB      Row Name 04/17/25 1555 04/17/25 1552       Cognition    Orientation Status (Cognition) oriented x 3  -RB oriented x 3  -RB      Row Name 04/17/25 1552          Safety Issues/Impairments Affecting Functional Mobility    Safety Issues Affecting Function (Mobility) safety precaution awareness;impulsivity;insight into deficits/self-awareness;safety precautions follow-through/compliance;problem-solving;awareness of need for assistance;sequencing abilities  -RB               User Key  (r) = Recorded By, (t) = Taken By, (c) = Cosigned By      Initials Name Provider Type    RB Zabrina Lyles OT Occupational Therapist                     Mobility/ADL's       Row Name 04/17/25 1555          Bed Mobility    Bed Mobility supine-sit;sit-supine  -RB     Supine-Sit [place] (Bed Mobility) moderate assist (50% patient effort);2 person assist;verbal cues  -RB     Sit-Supine [place] (Bed Mobility) moderate assist (50% patient effort);2 person assist;verbal cues  -RB     Bed Mobility, Safety Issues cognitive deficits limit understanding;decreased use of arms for pushing/pulling;decreased use of legs for bridging/pushing;impaired trunk control for bed mobility  -RB     Assistive Device (Bed Mobility) bed rails  -RB       Row Name 04/17/25 1555          Transfers    Transfers sit-stand transfer;stand-sit transfer;toilet transfer  -RB       Row Name 04/17/25 1555          Sit-Stand Transfer    Sit-Stand [place] (Transfers) moderate assist (50% patient effort);2 person assist;verbal cues  -RB       Row Name 04/17/25 1555          Stand-Sit Transfer    Stand-Sit [place] (Transfers) moderate assist (50% patient effort);2 person assist;verbal cues  -RB     Assistive Device (Stand-Sit Transfers) walker,  front-wheeled  -RB       Row Name 04/17/25 1555          Toilet Transfer    [place] Level (Toilet Transfer) moderate assist (50% patient effort);2 person assist;verbal cues  -RB     Assistive Device (Toilet Transfer) commode, 3-in-1;walker, front-wheeled  -RB       Row Name 04/17/25 1555          Functional Mobility    Functional Mobility- Ind. Level not tested;unable to perform  -RB       Row Name 04/17/25 1555          Activities of Daily Living    BADL Assessment/Intervention toileting;lower body dressing  -RB       Row Name 04/17/25 1555          Lower Body Dressing Assessment/Training    [place] Level (Lower Body Dressing) lower body dressing skills;maximum assist (25% patient effort);socks;verbal cues  -RB     Position (Lower Body Dressing) edge of bed sitting  -RB     Comment, (Lower Body Dressing) attempted figure 4  -RB       Row Name 04/17/25 1555          Toileting Assessment/Training    [place] Level (Toileting) toileting skills;dependent (less than 25% patient effort);change pad/brief;adjust/manage clothing;perform perineal hygiene  -RB     Position (Toileting) supported sitting;supported standing  -RB               User Key  (r) = Recorded By, (t) = Taken By, (c) = Cosigned By      Initials Name Provider Type    RB Zabrina Lyles OT Occupational Therapist                   Obj/Interventions       Row Name 04/17/25 1554          Shoulder (Therapeutic Exercise)    Shoulder (Therapeutic Exercise) AROM (active range of motion)  -RB     Shoulder AROM (Therapeutic Exercise) bilateral;flexion;extension;10 repetitions;sitting  -RB       Row Name 04/17/25 1554          Elbow/Forearm (Therapeutic Exercise)    Elbow/Forearm (Therapeutic Exercise) AROM (active range of motion)  -RB     Elbow/Forearm AROM (Therapeutic Exercise) bilateral;flexion;extension;10 repetitions;sitting  -RB       Row Name 04/17/25 1554          Motor Skills    Therapeutic Exercise shoulder;elbow/forearm  -RB       Row  Name 04/17/25 1554          Balance    Comment, Balance SBA/CGA sitting balance, Mod A x2 standing balance  -RB               User Key  (r) = Recorded By, (t) = Taken By, (c) = Cosigned By      Initials Name Provider Type    Zabrina Solano OT Occupational Therapist                   Goals/Plan    No documentation.                  Clinical Impression       Row Name 04/17/25 1553          Pain Assessment    Pretreatment Pain Rating 0/10 - no pain  -RB     Posttreatment Pain Rating 0/10 - no pain  -RB       Row Name 04/17/25 1553          Plan of Care Review    Plan of Care Reviewed With patient  -RB     Progress no change  -RB     Outcome Evaluation The pt was agreeable to a OT/PT co-tx this afternoon. She completed bed mobility with Mod A x2. Max A provided for donning LB socks via figure 4. Mod A x2 + walker to stand and pivot over to a BSC. Total A for hygiene following a loose BM. Mod A x2 to pivot back to the EOB and return to supine. SNF is recommended.  -RB       Row Name 04/17/25 1553          Therapy Assessment/Plan (OT)    Rehab Potential (OT) good  -RB     Criteria for Skilled Therapeutic Interventions Met (OT) yes;skilled treatment is necessary  -RB     Therapy Frequency (OT) 5 times/wk  -RB       Row Name 04/17/25 1553          Therapy Plan Review/Discharge Plan (OT)    Anticipated Discharge Disposition (OT) skilled nursing facility;inpatient rehabilitation facility  -RB       Row Name 04/17/25 1553          Positioning and Restraints    Pre-Treatment Position in bed  -RB     Post Treatment Position bed  -RB     In Bed notified nsg;supine;call light within reach;encouraged to call for assist;fowlers;legs elevated  -RB               User Key  (r) = Recorded By, (t) = Taken By, (c) = Cosigned By      Initials Name Provider Type    Zabrina Solano OT Occupational Therapist                   Outcome Measures       Row Name 04/17/25 0906          How much help from another person do you currently  need...    Turning from your back to your side while in flat bed without using bedrails? 3  -KP     Moving from lying on back to sitting on the side of a flat bed without bedrails? 3  -KP     Moving to and from a bed to a chair (including a wheelchair)? 1  -KP     Standing up from a chair using your arms (e.g., wheelchair, bedside chair)? 1  -KP     Climbing 3-5 steps with a railing? 1  -KP     To walk in hospital room? 1  -KP     AM-PAC 6 Clicks Score (PT) 10  -KP     Highest Level of Mobility Goal 4 --> Transfer to chair/commode  -KP               User Key  (r) = Recorded By, (t) = Taken By, (c) = Cosigned By      Initials Name Provider Type    Gisselle Celestin, RN Registered Nurse                    Occupational Therapy Education       Title: PT OT SLP Therapies (In Progress)       Topic: Occupational Therapy (In Progress)       Point: ADL training (Done)       Learning Progress Summary            Patient Acceptance, E, VU,NR by  at 4/15/2025 1047                      Point: Precautions (Done)       Learning Progress Summary            Patient Acceptance, E, VU,NR by  at 4/15/2025 1047                      Point: Body mechanics (Done)       Learning Progress Summary            Patient Acceptance, E, VU,NR by  at 4/15/2025 1047                                      User Key       Initials Effective Dates Name Provider Type Discipline     06/10/21 -  Katerine Grady OT Occupational Therapist OT                  OT Recommendation and Plan  Therapy Frequency (OT): 5 times/wk  Plan of Care Review  Plan of Care Reviewed With: patient  Progress: no change  Outcome Evaluation: The pt was agreeable to a OT/PT co-tx this afternoon. She completed bed mobility with Mod A x2. Max A provided for donning LB socks via figure 4. Mod A x2 + walker to stand and pivot over to a BSC. Total A for hygiene following a loose BM. Mod A x2 to pivot back to the EOB and return to supine. SNF is recommended.     Time Calculation:          Time Calculation- OT       Row Name 04/17/25 1552             Time Calculation- OT    OT Start Time 1423  -RB      OT Stop Time 1446  -RB      OT Time Calculation (min) 23 min  -RB      Total Timed Code Minutes- OT 23 minute(s)  -RB      OT Received On 04/17/25  -RB      OT - Next Appointment 04/18/25  -RB         Timed Charges    24661 - OT Self Care/Mgmt Minutes 23  -RB         Total Minutes    Timed Charges Total Minutes 23  -RB       Total Minutes 23  -RB                User Key  (r) = Recorded By, (t) = Taken By, (c) = Cosigned By      Initials Name Provider Type    RB Zabrina Lyles OT Occupational Therapist                  Therapy Charges for Today       Code Description Service Date Service Provider Modifiers Qty    [id number] HC OT SELF CARE/MGMT/TRAIN EA 15 MIN 4/17/2025 Zabrina Lyles OT GO 2                 Zabrina Lyles OT  4/17/2025

## 2025-04-17 NOTE — PROGRESS NOTES
CHIEF COMPLAINT:  LV thrombus with multifocal emboli, anemia    HISTORY OF PRESENT ILLNESS:   This is a 65-year-old lady with multiple medical comorbidities undergoing treatment for in LV thrombus with embolization and cardiogenic shock.  Imaging has shown occlusion of the left superficial femoral artery, distal left renal artery, distal splenic artery, splenic infarcts, left renal infarcts, occlusion of the ONEIDA with reconstitution distally.  On 4/12/2025 she underwent a mechanical embolectomy and 4 compartment fasciotomy left lower extremity.    Interval history-no acute c/o today.  Note plan of wound wash out tomorrow per vascular.      Past Medical History, Past Surgical History, Social History, Family History have been reviewed and are without significant changes except as mentioned.    Review of Systems   A comprehensive 14 point review of systems was performed and was negative except as mentioned.    Medications:  The current medication list was reviewed in the EMR    ALLERGIES:    Allergies   Allergen Reactions    Tramadol Itching     High severity per pt       Objective      Vitals:    04/17/25 0901   BP:    Pulse: 87   Resp: 16   Temp:    SpO2:           Physical Exam    CONSTITUTIONAL: pleasant well-developed woman lying in bed a bit uncomfortable looking  HEENT: no icterus, no thrush, moist membranes  CV: RRR, S1S2, no murmur  RESP: cta bilat, no wheezing, no rales  GI: soft no rebound or guarding, no splenomegaly, +BS  NEURO: alert and oriented x3, mild to moderate global weakness  PSYCH: normal mood and affect   Skin: Surgical wounds left lower extremity with dressed wounds and clean/dry  RECENT LABS:  Hematology Results from last 7 days   Lab Units 04/17/25  0435 04/16/25  0435 04/15/25  0614   WBC 10*3/mm3 26.91* 22.45* 18.20*   HEMOGLOBIN g/dL 7.9* 8.5* 8.6*   HEMATOCRIT % 24.3* 26.6* 25.9*   PLATELETS 10*3/mm3 399 346 325     2  Lab Results   Component Value Date    GLUCOSE 113 (H) 04/17/2025    BUN  7 (L) 04/17/2025    CREATININE 0.64 04/17/2025    EGFRIFNONA 96 11/04/2021    BCR 10.9 04/17/2025    CO2 28.0 04/17/2025    CALCIUM 8.2 (L) 04/17/2025    ALBUMIN 2.8 (L) 04/17/2025    AST 52 (H) 04/16/2025    ALT 11 04/16/2025           Lab Results   Component Value Date    NYNIHHNA04 344 08/08/2024          CT abdomen/pelvis 4/16/2025-  FINDINGS:  1. Most of the colonic dilatation has resolved, but there is mild  persistent dilatation of the proximal transverse colon. The proximal  transverse colon appears mildly patulous and has a slightly thickened  wall. There is less dilatation of a long segment of proximal small  bowel, but there is a long segment of distal ileum which appears  slightly more dilated than previously, image 107. The distal/terminal  ileum is normal in caliber, stable. There is a slightly less thickened  appearance of some of the small bowel loops with less thickening of the  folds. There is no bowel pneumatosis.     There is a slightly larger small volume of free fluid, but there is also  new third spacing of fluid within the body wall. There are no fluid  collections. No free air.     2. Evolving splenic infarcts appear unchanged. Evolving left renal  infarcts appear slightly less defined.     3. There is no new abnormality at the liver, pancreas, adrenals, or  right kidney. Urinary bladder is significantly distended. Consider a  Storm catheter.     4. There are postsurgical changes at the left groin. Slightly narrowed,  but the left common femoral artery is patent as is the visualized left  superficial femoral artery, series 3 image 132. There is a very small  amount of fluid at the left groin, but there is no discrete fluid  collection or hematoma.     5. There is no pneumonia or pleural effusions at the visualized lung  bases.         Assessment & Plan   *LV thrombus with multiple sites of emboli-currently on unfractionated heparin  *Left lower extremity embolectomy/fasciotomies  4/12/25  *Bilateral pulmonary nodules  *Acute blood loss anemia-hemoglobin slow decline 7.9  *Leukocytosis-increased 26,000 today with neutrophilia, mild monocytosis, minimal basophilia  *Coronary artery disease, peripheral artery disease on aspirin/Plavix  *COPD    Hematology plan/recommendations:  Monitor hemoglobin on anticoagulation, transfuse as needed for hemoglobin less than 7.0 or symptomatic anemia  Note plan for possible OR later in the week, patient to remain on UFH until cleared by vascular for oral anticoagulation              4/17/2025      CC:

## 2025-04-17 NOTE — PLAN OF CARE
Goal Outcome Evaluation:  Plan of Care Reviewed With: patient           Outcome Evaluation: Pt confused this session, did agree to get up , then difficulty getting LEs to EOB, or following directions, pt was able to tfer w/assist of 2 , multople cues for safety <> bsc and back to bed, difficulty WB on LLE, bilat knees flexed, did not buckle, but never full ext, pt did use rwx this session for tfer to bsc, but lots of cues for hand placement, very unsteady, will need placement  at Dc    Anticipated Discharge Disposition (PT): skilled nursing facility, inpatient rehabilitation facility (pending progress)

## 2025-04-17 NOTE — PLAN OF CARE
Problem: Adult Inpatient Plan of Care  Goal: Plan of Care Review  Outcome: Progressing  Flowsheets (Taken 4/17/2025 1615)  Outcome Evaluation: Alert and oriented. Room air. Heparin drip. Pain manage dwith PRN medications,  NPO after midnight  Goal: Patient-Specific Goal (Individualized)  Outcome: Progressing  Goal: Absence of Hospital-Acquired Illness or Injury  Outcome: Progressing  Intervention: Identify and Manage Fall Risk  Recent Flowsheet Documentation  Taken 4/17/2025 1613 by Gisselle Tuttle RN  Safety Promotion/Fall Prevention:   clutter free environment maintained   activity supervised  Taken 4/17/2025 1409 by Gisselle Tuttle RN  Safety Promotion/Fall Prevention:   clutter free environment maintained   activity supervised  Taken 4/17/2025 1204 by Gisslele Tuttle RN  Safety Promotion/Fall Prevention:   clutter free environment maintained   activity supervised  Taken 4/17/2025 1005 by Gisselle Tuttle RN  Safety Promotion/Fall Prevention:   clutter free environment maintained   activity supervised  Taken 4/17/2025 0906 by Gisselle Tuttle RN  Safety Promotion/Fall Prevention:   clutter free environment maintained   activity supervised  Intervention: Prevent and Manage VTE (Venous Thromboembolism) Risk  Recent Flowsheet Documentation  Taken 4/17/2025 0906 by Gisselle Tuttle RN  VTE Prevention/Management: (Heparin GTt) other (see comments)  Intervention: Prevent Infection  Recent Flowsheet Documentation  Taken 4/17/2025 1613 by Gisselle Tuttle RN  Infection Prevention: single patient room provided  Taken 4/17/2025 1409 by Gisselle Tuttle RN  Infection Prevention: single patient room provided  Taken 4/17/2025 1204 by Gisselle Tuttle RN  Infection Prevention: single patient room provided  Taken 4/17/2025 1005 by Gisselle Tuttle RN  Infection Prevention: single patient room provided  Taken 4/17/2025 0906 by Gisselle Tuttle RN  Infection Prevention: single patient room provided  Goal: Optimal Comfort and Wellbeing  Outcome:  Progressing  Intervention: Provide Person-Centered Care  Recent Flowsheet Documentation  Taken 4/17/2025 1409 by Gisselle Tuttle RN  Trust Relationship/Rapport:   care explained   choices provided  Taken 4/17/2025 0906 by Gisselle Tuttle RN  Trust Relationship/Rapport:   care explained   choices provided  Goal: Readiness for Transition of Care  Outcome: Progressing     Problem: Skin Injury Risk Increased  Goal: Skin Health and Integrity  Outcome: Progressing     Problem: Sepsis/Septic Shock  Goal: Optimal Coping  Outcome: Progressing  Intervention: Support Patient and Family Response  Recent Flowsheet Documentation  Taken 4/17/2025 1409 by Gisselel Tuttle RN  Family/Support System Care: self-care encouraged  Taken 4/17/2025 0906 by Gisselle Tuttle RN  Family/Support System Care: self-care encouraged  Goal: Absence of Bleeding  Outcome: Progressing  Goal: Blood Glucose Level Within Target Range  Outcome: Progressing  Goal: Absence of Infection Signs and Symptoms  Outcome: Progressing  Intervention: Initiate Sepsis Management  Recent Flowsheet Documentation  Taken 4/17/2025 1613 by Gisselle Tuttle RN  Infection Prevention: single patient room provided  Taken 4/17/2025 1409 by Gisselle Tuttle RN  Infection Prevention: single patient room provided  Taken 4/17/2025 1204 by Gisselle Tuttle RN  Infection Prevention: single patient room provided  Taken 4/17/2025 1005 by Gisselle Tuttle RN  Infection Prevention: single patient room provided  Taken 4/17/2025 0906 by Gisselle Tuttle RN  Infection Prevention: single patient room provided  Goal: Optimal Nutrition Delivery  Outcome: Progressing     Problem: Fall Injury Risk  Goal: Absence of Fall and Fall-Related Injury  Outcome: Progressing  Intervention: Identify and Manage Contributors  Recent Flowsheet Documentation  Taken 4/17/2025 1613 by Gisselle Tuttle RN  Medication Review/Management: medications reviewed  Taken 4/17/2025 1409 by Gisselle Tuttle RN  Medication Review/Management:  medications reviewed  Taken 4/17/2025 1204 by Gisselle Tuttle RN  Medication Review/Management: medications reviewed  Taken 4/17/2025 1005 by Gisselle Tuttle RN  Medication Review/Management: medications reviewed  Taken 4/17/2025 0906 by Gisselle Tuttle RN  Medication Review/Management: medications reviewed  Intervention: Promote Injury-Free Environment  Recent Flowsheet Documentation  Taken 4/17/2025 1613 by Gisselle Tuttle RN  Safety Promotion/Fall Prevention:   clutter free environment maintained   activity supervised  Taken 4/17/2025 1409 by Gisselle Tuttle RN  Safety Promotion/Fall Prevention:   clutter free environment maintained   activity supervised  Taken 4/17/2025 1204 by Gisselle Tuttle RN  Safety Promotion/Fall Prevention:   clutter free environment maintained   activity supervised  Taken 4/17/2025 1005 by Gisselle Tuttle RN  Safety Promotion/Fall Prevention:   clutter free environment maintained   activity supervised  Taken 4/17/2025 0906 by Gisselle Tuttle RN  Safety Promotion/Fall Prevention:   clutter free environment maintained   activity supervised     Problem: Pain Acute  Goal: Optimal Pain Control and Function  Outcome: Progressing  Intervention: Optimize Psychosocial Wellbeing  Recent Flowsheet Documentation  Taken 4/17/2025 1409 by Gisselle Tuttle RN  Diversional Activities: television  Taken 4/17/2025 0906 by Gisselle Tuttle RN  Diversional Activities: television  Intervention: Prevent or Manage Pain  Recent Flowsheet Documentation  Taken 4/17/2025 1613 by Gisselle Tuttle RN  Medication Review/Management: medications reviewed  Taken 4/17/2025 1409 by Gisselle Tuttle RN  Medication Review/Management: medications reviewed  Taken 4/17/2025 1204 by Gisselle Tuttle RN  Medication Review/Management: medications reviewed  Taken 4/17/2025 1005 by Gisselle Tuttle RN  Medication Review/Management: medications reviewed  Taken 4/17/2025 0906 by Gisselle Tuttle RN  Medication Review/Management: medications reviewed      Problem: Heart Failure  Goal: Optimal Coping  Outcome: Progressing  Intervention: Support Psychosocial Response  Recent Flowsheet Documentation  Taken 4/17/2025 1409 by Gisselle Tuttle RN  Family/Support System Care: self-care encouraged  Taken 4/17/2025 0906 by Gisselle Tuttle RN  Family/Support System Care: self-care encouraged  Goal: Optimal Cardiac Output and Blood Flow  Outcome: Progressing  Goal: Stable Heart Rate and Rhythm  Outcome: Progressing  Goal: Fluid and Electrolyte Balance  Outcome: Progressing  Goal: Optimal Functional Ability  Outcome: Progressing  Goal: Improved Oral Intake  Outcome: Progressing  Goal: Effective Oxygenation and Ventilation  Outcome: Progressing  Goal: Effective Breathing Pattern During Sleep  Outcome: Progressing  Intervention: Monitor and Manage Obstructive Sleep Apnea  Recent Flowsheet Documentation  Taken 4/17/2025 1613 by Gisselle Tuttle RN  Medication Review/Management: medications reviewed  Taken 4/17/2025 1409 by Gisselle Tuttle RN  Medication Review/Management: medications reviewed  Taken 4/17/2025 1204 by Gisselle Tuttle RN  Medication Review/Management: medications reviewed  Taken 4/17/2025 1005 by Gisselle Tuttle RN  Medication Review/Management: medications reviewed  Taken 4/17/2025 0906 by Gisselle Tuttle RN  Medication Review/Management: medications reviewed     Problem: VTE (Venous Thromboembolism)  Goal: Tissue Perfusion  Outcome: Progressing  Intervention: Optimize Tissue Perfusion  Recent Flowsheet Documentation  Taken 4/17/2025 0906 by Gisselle Tuttle RN  VTE Prevention/Management: (Heparin GTt) other (see comments)  Goal: Optimal Right Ventricular Function  Outcome: Progressing   Goal Outcome Evaluation:              Outcome Evaluation: Alert and oriented. Room air. Heparin drip. Pain manage dwith PRN medications,  NPO after midnight

## 2025-04-17 NOTE — PROGRESS NOTES
"General Surgery Progress Note    Chief complaint:  ischemic enteritis/colitis, ileus     Interval history:  Patient states she is feeling ok today. She is sitting up in bed stretching, and states she is anticipating going for leg washout soon. She reports a couple of bowel movements yesterday and today. Denies nausea or abdominal pain. Is hungry and asking for real food.     Vital signs:  /62 (Patient Position: Lying)   Pulse 87   Temp 97.7 °F (36.5 °C) (Oral)   Resp 16   Ht 167.6 cm (65.98\")   Wt 75.4 kg (166 lb 3.6 oz)   SpO2 96%   BMI 26.84 kg/m²     Intake/output:  Intake & Output (last day)         04/16 0701 04/17 0700 04/17 0701 04/18 0700    P.O. 300     I.V. (mL/kg)      Total Intake(mL/kg) 300 (4)     Urine (mL/kg/hr) 50 (0)     Stool 0     Total Output 50     Net +250           Urine Unmeasured Occurrence 401 x     Stool Unmeasured Occurrence 3 x             Physical exam:  Sitting up in bed stretching her back prior to exam, appears comfortable  NEURO: awake, alert, no gross focal deficits, moves all extremities  PSYCH: interactive, cooperative  PULM: unlabored respirations on room air  CV: HR 80s on monitor; left leg with ace wrap, some blood on left groin dressing  GI: abdomen moderately distended but remains soft, and she is nontender without guarding  : purewick in place with remigio urine in canister     Diagnostics:  Results from last 7 days   Lab Units 04/17/25  0435 04/16/25  0435 04/15/25  0614 04/15/25  0018   WBC 10*3/mm3 26.91* 22.45* 18.20* 17.46*   HEMOGLOBIN g/dL 7.9* 8.5* 8.6* 8.2*   HEMATOCRIT % 24.3* 26.6* 25.9* 25.9*   PLATELETS 10*3/mm3 399 346 325 317   MONOCYTES % % 2.0* 8.1  --  10.0   EOSINOPHIL % % 2.0 0.0*  --  2.0     Results from last 7 days   Lab Units 04/17/25  0435 04/16/25  1746 04/16/25  0435 04/15/25  0614   SODIUM mmol/L 138  --  139 139   POTASSIUM mmol/L 4.1 3.9 3.4* 3.7   CHLORIDE mmol/L 102  --  104 105   CO2 mmol/L 28.0  --  24.2 25.0   BUN mg/dL 7*  " --  10 14   CREATININE mg/dL 0.64  --  0.69 0.76   CALCIUM mg/dL 8.2*  --  8.1* 8.1*   BILIRUBIN mg/dL  --   --  0.6  --    ALK PHOS U/L  --   --  139*  --    ALT (SGPT) U/L  --   --  11  --    AST (SGOT) U/L  --   --  52*  --    GLUCOSE mg/dL 113*  --  109* 128*     Results from last 7 days   Lab Units 04/17/25  0435 04/16/25  2115 04/16/25  1746 04/10/25  1742 04/10/25  1244   INR   --   --   --   --  1.36*   APTT seconds 81.5* 91.1* 82.0*   < > 96.3*    < > = values in this interval not displayed.     CT abd/pelvis 4/16/25 images and report reviewed -   FINDINGS:  1. Most of the colonic dilatation has resolved, but there is mild  persistent dilatation of the proximal transverse colon. The proximal  transverse colon appears mildly patulous and has a slightly thickened  wall. There is less dilatation of a long segment of proximal small  bowel, but there is a long segment of distal ileum which appears  slightly more dilated than previously, image 107. The distal/terminal  ileum is normal in caliber, stable. There is a slightly less thickened  appearance of some of the small bowel loops with less thickening of the  folds. There is no bowel pneumatosis.     There is a slightly larger small volume of free fluid, but there is also  new third spacing of fluid within the body wall. There are no fluid  collections. No free air.     2. Evolving splenic infarcts appear unchanged. Evolving left renal  infarcts appear slightly less defined.     3. There is no new abnormality at the liver, pancreas, adrenals, or  right kidney. Urinary bladder is significantly distended. Consider a  Storm catheter.     4. There are postsurgical changes at the left groin. Slightly narrowed,  but the left common femoral artery is patent as is the visualized left  superficial femoral artery, series 3 image 132. There is a very small  amount of fluid at the left groin, but there is no discrete fluid  collection or hematoma.     5. There is no  pneumonia or pleural effusions at the visualized lung  bases.    I agree with the above findings.    Assessment/Plan:  65-year-old lady with tobacco use, COPD, and severe peripheral vascular disease who developed left ventricular thrombus and showering of emboli left kidney, spleen, suspected to bowel.  This admission she has undergone emergent heart cath with PCI, left lower extremity thromboembolectomy, revascularization, and fasciotomy.  She was noted to have possible ischemic enteritis and colitis and developed an ileus.  She underwent NGT decompression and serial abdominal exams. Her ileus resolved and NGT was removed. She has been having nonbloody bowel movements and has tolerated clear liquids for the past three days. Repeat CT abd/pelvis yesterday demonstrates what appears to be interval improvement of her bowel dilation without findings concerning for worsening ischemia.  ID is seeing and notes her climbing leukocytosis does not appear to be related to infection. She is going for LLE washout tomorrow.  I will advance her diet today and continue to follow.     Yulisa Parisi MD  General, Robotic and Endoscopic Surgery  [facility]    [address], [address]  [place], [place], [address]  P: [phone]  F: [phone]

## 2025-04-17 NOTE — PROGRESS NOTES
PROGRESS NOTE  Patient Name: Desiree Garcia  Age/Sex: 65 y.o. female  : 1960  MRN: [id number]    Date of Admission: 4/10/2025  Date of Encounter Visit: 25   LOS: 7 days   Patient Care Team:  Sadnra Kaba MD as PCP - General (Family Medicine)  Marisol Nazario, RN as Ambulatory  ([facility])    Chief Complaint: Acute left lower extremity ischemia, acute MI, left ventricular thrombus    Hospital course: Patient is doing better, she is on room air, hemodynamically stable on no pressors  Still on the heparin drip, still oozing some blood from the fasciotomy area but her hemoglobin is relatively okay despite the trend downward.  Vascular surgery note reviewed, continue with heparin drip with the anticipated future procedures  She is tolerating p.o. with no more nasogastric tube in position, still having her abdominal discomfort  Pain in the left leg is still present, improved with pain medication    REVIEW OF SYSTEMS:   CONSTITUTIONAL: no fever or chills  No nausea or vomiting, tolerating p.o., good bowel movement  Denies shortness of breath  Positive pain in the leg  Ventilator/Non-Invasive Ventilation Settings (From admission, onward)      Room air              Vital Signs  Temp:  [97.7 °F (36.5 °C)-98.5 °F (36.9 °C)] 97.7 °F (36.5 °C)  Heart Rate:  [80-90] 80  Resp:  [16-18] 18  BP: (110-117)/(62-71) 117/62  SpO2:  [94 %-98 %] 98 %  on    Device (Oxygen Therapy): room air    Intake/Output Summary (Last 24 hours) at 2025 0854  Last data filed at 2025 1526  Gross per 24 hour   Intake 60 ml   Output 50 ml   Net 10 ml     Flowsheet Rows      Flowsheet Row First Filed Value   Admission Height --   Admission Weight 71 kg (156 lb 8.4 oz) Documented at 04/10/2025 1700          Body mass index is 26.84 kg/m².      25  0500 04/15/25  0600 25  0400   Weight: 72.7 kg (160 lb 4.4 oz) 73.6 kg (162 lb 4.1 oz) 75.4 kg (166 lb 3.6 oz)       Physical Exam:  GEN:    Awake and alert, looking better, in no distress  EYES:   Sclerae clear. No icterus. PERRL. Normal EOM  ENT:   External ears/nose normal, no oral lesions, no thrush, moist mucous membrane  NECK:  Supple, midline trachea, no JVD  LUNGS: Normal chest on inspection, CTAB but diminished, no wheezes. No rhonchi. No crackles. Respirations regular, even and unlabored.  On room air  CV:  Regular rhythm and rate. Normal S1/S2. No murmurs, gallops, or rubs noted.  ABD:  Soft, nontender, non-stented, normal bowel sounds. No guarding  EXT:  Moves all extremities well. No cyanosis. No redness. No edema.  warmer left lower extremity compared to the right with dopplerable pulses.  She is weeping some blood from the fasciotomy site  Skin: Dry, intact, no bleeding    Results Review:    Results From Last 14 Days   Lab Units 04/12/25  0014 04/11/25  1711 04/11/25  0104 04/09/25  2134 04/09/25  0602   LACTATE mmol/L 1.8 2.2* 1.0   < >  --    TRIGLYCERIDES mg/dL  --   --   --   --  158*    < > = values in this interval not displayed.     Results from last 7 days   Lab Units 04/17/25  0435 04/16/25  1746 04/16/25  0435 04/15/25  0614 04/14/25  0416 04/13/25  0514 04/12/25  2153 04/12/25  1532 04/12/25  1302 04/12/25  0454 04/10/25  1814 04/10/25  0907   SODIUM mmol/L 138  --  139 139 138 133*  --  133*  --  131*   < > 125*   POTASSIUM mmol/L 4.1 3.9 3.4* 3.7 3.9 4.2 3.6 3.8   < > 3.1*   < > 3.7   CHLORIDE mmol/L 102  --  104 105 105 104  --  102  --  98   < > 88*   CO2 mmol/L 28.0  --  24.2 25.0 22.1 18.0*  --  19.3*  --  19.6*   < > 20.8*   BUN mg/dL 7*  --  10 14 19 22  --  26*  --  32*   < > 33*   CREATININE mg/dL 0.64  --  0.69 0.76 0.87 1.12*  --  1.06*  --  1.08*   < > 1.40*   CALCIUM mg/dL 8.2*  --  8.1* 8.1* 8.1* 8.0*  --  7.7*  --  7.5*   < > 8.5*   AST (SGOT) U/L  --   --  52*  --   --   --   --   --   --   --   --  72*   ALT (SGPT) U/L  --   --  11  --   --   --   --   --   --   --   --  36*   ANION GAP mmol/L 8.0  --  10.8 9.0  "10.9 11.0  --  11.7  --  13.4   < > 16.2*   ALBUMIN g/dL 2.8*  --  2.4* 2.7* 2.6*  --   --   --   --   --   --  2.7*    < > = values in this interval not displayed.               Results from last 7 days   Lab Units 04/10/25  1244   PROBNP pg/mL 9,388.0*     Results from last 7 days   Lab Units 04/17/25  0435 04/16/25  0435 04/15/25  0614 04/15/25  0018 04/14/25  1852 04/14/25  1225 04/14/25  0416   WBC 10*3/mm3 26.91* 22.45* 18.20* 17.46* 16.22* 15.49* 14.46*   HEMOGLOBIN g/dL 7.9* 8.5* 8.6* 8.2* 8.7* 8.6* 9.5*   HEMATOCRIT % 24.3* 26.6* 25.9* 25.9* 26.2* 25.8* 28.2*   PLATELETS 10*3/mm3 399 346 325 317 312 287 288   MCV fL 85.6 86.1 84.1 84.9 83.2 84.3 82.7     Results from last 7 days   Lab Units 04/17/25  0435 04/16/25  2115 04/16/25  1746 04/16/25  0435 04/15/25  2119 04/15/25  1307 04/15/25  0614 04/10/25  1742 04/10/25  1244   INR   --   --   --   --   --   --   --   --  1.36*   APTT seconds 81.5* 91.1* 82.0* 55.3* 67.3* 59.8* 72.3*   < > 96.3*    < > = values in this interval not displayed.     Results from last 7 days   Lab Units 04/17/25  0435 04/16/25  0435 04/15/25  0614 04/14/25  0416 04/13/25  0514 04/12/25  0454 04/11/25  0413   MAGNESIUM mg/dL 1.9 2.0 2.1 2.2 2.3 2.3 2.3           Invalid input(s): \"LDLCALC\"    Results from last 7 days   Lab Units 04/10/25  1958   PH, ARTERIAL pH units 7.422   PCO2, ARTERIAL mm Hg 37.0   PO2 ART mm Hg 66.4*   HCO3 ART mmol/L 24.1           Glucose   Date/Time Value Ref Range Status   04/17/2025 0521 121 70 - 130 mg/dL Final   04/16/2025 2058 132 (H) 70 - 130 mg/dL Final   04/16/2025 1605 136 (H) 70 - 130 mg/dL Final   04/16/2025 1229 144 (H) 70 - 130 mg/dL Final   04/16/2025 0528 104 70 - 130 mg/dL Final   04/15/2025 2356 143 (H) 70 - 130 mg/dL Final   04/15/2025 1556 137 (H) 70 - 130 mg/dL Final   04/15/2025 1158 288 (H) 70 - 130 mg/dL Final     Results from last 7 days   Lab Units 04/12/25  0014 04/11/25  1711 04/11/25  1059 04/11/25  0104 04/10/25  0907 "   PROCALCITONIN ng/mL  --   --  1.13*  --   --    LACTATE mmol/L 1.8 2.2*  --  1.0 1.4     Results from last 7 days   Lab Units 04/12/25  1702 04/12/25  1652   BLOODCX  No growth at 4 days No growth at 4 days             Results from last 7 days   Lab Units 04/10/25  1829 04/10/25  1814   SODIUM UR mmol/L <20  --    CREATININE UR mg/dL 64.8  --    CHLORIDE UR mmol/L <20  --    OSMOLALITY UR mOsm/kg 494  --    PROTEIN TOTAL URINE mg/dL 95.5  --    URIC ACID mg/dL  --  6.2*           Imaging:   Imaging Results (All)               I reviewed the patient's new clinical results.  I personally viewed and interpreted the patient's imaging results:        Medication Review:   amoxicillin-clavulanate, 1 tablet, Oral, Q12H  arformoterol, 15 mcg, Nebulization, BID - RT  aspirin, 81 mg, Oral, Daily   Or  aspirin, 300 mg, Rectal, Daily  atorvastatin, 40 mg, Oral, Nightly  budesonide, 0.5 mg, Nebulization, BID - RT  clopidogrel, 75 mg, Oral, Daily  insulin regular, 2-7 Units, Subcutaneous, Q6H  metoprolol tartrate, 25 mg, Oral, Q12H  pantoprazole, 40 mg, Oral, Q AM  revefenacin, 175 mcg, Nebulization, Daily - RT  senna-docusate sodium, 2 tablet, Oral, BID        heparin, 12 Units/kg/hr, Last Rate: 14 Units/kg/hr (04/16/25 2227)        ASSESSMENT:   Cardiogenic shock, resolved  Arterial embolism and thrombosis of the lower extremity, status post fasciotomy and thrombectomy with revascularization 4/12/2025  Diabetes mellitus type 2  Coronary artery disease with prior angioplasties with acute inferior wall MI status post repeat angioplasty  Left ventricular intramural thrombus likely the origin of the above  Bilateral pulmonary nodules  Severe peripheral arterial disease  Acute hypoxemic respiratory failure requiring oxygen supplementation at some point up to 4 L/min just to maintain adequate oxygenation, that was multifactorial but mostly due to atelectasis and prolonged bedrest.  Nonsustained ventricular  tachycardia  Hyperlipidemia  Hyponatremia  Acute kidney injury, improved  Tobacco abuse  Small bowel enteritis was suspected bowel ischemia from the embolic events  COPD, no exacerbation  Nonsustained V. tach  History of E. coli UTI on 3/18/2025 with negative blood culture on this admission, patient is on Zosyn for possible intra-abdominal infection from bowel ischemia  Worsening leukocytosis    PLAN:  Patient is hemodynamically stable however the gradual increase in the white count is concerning  Vascular surgery note reviewed, they are following  Hemodynamically the patient is doing good and she is on room air  Patient has multiple comorbidities with electrolyte disturbances, leukocytosis, coronary artery disease and diabetes mellitus that needs to be managed, we will consult internal medicine team for further management, we will continue to address any respiratory or hemodynamic issues        Discussed with the patient   Notes reviewed    Labs/Notes/films were independently reviewed and pertinent results are summarized above  The copied texts in this note were reviewed and they are accurate as of 04/17/25    Disposition: Telemetry    Zuri Grey MD  04/17/25  08:54 EDT          Dictated utilizing Dragon dictation

## 2025-04-17 NOTE — PROGRESS NOTES
[facility]   Surgical Progress Note    Patient Name: Desiree Garcia  : 1960  MRN: [id number]  Date of admission: 4/10/2025  Surgical Procedures Since Admission:  Procedure(s):  EMBOLECTOMY MECHANICAL, FOUR COMPARTMENT FASCIOTOMY  ARTERIOGRAM LOWER EXTREMITY  Surgeon:  Misael Lawton MD  Status:  5 Days Post-Op  -------------------    Procedure(s):  LOWER EXTREMITY DEBRIDEMENT  Surgeon:  Misael Lawton MD  Status:  * No surgery found *  -------------------    Subjective   Subjective     Chief Complaint: Postop day 5 status post left leg thrombectomy and 4 compartment fasciotomies    History of Present Illness   65-year-old woman with multifocal thromboemboli.  Left leg either extended or threw new clot that became critically ischemic.  This occurred despite full heparin on board.  Currently continued.  Unfortunately patient has been oozing in her left fasciotomy site.  We decompress the hematoma yesterday and again this morning.  She may need this washed out and packed with the wound VAC.  At this point in time white count continues to increase but CT of the abdomen really does not show much in the way of major issues.  She seems clinically stable.  I am very concerned that we do not have an answer for this and I will ask infectious disease opinion.  Plan for exploration of the left lateral calf wound and possibly the right tomorrow in the operating room.  May leave open place wound VAC depending on what we find oozing.    Review of Systems denies pain in the foot but still numb.  Able to move it.  Still having abdominal pain but eating clear liquids, wants real food    Objective   Objective     Vitals:   Temp:  [97.7 °F (36.5 °C)-98.5 °F (36.9 °C)] 97.7 °F (36.5 °C)  Heart Rate:  [81-90] 84  Resp:  [16] 16  BP: (110-120)/(59-71) 110/62  Output by Drain (mL) 25 0701 - 25 1900 25 1901 - 25 0700 25 0701 - 25 0731 Range Total   External Urinary Catheter 50   50        Physical Exam    Incision sites groin and medial calf stable.  Lateral calf with hematoma and oozing.  Slow venous ooze in this area with some clot finally showing..  Will plan washout.  Left foot was positive motor but decreased sensation.  No evidence of compartment syndrome at this time.  Abdomen is still mildly tender    Result Review    Result Review:  I have personally reviewed the results from the time of this admission to 4/17/2025 07:31 EDT and agree with these findings:  [x]  Laboratory list / accordion  []  Microbiology  [x]  Radiology  []  EKG/Telemetry   []  Cardiology/Vascular   []  Pathology  []  Old records  []  Other:  Most notable findings include: Hemoglobin 7.9.  White count elevated to 26 steadily.  This is alarming creatinine stable    Results from last 7 days   Lab Units 04/17/25  0435 04/16/25  0435 04/15/25  0614 04/15/25  0018 04/14/25  1852 04/14/25  1225 04/14/25  0416   WBC 10*3/mm3 26.91* 22.45* 18.20* 17.46* 16.22* 15.49* 14.46*   HEMOGLOBIN g/dL 7.9* 8.5* 8.6* 8.2* 8.7* 8.6* 9.5*   PLATELETS 10*3/mm3 399 346 325 317 312 287 288     Results from last 7 days   Lab Units 04/17/25  0435 04/16/25  1746 04/16/25  0435 04/15/25  2119 04/15/25  0614 04/14/25  0416 04/13/25  0514 04/12/25  2153 04/12/25  1532 04/12/25  1302 04/12/25  0454 04/11/25  1711 04/11/25  0413   SODIUM mmol/L 138  --  139  --  139 138 133*  --  133*  --  131*   < > 130*   POTASSIUM mmol/L 4.1 3.9 3.4*  --  3.7 3.9 4.2 3.6 3.8   < > 3.1*   < > 3.9   CHLORIDE mmol/L 102  --  104  --  105 105 104  --  102  --  98   < > 97*   CO2 mmol/L 28.0  --  24.2  --  25.0 22.1 18.0*  --  19.3*  --  19.6*   < > 20.5*   BUN mg/dL 7*  --  10  --  14 19 22  --  26*  --  32*   < > 38*   CREATININE mg/dL 0.64  --  0.69  --  0.76 0.87 1.12*  --  1.06*  --  1.08*   < > 1.27*   GLUCOSE mg/dL 113*  --  109*  --  128* 108* 102*  --  90  --  74   < > 106*   MAGNESIUM mg/dL 1.9  --  2.0  --  2.1 2.2 2.3  --   --   --  2.3  --  2.3    PHOSPHORUS mg/dL 2.7  --  3.0 2.8 2.2* 2.3* 3.0  --   --   --  3.0  --  4.8*    < > = values in this interval not displayed.   Estimated Creatinine Clearance: 92.1 mL/min (by C-G formula based on SCr of 0.64 mg/dL).  Results from last 7 days   Lab Units 04/10/25  1244   PROTIME Seconds 17.4*   INR  1.36*     Lab Results   Component Value Date    HGBA1C 9.40 (H) 04/09/2025    HGBA1C 9.30 (H) 03/13/2025    HGBA1C 9.40 (H) 12/23/2024     Glucose   Date/Time Value Ref Range Status   04/17/2025 0521 121 70 - 130 mg/dL Final   04/16/2025 2058 132 (H) 70 - 130 mg/dL Final   04/16/2025 1605 136 (H) 70 - 130 mg/dL Final   04/16/2025 1229 144 (H) 70 - 130 mg/dL Final   04/16/2025 0528 104 70 - 130 mg/dL Final   04/15/2025 2356 143 (H) 70 - 130 mg/dL Final   04/15/2025 1556 137 (H) 70 - 130 mg/dL Final   04/15/2025 1158 288 (H) 70 - 130 mg/dL Final       Assessment & Plan   Assessment / Plan     Brief Patient Summary:  Desiree Garcia is a 65 y.o. female who progress thrombus despite the full use of heparin.  Concerns for long-term recurrence of clot have been raised and family is aware.  Still unclear as to whether this was new embolism versus extension of clot.  The character of the clot was definitely different and I am inclined to believe this was recurrent embolized.  Unfortunately white count is now 26 and I I am planning to explore the leg tomorrow morning.  There is no evidence of dead gut in the abdomen.  With this white count elevating without a source I am going to ask infectious disease to evaluate.    Active Hospital Problems:   Active Hospital Problems    Diagnosis    • 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    • Acute thrombus of left ventricle    • Altered bowel habits    • Leg paresthesia      Plan:   Would not start Eliquis at this time as I believe there is still a major issue at hand given the white count.  Will plan on washout of the leg in the a.m.  Will await input from infectious  disease  VTE Prophylaxis:  Mechanical VTE prophylaxis orders are signed & held. Pharmacologic & mechanical VTE prophylaxis orders are present.        Misael Lawton MD

## 2025-04-17 NOTE — PLAN OF CARE
The pt was agreeable to a OT/PT co-tx this afternoon. She completed bed mobility with Mod A x2. Max A provided for donning LB socks via figure 4. Mod A x2 + walker to stand and pivot over to a BSC. Total A for hygiene following a loose BM. Mod A x2 to pivot back to the EOB and return to supine. SNF vs. IRF is recommended pending per progress.

## 2025-04-17 NOTE — CONSULTS
"Referring Provider: Dr Lawton    Reason for Consultation: Leukocytosis    History of present illness:  Desiree Garcia is a 65 y.o. who I am asked to evaluate and give opinion for \"WBC elevated to 26\". History is obtained from the patient and review of the old medical records which I summarize/synthesize as follows: She was admitted to [facility] on 4/8/2025 with abdominal pain that had been going on for about 24 hours.  She was found to have cardiogenic shock.  She was found to have an inferior STEMI and taken to the Cath Lab.  She was found to have RCA thrombosis and underwent thrombectomy, PCI with drug-eluting stent.  She had a CT scan done that showed renal and splenic infarcts.  She later had a TTE done that showed a left ventricular thrombus and she was given a heparin drip.  The case was discussed with cardiac surgery at [facility] and she was transferred here on 4/10/2025.  Of note from an infectious diseases standpoint, she had blood cultures obtained at admission on 4/8 that finalized as negative.    Due to ongoing abdominal pain, she had a repeat CT done that showed findings concerning for sigmoid colon ischemia with associated enteritis and ileus.  General surgery was consulted.  There was no urgent need for surgical intervention.  On 4/12 she developed critical left leg ischemia with embolism to the left femoral site.  She went to the operating room with vascular surgery for open thrombectomy and endarterectomy with a 4 compartment fasciotomy.  She had repeat blood cultures drawn that day which were negative so far.    Throughout the entirety of her hospital stay she has been afebrile.  Oncology was consulted and saw the patient on 4/13.  They noted that her elevated WBC was likely due to a strong marrow response due to all her body has been through.  Her WBC trend is as follows.  21, 21, 15, 9, 10.  Then the day after her surgery on 4/12 it increased to 16, 16, 18, 22, and " today is 26.  She has not been on any steroids.  There were concerns for ongoing bleeding at her fasciotomy site and she was diagnosed with a hematoma.  Plans are in place to take her back to the operating room tomorrow to explore the left lateral calf wound and address her ongoing oozing.    From an antibiotic standpoint, she has received perioperative cefazolin.  She was also on Zosyn from 4/12 - 4/15 at which time she was transitioned to Augmentin.     At the time my visit, she denies abdominal pain.  She says that her left lower extremity continues to be painful.  She denies any past history of serious or unusual infections.    Past Medical History:   Diagnosis Date    Arthritis     Cervical cancer screening     COPD (chronic obstructive pulmonary disease)     Coronary artery disease involving native heart without angina pectoris 09/30/2021    primary angioplasty and stent placement to mid right coronary artery with good angiographic results on 6/14/2021 after STEMI    Depression with anxiety     Diabetes mellitus     Forgetfulness     Gastric reflux     Hypertension     Leg paresthesia 01/20/2017    Right    Limb swelling     Lumbago 01/20/2017    Low back pain    Lumbar spinal stenosis 02/23/2017    L4/5 moderate to severe central canal stenosis    Lumbosacral radiculopathy 01/20/2017    Right    Migraines     Night sweat     Reflux esophagitis     Shortness of breath     ST elevation myocardial infarction (STEMI) (CMS/[facility]) 06/14/2021    Stomach disorder        Past Surgical History:   Procedure Laterality Date    ABDOMINAL SURGERY      3 C-SECTIONS    ARTERIOGRAM LOWER EXTREMITY Left 4/12/2025    Procedure: ARTERIOGRAM LOWER EXTREMITY;  Surgeon: Misael Lawton MD;  Location: [facility] OR;  Service: Vascular;  Laterality: Left;    CARDIAC CATHETERIZATION      CARDIAC CATHETERIZATION N/A 06/14/2021    Procedure: Left Heart Cath;  Surgeon: Sidney Xiao MD;  Location: [facility] CATH INVASIVE LOCATION;   Service: Cardiology;  Laterality: N/A;    CARDIAC CATHETERIZATION N/A 2025    Procedure: Left Heart Cath;  Surgeon: James Verdugo MD;  Location: [facility] CATH INVASIVE LOCATION;  Service: Cardiology;  Laterality: N/A;     SECTION      x 3    CHOLECYSTECTOMY      COLONOSCOPY      COLONOSCOPY N/A 3/25/2025    Procedure: COLONOSCOPY WITH BIOPSIES AND COLD AND HOT SNARE POLYPECTOMIES;  Surgeon: Heber Najera MD;  Location: [facility] ENDOSCOPY;  Service: Gastroenterology;  Laterality: N/A;  COLON POLYPS    CORONARY STENT PLACEMENT      EMBOLECTOMY Left 2025    Procedure: EMBOLECTOMY MECHANICAL, FOUR COMPARTMENT FASCIOTOMY;  Surgeon: Misael Lawton MD;  Location: [facility] OR;  Service: Vascular;  Laterality: Left;    ENDOSCOPY      2007    FOOT SURGERY Right     HAND SURGERY      HYSTERECTOMY         Social History:  Lives in [place], [place] with her son    Antibiotic allergies and intolerances:  None    Medications:    Current Facility-Administered Medications:     acetaminophen (TYLENOL) tablet 650 mg, 650 mg, Oral, Q4H PRN **OR** acetaminophen (TYLENOL) 160 MG/5ML oral solution 650 mg, 650 mg, Oral, Q4H PRN **OR** acetaminophen (TYLENOL) suppository 650 mg, 650 mg, Rectal, Q4H PRN, Misael Lawton MD, 650 mg at 25 0923    acetaminophen (TYLENOL) tablet 650 mg, 650 mg, Oral, Q4H PRN **OR** acetaminophen (TYLENOL) suppository 650 mg, 650 mg, Rectal, Q4H PRN, Misael Lawton MD    albuterol (PROVENTIL) nebulizer solution 0.083% 2.5 mg/3mL, 2.5 mg, Nebulization, Q6H PRN, Misael Lawton MD    aluminum-magnesium hydroxide-simethicone (MAALOX MAX) 400-400-40 MG/5ML suspension 15 mL, 15 mL, Oral, Q6H PRN, Misael Lawton MD    amoxicillin-clavulanate (AUGMENTIN) 875-125 MG per tablet 1 tablet, 1 tablet, Oral, Q12H, Zuri Grey MD, 1 tablet at 25 0906    arformoterol (BROVANA) nebulizer solution 15 mcg, 15 mcg, Nebulization, BID - RT, Misael Lawton MD, 15 mcg  at 04/17/25 0852    aspirin EC tablet 81 mg, 81 mg, Oral, Daily, 81 mg at 04/17/25 0906 **OR** aspirin suppository 300 mg, 300 mg, Rectal, Daily, Misael Lawton MD    atorvastatin (LIPITOR) tablet 40 mg, 40 mg, Oral, Nightly, Misael Lawton MD, 40 mg at 04/16/25 2019    sennosides-docusate (PERICOLACE) 8.6-50 MG per tablet 2 tablet, 2 tablet, Oral, BID, 2 tablet at 04/17/25 0906 **AND** polyethylene glycol (MIRALAX) packet 17 g, 17 g, Oral, Daily PRN **AND** bisacodyl (DULCOLAX) EC tablet 5 mg, 5 mg, Oral, Daily PRN **AND** bisacodyl (DULCOLAX) suppository 10 mg, 10 mg, Rectal, Daily PRN, Misael Lawton MD    budesonide (PULMICORT) nebulizer solution 0.5 mg, 0.5 mg, Nebulization, BID - RT, Misael Lawton MD, 0.5 mg at 04/17/25 0854    Calcium Replacement - Follow Nurse / BPA Driven Protocol, , Not Applicable, PRN, Misael Lawton MD    clopidogrel (PLAVIX) tablet 75 mg, 75 mg, Oral, Daily, Misael Lawton MD, 75 mg at 04/17/25 0906    dextrose (D50W) (25 g/50 mL) IV injection 25 g, 25 g, Intravenous, Q15 Min PRN, Misael Lawton MD    dextrose (GLUTOSE) oral gel 15 g, 15 g, Oral, Q15 Min PRN, Misael Lawton MD    glucagon (GLUCAGEN) injection 1 mg, 1 mg, Intramuscular, Q15 Min PRN, Misael Lawton MD    heparin (porcine) 5000 UNIT/ML injection 2,100-4,000 Units, 30-57.9 Units/kg, Intravenous, Q6H PRN, Misael Lawton MD, 2,100 Units at 04/16/25 0615    heparin 23614 units/250 mL (100 units/mL) in 0.45 % NaCl infusion, 12 Units/kg/hr, Intravenous, Titrated, Misael Lawton MD, Last Rate: 9.94 mL/hr at 04/16/25 2227, 14 Units/kg/hr at 04/16/25 2227    HYDROcodone-acetaminophen (NORCO) 7.5-325 MG per tablet 1 tablet, 1 tablet, Oral, Q6H PRN, Misael Lawton MD, 1 tablet at 04/17/25 0501    HYDROmorphone (DILAUDID) injection 1 mg, 1 mg, Intravenous, Q3H PRN, 1 mg at 04/17/25 0906 **AND** naloxone (NARCAN) injection 0.4 mg, 0.4 mg, Intravenous, Q5 Min PRN, Misael Lawton MD    insulin regular (humuLIN  R,novoLIN R) injection 2-7 Units, 2-7 Units, Subcutaneous, Q6H, Misael Lawton MD, 4 Units at 04/15/25 1306    Magnesium Standard Dose Replacement - Follow Nurse / BPA Driven Protocol, , Not Applicable, PRN, Misael Lawton MD    metoprolol tartrate (LOPRESSOR) tablet 25 mg, 25 mg, Oral, Q12H, Santi Erickson MD, 25 mg at 04/17/25 0906    nitroglycerin (NITROSTAT) SL tablet 0.4 mg, 0.4 mg, Sublingual, Q5 Min PRN, Misael Lawton MD    ondansetron ODT (ZOFRAN-ODT) disintegrating tablet 4 mg, 4 mg, Oral, Q6H PRN **OR** ondansetron (ZOFRAN) injection 4 mg, 4 mg, Intravenous, Q6H PRN, Misael Lawton MD    pantoprazole (PROTONIX) EC tablet 40 mg, 40 mg, Oral, Q AM, Zuri Grey MD, 40 mg at 04/17/25 0501    Phosphorus Replacement - Follow Nurse / BPA Driven Protocol, , Not Applicable, PRN, Misael Lawton MD    Potassium Replacement - Follow Nurse / BPA Driven Protocol, , Not Applicable, PRJered BARRIENTOS Matthew T, MD    revefenacin (YUPELRI) nebulizer solution 175 mcg, 175 mcg, Nebulization, Daily - RT, Misael Lawton MD, 175 mcg at 04/17/25 0856      Objective   Vital Signs   Temp:  [97.7 °F (36.5 °C)-98.5 °F (36.9 °C)] 97.7 °F (36.5 °C)  Heart Rate:  [80-90] 87  Resp:  [16-18] 16  BP: (110-117)/(62-71) 117/62    Physical Exam:   General: awake, very nice, sitting up in bed  Eyes: no scleral icterus  ENT: no thrush, edentulous  Cardiovascular: Normal rate  Respiratory: normal work of breathing on ambient air  GI: Abdomen is soft, minimally tender to palpation; no rebound or guarding  : External urinary catheter  MSK: LLE wrapped  Skin: No rashes on exposed areas  Neurological: Alert and oriented x 3  Psychiatric: Normal mood and affect   Vasc: PIV w/o erythema    Labs:     Lab Results   Component Value Date    WBC 26.91 (H) 04/17/2025    HGB 7.9 (L) 04/17/2025    HCT 24.3 (L) 04/17/2025    MCV 85.6 04/17/2025     04/17/2025       Lab Results   Component Value Date    GLUCOSE 113 (H) 04/17/2025    BUN 7 (L)  04/17/2025    CREATININE 0.64 04/17/2025    EGFRIFNONA 96 11/04/2021    BCR 10.9 04/17/2025    CO2 28.0 04/17/2025    CALCIUM 8.2 (L) 04/17/2025    ALBUMIN 2.8 (L) 04/17/2025    AST 52 (H) 04/16/2025    ALT 11 04/16/2025       Microbiology:  4/8 BCx: Negative  4/12 BCx: Negative    Radiology:  4/16 CT abdomen/pelvis shows improvement of colonic dilatation and thickening of the transverse colon.  There are evolving splenic infarcts.  Postsurgical changes noted in the left groin.  No evidence of pneumonia.      Isolation:  No active isolations    ASSESSMENT/PLAN:  Leukocytosis  Inferior STEMI s/p mechanical thrombectomy  Cardiogenic shock -resolved  Ischemic enteritis and colitis  Ileus  Left ventricular thrombus  Critical ischemia left lower extremity due to embolism  Status post open thrombectomy and angiogram  Status post 4 compartment fasciotomy  Post-surgical bleeding and hematoma  COPD due to tobacco abuse    I agree with hematologist that her WBC is likely related to stress response rather than infection.  Ileus, ischemia, enteritis, colitis, probable ongoing bleeding, and recent surgical procedure are likely all contributing she has not had a fever since admission.  She has multiple sets of negative blood cultures.  There has been no mention of concerns for infection of her left lower extremity.  She has been on broad-spectrum antibiotics which have not alter the WBC value.      I recommend stopping Augmentin.  I counseled patient regarding antibiotic decisions.    I will check on her again tomorrow and specifically follow-up the operative findings.

## 2025-04-17 NOTE — PLAN OF CARE
Problem: Adult Inpatient Plan of Care  Goal: Absence of Hospital-Acquired Illness or Injury  Intervention: Prevent and Manage VTE (Venous Thromboembolism) Risk  Recent Flowsheet Documentation  Taken 4/16/2025 2022 by Ana Collado RN  VTE Prevention/Management: (heparin gtt) other (see comments)     Problem: Skin Injury Risk Increased  Goal: Skin Health and Integrity  Outcome: Progressing  Intervention: Optimize Skin Protection  Recent Flowsheet Documentation  Taken 4/17/2025 0224 by Ana Collado RN  Activity Management: activity encouraged  Head of Bed (HOB) Positioning: HOB elevated  Taken 4/17/2025 0000 by Ana Collado RN  Activity Management: activity encouraged  Head of Bed (HOB) Positioning: HOB elevated  Taken 4/16/2025 2226 by Ana Collado RN  Activity Management: activity encouraged  Head of Bed (HOB) Positioning: HOB elevated  Taken 4/16/2025 2022 by Ana Collado RN  Activity Management: activity encouraged  Head of Bed (HOB) Positioning: HOB elevated   Goal Outcome Evaluation:  Plan of Care Reviewed With: patient        Progress: no change  Outcome Evaluation: VSS, AOx4, pt anxious, c/o pain always states 10/10 pain meds, pain meds given as ordered.

## 2025-04-17 NOTE — THERAPY TREATMENT NOTE
Patient Name: Desiree Garcia  : 1960    MRN: [id number]                              Today's Date: 2025       Admit Date: 4/10/2025    Visit Dx:     ICD-10-CM ICD-9-CM   1. 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  I74.3 444.22     Patient Active Problem List   Diagnosis    Type 2 diabetes mellitus, without long-term current use of insulin    COPD (chronic obstructive pulmonary disease)    Depression with anxiety    Esophageal reflux    Forgetfulness    Leg paresthesia    Lumbar spinal stenosis    Migraines    Night sweat    Sciatica    Dyspnea on exertion    Thoracic or lumbosacral neuritis or radiculitis    Left wrist pain    Sprain of left wrist    Arthritis of knee, left    Contusion of left knee    Nondisplaced fracture of trapezium bone of left wrist with routine healing    Chronic left shoulder pain    Coronary artery disease involving native heart without angina pectoris    Hypertension, essential    Mixed hyperlipidemia    Cigarette nicotine dependence with nicotine-induced disorder    Altered bowel habits    Diarrhea    Rectal bleeding    Abdominal bloating    Allergic rhinitis due to food    Elevated troponin    Acute diastolic CHF (congestive heart failure)    History of ST elevation myocardial infarction (STEMI)    STEMI (ST elevation myocardial infarction)    Acute thrombus of left ventricle    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     Past Medical History:   Diagnosis Date    Arthritis     Cervical cancer screening     COPD (chronic obstructive pulmonary disease)     Coronary artery disease involving native heart without angina pectoris 2021    primary angioplasty and stent placement to mid right coronary artery with good angiographic results on 2021 after STEMI    Depression with anxiety     Diabetes mellitus     Forgetfulness     Gastric reflux     Hypertension     Leg paresthesia 2017    Right    Limb swelling     Lumbago 2017    Low  back pain    Lumbar spinal stenosis 2017    L4/5 moderate to severe central canal stenosis    Lumbosacral radiculopathy 2017    Right    Migraines     Night sweat     Reflux esophagitis     Shortness of breath     ST elevation myocardial infarction (STEMI) (CMS/Former[facility]) 2021    Stomach disorder      Past Surgical History:   Procedure Laterality Date    ABDOMINAL SURGERY      3 C-SECTIONS    ARTERIOGRAM LOWER EXTREMITY Left 2025    Procedure: ARTERIOGRAM LOWER EXTREMITY;  Surgeon: Misael Lawton MD;  Location: [facility] OR;  Service: Vascular;  Laterality: Left;    CARDIAC CATHETERIZATION      CARDIAC CATHETERIZATION N/A 2021    Procedure: Left Heart Cath;  Surgeon: Sidney Xiao MD;  Location: [facility] CATH INVASIVE LOCATION;  Service: Cardiology;  Laterality: N/A;    CARDIAC CATHETERIZATION N/A 2025    Procedure: Left Heart Cath;  Surgeon: James Verdugo MD;  Location: [facility] CATH INVASIVE LOCATION;  Service: Cardiology;  Laterality: N/A;     SECTION      x 3    CHOLECYSTECTOMY      COLONOSCOPY      COLONOSCOPY N/A 3/25/2025    Procedure: COLONOSCOPY WITH BIOPSIES AND COLD AND HOT SNARE POLYPECTOMIES;  Surgeon: Heber Najera MD;  Location: [facility] ENDOSCOPY;  Service: Gastroenterology;  Laterality: N/A;  COLON POLYPS    CORONARY STENT PLACEMENT      EMBOLECTOMY Left 2025    Procedure: EMBOLECTOMY MECHANICAL, FOUR COMPARTMENT FASCIOTOMY;  Surgeon: Misael Lawton MD;  Location: [facility] OR;  Service: Vascular;  Laterality: Left;    ENDOSCOPY      2007    FOOT SURGERY Right     HAND SURGERY      HYSTERECTOMY  1985      General Information       Row Name 25 9330          Physical Therapy Time and Intention    Document Type therapy note (daily note)  -TIFF     Mode of Treatment co-treatment;occupational therapy;physical therapy  -       Row Name 25 [id number]          General Information    Patient Profile Reviewed yes  -       Row  Name 04/17/25 1755          Cognition    Orientation Status (Cognition) oriented x 3  -       Row Name 04/17/25 1755          Safety Issues/Impairments Affecting Functional Mobility    Comment, Safety Issues/Impairments (Mobility) PT/OT cotreatment appropriate due to pt acuity level and to maxmize therapeutic benefit and for safety of patient and staff  -               User Key  (r) = Recorded By, (t) = Taken By, (c) = Cosigned By      Initials Name Provider Type    Laura Kulkarni PTA Physical Therapist Assistant                   Mobility       Row Name 04/17/25 1755          Bed Mobility    Bed Mobility supine-sit;sit-supine  -     Supine-Sit Merna (Bed Mobility) moderate assist (50% patient effort);2 person assist;verbal cues  -     Sit-Supine Merna (Bed Mobility) moderate assist (50% patient effort);2 person assist;verbal cues  -     Assistive Device (Bed Mobility) bed rails  -       Row Name 04/17/25 1755          Sit-Stand Transfer    Sit-Stand Merna (Transfers) moderate assist (50% patient effort);2 person assist;verbal cues  -     Assistive Device (Sit-Stand Transfers) walker, front-wheeled  -       Row Name 04/17/25 1755          Gait/Stairs (Locomotion)    Merna Level (Gait) 2 person assist;maximum assist (25% patient effort)  -     Assistive Device (Gait) walker, front-wheeled  -     Distance in Feet (Gait) 2  -     Deviations/Abnormal Patterns (Gait) festinating/shuffling;ataxic;base of support, narrow  -     Bilateral Gait Deviations forward flexed posture  -     Left Sided Gait Deviations decreased knee extension;weight shift ability decreased  -     Right Sided Gait Deviations decreased knee extension  -     Comment, (Gait/Stairs) bed <>bsc  -               User Key  (r) = Recorded By, (t) = Taken By, (c) = Cosigned By      Initials Name Provider Type    Laura Kulkarni PTA Physical Therapist Assistant                    Obj/Interventions    No documentation.                  Goals/Plan    No documentation.                  Clinical Impression       Row Name 04/17/25 1758          Pain    Pain Management Interventions exercise or physical activity utilized  -     Response to Pain Interventions activity participation with increased pain  -     Pre/Posttreatment Pain Comment LLE pain, but did not rate , just rolling to side in bed in pain  -JM       Row Name 04/17/25 1758          Plan of Care Review    Plan of Care Reviewed With patient  -     Outcome Evaluation Pt confused this session, did agree to get up , then difficulty getting LEs to EOB, or following directions, pt was able to tfer w/assist of 2 , multople cues for safety <> bsc and back to bed, difficulty WB on LLE, bilat knees flexed, did not buckle, but never full ext, pt did use rwx this session for tfer to bsc, but lots of cues for hand placement, very unsteady, will need placement  at FL  -       Row Name 04/17/25 1758          Therapy Assessment/Plan (PT)    Rehab Potential (PT) fair  -     Criteria for Skilled Interventions Met (PT) yes  -JM       [facility] Name 04/17/25 1758          Positioning and Restraints    Pre-Treatment Position in bed  -     Post Treatment Position bed  -JM     In Bed fowlers;call light within reach;encouraged to call for assist;exit alarm on;notified nsg;side rails up x3  -               User Key  (r) = Recorded By, (t) = Taken By, (c) = Cosigned By      Initials Name Provider Type    Laura Kulkarni, ABRAHAM Physical Therapist Assistant                   Outcome Measures       Row Name 04/17/25 1809 04/17/25 0906       How much help from another person do you currently need...    Turning from your back to your side while in flat bed without using bedrails? 3  -JM 3  -KP    Moving from lying on back to sitting on the side of a flat bed without bedrails? 2  -JM 3  -KP    Moving to and from a bed to a chair (including a wheelchair)? 1   - 1  -    Standing up from a chair using your arms (e.g., wheelchair, bedside chair)? 2  - 1  -KP    Climbing 3-5 steps with a railing? 1  - 1  -    To walk in hospital room? 1  - 1  -    AM-PAC 6 Clicks Score (PT) 10  - 10  -    Highest Level of Mobility Goal 4 --> Transfer to chair/commode  - 4 --> Transfer to chair/commode  -              User Key  (r) = Recorded By, (t) = Taken By, (c) = Cosigned By      Initials Name Provider Type    Laura Kulkarni PTA Physical Therapist Assistant    Gisselle Celestin RN Registered Nurse                                 Physical Therapy Education       Title: PT OT SLP Therapies (In Progress)       Topic: Physical Therapy (In Progress)       Point: Mobility training (In Progress)       Learning Progress Summary            Patient Acceptance, E,D, NR by TIFF at 4/17/2025 1809    Acceptance, E, NR by MARISSA at 4/15/2025 1324                      Point: Home exercise program (In Progress)       Learning Progress Summary            Patient Acceptance, E,D, NR by TIFF at 4/17/2025 1809                      Point: Body mechanics (In Progress)       Learning Progress Summary            Patient Acceptance, E,D, NR by TIFF at 4/17/2025 1809    Acceptance, E, NR by MARISSA at 4/15/2025 1324                      Point: Precautions (In Progress)       Learning Progress Summary            Patient Acceptance, E,D, NR by TIFF at 4/17/2025 1809    Acceptance, E, NR by MARISSA at 4/15/2025 1324                                      User Key       Initials Effective Dates Name Provider Type Discipline     03/07/18 -  Laura Gooden PTA Physical Therapist Assistant PT    MARISSA 10/25/19 -  Meka Araiza PT Physical Therapist PT                  PT Recommendation and Plan     Outcome Evaluation: Pt confused this session, did agree to get up , then difficulty getting LEs to EOB, or following directions, pt was able to tfer w/assist of 2 , multople cues for safety <> bsc and back to bed,  difficulty WB on LLE, bilat knees flexed, did not buckle, but never full ext, pt did use rwx this session for tfer to bsc, but lots of cues for hand placement, very unsteady, will need placement  at Dc     Time Calculation:         PT Charges       Row Name 04/17/25 4662             Time Calculation    Start Time 1417  -      Stop Time 1450  -JM      Time Calculation (min) 33 min  -      PT Received On 04/17/25  -      PT - Next Appointment 04/18/25  -                User Key  (r) = Recorded By, (t) = Taken By, (c) = Cosigned By      Initials Name Provider Type    Laura Kulkarni PTA Physical Therapist Assistant                  Therapy Charges for Today       Code Description Service Date Service Provider Modifiers Qty    [id number]  PT THERAPEUTIC ACT EA 15 MIN 4/17/2025 Laura Gooden PTA GP 2            PT G-Codes  Outcome Measure Options: AM-PAC 6 Clicks Basic Mobility (PT)  AM-PAC 6 Clicks Score (PT): 10  AM-PAC 6 Clicks Score (OT): 14  PT Discharge Summary  Anticipated Discharge Disposition (PT): skilled nursing facility, inpatient rehabilitation facility (pending progress)    Laura Gooden PTA  4/17/2025

## 2025-04-17 NOTE — PROGRESS NOTES
"    Patient Name: Desiree Garcia  :1960  65 y.o.      Patient Care Team:  Sandra Kaba MD as PCP - General (Family Medicine)  Marisol Nazario, RN as Ambulatory  ([facility])    Chief Complaint:   Inferior STEMI, LV thrombus, PAD, ischemic limb    Interval History:   Transferred out of CCU. Feels ok, mild left leg pain. Per staff, had large BM.    Objective   Vital Signs  Temp:  [97.7 °F (36.5 °C)-98.5 °F (36.9 °C)] 97.7 °F (36.5 °C)  Heart Rate:  [80-90] 80  Resp:  [16-18] 18  BP: (110-120)/(59-71) 117/62    Intake/Output Summary (Last 24 hours) at 2025 0749  Last data filed at 2025 1526  Gross per 24 hour   Intake 300 ml   Output 50 ml   Net 250 ml     Flowsheet Rows      Flowsheet Row First Filed Value   Admission Height 167.6 cm (65.98\") Documented at 2025 1714   Admission Weight 71 kg (156 lb 8.4 oz) Documented at 04/10/2025 1700            GEN: no distress, alert and oriented  HEENT: NACT, EOMI, moist mucous membranes  Lungs: CTAB, no wheezes, rales or rhonchi  CV: normal rate, regular rhythm, normal S1, S2, no murmurs, +2 radial pulses b/l, no carotid bruit  Abdomen: soft, distended, mlid TTP in lower quadrants  Extremities: left leg with overlying dressing  Skin: no rash, warm, dry  Heme/Lymph: no bruising  Psych: organized thought, normal behavior and affect    Results Review:    Results from last 7 days   Lab Units 25  043   SODIUM mmol/L 138   POTASSIUM mmol/L 4.1   CHLORIDE mmol/L 102   CO2 mmol/L 28.0   BUN mg/dL 7*   CREATININE mg/dL 0.64   GLUCOSE mg/dL 113*   CALCIUM mg/dL 8.2*           Results from last 7 days   Lab Units 25   WBC 10*3/mm3 26.91*   HEMOGLOBIN g/dL 7.9*   HEMATOCRIT % 24.3*   PLATELETS 10*3/mm3 399     Results from last 7 days   Lab Units 25  043 25  2115 25  1746 04/10/25  1742 04/10/25  1244   INR   --   --   --   --  1.36*   APTT seconds 81.5* 91.1* 82.0*   < > 96.3*    < > = values in " this interval not displayed.     Results from last 7 days   Lab Units 04/17/25  0435   MAGNESIUM mg/dL 1.9                     Medication Review:   amoxicillin-clavulanate, 1 tablet, Oral, Q12H  arformoterol, 15 mcg, Nebulization, BID - RT  aspirin, 81 mg, Oral, Daily   Or  aspirin, 300 mg, Rectal, Daily  atorvastatin, 40 mg, Oral, Nightly  budesonide, 0.5 mg, Nebulization, BID - RT  clopidogrel, 75 mg, Oral, Daily  insulin regular, 2-7 Units, Subcutaneous, Q6H  metoprolol tartrate, 25 mg, Oral, Q12H  pantoprazole, 40 mg, Oral, Q AM  revefenacin, 175 mcg, Nebulization, Daily - RT  senna-docusate sodium, 2 tablet, Oral, BID         heparin, 12 Units/kg/hr, Last Rate: 14 Units/kg/hr (04/16/25 8990)        Assessment & Plan   #Inferior STEMI  #LV thrombus  #critical limb ischemia s/p open left iliofemoral profunda and SFA thrombectomy, fasciotomy, left common femoral endarterectomy   #Acute HFrEF  #Splenic infarct  #Left renal infarct  #Left SFA occlusion  #Ileus  #NIA     64 yo woman with CAD status post PCI to RCA in 2021 in the setting of a STEMI, hypertension, hyperlipidemia, diabetes, COPD who presented to [facility] with severe abdominal pain.  On arrival there, she was noted to have inferior STEMI.  CTA abdomen pelvis also revealed complete occlusion of the left SFA, left renal artery, splenic artery, occlusion of the ONEIDA with reconstitution distally, as well as findings concerning for splenic and renal infarcts.  She was taken for emergent coronary angiogram which revealed distal circumflex occlusion, mild to moderate proximal LAD disease and a 100% thrombotic occlusion of the RCA.  There was heavy thrombus burden in the RCA and she received PCI to this with mechanical thrombectomy and overlapping drug-eluting stents.  There was ABILIO I-II flow in the distal vessels post PCI.  Echocardiogram performed 4/10/2025 with an EF of about 45%.  There was a 1.4 cm mobile thrombus in the left ventricle.  She  was started on heparin drip and vascular surgery was consulted given the CT findings and recommendation was to continue anticoagulation.  While admitted there, she had a run of NSVT and was started on amiodarone for this.  She was also hypotensive requiring Levophed and was also started on milrinone.  Patient was transferred here for further management. Pressors and inotropes were discontinued on arrival.  During admission, patient complained of severe abdominal pain.  Lactate was 1.  Repeat CT abdomen pelvis concerning for sigmoid colonic ischemia as well as long segment extensive small bowel enteritis and ileus.  Surgery was consulted. Over the weekend, had critical limb ischemia of the left leg and underwent thrombectomy and fasciotomy. She continues to have leukocytosis, repeat CT abdomen pelvis with mostly improving colonic dilatation.      - continue aspirin 81 mg daily, clopidogrel 75 mg daily, atorvastatin 40 mg daily  - continue metoprolol tartrate 25 mg BID  - continue hep gtt, plan is eventual transition to DOAC with long term dual therapy(no aspirin)  - possible left leg washout with wound vac tomorrow given leukocytosis    Santi Howard MD, FACC, [facility]  04/17/25  07:49 EDT

## 2025-04-17 NOTE — TELEPHONE ENCOUNTER
Spoke to pharmacist, Alis SALDANA  Advised pt was hospitalized and to hold refills on Carvedilol and Lisinopril (per Edith Rodriguez).     She said the pharmacy will reach out at a later time for clarification.

## 2025-04-18 ENCOUNTER — ANESTHESIA (OUTPATIENT)
Dept: PERIOP | Facility: HOSPITAL | Age: 65
End: 2025-04-18
Payer: MEDICARE

## 2025-04-18 LAB
ALBUMIN SERPL-MCNC: 2.4 G/DL (ref 3.5–5.2)
ANION GAP SERPL CALCULATED.3IONS-SCNC: 7.6 MMOL/L (ref 5–15)
ANISOCYTOSIS BLD QL: ABNORMAL
APTT PPP: 75.3 SECONDS (ref 22.7–35.4)
BASOPHILS # BLD MANUAL: 0.28 10*3/MM3 (ref 0–0.2)
BASOPHILS NFR BLD MANUAL: 1 % (ref 0–1.5)
BUN SERPL-MCNC: 6 MG/DL (ref 8–23)
BUN/CREAT SERPL: 8.7 (ref 7–25)
CALCIUM SPEC-SCNC: 7.8 MG/DL (ref 8.6–10.5)
CHLORIDE SERPL-SCNC: 105 MMOL/L (ref 98–107)
CO2 SERPL-SCNC: 27.4 MMOL/L (ref 22–29)
CREAT SERPL-MCNC: 0.69 MG/DL (ref 0.57–1)
DEPRECATED RDW RBC AUTO: 44 FL (ref 37–54)
EGFRCR SERPLBLD CKD-EPI 2021: 97.1 ML/MIN/1.73
EOSINOPHIL # BLD MANUAL: 0.56 10*3/MM3 (ref 0–0.4)
EOSINOPHIL NFR BLD MANUAL: 2 % (ref 0.3–6.2)
ERYTHROCYTE [DISTWIDTH] IN BLOOD BY AUTOMATED COUNT: 14.2 % (ref 12.3–15.4)
GLUCOSE BLDC GLUCOMTR-MCNC: 157 MG/DL (ref 70–130)
GLUCOSE BLDC GLUCOMTR-MCNC: 198 MG/DL (ref 70–130)
GLUCOSE BLDC GLUCOMTR-MCNC: 200 MG/DL (ref 70–130)
GLUCOSE BLDC GLUCOMTR-MCNC: 240 MG/DL (ref 70–130)
GLUCOSE BLDC GLUCOMTR-MCNC: 268 MG/DL (ref 70–130)
GLUCOSE SERPL-MCNC: 143 MG/DL (ref 65–99)
HCT VFR BLD AUTO: 22.3 % (ref 34–46.6)
HGB BLD-MCNC: 7.1 G/DL (ref 12–15.9)
LYMPHOCYTES # BLD MANUAL: 1.94 10*3/MM3 (ref 0.7–3.1)
LYMPHOCYTES NFR BLD MANUAL: 4 % (ref 5–12)
MAGNESIUM SERPL-MCNC: 1.8 MG/DL (ref 1.6–2.4)
MCH RBC QN AUTO: 27.2 PG (ref 26.6–33)
MCHC RBC AUTO-ENTMCNC: 31.8 G/DL (ref 31.5–35.7)
MCV RBC AUTO: 85.4 FL (ref 79–97)
METAMYELOCYTES NFR BLD MANUAL: 3 % (ref 0–0)
MONOCYTES # BLD: 1.11 10*3/MM3 (ref 0.1–0.9)
MYELOCYTES NFR BLD MANUAL: 2 % (ref 0–0)
NEUTROPHILS # BLD AUTO: 22.5 10*3/MM3 (ref 1.7–7)
NEUTROPHILS NFR BLD MANUAL: 81 % (ref 42.7–76)
NRBC BLD AUTO-RTO: 1.8 /100 WBC (ref 0–0.2)
NRBC SPEC MANUAL: 2 /100 WBC (ref 0–0.2)
PHOSPHATE SERPL-MCNC: 2.6 MG/DL (ref 2.5–4.5)
PLAT MORPH BLD: NORMAL
PLATELET # BLD AUTO: 409 10*3/MM3 (ref 140–450)
PMV BLD AUTO: 9.9 FL (ref 6–12)
POLYCHROMASIA BLD QL SMEAR: ABNORMAL
POTASSIUM SERPL-SCNC: 3.4 MMOL/L (ref 3.5–5.2)
RBC # BLD AUTO: 2.61 10*6/MM3 (ref 3.77–5.28)
SODIUM SERPL-SCNC: 140 MMOL/L (ref 136–145)
VARIANT LYMPHS NFR BLD MANUAL: 7 % (ref 19.6–45.3)
WBC MORPH BLD: NORMAL
WBC NRBC COR # BLD AUTO: 27.78 10*3/MM3 (ref 3.4–10.8)

## 2025-04-18 PROCEDURE — 87205 SMEAR GRAM STAIN: CPT | Performed by: SURGERY

## 2025-04-18 PROCEDURE — 25010000002 FENTANYL CITRATE (PF) 50 MCG/ML SOLUTION

## 2025-04-18 PROCEDURE — 25810000003 LACTATED RINGERS PER 1000 ML: Performed by: ANESTHESIOLOGY

## 2025-04-18 PROCEDURE — 11043 DBRDMT MUSC&/FSCA 1ST 20/<: CPT | Performed by: SURGERY

## 2025-04-18 PROCEDURE — 25010000002 HYDROMORPHONE 1 MG/ML SOLUTION: Performed by: SURGERY

## 2025-04-18 PROCEDURE — 25010000002 HYDROMORPHONE 1 MG/ML SOLUTION: Performed by: NURSE PRACTITIONER

## 2025-04-18 PROCEDURE — 25010000002 DEXAMETHASONE SODIUM PHOSPHATE 20 MG/5ML SOLUTION

## 2025-04-18 PROCEDURE — 25010000002 FENTANYL CITRATE (PF) 50 MCG/ML SOLUTION: Performed by: ANESTHESIOLOGY

## 2025-04-18 PROCEDURE — 25010000002 MAGNESIUM SULFATE PER 500 MG OF MAGNESIUM

## 2025-04-18 PROCEDURE — 25010000002 HEPARIN (PORCINE) 25000-0.45 UT/250ML-% SOLUTION: Performed by: SURGERY

## 2025-04-18 PROCEDURE — 99232 SBSQ HOSP IP/OBS MODERATE 35: CPT | Performed by: INTERNAL MEDICINE

## 2025-04-18 PROCEDURE — 25010000002 LIDOCAINE 2% SOLUTION

## 2025-04-18 PROCEDURE — 25010000002 FAMOTIDINE 10 MG/ML SOLUTION: Performed by: ANESTHESIOLOGY

## 2025-04-18 PROCEDURE — 85730 THROMBOPLASTIN TIME PARTIAL: CPT | Performed by: SURGERY

## 2025-04-18 PROCEDURE — 83735 ASSAY OF MAGNESIUM: CPT | Performed by: SURGERY

## 2025-04-18 PROCEDURE — 11046 DBRDMT MUSC&/FSCA EA ADDL: CPT | Performed by: SURGERY

## 2025-04-18 PROCEDURE — 25010000002 CEFAZOLIN PER 500 MG: Performed by: INTERNAL MEDICINE

## 2025-04-18 PROCEDURE — 25010000002 CEFAZOLIN PER 500 MG: Performed by: SURGERY

## 2025-04-18 PROCEDURE — 94799 UNLISTED PULMONARY SVC/PX: CPT

## 2025-04-18 PROCEDURE — 87070 CULTURE OTHR SPECIMN AEROBIC: CPT | Performed by: SURGERY

## 2025-04-18 PROCEDURE — 82948 REAGENT STRIP/BLOOD GLUCOSE: CPT

## 2025-04-18 PROCEDURE — 94761 N-INVAS EAR/PLS OXIMETRY MLT: CPT

## 2025-04-18 PROCEDURE — 0KBT0ZZ EXCISION OF LEFT LOWER LEG MUSCLE, OPEN APPROACH: ICD-10-PCS | Performed by: SURGERY

## 2025-04-18 PROCEDURE — 25010000002 HYDROMORPHONE PER 4 MG

## 2025-04-18 PROCEDURE — G0545 PR INHERENT VISIT TO INPT: HCPCS | Performed by: INTERNAL MEDICINE

## 2025-04-18 PROCEDURE — 25010000002 SUGAMMADEX 200 MG/2ML SOLUTION

## 2025-04-18 PROCEDURE — 99232 SBSQ HOSP IP/OBS MODERATE 35: CPT | Performed by: STUDENT IN AN ORGANIZED HEALTH CARE EDUCATION/TRAINING PROGRAM

## 2025-04-18 PROCEDURE — 63710000001 INSULIN REGULAR HUMAN PER 5 UNITS: Performed by: SURGERY

## 2025-04-18 PROCEDURE — 25010000002 ONDANSETRON PER 1 MG

## 2025-04-18 PROCEDURE — 94664 DEMO&/EVAL PT USE INHALER: CPT

## 2025-04-18 PROCEDURE — 25010000002 ACETAMINOPHEN 10 MG/ML SOLUTION

## 2025-04-18 PROCEDURE — 25010000002 PROPOFOL 10 MG/ML EMULSION

## 2025-04-18 PROCEDURE — 85007 BL SMEAR W/DIFF WBC COUNT: CPT | Performed by: SURGERY

## 2025-04-18 PROCEDURE — 80069 RENAL FUNCTION PANEL: CPT | Performed by: INTERNAL MEDICINE

## 2025-04-18 PROCEDURE — 85025 COMPLETE CBC W/AUTO DIFF WBC: CPT | Performed by: SURGERY

## 2025-04-18 DEVICE — ABSORBABLE HEMOSTAT (OXIDIZED REGENERATED CELLULOSE)
Type: IMPLANTABLE DEVICE | Site: LEG | Status: FUNCTIONAL
Brand: SURGICEL NU-KNIT

## 2025-04-18 RX ORDER — FENTANYL CITRATE 50 UG/ML
INJECTION, SOLUTION INTRAMUSCULAR; INTRAVENOUS AS NEEDED
Status: DISCONTINUED | OUTPATIENT
Start: 2025-04-18 | End: 2025-04-18 | Stop reason: SURG

## 2025-04-18 RX ORDER — NALOXONE HCL 0.4 MG/ML
0.4 VIAL (ML) INJECTION
Status: DISCONTINUED | OUTPATIENT
Start: 2025-04-18 | End: 2025-04-25

## 2025-04-18 RX ORDER — ONDANSETRON 4 MG/1
4 TABLET, ORALLY DISINTEGRATING ORAL EVERY 6 HOURS PRN
Status: DISCONTINUED | OUTPATIENT
Start: 2025-04-18 | End: 2025-05-06 | Stop reason: HOSPADM

## 2025-04-18 RX ORDER — PROPOFOL 10 MG/ML
VIAL (ML) INTRAVENOUS AS NEEDED
Status: DISCONTINUED | OUTPATIENT
Start: 2025-04-18 | End: 2025-04-18 | Stop reason: SURG

## 2025-04-18 RX ORDER — OXYCODONE AND ACETAMINOPHEN 7.5; 325 MG/1; MG/1
1 TABLET ORAL EVERY 4 HOURS PRN
Status: DISCONTINUED | OUTPATIENT
Start: 2025-04-18 | End: 2025-04-18 | Stop reason: HOSPADM

## 2025-04-18 RX ORDER — FLUMAZENIL 0.1 MG/ML
0.2 INJECTION INTRAVENOUS AS NEEDED
Status: DISCONTINUED | OUTPATIENT
Start: 2025-04-18 | End: 2025-04-18 | Stop reason: HOSPADM

## 2025-04-18 RX ORDER — SODIUM CHLORIDE, SODIUM LACTATE, POTASSIUM CHLORIDE, CALCIUM CHLORIDE 600; 310; 30; 20 MG/100ML; MG/100ML; MG/100ML; MG/100ML
9 INJECTION, SOLUTION INTRAVENOUS CONTINUOUS
Status: DISCONTINUED | OUTPATIENT
Start: 2025-04-18 | End: 2025-04-18

## 2025-04-18 RX ORDER — POTASSIUM CHLORIDE 1500 MG/1
40 TABLET, EXTENDED RELEASE ORAL EVERY 4 HOURS
Status: COMPLETED | OUTPATIENT
Start: 2025-04-18 | End: 2025-04-18

## 2025-04-18 RX ORDER — FENTANYL CITRATE 50 UG/ML
50 INJECTION, SOLUTION INTRAMUSCULAR; INTRAVENOUS
Status: DISCONTINUED | OUTPATIENT
Start: 2025-04-18 | End: 2025-04-18 | Stop reason: HOSPADM

## 2025-04-18 RX ORDER — NALOXONE HCL 0.4 MG/ML
0.2 VIAL (ML) INJECTION AS NEEDED
Status: DISCONTINUED | OUTPATIENT
Start: 2025-04-18 | End: 2025-04-18 | Stop reason: HOSPADM

## 2025-04-18 RX ORDER — IPRATROPIUM BROMIDE AND ALBUTEROL SULFATE 2.5; .5 MG/3ML; MG/3ML
3 SOLUTION RESPIRATORY (INHALATION)
Status: DISPENSED | OUTPATIENT
Start: 2025-04-18 | End: 2025-04-20

## 2025-04-18 RX ORDER — ACETAMINOPHEN 10 MG/ML
INJECTION, SOLUTION INTRAVENOUS AS NEEDED
Status: DISCONTINUED | OUTPATIENT
Start: 2025-04-18 | End: 2025-04-18 | Stop reason: SURG

## 2025-04-18 RX ORDER — NITROGLYCERIN 0.4 MG/1
0.4 TABLET SUBLINGUAL
Status: DISCONTINUED | OUTPATIENT
Start: 2025-04-18 | End: 2025-05-06 | Stop reason: HOSPADM

## 2025-04-18 RX ORDER — ONDANSETRON 2 MG/ML
INJECTION INTRAMUSCULAR; INTRAVENOUS AS NEEDED
Status: DISCONTINUED | OUTPATIENT
Start: 2025-04-18 | End: 2025-04-18 | Stop reason: SURG

## 2025-04-18 RX ORDER — FAMOTIDINE 10 MG/ML
20 INJECTION, SOLUTION INTRAVENOUS ONCE
Status: COMPLETED | OUTPATIENT
Start: 2025-04-18 | End: 2025-04-18

## 2025-04-18 RX ORDER — HYDROMORPHONE HYDROCHLORIDE 1 MG/ML
0.5 INJECTION, SOLUTION INTRAMUSCULAR; INTRAVENOUS; SUBCUTANEOUS
Status: DISCONTINUED | OUTPATIENT
Start: 2025-04-18 | End: 2025-04-18 | Stop reason: HOSPADM

## 2025-04-18 RX ORDER — HYDROCODONE BITARTRATE AND ACETAMINOPHEN 7.5; 325 MG/1; MG/1
1 TABLET ORAL EVERY 6 HOURS PRN
Refills: 0 | Status: DISCONTINUED | OUTPATIENT
Start: 2025-04-18 | End: 2025-04-22

## 2025-04-18 RX ORDER — MIDAZOLAM HYDROCHLORIDE 1 MG/ML
1 INJECTION, SOLUTION INTRAMUSCULAR; INTRAVENOUS
Status: DISCONTINUED | OUTPATIENT
Start: 2025-04-18 | End: 2025-04-18 | Stop reason: HOSPADM

## 2025-04-18 RX ORDER — ONDANSETRON 2 MG/ML
4 INJECTION INTRAMUSCULAR; INTRAVENOUS ONCE AS NEEDED
Status: DISCONTINUED | OUTPATIENT
Start: 2025-04-18 | End: 2025-04-18 | Stop reason: HOSPADM

## 2025-04-18 RX ORDER — ACETAMINOPHEN 325 MG/1
650 TABLET ORAL EVERY 4 HOURS PRN
Status: DISCONTINUED | OUTPATIENT
Start: 2025-04-18 | End: 2025-05-06 | Stop reason: HOSPADM

## 2025-04-18 RX ORDER — IPRATROPIUM BROMIDE AND ALBUTEROL SULFATE 2.5; .5 MG/3ML; MG/3ML
3 SOLUTION RESPIRATORY (INHALATION) ONCE AS NEEDED
Status: DISCONTINUED | OUTPATIENT
Start: 2025-04-18 | End: 2025-04-18 | Stop reason: HOSPADM

## 2025-04-18 RX ORDER — PHENYLEPHRINE HCL IN 0.9% NACL 1 MG/10 ML
SYRINGE (ML) INTRAVENOUS AS NEEDED
Status: DISCONTINUED | OUTPATIENT
Start: 2025-04-18 | End: 2025-04-18 | Stop reason: SURG

## 2025-04-18 RX ORDER — DROPERIDOL 2.5 MG/ML
0.62 INJECTION, SOLUTION INTRAMUSCULAR; INTRAVENOUS
Status: DISCONTINUED | OUTPATIENT
Start: 2025-04-18 | End: 2025-04-18 | Stop reason: HOSPADM

## 2025-04-18 RX ORDER — ATROPINE SULFATE 0.4 MG/ML
0.4 INJECTION, SOLUTION INTRAMUSCULAR; INTRAVENOUS; SUBCUTANEOUS ONCE AS NEEDED
Status: DISCONTINUED | OUTPATIENT
Start: 2025-04-18 | End: 2025-04-18 | Stop reason: HOSPADM

## 2025-04-18 RX ORDER — DIPHENHYDRAMINE HYDROCHLORIDE 50 MG/ML
12.5 INJECTION, SOLUTION INTRAMUSCULAR; INTRAVENOUS
Status: DISCONTINUED | OUTPATIENT
Start: 2025-04-18 | End: 2025-04-18 | Stop reason: HOSPADM

## 2025-04-18 RX ORDER — EPHEDRINE SULFATE 50 MG/ML
5 INJECTION, SOLUTION INTRAVENOUS ONCE AS NEEDED
Status: DISCONTINUED | OUTPATIENT
Start: 2025-04-18 | End: 2025-04-18 | Stop reason: HOSPADM

## 2025-04-18 RX ORDER — EPHEDRINE SULFATE 50 MG/ML
INJECTION INTRAVENOUS AS NEEDED
Status: DISCONTINUED | OUTPATIENT
Start: 2025-04-18 | End: 2025-04-18 | Stop reason: SURG

## 2025-04-18 RX ORDER — LIDOCAINE HYDROCHLORIDE 20 MG/ML
INJECTION, SOLUTION INFILTRATION; PERINEURAL AS NEEDED
Status: DISCONTINUED | OUTPATIENT
Start: 2025-04-18 | End: 2025-04-18 | Stop reason: SURG

## 2025-04-18 RX ORDER — ROCURONIUM BROMIDE 10 MG/ML
INJECTION, SOLUTION INTRAVENOUS AS NEEDED
Status: DISCONTINUED | OUTPATIENT
Start: 2025-04-18 | End: 2025-04-18 | Stop reason: SURG

## 2025-04-18 RX ORDER — HYDRALAZINE HYDROCHLORIDE 20 MG/ML
5 INJECTION INTRAMUSCULAR; INTRAVENOUS
Status: DISCONTINUED | OUTPATIENT
Start: 2025-04-18 | End: 2025-04-18 | Stop reason: HOSPADM

## 2025-04-18 RX ORDER — ACETAMINOPHEN 650 MG/1
650 SUPPOSITORY RECTAL EVERY 4 HOURS PRN
Status: DISCONTINUED | OUTPATIENT
Start: 2025-04-18 | End: 2025-05-06 | Stop reason: HOSPADM

## 2025-04-18 RX ORDER — ONDANSETRON 2 MG/ML
4 INJECTION INTRAMUSCULAR; INTRAVENOUS EVERY 6 HOURS PRN
Status: DISCONTINUED | OUTPATIENT
Start: 2025-04-18 | End: 2025-05-06 | Stop reason: HOSPADM

## 2025-04-18 RX ORDER — HYDROCODONE BITARTRATE AND ACETAMINOPHEN 5; 325 MG/1; MG/1
1 TABLET ORAL ONCE AS NEEDED
Status: DISCONTINUED | OUTPATIENT
Start: 2025-04-18 | End: 2025-04-18 | Stop reason: HOSPADM

## 2025-04-18 RX ORDER — DEXAMETHASONE SODIUM PHOSPHATE 4 MG/ML
INJECTION, SOLUTION INTRA-ARTICULAR; INTRALESIONAL; INTRAMUSCULAR; INTRAVENOUS; SOFT TISSUE AS NEEDED
Status: DISCONTINUED | OUTPATIENT
Start: 2025-04-18 | End: 2025-04-18 | Stop reason: SURG

## 2025-04-18 RX ORDER — MAGNESIUM SULFATE HEPTAHYDRATE 500 MG/ML
INJECTION, SOLUTION INTRAMUSCULAR; INTRAVENOUS AS NEEDED
Status: DISCONTINUED | OUTPATIENT
Start: 2025-04-18 | End: 2025-04-18 | Stop reason: SURG

## 2025-04-18 RX ORDER — LABETALOL HYDROCHLORIDE 5 MG/ML
5 INJECTION, SOLUTION INTRAVENOUS
Status: DISCONTINUED | OUTPATIENT
Start: 2025-04-18 | End: 2025-04-18 | Stop reason: HOSPADM

## 2025-04-18 RX ORDER — PROMETHAZINE HYDROCHLORIDE 25 MG/1
25 SUPPOSITORY RECTAL ONCE AS NEEDED
Status: DISCONTINUED | OUTPATIENT
Start: 2025-04-18 | End: 2025-04-18 | Stop reason: HOSPADM

## 2025-04-18 RX ORDER — SODIUM CHLORIDE 0.9 % (FLUSH) 0.9 %
3-10 SYRINGE (ML) INJECTION AS NEEDED
Status: DISCONTINUED | OUTPATIENT
Start: 2025-04-18 | End: 2025-04-18 | Stop reason: HOSPADM

## 2025-04-18 RX ORDER — ACETAMINOPHEN 160 MG/5ML
650 SOLUTION ORAL EVERY 4 HOURS PRN
Status: DISCONTINUED | OUTPATIENT
Start: 2025-04-18 | End: 2025-05-06 | Stop reason: HOSPADM

## 2025-04-18 RX ORDER — PROMETHAZINE HYDROCHLORIDE 25 MG/1
25 TABLET ORAL ONCE AS NEEDED
Status: DISCONTINUED | OUTPATIENT
Start: 2025-04-18 | End: 2025-04-18 | Stop reason: HOSPADM

## 2025-04-18 RX ORDER — SODIUM CHLORIDE 0.9 % (FLUSH) 0.9 %
3 SYRINGE (ML) INJECTION EVERY 12 HOURS SCHEDULED
Status: DISCONTINUED | OUTPATIENT
Start: 2025-04-18 | End: 2025-04-18 | Stop reason: HOSPADM

## 2025-04-18 RX ORDER — HYDROCODONE BITARTRATE AND ACETAMINOPHEN 7.5; 325 MG/1; MG/1
1 TABLET ORAL EVERY 4 HOURS PRN
Refills: 0 | Status: DISCONTINUED | OUTPATIENT
Start: 2025-04-18 | End: 2025-04-22

## 2025-04-18 RX ADMIN — MAGNESIUM SULFATE HEPTAHYDRATE 2 G: 500 INJECTION, SOLUTION INTRAMUSCULAR; INTRAVENOUS at 07:30

## 2025-04-18 RX ADMIN — LIDOCAINE HYDROCHLORIDE 100 MG: 20 INJECTION, SOLUTION INFILTRATION; PERINEURAL at 07:37

## 2025-04-18 RX ADMIN — HYDROMORPHONE HYDROCHLORIDE 1 MG: 1 INJECTION, SOLUTION INTRAMUSCULAR; INTRAVENOUS; SUBCUTANEOUS at 23:47

## 2025-04-18 RX ADMIN — IPRATROPIUM BROMIDE AND ALBUTEROL SULFATE 3 ML: .5; 3 SOLUTION RESPIRATORY (INHALATION) at 15:39

## 2025-04-18 RX ADMIN — HYDROCODONE BITARTRATE AND ACETAMINOPHEN 1 TABLET: 7.5; 325 TABLET ORAL at 12:35

## 2025-04-18 RX ADMIN — HYDROMORPHONE HYDROCHLORIDE 1 MG: 1 INJECTION, SOLUTION INTRAMUSCULAR; INTRAVENOUS; SUBCUTANEOUS at 05:37

## 2025-04-18 RX ADMIN — SODIUM CHLORIDE, POTASSIUM CHLORIDE, SODIUM LACTATE AND CALCIUM CHLORIDE 9 ML/HR: 600; 310; 30; 20 INJECTION, SOLUTION INTRAVENOUS at 06:58

## 2025-04-18 RX ADMIN — EPHEDRINE SULFATE 10 MG: 50 INJECTION INTRAVENOUS at 07:41

## 2025-04-18 RX ADMIN — CEFAZOLIN 2000 MG: 2 INJECTION, POWDER, FOR SOLUTION INTRAMUSCULAR; INTRAVENOUS at 07:20

## 2025-04-18 RX ADMIN — Medication 200 MCG: at 07:45

## 2025-04-18 RX ADMIN — FENTANYL CITRATE 50 MCG: 50 INJECTION, SOLUTION INTRAMUSCULAR; INTRAVENOUS at 09:13

## 2025-04-18 RX ADMIN — HEPARIN SODIUM 14 UNITS/KG/HR: 10000 INJECTION, SOLUTION INTRAVENOUS at 12:31

## 2025-04-18 RX ADMIN — POTASSIUM CHLORIDE 40 MEQ: 1500 TABLET, EXTENDED RELEASE ORAL at 15:28

## 2025-04-18 RX ADMIN — FENTANYL CITRATE 50 MCG: 50 INJECTION, SOLUTION INTRAMUSCULAR; INTRAVENOUS at 07:37

## 2025-04-18 RX ADMIN — POTASSIUM CHLORIDE 40 MEQ: 1500 TABLET, EXTENDED RELEASE ORAL at 12:00

## 2025-04-18 RX ADMIN — HYDROCODONE BITARTRATE AND ACETAMINOPHEN 1 TABLET: 7.5; 325 TABLET ORAL at 19:50

## 2025-04-18 RX ADMIN — FAMOTIDINE 20 MG: 10 INJECTION, SOLUTION INTRAVENOUS at 06:57

## 2025-04-18 RX ADMIN — ACETAMINOPHEN 650 MG: 325 TABLET, FILM COATED ORAL at 00:49

## 2025-04-18 RX ADMIN — Medication 200 MCG: at 07:49

## 2025-04-18 RX ADMIN — ROCURONIUM BROMIDE 30 MG: 10 INJECTION, SOLUTION INTRAVENOUS at 07:37

## 2025-04-18 RX ADMIN — INSULIN HUMAN 4 UNITS: 100 INJECTION, SOLUTION PARENTERAL at 17:08

## 2025-04-18 RX ADMIN — ATORVASTATIN CALCIUM 40 MG: 20 TABLET, FILM COATED ORAL at 19:50

## 2025-04-18 RX ADMIN — HYDROMORPHONE HYDROCHLORIDE 0.5 MG: 1 INJECTION, SOLUTION INTRAMUSCULAR; INTRAVENOUS; SUBCUTANEOUS at 09:13

## 2025-04-18 RX ADMIN — EPHEDRINE SULFATE 10 MG: 50 INJECTION INTRAVENOUS at 07:44

## 2025-04-18 RX ADMIN — SUGAMMADEX 200 MG: 100 INJECTION, SOLUTION INTRAVENOUS at 08:22

## 2025-04-18 RX ADMIN — ONDANSETRON 4 MG: 2 INJECTION, SOLUTION INTRAMUSCULAR; INTRAVENOUS at 07:46

## 2025-04-18 RX ADMIN — METOPROLOL TARTRATE 25 MG: 25 TABLET, FILM COATED ORAL at 19:50

## 2025-04-18 RX ADMIN — PROPOFOL 150 MG: 10 INJECTION, EMULSION INTRAVENOUS at 07:37

## 2025-04-18 RX ADMIN — Medication 100 MCG: at 07:43

## 2025-04-18 RX ADMIN — HYDROMORPHONE HYDROCHLORIDE 1 MG: 1 INJECTION, SOLUTION INTRAMUSCULAR; INTRAVENOUS; SUBCUTANEOUS at 17:03

## 2025-04-18 RX ADMIN — METOPROLOL TARTRATE 25 MG: 25 TABLET, FILM COATED ORAL at 05:28

## 2025-04-18 RX ADMIN — Medication 200 MCG: at 08:16

## 2025-04-18 RX ADMIN — HYDROMORPHONE HYDROCHLORIDE 1 MG: 1 INJECTION, SOLUTION INTRAMUSCULAR; INTRAVENOUS; SUBCUTANEOUS at 20:49

## 2025-04-18 RX ADMIN — Medication 200 MCG: at 08:22

## 2025-04-18 RX ADMIN — HYDROMORPHONE HYDROCHLORIDE 0.5 MG: 1 INJECTION, SOLUTION INTRAMUSCULAR; INTRAVENOUS; SUBCUTANEOUS at 08:55

## 2025-04-18 RX ADMIN — CEFAZOLIN 2000 MG: 2 INJECTION, POWDER, FOR SOLUTION INTRAMUSCULAR; INTRAVENOUS at 23:17

## 2025-04-18 RX ADMIN — HYDROMORPHONE HYDROCHLORIDE 1 MG: 1 INJECTION, SOLUTION INTRAMUSCULAR; INTRAVENOUS; SUBCUTANEOUS at 01:24

## 2025-04-18 RX ADMIN — DEXAMETHASONE SODIUM PHOSPHATE 4 MG: 4 INJECTION, SOLUTION INTRAMUSCULAR; INTRAVENOUS at 07:46

## 2025-04-18 RX ADMIN — INSULIN HUMAN 3 UNITS: 100 INJECTION, SOLUTION PARENTERAL at 00:58

## 2025-04-18 RX ADMIN — ACETAMINOPHEN 1000 MG: 1000 INJECTION INTRAVENOUS at 07:50

## 2025-04-18 RX ADMIN — CEFAZOLIN 2000 MG: 2 INJECTION, POWDER, FOR SOLUTION INTRAMUSCULAR; INTRAVENOUS at 15:28

## 2025-04-18 RX ADMIN — INSULIN HUMAN 3 UNITS: 100 INJECTION, SOLUTION PARENTERAL at 12:00

## 2025-04-18 RX ADMIN — FENTANYL CITRATE 50 MCG: 50 INJECTION, SOLUTION INTRAMUSCULAR; INTRAVENOUS at 06:58

## 2025-04-18 RX ADMIN — FENTANYL CITRATE 50 MCG: 50 INJECTION, SOLUTION INTRAMUSCULAR; INTRAVENOUS at 08:08

## 2025-04-18 RX ADMIN — Medication 100 MCG: at 08:11

## 2025-04-18 RX ADMIN — Medication 200 MCG: at 07:55

## 2025-04-18 NOTE — ANESTHESIA PROCEDURE NOTES
Airway  Reason: elective    Date/Time: 4/18/2025 7:40 AM  Airway not difficult    General Information and Staff    Patient location during procedure: OR  CRNA/CAA: Tal Deluca CRNA    Indications and Patient Condition  Indications for airway management: airway protection    Preoxygenated: yes  MILS not maintained throughout    Mask difficulty assessment: 1 - vent by mask    Final Airway Details    Final airway type: endotracheal airway      Successful airway: ETT  Cuffed: yes   Successful intubation technique: direct laryngoscopy  Endotracheal tube insertion site: oral  Blade: Brayan  Blade size: 3  ETT size (mm): 7.0  Cormack-Lehane Classification: grade I - full view of glottis  Placement verified by: chest auscultation and capnometry   Cuff volume (mL): 10  Measured from: gums  ETT/EBT to gums (cm): 22  ETT/EBT  to teeth (cm): 22  Number of attempts at approach: 1  Assessment: lips, teeth, and gum same as pre-op and atraumatic intubation

## 2025-04-18 NOTE — CONSULTS
Inpatient Internal Medicine Consult  Consult performed by: James Castillo MD  Consult ordered by: Misael Lawton MD      Date of Admit: 4/10/2025  Date of Consult: 04/18/25    Subjective   65 y.o. female with history of COPD, coronary artery disease, depression, hypertension, presented to the hospital, has been in the ICU, patient has been evaluated for leukocytosis, cardiogenic shock, ischemic enteritis, critical ischemia of left lower extremity due to embolism, status post thrombectomy and angiogram, is now currently clinically stable.  She is followed by vascular surgery, nephrology, CT surgery, pulmonology and infectious disease.  Medicine team was asked to follow this patient.    Review of Systems   Constitutional: Negative for activity change and appetite change.   HENT: Negative for congestion and dental problem.    Eyes: Negative for discharge and itching.   Respiratory: Negative for apnea and chest tightness.    Cardiovascular: Negative for chest pain and palpitations.   Gastrointestinal: Negative for abdominal distention and abdominal pain.   Endocrine: Negative for cold intolerance and heat intolerance.   Genitourinary: Negative for difficulty urinating and frequency.   Musculoskeletal: Negative for arthralgias and back pain.   Skin: Negative for color change and pallor.   Allergic/Immunologic: Negative for environmental allergies and food allergies.   Neurological: Negative for dizziness and facial asymmetry.   Hematological: Negative for adenopathy. Does not bruise/bleed easily.   Psychiatric/Behavioral: Negative for agitation and suicidal ideas.       Past Medical History:   Diagnosis Date    Arthritis     Cervical cancer screening     COPD (chronic obstructive pulmonary disease)     Coronary artery disease involving native heart without angina pectoris 09/30/2021    primary angioplasty and stent placement to mid right coronary artery with good angiographic results on 6/14/2021 after STEMI     Depression with anxiety     Diabetes mellitus     Forgetfulness     Gastric reflux     Hypertension     Leg paresthesia 2017    Right    Limb swelling     Lumbago 2017    Low back pain    Lumbar spinal stenosis 2017    L4/5 moderate to severe central canal stenosis    Lumbosacral radiculopathy 2017    Right    Migraines     Night sweat     Reflux esophagitis     Shortness of breath     ST elevation myocardial infarction (STEMI) (CMS/formerly [facility]) 2021    Stomach disorder      Past Surgical History:   Procedure Laterality Date    ABDOMINAL SURGERY      3 C-SECTIONS    ARTERIOGRAM LOWER EXTREMITY Left 2025    Procedure: ARTERIOGRAM LOWER EXTREMITY;  Surgeon: Misael Lawton MD;  Location: Replaced by [facility] OR;  Service: Vascular;  Laterality: Left;    CARDIAC CATHETERIZATION      CARDIAC CATHETERIZATION N/A 2021    Procedure: Left Heart Cath;  Surgeon: Sidney Xiao MD;  Location: [facility] CATH INVASIVE LOCATION;  Service: Cardiology;  Laterality: N/A;    CARDIAC CATHETERIZATION N/A 2025    Procedure: Left Heart Cath;  Surgeon: James Verdugo MD;  Location: [facility] CATH INVASIVE LOCATION;  Service: Cardiology;  Laterality: N/A;     SECTION      x 3    CHOLECYSTECTOMY      COLONOSCOPY      COLONOSCOPY N/A 3/25/2025    Procedure: COLONOSCOPY WITH BIOPSIES AND COLD AND HOT SNARE POLYPECTOMIES;  Surgeon: Heber Najera MD;  Location: [facility] ENDOSCOPY;  Service: Gastroenterology;  Laterality: N/A;  COLON POLYPS    CORONARY STENT PLACEMENT      EMBOLECTOMY Left 2025    Procedure: EMBOLECTOMY MECHANICAL, FOUR COMPARTMENT FASCIOTOMY;  Surgeon: Misael Lawton MD;  Location: Replaced by [facility] OR;  Service: Vascular;  Laterality: Left;    ENDOSCOPY      2007    FOOT SURGERY Right     HAND SURGERY      HYSTERECTOMY  1985       Family History   Problem Relation Age of Onset    Stroke Mother     Lung cancer Mother         Unspecified    Arthritis Mother     Stroke  Father     Heart disease Father     Diabetes Father         Unspecified type    Arthritis Father     Heart attack Father     Stroke Sister     Arthritis Sister     Breast cancer Sister     Stroke Brother     Heart disease Brother     Diabetes Brother         Unspecified type    Arthritis Brother     Colon cancer Neg Hx      Social History     Tobacco Use    Smoking status: Every Day     Current packs/day: 1.00     Average packs/day: 1 pack/day for 58.3 years (58.3 ttl pk-yrs)     Types: Cigarettes     Start date: 1/14/1967     Passive exposure: Current    Smokeless tobacco: Never    Tobacco comments:     Smoked 21-30 years   Vaping Use    Vaping status: Never Used   Substance Use Topics    Alcohol use: Never     Comment: Does not drink    Drug use: Not Currently     Types: Methamphetamines     Objective      Vital Signs  Temp:  [98 °F (36.7 °C)-98.7 °F (37.1 °C)] 98.1 °F (36.7 °C)  Heart Rate:  [75-94] 75  Resp:  [14-18] 16  BP: (104-130)/(49-74) 113/57    Physical Exam  General, awake and alert.  Head and ENT, normocephalic and atraumatic.  Lungs, symmetric expansion, equal air entry bilaterally.  Heart, regular rate and rhythm.  Abdomen, soft and nontender.  Extremities, no clubbing or cyanosis.  Neuro, no focal deficits.  Skin: Warm and no rash.  Psych, normal mood and affect.  Musculoskeletal, joint examination is grossly normal.    Assessment & Plan   Active Hospital Problems    Diagnosis  POA    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 [I74.3]  Unknown    Acute thrombus of left ventricle [I24.0]  Yes    Altered bowel habits [R19.4]  Yes    Leg paresthesia [R20.2]  Yes      Resolved Hospital Problems    Diagnosis Date Resolved POA    **Cardiogenic shock [R57.0] 04/15/2025 Yes     Assessment and plan  1.  Leukocytosis, ischemic enteritis and colitis, follow WBC trend, ID on board, follow management recommendations.    2.  Cardiogenic shock, resolved, patient stable from ICU standpoint.    3.  Status  post open thrombectomy and angiogram, due to critical ischemia of left lower extremity due to embolism.  Status post compartment fasciotomy.    4.  Postop anemia, surgical bleeding and hematoma, monitor hemoglobin trend.    5.  CODE STATUS is full code.  Further plans based on hospital course.    James Castillo MD  [facility]  04/18/25 16:27 EDT

## 2025-04-18 NOTE — PLAN OF CARE
Problem: Adult Inpatient Plan of Care  Goal: Plan of Care Review  Outcome: Progressing  Flowsheets (Taken 4/18/2025 1429)  Outcome Evaluation: Patient is alert and oriented. VSS C/o pain managed with PRN medications. Wound debridment done today. Regular diet.  Goal: Patient-Specific Goal (Individualized)  Outcome: Progressing  Goal: Absence of Hospital-Acquired Illness or Injury  Outcome: Progressing  Intervention: Identify and Manage Fall Risk  Recent Flowsheet Documentation  Taken 4/18/2025 1403 by Gisselle Tuttle RN  Safety Promotion/Fall Prevention:   clutter free environment maintained   activity supervised  Taken 4/18/2025 1202 by Gisselle Tuttle RN  Safety Promotion/Fall Prevention:   clutter free environment maintained   activity supervised  Taken 4/18/2025 1200 by Gisselle Tuttle RN  Safety Promotion/Fall Prevention:   clutter free environment maintained   activity supervised  Taken 4/18/2025 1015 by Gisselle Tuttle RN  Safety Promotion/Fall Prevention:   clutter free environment maintained   activity supervised  Intervention: Prevent Infection  Recent Flowsheet Documentation  Taken 4/18/2025 1403 by Gisselle Tuttle RN  Infection Prevention: single patient room provided  Taken 4/18/2025 1202 by Gisselle Tuttle RN  Infection Prevention: single patient room provided  Taken 4/18/2025 1200 by Gisselle Tuttle RN  Infection Prevention: single patient room provided  Taken 4/18/2025 1015 by Gisselle Tuttle RN  Infection Prevention: single patient room provided  Goal: Optimal Comfort and Wellbeing  Outcome: Progressing  Intervention: Provide Person-Centered Care  Recent Flowsheet Documentation  Taken 4/18/2025 1403 by Gisselle Tuttle RN  Trust Relationship/Rapport:   care explained   choices provided  Taken 4/18/2025 1015 by Gisselle Tuttle RN  Trust Relationship/Rapport:   care explained   choices provided  Goal: Readiness for Transition of Care  Outcome: Progressing     Problem: Skin Injury Risk Increased  Goal: Skin  Health and Integrity  Outcome: Progressing     Problem: Sepsis/Septic Shock  Goal: Optimal Coping  Outcome: Progressing  Intervention: Support Patient and Family Response  Recent Flowsheet Documentation  Taken 4/18/2025 1403 by Gisselle Tuttle RN  Family/Support System Care: self-care encouraged  Taken 4/18/2025 1015 by Gisselle Tuttle RN  Family/Support System Care: self-care encouraged  Goal: Absence of Bleeding  Outcome: Progressing  Goal: Blood Glucose Level Within Target Range  Outcome: Progressing  Goal: Absence of Infection Signs and Symptoms  Outcome: Progressing  Intervention: Initiate Sepsis Management  Recent Flowsheet Documentation  Taken 4/18/2025 1403 by Gisselle Tuttle RN  Infection Prevention: single patient room provided  Taken 4/18/2025 1202 by Gisselle Tuttle RN  Infection Prevention: single patient room provided  Taken 4/18/2025 1200 by Gisselle Tuttle RN  Infection Prevention: single patient room provided  Taken 4/18/2025 1015 by Gisselle Tuttle RN  Infection Prevention: single patient room provided  Goal: Optimal Nutrition Delivery  Outcome: Progressing     Problem: Fall Injury Risk  Goal: Absence of Fall and Fall-Related Injury  Outcome: Progressing  Intervention: Identify and Manage Contributors  Recent Flowsheet Documentation  Taken 4/18/2025 1403 by Gisselle Tuttle RN  Medication Review/Management: medications reviewed  Taken 4/18/2025 1202 by Gisselle Tuttle RN  Medication Review/Management: medications reviewed  Taken 4/18/2025 1200 by Gisselle Tuttle RN  Medication Review/Management: medications reviewed  Taken 4/18/2025 1015 by Gisselle Tuttle RN  Medication Review/Management: medications reviewed  Intervention: Promote Injury-Free Environment  Recent Flowsheet Documentation  Taken 4/18/2025 1403 by Gisselle Tuttle RN  Safety Promotion/Fall Prevention:   clutter free environment maintained   activity supervised  Taken 4/18/2025 1202 by Gisselle Tuttle RN  Safety Promotion/Fall Prevention:   clutter  free environment maintained   activity supervised  Taken 4/18/2025 1200 by Gisselle Tuttle RN  Safety Promotion/Fall Prevention:   clutter free environment maintained   activity supervised  Taken 4/18/2025 1015 by Gisselle Tuttle RN  Safety Promotion/Fall Prevention:   clutter free environment maintained   activity supervised     Problem: Pain Acute  Goal: Optimal Pain Control and Function  Outcome: Progressing  Intervention: Optimize Psychosocial Wellbeing  Recent Flowsheet Documentation  Taken 4/18/2025 1403 by Gisselle Tuttle RN  Diversional Activities: television  Taken 4/18/2025 1015 by Gisselle Tuttle RN  Diversional Activities: television  Intervention: Prevent or Manage Pain  Recent Flowsheet Documentation  Taken 4/18/2025 1403 by Gisselle Tuttle RN  Medication Review/Management: medications reviewed  Taken 4/18/2025 1202 by Gisselle Tuttle RN  Medication Review/Management: medications reviewed  Taken 4/18/2025 1200 by Gisselle Tuttle RN  Medication Review/Management: medications reviewed  Taken 4/18/2025 1015 by Gisselle Tuttle RN  Medication Review/Management: medications reviewed     Problem: Heart Failure  Goal: Optimal Coping  Outcome: Progressing  Intervention: Support Psychosocial Response  Recent Flowsheet Documentation  Taken 4/18/2025 1403 by Gisselle Tuttle RN  Family/Support System Care: self-care encouraged  Taken 4/18/2025 1015 by Gisselle Tuttle RN  Family/Support System Care: self-care encouraged  Goal: Optimal Cardiac Output and Blood Flow  Outcome: Progressing  Goal: Stable Heart Rate and Rhythm  Outcome: Progressing  Goal: Fluid and Electrolyte Balance  Outcome: Progressing  Goal: Optimal Functional Ability  Outcome: Progressing  Goal: Improved Oral Intake  Outcome: Progressing  Goal: Effective Oxygenation and Ventilation  Outcome: Progressing  Goal: Effective Breathing Pattern During Sleep  Outcome: Progressing  Intervention: Monitor and Manage Obstructive Sleep Apnea  Recent Flowsheet  Documentation  Taken 4/18/2025 1403 by Gisselle Tuttle RN  Medication Review/Management: medications reviewed  Taken 4/18/2025 1202 by Gisselle Tuttle RN  Medication Review/Management: medications reviewed  Taken 4/18/2025 1200 by Gisselle Tuttle RN  Medication Review/Management: medications reviewed  Taken 4/18/2025 1015 by Gisselle Tuttle RN  Medication Review/Management: medications reviewed     Problem: VTE (Venous Thromboembolism)  Goal: Tissue Perfusion  Outcome: Progressing  Goal: Optimal Right Ventricular Function  Outcome: Progressing   Goal Outcome Evaluation:              Outcome Evaluation: Patient is alert and oriented. VSS C/o pain managed with PRN medications. Wound debridment done today. Regular diet.

## 2025-04-18 NOTE — DISCHARGE PLACEMENT REQUEST
"Bin Catalan (65 y.o. Female)       Date of Birth   1960    Social Security Number       Address   [address] [place] [id number]    Home Phone   [phone]    MRN   [id number]       [facility]    Marital Status   Legally                             Admission Date   4/10/2025    Admission Type   Urgent    Admitting Provider   Bryn Merino MD    Attending Provider   Bryn Merino MD    Department, Room/Bed   [address], [address]       Discharge Date       Discharge Disposition       Discharge Destination                                 Attending Provider: Bryn Merino MD    Allergies: Tramadol    Isolation: None   Infection: None   Code Status: CPR    Ht: 167.6 cm (65.98\")   Wt: 75.4 kg (166 lb 3.6 oz)    Admission Cmt: None   Principal Problem: Cardiogenic shock [R57.0]                   Active Insurance as of 4/10/2025       Primary Coverage       Payor Plan Insurance Group Employer/Plan Group    HUMANA MEDICARE REPLACEMENT HU[employer]NA MEDICARE ADVANTAGE [facility] [id number]       Payor Plan Address Payor Plan Phone Number Payor Plan Fax Number Effective Dates    [address] [address] [phone]  1/1/2024 - None Entered    [facility] [id number]         Subscriber Name Subscriber Birth Date Member ID       BIN CATALAN 1960 [id number]               Secondary Coverage       Payor Plan Insurance Group Employer/Plan Group    [facility] BY [facility] BY JB [id number]       Payor Plan Address Payor Plan Phone Number Payor Plan Fax Number Effective Dates    [address] [address]   1/1/2021 - None Entered    [facility] [address]         Subscriber Name Subscriber Birth Date Member ID       BIN CATALAN 1960 [id number]                     Emergency Contacts        (Rel.) Home Phone Work Phone Mobile Phone    Yuilsa Chowdhury (Sister) -- [phone] --    Angel CRUZ (Son) -- -- [phone]    " Yulisa Chowdhury (Sister) [phone] -- [phone]    Yana CRUZ (Relative) -- -- [phone]    jonnie pritchard (Brother) -- -- [phone]

## 2025-04-18 NOTE — BRIEF OP NOTE
INCISION AND DRAINAGE LOWER EXTREMITY  Progress Note    Desiree Garcia  4/18/2025    Pre-op Diagnosis:   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 [I74.3]       Post-Op Diagnosis Codes:     * 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 [I74.3]    Procedure(s):      Procedure(s):  LOWER EXTREMITY DEBRIDEMENT left lateral and medial wounds with skin and subcutaneous tissue and muscle debridement, left lateral 15 x 8 x 4 cm, left medial 4 x 3 x 3 cm              Surgeon(s):  Misael Lawton MD    Anesthesia: General    Staff:   Circulator: Emerald Glynn RN  Scrub Person: Stephanie Kenney  Assistant: Sada James CSA  Assistant: Sada James CSA    Estimated Blood Loss: 100ml    Urine Voided: * No values recorded between 4/18/2025  7:27 AM and 4/18/2025  8:37 AM *    Specimens:                Specimens       ID Source Type Tests Collected By Collected At Frozen?    1 Leg, Left Body Fluid BODY FLUID CULTURE   Misael Lawton MD 4/18/25 0801     Description: left lateral calf hematoma    2 Leg, Left Body Fluid BODY FLUID CULTURE   Misael Lawton MD 4/18/25 0813     Description: left medial calf hematoma              Drains:   External Urinary Catheter (Active)   Daily Indications Daily output 04/18/25 0230   Site Assessment Skin intact;Clean 04/18/25 0230   Application/Removal external catheter changed 04/18/25 0230   Collection Container Wall suction 04/18/25 0230   Wall suction (mmHG) 125 mmHG 04/18/25 0230   Securement Method Securing device 04/18/25 0230   Catheter care complete Yes 04/18/25 0230   Output (mL) 50 mL 04/16/25 1400       [REMOVED] NG/OG Tube Nasogastric Left nostril (Removed)   Site Assessment Clean;Dry;Intact 04/12/25 0800   Securement anchored to nostril center with adhesive device 04/12/25 0800   Secured at (cm) 60 04/12/25 0800   NG/OG Site Interventions Site assessed;Nasal/Oral care performed 04/12/25 0800   Dressing Intervention Dressing changed  04/12/25 0400   Status Suction-low intermittent 04/12/25 0800   Drainage Appearance Yellow;Brown;Green 04/12/25 0800   Surrounding Skin Dry;Intact 04/12/25 0800   Output (mL) 300 mL 04/12/25 0800       [REMOVED] NG/OG Tube Nasogastric Left nostril (Removed)   Placement Verification X-ray 04/14/25 0500   Site Assessment Clean;Dry;Intact 04/14/25 1200   Securement taped to nostril center 04/14/25 1200   Secured at (cm) 65 04/14/25 1200   NG/OG Site Interventions Site assessed;Nasal/Oral care performed 04/14/25 1200   Dressing Intervention Dressing reinforced 04/13/25 0400   Status Suction-low intermittent 04/14/25 1200   Drainage Appearance Brown 04/14/25 1200   Surrounding Skin Dry;Intact 04/14/25 1200   Intake (mL) 0 mL 04/12/25 1600   Output (mL) 250 mL 04/14/25 0530       [REMOVED] Urethral Catheter Silicone 16 Fr. (Removed)   Daily Indications Hourly Output in Critical Unstable Patient requiring Frequent Intervention (hemodynamics, titration or life supportive therapy) 04/15/25 1400   Site Assessment Clean;Skin intact 04/15/25 1400   Collection Container Standard drainage bag 04/15/25 1400   Securement Method Securing device 04/15/25 1400   Catheter care complete Yes 04/15/25 1400   Intake (mL) 240 mL 04/13/25 2110   Output (mL) 375 mL 04/15/25 1600       [REMOVED] External Urinary Catheter (Removed)   Daily Indications Strict bedrest 04/12/25 0754   Site Assessment Clean;Skin intact 04/12/25 0754   Application/Removal external catheter changed;skin care provided 04/12/25 0754   Collection Container Wall suction 04/12/25 0754   Wall suction (mmHG) 120 mmHG 04/12/25 0754   Securement Method Securing device 04/12/25 0754   Catheter care complete Yes 04/12/25 0754   Output (mL) 50 mL 04/12/25 0800       Findings: See dictation      Complications: None    Assistant: Sada James, GHADA  was responsible for performing the following activities: Retraction, Suction, Irrigation, and Placing Dressing and their  skilled assistance was necessary for the success of this case.    Misael Lawton MD     Date: 4/18/2025  Time: 08:52 EDT

## 2025-04-18 NOTE — PROGRESS NOTES
"General Surgery Progress Note    Chief complaint:  ischemic enteritis/colitis, ileus     Interval history:  Patient says her pain is controlled following her left lower extremity washout today.  She denies any abdominal pain.  She had a bowel movement yesterday.  She ate [employer] last night for dinner and tolerated it without any nausea or vomiting.      Vital signs:  /69 (BP Location: Left arm, Patient Position: Lying)   Pulse 80   Temp 98 °F (36.7 °C) (Oral)   Resp 18   Ht 167.6 cm (65.98\")   Wt 75.4 kg (166 lb 3.6 oz)   SpO2 99%   BMI 26.84 kg/m²     Intake/output:  Intake & Output (last day)         04/17 0701 04/18 0700 04/18 0701 04/19 0700    P.O. 960     I.V. (mL/kg)  600 (8)    Total Intake(mL/kg) 960 (12.7) 600 (8)    Urine (mL/kg/hr)      Stool      Total Output      Net +960 +600          Stool Unmeasured Occurrence 1 x             Physical exam:  Resting in bed  NEURO: awake, alert, no gross focal deficits, moves all extremities  PSYCH: interactive, cooperative  PULM: unlabored respirations on nasal cannula  CV: Regular rate   GI: abdomen soft, mildly distended, nontender  : purewick in place     Diagnostics:  Results from last 7 days   Lab Units 04/18/25 0520 04/17/25 0435 04/16/25 0435   WBC 10*3/mm3 27.78* 26.91* 22.45*   HEMOGLOBIN g/dL 7.1* 7.9* 8.5*   HEMATOCRIT % 22.3* 24.3* 26.6*   PLATELETS 10*3/mm3 409 399 346   MONOCYTES % % 4.0* 2.0* 8.1   EOSINOPHIL % % 2.0 2.0 0.0*     Results from last 7 days   Lab Units 04/18/25  0520 04/17/25 0435 04/16/25  1746 04/16/25 0435   SODIUM mmol/L 140 138  --  139   POTASSIUM mmol/L 3.4* 4.1 3.9 3.4*   CHLORIDE mmol/L 105 102  --  104   CO2 mmol/L 27.4 28.0  --  24.2   BUN mg/dL 6* 7*  --  10   CREATININE mg/dL 0.69 0.64  --  0.69   CALCIUM mg/dL 7.8* 8.2*  --  8.1*   BILIRUBIN mg/dL  --   --   --  0.6   ALK PHOS U/L  --   --   --  139*   ALT (SGPT) U/L  --   --   --  11   AST (SGOT) U/L  --   --   --  52*   GLUCOSE mg/dL 143* 113*  " --  109*     Results from last 7 days   Lab Units 04/18/25  0520 04/17/25  1015 04/17/25  0435   APTT seconds 75.3* 85.2* 81.5*     Magnesium 1.8    Assessment/Plan:  65-year-old lady with tobacco use, COPD, and severe peripheral vascular disease who developed left ventricular thrombus and showering of emboli left kidney, spleen, suspected to bowel.  This admission she has undergone emergent heart cath with PCI, left lower extremity thromboembolectomy, revascularization, and fasciotomy.  She was noted to have possible ischemic enteritis and colitis and developed an ileus.  She underwent NGT decompression and serial abdominal exams. Her ileus resolved and NGT was removed she was started on clear liquids.  Repeat CT abd/pelvis demonstrated interval improvement of her bowel dilation without findings concerning for worsening ischemia.  She tolerated advancement to regular diet last night and continues to have bowel movements.  Abdominal exam is reassuring without worsening evidence of bowel ischemia.  She remains afebrile.  Her WBC is elevated at 27.  ID is following and planning Ancef pending repeat operative cultures from today's left lower extremity washout by Vascular. She may resume regular diet from my standpoint. Will continue to follow.     Yulisa Parisi MD  General, Robotic and Endoscopic Surgery  [facility], [facility]    [address], [address]  [place], [place], [address]  P: [phone]  F: [phone]

## 2025-04-18 NOTE — CASE MANAGEMENT/SOCIAL WORK
Continued Stay Note  [facility]     Patient Name: Desiree Garcia  MRN: [id number]  Today's Date: 4/18/2025    Admit Date: 4/10/2025    Plan: SNF   Discharge Plan       Row Name 04/18/25 1745       Plan    Plan SNF    Patient/Family in Agreement with Plan yes    Plan Comments Met with pt and sister in room. Provided pt with a new Patient choice list as she does not know where the other one is. We discussed the rehab options and she requested a referral to [facility]. Referral entered. She denied any other needs at this time. She will need a precert. CCP following. Garrison BARRIENTOS RN                   Discharge Codes    No documentation.                 Expected Discharge Date and Time       Expected Discharge Date Expected Discharge Time    Apr 23, 2025               Garrison Harrison RN

## 2025-04-18 NOTE — PROGRESS NOTES
PROGRESS NOTE  Patient Name: Desiree Garcia  Age/Sex: 65 y.o. female  : 1960  MRN: [id number]    Date of Admission: 4/10/2025  Date of Encounter Visit: 25   LOS: 8 days   Patient Care Team:  Sandra Kaba MD as PCP - General (Family Medicine)  Marisol Nazario, RN as Ambulatory  ([facility])    Chief Complaint: Acute left lower extremity ischemia, acute MI, left ventricular thrombus    Hospital course: Patient is doing better, she is on room air, hemodynamically stable on no pressors  Still on the heparin drip had her lower extremity flush today by vascular surgery and she seems to be doing better, pain is better controlled, appetite is better, and she is on minimal oxygen supplementation.      REVIEW OF SYSTEMS:   CONSTITUTIONAL: no fever or chills  No nausea or vomiting, tolerating p.o., good bowel movement  Denies shortness of breath  Improved pain over the left lower extremity  Ventilator/Non-Invasive Ventilation Settings (From admission, onward)     Oxygen supplementation             Vital Signs  Temp:  [97.7 °F (36.5 °C)-98.7 °F (37.1 °C)] 98 °F (36.7 °C)  Heart Rate:  [76-94] 80  Resp:  [14-18] 18  BP: (104-130)/(49-74) 128/69  SpO2:  [90 %-100 %] 99 %  on  Flow (L/min) (Oxygen Therapy):  [3-4] 3 Device (Oxygen Therapy): nasal cannula    Intake/Output Summary (Last 24 hours) at 2025 1109  Last data filed at 2025 0822  Gross per 24 hour   Intake 1320 ml   Output --   Net 1320 ml     Flowsheet Rows      Flowsheet Row First Filed Value   Admission Height --   Admission Weight 71 kg (156 lb 8.4 oz) Documented at 04/10/2025 1700          Body mass index is 26.84 kg/m².      25  0500 04/15/25  0600 25  0400   Weight: 72.7 kg (160 lb 4.4 oz) 73.6 kg (162 lb 4.1 oz) 75.4 kg (166 lb 3.6 oz)       Physical Exam:  GEN:   Awake and alert, looking better, in no distress  EYES:   Sclerae clear. No icterus. PERRL. Normal EOM  ENT:   External ears/nose  normal, no oral lesions, no thrush, moist mucous membrane  NECK:  Supple, midline trachea, no JVD  LUNGS: Normal chest on inspection, CTAB but diminished, no wheezes. No rhonchi. No crackles. Respirations regular, even and unlabored.   CV:  Regular rhythm and rate. Normal S1/S2. No murmurs, gallops, or rubs noted.  ABD:  Soft, nontender, non-stented, normal bowel sounds. No guarding  EXT:  Moves all extremities well. No cyanosis. No redness. No edema.  warmer left lower extremity compared to the right with dopplerable pulses.  She is weeping some blood from the fasciotomy site  Skin: Dry, intact, no bleeding    Results Review:    Results From Last 14 Days   Lab Units 04/12/25  0014 04/11/25  1711 04/11/25  0104 04/09/25  2134 04/09/25  0602   LACTATE mmol/L 1.8 2.2* 1.0   < >  --    TRIGLYCERIDES mg/dL  --   --   --   --  158*    < > = values in this interval not displayed.     Results from last 7 days   Lab Units 04/18/25  0520 04/17/25  0435 04/16/25  1746 04/16/25  0435 04/15/25  0614 04/14/25  0416 04/13/25  0514 04/12/25  2153 04/12/25  1532   SODIUM mmol/L 140 138  --  139 139 138 133*  --  133*   POTASSIUM mmol/L 3.4* 4.1 3.9 3.4* 3.7 3.9 4.2   < > 3.8   CHLORIDE mmol/L 105 102  --  104 105 105 104  --  102   CO2 mmol/L 27.4 28.0  --  24.2 25.0 22.1 18.0*  --  19.3*   BUN mg/dL 6* 7*  --  10 14 19 22  --  26*   CREATININE mg/dL 0.69 0.64  --  0.69 0.76 0.87 1.12*  --  1.06*   CALCIUM mg/dL 7.8* 8.2*  --  8.1* 8.1* 8.1* 8.0*  --  7.7*   AST (SGOT) U/L  --   --   --  52*  --   --   --   --   --    ALT (SGPT) U/L  --   --   --  11  --   --   --   --   --    ANION GAP mmol/L 7.6 8.0  --  10.8 9.0 10.9 11.0  --  11.7   ALBUMIN g/dL 2.4* 2.8*  --  2.4* 2.7* 2.6*  --   --   --     < > = values in this interval not displayed.                     Results from last 7 days   Lab Units 04/18/25  0520 04/17/25  0435 04/16/25  0435 04/15/25  0614 04/15/25  0018 04/14/25  1852 04/14/25  1225   WBC 10*3/mm3 27.78* 26.91*  "22.45* 18.20* 17.46* 16.22* 15.49*   HEMOGLOBIN g/dL 7.1* 7.9* 8.5* 8.6* 8.2* 8.7* 8.6*   HEMATOCRIT % 22.3* 24.3* 26.6* 25.9* 25.9* 26.2* 25.8*   PLATELETS 10*3/mm3 409 399 346 325 317 312 287   MCV fL 85.4 85.6 86.1 84.1 84.9 83.2 84.3     Results from last 7 days   Lab Units 04/18/25  0520 04/17/25  1015 04/17/25  0435 04/16/25  2115 04/16/25  1746 04/16/25  0435 04/15/25  2119   APTT seconds 75.3* 85.2* 81.5* 91.1* 82.0* 55.3* 67.3*     Results from last 7 days   Lab Units 04/18/25  0520 04/17/25  0435 04/16/25  0435 04/15/25  0614 04/14/25  0416 04/13/25  0514 04/12/25  0454   MAGNESIUM mg/dL 1.8 1.9 2.0 2.1 2.2 2.3 2.3           Invalid input(s): \"LDLCALC\"                Glucose   Date/Time Value Ref Range Status   04/18/2025 0949 198 (H) 70 - 130 mg/dL Final   04/18/2025 0531 157 (H) 70 - 130 mg/dL Final   04/18/2025 0055 240 (H) 70 - 130 mg/dL Final   04/17/2025 1612 176 (H) 70 - 130 mg/dL Final   04/17/2025 1137 142 (H) 70 - 130 mg/dL Final   04/17/2025 0521 121 70 - 130 mg/dL Final   04/16/2025 2058 132 (H) 70 - 130 mg/dL Final   04/16/2025 1605 136 (H) 70 - 130 mg/dL Final     Results from last 7 days   Lab Units 04/12/25  0014 04/11/25  1711   LACTATE mmol/L 1.8 2.2*     Results from last 7 days   Lab Units 04/12/25  1702 04/12/25  1652   BLOODCX  No growth at 5 days No growth at 5 days                         Imaging:   Imaging Results (All)               I reviewed the patient's new clinical results.  I personally viewed and interpreted the patient's imaging results:        Medication Review:   arformoterol, 15 mcg, Nebulization, BID - RT  aspirin, 81 mg, Oral, Daily   Or  aspirin, 300 mg, Rectal, Daily  atorvastatin, 40 mg, Oral, Nightly  budesonide, 0.5 mg, Nebulization, BID - RT  ceFAZolin, 2,000 mg, Intravenous, Q8H  clopidogrel, 75 mg, Oral, Daily  insulin regular, 2-7 Units, Subcutaneous, Q6H  ipratropium-albuterol, 3 mL, Nebulization, Q8H - RT  metoprolol tartrate, 25 mg, Oral, " Q12H  pantoprazole, 40 mg, Oral, Q AM  revefenacin, 175 mcg, Nebulization, Daily - RT  senna-docusate sodium, 2 tablet, Oral, BID        heparin, 12 Units/kg/hr, Last Rate: 14 Units/kg/hr (04/17/25 1129)        ASSESSMENT:   Cardiogenic shock, resolved  Arterial embolism and thrombosis of the lower extremity, status post fasciotomy and thrombectomy with revascularization 4/12/2025  Diabetes mellitus type 2  Coronary artery disease with prior angioplasties with acute inferior wall MI status post repeat angioplasty  Left ventricular intramural thrombus likely the origin of the above  Bilateral pulmonary nodules  Severe peripheral arterial disease  Acute hypoxemic respiratory failure requiring oxygen supplementation at some point up to 4 L/min just to maintain adequate oxygenation, that was multifactorial but mostly due to atelectasis and prolonged bedrest.  Nonsustained ventricular tachycardia  Hyperlipidemia  Hyponatremia  Acute kidney injury, improved  Tobacco abuse  Small bowel enteritis was suspected bowel ischemia from the embolic events  COPD, no exacerbation  Nonsustained V. tach  History of E. coli UTI on 3/18/2025 with negative blood culture on this admission, patient is on Zosyn for possible intra-abdominal infection from bowel ischemia  Worsening leukocytosis    PLAN:  Patient is hemodynamically stable however the gradual increase in the white count is concerning.  Seems to be plateauing and the surgery already did the flushing today and hopefully that number will start to turn around  No other sign of worsening sepsis with hemodynamic stability and lack of any fever and subjective and clinical improvement  Will continue to track her white blood cell count  Continue to wean and discontinue oxygen  Her current antibiotic are managed by infectious disease, she had 2 more days and it will be discontinued if cultures are negative, please refer to ID note for more details  Continue to encourage pulmonary hygiene  and work with PT  Vascular surgery note reviewed, they are following  Hemodynamically the patient is doing good and she is on room air  Patient has multiple comorbidities with electrolyte disturbances, leukocytosis, coronary artery disease and diabetes mellitus that needs to be managed, we will consult internal medicine team for further management, we will continue to address any respiratory or hemodynamic issues        Discussed with the patient   Notes reviewed    Labs/Notes/films were independently reviewed and pertinent results are summarized above  The copied texts in this note were reviewed and they are accurate as of 04/18/25    Disposition: Telemetry    Zuri Grey MD  04/18/25  11:09 EDT          Dictated utilizing Dragon dictation

## 2025-04-18 NOTE — PROGRESS NOTES
CHIEF COMPLAINT:  LV thrombus with multifocal emboli, anemia    HISTORY OF PRESENT ILLNESS:   This is a 65-year-old lady with multiple medical comorbidities undergoing treatment for in LV thrombus with embolization and cardiogenic shock.  Imaging has shown occlusion of the left superficial femoral artery, distal left renal artery, distal splenic artery, splenic infarcts, left renal infarcts, occlusion of the ONEIDA with reconstitution distally.  On 4/12/2025 she underwent a mechanical embolectomy and 4 compartment fasciotomy left lower extremity.    On 4/18/2025 the patient underwent debridement of skin soft tissue and muscle of the left fasciotomy site.  CBC this morning showed a white count 27.7 hemoglobin 7.1 platelets 409      Past Medical History, Past Surgical History, Social History, Family History have been reviewed and are without significant changes except as mentioned.    Review of Systems   A comprehensive 14 point review of systems was performed and was negative except as mentioned.    Medications:  The current medication list was reviewed in the EMR    ALLERGIES:    Allergies   Allergen Reactions    Tramadol Itching     High severity per pt       Objective      Vitals:    04/18/25 1003   BP:    Pulse: 80   Resp: 18   Temp: 98 °F (36.7 °C)   SpO2: 99%          Physical Exam    CONSTITUTIONAL: pleasant well-developed woman lying in bed a bit uncomfortable looking  HEENT: no icterus, no thrush, moist membranes  CV: RRR, S1S2, no murmur  RESP: cta bilat, no wheezing, no rales  GI: soft no rebound or guarding, no splenomegaly, +BS  NEURO: alert and oriented x3, mild to moderate global weakness  PSYCH: normal mood and affect   Skin: Surgical wounds left lower extremity with dressed wounds and clean/dry  RECENT LABS:  Hematology Results from last 7 days   Lab Units 04/18/25  0520 04/17/25  0435 04/16/25  0435   WBC 10*3/mm3 27.78* 26.91* 22.45*   HEMOGLOBIN g/dL 7.1* 7.9* 8.5*   HEMATOCRIT % 22.3* 24.3* 26.6*    PLATELETS 10*3/mm3 409 399 346     2  Lab Results   Component Value Date    GLUCOSE 143 (H) 04/18/2025    BUN 6 (L) 04/18/2025    CREATININE 0.69 04/18/2025    EGFRIFNONA 96 11/04/2021    BCR 8.7 04/18/2025    CO2 27.4 04/18/2025    CALCIUM 7.8 (L) 04/18/2025    ALBUMIN 2.4 (L) 04/18/2025    AST 52 (H) 04/16/2025    ALT 11 04/16/2025           Lab Results   Component Value Date    [id number] 344 08/08/2024          CT abdomen/pelvis 4/16/2025-  FINDINGS:  1. Most of the colonic dilatation has resolved, but there is mild  persistent dilatation of the proximal transverse colon. The proximal  transverse colon appears mildly patulous and has a slightly thickened  wall. There is less dilatation of a long segment of proximal small  bowel, but there is a long segment of distal ileum which appears  slightly more dilated than previously, image 107. The distal/terminal  ileum is normal in caliber, stable. There is a slightly less thickened  appearance of some of the small bowel loops with less thickening of the  folds. There is no bowel pneumatosis.     There is a slightly larger small volume of free fluid, but there is also  new third spacing of fluid within the body wall. There are no fluid  collections. No free air.     2. Evolving splenic infarcts appear unchanged. Evolving left renal  infarcts appear slightly less defined.     3. There is no new abnormality at the liver, pancreas, adrenals, or  right kidney. Urinary bladder is significantly distended. Consider a  Storm catheter.     4. There are postsurgical changes at the left groin. Slightly narrowed,  but the left common femoral artery is patent as is the visualized left  superficial femoral artery, series 3 image 132. There is a very small  amount of fluid at the left groin, but there is no discrete fluid  collection or hematoma.     5. There is no pneumonia or pleural effusions at the visualized lung  bases.         Assessment & Plan   *LV thrombus with multiple  sites of emboli-currently on unfractionated heparin  *Left lower extremity embolectomy/fasciotomies 4/12/25 4/18/2025 the patient underwent debridement of skin soft tissue and muscle of the left fasciotomy site.   *Bilateral pulmonary nodules  *Acute blood loss anemia-hemoglobin slow decline 7.1  *Leukocytosis  *Coronary artery disease, peripheral artery disease on aspirin/Plavix  *COPD    Hematology plan/recommendations:  Monitor hemoglobin on anticoagulation, transfuse as needed for hemoglobin less than 7.0 or symptomatic anemia  Plan to switch to an oral NOAC once cleared by vascular              4/18/2025      CC:

## 2025-04-18 NOTE — ANESTHESIA PREPROCEDURE EVALUATION
Anesthesia Evaluation     no history of anesthetic complications:   NPO Solid Status: > 8 hours  NPO Liquid Status: > 2 hours           Airway   Mallampati: II  no difficulty expected  Dental    (+) edentulous    Pulmonary - normal exam   (+) a smoker Current, COPD,shortness of breath  (-) asthma, sleep apnea    PE comment: nonlabored  Cardiovascular - normal exam  Exercise tolerance: poor (<4 METS)    Rhythm: regular  Rate: normal    (+) hypertension, past MI (2021) , CAD, cardiac stents (2021 and this admission) Drug eluting stent within the past 12 months , CHF Diastolic >=55%, HALL, PVD, hyperlipidemia  (-) valvular problems/murmurs, dysrhythmias, angina    ROS comment: LV Thrombus--current      Echo 4/10/25:  ·  Left ventricular systolic function is mildly decreased. Left ventricular ejection fraction appears to be 41 - 45%.  ·  Left ventricular wall thickness is consistent with mild concentric hypertrophy.  ·  Left ventricular diastolic dysfunction is noted.  ·  There is a moderate sized, pedunculated, solid thrombus located in the basal inferoseptum of the left ventricle. The thrombus measures 1.4 cm in diameter and is mobile.       Cardiac Cath 4/8/25:  Conclusions:  1. Patient with extensive clot burden causing 100% proximal to distal RCA thrombosis, status post mechanical aspiration thrombectomy, IVUS and PTCA/PCI with drug-eluting stent x 2 with 2.75 X28 and 3.5X 23 mm drug-eluting stent in the mid to distal RCA resulting in ABILIO II-III flow.  2. Distalmost left circumflex with 100% occlusion, small territory and a small vessel.  Appears to be thrombotic in nature as well.  3. No aortic stenosis on pullback gradient  4. Review of the CT scan chest and abdomen shows infarct of renal artery, splenic artery, concerning for thrombotic showering, need to rule out cardiac cause of thromboembolism.  5. Patient was supposed to be on aspirin and Plavix, suspected medical noncompliance             EKG 4/11/25:  -  ABNORMAL ECG -  Sinus rhythm  Probable  left atrial enlargement  Nonspecific  intraventricular conduction delay  Probable  inferior infarct, recent  No change from prior tracing      Neuro/Psych  (+) headaches, psychiatric history Anxiety and Depression, poor historian.  (-) seizures, TIA, CVA  GI/Hepatic/Renal/Endo    (+) GERD, GI bleeding , diabetes mellitus type 2  (-) liver disease, no renal disease, no thyroid disorder    Musculoskeletal     (+) arthralgias, back pain, chronic pain  Abdominal    Substance History      OB/GYN          Other   arthritis,     ROS/Med Hx Other:  left ventricular thrombus and showering of emboli left kidney, spleen, suspected to bowel.  This admission she has undergone emergent heart cath with PCI, left lower extremity thromboembolectomy, revascularization, and fasciotomy.  She was noted to have possible ischemic enteritis and colitis                Anesthesia Plan    ASA 4     general     intravenous induction     Anesthetic plan, risks, benefits, and alternatives have been provided, discussed and informed consent has been obtained with: patient.    CODE STATUS:    Code Status (Patient has no pulse and is not breathing): CPR (Attempt to Resuscitate)  Medical Interventions (Patient has pulse or is breathing): Full Support  Level Of Support Discussed With: Patient

## 2025-04-18 NOTE — PROGRESS NOTES
ID NOTE    CC: f/u leukocytosis    Subj: She went to the OR this morning for exploration of her left lower extremity.  I discussed with her surgeon and reviewed the operative note.  There were no findings that would be terribly concerning for infection.  Cultures were sent of clot just to be sure.  She achieved control of multiple venous bleeding sites.    Medications:    Current Facility-Administered Medications:     [Transfer Hold] acetaminophen (TYLENOL) tablet 650 mg, 650 mg, Oral, Q4H PRN, 650 mg at 04/18/25 0049 **OR** [Transfer Hold] acetaminophen (TYLENOL) 160 MG/5ML oral solution 650 mg, 650 mg, Oral, Q4H PRN **OR** [Transfer Hold] acetaminophen (TYLENOL) suppository 650 mg, 650 mg, Rectal, Q4H PRN, Misael Lawton MD, 650 mg at 04/13/25 0923    acetaminophen (TYLENOL) tablet 650 mg, 650 mg, Oral, Q4H PRN **OR** acetaminophen (TYLENOL) 160 MG/5ML oral solution 650 mg, 650 mg, Oral, Q4H PRN **OR** acetaminophen (TYLENOL) suppository 650 mg, 650 mg, Rectal, Q4H PRN, Misael Lawton MD    [Transfer Hold] acetaminophen (TYLENOL) tablet 650 mg, 650 mg, Oral, Q4H PRN, 650 mg at 04/17/25 2041 **OR** [Transfer Hold] acetaminophen (TYLENOL) suppository 650 mg, 650 mg, Rectal, Q4H PRN, Misael Lawton MD    [Transfer Hold] albuterol (PROVENTIL) nebulizer solution 0.083% 2.5 mg/3mL, 2.5 mg, Nebulization, Q6H PRN, Misael Lawton MD    [Transfer Hold] aluminum-magnesium hydroxide-simethicone (MAALOX MAX) 400-400-40 MG/5ML suspension 15 mL, 15 mL, Oral, Q6H PRN, Misael Lawton MD    [Transfer Hold] arformoterol (BROVANA) nebulizer solution 15 mcg, 15 mcg, Nebulization, BID - RT, Misael Lawton MD, 15 mcg at 04/17/25 1941    [Transfer Hold] aspirin EC tablet 81 mg, 81 mg, Oral, Daily, 81 mg at 04/17/25 0906 **OR** [Transfer Hold] aspirin suppository 300 mg, 300 mg, Rectal, Daily, Misael Lawton MD    [Transfer Hold] atorvastatin (LIPITOR) tablet 40 mg, 40 mg, Oral, Nightly, Misael Lawton MD, 40 mg at  04/17/25 2041    Atropine Sulfate (PF) injection 0.4 mg, 0.4 mg, Intravenous, Once PRN, Tal Deluca CRNA    [Transfer Hold] sennosides-docusate (PERICOLACE) 8.6-50 MG per tablet 2 tablet, 2 tablet, Oral, BID, 2 tablet at 04/17/25 0906 **AND** [Transfer Hold] polyethylene glycol (MIRALAX) packet 17 g, 17 g, Oral, Daily PRN **AND** [Transfer Hold] bisacodyl (DULCOLAX) EC tablet 5 mg, 5 mg, Oral, Daily PRN **AND** [Transfer Hold] bisacodyl (DULCOLAX) suppository 10 mg, 10 mg, Rectal, Daily PRN, Misael Lawton MD    [Transfer Hold] budesonide (PULMICORT) nebulizer solution 0.5 mg, 0.5 mg, Nebulization, BID - RT, Misael Lawton MD, 0.5 mg at 04/17/25 1940    [Transfer Hold] Calcium Replacement - Follow Nurse / BPA Driven Protocol, , Not Applicable, PRN, Misael Lawton MD    ceFAZolin 2000 mg IVPB in 100 mL NS (MBP), 2,000 mg, Intravenous, Q8H, Misael Lawton MD    [Transfer Hold] clopidogrel (PLAVIX) tablet 75 mg, 75 mg, Oral, Daily, Misael Lawton MD, 75 mg at 04/17/25 0906    [Transfer Hold] dextrose (D50W) (25 g/50 mL) IV injection 25 g, 25 g, Intravenous, Q15 Min PRN, Misael Lawton MD    [Transfer Hold] dextrose (GLUTOSE) oral gel 15 g, 15 g, Oral, Q15 Min PRN, Misael Lawton MD    diphenhydrAMINE (BENADRYL) injection 12.5 mg, 12.5 mg, Intravenous, Q15 Min PRN, Tal Deluca CRNA    droperidol (INAPSINE) injection 0.625 mg, 0.625 mg, Intravenous, Q20 Min PRN **OR** droperidol (INAPSINE) injection 0.625 mg, 0.625 mg, Intramuscular, Q20 Min PRN, Tal Deluca CRNA    ePHEDrine injection 5 mg, 5 mg, Intravenous, Once PRN, Tal Deluca CRNA    fentaNYL citrate (PF) (SUBLIMAZE) injection 50 mcg, 50 mcg, Intravenous, Q5 Min PRN, Tal Deluca CRNA, 50 mcg at 04/18/25 0913    flumazenil (ROMAZICON) injection 0.2 mg, 0.2 mg, Intravenous, PRN, Tal Deluca CRNA    [Transfer Hold] glucagon (GLUCAGEN) injection 1 mg, 1 mg, Intramuscular, Q15 Min PRN, Misael Lawton MD    [Transfer Hold] heparin  (porcine) 5000 UNIT/ML injection 2,100-4,000 Units, 30-57.9 Units/kg, Intravenous, Q6H PRN, Misael Lawton MD, 2,100 Units at 04/16/25 0615    heparin 55339 units/250 mL (100 units/mL) in 0.45 % NaCl infusion, 12 Units/kg/hr, Intravenous, Titrated, Misael Lawton MD, Last Rate: 9.94 mL/hr at 04/17/25 1129, 14 Units/kg/hr at 04/17/25 1129    hydrALAZINE (APRESOLINE) injection 5 mg, 5 mg, Intravenous, Q10 Min PRN, Tal Deluca CRNA    HYDROcodone-acetaminophen (NORCO) 5-325 MG per tablet 1 tablet, 1 tablet, Oral, Once PRN, Tal Deluca CRNA    [Transfer Hold] HYDROcodone-acetaminophen (NORCO) 7.5-325 MG per tablet 1 tablet, 1 tablet, Oral, Q6H PRN, Sol Urias APRN    HYDROcodone-acetaminophen (NORCO) 7.5-325 MG per tablet 1 tablet, 1 tablet, Oral, Q4H PRN, Misael Lawton MD    HYDROmorphone (DILAUDID) injection 0.5 mg, 0.5 mg, Intravenous, Q5 Min PRN, Tal Deluca CRNA, 0.5 mg at 04/18/25 0913    [Transfer Hold] HYDROmorphone (DILAUDID) injection 1 mg, 1 mg, Intravenous, Q3H PRN, Sol Urias APRN, 1 mg at 04/18/25 0537    HYDROmorphone (DILAUDID) injection 1 mg, 1 mg, Intravenous, Q2H PRN **AND** naloxone (NARCAN) injection 0.4 mg, 0.4 mg, Intravenous, Q5 Min PRN, Misael Lawton MD    [Transfer Hold] insulin regular (humuLIN R,novoLIN R) injection 2-7 Units, 2-7 Units, Subcutaneous, Q6H, Misael Lawton MD, 3 Units at 04/18/25 0058    ipratropium-albuterol (DUO-NEB) nebulizer solution 3 mL, 3 mL, Nebulization, Once PRN, Tal Deluca CRNA    ipratropium-albuterol (DUO-NEB) nebulizer solution 3 mL, 3 mL, Nebulization, Q8H - RT, Misael Lawton MD    labetalol (NORMODYNE,TRANDATE) injection 5 mg, 5 mg, Intravenous, Q5 Min PRN, Tal Deluca CRNA    lactated ringers infusion, 9 mL/hr, Intravenous, Continuous, Flavio Cannon MD, Last Rate: 9 mL/hr at 04/18/25 0658, Restarted at 04/18/25 0726    [Transfer Hold] Magnesium Standard Dose Replacement - Follow Nurse / BPA Driven  Protocol, , Not Applicable, PRN, Misael Lawton MD    metoprolol tartrate (LOPRESSOR) tablet 25 mg, 25 mg, Oral, Q12H, Santi Erickson MD, 25 mg at 25 0528    naloxone (NARCAN) injection 0.2 mg, 0.2 mg, Intravenous, PRN, Tal Deluca CRNA    [] HYDROmorphone (DILAUDID) injection 1 mg, 1 mg, Intravenous, Q3H PRN, 1 mg at 25 0906 **AND** [Transfer Hold] naloxone (NARCAN) injection 0.4 mg, 0.4 mg, Intravenous, Q5 Min PRN, Misael Lawton MD    [Transfer Hold] nitroglycerin (NITROSTAT) SL tablet 0.4 mg, 0.4 mg, Sublingual, Q5 Min PRN, Misael Lawton MD    nitroglycerin (NITROSTAT) SL tablet 0.4 mg, 0.4 mg, Sublingual, Q5 Min PRN, Misael Lawton MD    nitroglycerin (NITROSTAT) SL tablet 0.4 mg, 0.4 mg, Sublingual, Q5 Min PRN, Misael Lawton MD    [Transfer Hold] ondansetron ODT (ZOFRAN-ODT) disintegrating tablet 4 mg, 4 mg, Oral, Q6H PRN **OR** [Transfer Hold] ondansetron (ZOFRAN) injection 4 mg, 4 mg, Intravenous, Q6H PRN, Misael Lawton MD    ondansetron (ZOFRAN) injection 4 mg, 4 mg, Intravenous, Once PRN, Tal Deluca CRNA    ondansetron ODT (ZOFRAN-ODT) disintegrating tablet 4 mg, 4 mg, Oral, Q6H PRN **OR** ondansetron (ZOFRAN) injection 4 mg, 4 mg, Intravenous, Q6H PRN, Misael Lawton MD    oxyCODONE-acetaminophen (PERCOCET) 7.5-325 MG per tablet 1 tablet, 1 tablet, Oral, Q4H PRN, Tal Deluca CRNA    [Transfer Hold] pantoprazole (PROTONIX) EC tablet 40 mg, 40 mg, Oral, Q AM, Que, Lebnan S, MD, 40 mg at 25 0501    [Transfer Hold] Phosphorus Replacement - Follow Nurse / BPA Driven Protocol, , Not Applicable, PRN, Misael Lawton MD    Potassium Replacement - Follow Nurse / BPA Driven Protocol, , Not Applicable, PRN, Misael Lawton MD    promethazine (PHENERGAN) suppository 25 mg, 25 mg, Rectal, Once PRN **OR** promethazine (PHENERGAN) tablet 25 mg, 25 mg, Oral, Once PRN, Tal Deluca CRNA    [Transfer Hold] revefenacin (YUPELRI) nebulizer solution 175 mcg, 175 mcg,  Nebulization, Daily - RT, Misael Lawton MD, 175 mcg at 04/17/25 0856      Objective   Vital Signs   Temp:  [97.7 °F (36.5 °C)-98.7 °F (37.1 °C)] 98.3 °F (36.8 °C)  Heart Rate:  [76-94] 85  Resp:  [14-18] 14  BP: (104-130)/(49-74) 130/69    Physical Exam:   General: awake, seen in PACU  Eyes: no scleral icterus  ENT: no thrush, edentulous  Cardiovascular: NR  Respiratory: no wheezing  GI: Abdomen is not distended  : External urinary catheter  MSK: LLE wrapped    Labs:   CBC, BMP, and wound cultures reviewed today  Lab Results   Component Value Date    WBC 27.78 (H) 04/18/2025    HGB 7.1 (L) 04/18/2025    HCT 22.3 (L) 04/18/2025    MCV 85.4 04/18/2025     04/18/2025       Lab Results   Component Value Date    GLUCOSE 143 (H) 04/18/2025    BUN 6 (L) 04/18/2025    CREATININE 0.69 04/18/2025    EGFRIFNONA 96 11/04/2021    BCR 8.7 04/18/2025    CO2 27.4 04/18/2025    CALCIUM 7.8 (L) 04/18/2025    ALBUMIN 2.4 (L) 04/18/2025    AST 52 (H) 04/16/2025    ALT 11 04/16/2025       Microbiology:  4/8 BCx: Negative  4/12 BCx: Negative  4/18 LLE wound Cx: Pending    Isolation:  No active isolations    ASSESSMENT/PLAN:  Leukocytosis  Inferior STEMI s/p mechanical thrombectomy  Cardiogenic shock -resolved  Ischemic enteritis and colitis  Ileus  Left ventricular thrombus  Critical ischemia left lower extremity due to embolism  Status post open thrombectomy and angiogram  Status post 4 compartment fasciotomy  Post-surgical bleeding and hematoma  COPD due to tobacco abuse    She remains afebrile and hemodynamically stable.  Persistent leukocytosis likely due to multifactorial stress response rather than infection.  No gross infection seen in the operating room though cultures were sent.  I think it would be reasonable to give her cefazolin 2 g IV every 8 hours for 3 days while awaiting the results of the operative cultures.  However, if cultures are negative then antibiotics can be discontinued.    ID will follow-up her  cultures and see again if positive.

## 2025-04-18 NOTE — OP NOTE
Operative Note  Date of Admission:  4/10/2025  OR Date: 4/18/2025    Pre-op Diagnosis:   Bleeding with possible infection of fasciotomy sites in the left leg    Post-Op Diagnosis Codes:     * 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 [I74.3]    Procedure:   1) left lateral fasciotomy site sharp incisional and excisional debridement of skin subcutaneous tissue tendon and muscle wound size 15 x 8 x 4 cm  2) left medial fasciotomy site sharp incisional and excisional debridement of skin, subcutaneous tissue and muscle size 4 x 3 x 3 cm  3) control of multiple venous bleeding sites    Surgeon: Yaakov Lawton MD    Assistant:  JE James CSA and they provided critical assistance during the case including suctioning, exposure, retraction, and reduction of blood loss.    Anesthesia: General    Staff:   Circulator: Emerald Glynn RN  Scrub Person: Stephanie Kenney  Assistant: Sada James CSA    Estimated Blood Loss: 100ml    Specimens:   Order Name Source Comment Collection Info Order Time   BODY FLUID CULTURE Leg, Left  Collected By: Misael Lawton MD 4/18/2025  8:04 AM     Release to patient   Routine Release        BODY FLUID CULTURE Leg, Left  Collected By: Misael Lawton MD 4/18/2025  8:13 AM     Release to patient   Routine Release             Complications: None    Findings: No gross infection seen.  Culture sent from each site.  Large amounts of thrombus and liquid blood removed from each side.  All Muscle was viable    Indications:    The patient is an 65 y.o. female seen for evaluation persistent bleeding from the left lateral side of fasciotomy with inability to stop while on heparin.  Patient's white count has now climbed tremendously.  Concern for possible infection at the site also raised.  Debridement and exsanguination of blood with control bleeding site required.  Patient understand risk benefits complications agreed with procedure.       Procedure:    Patient was prepped and  draped sterilely.  Removing all staples from the left lateral wound and 4 cm of staples from the left medial knee exsanguinated large amounts of liquid blood and clot.  It appeared to be venous in nature with continued venous oozing from several sites.  Recognizing the need to extend the fasciotomy sites to control the bleeding we used Bovie cutting and cautery tense addended skin incisions proximally and distally on the left lateral side and proximally on the left medial side.  Finding sites of active oozing and bleeding we cauterized multiple sites at the muscle skin and fascial levels.  Irrisept irrigation was then used.  Both sides copiously after full exsanguination of all clotted and clotted blood.  Areas were washed to determine if there is any continued bleeding after hemostasis was felt to be assured.  Following this we packed strips of Surgicel Nu-Knit into the subcutaneous portions of the skin at the proximal and distal portions of the wounds appropriately.  The rest of the wounds were packed with Betadine wet-to-dry gauze.  Cultures were taken on both sides prior to irrigating them.  Once completed Curlex wraps and ABDs were placed.  Patient tolerated the procedure well.  Skin remained viable outside the area of incision on the medial aspect and on the lateral aspect there is some areas of skin ischemia that are not full-thickness based on my exam.  No gross pus was seen.        Active Hospital Problems    Diagnosis  POA    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 [I74.3]  Unknown    Acute thrombus of left ventricle [I24.0]  Yes    Altered bowel habits [R19.4]  Yes    Leg paresthesia [R20.2]  Yes      Resolved Hospital Problems    Diagnosis Date Resolved POA    **Cardiogenic shock [R57.0] 04/15/2025 Yes      Misael Lawton MD     Date: 4/18/2025  Time: 08:56 EDT

## 2025-04-18 NOTE — NURSING NOTE
[facility] Note: Per Dr Lawton's order, will plan to evaluate for wound vac placement on lower extremity on Monday

## 2025-04-18 NOTE — CONSULTS
"CC:  Weak.  Had stent placed. \"I'm a miracle\". Now with LLE impairment. Eating ok.     HPI:  66 yo WF admitted to [facility] on 4/8/2025 with abdominal pain that had been going on for about 24 hours. She was found to have cardiogenic shock. She was found to have an inferior STEMI and taken to the Cath Lab. She was found to have RCA thrombosis and underwent thrombectomy, PCI with drug-eluting stent. She had a CT scan done that showed renal and splenic infarcts. She later had a TTE done that showed a left ventricular thrombus and she was given a heparin drip. Had ileus, NGT.  Now no NGT.  Eating and has had BM.     4/18 -   1) left lateral fasciotomy site sharp incisional and excisional debridement of skin subcutaneous tissue tendon and muscle wound size 15 x 8 x 4 cm  2) left medial fasciotomy site sharp incisional and excisional debridement of skin, subcutaneous tissue and muscle size 4 x 3 x 3 cm  3) control of multiple venous bleeding sites    Pulm 4/18:  Patient is hemodynamically stable however the gradual increase in the white count is concerning.  Seems to be plateauing and the surgery already did the flushing today and hopefully that number will start to turn around  No other sign of worsening sepsis with hemodynamic stability and lack of any fever and subjective and clinical improvement  Will continue to track her white blood cell count    PT 4/17:  difficulty getting LEs to EOB, or following directions, pt was able to tfer w/assist of 2 , multople cues for safety <> bsc and back to bed, difficulty WB on LLE, bilat knees flexed, did not buckle, but never full ext, pt did use rwx this session for tfer to bsc, but lots of cues for hand placement, very unsteady     OT 4/17:  She completed bed mobility with Mod A x2. Max A provided for donning LB socks via figure 4. Mod A x2 + walker to stand and pivot over to a BSC. Total A for hygiene following a loose BM. Mod A x2 to pivot back to the EOB and " return to supine.     Past Medical History:   Diagnosis Date    Arthritis     Cervical cancer screening     COPD (chronic obstructive pulmonary disease)     Coronary artery disease involving native heart without angina pectoris 09/30/2021    primary angioplasty and stent placement to mid right coronary artery with good angiographic results on 6/14/2021 after STEMI    Depression with anxiety     Diabetes mellitus     Forgetfulness     Gastric reflux     Hypertension     Leg paresthesia 01/20/2017    Right    Limb swelling     Lumbago 01/20/2017    Low back pain    Lumbar spinal stenosis 02/23/2017    L4/5 moderate to severe central canal stenosis    Lumbosacral radiculopathy 01/20/2017    Right    Migraines     Night sweat     Reflux esophagitis     Shortness of breath     ST elevation myocardial infarction (STEMI) (CMS/[facility]) 06/14/2021    Stomach disorder      Social History     Socioeconomic History    Marital status: Legally    Tobacco Use    Smoking status: Every Day     Current packs/day: 1.00     Average packs/day: 1 pack/day for 58.3 years (58.3 ttl pk-yrs)     Types: Cigarettes     Start date: 1/14/1967     Passive exposure: Current    Smokeless tobacco: Never    Tobacco comments:     Smoked 21-30 years   Vaping Use    Vaping status: Never Used   Substance and Sexual Activity    Alcohol use: Never     Comment: Does not drink    Drug use: Not Currently     Types: Methamphetamines    Sexual activity: Defer      Family History   Problem Relation Age of Onset    Stroke Mother     Lung cancer Mother         Unspecified    Arthritis Mother     Stroke Father     Heart disease Father     Diabetes Father         Unspecified type    Arthritis Father     Heart attack Father     Stroke Sister     Arthritis Sister     Breast cancer Sister     Stroke Brother     Heart disease Brother     Diabetes Brother         Unspecified type    Arthritis Brother     Colon cancer Neg Hx       Allergies   Allergen Reactions     Tramadol Itching     High severity per pt     Scheduled Meds:  arformoterol, 15 mcg, Nebulization, BID - RT  aspirin, 81 mg, Oral, Daily   Or  aspirin, 300 mg, Rectal, Daily  atorvastatin, 40 mg, Oral, Nightly  budesonide, 0.5 mg, Nebulization, BID - RT  ceFAZolin, 2,000 mg, Intravenous, Q8H  clopidogrel, 75 mg, Oral, Daily  insulin regular, 2-7 Units, Subcutaneous, Q6H  ipratropium-albuterol, 3 mL, Nebulization, Q8H - RT  metoprolol tartrate, 25 mg, Oral, Q12H  pantoprazole, 40 mg, Oral, Q AM  potassium chloride ER, 40 mEq, Oral, Q4H  revefenacin, 175 mcg, Nebulization, Daily - RT  senna-docusate sodium, 2 tablet, Oral, BID      Prior to Admission medications    Medication Sig Start Date End Date Taking? Authorizing Provider   acetaminophen (TYLENOL) 325 MG tablet Take 2 tablets by mouth Every 4 (Four) Hours As Needed for Mild Pain or Fever (temperature greater than 101F). 4/10/25   Jose Butts MD   albuterol (PROVENTIL) (2.5 MG/3ML) 0.083% nebulizer solution Take 2.5 mg by nebulization Every 6 (Six) Hours As Needed for Wheezing or Shortness of Air. 4/10/25   Jose Butts MD   aspirin 81 MG EC tablet Take 1 tablet by mouth Daily. 4/11/25   Jose Butts MD   atorvastatin (LIPITOR) 40 MG tablet Take 1 tablet by mouth Every Night for 90 days. 4/1/25 6/30/25  Sandra Kaba MD   budesonide (PULMICORT) 0.5 MG/2ML nebulizer solution Take 2 mL by nebulization 2 (Two) Times a Day. 4/10/25   Jose Butts MD   dextrose (GLUTOSE) 40 % gel Take 15 g by mouth Every 15 (Fifteen) Minutes As Needed for Low Blood Sugar (Blood sugar less than 70). 4/10/25   Jose Butts MD   glucagon (GLUCAGEN) 1 MG injection Inject 1 mg into the appropriate muscle as directed by prescriber Every 15 (Fifteen) Minutes As Needed (Blood Glucose Less Than 70). 4/10/25   Jose Butts MD   insulin glargine (LANTUS, SEMGLEE) 100 UNIT/ML injection Inject 10 Units under the skin into the appropriate area as directed Daily. 4/11/25   Beckie,  Jose TANNER MD   insulin regular (humuLIN R,novoLIN R) 100 UNIT/ML injection Inject 2-7 Units under the skin into the appropriate area as directed Every 6 (Six) Hours. 4/10/25   Jose Butts MD   Jardiance 25 MG tablet tablet Take 1 tablet by mouth Daily. 1/7/25   Provider, MD Mara   nitroglycerin (NITROSTAT) 0.4 MG SL tablet Place 1 tablet under the tongue Every 5 (Five) Minutes As Needed for Chest Pain (Systolic BP Greater Than 100). Take no more than 3 doses in 15 minutes. 4/10/25   Jose Butts MD   ondansetron (ZOFRAN) 2 mg/mL injection Infuse 2 mL into a venous catheter Every 6 (Six) Hours As Needed for Nausea or Vomiting. 4/10/25   Jose Butts MD   ticagrelor (BRILINTA) 90 MG tablet tablet Take 1 tablet by mouth 2 (Two) Times a Day. 4/10/25   Jose Butts MD      Labs reviewed - Leukocytosis 27.78 increasing, hbg 7.1downtrending. K+ 3.4 down    CT Abdomen Pelvis With Contrast  Result Date: 4/17/2025  CT ABDOMEN AND PELVIS WITH IV CONTRAST  HISTORY: 65-year-old female with left ventricular thrombus with embolization and cardiogenic shock. Occlusion of distal left renal artery with left renal infarcts, distal splenic artery with splenic infarcts, ONEIDA occlusion with reconstitution distally, left superficial femoral artery occlusion. Mechanical embolectomy and 4-compartment fasciotomy left lower extremity.  TECHNIQUE: Radiation dose reduction techniques were utilized, including automated exposure control and exposure modulation based on body size. 3 mm images were obtained through the abdomen and pelvis after the administration of IV contrast. Compared with CTA abdomen and pelvis 04/12/2025.  FINDINGS: 1. Most of the colonic dilatation has resolved, but there is mild persistent dilatation of the proximal transverse colon. The proximal transverse colon appears mildly patulous and has a slightly thickened wall. There is less dilatation of a long segment of proximal small bowel, but there is a long  segment of distal ileum which appears slightly more dilated than previously, image 107. The distal/terminal ileum is normal in caliber, stable. There is a slightly less thickened appearance of some of the small bowel loops with less thickening of the folds. There is no bowel pneumatosis.  There is a slightly larger small volume of free fluid, but there is also new third spacing of fluid within the body wall. There are no fluid collections. No free air.  2. Evolving splenic infarcts appear unchanged. Evolving left renal infarcts appear slightly less defined.  3. There is no new abnormality at the liver, pancreas, adrenals, or right kidney. Urinary bladder is significantly distended. Consider a Storm catheter.  4. There are postsurgical changes at the left groin. Slightly narrowed, but the left common femoral artery is patent as is the visualized left superficial femoral artery, series 3 image 132. There is a very small amount of fluid at the left groin, but there is no discrete fluid collection or hematoma.  5. There is no pneumonia or pleural effusions at the visualized lung bases.   This report was finalized on 4/17/2025 8:30 AM by Dr. Aleida Perez M.D on Workstation: [id number]      XR Abdomen KUB  Result Date: 4/12/2025  XR ABDOMEN KUB-   INDICATION: NG tube placement  COMPARISON: Abdominal radiograph April 12, 2025  TECHNIQUE: 1 view abdomen  FINDINGS:  NG tube tip and sideport are in the gastric fundus. Cholecystectomy clips. Gas distended small bowel loops, with a loop in the left lower quadrant measuring 4.1 cm. Prominent gas-filled right colon.       1. Interval advancement of NG tube with the tip and sideport in the gastric fundus. 2. Stable gas distended small bowel loops  This report was finalized on 4/12/2025 1:58 PM by Dr. Jose Daniel Dallas M.D on Workstation: [id number]      XR Abdomen KUB  Result Date: 4/12/2025  XR ABDOMEN KUB-   INDICATION: 1 view abdomen  COMPARISON: Abdominal radiograph April  11, 2025  TECHNIQUE: 1 view abdomen  FINDINGS:  NG tube tip is in the gastric fundus and the sideport is at the GE junction. Cholecystectomy clips. Gas distended small bowel loops, with a loop in the left mid abdomen measuring 4.2 cm. Mild thickening of small bowel circular folds. Gas distended hepatic flexure of the colon.       1. NG tube tip is in the gastric fundus and the sideport is at the GE junction. Consider advancement. 2. Gas distended small bowel with suggestion of thickening of circular folds and gas distended hepatic flexure of the colon, similar.  This report was finalized on 4/12/2025 12:36 PM by Dr. Jose Daniel Dallas M.D on Workstation: [id number]      Arteriogram (Autofinalize)  Result Date: 4/12/2025  This procedure was auto-finalized with no dictation required.    Arterial Line  Result Date: 4/12/2025  Garcia Patrick DO     4/12/2025  9:59 AM Arterial Line Pre-sedation assessment completed: 4/12/2025 9:16 AM Patient reassessed immediately prior to procedure Patient location during procedure: holding area Start time: 4/12/2025 9:17 AM Stop Time:4/12/2025 9:20 AM  Line placed for hemodynamic monitoring and ABGs/Labs/ISTAT. Performed By Anesthesiologist: Garcia Patrick DO Preanesthetic Checklist Completed: patient identified, IV checked, site marked, risks and benefits discussed, surgical consent, monitors and equipment checked, pre-op evaluation and timeout performed Arterial Line Prep  Sterile Tech: gloves, mask and cap Prep: ChloraPrep Patient monitoring: blood pressure monitoring, continuous pulse oximetry and EKG Arterial Line Procedure Laterality:left Location:  radial artery Catheter size: 20 G Guidance: ultrasound guided PROCEDURE NOTE/ULTRASOUND INTERPRETATION.  Using ultrasound guidance the potential vascular sites for insertion of the catheter were visualized to determine the patency of the vessel to be used for vascular access.  After selecting the appropriate site  for insertion, the needle was visualized under ultrasound being inserted into the radial artery, followed by ultrasound confirmation of wire and catheter placement. There were no abnormalities seen on ultrasound; an image was taken; and the patient tolerated the procedure with no complications. Number of attempts: 1 Successful placement: yes Images: still images obtained, printed/placed on the chart Post Assessment Dressing Type: occlusive dressing applied, secured with tape and wrist guard applied. Complications no Circ/Move/Sens Assessment: normal and unchanged. Patient Tolerance: patient tolerated the procedure well with no apparent complications Additional Notes Using ultrasound guidance the potential vascular sites for insertion of the catheter were visualized to determine the patency of the vessel to be used for vascular access.  After selecting the appropriate site for insertion, the needle was visualized under ultrasound being inserted into the radial artery, followed by ultrasound confirmation of wire and catheter placement.  There were no abnormalities seen on ultrasound; an image was taken/ and the patient tolerated the procedure with no complications.      CT Angio Abdominal Aorta Bilateral Iliofem Runoff  Result Date: 4/12/2025  CT ANGIOGRAM OF THE ABDOMEN AND PELVIS WITH BILATERAL LOWER EXTREMITY RUNOFF  HISTORY: Loss of pulses within the lower extremity  COMPARISON: April 11, 2025  TECHNIQUE: Axial CT imaging was obtained through the abdomen and pelvis. IV contrast was administered.  FINDINGS: Images through the lung bases demonstrate some dependent atelectasis.  The celiac axis and superior mesenteric artery are widely patent, as are the renal arteries bilaterally. Inferior mesenteric artery is occluded at its origin, although there is distal reconstitution. The common iliac arteries are widely patent bilaterally. There does appear to be a moderate stenosis involving the proximal right internal iliac  artery. There is probably some mild narrowing involving the proximal left internal iliac artery. The external iliac arteries are widely patent bilaterally.  The right common femoral artery is patent, as is the profunda femoris. Right superficial femoral artery has a short segment occlusion within the proximal to mid thigh. It reconstitutes, but does appear somewhat narrowed. There is also some calcified plaque noted more distally, with associated moderate stenosis. Right popliteal artery is patent. There is some narrowing at the origin of the right anterior tibial artery. It is continuous to the ankle on delayed phase imaging. There is some calcification involving the tibioperoneal trunk. The peroneal artery and posterior tibial artery are continuous to the foot.  There is an occlusion of the left common femoral artery. There is reconstitution of the left profunda femoris artery. The left superficial femoral artery remains occluded before reconstituting in the mid to distal thigh. There is some irregularity and calcification of the distal left superficial femoral artery, with mild to moderate narrowing. Left popliteal artery is patent. Left anterior tibial artery appears to terminate within the distal calf. Left posterior tibial artery is continuous to the foot. Left peroneal artery appears to be continuous to the ankle on delayed phase imaging.  No suspicious hepatic lesions are seen. Areas of infarction are again noted within the spleen and left kidney. No hydronephrosis is seen on either side. Excreted contrast material is noted within the urinary bladder. Uterus is absent. The patient's ascending and transverse colon are distended with air and fluid. The appendix is normal. There are multiple distended loops of small bowel, which do appear to have wall thickening and edema. Appearance is suspicious for ischemic colitis/enteritis. No pneumatosis or free air is seen. There is a small amount of free fluid which is  noted within the abdomen and pelvis. No acute osseous abnormalities are seen.       1. The patient has a short segment occlusion of the right superficial femoral artery in the proximal to mid thigh. It reconstitutes, but does show narrowing. There is further plaque noted more distally, with associated moderate stenosis. I do think the right anterior tibial, peroneal, and posterior tibial arteries are continuous to the foot. 2. Occlusion of the left common femoral artery, noted on prior imaging. There is reconstitution of the left superficial femoral artery within the mid to distal thigh. Left anterior tibial artery appears to terminate within the distal calf. Left peroneal artery terminates at the ankle. Left posterior tibial artery is continuous to the foot. 3. Areas of infarction are again noted within the spleen and left kidney. 4. Distention of the ascending and transverse colon, along with distended loops of small bowel with associated wall thickening and edema. Again, this is favored to reflect ischemic colitis/enteritis. No pneumatosis or free air is seen. There is some free fluid which is noted within the abdomen and pelvis.  Radiation dose reduction techniques were utilized, including automated exposure control and exposure modulation based on body size.   This report was finalized on 4/12/2025 5:25 AM by Dr. Jennifer Jo M.D on Workstation: [id number]      XR Abdomen KUB  Result Date: 4/11/2025  XR ABDOMEN KUB-  Clinical: Nasogastric tube placement  FINDINGS: Nasogastric tube tip is located within the gastric fundus, this position is satisfactory. Distended loops of small bowel similar to the previous CT. The remainder is unremarkable.  This report was finalized on 4/11/2025 7:17 AM by Dr. Baljinder Guevara M.D on Workstation: [id number]      CT Angiogram Abdomen Pelvis  Addendum Date: 4/11/2025  ADDENDUM: 04 11 25 05:21 Call Doctor Regarding Above results, called ALEKSANDR Eldridge  on 04 11 05:21 (-04:00)      Result Date: 4/11/2025  CR Patient: BIN CATALAN  Time Out: 05:11 Exam(s): CTA ABDOMEN + PELVIS EXAM:   CT Angiography Abdomen and Pelvis With Intravenous Contrast CLINICAL HISTORY:    Reason for exam: severe abdominal pain, nausea, concern for bowel ischemia. TECHNIQUE:   Axial computed tomographic angiography images of the abdomen and pelvis with intravenous contrast with coronal and sagittal reformats.  CTDI is 16.01 mGy and DLP is 578 mGy-cm.  This CT exam was performed according to the principle of ALARA (As Low As Reasonably Achievable) by using one or more of the following dose reduction techniques: automated exposure control, adjustment of the mA and or kV according to patient size, and or use of iterative reconstruction technique.   3D and MIP reconstructed images were created and reviewed. COMPARISON:   No relevant prior studies available. FINDINGS:  VASCULATURE: Left SFA occlusion from the origin through the entire visualized portion   Aorta:  Atherosclerotic arterial calcifications: trace. No aortic aneurysm or dissection. Arterial structures otherwise unremarkable.   Celiac trunk and mesenteric arteries:  ONEIDA thready patency, diminutive.  Celiac artery artery patent.  Superior mesenteric artery patent.   Renal arteries:  Grossly patent.   Iliac arteries:  Grossly patent.   Portal veins:  Unremarkable as visualized.  No portal venous gas.   Heart:  Cardiomegaly.  Mild coronary artery calcifications.  ABDOMEN:   Liver:  Consider additional evaluation of atypical liver morphology, potentially incidental or chonic liver disease.   Gallbladder and bile ducts:  Cholecystectomy.   Pancreas:  Unremarkable.   Spleen:  Splenic scattered infarcts likely 20-30% of total parenchymal volume.   Adrenals:  Unremarkable.   Kidneys and ureters:  Left renal multifocal infarcts, likely 50% of parenchyma.   Stomach and bowel:  Sigmoid possible colitis, correlate for watershed colonic ischemia.  Large bowel and  probably small bowel ileus.  Long segment extensive small bowel enteritis.  Colonic diverticula.  No pneumatosis intestinalis.  PELVIS:   Appendix:  No findings to suggest acute appendicitis.   Bladder:  Unremarkable.   Reproductive:  Hysterectomy.  ABDOMEN and PELVIS:   Intraperitoneal space:  Unremarkable.  No pneumoperitoneum.   Bones joints:  No acute abnormality.   Soft tissues:  Unremarkable.   Lymph nodes:  Unremarkable.   Other findings:  Vascular structures: Otherwise unremarkable. IMPRESSION:     1.  Left renal and splenic infarcts , left SFA occlusion from origin distally concerning for arterial thrombi embolism ONEIDA thready patency, diminutive. 2.  Recommend evaluation for arterial thrombi embolism. 3.  Recommend vascular IR consultation 3.  Concerning for sigmoid colonic possible ischemia; correlate with presentation. 4.  Long segment extensive small bowel enteritis. Large bowel and probably small bowel ileus. 5  No bowel perforation or portal venous gas. 6.  See additional findings above.     Communications:  Call Doctor Above results Electronically signed by Jose Albarran MD on 04-11-25 at 0511    Adult Transthoracic Echo Complete w/ Color, Spectral and Contrast if necessary per protocol  Result Date: 4/10/2025    Left ventricular systolic function is mildly decreased. Left ventricular ejection fraction appears to be 41 - 45%.   Left ventricular wall thickness is consistent with mild concentric hypertrophy.   Left ventricular diastolic dysfunction is noted.   There is a moderate sized, pedunculated, solid thrombus located in the basal inferoseptum of the left ventricle. The thrombus measures 1.4 cm in diameter and is mobile.     Adult Transthoracic Echo Limited W/ Cont if Necessary Per Protocol  Result Date: 4/9/2025    Left ventricular systolic function is moderately decreased. Left ventricular ejection fraction appears to be 36 - 40%.   Left ventricular diastolic function was not assessed.    Limited echocardiogram to rule out LV thrombus.  There is no evidence of LV thrombus.     Cardiac Catheterization/Vascular Study  Result Date: 2025  [facility] CARDIAC CATHETERIZATION PROCEDURE REPORT Patient: Desiree Garcia : 1960 MRN: [id number] Procedure Date: 25 Referring Physician: ER Interventional Cardiologist: James Verdugo MD, [facility] Indication: Inferior STEMI seen on EKG Tachycardia Abdominal pain Clinical Presentation: Patient is a 61-year-old female with history of CAD status post PCI to mid RCA in , hypertension, hyperlipidemia, type 2 diabetes, who presents to the ER with complaints of abdominal pain that started since yesterday.  Patient was found to have sinus tachycardia with EKG showing inferior STEMI with reciprocal changes in the lateral leads.  Urgent CT chest and abdomen was done which showed suspected renal and splenic infarct.  Patient was brought to the Cath Lab urgently given inferior STEMI. Procedure performed: Diagnostic Left Heart Catheterization Selective Coronary Angiography Left ventricle pressure measurement Successful percutaneous coronary intervention to mid to distal RCA with drug-eluting stent x 2 Ultrasound-guided right radial artery access IVUS of RCA Mechanical aspiration thrombectomy using CAT Rx device of RCA Moderate sedation Access Site(s): Right radial artery Findings: 1. Coronary Artery Anatomy: Dominance: Right coronary artery Left Main: No significant stenosis Left Anterior Descendin to 40% proximal LAD stenosis with diffuse luminal irregularities without significant obstructive lesion Left Circumflex Artery: Luminal irregularities, distalmost left circumflex is 100% occluded, likely acute however small vessel size and territory. Right Coronary Artery: Proximal RCA is 100% acute thrombotic occlusion was seen. 2. Hemodynamics:  The left ventricular end-diastolic pressure is 24 mmHg. There was no gradient across aortic valve on  pullback  3. Left Ventriculogram: Not done given suspected intracardiac thrombus. 4. Percutaneous Intervention: Location: Proximal right coronary artery all the way to distal right coronary artery Treatment: PTCA, penumbra aspiration thrombectomy, IVUS, PCI, post dilation Pre-stenosis: 100% Post-stenosis: 0% Lesion Type C: Yes ABILIO Flow Pre: 0 ABILIO Flow Post: 3, distalmost RCA bifurcation has ABILIO II flow secondary to thromboembolism. Bifurcation: No Severe Calcium: No Dissection: No Details of the procedure: Informed consent was obtained with an explanation of the risks, benefits and alternatives of the procedure. The patient was brought to the Cardiac Catheterization Laboratory and was prepped and draped in a standard sterile fashion. Moderate sedation with Fentanyl and Versed was administered by the circulating nurse. Lidocaine 2% was used to anesthetize the right radial artery and a 6 Slender sheath was placed.  Tiger 5 Kazakh catheter was used to engage the right and left coronary arteries with diagnostic angiography obtained in all appropriate projections.  We then exchanged for an angled pigtail catheter and enter the left ventricle to measure hemodynamics and perform a left ventriculogram.  The catheter was then removed over a guidewire. Details of angioplasty: Patient was noted to have 100% acute thrombotic occlusion of the proximal RCA.  JR4 guide catheter was used to engage the right coronary artery followed by Runthrough wire to cross the lesion and parked distally.  3.0 mm NC balloon was used predilate.  Extensive thrombus burden was noted.  Penumbra CAT Rx was used for 2 passes.  A decent size clot was retrieved.  Significant episode mid to distal RCA had clot burden.  Further predilation was done with a 2.5 and 3.0 mm NC balloon.  We deployed 2.75 X28 and 3.5X 23 mm drug-eluting stent in the mid to distal RCA and postdilated with 3.0 and 3.5 mm balloon and high-pressure.  The right PDA had  thromboembolism, for which PTCA was done in the ostial part of right PDA with 2.0 mm NC balloon.  Adenosine was used.  Final angiographic images showed ABILIO II-III flow. Heparin was used for anticoagulation throughout the procedure with frequent checking of ACT.  Patient was already on aspirin.  Brilinta 180 mg was given.  Cangrelor drip was used..  At the end of the procedure, the radial artery sheath was removed and TR band was applied for hemostasis.  Patient tolerated procedure well without any complications.  The results of the test were explained in detail to the patient. Complications: None. Estimated Blood Loss: Minimal. Conclusions: Patient with extensive clot burden causing 100% proximal to distal RCA thrombosis, status post mechanical aspiration thrombectomy, IVUS and PTCA/PCI with drug-eluting stent x 2 with 2.75 X28 and 3.5X 23 mm drug-eluting stent in the mid to distal RCA resulting in ABILIO II-III flow. Distalmost left circumflex with 100% occlusion, small territory and a small vessel.  Appears to be thrombotic in nature as well. No aortic stenosis on pullback gradient Review of the CT scan chest and abdomen shows infarct of renal artery, splenic artery, concerning for thrombotic showering, need to rule out cardiac cause of thromboembolism. Patient was supposed to be on aspirin and Plavix, suspected medical noncompliance Recommendations: Aspirin 81 and Brilinta 90 mg twice daily uninterrupted for at least 1 year.  High intensity statin, beta-blocker, ACE/ARB as tolerated.  Aggressive risk factor management with aggressive diabetes and blood pressure control. Patient with extensive clot burden, resume heparin without bolus 2 hours after TR band removal.  Heparin to be continued overnight. Patient with renal and splenic infarct.  Continue monitoring for now. Will obtain echocardiogram. Procedure Status: Emergent Electronically signed by James Verdugo MD, 04/08/25, 8:20 PM EDT.     CT Angiogram Abdomen  Pelvis  Result Date: 4/8/2025  CT ANGIOGRAM ABDOMEN PELVIS, CT ANGIOGRAM CHEST Date of Exam: 4/8/2025 5:39 PM EDT Indication: Chest pain abdominal pain. Comparison: CT abdomen and pelvis 10/6/2024 Technique: CTA of the chest, abdomen and pelvis was performed after the uneventful intravenous administration of iodinated contrast. Reconstructed coronal and sagittal images were also obtained. In addition, a 3-D volume rendered image was created for interpretation. Automated exposure control and iterative reconstruction methods were used. Findings: Vascular: The visualized supra aortic great vessels are normal. The thoracic aorta is normal in caliber without evidence of dissection or penetrating ulcer. The visualized pulmonary arteries are patent. The thoracic aorta is unremarkable. The celiac axis, the SMA and proximal bilateral renal arteries are patent. There is occlusion of the distal aspect of the left renal artery. There is also occlusion of the very distal aspect of the splenic artery. Complete occlusion of the ONEIDA with reconstitution distally. The bilateral common iliac, external iliac and common femoral arteries are patent. There is evidence of complete occlusion of the distal branches of the left internal iliac artery. The right internal iliac artery is patent. The bilateral profunda femoris are patent. The right superficial femoral artery is patent. There is complete occlusion of the left superficial femoral artery. Nonvascular: Lungs and Pleura: Scattered pulmonary nodules, the largest of which measures 0.6 cm (image 105 of series 3). Otherwise the lungs are clear. The central airways are patent. No pleural effusion or pneumothorax Mediastinum/Hanna: No lymphadenopathy. No suspicious mass. Heart: Normal in size. No pericardial effusion. Normal RV/LV ratio. Liver: Liver is normal in size and CT density. No focal lesions. Gallbladder: Status post cholecystectomy Bile Ducts: No billiary dcutal dilation. Spleen:  Areas of hypoenhancement noted within the spleen, most significantly anteriorly, concerning for splenic infarcts. Pancreas: Pancreas is normal. There is no evidence of pancreatic mass or peripancreatic fluid. Adrenals: Adrenal glands are unremarkable. Kidneys: Hypoenhancement of the inferior aspect of the left kidney, consistent with renal infarct. The right kidney is normal.. Gastrointestinal: Multiple prominent fluid-filled loops of small bowel noted throughout the abdomen. No significant distention to suggest bowel obstruction. Otherwise unremarkable. Normal appendix. Bladder: The bladder is normal. Pelvis:  No suspecious mass. Peritoneum/Mesentery: No fluid collection, ascities, or free air.   Lymph Nodes: No lymphadenopathy. Vasculature: Unremarkable Soft tissue:: Unremarkable Bony Structures: No acute osseous abnormality. Chronic deformity of the sternum. Degenerative changes noted throughout the spine.     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 4.Areas of hypoenhancement noted within the spleen, most significantly anteriorly, concerning for splenic infarcts. 5.Hypoenhancement of the inferior aspect of the left kidney, consistent with renal infarct. 6.Complete occlusion of the ONEIDA with reconstitution distally. 7.Multiple prominent fluid-filled loops of small bowel noted throughout the abdomen. No significant distention to suggest bowel obstruction. 8.Scattered pulmonary nodules, the largest of which measures 0.6 cm. Multiple indeterminate pulmonary nodules. The largest one is solid and measures 6 mm. For multiple nodules, with the most suspicious nodule being solid and measuring 6-8 mm, recommend CT at 3-6 months. Then, for a low-risk patient, consider CT at 18-24 months. For a high-risk patient, if the nodule is stable at 3-6 months, recommend  CT at 18-24 months. Follow-up intervals may vary according to size and risk. Electronically Signed: Julian Gilsonxochilt   4/8/2025 7:11 PM EDT  Workstation ID: [id number]    CT Angiogram Chest  Result Date: 4/8/2025  CT ANGIOGRAM ABDOMEN PELVIS, CT ANGIOGRAM CHEST Date of Exam: 4/8/2025 5:39 PM EDT Indication: Chest pain abdominal pain. Comparison: CT abdomen and pelvis 10/6/2024 Technique: CTA of the chest, abdomen and pelvis was performed after the uneventful intravenous administration of iodinated contrast. Reconstructed coronal and sagittal images were also obtained. In addition, a 3-D volume rendered image was created for interpretation. Automated exposure control and iterative reconstruction methods were used. Findings: Vascular: The visualized supra aortic great vessels are normal. The thoracic aorta is normal in caliber without evidence of dissection or penetrating ulcer. The visualized pulmonary arteries are patent. The thoracic aorta is unremarkable. The celiac axis, the SMA and proximal bilateral renal arteries are patent. There is occlusion of the distal aspect of the left renal artery. There is also occlusion of the very distal aspect of the splenic artery. Complete occlusion of the ONEIDA with reconstitution distally. The bilateral common iliac, external iliac and common femoral arteries are patent. There is evidence of complete occlusion of the distal branches of the left internal iliac artery. The right internal iliac artery is patent. The bilateral profunda femoris are patent. The right superficial femoral artery is patent. There is complete occlusion of the left superficial femoral artery. Nonvascular: Lungs and Pleura: Scattered pulmonary nodules, the largest of which measures 0.6 cm (image 105 of series 3). Otherwise the lungs are clear. The central airways are patent. No pleural effusion or pneumothorax Mediastinum/Hanna: No lymphadenopathy. No suspicious mass. Heart: Normal in size. No pericardial  effusion. Normal RV/LV ratio. Liver: Liver is normal in size and CT density. No focal lesions. Gallbladder: Status post cholecystectomy Bile Ducts: No billiary dcutal dilation. Spleen: Areas of hypoenhancement noted within the spleen, most significantly anteriorly, concerning for splenic infarcts. Pancreas: Pancreas is normal. There is no evidence of pancreatic mass or peripancreatic fluid. Adrenals: Adrenal glands are unremarkable. Kidneys: Hypoenhancement of the inferior aspect of the left kidney, consistent with renal infarct. The right kidney is normal.. Gastrointestinal: Multiple prominent fluid-filled loops of small bowel noted throughout the abdomen. No significant distention to suggest bowel obstruction. Otherwise unremarkable. Normal appendix. Bladder: The bladder is normal. Pelvis:  No suspecious mass. Peritoneum/Mesentery: No fluid collection, ascities, or free air.   Lymph Nodes: No lymphadenopathy. Vasculature: Unremarkable Soft tissue:: Unremarkable Bony Structures: No acute osseous abnormality. Chronic deformity of the sternum. Degenerative changes noted throughout the spine.     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 4.Areas of hypoenhancement noted within the spleen, most significantly anteriorly, concerning for splenic infarcts. 5.Hypoenhancement of the inferior aspect of the left kidney, consistent with renal infarct. 6.Complete occlusion of the ONEIDA with reconstitution distally. 7.Multiple prominent fluid-filled loops of small bowel noted throughout the abdomen. No significant distention to suggest bowel obstruction. 8.Scattered pulmonary nodules, the largest of which measures 0.6 cm. Multiple indeterminate pulmonary nodules. The largest one is solid and measures 6 mm. For multiple nodules, with the most suspicious nodule being solid  and measuring 6-8 mm, recommend CT at 3-6 months. Then, for a low-risk patient, consider CT at 18-24 months. For a high-risk patient, if the nodule is stable at 3-6 months, recommend CT at 18-24 months. Follow-up intervals may vary according to size and risk. Electronically Signed: Julian Rosenthal,   4/8/2025 7:11 PM EDT  Workstation ID: [id number]    ROS:  Weak, LLE impaired. Denies HA, dizziness, impaired vision, speech, swallow, CP, palpitations, SOB, cough, N&V. Voiding. Has had BM.  Eating ok.     Vitals:    04/18/25 0915 04/18/25 0930 04/18/25 1002 04/18/25 1003   BP: 130/69 116/66 128/69    BP Location:   Left arm    Patient Position:   Lying    Pulse: 85 78  80   Resp: 14 16  18   Temp:    98 °F (36.7 °C)   TempSrc:    Oral   SpO2: 100% 98%  99%   Weight:       Height:         PE:  General: Middle aged WF overweight WF lying in bed, in NAD.   Neuro: A&Oxs3. Pleasant. Approp with conversation. Follows commands approp.   HEENT: NC. AT. EOMI.   CV: Heart RRR, no murmurs noted  Resp: LS clear to auscultation. On RA  Abd: Soft, NT, ND. BS+  Ext: Moving BUE without diff. Able to move RLE without diff with HF/KF/KE/ADF.  Diff assessing LLE - had surgery this am, no ADF at this time. L foot swollen.   Skin: Fasciotomy site covered with kerlex.     On Heparin gtt.     Assessment:  Abdominal Pain  Cardiogenic shock  R ICA thrombosis s/p thrombectomy and stent placement.  Renal and splenic infarcts  L ventricular thrombus - on Hep gtt  Ileus - resolved  LLE ischemia d/t L femoral embolism s/p thrombectomy, endartectomy and 4 compartment fasciotomy    Cont PT/OT    Pt will need AIR upon dc. She requires PT/OT. She usd a walker and cane sometimes at baseline. Lives with her son.     Will follow peripherally.     ALVIN Umana

## 2025-04-18 NOTE — ANESTHESIA POSTPROCEDURE EVALUATION
"Patient: Desiree Garcia    Procedure Summary       Date: 04/18/25 Room / Location: [facility] OR  / [facility] MAIN OR    Anesthesia Start: 0726 Anesthesia Stop: 0841    Procedure: LOWER EXTREMITY DEBRIDEMENT (Left) Diagnosis:       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      (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 [I74.3])    Surgeons: Misael Lawton MD Provider: Flavio Cannon MD    Anesthesia Type: general ASA Status: 4            Anesthesia Type: general    Vitals  Vitals Value Taken Time   /66 04/18/25 09:30   Temp 36.8 °C (98.3 °F) 04/18/25 08:40   Pulse 80 04/18/25 09:43   Resp 14 04/18/25 09:15   SpO2 98 % 04/18/25 09:43   Vitals shown include unfiled device data.        Post Anesthesia Care and Evaluation    Patient location during evaluation: bedside  Pain management: adequate    Airway patency: patent  Anesthetic complications: No anesthetic complications    Cardiovascular status: acceptable  Respiratory status: acceptable  Hydration status: acceptable    Comments: */69   Pulse 85   Temp 36.8 °C (98.3 °F) (Oral)   Resp 14   Ht 167.6 cm (65.98\")   Wt 75.4 kg (166 lb 3.6 oz)   SpO2 100%   BMI 26.84 kg/m²       "

## 2025-04-18 NOTE — PLAN OF CARE
Problem: Adult Inpatient Plan of Care  Goal: Plan of Care Review  Outcome: Progressing  Plan of care reviewed with: patient    A&Ox4. Endorsing pain to LLE; woke up around 0030 in intense pain. Patient yelling out and very uncomfortable. Only prn pain medication ordered was tylenol. LHA paged and new orders obtained for 1mg dilaudid and 7.5mg norco. Patient resting comfortably for rest of night. NSR on cardiac monitor. Room air. Heparin gtt at 14units. NPO at midnight for LLE washout and possible wound vac placement.  Q6h blood sugar checks. PT/OT following.incontinent of bowel and bladder. Voiding with purewick.  Plan of care ongoing.               Report given to pre op nurse. Verified with Lee PHILIPPE with vascular (on call) to continue heparin gtt. No orders to stop gtt and nothing mentioned in vascular note. Patient sent to pre op with Heparin gtt infusing. Prn dilaudid given for pain before going down. Consent signed.

## 2025-04-19 LAB
ABO GROUP BLD: NORMAL
ALBUMIN SERPL-MCNC: 2.4 G/DL (ref 3.5–5.2)
ANION GAP SERPL CALCULATED.3IONS-SCNC: 8.3 MMOL/L (ref 5–15)
ANISOCYTOSIS BLD QL: ABNORMAL
APTT PPP: 76.1 SECONDS (ref 22.7–35.4)
BASOPHILS # BLD MANUAL: 0 10*3/MM3 (ref 0–0.2)
BASOPHILS NFR BLD MANUAL: 0 % (ref 0–1.5)
BLD GP AB SCN SERPL QL: NEGATIVE
BUN SERPL-MCNC: 6 MG/DL (ref 8–23)
BUN/CREAT SERPL: 9.5 (ref 7–25)
CALCIUM SPEC-SCNC: 7.4 MG/DL (ref 8.6–10.5)
CHLORIDE SERPL-SCNC: 103 MMOL/L (ref 98–107)
CO2 SERPL-SCNC: 26.7 MMOL/L (ref 22–29)
CREAT SERPL-MCNC: 0.63 MG/DL (ref 0.57–1)
DEPRECATED RDW RBC AUTO: 44 FL (ref 37–54)
EGFRCR SERPLBLD CKD-EPI 2021: 99.2 ML/MIN/1.73
EOSINOPHIL # BLD MANUAL: 0.56 10*3/MM3 (ref 0–0.4)
EOSINOPHIL NFR BLD MANUAL: 2 % (ref 0.3–6.2)
ERYTHROCYTE [DISTWIDTH] IN BLOOD BY AUTOMATED COUNT: 14.4 % (ref 12.3–15.4)
GLUCOSE BLDC GLUCOMTR-MCNC: 133 MG/DL (ref 70–130)
GLUCOSE BLDC GLUCOMTR-MCNC: 177 MG/DL (ref 70–130)
GLUCOSE BLDC GLUCOMTR-MCNC: 192 MG/DL (ref 70–130)
GLUCOSE BLDC GLUCOMTR-MCNC: 193 MG/DL (ref 70–130)
GLUCOSE BLDC GLUCOMTR-MCNC: 229 MG/DL (ref 70–130)
GLUCOSE SERPL-MCNC: 178 MG/DL (ref 65–99)
HCT VFR BLD AUTO: 20.7 % (ref 34–46.6)
HGB BLD-MCNC: 6.5 G/DL (ref 12–15.9)
HYPOCHROMIA BLD QL: ABNORMAL
LYMPHOCYTES # BLD MANUAL: 1.41 10*3/MM3 (ref 0.7–3.1)
LYMPHOCYTES NFR BLD MANUAL: 1 % (ref 5–12)
MAGNESIUM SERPL-MCNC: 2.2 MG/DL (ref 1.6–2.4)
MCH RBC QN AUTO: 27.4 PG (ref 26.6–33)
MCHC RBC AUTO-ENTMCNC: 31.4 G/DL (ref 31.5–35.7)
MCV RBC AUTO: 87.3 FL (ref 79–97)
METAMYELOCYTES NFR BLD MANUAL: 1 % (ref 0–0)
MONOCYTES # BLD: 0.28 10*3/MM3 (ref 0.1–0.9)
MYELOCYTES NFR BLD MANUAL: 4 % (ref 0–0)
NEUTROPHILS # BLD AUTO: 24.56 10*3/MM3 (ref 1.7–7)
NEUTROPHILS NFR BLD MANUAL: 87 % (ref 42.7–76)
PHOSPHATE SERPL-MCNC: 2.5 MG/DL (ref 2.5–4.5)
PLAT MORPH BLD: NORMAL
PLATELET # BLD AUTO: 415 10*3/MM3 (ref 140–450)
PMV BLD AUTO: 10.1 FL (ref 6–12)
POLYCHROMASIA BLD QL SMEAR: ABNORMAL
POTASSIUM SERPL-SCNC: 4.5 MMOL/L (ref 3.5–5.2)
RBC # BLD AUTO: 2.37 10*6/MM3 (ref 3.77–5.28)
RH BLD: POSITIVE
SODIUM SERPL-SCNC: 138 MMOL/L (ref 136–145)
T&S EXPIRATION DATE: NORMAL
VARIANT LYMPHS NFR BLD MANUAL: 5 % (ref 19.6–45.3)
WBC MORPH BLD: NORMAL
WBC NRBC COR # BLD AUTO: 28.23 10*3/MM3 (ref 3.4–10.8)

## 2025-04-19 PROCEDURE — 86901 BLOOD TYPING SEROLOGIC RH(D): CPT

## 2025-04-19 PROCEDURE — 80069 RENAL FUNCTION PANEL: CPT | Performed by: SURGERY

## 2025-04-19 PROCEDURE — 85025 COMPLETE CBC W/AUTO DIFF WBC: CPT | Performed by: SURGERY

## 2025-04-19 PROCEDURE — 86850 RBC ANTIBODY SCREEN: CPT

## 2025-04-19 PROCEDURE — 86900 BLOOD TYPING SEROLOGIC ABO: CPT

## 2025-04-19 PROCEDURE — 99232 SBSQ HOSP IP/OBS MODERATE 35: CPT | Performed by: INTERNAL MEDICINE

## 2025-04-19 PROCEDURE — 94799 UNLISTED PULMONARY SVC/PX: CPT

## 2025-04-19 PROCEDURE — 85007 BL SMEAR W/DIFF WBC COUNT: CPT | Performed by: SURGERY

## 2025-04-19 PROCEDURE — 83735 ASSAY OF MAGNESIUM: CPT | Performed by: SURGERY

## 2025-04-19 PROCEDURE — 63710000001 REVEFENACIN 175 MCG/3ML SOLUTION: Performed by: SURGERY

## 2025-04-19 PROCEDURE — 25010000002 HEPARIN (PORCINE) 25000-0.45 UT/250ML-% SOLUTION: Performed by: SURGERY

## 2025-04-19 PROCEDURE — 85730 THROMBOPLASTIN TIME PARTIAL: CPT | Performed by: SURGERY

## 2025-04-19 PROCEDURE — 63710000001 INSULIN REGULAR HUMAN PER 5 UNITS: Performed by: SURGERY

## 2025-04-19 PROCEDURE — 99024 POSTOP FOLLOW-UP VISIT: CPT | Performed by: SURGERY

## 2025-04-19 PROCEDURE — 99232 SBSQ HOSP IP/OBS MODERATE 35: CPT | Performed by: STUDENT IN AN ORGANIZED HEALTH CARE EDUCATION/TRAINING PROGRAM

## 2025-04-19 PROCEDURE — 86923 COMPATIBILITY TEST ELECTRIC: CPT

## 2025-04-19 PROCEDURE — 82948 REAGENT STRIP/BLOOD GLUCOSE: CPT

## 2025-04-19 PROCEDURE — 94664 DEMO&/EVAL PT USE INHALER: CPT

## 2025-04-19 PROCEDURE — 25010000002 HYDROMORPHONE 1 MG/ML SOLUTION: Performed by: SURGERY

## 2025-04-19 PROCEDURE — P9016 RBC LEUKOCYTES REDUCED: HCPCS

## 2025-04-19 PROCEDURE — 94761 N-INVAS EAR/PLS OXIMETRY MLT: CPT

## 2025-04-19 PROCEDURE — 94760 N-INVAS EAR/PLS OXIMETRY 1: CPT

## 2025-04-19 PROCEDURE — 25010000002 CEFAZOLIN PER 500 MG: Performed by: INTERNAL MEDICINE

## 2025-04-19 PROCEDURE — 36430 TRANSFUSION BLD/BLD COMPNT: CPT

## 2025-04-19 RX ADMIN — ARFORMOTEROL TARTRATE 15 MCG: 15 SOLUTION RESPIRATORY (INHALATION) at 00:04

## 2025-04-19 RX ADMIN — ARFORMOTEROL TARTRATE 15 MCG: 15 SOLUTION RESPIRATORY (INHALATION) at 09:53

## 2025-04-19 RX ADMIN — HEPARIN SODIUM 14 UNITS/KG/HR: 10000 INJECTION, SOLUTION INTRAVENOUS at 14:20

## 2025-04-19 RX ADMIN — CEFAZOLIN 2000 MG: 2 INJECTION, POWDER, FOR SOLUTION INTRAMUSCULAR; INTRAVENOUS at 14:28

## 2025-04-19 RX ADMIN — INSULIN HUMAN 3 UNITS: 100 INJECTION, SOLUTION PARENTERAL at 00:48

## 2025-04-19 RX ADMIN — PANTOPRAZOLE SODIUM 40 MG: 40 TABLET, DELAYED RELEASE ORAL at 06:30

## 2025-04-19 RX ADMIN — BUDESONIDE 0.5 MG: 0.5 INHALANT RESPIRATORY (INHALATION) at 00:04

## 2025-04-19 RX ADMIN — HYDROMORPHONE HYDROCHLORIDE 1 MG: 1 INJECTION, SOLUTION INTRAMUSCULAR; INTRAVENOUS; SUBCUTANEOUS at 09:51

## 2025-04-19 RX ADMIN — ASPIRIN 81 MG CHEWABLE TABLET 81 MG: 81 TABLET CHEWABLE at 09:09

## 2025-04-19 RX ADMIN — CEFAZOLIN 2000 MG: 2 INJECTION, POWDER, FOR SOLUTION INTRAMUSCULAR; INTRAVENOUS at 23:35

## 2025-04-19 RX ADMIN — INSULIN HUMAN 2 UNITS: 100 INJECTION, SOLUTION PARENTERAL at 06:29

## 2025-04-19 RX ADMIN — HYDROCODONE BITARTRATE AND ACETAMINOPHEN 1 TABLET: 7.5; 325 TABLET ORAL at 06:30

## 2025-04-19 RX ADMIN — CLOPIDOGREL BISULFATE 75 MG: 75 TABLET, FILM COATED ORAL at 09:10

## 2025-04-19 RX ADMIN — BUDESONIDE 0.5 MG: 0.5 INHALANT RESPIRATORY (INHALATION) at 20:42

## 2025-04-19 RX ADMIN — HYDROCODONE BITARTRATE AND ACETAMINOPHEN 1 TABLET: 7.5; 325 TABLET ORAL at 14:28

## 2025-04-19 RX ADMIN — IPRATROPIUM BROMIDE AND ALBUTEROL SULFATE 3 ML: .5; 3 SOLUTION RESPIRATORY (INHALATION) at 15:54

## 2025-04-19 RX ADMIN — HYDROCODONE BITARTRATE AND ACETAMINOPHEN 1 TABLET: 7.5; 325 TABLET ORAL at 00:49

## 2025-04-19 RX ADMIN — BUDESONIDE 0.5 MG: 0.5 INHALANT RESPIRATORY (INHALATION) at 09:45

## 2025-04-19 RX ADMIN — METOPROLOL TARTRATE 25 MG: 25 TABLET, FILM COATED ORAL at 09:10

## 2025-04-19 RX ADMIN — IPRATROPIUM BROMIDE AND ALBUTEROL SULFATE 3 ML: .5; 3 SOLUTION RESPIRATORY (INHALATION) at 09:45

## 2025-04-19 RX ADMIN — ATORVASTATIN CALCIUM 40 MG: 20 TABLET, FILM COATED ORAL at 20:07

## 2025-04-19 RX ADMIN — CEFAZOLIN 2000 MG: 2 INJECTION, POWDER, FOR SOLUTION INTRAMUSCULAR; INTRAVENOUS at 06:30

## 2025-04-19 RX ADMIN — HYDROCODONE BITARTRATE AND ACETAMINOPHEN 1 TABLET: 7.5; 325 TABLET ORAL at 23:35

## 2025-04-19 RX ADMIN — REVEFENACIN 175 MCG: 175 SOLUTION RESPIRATORY (INHALATION) at 09:46

## 2025-04-19 RX ADMIN — IPRATROPIUM BROMIDE AND ALBUTEROL SULFATE 3 ML: .5; 3 SOLUTION RESPIRATORY (INHALATION) at 00:11

## 2025-04-19 RX ADMIN — ARFORMOTEROL TARTRATE 15 MCG: 15 SOLUTION RESPIRATORY (INHALATION) at 20:45

## 2025-04-19 RX ADMIN — METOPROLOL TARTRATE 25 MG: 25 TABLET, FILM COATED ORAL at 20:08

## 2025-04-19 RX ADMIN — HYDROMORPHONE HYDROCHLORIDE 1 MG: 1 INJECTION, SOLUTION INTRAMUSCULAR; INTRAVENOUS; SUBCUTANEOUS at 20:08

## 2025-04-19 RX ADMIN — INSULIN HUMAN 2 UNITS: 100 INJECTION, SOLUTION PARENTERAL at 12:09

## 2025-04-19 RX ADMIN — INSULIN HUMAN 2 UNITS: 100 INJECTION, SOLUTION PARENTERAL at 16:27

## 2025-04-19 NOTE — PLAN OF CARE
Goal Outcome Evaluation:  Plan of Care Reviewed With: patient           Outcome Evaluation: VSS overnight. Hgb 6.5 this am, 1 unit PRBC ordered, awaiting type and screen. IV ABX contined. Pain managed with Dilaudid and hydrocodone. LLE surgical dressing with moderate drainage, pulses dopplerable. Purewick catheter in place. Will contine to monitor and await further orders.

## 2025-04-19 NOTE — PROGRESS NOTES
CHIEF COMPLAINT:  LV thrombus with multifocal emboli, anemia    HISTORY OF PRESENT ILLNESS:   This is a 65-year-old lady with multiple medical comorbidities undergoing treatment for in LV thrombus with embolization and cardiogenic shock.  Imaging has shown occlusion of the left superficial femoral artery, distal left renal artery, distal splenic artery, splenic infarcts, left renal infarcts, occlusion of the ONEIDA with reconstitution distally.  On 4/12/2025 she underwent a mechanical embolectomy and 4 compartment fasciotomy left lower extremity.    On 4/18/2025 the patient underwent debridement of skin soft tissue and muscle of the left fasciotomy site.      Patient complains of pain at the fasciotomy site after wound dressing change.  Patient states tolerating regular diet no worsening abdominal pain nausea or vomiting.      Past Medical History, Past Surgical History, Social History, Family History have been reviewed and are without significant changes except as mentioned.    Review of Systems   A comprehensive 14 point review of systems was performed and was negative except as mentioned.    Medications:  The current medication list was reviewed in the EMR    ALLERGIES:    Allergies   Allergen Reactions    Tramadol Itching     High severity per pt       Objective      Vitals:    04/19/25 0953   BP:    Pulse: 78   Resp: 18   Temp:    SpO2: 100%          Physical Exam    CONSTITUTIONAL: pleasant well-developed woman lying in bed a bit uncomfortable looking  CV: RRR, S1S2, no murmur  RESP: cta bilat, no wheezing, no rales  GI: soft no rebound or guarding, no splenomegaly, +BS  NEURO: alert and oriented x3, mild to moderate global weakness  PSYCH: normal mood and mild anxious affect  Skin: Surgical wounds left lower extremity with dressed wounds and clean/dry    RECENT LABS:  Hematology Results from last 7 days   Lab Units 04/19/25  0401 04/18/25  0520 04/17/25  0435   WBC 10*3/mm3 28.23* 27.78* 26.91*   HEMOGLOBIN g/dL  6.5* 7.1* 7.9*   HEMATOCRIT % 20.7* 22.3* 24.3*   PLATELETS 10*3/mm3 415 409 399     2  Lab Results   Component Value Date    GLUCOSE 178 (H) 04/19/2025    BUN 6 (L) 04/19/2025    CREATININE 0.63 04/19/2025    EGFRIFNONA 96 11/04/2021    BCR 9.5 04/19/2025    CO2 26.7 04/19/2025    CALCIUM 7.4 (L) 04/19/2025    ALBUMIN 2.4 (L) 04/19/2025    AST 52 (H) 04/16/2025    ALT 11 04/16/2025           Lab Results   Component Value Date    [id number] 344 08/08/2024          CT abdomen/pelvis 4/16/2025-  FINDINGS:  1. Most of the colonic dilatation has resolved, but there is mild  persistent dilatation of the proximal transverse colon. The proximal  transverse colon appears mildly patulous and has a slightly thickened  wall. There is less dilatation of a long segment of proximal small  bowel, but there is a long segment of distal ileum which appears  slightly more dilated than previously, image 107. The distal/terminal  ileum is normal in caliber, stable. There is a slightly less thickened  appearance of some of the small bowel loops with less thickening of the  folds. There is no bowel pneumatosis.     There is a slightly larger small volume of free fluid, but there is also  new third spacing of fluid within the body wall. There are no fluid  collections. No free air.     2. Evolving splenic infarcts appear unchanged. Evolving left renal  infarcts appear slightly less defined.     3. There is no new abnormality at the liver, pancreas, adrenals, or  right kidney. Urinary bladder is significantly distended. Consider a  Storm catheter.     4. There are postsurgical changes at the left groin. Slightly narrowed,  but the left common femoral artery is patent as is the visualized left  superficial femoral artery, series 3 image 132. There is a very small  amount of fluid at the left groin, but there is no discrete fluid  collection or hematoma.     5. There is no pneumonia or pleural effusions at the visualized lung  bases.          Assessment & Plan   *LV thrombus with multiple sites of emboli-currently on unfractionated heparin  *Left lower extremity embolectomy/fasciotomies 4/12/25 4/18/2025 the patient underwent debridement of skin soft tissue and muscle of the left fasciotomy site.   *Bilateral pulmonary nodules  *Acute blood loss anemia-hemoglobin slow decline 6.5  *Leukocytosis with neutrophilia  *Coronary artery disease, peripheral artery disease on aspirin/Plavix  *COPD    Hematology plan/recommendations:  Agree with transfusion 1 unit PRBCs today  Plan to switch to an oral NOAC once cleared by vascular  CBC with differential AM              4/19/2025      CC:

## 2025-04-19 NOTE — PROGRESS NOTES
Name: Desiree Garcia ADMIT: 4/10/2025   : 1960  PCP: Sandra Kaba MD    MRN: [id number] LOS: 9 days   AGE/SEX: 65 y.o. female  ROOM: [facility]     Subjective   Subjective   Patient is seen at bedside, no new complaints.       Objective   Objective   Vital Signs  Temp:  [97.2 °F (36.2 °C)-98.5 °F (36.9 °C)] 98.3 °F (36.8 °C)  Heart Rate:  [69-87] 69  Resp:  [16-18] 18  BP: (110-136)/(53-77) 123/66  SpO2:  [96 %-100 %] 100 %  on   ;   Device (Oxygen Therapy): room air  Body mass index is 28.16 kg/m².  Physical Exam  General, awake and alert.  Head and ENT, normocephalic and atraumatic.  Lungs, symmetric expansion, equal air entry bilaterally.  Heart, regular rate and rhythm.  Abdomen, soft and nontender.  Extremities, no clubbing or cyanosis.  Neuro, no focal deficits.  Skin: Warm and no rash.  Psych, normal mood and affect.  Musculoskeletal, joint examination is grossly normal.        Results Review     I reviewed the patient's new clinical results.  Results from last 7 days   Lab Units 25  04025   WBC 10*3/mm3 28.23* 27.78* 26.91* 22.45*   HEMOGLOBIN g/dL 6.5* 7.1* 7.9* 8.5*   PLATELETS 10*3/mm3 415 409 399 346     Results from last 7 days   Lab Units 25  04025  0520 25  0435 25  1746 25  043   SODIUM mmol/L 138 140 138  --  139   POTASSIUM mmol/L 4.5 3.4* 4.1 3.9 3.4*   CHLORIDE mmol/L 103 105 102  --  104   CO2 mmol/L 26.7 27.4 28.0  --  24.2   BUN mg/dL 6* 6* 7*  --  10   CREATININE mg/dL 0.63 0.69 0.64  --  0.69   GLUCOSE mg/dL 178* 143* 113*  --  109*   EGFR mL/min/1.73 99.2 97.1 98.8  --  97.1     Results from last 7 days   Lab Units 25  0401 25  0520 25  0435 25  043   ALBUMIN g/dL 2.4* 2.4* 2.8* 2.4*   BILIRUBIN mg/dL  --   --   --  0.6   ALK PHOS U/L  --   --   --  139*   AST (SGOT) U/L  --   --   --  52*   ALT (SGPT) U/L  --   --   --  11     Results from last 7 days   Lab Units  04/19/25  0401 04/18/25  0520 04/17/25  0435 04/16/25  0435   CALCIUM mg/dL 7.4* 7.8* 8.2* 8.1*   ALBUMIN g/dL 2.4* 2.4* 2.8* 2.4*   MAGNESIUM mg/dL 2.2 1.8 1.9 2.0   PHOSPHORUS mg/dL 2.5 2.6 2.7 3.0       Glucose   Date/Time Value Ref Range Status   04/19/2025 1552 193 (H) 70 - 130 mg/dL Final   04/19/2025 1109 177 (H) 70 - 130 mg/dL Final   04/19/2025 0614 192 (H) 70 - 130 mg/dL Final   04/19/2025 0010 229 (H) 70 - 130 mg/dL Final   04/18/2025 1631 268 (H) 70 - 130 mg/dL Final   04/18/2025 1118 200 (H) 70 - 130 mg/dL Final   04/18/2025 0949 198 (H) 70 - 130 mg/dL Final       No radiology results for the last day    I have personally reviewed all medications:  Scheduled Medications  arformoterol, 15 mcg, Nebulization, BID - RT  aspirin, 81 mg, Oral, Daily   Or  aspirin, 300 mg, Rectal, Daily  atorvastatin, 40 mg, Oral, Nightly  budesonide, 0.5 mg, Nebulization, BID - RT  ceFAZolin, 2,000 mg, Intravenous, Q8H  clopidogrel, 75 mg, Oral, Daily  insulin regular, 2-7 Units, Subcutaneous, Q6H  ipratropium-albuterol, 3 mL, Nebulization, Q8H - RT  metoprolol tartrate, 25 mg, Oral, Q12H  pantoprazole, 40 mg, Oral, Q AM  revefenacin, 175 mcg, Nebulization, Daily - RT  senna-docusate sodium, 2 tablet, Oral, BID    Infusions  heparin, 12 Units/kg/hr, Last Rate: 14 Units/kg/hr (04/19/25 1420)    Diet  Diet: Regular/House; Fluid Consistency: Thin (IDDSI 0)    I have personally reviewed:  [x]  Laboratory   [x]  Microbiology   [x]  Radiology   [x]  EKG/Telemetry  [x]  Cardiology/Vascular   []  Pathology    []  Records       Assessment/Plan     Active Hospital Problems    Diagnosis  POA   • 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 [I74.3]  Unknown   • Acute thrombus of left ventricle [I24.0]  Yes   • Altered bowel habits [R19.4]  Yes   • Leg paresthesia [R20.2]  Yes      Resolved Hospital Problems    Diagnosis Date Resolved POA   • **Cardiogenic shock [R57.0] 04/15/2025 Yes       65 y.o. female admitted with Cardiogenic  shock.    Assessment and plan  1.  Leukocytosis, ischemic enteritis and colitis, follow WBC trend, ID on board, follow management recommendations.     2.  Cardiogenic shock, resolved, patient stable from ICU standpoint.     3.  Status post open thrombectomy and angiogram, due to critical ischemia of left lower extremity due to embolism.  Status post compartment fasciotomy.     4.  Postop anemia, surgical bleeding and hematoma, monitor hemoglobin trend.  Transfuse PRBCs today.     5.  CODE STATUS is full code.  Further plans based on hospital course.     Expected Discharge Date: 4/23/2025; Expected Discharge Time:       James Castillo MD  [facility]  04/19/25  16:20 EDT

## 2025-04-19 NOTE — SIGNIFICANT NOTE
04/19/25 1309   OTHER   Discipline physical therapist   Rehab Time/Intention   Session Not Performed other (see comments)  (per chart review, Hgb is 6.5 and below theraputic threshold. PT will f/u tomorrow)   Therapy Assessment/Plan (PT)   Criteria for Skilled Interventions Met (PT) yes   Recommendation   PT - Next Appointment 04/20/25

## 2025-04-19 NOTE — PROGRESS NOTES
[facility]   Surgical Progress Note    Patient Name: Desiree Garcia  : 1960  MRN: [id number]  Date of admission: 4/10/2025  Surgical Procedures Since Admission:  Procedure(s):  EMBOLECTOMY MECHANICAL, FOUR COMPARTMENT FASCIOTOMY  ARTERIOGRAM LOWER EXTREMITY  Surgeon:  Misael Lawton MD  Status:  7 Days Post-Op  -------------------    Procedure(s):  LOWER EXTREMITY DEBRIDEMENT  Surgeon:  Misael Lawton MD  Status:  1 Day Post-Op  -------------------    Subjective   Subjective     Chief Complaint: Left leg ischemia follow-up.    History of Present Illness   Patient states she had a bowel movement yesterday.  Main complaint is that of left leg pain.    Review of Systems    Objective   Objective     Vitals:   Temp:  [97.2 °F (36.2 °C)-98.5 °F (36.9 °C)] 98 °F (36.7 °C)  Heart Rate:  [72-87] 77  Resp:  [16-18] 18  BP: (110-119)/(55-70) 112/55  Flow (L/min) (Oxygen Therapy):  [3] 3  Output by Drain (mL) 25 0701 - 25 1900 25 1901 - 25 0700 25 0701 - 25 1108 Range Total   External Urinary Catheter 600 900  1500       Physical Exam  Constitutional:       Appearance: She is well-developed.   Pulmonary:      Effort: Pulmonary effort is normal. No respiratory distress.   Abdominal:      General: There is no distension.      Palpations: Abdomen is soft.   Neurological:      Mental Status: She is alert and oriented to person, place, and time.           Result Review    Result Review:  I have personally reviewed the results from the time of this admission to 2025 11:08 EDT and agree with these findings:  [x]  Laboratory list / accordion  []  Microbiology  []  Radiology  []  EKG/Telemetry   []  Cardiology/Vascular   []  Pathology  []  Old records  []  Other:  Most notable findings include:       Results from last 7 days   Lab Units 25  0401 25  0520 25  0435 25  0435 04/15/25  0614 04/15/25  0018 25  1852   WBC 10*3/mm3 28.23* 27.78* 26.91*  22.45* 18.20* 17.46* 16.22*   HEMOGLOBIN g/dL 6.5* 7.1* 7.9* 8.5* 8.6* 8.2* 8.7*   PLATELETS 10*3/mm3 415 409 399 346 325 317 312     Results from last 7 days   Lab Units 04/19/25  0401 04/18/25  0520 04/17/25  0435 04/16/25  1746 04/16/25  0435 04/15/25  2119 04/15/25  0614 04/14/25  0416 04/13/25  0514   SODIUM mmol/L 138 140 138  --  139  --  139 138 133*   POTASSIUM mmol/L 4.5 3.4* 4.1 3.9 3.4*  --  3.7 3.9 4.2   CHLORIDE mmol/L 103 105 102  --  104  --  105 105 104   CO2 mmol/L 26.7 27.4 28.0  --  24.2  --  25.0 22.1 18.0*   BUN mg/dL 6* 6* 7*  --  10  --  14 19 22   CREATININE mg/dL 0.63 0.69 0.64  --  0.69  --  0.76 0.87 1.12*   GLUCOSE mg/dL 178* 143* 113*  --  109*  --  128* 108* 102*   MAGNESIUM mg/dL 2.2 1.8 1.9  --  2.0  --  2.1 2.2 2.3   PHOSPHORUS mg/dL 2.5 2.6 2.7  --  3.0 2.8 2.2* 2.3* 3.0   Estimated Creatinine Clearance: 95.7 mL/min (by C-G formula based on SCr of 0.63 mg/dL).    Lab Results   Component Value Date    HGBA1C 9.40 (H) 04/09/2025    HGBA1C 9.30 (H) 03/13/2025    HGBA1C 9.40 (H) 12/23/2024     Glucose   Date/Time Value Ref Range Status   04/19/2025 0614 192 (H) 70 - 130 mg/dL Final   04/19/2025 0010 229 (H) 70 - 130 mg/dL Final   04/18/2025 1631 268 (H) 70 - 130 mg/dL Final   04/18/2025 1118 200 (H) 70 - 130 mg/dL Final   04/18/2025 0949 198 (H) 70 - 130 mg/dL Final   04/18/2025 0531 157 (H) 70 - 130 mg/dL Final   04/18/2025 0055 240 (H) 70 - 130 mg/dL Final   04/17/2025 1612 176 (H) 70 - 130 mg/dL Final       Assessment & Plan   Assessment / Plan     Brief Patient Summary:  Desiree Garcia is a 65 y.o. female who multiple medical issues.  Vascular is following for ischemia of the left lower extremity.    Active Hospital Problems:   Active Hospital Problems    Diagnosis    • 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    • Acute thrombus of left ventricle    • Altered bowel habits    • Leg paresthesia      Plan:   4/12/2025: Open left leg thrombectomy with 4 compartment  fasciotomies.  ([facility])  4/18/2025: Debridement of left leg fasciotomies ([facility])    4/19/2025: Postop day #1 from fasciotomy debridement.  Dressings down today.  Minimal oozing noted muscles appear viable as pictured below.  There is some ischemic changes to the skin on the anterior margin of the lateral fasciotomies will continue to monitor.  I redressed the wounds with the assistance of the nurse with dilute Betadine soaked gauze.  Will plan on leaving intact upon my evaluation for tomorrow.  She has a strong dorsalis pedis artery signal.  She has weak dorsiflexion of her left foot.        VTE Prophylaxis:  Pharmacologic & mechanical VTE prophylaxis orders are present.        Raimundo Lin MD

## 2025-04-19 NOTE — PROGRESS NOTES
"General Surgery Progress Note    Chief complaint:  ischemic enteritis/colitis, ileus     Interval history:  Patient denies abdominal pain, nausea, or fever. She is tolerating regular diet.  Had a bowel movement yesterday. States her left leg is hurting a lot.      Vital signs:  /63 (BP Location: Left arm, Patient Position: Lying)   Pulse 73   Temp 97.9 °F (36.6 °C) (Oral)   Resp 18   Ht 167.6 cm (65.98\")   Wt 79.1 kg (174 lb 6.1 oz)   SpO2 100%   BMI 28.16 kg/m²     Intake/output:  Intake & Output (last day)         04/18 0701  04/19 0700 04/19 0701 04/20 0700    P.O. 600     I.V. (mL/kg) 1364.1 (17.2)     IV Piggyback 200     Total Intake(mL/kg) 2164.1 (27.4)     Urine (mL/kg/hr) 1500 (0.8)     Stool 0     Total Output 1500     Net +664.1           Urine Unmeasured Occurrence 2 x     Stool Unmeasured Occurrence 1 x             Physical exam:  Resting in bed, changes positions for exam easily without assistance  MSK: LLE gauze dressing in place with betadine strikethrough  NEURO: awake, alert, no gross focal deficits, moves all extremities  PSYCH: interactive, cooperative  PULM: unlabored respirations on nasal cannula  CV: Regular rate   GI: abdomen soft, less distended, nontender, no guarding     Diagnostics:  Results from last 7 days   Lab Units 04/19/25  0401 04/18/25  0520 04/17/25  0435   WBC 10*3/mm3 28.23* 27.78* 26.91*   HEMOGLOBIN g/dL 6.5* 7.1* 7.9*   HEMATOCRIT % 20.7* 22.3* 24.3*   PLATELETS 10*3/mm3 415 409 399   MONOCYTES % % 1.0* 4.0* 2.0*   EOSINOPHIL % % 2.0 2.0 2.0     Results from last 7 days   Lab Units 04/19/25  0401 04/18/25  0520 04/17/25  0435 04/16/25  1746 04/16/25  0435   SODIUM mmol/L 138 140 138  --  139   POTASSIUM mmol/L 4.5 3.4* 4.1   < > 3.4*   CHLORIDE mmol/L 103 105 102  --  104   CO2 mmol/L 26.7 27.4 28.0  --  24.2   BUN mg/dL 6* 6* 7*  --  10   CREATININE mg/dL 0.63 0.69 0.64  --  0.69   CALCIUM mg/dL 7.4* 7.8* 8.2*  --  8.1*   BILIRUBIN mg/dL  --   --   --   --  " 0.6   ALK PHOS U/L  --   --   --   --  139*   ALT (SGPT) U/L  --   --   --   --  11   AST (SGOT) U/L  --   --   --   --  52*   GLUCOSE mg/dL 178* 143* 113*  --  109*    < > = values in this interval not displayed.     Results from last 7 days   Lab Units 04/19/25  0401 04/18/25  0520 04/17/25  1015   APTT seconds 76.1* 75.3* 85.2*     Magnesium 2.2  4/19 Body fluid culture x2 no growth, rare WBCs seen, no organisms seen      Assessment/Plan:  65-year-old lady with tobacco use, COPD, and severe peripheral vascular disease who developed left ventricular thrombus and showering of emboli left kidney, spleen, suspected to bowel.  This admission she has undergone emergent heart cath with PCI, left lower extremity thromboembolectomy, revascularization, and fasciotomy.  She also underwent extension of her fasciotomy with hematoma evacuation of her LLE with debridement and control of multiple bleeding sites.      She was noted to have possible ischemic enteritis and colitis and developed an ileus.  She underwent NGT decompression and serial abdominal exams. Her ileus resolved and NGT was removed she was started on clear liquids.  Repeat CT abd/pelvis demonstrated interval improvement of her bowel dilation without findings concerning for worsening ischemia.  She is now tolerating a regular diet and continues to have bowel movements, without abdominal pain or tenderness on exam. WBC is elevated at 28, suspect due to stress response as she is afebrile and wound cultures are negative, and ID is following.    Overall appears to be resolving from her previous bowel ischemia. Splenic infarcts stable. Continue regular diet.       Yulisa Parisi MD  General, Robotic and Endoscopic Surgery  [facility]    [address], [address]  [place], [place], [address]  P: [phone]  F: [phone]

## 2025-04-19 NOTE — PLAN OF CARE
Pt alert and oriented times 4. VSS. 1 unit of PRBC given today. Wound care done by MD Lin today, dressing clean dry and intact. Prn pain medication given as needed. No changes in neurovascular checks noted.

## 2025-04-20 LAB
ALBUMIN SERPL-MCNC: 2.7 G/DL (ref 3.5–5.2)
ANION GAP SERPL CALCULATED.3IONS-SCNC: 8 MMOL/L (ref 5–15)
APTT PPP: 51.6 SECONDS (ref 22.7–35.4)
APTT PPP: 57.5 SECONDS (ref 22.7–35.4)
APTT PPP: 63.6 SECONDS (ref 22.7–35.4)
APTT PPP: 75.5 SECONDS (ref 22.7–35.4)
BH BB BLOOD EXPIRATION DATE: NORMAL
BH BB BLOOD TYPE BARCODE: 6200
BH BB DISPENSE STATUS: NORMAL
BH BB PRODUCT CODE: NORMAL
BH BB UNIT NUMBER: NORMAL
BUN SERPL-MCNC: 8 MG/DL (ref 8–23)
BUN/CREAT SERPL: 11 (ref 7–25)
CALCIUM SPEC-SCNC: 7.9 MG/DL (ref 8.6–10.5)
CHLORIDE SERPL-SCNC: 102 MMOL/L (ref 98–107)
CO2 SERPL-SCNC: 29 MMOL/L (ref 22–29)
CREAT SERPL-MCNC: 0.73 MG/DL (ref 0.57–1)
CROSSMATCH INTERPRETATION: NORMAL
DEPRECATED RDW RBC AUTO: 47.1 FL (ref 37–54)
EGFRCR SERPLBLD CKD-EPI 2021: 92 ML/MIN/1.73
EOSINOPHIL # BLD MANUAL: 0.46 10*3/MM3 (ref 0–0.4)
EOSINOPHIL NFR BLD MANUAL: 2 % (ref 0.3–6.2)
ERYTHROCYTE [DISTWIDTH] IN BLOOD BY AUTOMATED COUNT: 15.2 % (ref 12.3–15.4)
GLUCOSE BLDC GLUCOMTR-MCNC: 144 MG/DL (ref 70–130)
GLUCOSE BLDC GLUCOMTR-MCNC: 180 MG/DL (ref 70–130)
GLUCOSE BLDC GLUCOMTR-MCNC: 181 MG/DL (ref 70–130)
GLUCOSE BLDC GLUCOMTR-MCNC: 184 MG/DL (ref 70–130)
GLUCOSE BLDC GLUCOMTR-MCNC: 198 MG/DL (ref 70–130)
GLUCOSE SERPL-MCNC: 132 MG/DL (ref 65–99)
HCT VFR BLD AUTO: 24.3 % (ref 34–46.6)
HGB BLD-MCNC: 7.9 G/DL (ref 12–15.9)
LYMPHOCYTES # BLD MANUAL: 2.51 10*3/MM3 (ref 0.7–3.1)
LYMPHOCYTES NFR BLD MANUAL: 6 % (ref 5–12)
MAGNESIUM SERPL-MCNC: 2 MG/DL (ref 1.6–2.4)
MCH RBC QN AUTO: 27.9 PG (ref 26.6–33)
MCHC RBC AUTO-ENTMCNC: 32.5 G/DL (ref 31.5–35.7)
MCV RBC AUTO: 85.9 FL (ref 79–97)
METAMYELOCYTES NFR BLD MANUAL: 3 % (ref 0–0)
MONOCYTES # BLD: 1.37 10*3/MM3 (ref 0.1–0.9)
MYELOCYTES NFR BLD MANUAL: 3 % (ref 0–0)
NEUTROPHILS # BLD AUTO: 17.14 10*3/MM3 (ref 1.7–7)
NEUTROPHILS NFR BLD MANUAL: 75 % (ref 42.7–76)
NRBC BLD AUTO-RTO: 1.4 /100 WBC (ref 0–0.2)
PHOSPHATE SERPL-MCNC: 2.9 MG/DL (ref 2.5–4.5)
PLAT MORPH BLD: NORMAL
PLATELET # BLD AUTO: 446 10*3/MM3 (ref 140–450)
PMV BLD AUTO: 9.7 FL (ref 6–12)
POTASSIUM SERPL-SCNC: 4.3 MMOL/L (ref 3.5–5.2)
RBC # BLD AUTO: 2.83 10*6/MM3 (ref 3.77–5.28)
RBC MORPH BLD: NORMAL
SODIUM SERPL-SCNC: 139 MMOL/L (ref 136–145)
UNIT  ABO: NORMAL
UNIT  RH: NORMAL
VARIANT LYMPHS NFR BLD MANUAL: 11 % (ref 19.6–45.3)
WBC MORPH BLD: NORMAL
WBC NRBC COR # BLD AUTO: 22.85 10*3/MM3 (ref 3.4–10.8)

## 2025-04-20 PROCEDURE — 85025 COMPLETE CBC W/AUTO DIFF WBC: CPT | Performed by: SURGERY

## 2025-04-20 PROCEDURE — 25010000002 HYDROMORPHONE 1 MG/ML SOLUTION: Performed by: SURGERY

## 2025-04-20 PROCEDURE — 83735 ASSAY OF MAGNESIUM: CPT | Performed by: SURGERY

## 2025-04-20 PROCEDURE — 85730 THROMBOPLASTIN TIME PARTIAL: CPT | Performed by: SURGERY

## 2025-04-20 PROCEDURE — 25010000002 CEFAZOLIN PER 500 MG: Performed by: INTERNAL MEDICINE

## 2025-04-20 PROCEDURE — 99024 POSTOP FOLLOW-UP VISIT: CPT | Performed by: SURGERY

## 2025-04-20 PROCEDURE — 63710000001 REVEFENACIN 175 MCG/3ML SOLUTION: Performed by: SURGERY

## 2025-04-20 PROCEDURE — 94664 DEMO&/EVAL PT USE INHALER: CPT

## 2025-04-20 PROCEDURE — 85730 THROMBOPLASTIN TIME PARTIAL: CPT | Performed by: INTERNAL MEDICINE

## 2025-04-20 PROCEDURE — 94799 UNLISTED PULMONARY SVC/PX: CPT

## 2025-04-20 PROCEDURE — 25010000002 HEPARIN (PORCINE) 25000-0.45 UT/250ML-% SOLUTION: Performed by: SURGERY

## 2025-04-20 PROCEDURE — 63710000001 INSULIN REGULAR HUMAN PER 5 UNITS: Performed by: SURGERY

## 2025-04-20 PROCEDURE — 85007 BL SMEAR W/DIFF WBC COUNT: CPT | Performed by: SURGERY

## 2025-04-20 PROCEDURE — 99231 SBSQ HOSP IP/OBS SF/LOW 25: CPT | Performed by: INTERNAL MEDICINE

## 2025-04-20 PROCEDURE — 82948 REAGENT STRIP/BLOOD GLUCOSE: CPT

## 2025-04-20 PROCEDURE — 99231 SBSQ HOSP IP/OBS SF/LOW 25: CPT | Performed by: STUDENT IN AN ORGANIZED HEALTH CARE EDUCATION/TRAINING PROGRAM

## 2025-04-20 PROCEDURE — 94761 N-INVAS EAR/PLS OXIMETRY MLT: CPT

## 2025-04-20 PROCEDURE — 80069 RENAL FUNCTION PANEL: CPT | Performed by: SURGERY

## 2025-04-20 PROCEDURE — 97530 THERAPEUTIC ACTIVITIES: CPT

## 2025-04-20 RX ORDER — SODIUM HYPOCHLORITE 1.25 MG/ML
SOLUTION TOPICAL DAILY
Status: DISCONTINUED | OUTPATIENT
Start: 2025-04-20 | End: 2025-04-26

## 2025-04-20 RX ADMIN — DAKIN'S SOLUTION 0.125% (QUARTER STRENGTH): 0.12 SOLUTION at 14:31

## 2025-04-20 RX ADMIN — CLOPIDOGREL BISULFATE 75 MG: 75 TABLET, FILM COATED ORAL at 09:54

## 2025-04-20 RX ADMIN — HEPARIN SODIUM 18 UNITS/KG/HR: 10000 INJECTION, SOLUTION INTRAVENOUS at 13:57

## 2025-04-20 RX ADMIN — BUDESONIDE 0.5 MG: 0.5 INHALANT RESPIRATORY (INHALATION) at 08:49

## 2025-04-20 RX ADMIN — ARFORMOTEROL TARTRATE 15 MCG: 15 SOLUTION RESPIRATORY (INHALATION) at 20:51

## 2025-04-20 RX ADMIN — ARFORMOTEROL TARTRATE 15 MCG: 15 SOLUTION RESPIRATORY (INHALATION) at 08:49

## 2025-04-20 RX ADMIN — METOPROLOL TARTRATE 25 MG: 25 TABLET, FILM COATED ORAL at 20:39

## 2025-04-20 RX ADMIN — REVEFENACIN 175 MCG: 175 SOLUTION RESPIRATORY (INHALATION) at 08:49

## 2025-04-20 RX ADMIN — METOPROLOL TARTRATE 25 MG: 25 TABLET, FILM COATED ORAL at 09:54

## 2025-04-20 RX ADMIN — BUDESONIDE 0.5 MG: 0.5 INHALANT RESPIRATORY (INHALATION) at 20:49

## 2025-04-20 RX ADMIN — CEFAZOLIN 2000 MG: 2 INJECTION, POWDER, FOR SOLUTION INTRAMUSCULAR; INTRAVENOUS at 23:36

## 2025-04-20 RX ADMIN — ACETAMINOPHEN 650 MG: 325 TABLET, FILM COATED ORAL at 15:30

## 2025-04-20 RX ADMIN — HYDROCODONE BITARTRATE AND ACETAMINOPHEN 1 TABLET: 7.5; 325 TABLET ORAL at 20:40

## 2025-04-20 RX ADMIN — CEFAZOLIN 2000 MG: 2 INJECTION, POWDER, FOR SOLUTION INTRAMUSCULAR; INTRAVENOUS at 06:43

## 2025-04-20 RX ADMIN — INSULIN HUMAN 2 UNITS: 100 INJECTION, SOLUTION PARENTERAL at 11:54

## 2025-04-20 RX ADMIN — CEFAZOLIN 2000 MG: 2 INJECTION, POWDER, FOR SOLUTION INTRAMUSCULAR; INTRAVENOUS at 14:31

## 2025-04-20 RX ADMIN — HYDROCODONE BITARTRATE AND ACETAMINOPHEN 1 TABLET: 7.5; 325 TABLET ORAL at 09:53

## 2025-04-20 RX ADMIN — HYDROMORPHONE HYDROCHLORIDE 1 MG: 1 INJECTION, SOLUTION INTRAMUSCULAR; INTRAVENOUS; SUBCUTANEOUS at 06:44

## 2025-04-20 RX ADMIN — ASPIRIN 81 MG CHEWABLE TABLET 81 MG: 81 TABLET CHEWABLE at 09:54

## 2025-04-20 RX ADMIN — HYDROMORPHONE HYDROCHLORIDE 1 MG: 1 INJECTION, SOLUTION INTRAMUSCULAR; INTRAVENOUS; SUBCUTANEOUS at 23:35

## 2025-04-20 RX ADMIN — INSULIN HUMAN 2 UNITS: 100 INJECTION, SOLUTION PARENTERAL at 01:32

## 2025-04-20 RX ADMIN — HYDROCODONE BITARTRATE AND ACETAMINOPHEN 1 TABLET: 7.5; 325 TABLET ORAL at 13:58

## 2025-04-20 RX ADMIN — HYDROMORPHONE HYDROCHLORIDE 1 MG: 1 INJECTION, SOLUTION INTRAMUSCULAR; INTRAVENOUS; SUBCUTANEOUS at 00:21

## 2025-04-20 RX ADMIN — PANTOPRAZOLE SODIUM 40 MG: 40 TABLET, DELAYED RELEASE ORAL at 06:43

## 2025-04-20 RX ADMIN — HYDROMORPHONE HYDROCHLORIDE 1 MG: 1 INJECTION, SOLUTION INTRAMUSCULAR; INTRAVENOUS; SUBCUTANEOUS at 03:36

## 2025-04-20 RX ADMIN — HYDROMORPHONE HYDROCHLORIDE 1 MG: 1 INJECTION, SOLUTION INTRAMUSCULAR; INTRAVENOUS; SUBCUTANEOUS at 17:06

## 2025-04-20 RX ADMIN — INSULIN HUMAN 2 UNITS: 100 INJECTION, SOLUTION PARENTERAL at 17:07

## 2025-04-20 RX ADMIN — ATORVASTATIN CALCIUM 40 MG: 20 TABLET, FILM COATED ORAL at 20:39

## 2025-04-20 NOTE — PLAN OF CARE
Goal Outcome Evaluation:  Plan of Care Reviewed With: patient        Progress: no change  Outcome Evaluation: VSS, 2L O2 NC while asleep after pain med administration. IV Abx Q8H. LLE dressing CDI. Pain managed with medications per order. Plan of care ongoing.

## 2025-04-20 NOTE — PLAN OF CARE
Goal Outcome Evaluation:  Plan of Care Reviewed With: patient           Outcome Evaluation: Upon entering room, pt. supine in bed, awake/alert, and agreeable to work with P.T. this date despite c/o LLE pain (8/10).  This AM, pt. requires Min. assist x 1 for bed mobility and Max-Dep. assist x 2 for sit <-> stand transfers. Pt. was able to clear her bottom off of the bed but could not stand fully upright due to inability to bear weight on her LLE.  Verbal/tactile cues given throughout for posture correction.  LLE ther. ex. program completed for general strengthening and encouraged pt. to continue ther. ex. on her own for strengthening and stretching.  Will continue to progress functional mobility as tolerated.    Anticipated Discharge Disposition (PT): skilled nursing facility, home with 24/7 care, home with home health (All pending pt. progress)

## 2025-04-20 NOTE — THERAPY TREATMENT NOTE
Patient Name: Desiree Garcia  : 1960    MRN: [id number]                              Today's Date: 2025       Admit Date: 4/10/2025    Visit Dx:     ICD-10-CM ICD-9-CM   1. 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  I74.3 444.22     Patient Active Problem List   Diagnosis    Type 2 diabetes mellitus, without long-term current use of insulin    COPD (chronic obstructive pulmonary disease)    Depression with anxiety    Esophageal reflux    Forgetfulness    Leg paresthesia    Lumbar spinal stenosis    Migraines    Night sweat    Sciatica    Dyspnea on exertion    Thoracic or lumbosacral neuritis or radiculitis    Left wrist pain    Sprain of left wrist    Arthritis of knee, left    Contusion of left knee    Nondisplaced fracture of trapezium bone of left wrist with routine healing    Chronic left shoulder pain    Coronary artery disease involving native heart without angina pectoris    Hypertension, essential    Mixed hyperlipidemia    Cigarette nicotine dependence with nicotine-induced disorder    Altered bowel habits    Diarrhea    Rectal bleeding    Abdominal bloating    Allergic rhinitis due to food    Elevated troponin    Acute diastolic CHF (congestive heart failure)    History of ST elevation myocardial infarction (STEMI)    STEMI (ST elevation myocardial infarction)    Acute thrombus of left ventricle    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     Past Medical History:   Diagnosis Date    Arthritis     Cervical cancer screening     COPD (chronic obstructive pulmonary disease)     Coronary artery disease involving native heart without angina pectoris 2021    primary angioplasty and stent placement to mid right coronary artery with good angiographic results on 2021 after STEMI    Depression with anxiety     Diabetes mellitus     Forgetfulness     Gastric reflux     Hypertension     Leg paresthesia 2017    Right    Limb swelling     Lumbago 2017    Low  back pain    Lumbar spinal stenosis 2017    L4/5 moderate to severe central canal stenosis    Lumbosacral radiculopathy 2017    Right    Migraines     Night sweat     Reflux esophagitis     Shortness of breath     ST elevation myocardial infarction (STEMI) (CMS/[facility]) 2021    Stomach disorder      Past Surgical History:   Procedure Laterality Date    ABDOMINAL SURGERY      3 C-SECTIONS    ARTERIOGRAM LOWER EXTREMITY Left 2025    Procedure: ARTERIOGRAM LOWER EXTREMITY;  Surgeon: Misael Lawton MD;  Location: [facility] OR;  Service: Vascular;  Laterality: Left;    CARDIAC CATHETERIZATION      CARDIAC CATHETERIZATION N/A 2021    Procedure: Left Heart Cath;  Surgeon: Sidney Xiao MD;  Location: [facility] CATH INVASIVE LOCATION;  Service: Cardiology;  Laterality: N/A;    CARDIAC CATHETERIZATION N/A 2025    Procedure: Left Heart Cath;  Surgeon: James Verdugo MD;  Location: [facility] CATH INVASIVE LOCATION;  Service: Cardiology;  Laterality: N/A;     SECTION      x 3    CHOLECYSTECTOMY      COLONOSCOPY      COLONOSCOPY N/A 3/25/2025    Procedure: COLONOSCOPY WITH BIOPSIES AND COLD AND HOT SNARE POLYPECTOMIES;  Surgeon: Heber Najera MD;  Location: [facility] ENDOSCOPY;  Service: Gastroenterology;  Laterality: N/A;  COLON POLYPS    CORONARY STENT PLACEMENT      EMBOLECTOMY Left 2025    Procedure: EMBOLECTOMY MECHANICAL, FOUR COMPARTMENT FASCIOTOMY;  Surgeon: Misael Lawton MD;  Location: [facility] OR;  Service: Vascular;  Laterality: Left;    ENDOSCOPY      2007    FOOT SURGERY Right     HAND SURGERY      HYSTERECTOMY  1985      General Information       Row Name 25 1120          Physical Therapy Time and Intention    Document Type therapy note (daily note)  Pt. is s/p Left LE debridement  -MS     Mode of Treatment physical therapy;individual therapy  -MS       Row Name 25 1120          General Information    Patient Profile Reviewed yes   -MS     Prior Level of Function independent:  -MS     Existing Precautions/Restrictions fall   Exit alarm  -MS     Barriers to Rehab none identified  -MS       Row Name 04/20/25 1120          Cognition    Orientation Status (Cognition) oriented x 3  -MS               User Key  (r) = Recorded By, (t) = Taken By, (c) = Cosigned By      Initials Name Provider Type    Marina Ruizaaliyah JORDAN PT Physical Therapist                   Mobility       Row Name 04/20/25 1121          Bed Mobility    Supine-Sit Grady (Bed Mobility) minimum assist (75% patient effort)  -MS     Sit-Supine Grady (Bed Mobility) minimum assist (75% patient effort)  -MS       Row Name 04/20/25 1121          Sit-Stand Transfer    Sit-Stand Grady (Transfers) maximum assist (25% patient effort);dependent (less than 25% patient effort);2 person assist  -MS     Assistive Device (Sit-Stand Transfers) --  HHA x 2  -MS     Comment, (Sit-Stand Transfer) Pt. able to clear her bottom off of the bed but was unable to stand fully upright due to inability to bear weight on her LLE due to increased pain.   -MS               User Key  (r) = Recorded By, (t) = Taken By, (c) = Cosigned By      Initials Name Provider Type    MS Cerda Misael JORDAN PT Physical Therapist                   Obj/Interventions       Row Name 04/20/25 1122          Motor Skills    Therapeutic Exercise --  LLE (AROM) ther. ex. program x 5-10 reps completed (Ankle pumps, Heel Slides, SLR's);  Encouraged pt. to continue ther. ex. on her own for strengthening and stretching. Ankle DF/PF limited due to swelling/pain.  -MS               User Key  (r) = Recorded By, (t) = Taken By, (c) = Cosigned By      Initials Name Provider Type    MS Cerda Misael JORDAN PT Physical Therapist                   Goals/Plan       Row Name 04/20/25 1123          Bed Mobility Goal 1 (PT)    Activity/Assistive Device (Bed Mobility Goal 1, PT) bed mobility activities, all  -MS     Grady  Level/Cues Needed (Bed Mobility Goal 1, PT) standby assist  -MS     Time Frame (Bed Mobility Goal 1, PT) long term goal (LTG);1 week  -MS       Row Name 04/20/25 1123          Transfer Goal 1 (PT)    Activity/Assistive Device (Transfer Goal 1, PT) transfers, all;walker, rolling  -MS     Carver Level/Cues Needed (Transfer Goal 1, PT) minimum assist (75% or more patient effort)  -MS     Time Frame (Transfer Goal 1, PT) long term goal (LTG);1 week  -MS       Row Name 04/20/25 1123          Gait Training Goal 1 (PT)    Activity/Assistive Device (Gait Training Goal 1, PT) gait (walking locomotion);walker, rolling  -MS     Carver Level (Gait Training Goal 1, PT) minimum assist (75% or more patient effort)  -MS     Distance (Gait Training Goal 1, PT) 30 feet  -MS     Time Frame (Gait Training Goal 1, PT) long term goal (LTG);1 week  -MS       Row Name 04/20/25 1123          Therapy Assessment/Plan (PT)    Planned Therapy Interventions (PT) balance training;bed mobility training;gait training;home exercise program;patient/family education;postural re-education;transfer training;strengthening;ROM (range of motion)  -MS               User Key  (r) = Recorded By, (t) = Taken By, (c) = Cosigned By      Initials Name Provider Type    Misael Ruiz, PT Physical Therapist                   Clinical Impression       Row Name 04/20/25 1123          Pain    Pretreatment Pain Rating 8/10  -MS     Posttreatment Pain Rating 8/10  -MS     Pain Location ankle;calf;foot  -MS     Pain Side/Orientation left  -MS     Pain Management Interventions positioning techniques utilized;premedicated for activity;nursing notified  -MS       Row Name 04/20/25 1123          Plan of Care Review    Plan of Care Reviewed With patient  -MS       Row Name 04/20/25 1123          Therapy Assessment/Plan (PT)    Criteria for Skilled Interventions Met (PT) skilled treatment is necessary  -MS     Therapy Frequency (PT) 6 times/wk  -MS       Row  Name 04/20/25 1123          Positioning and Restraints    Pre-Treatment Position in bed  -MS     Post Treatment Position bed  -MS     In Bed notified nsg;supine;call light within reach;encouraged to call for assist;exit alarm on;LLE elevated  All lines intact.  -MS               User Key  (r) = Recorded By, (t) = Taken By, (c) = Cosigned By      Initials Name Provider Type    MS CerdaMisael, PT Physical Therapist                   Outcome Measures       Row Name 04/20/25 1124          How much help from another person do you currently need...    Turning from your back to your side while in flat bed without using bedrails? 3  -MS     Moving from lying on back to sitting on the side of a flat bed without bedrails? 3  -MS     Moving to and from a bed to a chair (including a wheelchair)? 2  -MS     Standing up from a chair using your arms (e.g., wheelchair, bedside chair)? 2  -MS     Climbing 3-5 steps with a railing? 1  -MS     To walk in hospital room? 2  -MS     AM-PAC 6 Clicks Score (PT) 13  -MS     Highest Level of Mobility Goal 4 --> Transfer to chair/commode  -MS       Row Name 04/20/25 1124          Functional Assessment    Outcome Measure Options AM-PAC 6 Clicks Basic Mobility (PT)  -MS               User Key  (r) = Recorded By, (t) = Taken By, (c) = Cosigned By      Initials Name Provider Type    MS Cerda Misael JORDAN, PT Physical Therapist                                 Physical Therapy Education       Title: PT OT SLP Therapies (In Progress)       Topic: Physical Therapy (Done)       Point: Mobility training (Done)       Learning Progress Summary            Patient Acceptance, E,D, VU,NR by MS at 4/20/2025 1124    Acceptance, E, NR by MR at 4/19/2025 1647    Acceptance, E,D, NR by TIFF at 4/17/2025 1809    Acceptance, E, NR by MARISSA at 4/15/2025 1324                      Point: Home exercise program (Done)       Learning Progress Summary            Patient Acceptance, E,D, VU,NR by MS at 4/20/2025 1124     Acceptance, E, NR by MR at 4/19/2025 1647    Acceptance, E,D, NR by TIFF at 4/17/2025 1809                      Point: Body mechanics (Done)       Learning Progress Summary            Patient Acceptance, E,D, VU,NR by MS at 4/20/2025 1124    Acceptance, E, NR by MR at 4/19/2025 1647    Acceptance, E,D, NR by TIFF at 4/17/2025 1809    Acceptance, E, NR by MARISSA at 4/15/2025 1324                      Point: Precautions (Done)       Learning Progress Summary            Patient Acceptance, E,D, VU,NR by MS at 4/20/2025 1124    Acceptance, E, NR by MR at 4/19/2025 1647    Acceptance, E,D, NR by TIFF at 4/17/2025 1809    Acceptance, E, NR by MARISSA at 4/15/2025 1324                                      User Key       Initials Effective Dates Name Provider Type Discipline     03/07/18 -  Laura Gooden, PTA Physical Therapist Assistant PT    MS 06/16/21 -  Misael Cerda, PT Physical Therapist PT     10/25/19 -  Meka Araiza, PT Physical Therapist PT    MR 08/03/23 -  Mary Jane Kelly, RN Registered Nurse Nurse                  PT Recommendation and Plan  Planned Therapy Interventions (PT): balance training, bed mobility training, gait training, home exercise program, patient/family education, postural re-education, transfer training, strengthening, ROM (range of motion)  Outcome Evaluation: Upon entering room, pt. supine in bed, awake/alert, and agreeable to work with P.T. this date despite c/o LLE pain (8/10).  This AM, pt. requires Min. assist x 1 for bed mobility and Max-Dep. assist x 2 for sit <-> stand transfers. Pt. was able to clear her bottom off of the bed but could not stand fully upright due to inability to bear weight on her LLE.  Verbal/tactile cues given throughout for posture correction.  LLE ther. ex. program completed for general strengthening and encouraged pt. to continue ther. ex. on her own for strengthening and stretching.  Will continue to progress functional mobility as tolerated.     Time Calculation:          PT Charges       Row Name 04/20/25 1128             Time Calculation    Start Time 0930  -MS      Stop Time 0945  -MS      Time Calculation (min) 15 min  -MS      PT Received On 04/20/25  -MS      PT - Next Appointment 04/21/25  -MS      PT Goal Re-Cert Due Date 04/27/25  -MS         Time Calculation- PT    Total Timed Code Minutes- PT 14 minute(s)  -MS                User Key  (r) = Recorded By, (t) = Taken By, (c) = Cosigned By      Initials Name Provider Type    Misael Ruiz, PT Physical Therapist                  Therapy Charges for Today       Code Description Service Date Service Provider Modifiers Qty    10808300628 HC PT THERAPEUTIC ACT EA 15 MIN 4/20/2025 Misael Cerda, PT GP 1    51892714234 HC PT THER SUPP EA 15 MIN 4/20/2025 Misael Cerda, PT GP 1            PT G-Codes  Outcome Measure Options: AM-PAC 6 Clicks Basic Mobility (PT)  AM-PAC 6 Clicks Score (PT): 13  AM-PAC 6 Clicks Score (OT): 14  PT Discharge Summary  Anticipated Discharge Disposition (PT): home with 24/7 care, home with home health, skilled nursing facility (All pending pt. progress)    Misael Cerda, PT  4/20/2025

## 2025-04-20 NOTE — PROGRESS NOTES
Name: Desiree Garcia ADMIT: 4/10/2025   : 1960  PCP: Sandra Kaba MD    MRN: [id number] LOS: 10 days   AGE/SEX: 65 y.o. female  ROOM: [facility]     Subjective   Subjective   Patient is seen at bedside, pain is controlled, lying comfortably in bed.       Objective   Objective   Vital Signs  Temp:  [97.9 °F (36.6 °C)-100.4 °F (38 °C)] 100.4 °F (38 °C)  Heart Rate:  [76-86] 85  Resp:  [18] 18  BP: (109-129)/(55-82) 114/55  SpO2:  [85 %-100 %] 96 %  on  Flow (L/min) (Oxygen Therapy):  [2] 2;   Device (Oxygen Therapy): room air  Body mass index is 28.16 kg/m².  Physical Exam  General, awake and alert.  Head and ENT, normocephalic and atraumatic.  Lungs, symmetric expansion, equal air entry bilaterally.  Heart, regular rate and rhythm.  Abdomen, soft and nontender.  Extremities, no clubbing or cyanosis.  Neuro, no focal deficits.  Skin: Warm and no rash.  Psych, normal mood and affect.  Musculoskeletal, joint examination is grossly normal.      Results Review     I reviewed the patient's new clinical results.  Results from last 7 days   Lab Units 25  0551 25  04025  0525  0435   WBC 10*3/mm3 22.85* 28.23* 27.78* 26.91*   HEMOGLOBIN g/dL 7.9* 6.5* 7.1* 7.9*   PLATELETS 10*3/mm3 446 415 409 399     Results from last 7 days   Lab Units 25  0551 25  0401 25  0520 25  0435   SODIUM mmol/L 139 138 140 138   POTASSIUM mmol/L 4.3 4.5 3.4* 4.1   CHLORIDE mmol/L 102 103 105 102   CO2 mmol/L 29.0 26.7 27.4 28.0   BUN mg/dL 8 6* 6* 7*   CREATININE mg/dL 0.73 0.63 0.69 0.64   GLUCOSE mg/dL 132* 178* 143* 113*   EGFR mL/min/1.73 92.0 99.2 97.1 98.8     Results from last 7 days   Lab Units 25  0551 25  0401 25  0520 25  0435 25  043   ALBUMIN g/dL 2.7* 2.4* 2.4* 2.8* 2.4*   BILIRUBIN mg/dL  --   --   --   --  0.6   ALK PHOS U/L  --   --   --   --  139*   AST (SGOT) U/L  --   --   --   --  52*   ALT (SGPT) U/L  --   --   --   --  11      Results from last 7 days   Lab Units 04/20/25  0551 04/19/25  0401 04/18/25  0520 04/17/25  0435   CALCIUM mg/dL 7.9* 7.4* 7.8* 8.2*   ALBUMIN g/dL 2.7* 2.4* 2.4* 2.8*   MAGNESIUM mg/dL 2.0 2.2 1.8 1.9   PHOSPHORUS mg/dL 2.9 2.5 2.6 2.7       Glucose   Date/Time Value Ref Range Status   04/20/2025 1147 198 (H) 70 - 130 mg/dL Final   04/20/2025 0613 144 (H) 70 - 130 mg/dL Final   04/20/2025 0122 184 (H) 70 - 130 mg/dL Final   04/19/2025 2136 133 (H) 70 - 130 mg/dL Final   04/19/2025 1552 193 (H) 70 - 130 mg/dL Final   04/19/2025 1109 177 (H) 70 - 130 mg/dL Final   04/19/2025 0614 192 (H) 70 - 130 mg/dL Final       No radiology results for the last day    I have personally reviewed all medications:  Scheduled Medications  arformoterol, 15 mcg, Nebulization, BID - RT  aspirin, 81 mg, Oral, Daily   Or  aspirin, 300 mg, Rectal, Daily  atorvastatin, 40 mg, Oral, Nightly  budesonide, 0.5 mg, Nebulization, BID - RT  ceFAZolin, 2,000 mg, Intravenous, Q8H  clopidogrel, 75 mg, Oral, Daily  insulin regular, 2-7 Units, Subcutaneous, Q6H  metoprolol tartrate, 25 mg, Oral, Q12H  pantoprazole, 40 mg, Oral, Q AM  revefenacin, 175 mcg, Nebulization, Daily - RT  senna-docusate sodium, 2 tablet, Oral, BID  sodium hypochlorite, , Topical, Daily    Infusions  heparin, 12 Units/kg/hr, Last Rate: 18 Units/kg/hr (04/20/25 1357)    Diet  Diet: Regular/House; Fluid Consistency: Thin (IDDSI 0)    I have personally reviewed:  [x]  Laboratory   [x]  Microbiology   [x]  Radiology   [x]  EKG/Telemetry  [x]  Cardiology/Vascular   []  Pathology    []  Records       Assessment/Plan     Active Hospital Problems    Diagnosis  POA   • 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 [I74.3]  Unknown   • Acute thrombus of left ventricle [I24.0]  Yes   • Altered bowel habits [R19.4]  Yes   • Leg paresthesia [R20.2]  Yes      Resolved Hospital Problems    Diagnosis Date Resolved POA   • **Cardiogenic shock [R57.0] 04/15/2025 Yes       65 y.o. female  admitted with Cardiogenic shock.    Assessment and plan  1.  Leukocytosis, ischemic enteritis and colitis, follow WBC trend, ID on board, follow management recommendations.     2.  Cardiogenic shock, resolved, patient stable from ICU standpoint.     3.  Status post open thrombectomy and angiogram, due to critical ischemia of left lower extremity due to embolism.  Status post compartment fasciotomy.  Vascular surgery on board and following.  Patient expected to have more surgeries going forward into next week.     4.  Postop anemia, surgical bleeding and hematoma, monitor hemoglobin trend.  Transfused PRBCs.     5.  CODE STATUS is full code.  Further plans based on hospital course.  Expected Discharge Date: 4/23/2025; Expected Discharge Time:       James Castillo MD  [facility]  04/20/25  16:30 EDT

## 2025-04-20 NOTE — PROGRESS NOTES
[facility]   Surgical Progress Note    Patient Name: Desiree Garcia  : 1960  MRN: [id number]  Date of admission: 4/10/2025  Surgical Procedures Since Admission:  Procedure(s):  EMBOLECTOMY MECHANICAL, FOUR COMPARTMENT FASCIOTOMY  ARTERIOGRAM LOWER EXTREMITY  Surgeon:  Misael Lawton MD  Status:  8 Days Post-Op  -------------------    Procedure(s):  LOWER EXTREMITY DEBRIDEMENT  Surgeon:  Misael Lawton MD  Status:  2 Days Post-Op  -------------------    Subjective   Subjective     Chief Complaint: Left leg ischemia follow-up.    History of Present Illness   No chest pain shortness of breath.    Review of Systems    Objective   Objective     Vitals:   Temp:  [98 °F (36.7 °C)-98.7 °F (37.1 °C)] 98.2 °F (36.8 °C)  Heart Rate:  [69-86] 76  Resp:  [18] 18  BP: (109-136)/(53-82) 129/57  Flow (L/min) (Oxygen Therapy):  [2] 2  Output by Drain (mL) 25 0701 - 25 1900 25 1901 - 25 0700 25 0701 - 25 1032 Range Total   External Urinary Catheter           Physical Exam  Constitutional:       Appearance: She is well-developed.   Pulmonary:      Effort: Pulmonary effort is normal. No respiratory distress.   Abdominal:      General: There is no distension.      Palpations: Abdomen is soft.   Neurological:      Mental Status: She is alert and oriented to person, place, and time.           Result Review    Result Review:  I have personally reviewed the results from the time of this admission to 2025 10:32 EDT and agree with these findings:  [x]  Laboratory list / accordion  []  Microbiology  []  Radiology  []  EKG/Telemetry   []  Cardiology/Vascular   []  Pathology  []  Old records  []  Other:  Most notable findings include:       Results from last 7 days   Lab Units 25  0551 25  0401 25  0520 25  0435 25  0435 04/15/25  0614 04/15/25  0018   WBC 10*3/mm3 22.85* 28.23* 27.78* 26.91* 22.45* 18.20* 17.46*   HEMOGLOBIN g/dL 7.9* 6.5* 7.1* 7.9*  8.5* 8.6* 8.2*   PLATELETS 10*3/mm3 446 415 409 399 346 325 317     Results from last 7 days   Lab Units 04/20/25  0551 04/19/25  0401 04/18/25  0520 04/17/25  0435 04/16/25  1746 04/16/25  0435 04/15/25  2119 04/15/25  0614 04/14/25  0416   SODIUM mmol/L 139 138 140 138  --  139  --  139 138   POTASSIUM mmol/L 4.3 4.5 3.4* 4.1 3.9 3.4*  --  3.7 3.9   CHLORIDE mmol/L 102 103 105 102  --  104  --  105 105   CO2 mmol/L 29.0 26.7 27.4 28.0  --  24.2  --  25.0 22.1   BUN mg/dL 8 6* 6* 7*  --  10  --  14 19   CREATININE mg/dL 0.73 0.63 0.69 0.64  --  0.69  --  0.76 0.87   GLUCOSE mg/dL 132* 178* 143* 113*  --  109*  --  128* 108*   MAGNESIUM mg/dL 2.0 2.2 1.8 1.9  --  2.0  --  2.1 2.2   PHOSPHORUS mg/dL 2.9 2.5 2.6 2.7  --  3.0 2.8 2.2* 2.3*   Estimated Creatinine Clearance: 82.6 mL/min (by C-G formula based on SCr of 0.73 mg/dL).    Lab Results   Component Value Date    HGBA1C 9.40 (H) 04/09/2025    HGBA1C 9.30 (H) 03/13/2025    HGBA1C 9.40 (H) 12/23/2024     Glucose   Date/Time Value Ref Range Status   04/20/2025 0613 144 (H) 70 - 130 mg/dL Final   04/20/2025 0122 184 (H) 70 - 130 mg/dL Final   04/19/2025 2136 133 (H) 70 - 130 mg/dL Final   04/19/2025 1552 193 (H) 70 - 130 mg/dL Final   04/19/2025 1109 177 (H) 70 - 130 mg/dL Final   04/19/2025 0614 192 (H) 70 - 130 mg/dL Final   04/19/2025 0010 229 (H) 70 - 130 mg/dL Final   04/18/2025 1631 268 (H) 70 - 130 mg/dL Final       Assessment & Plan   Assessment / Plan     Brief Patient Summary:  Desiree Garcia is a 65 y.o. female who multiple medical issues.  Vascular is following for ischemia of the left lower extremity.    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  (MTJ)  4/18/2025: Debridement of left leg fasciotomies  (MTJ)    4/19/2025: Postop day #1 from fasciotomy debridement.  Dressings down today.  Minimal oozing noted muscles appear viable as pictured below.  There is some ischemic changes to the skin on the anterior margin of the lateral fasciotomies will continue to monitor.  I redressed the wounds with the assistance of the nurse with dilute Betadine soaked gauze.  Will plan on leaving intact upon my evaluation for tomorrow.  She has a strong dorsalis pedis artery signal.  She has weak dorsiflexion of her left foot.      4/20/2025: Postop day 2 fasciotomy debridement.  Will continue with once daily Dakin's dressing changes.  Will continue to lower the anterior skin on the lateral fasciotomy to demarcate.  This will likely require debridement sometime next week once further demarcation has occurred.    VTE Prophylaxis:  Pharmacologic & mechanical VTE prophylaxis orders are present.        Raimundo Lin MD

## 2025-04-20 NOTE — PROGRESS NOTES
CHIEF COMPLAINT:  LV thrombus with multifocal emboli, anemia    HISTORY OF PRESENT ILLNESS:   This is a 65-year-old lady with multiple medical comorbidities undergoing treatment for in LV thrombus with embolization and cardiogenic shock.  Imaging has shown occlusion of the left superficial femoral artery, distal left renal artery, distal splenic artery, splenic infarcts, left renal infarcts, occlusion of the ONEIDA with reconstitution distally.  On 4/12/2025 she underwent a mechanical embolectomy and 4 compartment fasciotomy left lower extremity.    On 4/18/2025 the patient underwent debridement of skin soft tissue and muscle of the left fasciotomy site.      On 4/20/2025 the white blood cell count is trending down 22.8, hemoglobin 7.9 after transfusion 1 unit PRBCs.  She remains on heparin drip aspirin and Plavix.      Past Medical History, Past Surgical History, Social History, Family History have been reviewed and are without significant changes except as mentioned.    Review of Systems   A comprehensive 14 point review of systems was performed and was negative except as mentioned.    Medications:  The current medication list was reviewed in the EMR    ALLERGIES:    Allergies   Allergen Reactions    Tramadol Itching     High severity per pt       Objective      Vitals:    04/20/25 0900   BP:    Pulse: 76   Resp:    Temp:    SpO2: 100%          Physical Exam    CONSTITUTIONAL: pleasant well-developed woman lying in bed in no distress  CV: RRR, S1S2, no murmur  RESP: cta bilat, no wheezing, no rales  GI: soft no rebound or guarding, no splenomegaly, +BS  NEURO: alert and oriented x3, mild to moderate global weakness  PSYCH: normal mood and mild anxious affect  Skin: Surgical wounds left lower extremity     RECENT LABS:  Hematology Results from last 7 days   Lab Units 04/20/25  0551 04/19/25  0401 04/18/25  0520   WBC 10*3/mm3 22.85* 28.23* 27.78*   HEMOGLOBIN g/dL 7.9* 6.5* 7.1*   HEMATOCRIT % 24.3* 20.7* 22.3*    PLATELETS 10*3/mm3 446 415 409     2  Lab Results   Component Value Date    GLUCOSE 132 (H) 04/20/2025    BUN 8 04/20/2025    CREATININE 0.73 04/20/2025    EGFRIFNONA 96 11/04/2021    BCR 11.0 04/20/2025    CO2 29.0 04/20/2025    CALCIUM 7.9 (L) 04/20/2025    ALBUMIN 2.7 (L) 04/20/2025    AST 52 (H) 04/16/2025    ALT 11 04/16/2025           Lab Results   Component Value Date    YEWUONTY35 344 08/08/2024          CT abdomen/pelvis 4/16/2025-  FINDINGS:  1. Most of the colonic dilatation has resolved, but there is mild  persistent dilatation of the proximal transverse colon. The proximal  transverse colon appears mildly patulous and has a slightly thickened  wall. There is less dilatation of a long segment of proximal small  bowel, but there is a long segment of distal ileum which appears  slightly more dilated than previously, image 107. The distal/terminal  ileum is normal in caliber, stable. There is a slightly less thickened  appearance of some of the small bowel loops with less thickening of the  folds. There is no bowel pneumatosis.     There is a slightly larger small volume of free fluid, but there is also  new third spacing of fluid within the body wall. There are no fluid  collections. No free air.     2. Evolving splenic infarcts appear unchanged. Evolving left renal  infarcts appear slightly less defined.     3. There is no new abnormality at the liver, pancreas, adrenals, or  right kidney. Urinary bladder is significantly distended. Consider a  Storm catheter.     4. There are postsurgical changes at the left groin. Slightly narrowed,  but the left common femoral artery is patent as is the visualized left  superficial femoral artery, series 3 image 132. There is a very small  amount of fluid at the left groin, but there is no discrete fluid  collection or hematoma.     5. There is no pneumonia or pleural effusions at the visualized lung  bases.         Assessment & Plan   *LV thrombus with multiple  sites of emboli-currently on unfractionated heparin-PTT 63.6  *Left lower extremity embolectomy/fasciotomies 4/12/25 4/18/2025 the patient underwent debridement of skin soft tissue and muscle of the left fasciotomy site.   *Bilateral pulmonary nodules  *Acute blood loss anemia-hemoglobin slow decline 6.5 transfused 1 unit PRBCs on 4/19/2025  Hemoglobin appropriately responded 7.9  *Leukocytosis with neutrophilia, reactive and trending down  *Coronary artery disease, peripheral artery disease on aspirin/Plavix  *COPD    Hematology plan/recommendations:  Plan to switch to an oral NOAC once cleared by vascular; I do not think she will require loading dose if Eliquis used since she has been on UFH more than 1 week.  She will require indefinite anticoagulation  Monitor CBC-transfuse PRBC if hemoglobin less than 7.0 or increasing symptoms  Antiplatelets per vascular/cardiology-cardiology indicates okay for Plavix without aspirin once on DOAC  Nothing further to add from hematology; please call as needed.              4/20/2025      CC:

## 2025-04-21 LAB
ALBUMIN SERPL-MCNC: 2.4 G/DL (ref 3.5–5.2)
ANION GAP SERPL CALCULATED.3IONS-SCNC: 7.1 MMOL/L (ref 5–15)
APTT PPP: 79.5 SECONDS (ref 22.7–35.4)
BACTERIA FLD CULT: NORMAL
BACTERIA FLD CULT: NORMAL
BASOPHILS # BLD MANUAL: 0 10*3/MM3 (ref 0–0.2)
BASOPHILS NFR BLD MANUAL: 0 % (ref 0–1.5)
BUN SERPL-MCNC: 6 MG/DL (ref 8–23)
BUN/CREAT SERPL: 9.5 (ref 7–25)
CALCIUM SPEC-SCNC: 7.8 MG/DL (ref 8.6–10.5)
CHLORIDE SERPL-SCNC: 97 MMOL/L (ref 98–107)
CO2 SERPL-SCNC: 28.9 MMOL/L (ref 22–29)
CREAT SERPL-MCNC: 0.63 MG/DL (ref 0.57–1)
DEPRECATED RDW RBC AUTO: 45.7 FL (ref 37–54)
EGFRCR SERPLBLD CKD-EPI 2021: 99.2 ML/MIN/1.73
EOSINOPHIL # BLD MANUAL: 0 10*3/MM3 (ref 0–0.4)
EOSINOPHIL NFR BLD MANUAL: 0 % (ref 0.3–6.2)
ERYTHROCYTE [DISTWIDTH] IN BLOOD BY AUTOMATED COUNT: 15.2 % (ref 12.3–15.4)
GLUCOSE BLDC GLUCOMTR-MCNC: 155 MG/DL (ref 70–130)
GLUCOSE BLDC GLUCOMTR-MCNC: 192 MG/DL (ref 70–130)
GLUCOSE BLDC GLUCOMTR-MCNC: 208 MG/DL (ref 70–130)
GLUCOSE BLDC GLUCOMTR-MCNC: 211 MG/DL (ref 70–130)
GLUCOSE SERPL-MCNC: 145 MG/DL (ref 65–99)
GRAM STN SPEC: NORMAL
HCT VFR BLD AUTO: 24 % (ref 34–46.6)
HCT VFR BLD AUTO: 25.6 % (ref 34–46.6)
HGB BLD-MCNC: 7.4 G/DL (ref 12–15.9)
HGB BLD-MCNC: 8 G/DL (ref 12–15.9)
LYMPHOCYTES # BLD MANUAL: 3.57 10*3/MM3 (ref 0.7–3.1)
LYMPHOCYTES NFR BLD MANUAL: 3 % (ref 5–12)
MAGNESIUM SERPL-MCNC: 1.6 MG/DL (ref 1.6–2.4)
MCH RBC QN AUTO: 26.2 PG (ref 26.6–33)
MCHC RBC AUTO-ENTMCNC: 30.8 G/DL (ref 31.5–35.7)
MCV RBC AUTO: 85.1 FL (ref 79–97)
MONOCYTES # BLD: 0.76 10*3/MM3 (ref 0.1–0.9)
NEUTROPHILS # BLD AUTO: 20.96 10*3/MM3 (ref 1.7–7)
NEUTROPHILS NFR BLD MANUAL: 82.8 % (ref 42.7–76)
PHOSPHATE SERPL-MCNC: 3.2 MG/DL (ref 2.5–4.5)
PLAT MORPH BLD: NORMAL
PLATELET # BLD AUTO: 415 10*3/MM3 (ref 140–450)
PMV BLD AUTO: 9.6 FL (ref 6–12)
POTASSIUM SERPL-SCNC: 3.7 MMOL/L (ref 3.5–5.2)
RBC # BLD AUTO: 2.82 10*6/MM3 (ref 3.77–5.28)
RBC MORPH BLD: NORMAL
SODIUM SERPL-SCNC: 133 MMOL/L (ref 136–145)
VARIANT LYMPHS NFR BLD MANUAL: 14.1 % (ref 19.6–45.3)
WBC MORPH BLD: NORMAL
WBC NRBC COR # BLD AUTO: 25.32 10*3/MM3 (ref 3.4–10.8)

## 2025-04-21 PROCEDURE — 94799 UNLISTED PULMONARY SVC/PX: CPT

## 2025-04-21 PROCEDURE — 25010000002 HEPARIN (PORCINE) 25000-0.45 UT/250ML-% SOLUTION: Performed by: SURGERY

## 2025-04-21 PROCEDURE — 25010000002 HYDROMORPHONE 1 MG/ML SOLUTION: Performed by: SURGERY

## 2025-04-21 PROCEDURE — 83735 ASSAY OF MAGNESIUM: CPT | Performed by: SURGERY

## 2025-04-21 PROCEDURE — 97530 THERAPEUTIC ACTIVITIES: CPT

## 2025-04-21 PROCEDURE — 85007 BL SMEAR W/DIFF WBC COUNT: CPT | Performed by: SURGERY

## 2025-04-21 PROCEDURE — 99232 SBSQ HOSP IP/OBS MODERATE 35: CPT | Performed by: STUDENT IN AN ORGANIZED HEALTH CARE EDUCATION/TRAINING PROGRAM

## 2025-04-21 PROCEDURE — 99024 POSTOP FOLLOW-UP VISIT: CPT | Performed by: SURGERY

## 2025-04-21 PROCEDURE — 80069 RENAL FUNCTION PANEL: CPT | Performed by: SURGERY

## 2025-04-21 PROCEDURE — 63710000001 REVEFENACIN 175 MCG/3ML SOLUTION: Performed by: SURGERY

## 2025-04-21 PROCEDURE — 82948 REAGENT STRIP/BLOOD GLUCOSE: CPT

## 2025-04-21 PROCEDURE — 85014 HEMATOCRIT: CPT | Performed by: STUDENT IN AN ORGANIZED HEALTH CARE EDUCATION/TRAINING PROGRAM

## 2025-04-21 PROCEDURE — 25010000002 CEFAZOLIN PER 500 MG: Performed by: INTERNAL MEDICINE

## 2025-04-21 PROCEDURE — G0545 PR INHERENT VISIT TO INPT: HCPCS | Performed by: INTERNAL MEDICINE

## 2025-04-21 PROCEDURE — 99232 SBSQ HOSP IP/OBS MODERATE 35: CPT | Performed by: INTERNAL MEDICINE

## 2025-04-21 PROCEDURE — 63710000001 INSULIN REGULAR HUMAN PER 5 UNITS: Performed by: SURGERY

## 2025-04-21 PROCEDURE — 85730 THROMBOPLASTIN TIME PARTIAL: CPT | Performed by: SURGERY

## 2025-04-21 PROCEDURE — 85025 COMPLETE CBC W/AUTO DIFF WBC: CPT | Performed by: SURGERY

## 2025-04-21 PROCEDURE — 85018 HEMOGLOBIN: CPT | Performed by: STUDENT IN AN ORGANIZED HEALTH CARE EDUCATION/TRAINING PROGRAM

## 2025-04-21 PROCEDURE — 94664 DEMO&/EVAL PT USE INHALER: CPT

## 2025-04-21 PROCEDURE — 94761 N-INVAS EAR/PLS OXIMETRY MLT: CPT

## 2025-04-21 RX ADMIN — HYDROMORPHONE HYDROCHLORIDE 1 MG: 1 INJECTION, SOLUTION INTRAMUSCULAR; INTRAVENOUS; SUBCUTANEOUS at 17:46

## 2025-04-21 RX ADMIN — HYDROMORPHONE HYDROCHLORIDE 1 MG: 1 INJECTION, SOLUTION INTRAMUSCULAR; INTRAVENOUS; SUBCUTANEOUS at 04:01

## 2025-04-21 RX ADMIN — INSULIN HUMAN 2 UNITS: 100 INJECTION, SOLUTION PARENTERAL at 05:44

## 2025-04-21 RX ADMIN — INSULIN HUMAN 2 UNITS: 100 INJECTION, SOLUTION PARENTERAL at 12:02

## 2025-04-21 RX ADMIN — INSULIN HUMAN 3 UNITS: 100 INJECTION, SOLUTION PARENTERAL at 18:31

## 2025-04-21 RX ADMIN — BUDESONIDE 0.5 MG: 0.5 INHALANT RESPIRATORY (INHALATION) at 08:15

## 2025-04-21 RX ADMIN — INSULIN HUMAN 3 UNITS: 100 INJECTION, SOLUTION PARENTERAL at 00:23

## 2025-04-21 RX ADMIN — METOPROLOL TARTRATE 25 MG: 25 TABLET, FILM COATED ORAL at 21:29

## 2025-04-21 RX ADMIN — HYDROCODONE BITARTRATE AND ACETAMINOPHEN 1 TABLET: 7.5; 325 TABLET ORAL at 05:50

## 2025-04-21 RX ADMIN — HYDROMORPHONE HYDROCHLORIDE 1 MG: 1 INJECTION, SOLUTION INTRAMUSCULAR; INTRAVENOUS; SUBCUTANEOUS at 08:48

## 2025-04-21 RX ADMIN — HYDROCODONE BITARTRATE AND ACETAMINOPHEN 1 TABLET: 7.5; 325 TABLET ORAL at 12:01

## 2025-04-21 RX ADMIN — ARFORMOTEROL TARTRATE 15 MCG: 15 SOLUTION RESPIRATORY (INHALATION) at 08:15

## 2025-04-21 RX ADMIN — REVEFENACIN 175 MCG: 175 SOLUTION RESPIRATORY (INHALATION) at 08:15

## 2025-04-21 RX ADMIN — CEFAZOLIN 2000 MG: 2 INJECTION, POWDER, FOR SOLUTION INTRAMUSCULAR; INTRAVENOUS at 06:35

## 2025-04-21 RX ADMIN — ATORVASTATIN CALCIUM 40 MG: 20 TABLET, FILM COATED ORAL at 21:29

## 2025-04-21 RX ADMIN — CEFAZOLIN 2000 MG: 2 INJECTION, POWDER, FOR SOLUTION INTRAMUSCULAR; INTRAVENOUS at 14:05

## 2025-04-21 RX ADMIN — BUDESONIDE 0.5 MG: 0.5 INHALANT RESPIRATORY (INHALATION) at 20:53

## 2025-04-21 RX ADMIN — CEFAZOLIN 2000 MG: 2 INJECTION, POWDER, FOR SOLUTION INTRAMUSCULAR; INTRAVENOUS at 22:38

## 2025-04-21 RX ADMIN — HEPARIN SODIUM 18 UNITS/KG/HR: 10000 INJECTION, SOLUTION INTRAVENOUS at 08:50

## 2025-04-21 RX ADMIN — ASPIRIN 81 MG CHEWABLE TABLET 81 MG: 81 TABLET CHEWABLE at 08:44

## 2025-04-21 RX ADMIN — HYDROMORPHONE HYDROCHLORIDE 1 MG: 1 INJECTION, SOLUTION INTRAMUSCULAR; INTRAVENOUS; SUBCUTANEOUS at 22:41

## 2025-04-21 RX ADMIN — HYDROCODONE BITARTRATE AND ACETAMINOPHEN 1 TABLET: 7.5; 325 TABLET ORAL at 21:29

## 2025-04-21 RX ADMIN — PANTOPRAZOLE SODIUM 40 MG: 40 TABLET, DELAYED RELEASE ORAL at 05:44

## 2025-04-21 RX ADMIN — CLOPIDOGREL BISULFATE 75 MG: 75 TABLET, FILM COATED ORAL at 08:44

## 2025-04-21 RX ADMIN — METOPROLOL TARTRATE 25 MG: 25 TABLET, FILM COATED ORAL at 08:44

## 2025-04-21 RX ADMIN — DAKIN'S SOLUTION 0.125% (QUARTER STRENGTH): 0.12 SOLUTION at 14:13

## 2025-04-21 RX ADMIN — ARFORMOTEROL TARTRATE 15 MCG: 15 SOLUTION RESPIRATORY (INHALATION) at 20:53

## 2025-04-21 NOTE — NURSING NOTE
CWON note: Per Dr Lin note from 4/20, will continue Dakins dressings changes with possible debridement in the OR this week. WOCN team will be available prn for any needs.

## 2025-04-21 NOTE — PROGRESS NOTES
"    Patient Name: Desiree Garcia  :1960  65 y.o.      Patient Care Team:  Sandra Kaba MD as PCP - General (Family Medicine)  Marisol Nazario, RN as Ambulatory  ([facility]– [place])    Chief Complaint:   Inferior STEMI, LV thrombus, PAD, ischemic limb    Interval History:   No acute events overnight.  Feels ok.    Objective   Vital Signs  Temp:  [97.9 °F (36.6 °C)-100.4 °F (38 °C)] 98.4 °F (36.9 °C)  Heart Rate:  [77-93] 93  Resp:  [16-18] 16  BP: (109-122)/(55-71) 122/71    Intake/Output Summary (Last 24 hours) at 2025 1054  Last data filed at 2025 0444  Gross per 24 hour   Intake 480 ml   Output 900 ml   Net -420 ml     Flowsheet Rows      Flowsheet Row First Filed Value   Admission Height 167.6 cm (65.98\") Documented at 2025 1714   Admission Weight 71 kg (156 lb 8.4 oz) Documented at 04/10/2025 1700            GEN: no distress, alert and oriented  HEENT: NACT, EOMI, moist mucous membranes  Lungs: CTAB, no wheezes, rales or rhonchi  CV: normal rate, regular rhythm, normal S1, S2, no murmurs, +2 radial pulses b/l, no carotid bruit  Abdomen: soft, distended, mlid TTP in lower quadrants  Extremities: left leg with overlying dressing  Skin: no rash, warm, dry  Heme/Lymph: no bruising  Psych: organized thought, normal behavior and affect    Results Review:    Results from last 7 days   Lab Units 25  0640   SODIUM mmol/L 133*   POTASSIUM mmol/L 3.7   CHLORIDE mmol/L 97*   CO2 mmol/L 28.9   BUN mg/dL 6*   CREATININE mg/dL 0.63   GLUCOSE mg/dL 145*   CALCIUM mg/dL 7.8*           Results from last 7 days   Lab Units 25  0640   WBC 10*3/mm3 25.32*   HEMOGLOBIN g/dL 7.4*   HEMATOCRIT % 24.0*   PLATELETS 10*3/mm3 415     Results from last 7 days   Lab Units 25  0640 04/2025  1250   APTT seconds 79.5* 75.5* 51.6*     Results from last 7 days   Lab Units 25  0640   MAGNESIUM mg/dL 1.6                     Medication Review:   arformoterol, 15 " mcg, Nebulization, BID - RT  aspirin, 81 mg, Oral, Daily   Or  aspirin, 300 mg, Rectal, Daily  atorvastatin, 40 mg, Oral, Nightly  budesonide, 0.5 mg, Nebulization, BID - RT  ceFAZolin, 2,000 mg, Intravenous, Q8H  clopidogrel, 75 mg, Oral, Daily  insulin regular, 2-7 Units, Subcutaneous, Q6H  metoprolol tartrate, 25 mg, Oral, Q12H  pantoprazole, 40 mg, Oral, Q AM  revefenacin, 175 mcg, Nebulization, Daily - RT  senna-docusate sodium, 2 tablet, Oral, BID  sodium hypochlorite, , Topical, Daily         heparin, 12 Units/kg/hr, Last Rate: 18 Units/kg/hr (04/21/25 0850)        Assessment & Plan   #Inferior STEMI  #LV thrombus  #critical limb ischemia s/p open left iliofemoral profunda and SFA thrombectomy, fasciotomy, left common femoral endarterectomy   #Acute HFrEF  #Splenic infarct  #Left renal infarct  #Left SFA occlusion  #Ileus  #NIA     66 yo woman with CAD status post PCI to RCA in 2021 in the setting of a STEMI, hypertension, hyperlipidemia, diabetes, COPD who presented to [facility] with severe abdominal pain.  On arrival there, she was noted to have inferior STEMI.  CTA abdomen pelvis also revealed complete occlusion of the left SFA, left renal artery, splenic artery, occlusion of the ONEIDA with reconstitution distally, as well as findings concerning for splenic and renal infarcts.  She was taken for emergent coronary angiogram which revealed distal circumflex occlusion, mild to moderate proximal LAD disease and a 100% thrombotic occlusion of the RCA.  There was heavy thrombus burden in the RCA and she received PCI to this with mechanical thrombectomy and overlapping drug-eluting stents.  There was ABILIO I-II flow in the distal vessels post PCI.  Echocardiogram performed 4/10/2025 with an EF of about 45%.  There was a 1.4 cm mobile thrombus in the left ventricle.  She was started on heparin drip and vascular surgery was consulted given the CT findings and recommendation was to continue anticoagulation.   While admitted there, she had a run of NSVT and was started on amiodarone for this.  She was also hypotensive requiring Levophed and was also started on milrinone.  Patient was transferred here for further management. Pressors and inotropes were discontinued on arrival.  During admission, patient complained of severe abdominal pain.  Lactate was 1.  Repeat CT abdomen pelvis concerning for sigmoid colonic ischemia as well as long segment extensive small bowel enteritis and ileus.  Surgery was consulted. Over the weekend, had critical limb ischemia of the left leg and underwent thrombectomy and fasciotomy. She continues to have leukocytosis, repeat CT abdomen pelvis with mostly improving colonic dilatation.  Vascular surgery planning on OR on Wednesday for further debridement and possible wound VAC.    - continue aspirin 81 mg daily, clopidogrel 75 mg daily, atorvastatin 40 mg daily  - continue metoprolol tartrate 25 mg BID  - continue hep gtt, plan is eventual transition to DOAC with long term dual therapy(no aspirin)    No change in cardiac meds while inpatient. We will sign off at this time, please call back with any questions or concerns.      Santi Howard MD, FACC, [facility]  04/21/25  10:54 EDT

## 2025-04-21 NOTE — PLAN OF CARE
Goal Outcome Evaluation:  Plan of Care Reviewed With: patient        Progress: no change  Outcome Evaluation: Pt agreed to PT session, already had meds for session, did report 7/10 pain upon entry, but quickly went to 10/10, pt jonathan perf STS x2 w/rwx, cues to use UEs more to push down through rwx , pt impulsive, unsteady, pain limiting further activity, did require seated rest in between standing attempts, cues for posture, pt perf SLRs x5 ad roberto, just to stretch prior to EOB, then again when in bed , will need SNU vs home pending progress    Anticipated Discharge Disposition (PT): skilled nursing facility (pending progress)

## 2025-04-21 NOTE — PLAN OF CARE
Goal Outcome Evaluation:      Pt. Given PRN pain medication. IV antibiotics continued. Heparin gtt currently therapeutic, will reevaluate after AM ptt results.

## 2025-04-21 NOTE — PROGRESS NOTES
"General Surgery Progress Note    Chief complaint:  ischemic enteritis/colitis, ileus     Interval history:  Patient denies abdominal pain or nausea. She is tolerating a diet and having bowel function.    Vital signs:  /68 (BP Location: Left arm, Patient Position: Lying)   Pulse 83   Temp 98.7 °F (37.1 °C) (Oral)   Resp 18   Ht 167.6 cm (65.98\")   Wt 79.1 kg (174 lb 6.1 oz)   SpO2 94%   BMI 28.16 kg/m²     Intake/output:  Intake & Output (last day)         04/20 0701  04/21 0700    P.O.     Blood     Total Intake(mL/kg)     Net          Stool Unmeasured Occurrence 1 x            Physical exam:  NEURO: awake, alert, no gross focal deficits, moves all extremities  PSYCH: interactive, cooperative  PULM: unlabored respirations on nasal cannula  CV: Regular rate   GI: abdomen soft, mildly distended, nontender, no guarding     Diagnostics:  Results from last 7 days   Lab Units 04/20/25  0551 04/19/25  0401 04/18/25  0520   WBC 10*3/mm3 22.85* 28.23* 27.78*   HEMOGLOBIN g/dL 7.9* 6.5* 7.1*   HEMATOCRIT % 24.3* 20.7* 22.3*   PLATELETS 10*3/mm3 446 415 409   MONOCYTES % % 6.0 1.0* 4.0*   EOSINOPHIL % % 2.0 2.0 2.0     Results from last 7 days   Lab Units 04/20/25  0551 04/19/25  0401 04/18/25  0520 04/16/25  1746 04/16/25  0435   SODIUM mmol/L 139 138 140   < > 139   POTASSIUM mmol/L 4.3 4.5 3.4*   < > 3.4*   CHLORIDE mmol/L 102 103 105   < > 104   CO2 mmol/L 29.0 26.7 27.4   < > 24.2   BUN mg/dL 8 6* 6*   < > 10   CREATININE mg/dL 0.73 0.63 0.69   < > 0.69   CALCIUM mg/dL 7.9* 7.4* 7.8*   < > 8.1*   BILIRUBIN mg/dL  --   --   --   --  0.6   ALK PHOS U/L  --   --   --   --  139*   ALT (SGPT) U/L  --   --   --   --  11   AST (SGOT) U/L  --   --   --   --  52*   GLUCOSE mg/dL 132* 178* 143*   < > 109*    < > = values in this interval not displayed.     Results from last 7 days   Lab Units 04/20/25 2040 04/20/25  1250 04/20/25  0805   APTT seconds 75.5* 51.6* 63.6*     Assessment/Plan:  65-year-old lady with " tobacco use, COPD, and severe peripheral vascular disease who developed left ventricular thrombus and showering of emboli left kidney, spleen, suspected to bowel.  This admission she has undergone emergent heart cath with PCI, left lower extremity thromboembolectomy, revascularization, and fasciotomy.  She also underwent extension of her fasciotomy with hematoma evacuation of her LLE with debridement and control of multiple bleeding sites.      She was noted to have possible ischemic enteritis and colitis and developed an ileus.  She underwent NGT decompression and serial abdominal exams. Her ileus resolved and NGT was removed she was started on clear liquids.  Repeat CT abd/pelvis demonstrated interval improvement of her bowel dilation without findings concerning for worsening ischemia.  She is now tolerating a regular diet and continues to have bowel movements, without abdominal pain or tenderness on exam. WBC is downtrending.    She has no further general surgical intervention required at present.  I will sign off and be available as needed. Please call back for questions.     Yulisa Parisi MD  General, Robotic and Endoscopic Surgery  [facility]    [address], [address]  [place], [place], [address]  P: [phone]  F: [phone]

## 2025-04-21 NOTE — PROGRESS NOTES
Name: Desiree Garcia ADMIT: 4/10/2025   : 1960  PCP: Sandra Kaba MD    MRN: [id number] LOS: 11 days   AGE/SEX: 65 y.o. female  ROOM: [facility]     Subjective   Subjective   Patient seen and examined at bedside, she is weepy but without distress.  She is tolerating diet.  Planning for possible surgery on Wednesday.  No overt bleeding on heparin drip       Objective   Objective   Vital Signs  Temp:  [98 °F (36.7 °C)-100.4 °F (38 °C)] 98 °F (36.7 °C)  Heart Rate:  [77-93] 81  Resp:  [16-18] 16  BP: (109-122)/(55-79) 110/79  SpO2:  [92 %-97 %] 97 %  on   ;   Device (Oxygen Therapy): room air  Body mass index is 28.44 kg/m².  Physical Exam  Constitutional:       Appearance: Normal appearance. She is ill-appearing.   HENT:      Head: Normocephalic and atraumatic.      Nose: No congestion.      Mouth/Throat:      Pharynx: No oropharyngeal exudate.   Eyes:      General: No scleral icterus.  Cardiovascular:      Heart sounds: No murmur heard.     No friction rub. No gallop.   Pulmonary:      Effort: No respiratory distress.      Breath sounds: No wheezing, rhonchi or rales.      Comments: Stable on room air  Abdominal:      General: There is no distension.      Tenderness: There is no abdominal tenderness. There is no guarding.   Musculoskeletal:         General: No deformity or signs of injury.      Cervical back: No rigidity.      Comments: Left leg is bandaged   Skin:     Coloration: Skin is not jaundiced.      Findings: No bruising or lesion.   Neurological:      General: No focal deficit present.      Mental Status: She is alert and oriented to person, place, and time.      Motor: Weakness present.       Results Review     I reviewed the patient's new clinical results.  Results from last 7 days   Lab Units 25  0640 25  0551 25  0401 25  0520   WBC 10*3/mm3 25.32* 22.85* 28.23* 27.78*   HEMOGLOBIN g/dL 7.4* 7.9* 6.5* 7.1*   PLATELETS 10*3/mm3 415 446 415 409     Results from  last 7 days   Lab Units 04/21/25  0640 04/20/25  0551 04/19/25 0401 04/18/25  0520   SODIUM mmol/L 133* 139 138 140   POTASSIUM mmol/L 3.7 4.3 4.5 3.4*   CHLORIDE mmol/L 97* 102 103 105   CO2 mmol/L 28.9 29.0 26.7 27.4   BUN mg/dL 6* 8 6* 6*   CREATININE mg/dL 0.63 0.73 0.63 0.69   GLUCOSE mg/dL 145* 132* 178* 143*   EGFR mL/min/1.73 99.2 92.0 99.2 97.1     Results from last 7 days   Lab Units 04/21/25  0640 04/20/25  0551 04/19/25 0401 04/18/25  0520 04/17/25  0435 04/16/25 0435   ALBUMIN g/dL 2.4* 2.7* 2.4* 2.4*   < > 2.4*   BILIRUBIN mg/dL  --   --   --   --   --  0.6   ALK PHOS U/L  --   --   --   --   --  139*   AST (SGOT) U/L  --   --   --   --   --  52*   ALT (SGPT) U/L  --   --   --   --   --  11    < > = values in this interval not displayed.     Results from last 7 days   Lab Units 04/21/25  0640 04/20/25 0551 04/19/25 0401 04/18/25  0520   CALCIUM mg/dL 7.8* 7.9* 7.4* 7.8*   ALBUMIN g/dL 2.4* 2.7* 2.4* 2.4*   MAGNESIUM mg/dL 1.6 2.0 2.2 1.8   PHOSPHORUS mg/dL 3.2 2.9 2.5 2.6       Glucose   Date/Time Value Ref Range Status   04/21/2025 1139 192 (H) 70 - 130 mg/dL Final   04/21/2025 0529 155 (H) 70 - 130 mg/dL Final   04/21/2025 0014 211 (H) 70 - 130 mg/dL Final   04/20/2025 2034 181 (H) 70 - 130 mg/dL Final   04/20/2025 1634 180 (H) 70 - 130 mg/dL Final   04/20/2025 1147 198 (H) 70 - 130 mg/dL Final   04/20/2025 0613 144 (H) 70 - 130 mg/dL Final       No radiology results for the last day    I have personally reviewed all medications:  Scheduled Medications  arformoterol, 15 mcg, Nebulization, BID - RT  aspirin, 81 mg, Oral, Daily   Or  aspirin, 300 mg, Rectal, Daily  atorvastatin, 40 mg, Oral, Nightly  budesonide, 0.5 mg, Nebulization, BID - RT  ceFAZolin, 2,000 mg, Intravenous, Q8H  clopidogrel, 75 mg, Oral, Daily  insulin regular, 2-7 Units, Subcutaneous, Q6H  metoprolol tartrate, 25 mg, Oral, Q12H  pantoprazole, 40 mg, Oral, Q AM  revefenacin, 175 mcg, Nebulization, Daily - RT  senna-docusate  sodium, 2 tablet, Oral, BID  sodium hypochlorite, , Topical, Daily    Infusions  heparin, 12 Units/kg/hr, Last Rate: 18 Units/kg/hr (04/21/25 0850)    Diet  Diet: Regular/House, Diabetic; Consistent Carbohydrate; Fluid Consistency: Thin (IDDSI 0)    I have personally reviewed:  [x]  Laboratory   [x]  Microbiology   [x]  Radiology   [x]  EKG/Telemetry  [x]  Cardiology/Vascular   []  Pathology    []  Records       Assessment/Plan     Active Hospital Problems    Diagnosis  POA   • 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 [I74.3]  Unknown   • Acute thrombus of left ventricle [I24.0]  Yes   • Altered bowel habits [R19.4]  Yes   • Leg paresthesia [R20.2]  Yes      Resolved Hospital Problems    Diagnosis Date Resolved POA   • **Cardiogenic shock [R57.0] 04/15/2025 Yes       65 y.o. female admitted with Cardiogenic shock.    #Left lower extremity embolism and thrombosis  #Left ventricular thrombus with showering emboli of kidney, spleen, bile  #Severe PAD    -Status post open left iliofemoral, profunda femoris and superficial femoral artery thromboembolectomy with left common femoral endarterectomy and repair, 4 compartment fasciotomy of left lower extremity with left femoropopliteal angioplasty and stent placement x 2 on 4/12/2025    -Status post left lateral fasciotomy site debridement and left medial fasciotomy site debridement on 4/18/2025    -Vascular is planning lateral fasciotomy debridement with possible wound VAC placement on Wednesday    - Continue heparin drip    -Plan to switch to an oral NOAC once cleared by vascular, loading dose not needed    - Hematology has signed off    - Pain control    - Aspirin and Plavix daily    - Statin      #Leukocytosis    - ID consulted, suspects WBC elevation is multifactorial as cultures are negative to date    - ID recommends to continue cefazolin 2 g IV every 8 hours    - Continue to monitor    - WBC 25.32    #Cardiogenic shock  #Inferior STEMI    -Status post JOE x  2 on 4/8/2025    - Resolved    - Aspirin 81 mg daily, Plavix 75 mg daily, atorvastatin 40 mg p.o. daily    - Depressive 25 mg p.o. twice daily    - Continue heparin drip, plan to transition to DOAC with long-term dual therapy    - Cardiology following    #COPD    - Brovana nebs twice daily    - Pulmicort twice daily    - Yupelri nebs daily    #Ileus    - Ileus has resolved per surgery, NG removed, surgery signed off    #NIA    - Resolved, creatinine stable    - Nephrology has signed off    #Anemia    - Hemoglobin 7.4, suspect blood loss from surgery    - No overt bleeding, continue to monitor closely is on heparin drip    - Transfuse maintain hemoglobin greater than 7    #GERD    - PPI          Continue on heparin drip  for DVT prophylaxis.  Full code.  Discussed with patient and nursing staff.  Anticipate discharge home with  vs SNU facility next week.  Expected Discharge Date: 4/23/2025; Expected Discharge Time:       Jaya Rocha DO  [facility]  04/21/25  12:15 EDT

## 2025-04-21 NOTE — SIGNIFICANT NOTE
04/21/25 1434   OTHER   Discipline occupational therapist   Rehab Time/Intention   Session Not Performed patient/family declined, not feeling well  (OT attempted to see patient this pm, but pt requesting f/u tomorrow d/t c/o 9/10 L calf pain and reports of already working with PT prior to session. Will f/u next service date.)   Therapy Assessment/Plan (PT)   Criteria for Skilled Interventions Met (PT) yes;skilled treatment is necessary   Recommendation   OT - Next Appointment 04/22/25

## 2025-04-21 NOTE — PROGRESS NOTES
[facility]   Surgical Progress Note    Patient Name: Desiree Garcia  : 1960  MRN: [id number]  Date of admission: 4/10/2025  Surgical Procedures Since Admission:  Procedure(s):  EMBOLECTOMY MECHANICAL, FOUR COMPARTMENT FASCIOTOMY  ARTERIOGRAM LOWER EXTREMITY  Surgeon:  Misael Lawton MD  Status:  9 Days Post-Op  -------------------    Procedure(s):  LOWER EXTREMITY DEBRIDEMENT  Surgeon:  Misael Lawton MD  Status:  3 Days Post-Op  -------------------    Procedure(s):  Left leg fasciotomy debridement with possible wound VAC placement.  Surgeon:  Raimundo Lin MD  Status:  * No surgery found *  -------------------    Subjective   Subjective     Chief Complaint: Left leg ischemia follow-up.    History of Present Illness   No chest pain shortness of breath.    Review of Systems    Objective   Objective     Vitals:   Temp:  [97.9 °F (36.6 °C)-100.4 °F (38 °C)] 98.4 °F (36.9 °C)  Heart Rate:  [76-85] 81  Resp:  [16-18] 16  BP: (109-129)/(55-71) 122/71  Output by Drain (mL) 25 0701 - 25 1900 25 1901 - 25 0700 25 0701 - 25 0741 Range Total   External Urinary Catheter  900  900       Physical Exam  Constitutional:       Appearance: She is well-developed.   Pulmonary:      Effort: Pulmonary effort is normal. No respiratory distress.   Abdominal:      General: There is no distension.      Palpations: Abdomen is soft.   Neurological:      Mental Status: She is alert and oriented to person, place, and time.           Result Review    Result Review:  I have personally reviewed the results from the time of this admission to 2025 07:41 EDT and agree with these findings:  [x]  Laboratory list / accordion  []  Microbiology  []  Radiology  []  EKG/Telemetry   []  Cardiology/Vascular   []  Pathology  []  Old records  []  Other:  Most notable findings include:       Results from last 7 days   Lab Units 25  0551 25  0401 25  0520 25  0435  04/16/25  0435 04/15/25  0614 04/15/25  0018   WBC 10*3/mm3 22.85* 28.23* 27.78* 26.91* 22.45* 18.20* 17.46*   HEMOGLOBIN g/dL 7.9* 6.5* 7.1* 7.9* 8.5* 8.6* 8.2*   PLATELETS 10*3/mm3 446 415 409 399 346 325 317     Results from last 7 days   Lab Units 04/21/25  0640 04/20/25  0551 04/19/25  0401 04/18/25  0520 04/17/25  0435 04/16/25  1746 04/16/25  0435 04/15/25  2119 04/15/25  0614   SODIUM mmol/L 133* 139 138 140 138  --  139  --  139   POTASSIUM mmol/L 3.7 4.3 4.5 3.4* 4.1 3.9 3.4*  --  3.7   CHLORIDE mmol/L 97* 102 103 105 102  --  104  --  105   CO2 mmol/L 28.9 29.0 26.7 27.4 28.0  --  24.2  --  25.0   BUN mg/dL 6* 8 6* 6* 7*  --  10  --  14   CREATININE mg/dL 0.63 0.73 0.63 0.69 0.64  --  0.69  --  0.76   GLUCOSE mg/dL 145* 132* 178* 143* 113*  --  109*  --  128*   MAGNESIUM mg/dL 1.6 2.0 2.2 1.8 1.9  --  2.0  --  2.1   PHOSPHORUS mg/dL 3.2 2.9 2.5 2.6 2.7  --  3.0 2.8 2.2*   Estimated Creatinine Clearance: 96.1 mL/min (by C-G formula based on SCr of 0.63 mg/dL).    Lab Results   Component Value Date    HGBA1C 9.40 (H) 04/09/2025    HGBA1C 9.30 (H) 03/13/2025    HGBA1C 9.40 (H) 12/23/2024     Glucose   Date/Time Value Ref Range Status   04/21/2025 0529 155 (H) 70 - 130 mg/dL Final   04/21/2025 0014 211 (H) 70 - 130 mg/dL Final   04/20/2025 2034 181 (H) 70 - 130 mg/dL Final   04/20/2025 1634 180 (H) 70 - 130 mg/dL Final   04/20/2025 1147 198 (H) 70 - 130 mg/dL Final   04/20/2025 0613 144 (H) 70 - 130 mg/dL Final   04/20/2025 0122 184 (H) 70 - 130 mg/dL Final   04/19/2025 2136 133 (H) 70 - 130 mg/dL Final       Assessment & Plan   Assessment / Plan     Brief Patient Summary:  Desiree Garcia is a 65 y.o. female who multiple medical issues.  Vascular is following for ischemia of the left lower extremity.    Active Hospital Problems:   Active Hospital Problems    Diagnosis    • 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    • Acute thrombus of left ventricle    • Altered bowel habits    • Leg  paresthesia      Plan:   4/12/2025: Open left leg thrombectomy with 4 compartment fasciotomies.  ([facility])  4/18/2025: Debridement of left leg fasciotomies ([facility])    4/19/2025: Postop day #1 from fasciotomy debridement.  Dressings down today.  Minimal oozing noted muscles appear viable as pictured below.  There is some ischemic changes to the skin on the anterior margin of the lateral fasciotomies will continue to monitor.  I redressed the wounds with the assistance of the nurse with dilute Betadine soaked gauze.  Will plan on leaving intact upon my evaluation for tomorrow.  She has a strong dorsalis pedis artery signal.  She has weak dorsiflexion of her left foot.      4/20/2025: Postop day 2 fasciotomy debridement.  Will continue with once daily Dakin's dressing changes.  Will continue to lower the anterior skin on the lateral fasciotomy to demarcate.  This will likely require debridement sometime next week once further demarcation has occurred.    4/29/2025: Postop day 3 fasciotomy debridement.  With some anterior ischemic changes to the flap on the lateral compartment.  Will place her on the surgery schedule for Wednesday to debride this.  If things appear well at that time we will also place wound VAC.    VTE Prophylaxis:  Pharmacologic & mechanical VTE prophylaxis orders are present.        Raimundo Lin MD

## 2025-04-21 NOTE — PLAN OF CARE
Problem: Adult Inpatient Plan of Care  Goal: Plan of Care Review  Outcome: Progressing  Flowsheets (Taken 4/21/2025 1631)  Outcome Evaluation: Alert and oriented. antibiotics.  heparin gtt. Pain managed with PRNS  Goal: Patient-Specific Goal (Individualized)  Outcome: Progressing  Goal: Absence of Hospital-Acquired Illness or Injury  Outcome: Progressing  Intervention: Identify and Manage Fall Risk  Recent Flowsheet Documentation  Taken 4/21/2025 1610 by Gisselle Tuttle RN  Safety Promotion/Fall Prevention:   clutter free environment maintained   activity supervised  Taken 4/21/2025 1405 by Gisselle Tuttle RN  Safety Promotion/Fall Prevention:   clutter free environment maintained   activity supervised  Taken 4/21/2025 1201 by Gisselle Tuttle RN  Safety Promotion/Fall Prevention:   clutter free environment maintained   activity supervised  Taken 4/21/2025 0855 by Gisselle Tuttle RN  Safety Promotion/Fall Prevention:   clutter free environment maintained   activity supervised  Intervention: Prevent Infection  Recent Flowsheet Documentation  Taken 4/21/2025 1610 by Gisselle Tuttle RN  Infection Prevention: single patient room provided  Taken 4/21/2025 1405 by Gisselle Tuttle RN  Infection Prevention: single patient room provided  Taken 4/21/2025 1201 by Gisselle Tuttle RN  Infection Prevention: single patient room provided  Taken 4/21/2025 0855 by Gisselle Tuttle RN  Infection Prevention: single patient room provided  Goal: Optimal Comfort and Wellbeing  Outcome: Progressing  Intervention: Provide Person-Centered Care  Recent Flowsheet Documentation  Taken 4/21/2025 1405 by Gisselle Tuttle RN  Trust Relationship/Rapport:   care explained   choices provided  Taken 4/21/2025 0855 by Gisselle Tuttle RN  Trust Relationship/Rapport:   care explained   choices provided  Goal: Readiness for Transition of Care  Outcome: Progressing     Problem: Skin Injury Risk Increased  Goal: Skin Health and Integrity  Outcome: Progressing      Problem: Sepsis/Septic Shock  Goal: Optimal Coping  Outcome: Progressing  Intervention: Support Patient and Family Response  Recent Flowsheet Documentation  Taken 4/21/2025 1405 by Gisselle Tuttle RN  Family/Support System Care: self-care encouraged  Taken 4/21/2025 0855 by Gisselle Tuttle RN  Family/Support System Care: self-care encouraged  Goal: Absence of Bleeding  Outcome: Progressing  Goal: Blood Glucose Level Within Target Range  Outcome: Progressing  Goal: Absence of Infection Signs and Symptoms  Outcome: Progressing  Intervention: Initiate Sepsis Management  Recent Flowsheet Documentation  Taken 4/21/2025 1610 by Gisselle Tuttle RN  Infection Prevention: single patient room provided  Taken 4/21/2025 1405 by Gisselle Tuttle RN  Infection Prevention: single patient room provided  Taken 4/21/2025 1201 by Gisselle Tuttle RN  Infection Prevention: single patient room provided  Taken 4/21/2025 0855 by Gisselle Tuttle RN  Infection Prevention: single patient room provided  Goal: Optimal Nutrition Delivery  Outcome: Progressing     Problem: Fall Injury Risk  Goal: Absence of Fall and Fall-Related Injury  Outcome: Progressing  Intervention: Identify and Manage Contributors  Recent Flowsheet Documentation  Taken 4/21/2025 1610 by Gisselle Tuttle RN  Medication Review/Management: medications reviewed  Taken 4/21/2025 1405 by Gisselle Tuttle RN  Medication Review/Management: medications reviewed  Taken 4/21/2025 1201 by Gisselle Tuttle RN  Medication Review/Management: medications reviewed  Taken 4/21/2025 0855 by Gisselle Tuttle RN  Medication Review/Management: medications reviewed  Intervention: Promote Injury-Free Environment  Recent Flowsheet Documentation  Taken 4/21/2025 1610 by Gisselle Tuttle RN  Safety Promotion/Fall Prevention:   clutter free environment maintained   activity supervised  Taken 4/21/2025 1405 by Gisselle Tuttle RN  Safety Promotion/Fall Prevention:   clutter free environment maintained   activity  supervised  Taken 4/21/2025 1201 by Gisselle Tuttle RN  Safety Promotion/Fall Prevention:   clutter free environment maintained   activity supervised  Taken 4/21/2025 0855 by Gisselle Tuttle RN  Safety Promotion/Fall Prevention:   clutter free environment maintained   activity supervised     Problem: Pain Acute  Goal: Optimal Pain Control and Function  Outcome: Progressing  Intervention: Optimize Psychosocial Wellbeing  Recent Flowsheet Documentation  Taken 4/21/2025 1405 by Gisselle Tuttle RN  Diversional Activities: television  Taken 4/21/2025 0855 by Gisselle Tuttle RN  Diversional Activities: television  Intervention: Prevent or Manage Pain  Recent Flowsheet Documentation  Taken 4/21/2025 1610 by Gisselle Tuttle RN  Medication Review/Management: medications reviewed  Taken 4/21/2025 1405 by Gisselle Tuttle RN  Medication Review/Management: medications reviewed  Taken 4/21/2025 1201 by Gisselle Tuttle RN  Medication Review/Management: medications reviewed  Taken 4/21/2025 0855 by Gisselle Tuttle RN  Medication Review/Management: medications reviewed     Problem: Heart Failure  Goal: Optimal Coping  Outcome: Progressing  Intervention: Support Psychosocial Response  Recent Flowsheet Documentation  Taken 4/21/2025 1405 by Gisselle Tuttle RN  Family/Support System Care: self-care encouraged  Taken 4/21/2025 0855 by Gisselle Tuttle RN  Family/Support System Care: self-care encouraged  Goal: Optimal Cardiac Output and Blood Flow  Outcome: Progressing  Goal: Stable Heart Rate and Rhythm  Outcome: Progressing  Goal: Fluid and Electrolyte Balance  Outcome: Progressing  Goal: Optimal Functional Ability  Outcome: Progressing  Goal: Improved Oral Intake  Outcome: Progressing  Goal: Effective Oxygenation and Ventilation  Outcome: Progressing  Goal: Effective Breathing Pattern During Sleep  Outcome: Progressing  Intervention: Monitor and Manage Obstructive Sleep Apnea  Recent Flowsheet Documentation  Taken 4/21/2025 1610 by Parag  ERNESTO Pitts  Medication Review/Management: medications reviewed  Taken 4/21/2025 1405 by Gisselle Tuttle RN  Medication Review/Management: medications reviewed  Taken 4/21/2025 1201 by Gisselle Tuttle RN  Medication Review/Management: medications reviewed  Taken 4/21/2025 0855 by Gisselle Tuttle RN  Medication Review/Management: medications reviewed     Problem: VTE (Venous Thromboembolism)  Goal: Tissue Perfusion  Outcome: Progressing  Goal: Optimal Right Ventricular Function  Outcome: Progressing   Goal Outcome Evaluation:              Outcome Evaluation: Alert and oriented. antibiotics.  heparin gtt. Pain managed with PRNS

## 2025-04-21 NOTE — PROGRESS NOTES
"Nutrition Services    Patient Name: Desiree Garcia  YOB: 1960  MRN: [id number]  Admission date: 4/10/2025    Assessment Date:  04/21/25    NUTRITION EVALUATION      Reason for Encounter Length of Stay   Diagnosis/Problem Admission Diagnosis:  Cardiogenic shock [R57.0]    Problem List:    Leg paresthesia    Altered bowel habits    Acute thrombus of left ventricle    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     Narrative Status post open thrombectomy and angiogram, due to critical ischemia of left lower extremity due to embolism.   Necrotic area on legs, plan is back to OR on 4/23 for debridement of necrotic tissue  Visited Pt who states appetite is going good, no request  Pt with hx of DM, will add consistent carb to diet, and offer Matt       PO Diet Diet: Regular/House, Diabetic; Consistent Carbohydrate; Fluid Consistency: Thin (IDDSI 0)   Allergies NKFA   Supplements n/a   PO Intake % %       Chewing/Swallowing Difficulty no issues identified at this time       Medications reviewed heparin,, lipitor, insulin, protonix, percolace   Labs  reviewed Na 133, glu 145-211       Physical Findings alert     Edema 3+ (moderate)    GI Function last bowel movement: 4/18   Skin Status Left groin incision, left leg wounds/incisions    Lines/Drains none   I/O reviewed        Height  Weight  BMI  Weight Trend     Height: 167.6 cm (65.98\")  Weight: 79.9 kg (176 lb 2.4 oz) (04/21/25 0500)  Body mass index is 28.44 kg/m².  Loss, Gain    Weight change: No significant changes       NFPE Not indicated at this time       Nutrition Problem (PES) Problem: Increased Nutrient Needs  Etiology: Medical Diagnosis - ischemia of left leg due to embolism    Signs/Symptoms: Report/Observation       Intervention/Plan Visited pt and discussed food preferences.  Will provide Matt supplements to help with wound healing.     RD to follow up per protocol.     Results from last 7 days   Lab Units 04/21/25  0640 " 04/20/25  0551 04/19/25  0401 04/16/25  1746 04/16/25  0435   SODIUM mmol/L 133* 139 138   < > 139   POTASSIUM mmol/L 3.7 4.3 4.5   < > 3.4*   CHLORIDE mmol/L 97* 102 103   < > 104   CO2 mmol/L 28.9 29.0 26.7   < > 24.2   BUN mg/dL 6* 8 6*   < > 10   CREATININE mg/dL 0.63 0.73 0.63   < > 0.69   CALCIUM mg/dL 7.8* 7.9* 7.4*   < > 8.1*   BILIRUBIN mg/dL  --   --   --   --  0.6   ALK PHOS U/L  --   --   --   --  139*   ALT (SGPT) U/L  --   --   --   --  11   AST (SGOT) U/L  --   --   --   --  52*   GLUCOSE mg/dL 145* 132* 178*   < > 109*    < > = values in this interval not displayed.     Results from last 7 days   Lab Units 04/21/25  0640 04/20/25  0551 04/19/25  0401   MAGNESIUM mg/dL 1.6 2.0 2.2   PHOSPHORUS mg/dL 3.2 2.9 2.5   HEMOGLOBIN g/dL 7.4* 7.9* 6.5*   HEMATOCRIT % 24.0* 24.3* 20.7*     Lab Results   Component Value Date    HGBA1C 9.40 (H) 04/09/2025     Wt Readings from Last 10 Encounters:   04/21/25 79.9 kg (176 lb 2.4 oz)   04/09/25 69.1 kg (152 lb 5.4 oz)   04/01/25 70.5 kg (155 lb 8 oz)   03/26/25 70.3 kg (155 lb)   03/25/25 70.6 kg (155 lb 10.3 oz)   03/17/25 76.2 kg (167 lb 15.9 oz)   03/13/25 76.2 kg (168 lb)   03/06/25 75.3 kg (166 lb)   03/06/25 74.4 kg (164 lb)   01/30/25 75.3 kg (166 lb)       Electronically signed by:  Maddy Ma RD  04/21/25 10:53 EDT

## 2025-04-21 NOTE — CASE MANAGEMENT/SOCIAL WORK
Continued Stay Note  [facility]     Patient Name: Desiree Garcia  MRN: [id number]  Today's Date: 4/21/2025    Admit Date: 4/10/2025    [facility]   Discharge Plan       Row Name 04/21/25 1727       Plan    Plan [facility]    Patient/Family in Agreement with Plan yes    Plan Comments Plan remains for pt to go to rehab at discharge. She will need transportation. [facility] and are following. She will need a precert. No needs assessed at this time. CCP following. Garrison BARRIENTOS RN                   Discharge Codes    No documentation.                 Expected Discharge Date and Time       Expected Discharge Date Expected Discharge Time    Apr 25, 2025               Garrison Harrison RN

## 2025-04-21 NOTE — PROGRESS NOTES
ID NOTE    CC: f/u leukocytosis    Subj: No acute events.  She had a temperature of 100.4 F at 3 PM yesterday but none since that time.  Her operative cultures remain negative.  I reviewed the vascular surgery note and due to necrotic areas in the legs she is going to go back to the OR on 4/23 for debridement of necrotic tissues.    Medications:    Current Facility-Administered Medications:     acetaminophen (TYLENOL) tablet 650 mg, 650 mg, Oral, Q4H PRN, 650 mg at 04/18/25 0049 **OR** acetaminophen (TYLENOL) 160 MG/5ML oral solution 650 mg, 650 mg, Oral, Q4H PRN **OR** acetaminophen (TYLENOL) suppository 650 mg, 650 mg, Rectal, Q4H PRN, Misael Lawton MD, 650 mg at 04/13/25 0923    acetaminophen (TYLENOL) tablet 650 mg, 650 mg, Oral, Q4H PRN **OR** acetaminophen (TYLENOL) 160 MG/5ML oral solution 650 mg, 650 mg, Oral, Q4H PRN **OR** acetaminophen (TYLENOL) suppository 650 mg, 650 mg, Rectal, Q4H PRN, Misael Lawton MD    acetaminophen (TYLENOL) tablet 650 mg, 650 mg, Oral, Q4H PRN, 650 mg at 04/20/25 1530 **OR** acetaminophen (TYLENOL) suppository 650 mg, 650 mg, Rectal, Q4H PRN, Misael Lawton MD    albuterol (PROVENTIL) nebulizer solution 0.083% 2.5 mg/3mL, 2.5 mg, Nebulization, Q6H PRN, Misael Lawton MD    aluminum-magnesium hydroxide-simethicone (MAALOX MAX) 400-400-40 MG/5ML suspension 15 mL, 15 mL, Oral, Q6H PRN, Misael Lawton MD    arformoterol (BROVANA) nebulizer solution 15 mcg, 15 mcg, Nebulization, BID - RT, Misael Lawton MD, 15 mcg at 04/21/25 0815    aspirin EC tablet 81 mg, 81 mg, Oral, Daily, 81 mg at 04/21/25 0844 **OR** aspirin suppository 300 mg, 300 mg, Rectal, Daily, Misael Lawton MD    atorvastatin (LIPITOR) tablet 40 mg, 40 mg, Oral, Nightly, Misael Lawton MD, 40 mg at 04/20/25 2039    sennosides-docusate (PERICOLACE) 8.6-50 MG per tablet 2 tablet, 2 tablet, Oral, BID, 2 tablet at 04/17/25 0906 **AND** polyethylene glycol (MIRALAX) packet 17 g, 17 g, Oral, Daily PRN  **AND** bisacodyl (DULCOLAX) EC tablet 5 mg, 5 mg, Oral, Daily PRN **AND** bisacodyl (DULCOLAX) suppository 10 mg, 10 mg, Rectal, Daily PRN, Misael Lawton MD    budesonide (PULMICORT) nebulizer solution 0.5 mg, 0.5 mg, Nebulization, BID - RT, Misael Lawton MD, 0.5 mg at 04/21/25 0815    Calcium Replacement - Follow Nurse / BPA Driven Protocol, , Not Applicable, PRN, Misael Lawton MD    ceFAZolin 2000 mg IVPB in 100 mL NS (MBP), 2,000 mg, Intravenous, Q8H, Jude Paez MD, 2,000 mg at 04/21/25 0635    clopidogrel (PLAVIX) tablet 75 mg, 75 mg, Oral, Daily, Misael Lawton MD, 75 mg at 04/21/25 0844    dextrose (D50W) (25 g/50 mL) IV injection 25 g, 25 g, Intravenous, Q15 Min PRN, Misael Lawton MD    dextrose (GLUTOSE) oral gel 15 g, 15 g, Oral, Q15 Min PRN, Misael Lawton MD    glucagon (GLUCAGEN) injection 1 mg, 1 mg, Intramuscular, Q15 Min PRN, Misael Lawton MD    heparin (porcine) 5000 UNIT/ML injection 2,100-4,000 Units, 30-57.9 Units/kg, Intravenous, Q6H PRN, Misael Lawton MD, 2,100 Units at 04/16/25 0615    heparin 55782 units/250 mL (100 units/mL) in 0.45 % NaCl infusion, 12 Units/kg/hr, Intravenous, Titrated, Misael Lawton MD, Last Rate: 12.78 mL/hr at 04/21/25 0850, 18 Units/kg/hr at 04/21/25 0850    HYDROcodone-acetaminophen (NORCO) 7.5-325 MG per tablet 1 tablet, 1 tablet, Oral, Q6H PRN, Raimundo Lin MD, 1 tablet at 04/21/25 0550    HYDROcodone-acetaminophen (NORCO) 7.5-325 MG per tablet 1 tablet, 1 tablet, Oral, Q4H PRN, Misael Lawton MD, 1 tablet at 04/20/25 1358    HYDROmorphone (DILAUDID) injection 1 mg, 1 mg, Intravenous, Q3H PRN, Raimundo Lin MD, 1 mg at 04/21/25 0848    HYDROmorphone (DILAUDID) injection 1 mg, 1 mg, Intravenous, Q2H PRN, 1 mg at 04/21/25 0401 **AND** naloxone (NARCAN) injection 0.4 mg, 0.4 mg, Intravenous, Q5 Min PRN, Misael Lawton MD    insulin regular (humuLIN R,novoLIN R) injection 2-7 Units, 2-7 Units, Subcutaneous, Q6H,  Misael Lawton MD, 2 Units at 25 0544    Magnesium Standard Dose Replacement - Follow Nurse / BPA Driven Protocol, , Not Applicable, PRN, Misael Lawton MD    metoprolol tartrate (LOPRESSOR) tablet 25 mg, 25 mg, Oral, Q12H, Misael Lawton MD, 25 mg at 25 0844    [] HYDROmorphone (DILAUDID) injection 1 mg, 1 mg, Intravenous, Q3H PRN, 1 mg at 25 0906 **AND** naloxone (NARCAN) injection 0.4 mg, 0.4 mg, Intravenous, Q5 Min PRN, Misael Lawton MD    nitroglycerin (NITROSTAT) SL tablet 0.4 mg, 0.4 mg, Sublingual, Q5 Min PRN, Misael Lawton MD    nitroglycerin (NITROSTAT) SL tablet 0.4 mg, 0.4 mg, Sublingual, Q5 Min PRN, Misael Lawton MD    nitroglycerin (NITROSTAT) SL tablet 0.4 mg, 0.4 mg, Sublingual, Q5 Min PRN, Misael Lawton MD    ondansetron ODT (ZOFRAN-ODT) disintegrating tablet 4 mg, 4 mg, Oral, Q6H PRN **OR** ondansetron (ZOFRAN) injection 4 mg, 4 mg, Intravenous, Q6H PRN, Misael Lawton MD    ondansetron ODT (ZOFRAN-ODT) disintegrating tablet 4 mg, 4 mg, Oral, Q6H PRN **OR** ondansetron (ZOFRAN) injection 4 mg, 4 mg, Intravenous, Q6H PRN, Misael Latwon MD    pantoprazole (PROTONIX) EC tablet 40 mg, 40 mg, Oral, Q AM, Misael Lawton MD, 40 mg at 25 0544    Phosphorus Replacement - Follow Nurse / BPA Driven Protocol, , Not Applicable, PRN, Misael Lawton MD    Potassium Replacement - Follow Nurse / BPA Driven Protocol, , Not Applicable, PRN, Misael Lawton MD    revefenacin (YUPELRI) nebulizer solution 175 mcg, 175 mcg, Nebulization, Daily - RT, Misael Lawton MD, 175 mcg at 25 0815    sodium hypochlorite (DAKIN'S 1/4 STRENGTH) 0.125 % topical solution 0.125% solution, , Topical, Daily, Raimundo Lin MD, Given at 25 1431      Objective   Vital Signs   Temp:  [97.9 °F (36.6 °C)-100.4 °F (38 °C)] 98.4 °F (36.9 °C)  Heart Rate:  [77-93] 93  Resp:  [16-18] 16  BP: (109-122)/(55-71) 122/71    Physical Exam:   General: NAD, lying in bed  Eyes: no  scleral icterus  ENT: no thrush, edentulous  Cardiovascular: Normal rate  Respiratory: no wheezing  GI: Abdomen is not distended  MSK: LLE wrapped    Labs:   CBC, BMP, and wound cultures reviewed today  Lab Results   Component Value Date    WBC 25.32 (H) 04/21/2025    HGB 7.4 (L) 04/21/2025    HCT 24.0 (L) 04/21/2025    MCV 85.1 04/21/2025     04/21/2025     Lab Results   Component Value Date    GLUCOSE 145 (H) 04/21/2025    CALCIUM 7.8 (L) 04/21/2025     (L) 04/21/2025    K 3.7 04/21/2025    CO2 28.9 04/21/2025    CL 97 (L) 04/21/2025    BUN 6 (L) 04/21/2025    CREATININE 0.63 04/21/2025    EGFR 99.2 04/21/2025    BCR 9.5 04/21/2025    ANIONGAP 7.1 04/21/2025         Microbiology:  4/8 BCx: Negative  4/12 BCx: Negative  4/18 LLE wound Cx: NGTD    Isolation:  No active isolations    ASSESSMENT/PLAN:  Leukocytosis  Inferior STEMI s/p mechanical thrombectomy  Cardiogenic shock -resolved  Ischemic enteritis and colitis  Ileus  Left ventricular thrombus  Critical ischemia left lower extremity due to embolism  Status post open thrombectomy and angiogram  Status post 4 compartment fasciotomy  Post-surgical bleeding and hematoma  COPD due to tobacco abuse    WBC remains elevated but as mentioned in prior notes it is likely multifactorial.  Her operative cultures from 4/18 are negative to date.  Reviewed vascular surgery note and plans to take back to the OR on 4/23/2025 to remove necrotic tissue.  Given the suspicion for infection based on her high WBC, I will keep her on cefazolin 2 g IV every 8 hours through the time of her surgical procedures.  If any concern for infection, please send updated cultures.    I will see her again in 4/23 MRI so please call me at [phone] if any ID questions or concerns in the interim.

## 2025-04-21 NOTE — THERAPY TREATMENT NOTE
Patient Name: Desiree Garcia  : 1960    MRN: [id number]                              Today's Date: 2025       Admit Date: 4/10/2025    Visit Dx:     ICD-10-CM ICD-9-CM   1. 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  I74.3 444.22     Patient Active Problem List   Diagnosis    Type 2 diabetes mellitus, without long-term current use of insulin    COPD (chronic obstructive pulmonary disease)    Depression with anxiety    Esophageal reflux    Forgetfulness    Leg paresthesia    Lumbar spinal stenosis    Migraines    Night sweat    Sciatica    Dyspnea on exertion    Thoracic or lumbosacral neuritis or radiculitis    Left wrist pain    Sprain of left wrist    Arthritis of knee, left    Contusion of left knee    Nondisplaced fracture of trapezium bone of left wrist with routine healing    Chronic left shoulder pain    Coronary artery disease involving native heart without angina pectoris    Hypertension, essential    Mixed hyperlipidemia    Cigarette nicotine dependence with nicotine-induced disorder    Altered bowel habits    Diarrhea    Rectal bleeding    Abdominal bloating    Allergic rhinitis due to food    Elevated troponin    Acute diastolic CHF (congestive heart failure)    History of ST elevation myocardial infarction (STEMI)    STEMI (ST elevation myocardial infarction)    Acute thrombus of left ventricle    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     Past Medical History:   Diagnosis Date    Arthritis     Cervical cancer screening     COPD (chronic obstructive pulmonary disease)     Coronary artery disease involving native heart without angina pectoris 2021    primary angioplasty and stent placement to mid right coronary artery with good angiographic results on 2021 after STEMI    Depression with anxiety     Diabetes mellitus     Forgetfulness     Gastric reflux     Hypertension     Leg paresthesia 2017    Right    Limb swelling     Lumbago 2017    Low  back pain    Lumbar spinal stenosis 2017    L4/5 moderate to severe central canal stenosis    Lumbosacral radiculopathy 2017    Right    Migraines     Night sweat     Reflux esophagitis     Shortness of breath     ST elevation myocardial infarction (STEMI) (CMS/[facility]) 2021    Stomach disorder      Past Surgical History:   Procedure Laterality Date    ABDOMINAL SURGERY      3 C-SECTIONS    ARTERIOGRAM LOWER EXTREMITY Left 2025    Procedure: ARTERIOGRAM LOWER EXTREMITY;  Surgeon: Misael Lawton MD;  Location: [facility] OR;  Service: Vascular;  Laterality: Left;    CARDIAC CATHETERIZATION      CARDIAC CATHETERIZATION N/A 2021    Procedure: Left Heart Cath;  Surgeon: Sidney Xiao MD;  Location: [facility] CATH INVASIVE LOCATION;  Service: Cardiology;  Laterality: N/A;    CARDIAC CATHETERIZATION N/A 2025    Procedure: Left Heart Cath;  Surgeon: James Verdugo MD;  Location: [facility] CATH INVASIVE LOCATION;  Service: Cardiology;  Laterality: N/A;     SECTION      x 3    CHOLECYSTECTOMY      COLONOSCOPY      COLONOSCOPY N/A 3/25/2025    Procedure: COLONOSCOPY WITH BIOPSIES AND COLD AND HOT SNARE POLYPECTOMIES;  Surgeon: Heber Najera MD;  Location: [facility] ENDOSCOPY;  Service: Gastroenterology;  Laterality: N/A;  COLON POLYPS    CORONARY STENT PLACEMENT      EMBOLECTOMY Left 2025    Procedure: EMBOLECTOMY MECHANICAL, FOUR COMPARTMENT FASCIOTOMY;  Surgeon: Misael aLwton MD;  Location: [facility] OR;  Service: Vascular;  Laterality: Left;    ENDOSCOPY      2007    FOOT SURGERY Right     HAND SURGERY      HYSTERECTOMY  1985      General Information       Row Name 25 1020          Physical Therapy Time and Intention    Document Type therapy note (daily note)  -     Mode of Treatment individual therapy;physical therapy  -       Row Name 25 1020          General Information    Patient Profile Reviewed yes  -TIFF     Existing  Precautions/Restrictions fall  -       Row Name 04/21/25 1020          Cognition    Orientation Status (Cognition) oriented x 3  -       Row Name 04/21/25 1020          Safety Issues/Impairments Affecting Functional Mobility    Safety Issues Affecting Function (Mobility) impulsivity;judgment;problem-solving;safety precaution awareness  -     Impairments Affecting Function (Mobility) balance;coordination;endurance/activity tolerance;pain;postural/trunk control;strength  -               User Key  (r) = Recorded By, (t) = Taken By, (c) = Cosigned By      Initials Name Provider Type    Laura Kulkarni PTA Physical Therapist Assistant                   Mobility       Row Name 04/21/25 1021          Bed Mobility    Bed Mobility scooting/bridging  -     Scooting/Bridging [place] (Bed Mobility) minimum assist (75% patient effort);moderate assist (50% patient effort);verbal cues;nonverbal cues (demo/gesture)  -     Supine-Sit [place] (Bed Mobility) minimum assist (75% patient effort);verbal cues  post lean  -     Sit-Supine [place] (Bed Mobility) standby assist  -     Assistive Device (Bed Mobility) bed rails;head of bed elevated  -     Comment, (Bed Mobility) pt had difficulty completing to EOB, post lean, educ offered to use RLE to assist over EOB to complete tfer  -       Row Name 04/21/25 1021          Bed-Chair Transfer    Bed-Chair [place] (Transfers) not tested  -       Row Name 04/21/25 1021          Sit-Stand Transfer    Sit-Stand [place] (Transfers) 2 person assist;maximum assist (25% patient effort)  -     Assistive Device (Sit-Stand Transfers) walker, front-wheeled  -     Comment, (Sit-Stand Transfer) x2 STS,cues to use UEs to push down into wx instead of lifting up, cues to lock in R knee and assist to block RLE during STS  -       Row Name 04/21/25 1021          Gait/Stairs (Locomotion)    [place] Level (Gait) unable to assess  -     Comment,  (Gait/Stairs) unable to jonathan any steps, pain 10/10 with pre-meds given  -               User Key  (r) = Recorded By, (t) = Taken By, (c) = Cosigned By      Initials Name Provider Type    Laura Kulkarni PTA Physical Therapist Assistant                   Obj/Interventions       Row Name 04/21/25 1025          Motor Skills    Therapeutic Exercise --  SLRs x5  -               User Key  (r) = Recorded By, (t) = Taken By, (c) = Cosigned By      Initials Name Provider Type    Laura Kulkarni PTA Physical Therapist Assistant                   Goals/Plan    No documentation.                  Clinical Impression       Row Name 04/21/25 1026          Pain    Pretreatment Pain Rating 7/10  -     Posttreatment Pain Rating 10/10  -     Pain Location extremity  reports the entire leg  -     Pain Side/Orientation left;lower  -     Pain Management Interventions premedicated for activity;exercise or physical activity utilized  -     Response to Pain Interventions activity participation with increased pain  -     Pre/Posttreatment Pain Comment limited activity due to incr pain with any WB, did jonathan STS x2 , fully cleared bottom, but unable to stand upright  -       Row Name 04/21/25 1026          Plan of Care Review    Plan of Care Reviewed With patient  -     Progress no change  -     Outcome Evaluation Pt agreed to PT session, already had meds for session, did report 7/10 pain upon entry, but quickly went to 10/10, pt jonathan perf STS x2 w/rwx, cues to use UEs more to push down through rwx , pt impulsive, unsteady, pain limiting further activity, did require seated rest in between standing attempts, cues for posture, pt perf SLRs x5 ad roberto, just to stretch prior to EOB, then again when in bed , will need SNU vs home pending progress  -       Row Name 04/21/25 1026          Therapy Assessment/Plan (PT)    Rehab Potential (PT) limited  -     Criteria for Skilled Interventions Met (PT) yes  -[facility]  Name 04/21/25 1026          Positioning and Restraints    Pre-Treatment Position in bed  -JM     Post Treatment Position bed  -JM     In Bed supine;call light within reach;encouraged to call for assist;exit alarm on;notified nsg;LLE elevated  -               User Key  (r) = Recorded By, (t) = Taken By, (c) = Cosigned By      Initials Name Provider Type    Laura Kulkarni PTA Physical Therapist Assistant                   Outcome Measures       Row Name 04/21/25 1033          How much help from another person do you currently need...    Turning from your back to your side while in flat bed without using bedrails? 4  -JM     Moving from lying on back to sitting on the side of a flat bed without bedrails? 3  -JM     Moving to and from a bed to a chair (including a wheelchair)? 1  -JM     Standing up from a chair using your arms (e.g., wheelchair, bedside chair)? 2  -JM     Climbing 3-5 steps with a railing? 1  -JM     To walk in hospital room? 1  -JM     AM-PAC 6 Clicks Score (PT) 12  -JM     Highest Level of Mobility Goal 4 --> Transfer to chair/commode  -               User Key  (r) = Recorded By, (t) = Taken By, (c) = Cosigned By      Initials Name Provider Type    Laura Kulkarni PTA Physical Therapist Assistant                                 Physical Therapy Education       Title: PT OT SLP Therapies (In Progress)       Topic: Physical Therapy (Done)       Point: Mobility training (Done)       Learning Progress Summary            Patient Eager, E,TB,D, VU,NR by TIFF at 4/21/2025 1034    Acceptance, E,D, VU,NR by MS at 4/20/2025 1124    Acceptance, E, NR by MR at 4/19/2025 1647    Acceptance, E,D, NR by TIFF at 4/17/2025 1809    Acceptance, E, NR by MARISSA at 4/15/2025 1324                      Point: Home exercise program (Done)       Learning Progress Summary            Patient Eager, E,TB,D, VU,NR by TIFF at 4/21/2025 1034    Acceptance, E,D, VU,NR by MS at 4/20/2025 1124    Acceptance, E, NR by  at  4/19/2025 1647    Acceptance, E,D, NR by JM at 4/17/2025 1809                      Point: Body mechanics (Done)       Learning Progress Summary            Patient Eager, E,TB,D, VU,NR by  at 4/21/2025 1034    Acceptance, E,D, VU,NR by MS at 4/20/2025 1124    Acceptance, E, NR by MR at 4/19/2025 1647    Acceptance, E,D, NR by JM at 4/17/2025 1809    Acceptance, E, NR by DJ at 4/15/2025 1324                      Point: Precautions (Done)       Learning Progress Summary            Patient Eager, E,TB,D, VU,NR by  at 4/21/2025 1034    Acceptance, E,D, VU,NR by MS at 4/20/2025 1124    Acceptance, E, NR by MR at 4/19/2025 1647    Acceptance, E,D, NR by JM at 4/17/2025 1809    Acceptance, E, NR by DJ at 4/15/2025 1324                                      User Key       Initials Effective Dates Name Provider Type Discipline     03/07/18 -  Laura Gooden, PTA Physical Therapist Assistant PT    MS 06/16/21 -  Misael Cerda, PT Physical Therapist PT    DJ 10/25/19 -  Meka Araiza, PT Physical Therapist PT    MR 08/03/23 -  Mary Jane Kelly, RN Registered Nurse Nurse                  PT Recommendation and Plan     Progress: no change  Outcome Evaluation: Pt agreed to PT session, already had meds for session, did report 7/10 pain upon entry, but quickly went to 10/10, pt jonathan perf STS x2 w/rwx, cues to use UEs more to push down through rwx , pt impulsive, unsteady, pain limiting further activity, did require seated rest in between standing attempts, cues for posture, pt perf SLRs x5 ad roberto, just to stretch prior to EOB, then again when in bed , will need SNU vs home pending progress     Time Calculation:         PT Charges       Row Name 04/21/25 1035             Time Calculation    Start Time 0955  -      Stop Time 1018  -      Time Calculation (min) 23 min  -      PT Received On 04/21/25  -      PT - Next Appointment 04/22/25  -                User Key  (r) = Recorded By, (t) = Taken By, (c) = Cosigned By       Initials Name Provider Type     Laura Gooden PTA Physical Therapist Assistant                  Therapy Charges for Today       Code Description Service Date Service Provider Modifiers Qty    69697603477 HC PT THERAPEUTIC ACT EA 15 MIN 4/21/2025 Laura Gooden PTA GP 2    59119426169 HC PT THER SUPP EA 15 MIN 4/21/2025 Laura Gooden PTA GP 2            PT G-Codes  Outcome Measure Options: AM-PAC 6 Clicks Basic Mobility (PT)  AM-PAC 6 Clicks Score (PT): 12  AM-PAC 6 Clicks Score (OT): 14  PT Discharge Summary  Anticipated Discharge Disposition (PT): skilled nursing facility (pending progress)    Laura Gooden PTA  4/21/2025

## 2025-04-22 LAB
ALBUMIN SERPL-MCNC: 2.7 G/DL (ref 3.5–5.2)
ANION GAP SERPL CALCULATED.3IONS-SCNC: 6.9 MMOL/L (ref 5–15)
APTT PPP: 68.7 SECONDS (ref 22.7–35.4)
BASOPHILS # BLD AUTO: 0.07 10*3/MM3 (ref 0–0.2)
BASOPHILS NFR BLD AUTO: 0.3 % (ref 0–1.5)
BUN SERPL-MCNC: 10 MG/DL (ref 8–23)
BUN/CREAT SERPL: 14.7 (ref 7–25)
CALCIUM SPEC-SCNC: 8.2 MG/DL (ref 8.6–10.5)
CHLORIDE SERPL-SCNC: 99 MMOL/L (ref 98–107)
CO2 SERPL-SCNC: 32.1 MMOL/L (ref 22–29)
CREAT SERPL-MCNC: 0.68 MG/DL (ref 0.57–1)
DEPRECATED RDW RBC AUTO: 44.3 FL (ref 37–54)
EGFRCR SERPLBLD CKD-EPI 2021: 97.4 ML/MIN/1.73
EOSINOPHIL # BLD AUTO: 0.14 10*3/MM3 (ref 0–0.4)
EOSINOPHIL NFR BLD AUTO: 0.6 % (ref 0.3–6.2)
ERYTHROCYTE [DISTWIDTH] IN BLOOD BY AUTOMATED COUNT: 14.6 % (ref 12.3–15.4)
GLUCOSE BLDC GLUCOMTR-MCNC: 167 MG/DL (ref 70–130)
GLUCOSE BLDC GLUCOMTR-MCNC: 182 MG/DL (ref 70–130)
GLUCOSE BLDC GLUCOMTR-MCNC: 221 MG/DL (ref 70–130)
GLUCOSE SERPL-MCNC: 148 MG/DL (ref 65–99)
HCT VFR BLD AUTO: 25.4 % (ref 34–46.6)
HGB BLD-MCNC: 7.9 G/DL (ref 12–15.9)
IMM GRANULOCYTES # BLD AUTO: 0.97 10*3/MM3 (ref 0–0.05)
IMM GRANULOCYTES NFR BLD AUTO: 4.3 % (ref 0–0.5)
LYMPHOCYTES # BLD AUTO: 2.64 10*3/MM3 (ref 0.7–3.1)
LYMPHOCYTES NFR BLD AUTO: 11.8 % (ref 19.6–45.3)
MAGNESIUM SERPL-MCNC: 1.7 MG/DL (ref 1.6–2.4)
MCH RBC QN AUTO: 26.4 PG (ref 26.6–33)
MCHC RBC AUTO-ENTMCNC: 31.1 G/DL (ref 31.5–35.7)
MCV RBC AUTO: 84.9 FL (ref 79–97)
MONOCYTES # BLD AUTO: 1.54 10*3/MM3 (ref 0.1–0.9)
MONOCYTES NFR BLD AUTO: 6.9 % (ref 5–12)
NEUTROPHILS NFR BLD AUTO: 17.01 10*3/MM3 (ref 1.7–7)
NEUTROPHILS NFR BLD AUTO: 76.1 % (ref 42.7–76)
NRBC BLD AUTO-RTO: 0.2 /100 WBC (ref 0–0.2)
PHOSPHATE SERPL-MCNC: 3.1 MG/DL (ref 2.5–4.5)
PLATELET # BLD AUTO: 402 10*3/MM3 (ref 140–450)
PMV BLD AUTO: 9.3 FL (ref 6–12)
POTASSIUM SERPL-SCNC: 3.6 MMOL/L (ref 3.5–5.2)
POTASSIUM SERPL-SCNC: 4.3 MMOL/L (ref 3.5–5.2)
RBC # BLD AUTO: 2.99 10*6/MM3 (ref 3.77–5.28)
SODIUM SERPL-SCNC: 138 MMOL/L (ref 136–145)
WBC NRBC COR # BLD AUTO: 22.37 10*3/MM3 (ref 3.4–10.8)

## 2025-04-22 PROCEDURE — 82948 REAGENT STRIP/BLOOD GLUCOSE: CPT

## 2025-04-22 PROCEDURE — 94799 UNLISTED PULMONARY SVC/PX: CPT

## 2025-04-22 PROCEDURE — 85025 COMPLETE CBC W/AUTO DIFF WBC: CPT | Performed by: SURGERY

## 2025-04-22 PROCEDURE — 63710000001 INSULIN REGULAR HUMAN PER 5 UNITS: Performed by: SURGERY

## 2025-04-22 PROCEDURE — 99024 POSTOP FOLLOW-UP VISIT: CPT | Performed by: SURGERY

## 2025-04-22 PROCEDURE — 25010000002 HEPARIN (PORCINE) 25000-0.45 UT/250ML-% SOLUTION: Performed by: SURGERY

## 2025-04-22 PROCEDURE — 83735 ASSAY OF MAGNESIUM: CPT | Performed by: SURGERY

## 2025-04-22 PROCEDURE — 84132 ASSAY OF SERUM POTASSIUM: CPT | Performed by: STUDENT IN AN ORGANIZED HEALTH CARE EDUCATION/TRAINING PROGRAM

## 2025-04-22 PROCEDURE — 25010000002 HYDROMORPHONE 1 MG/ML SOLUTION: Performed by: SURGERY

## 2025-04-22 PROCEDURE — 85730 THROMBOPLASTIN TIME PARTIAL: CPT | Performed by: SURGERY

## 2025-04-22 PROCEDURE — 80069 RENAL FUNCTION PANEL: CPT | Performed by: SURGERY

## 2025-04-22 RX ORDER — HYDROCODONE BITARTRATE AND ACETAMINOPHEN 7.5; 325 MG/1; MG/1
1 TABLET ORAL EVERY 6 HOURS PRN
Refills: 0 | Status: DISCONTINUED | OUTPATIENT
Start: 2025-04-22 | End: 2025-04-22

## 2025-04-22 RX ORDER — POTASSIUM CHLORIDE 1500 MG/1
40 TABLET, EXTENDED RELEASE ORAL EVERY 4 HOURS
Status: COMPLETED | OUTPATIENT
Start: 2025-04-22 | End: 2025-04-22

## 2025-04-22 RX ORDER — HYDROCODONE BITARTRATE AND ACETAMINOPHEN 7.5; 325 MG/1; MG/1
1 TABLET ORAL EVERY 4 HOURS PRN
Refills: 0 | Status: DISCONTINUED | OUTPATIENT
Start: 2025-04-22 | End: 2025-04-25

## 2025-04-22 RX ADMIN — HYDROCODONE BITARTRATE AND ACETAMINOPHEN 1 TABLET: 7.5; 325 TABLET ORAL at 14:33

## 2025-04-22 RX ADMIN — ASPIRIN 81 MG CHEWABLE TABLET 81 MG: 81 TABLET CHEWABLE at 08:31

## 2025-04-22 RX ADMIN — INSULIN HUMAN 2 UNITS: 100 INJECTION, SOLUTION PARENTERAL at 06:12

## 2025-04-22 RX ADMIN — INSULIN HUMAN 3 UNITS: 100 INJECTION, SOLUTION PARENTERAL at 11:55

## 2025-04-22 RX ADMIN — BUDESONIDE 0.5 MG: 0.5 INHALANT RESPIRATORY (INHALATION) at 20:44

## 2025-04-22 RX ADMIN — HYDROCODONE BITARTRATE AND ACETAMINOPHEN 1 TABLET: 7.5; 325 TABLET ORAL at 19:44

## 2025-04-22 RX ADMIN — HYDROMORPHONE HYDROCHLORIDE 1 MG: 1 INJECTION, SOLUTION INTRAMUSCULAR; INTRAVENOUS; SUBCUTANEOUS at 06:15

## 2025-04-22 RX ADMIN — METOPROLOL TARTRATE 25 MG: 25 TABLET, FILM COATED ORAL at 08:32

## 2025-04-22 RX ADMIN — HYDROCODONE BITARTRATE AND ACETAMINOPHEN 1 TABLET: 7.5; 325 TABLET ORAL at 04:39

## 2025-04-22 RX ADMIN — HEPARIN SODIUM 18 UNITS/KG/HR: 10000 INJECTION, SOLUTION INTRAVENOUS at 04:19

## 2025-04-22 RX ADMIN — HYDROMORPHONE HYDROCHLORIDE 1 MG: 1 INJECTION, SOLUTION INTRAMUSCULAR; INTRAVENOUS; SUBCUTANEOUS at 12:55

## 2025-04-22 RX ADMIN — ACETAMINOPHEN 650 MG: 325 TABLET, FILM COATED ORAL at 08:45

## 2025-04-22 RX ADMIN — ARFORMOTEROL TARTRATE 15 MCG: 15 SOLUTION RESPIRATORY (INHALATION) at 20:44

## 2025-04-22 RX ADMIN — POTASSIUM CHLORIDE 40 MEQ: 1500 TABLET, EXTENDED RELEASE ORAL at 06:04

## 2025-04-22 RX ADMIN — PANTOPRAZOLE SODIUM 40 MG: 40 TABLET, DELAYED RELEASE ORAL at 05:19

## 2025-04-22 RX ADMIN — HEPARIN SODIUM 18 UNITS/KG/HR: 10000 INJECTION, SOLUTION INTRAVENOUS at 23:42

## 2025-04-22 RX ADMIN — ATORVASTATIN CALCIUM 40 MG: 20 TABLET, FILM COATED ORAL at 20:56

## 2025-04-22 RX ADMIN — METOPROLOL TARTRATE 25 MG: 25 TABLET, FILM COATED ORAL at 20:56

## 2025-04-22 RX ADMIN — CLOPIDOGREL BISULFATE 75 MG: 75 TABLET, FILM COATED ORAL at 08:32

## 2025-04-22 RX ADMIN — INSULIN HUMAN 2 UNITS: 100 INJECTION, SOLUTION PARENTERAL at 16:09

## 2025-04-22 RX ADMIN — DAKIN'S SOLUTION 0.125% (QUARTER STRENGTH): 0.12 SOLUTION at 16:10

## 2025-04-22 RX ADMIN — HYDROMORPHONE HYDROCHLORIDE 1 MG: 1 INJECTION, SOLUTION INTRAMUSCULAR; INTRAVENOUS; SUBCUTANEOUS at 16:10

## 2025-04-22 RX ADMIN — POTASSIUM CHLORIDE 40 MEQ: 1500 TABLET, EXTENDED RELEASE ORAL at 09:59

## 2025-04-22 NOTE — PLAN OF CARE
Goal Outcome Evaluation:   VSS. A&OX4. Pain managed with PRN pain medication. Continues on heparin gtt, therapeutic. DSG to LLE CDI. Pulses present with doppler. Call light in reach.

## 2025-04-22 NOTE — PLAN OF CARE
Goal Outcome Evaluation:           Progress: no change  Outcome Evaluation: vss, a&ox4, heparin gtt, pain managed through PRNs routinely, consent signed for surgery.

## 2025-04-22 NOTE — PROGRESS NOTES
[facility]   Surgical Progress Note    Patient Name: Desiree Garcia  : 1960  MRN: [id number]  Date of admission: 4/10/2025  Surgical Procedures Since Admission:  Procedure(s):  EMBOLECTOMY MECHANICAL, FOUR COMPARTMENT FASCIOTOMY  ARTERIOGRAM LOWER EXTREMITY  Surgeon:  Misael Lawton MD  Status:  10 Days Post-Op  -------------------    Procedure(s):  LOWER EXTREMITY DEBRIDEMENT  Surgeon:  Misael Lawton MD  Status:  4 Days Post-Op  -------------------    Procedure(s):  Left leg fasciotomy debridement with possible wound VAC placement.  Surgeon:  Raimundo Lin MD  Status:  * No surgery found *  -------------------    Subjective   Subjective     Chief Complaint: Left leg ischemia follow-up.    History of Present Illness   No chest pain shortness of breath.    Review of Systems    Objective   Objective     Vitals:   Temp:  [98 °F (36.7 °C)-98.7 °F (37.1 °C)] 98.2 °F (36.8 °C)  Heart Rate:  [71-93] 71  Resp:  [16-20] 16  BP: (109-128)/(69-83) 109/73  Output by Drain (mL) 25 0701 - 25 1900 25 1901 - 25 0700 25 0701 - 25 0722 Range Total   External Urinary Catheter 1000 1100  2100       Physical Exam  Constitutional:       Appearance: She is well-developed.   Pulmonary:      Effort: Pulmonary effort is normal. No respiratory distress.   Abdominal:      General: There is no distension.      Palpations: Abdomen is soft.   Neurological:      Mental Status: She is alert and oriented to person, place, and time.           Result Review    Result Review:  I have personally reviewed the results from the time of this admission to 2025 07:22 EDT and agree with these findings:  [x]  Laboratory list / accordion  []  Microbiology  []  Radiology  []  EKG/Telemetry   []  Cardiology/Vascular   []  Pathology  []  Old records  []  Other:  Most notable findings include:       Results from last 7 days   Lab Units 25  1742 25  1521 25  0640 25  0551  04/19/25  0401 04/18/25  0520 04/17/25  0435 04/16/25 0435   WBC 10*3/mm3 22.37*  --  25.32* 22.85* 28.23* 27.78* 26.91* 22.45*   HEMOGLOBIN g/dL 7.9* 8.0* 7.4* 7.9* 6.5* 7.1* 7.9* 8.5*   PLATELETS 10*3/mm3 402  --  415 446 415 409 399 346     Results from last 7 days   Lab Units 04/22/25  0452 04/21/25  0640 04/20/25  0551 04/19/25  0401 04/18/25  0520 04/17/25  0435 04/16/25  1746 04/16/25 0435   SODIUM mmol/L 138 133* 139 138 140 138  --  139   POTASSIUM mmol/L 3.6 3.7 4.3 4.5 3.4* 4.1 3.9 3.4*   CHLORIDE mmol/L 99 97* 102 103 105 102  --  104   CO2 mmol/L 32.1* 28.9 29.0 26.7 27.4 28.0  --  24.2   BUN mg/dL 10 6* 8 6* 6* 7*  --  10   CREATININE mg/dL 0.68 0.63 0.73 0.63 0.69 0.64  --  0.69   GLUCOSE mg/dL 148* 145* 132* 178* 143* 113*  --  109*   MAGNESIUM mg/dL 1.7 1.6 2.0 2.2 1.8 1.9  --  2.0   PHOSPHORUS mg/dL 3.1 3.2 2.9 2.5 2.6 2.7  --  3.0   Estimated Creatinine Clearance: 92.1 mL/min (by C-G formula based on SCr of 0.68 mg/dL).    Lab Results   Component Value Date    HGBA1C 9.40 (H) 04/09/2025    HGBA1C 9.30 (H) 03/13/2025    HGBA1C 9.40 (H) 12/23/2024     Glucose   Date/Time Value Ref Range Status   04/22/2025 0603 182 (H) 70 - 130 mg/dL Final   04/21/2025 1827 208 (H) 70 - 130 mg/dL Final   04/21/2025 1139 192 (H) 70 - 130 mg/dL Final   04/21/2025 0529 155 (H) 70 - 130 mg/dL Final   04/21/2025 0014 211 (H) 70 - 130 mg/dL Final   04/20/2025 2034 181 (H) 70 - 130 mg/dL Final   04/20/2025 1634 180 (H) 70 - 130 mg/dL Final   04/20/2025 1147 198 (H) 70 - 130 mg/dL Final       Assessment & Plan   Assessment / Plan     Brief Patient Summary:  Desiree Garcia is a 65 y.o. female who multiple medical issues.  Vascular is following for ischemia of the left lower extremity.    Active Hospital Problems:   Active Hospital Problems    Diagnosis    • 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    • Acute thrombus of left ventricle    • Altered bowel habits    • Leg paresthesia      Plan:   4/12/2025:  Open left leg thrombectomy with 4 compartment fasciotomies.  (MTJ)  4/18/2025: Debridement of left leg fasciotomies (MTJ)    4/19/2025: Postop day #1 from fasciotomy debridement.  Dressings down today.  Minimal oozing noted muscles appear viable as pictured below.  There is some ischemic changes to the skin on the anterior margin of the lateral fasciotomies will continue to monitor.  I redressed the wounds with the assistance of the nurse with dilute Betadine soaked gauze.  Will plan on leaving intact upon my evaluation for tomorrow.  She has a strong dorsalis pedis artery signal.  She has weak dorsiflexion of her left foot.      4/20/2025: Postop day 2 fasciotomy debridement.  Will continue with once daily Dakin's dressing changes.  Will continue to lower the anterior skin on the lateral fasciotomy to demarcate.  This will likely require debridement sometime next week once further demarcation has occurred.    4/21/2025: Postop day 3 fasciotomy debridement.  With some anterior ischemic changes to the flap on the lateral compartment.  Will place her on the surgery schedule for Wednesday to debride this.  If things appear well at that time we will also place wound VAC.    4/22/2025: Postop day #4 fasciotomy debridement.  Plan on surgical debridement of the ischemic skin on the left anterior lateral fasciotomy with wound VAC placement tomorrow.  Will hold heparin infusion when patient goes down to preoperative holding tomorrow.    VTE Prophylaxis:  Pharmacologic & mechanical VTE prophylaxis orders are present.        Raimundo Lin MD

## 2025-04-22 NOTE — PROGRESS NOTES
Name: Desiree Garcia ADMIT: 4/10/2025   : 1960  PCP: Sandra Kaba MD    MRN: [id number] LOS: 12 days   AGE/SEX: 65 y.o. female  ROOM: [facility]     Subjective   Subjective   Patient seen and examined at bedside, she is weepy but without distress.  She is tolerating diet.  Planning for possible surgery on Wednesday.  No overt bleeding on heparin drip    2025  No overnight events, patient without distress.  Plan for surgery tomorrow.  Admits to breakthrough pain at times but otherwise doing okay         Objective   Objective   Vital Signs  Temp:  [98.1 °F (36.7 °C)-98.7 °F (37.1 °C)] 98.4 °F (36.9 °C)  Heart Rate:  [71-92] 79  Resp:  [16-20] 16  BP: (109-128)/(59-83) 118/59  SpO2:  [93 %-97 %] 93 %  on   ;   Device (Oxygen Therapy): room air  Body mass index is 30.44 kg/m².  Physical Exam  Constitutional:       Appearance: Normal appearance. She is ill-appearing.   HENT:      Head: Normocephalic and atraumatic.      Nose: No congestion.      Mouth/Throat:      Pharynx: No oropharyngeal exudate.   Eyes:      General: No scleral icterus.  Cardiovascular:      Heart sounds: No murmur heard.     No friction rub. No gallop.   Pulmonary:      Effort: No respiratory distress.      Breath sounds: No wheezing, rhonchi or rales.      Comments: Stable on room air  Abdominal:      General: There is no distension.      Tenderness: There is no abdominal tenderness. There is no guarding.   Musculoskeletal:         General: No deformity or signs of injury.      Cervical back: No rigidity.      Comments: Left leg is bandaged, bandaging is clean/dry/intact   Skin:     Coloration: Skin is not jaundiced.      Findings: No bruising or lesion.   Neurological:      General: No focal deficit present.      Mental Status: She is alert and oriented to person, place, and time.      Motor: Weakness present.       Results Review     I reviewed the patient's new clinical results.  Results from last 7 days   Lab Units  04/22/25  0453 04/21/25  1521 04/21/25  0640 04/20/25  0551 04/19/25  0401   WBC 10*3/mm3 22.37*  --  25.32* 22.85* 28.23*   HEMOGLOBIN g/dL 7.9* 8.0* 7.4* 7.9* 6.5*   PLATELETS 10*3/mm3 402  --  415 446 415     Results from last 7 days   Lab Units 04/22/25  0452 04/21/25  0640 04/20/25  0551 04/19/25  0401   SODIUM mmol/L 138 133* 139 138   POTASSIUM mmol/L 3.6 3.7 4.3 4.5   CHLORIDE mmol/L 99 97* 102 103   CO2 mmol/L 32.1* 28.9 29.0 26.7   BUN mg/dL 10 6* 8 6*   CREATININE mg/dL 0.68 0.63 0.73 0.63   GLUCOSE mg/dL 148* 145* 132* 178*   EGFR mL/min/1.73 97.4 99.2 92.0 99.2     Results from last 7 days   Lab Units 04/22/25 0452 04/21/25  0640 04/20/25  0551 04/19/25  0401 04/17/25  0435 04/16/25  0435   ALBUMIN g/dL 2.7* 2.4* 2.7* 2.4*   < > 2.4*   BILIRUBIN mg/dL  --   --   --   --   --  0.6   ALK PHOS U/L  --   --   --   --   --  139*   AST (SGOT) U/L  --   --   --   --   --  52*   ALT (SGPT) U/L  --   --   --   --   --  11    < > = values in this interval not displayed.     Results from last 7 days   Lab Units 04/22/25 0452 04/21/25 0640 04/20/25  0551 04/19/25  0401   CALCIUM mg/dL 8.2* 7.8* 7.9* 7.4*   ALBUMIN g/dL 2.7* 2.4* 2.7* 2.4*   MAGNESIUM mg/dL 1.7 1.6 2.0 2.2   PHOSPHORUS mg/dL 3.1 3.2 2.9 2.5       Glucose   Date/Time Value Ref Range Status   04/22/2025 0603 182 (H) 70 - 130 mg/dL Final   04/21/2025 1827 208 (H) 70 - 130 mg/dL Final   04/21/2025 1139 192 (H) 70 - 130 mg/dL Final   04/21/2025 0529 155 (H) 70 - 130 mg/dL Final   04/21/2025 0014 211 (H) 70 - 130 mg/dL Final   04/20/2025 2034 181 (H) 70 - 130 mg/dL Final   04/20/2025 1634 180 (H) 70 - 130 mg/dL Final       No radiology results for the last day    I have personally reviewed all medications:  Scheduled Medications  arformoterol, 15 mcg, Nebulization, BID - RT  aspirin, 81 mg, Oral, Daily   Or  aspirin, 300 mg, Rectal, Daily  atorvastatin, 40 mg, Oral, Nightly  budesonide, 0.5 mg, Nebulization, BID - RT  clopidogrel, 75 mg, Oral,  Daily  insulin regular, 2-7 Units, Subcutaneous, Q6H  metoprolol tartrate, 25 mg, Oral, Q12H  pantoprazole, 40 mg, Oral, Q AM  revefenacin, 175 mcg, Nebulization, Daily - RT  senna-docusate sodium, 2 tablet, Oral, BID  sodium hypochlorite, , Topical, Daily    Infusions  heparin, 12 Units/kg/hr, Last Rate: 18 Units/kg/hr (04/22/25 9906)    Diet  Diet: Regular/House, Diabetic; Consistent Carbohydrate; Fluid Consistency: Thin (IDDSI 0)  NPO Diet NPO Type: Sips with Meds    I have personally reviewed:  [x]  Laboratory   [x]  Microbiology   [x]  Radiology   [x]  EKG/Telemetry  [x]  Cardiology/Vascular   []  Pathology    []  Records       Assessment/Plan     Active Hospital Problems    Diagnosis  POA   • 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 [I74.3]  Unknown   • Acute thrombus of left ventricle [I24.0]  Yes   • Altered bowel habits [R19.4]  Yes   • Leg paresthesia [R20.2]  Yes      Resolved Hospital Problems    Diagnosis Date Resolved POA   • **Cardiogenic shock [R57.0] 04/15/2025 Yes       65 y.o. female admitted with Cardiogenic shock.    #Left lower extremity embolism and thrombosis  #Left ventricular thrombus with showering emboli of kidney, spleen, bile  #Severe PAD    -Status post open left iliofemoral, profunda femoris and superficial femoral artery thromboembolectomy with left common femoral endarterectomy and repair, 4 compartment fasciotomy of left lower extremity with left femoropopliteal angioplasty and stent placement x 2 on 4/12/2025    -Status post left lateral fasciotomy site debridement and left medial fasciotomy site debridement on 4/18/2025    -Vascular is planning left anterior lateral fasciotomy debridement with possible wound VAC placement on Wednesday    - Continue heparin drip    -Plan to switch to an oral NOAC once cleared by vascular, loading dose not needed    - Hematology has signed off    - Pain control    - Aspirin and Plavix daily    - Statin      #Leukocytosis    - ID  consulted, suspects WBC elevation is multifactorial as cultures are negative to date    - ID recommends to continue cefazolin 2 g IV every 8 hours    - Continue to monitor    - WBC 25.32 > 22.37    #Cardiogenic shock  #Inferior STEMI    -Status post JOE x 2 on 4/8/2025    - Resolved    - Aspirin 81 mg daily, Plavix 75 mg daily, atorvastatin 40 mg p.o. daily    - Depressive 25 mg p.o. twice daily    - Continue heparin drip, plan to transition to DOAC with long-term dual therapy    - Cardiology has signed off    #COPD    - Brovana nebs twice daily    - Pulmicort twice daily    - Yupelri nebs daily    #Ileus    - Ileus has resolved per surgery, NG removed, surgery signed off    #NIA    - Resolved, creatinine stable    - Nephrology has signed off    #Anemia    - Hemoglobin 7.4 > 7.9, suspect blood loss from surgery    - No overt bleeding, continue to monitor closely is on heparin drip    - Transfuse maintain hemoglobin greater than 7    #GERD    - PPI          Continue on heparin drip  for DVT prophylaxis.  Full code.  Discussed with patient and nursing staff.  Anticipate discharge home with  vs SNU facility next week.  Expected Discharge Date: 4/25/2025; Expected Discharge Time:       Jaya Rocha DO  [facility]  04/22/25  11:15 EDT

## 2025-04-23 ENCOUNTER — APPOINTMENT (OUTPATIENT)
Dept: CARDIOLOGY | Facility: HOSPITAL | Age: 65
DRG: 270 | End: 2025-04-23
Payer: MEDICARE

## 2025-04-23 ENCOUNTER — ANESTHESIA EVENT (OUTPATIENT)
Dept: PERIOP | Facility: HOSPITAL | Age: 65
End: 2025-04-23
Payer: MEDICARE

## 2025-04-23 ENCOUNTER — ANESTHESIA (OUTPATIENT)
Dept: PERIOP | Facility: HOSPITAL | Age: 65
End: 2025-04-23
Payer: MEDICARE

## 2025-04-23 LAB
ACT BLD: 291 SECONDS (ref 82–152)
ALBUMIN SERPL-MCNC: 2.4 G/DL (ref 3.5–5.2)
ANION GAP SERPL CALCULATED.3IONS-SCNC: 6.9 MMOL/L (ref 5–15)
APTT PPP: 39.8 SECONDS (ref 22.7–35.4)
APTT PPP: 83 SECONDS (ref 22.7–35.4)
APTT PPP: 85.8 SECONDS (ref 22.7–35.4)
ARTERIAL PATENCY WRIST A: POSITIVE
ATMOSPHERIC PRESS: 753 MMHG
BASE EXCESS BLDA CALC-SCNC: -6 MMOL/L (ref -5–5)
BASE EXCESS BLDA CALC-SCNC: 12.1 MMOL/L (ref 0–2)
BASOPHILS # BLD AUTO: 0.09 10*3/MM3 (ref 0–0.2)
BASOPHILS NFR BLD AUTO: 0.5 % (ref 0–1.5)
BDY SITE: ABNORMAL
BUN SERPL-MCNC: 9 MG/DL (ref 8–23)
BUN/CREAT SERPL: 12.2 (ref 7–25)
CALCIUM SPEC-SCNC: 8.3 MG/DL (ref 8.6–10.5)
CHLORIDE SERPL-SCNC: 100 MMOL/L (ref 98–107)
CO2 BLDA-SCNC: 21 MMOL/L (ref 24–29)
CO2 SERPL-SCNC: 30.1 MMOL/L (ref 22–29)
CREAT SERPL-MCNC: 0.74 MG/DL (ref 0.57–1)
DEPRECATED RDW RBC AUTO: 44.9 FL (ref 37–54)
DEVICE COMMENT: ABNORMAL
EGFRCR SERPLBLD CKD-EPI 2021: 90.5 ML/MIN/1.73
EOSINOPHIL # BLD AUTO: 0.11 10*3/MM3 (ref 0–0.4)
EOSINOPHIL NFR BLD AUTO: 0.6 % (ref 0.3–6.2)
ERYTHROCYTE [DISTWIDTH] IN BLOOD BY AUTOMATED COUNT: 14.5 % (ref 12.3–15.4)
GLUCOSE BLDC GLUCOMTR-MCNC: 116 MG/DL (ref 70–130)
GLUCOSE BLDC GLUCOMTR-MCNC: 126 MG/DL (ref 70–130)
GLUCOSE BLDC GLUCOMTR-MCNC: 131 MG/DL (ref 70–130)
GLUCOSE BLDC GLUCOMTR-MCNC: 133 MG/DL (ref 70–130)
GLUCOSE BLDC GLUCOMTR-MCNC: 136 MG/DL (ref 70–130)
GLUCOSE BLDC GLUCOMTR-MCNC: 179 MG/DL (ref 70–130)
GLUCOSE BLDC GLUCOMTR-MCNC: 196 MG/DL (ref 70–130)
GLUCOSE BLDC GLUCOMTR-MCNC: 77 MG/DL (ref 70–130)
GLUCOSE SERPL-MCNC: 117 MG/DL (ref 65–99)
HCO3 BLDA-SCNC: 20.1 MMOL/L (ref 22–26)
HCO3 BLDA-SCNC: 36.1 MMOL/L (ref 22–28)
HCT VFR BLD AUTO: 23.8 % (ref 34–46.6)
HCT VFR BLDA CALC: 34 % (ref 38–51)
HEMODILUTION: NO
HGB BLD-MCNC: 7.6 G/DL (ref 12–15.9)
HGB BLDA-MCNC: 11.6 G/DL (ref 12–17)
IMM GRANULOCYTES # BLD AUTO: 0.51 10*3/MM3 (ref 0–0.05)
IMM GRANULOCYTES NFR BLD AUTO: 2.7 % (ref 0–0.5)
LV EF BIPLANE MOD: 50 %
LYMPHOCYTES # BLD AUTO: 2.4 10*3/MM3 (ref 0.7–3.1)
LYMPHOCYTES NFR BLD AUTO: 12.5 % (ref 19.6–45.3)
MAGNESIUM SERPL-MCNC: 1.6 MG/DL (ref 1.6–2.4)
MCH RBC QN AUTO: 27.1 PG (ref 26.6–33)
MCHC RBC AUTO-ENTMCNC: 31.9 G/DL (ref 31.5–35.7)
MCV RBC AUTO: 85 FL (ref 79–97)
MODALITY: ABNORMAL
MONOCYTES # BLD AUTO: 1.62 10*3/MM3 (ref 0.1–0.9)
MONOCYTES NFR BLD AUTO: 8.4 % (ref 5–12)
NEUTROPHILS NFR BLD AUTO: 14.5 10*3/MM3 (ref 1.7–7)
NEUTROPHILS NFR BLD AUTO: 75.3 % (ref 42.7–76)
NRBC BLD AUTO-RTO: 0.1 /100 WBC (ref 0–0.2)
PCO2 BLDA: 39.7 MM HG (ref 35–45)
PCO2 BLDA: 44.3 MM HG (ref 35–45)
PH BLDA: 7.34 PH UNITS (ref 7.35–7.6)
PH BLDA: 7.52 PH UNITS (ref 7.35–7.45)
PHOSPHATE SERPL-MCNC: 2.9 MG/DL (ref 2.5–4.5)
PLATELET # BLD AUTO: 404 10*3/MM3 (ref 140–450)
PMV BLD AUTO: 9.4 FL (ref 6–12)
PO2 BLDA: 315 MMHG (ref 80–105)
PO2 BLDA: 55.8 MM HG (ref 80–100)
POTASSIUM BLDA-SCNC: 3.5 MMOL/L (ref 3.5–4.9)
POTASSIUM SERPL-SCNC: 4.2 MMOL/L (ref 3.5–5.2)
RBC # BLD AUTO: 2.8 10*6/MM3 (ref 3.77–5.28)
SAO2 % BLDA: 100 % (ref 95–98)
SAO2 % BLDCOA: 91.2 % (ref 92–98.5)
SODIUM SERPL-SCNC: 137 MMOL/L (ref 136–145)
TOTAL RATE: 18 BREATHS/MINUTE
WBC NRBC COR # BLD AUTO: 19.23 10*3/MM3 (ref 3.4–10.8)

## 2025-04-23 PROCEDURE — 85730 THROMBOPLASTIN TIME PARTIAL: CPT | Performed by: SURGERY

## 2025-04-23 PROCEDURE — 82803 BLOOD GASES ANY COMBINATION: CPT | Performed by: ANESTHESIOLOGY

## 2025-04-23 PROCEDURE — 63710000001 REVEFENACIN 175 MCG/3ML SOLUTION: Performed by: SURGERY

## 2025-04-23 PROCEDURE — 93010 ELECTROCARDIOGRAM REPORT: CPT | Performed by: STUDENT IN AN ORGANIZED HEALTH CARE EDUCATION/TRAINING PROGRAM

## 2025-04-23 PROCEDURE — 93308 TTE F-UP OR LMTD: CPT | Performed by: INTERNAL MEDICINE

## 2025-04-23 PROCEDURE — 85025 COMPLETE CBC W/AUTO DIFF WBC: CPT | Performed by: SURGERY

## 2025-04-23 PROCEDURE — 83735 ASSAY OF MAGNESIUM: CPT | Performed by: SURGERY

## 2025-04-23 PROCEDURE — 97535 SELF CARE MNGMENT TRAINING: CPT

## 2025-04-23 PROCEDURE — 25810000003 LACTATED RINGERS PER 1000 ML: Performed by: ANESTHESIOLOGY

## 2025-04-23 PROCEDURE — 99232 SBSQ HOSP IP/OBS MODERATE 35: CPT | Performed by: INTERNAL MEDICINE

## 2025-04-23 PROCEDURE — 97530 THERAPEUTIC ACTIVITIES: CPT

## 2025-04-23 PROCEDURE — 99024 POSTOP FOLLOW-UP VISIT: CPT | Performed by: SURGERY

## 2025-04-23 PROCEDURE — 25010000002 HYDROMORPHONE 1 MG/ML SOLUTION: Performed by: SURGERY

## 2025-04-23 PROCEDURE — 63710000001 INSULIN REGULAR HUMAN PER 5 UNITS: Performed by: SURGERY

## 2025-04-23 PROCEDURE — 94664 DEMO&/EVAL PT USE INHALER: CPT

## 2025-04-23 PROCEDURE — 94799 UNLISTED PULMONARY SVC/PX: CPT

## 2025-04-23 PROCEDURE — 93308 TTE F-UP OR LMTD: CPT

## 2025-04-23 PROCEDURE — 94761 N-INVAS EAR/PLS OXIMETRY MLT: CPT

## 2025-04-23 PROCEDURE — 93005 ELECTROCARDIOGRAM TRACING: CPT | Performed by: ANESTHESIOLOGY

## 2025-04-23 PROCEDURE — 82948 REAGENT STRIP/BLOOD GLUCOSE: CPT

## 2025-04-23 PROCEDURE — 25010000002 HEPARIN (PORCINE) 25000-0.45 UT/250ML-% SOLUTION: Performed by: SURGERY

## 2025-04-23 PROCEDURE — 80069 RENAL FUNCTION PANEL: CPT | Performed by: SURGERY

## 2025-04-23 PROCEDURE — 25010000002 FAMOTIDINE 10 MG/ML SOLUTION: Performed by: ANESTHESIOLOGY

## 2025-04-23 PROCEDURE — G0463 HOSPITAL OUTPT CLINIC VISIT: HCPCS | Performed by: SURGERY

## 2025-04-23 PROCEDURE — 36600 WITHDRAWAL OF ARTERIAL BLOOD: CPT | Performed by: ANESTHESIOLOGY

## 2025-04-23 RX ORDER — FAMOTIDINE 10 MG/ML
20 INJECTION, SOLUTION INTRAVENOUS ONCE
Status: COMPLETED | OUTPATIENT
Start: 2025-04-23 | End: 2025-04-23

## 2025-04-23 RX ORDER — DROPERIDOL 2.5 MG/ML
0.62 INJECTION, SOLUTION INTRAMUSCULAR; INTRAVENOUS
Status: CANCELLED | OUTPATIENT
Start: 2025-04-24

## 2025-04-23 RX ORDER — HYDROCODONE BITARTRATE AND ACETAMINOPHEN 5; 325 MG/1; MG/1
1 TABLET ORAL ONCE AS NEEDED
Refills: 0 | Status: CANCELLED | OUTPATIENT
Start: 2025-04-24

## 2025-04-23 RX ORDER — FENTANYL CITRATE 50 UG/ML
50 INJECTION, SOLUTION INTRAMUSCULAR; INTRAVENOUS
Status: CANCELLED | OUTPATIENT
Start: 2025-04-24

## 2025-04-23 RX ORDER — HEPARIN SODIUM 5000 [USP'U]/ML
30-57.9 INJECTION, SOLUTION INTRAVENOUS; SUBCUTANEOUS EVERY 6 HOURS PRN
Status: DISCONTINUED | OUTPATIENT
Start: 2025-04-23 | End: 2025-04-26

## 2025-04-23 RX ORDER — EPHEDRINE SULFATE 50 MG/ML
5 INJECTION, SOLUTION INTRAVENOUS ONCE AS NEEDED
Status: CANCELLED | OUTPATIENT
Start: 2025-04-24

## 2025-04-23 RX ORDER — OXYCODONE AND ACETAMINOPHEN 7.5; 325 MG/1; MG/1
1 TABLET ORAL EVERY 4 HOURS PRN
Refills: 0 | Status: CANCELLED | OUTPATIENT
Start: 2025-04-24 | End: 2025-05-01

## 2025-04-23 RX ORDER — PROMETHAZINE HYDROCHLORIDE 25 MG/1
25 TABLET ORAL ONCE AS NEEDED
Status: CANCELLED | OUTPATIENT
Start: 2025-04-24

## 2025-04-23 RX ORDER — NALOXONE HCL 0.4 MG/ML
0.2 VIAL (ML) INJECTION AS NEEDED
Status: CANCELLED | OUTPATIENT
Start: 2025-04-24

## 2025-04-23 RX ORDER — ATROPINE SULFATE 0.4 MG/ML
0.4 INJECTION, SOLUTION INTRAMUSCULAR; INTRAVENOUS; SUBCUTANEOUS ONCE AS NEEDED
Status: CANCELLED | OUTPATIENT
Start: 2025-04-24

## 2025-04-23 RX ORDER — SODIUM CHLORIDE 0.9 % (FLUSH) 0.9 %
3-10 SYRINGE (ML) INJECTION AS NEEDED
Status: DISCONTINUED | OUTPATIENT
Start: 2025-04-23 | End: 2025-04-23 | Stop reason: HOSPADM

## 2025-04-23 RX ORDER — MIDAZOLAM HYDROCHLORIDE 1 MG/ML
1 INJECTION, SOLUTION INTRAMUSCULAR; INTRAVENOUS
Status: DISCONTINUED | OUTPATIENT
Start: 2025-04-23 | End: 2025-04-23 | Stop reason: HOSPADM

## 2025-04-23 RX ORDER — HYDRALAZINE HYDROCHLORIDE 20 MG/ML
5 INJECTION INTRAMUSCULAR; INTRAVENOUS
Status: CANCELLED | OUTPATIENT
Start: 2025-04-24

## 2025-04-23 RX ORDER — IPRATROPIUM BROMIDE AND ALBUTEROL SULFATE 2.5; .5 MG/3ML; MG/3ML
3 SOLUTION RESPIRATORY (INHALATION) ONCE AS NEEDED
Status: CANCELLED | OUTPATIENT
Start: 2025-04-24

## 2025-04-23 RX ORDER — FLUMAZENIL 0.1 MG/ML
0.2 INJECTION INTRAVENOUS AS NEEDED
Status: CANCELLED | OUTPATIENT
Start: 2025-04-24

## 2025-04-23 RX ORDER — LABETALOL HYDROCHLORIDE 5 MG/ML
5 INJECTION, SOLUTION INTRAVENOUS
Status: CANCELLED | OUTPATIENT
Start: 2025-04-24

## 2025-04-23 RX ORDER — LIDOCAINE HYDROCHLORIDE 10 MG/ML
0.5 INJECTION, SOLUTION INFILTRATION; PERINEURAL ONCE AS NEEDED
Status: DISCONTINUED | OUTPATIENT
Start: 2025-04-23 | End: 2025-04-23 | Stop reason: HOSPADM

## 2025-04-23 RX ORDER — SODIUM CHLORIDE, SODIUM LACTATE, POTASSIUM CHLORIDE, CALCIUM CHLORIDE 600; 310; 30; 20 MG/100ML; MG/100ML; MG/100ML; MG/100ML
9 INJECTION, SOLUTION INTRAVENOUS CONTINUOUS
Status: DISCONTINUED | OUTPATIENT
Start: 2025-04-23 | End: 2025-04-24

## 2025-04-23 RX ORDER — SODIUM CHLORIDE 0.9 % (FLUSH) 0.9 %
3 SYRINGE (ML) INJECTION EVERY 12 HOURS SCHEDULED
Status: DISCONTINUED | OUTPATIENT
Start: 2025-04-23 | End: 2025-04-23 | Stop reason: HOSPADM

## 2025-04-23 RX ORDER — FENTANYL CITRATE 50 UG/ML
50 INJECTION, SOLUTION INTRAMUSCULAR; INTRAVENOUS ONCE AS NEEDED
Status: DISCONTINUED | OUTPATIENT
Start: 2025-04-23 | End: 2025-04-23 | Stop reason: HOSPADM

## 2025-04-23 RX ORDER — HYDROMORPHONE HYDROCHLORIDE 1 MG/ML
0.5 INJECTION, SOLUTION INTRAMUSCULAR; INTRAVENOUS; SUBCUTANEOUS
Refills: 0 | Status: CANCELLED | OUTPATIENT
Start: 2025-04-24

## 2025-04-23 RX ORDER — DIPHENHYDRAMINE HYDROCHLORIDE 50 MG/ML
12.5 INJECTION, SOLUTION INTRAMUSCULAR; INTRAVENOUS
Status: CANCELLED | OUTPATIENT
Start: 2025-04-24

## 2025-04-23 RX ORDER — PROMETHAZINE HYDROCHLORIDE 25 MG/1
25 SUPPOSITORY RECTAL ONCE AS NEEDED
Status: CANCELLED | OUTPATIENT
Start: 2025-04-24

## 2025-04-23 RX ORDER — ONDANSETRON 2 MG/ML
4 INJECTION INTRAMUSCULAR; INTRAVENOUS ONCE AS NEEDED
Status: CANCELLED | OUTPATIENT
Start: 2025-04-24

## 2025-04-23 RX ORDER — HEPARIN SODIUM 5000 [USP'U]/ML
30-57.9 INJECTION, SOLUTION INTRAVENOUS; SUBCUTANEOUS EVERY 6 HOURS PRN
Status: DISCONTINUED | OUTPATIENT
Start: 2025-04-23 | End: 2025-04-23

## 2025-04-23 RX ADMIN — CLOPIDOGREL BISULFATE 75 MG: 75 TABLET, FILM COATED ORAL at 08:12

## 2025-04-23 RX ADMIN — METOPROLOL TARTRATE 25 MG: 25 TABLET, FILM COATED ORAL at 20:33

## 2025-04-23 RX ADMIN — HYDROMORPHONE HYDROCHLORIDE 1 MG: 1 INJECTION, SOLUTION INTRAMUSCULAR; INTRAVENOUS; SUBCUTANEOUS at 08:12

## 2025-04-23 RX ADMIN — ARFORMOTEROL TARTRATE 15 MCG: 15 SOLUTION RESPIRATORY (INHALATION) at 07:54

## 2025-04-23 RX ADMIN — HYDROCODONE BITARTRATE AND ACETAMINOPHEN 1 TABLET: 7.5; 325 TABLET ORAL at 00:09

## 2025-04-23 RX ADMIN — BUDESONIDE 0.5 MG: 0.5 INHALANT RESPIRATORY (INHALATION) at 19:31

## 2025-04-23 RX ADMIN — METOPROLOL TARTRATE 25 MG: 25 TABLET, FILM COATED ORAL at 08:12

## 2025-04-23 RX ADMIN — HYDROCODONE BITARTRATE AND ACETAMINOPHEN 1 TABLET: 7.5; 325 TABLET ORAL at 06:09

## 2025-04-23 RX ADMIN — INSULIN HUMAN 2 UNITS: 100 INJECTION, SOLUTION PARENTERAL at 00:45

## 2025-04-23 RX ADMIN — SODIUM CHLORIDE, POTASSIUM CHLORIDE, SODIUM LACTATE AND CALCIUM CHLORIDE 9 ML/HR: 600; 310; 30; 20 INJECTION, SOLUTION INTRAVENOUS at 13:40

## 2025-04-23 RX ADMIN — FAMOTIDINE 20 MG: 10 INJECTION, SOLUTION INTRAVENOUS at 13:52

## 2025-04-23 RX ADMIN — HYDROCODONE BITARTRATE AND ACETAMINOPHEN 1 TABLET: 7.5; 325 TABLET ORAL at 11:04

## 2025-04-23 RX ADMIN — HYDROMORPHONE HYDROCHLORIDE 1 MG: 1 INJECTION, SOLUTION INTRAMUSCULAR; INTRAVENOUS; SUBCUTANEOUS at 02:56

## 2025-04-23 RX ADMIN — ASPIRIN 81 MG CHEWABLE TABLET 81 MG: 81 TABLET CHEWABLE at 08:12

## 2025-04-23 RX ADMIN — ARFORMOTEROL TARTRATE 15 MCG: 15 SOLUTION RESPIRATORY (INHALATION) at 19:30

## 2025-04-23 RX ADMIN — HYDROCODONE BITARTRATE AND ACETAMINOPHEN 1 TABLET: 7.5; 325 TABLET ORAL at 15:16

## 2025-04-23 RX ADMIN — PANTOPRAZOLE SODIUM 40 MG: 40 TABLET, DELAYED RELEASE ORAL at 06:09

## 2025-04-23 RX ADMIN — SENNOSIDES AND DOCUSATE SODIUM 2 TABLET: 50; 8.6 TABLET ORAL at 20:33

## 2025-04-23 RX ADMIN — HYDROMORPHONE HYDROCHLORIDE 1 MG: 1 INJECTION, SOLUTION INTRAMUSCULAR; INTRAVENOUS; SUBCUTANEOUS at 20:09

## 2025-04-23 RX ADMIN — REVEFENACIN 175 MCG: 175 SOLUTION RESPIRATORY (INHALATION) at 07:54

## 2025-04-23 RX ADMIN — HYDROMORPHONE HYDROCHLORIDE 1 MG: 1 INJECTION, SOLUTION INTRAMUSCULAR; INTRAVENOUS; SUBCUTANEOUS at 16:11

## 2025-04-23 RX ADMIN — ATORVASTATIN CALCIUM 40 MG: 20 TABLET, FILM COATED ORAL at 20:33

## 2025-04-23 RX ADMIN — BUDESONIDE 0.5 MG: 0.5 INHALANT RESPIRATORY (INHALATION) at 07:54

## 2025-04-23 RX ADMIN — HEPARIN SODIUM 18 UNITS/KG/HR: 10000 INJECTION, SOLUTION INTRAVENOUS at 17:16

## 2025-04-23 RX ADMIN — SENNOSIDES AND DOCUSATE SODIUM 2 TABLET: 50; 8.6 TABLET ORAL at 08:12

## 2025-04-23 NOTE — THERAPY TREATMENT NOTE
Patient Name: Desiree Garcia  : 1960    MRN: [id number]                              Today's Date: 2025       Admit Date: 4/10/2025    Visit Dx:     ICD-10-CM ICD-9-CM   1. 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  I74.3 444.22     Patient Active Problem List   Diagnosis    Type 2 diabetes mellitus, without long-term current use of insulin    COPD (chronic obstructive pulmonary disease)    Depression with anxiety    Esophageal reflux    Forgetfulness    Leg paresthesia    Lumbar spinal stenosis    Migraines    Night sweat    Sciatica    Dyspnea on exertion    Thoracic or lumbosacral neuritis or radiculitis    Left wrist pain    Sprain of left wrist    Arthritis of knee, left    Contusion of left knee    Nondisplaced fracture of trapezium bone of left wrist with routine healing    Chronic left shoulder pain    Coronary artery disease involving native heart without angina pectoris    Hypertension, essential    Mixed hyperlipidemia    Cigarette nicotine dependence with nicotine-induced disorder    Altered bowel habits    Diarrhea    Rectal bleeding    Abdominal bloating    Allergic rhinitis due to food    Elevated troponin    Acute diastolic CHF (congestive heart failure)    History of ST elevation myocardial infarction (STEMI)    STEMI (ST elevation myocardial infarction)    Acute thrombus of left ventricle    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     Past Medical History:   Diagnosis Date    Arthritis     Cervical cancer screening     COPD (chronic obstructive pulmonary disease)     Coronary artery disease involving native heart without angina pectoris 2021    primary angioplasty and stent placement to mid right coronary artery with good angiographic results on 2021 after STEMI    Depression with anxiety     Diabetes mellitus     Forgetfulness     Gastric reflux     Hypertension     Leg paresthesia 2017    Right    Limb swelling     Lumbago 2017    Low  back pain    Lumbar spinal stenosis 2017    L4/5 moderate to severe central canal stenosis    Lumbosacral radiculopathy 2017    Right    Migraines     Night sweat     Reflux esophagitis     Shortness of breath     ST elevation myocardial infarction (STEMI) (CMS/[facility]) 2021    Stomach disorder      Past Surgical History:   Procedure Laterality Date    ABDOMINAL SURGERY      3 C-SECTIONS    ARTERIOGRAM LOWER EXTREMITY Left 2025    Procedure: ARTERIOGRAM LOWER EXTREMITY;  Surgeon: Misael Lawton MD;  Location: [facility] OR;  Service: Vascular;  Laterality: Left;    CARDIAC CATHETERIZATION      CARDIAC CATHETERIZATION N/A 2021    Procedure: Left Heart Cath;  Surgeon: Sidney Xiao MD;  Location: [facility] CATH INVASIVE LOCATION;  Service: Cardiology;  Laterality: N/A;    CARDIAC CATHETERIZATION N/A 2025    Procedure: Left Heart Cath;  Surgeon: James Verdugo MD;  Location: [facility] CATH INVASIVE LOCATION;  Service: Cardiology;  Laterality: N/A;     SECTION      x 3    CHOLECYSTECTOMY      COLONOSCOPY      COLONOSCOPY N/A 3/25/2025    Procedure: COLONOSCOPY WITH BIOPSIES AND COLD AND HOT SNARE POLYPECTOMIES;  Surgeon: Heber Najera MD;  Location: [facility] ENDOSCOPY;  Service: Gastroenterology;  Laterality: N/A;  COLON POLYPS    CORONARY STENT PLACEMENT      EMBOLECTOMY Left 2025    Procedure: EMBOLECTOMY MECHANICAL, FOUR COMPARTMENT FASCIOTOMY;  Surgeon: Misael Lawton MD;  Location: [facility] OR;  Service: Vascular;  Laterality: Left;    ENDOSCOPY      2007    FOOT SURGERY Right     HAND SURGERY      HYSTERECTOMY  1985    INCISION AND DRAINAGE LEG Left 2025    Procedure: LOWER EXTREMITY DEBRIDEMENT;  Surgeon: Misael Lawton MD;  Location: [facility] OR;  Service: Vascular;  Laterality: Left;      General Information       Row Name 25 1317          Physical Therapy Time and Intention    Document Type therapy note (daily note)  -TIFF      Mode of Treatment individual therapy;occupational therapy  -       Row Name 04/23/25 1317          General Information    Patient Profile Reviewed yes  -       Row Name 04/23/25 1317          Cognition    Orientation Status (Cognition) oriented x 3  -[facility] Name 04/23/25 1317          Safety Issues/Impairments Affecting Functional Mobility    Comment, Safety Issues/Impairments (Mobility) PT/OT cotreatment appropriate due to pt acuity level and to maxmize therapeutic benefit and for safety of patient and staff  -               User Key  (r) = Recorded By, (t) = Taken By, (c) = Cosigned By      Initials Name Provider Type    Laura Kulkarni PTA Physical Therapist Assistant                   Mobility       Row Name 04/23/25 1317          Bed Mobility    Bed Mobility supine-sit;sit-supine;scooting/bridging  -     Scooting/Bridging [place] (Bed Mobility) minimum assist (75% patient effort)  -     Supine-Sit [place] (Bed Mobility) standby assist;verbal cues  -     Sit-Supine [place] (Bed Mobility) standby assist;verbal cues  -     Assistive Device (Bed Mobility) bed rails  -     Comment, (Bed Mobility) impulsive, unsafe movements , but perf w/rail and no assist  -[facility] Name 04/23/25 1317          Transfers    Comment, (Transfers) did perf bed <> bsc , pt having difficulty w/WB LLE, too painful, likes to place L foot on top of right, but educ offered to at least trial TTWB L during tfers  -[facility] Name 04/23/25 1317          Bed-Chair Transfer    Bed-Chair [place] (Transfers) not tested  -[facility] Name 04/23/25 1317          Gait/Stairs (Locomotion)    Left Sided Gait Deviations weight shift ability decreased  -     Right Sided Gait Deviations decreased knee extension  -               User Key  (r) = Recorded By, (t) = Taken By, (c) = Cosigned By      Initials Name Provider Type    Laura Kulkarni PTA Physical Therapist Assistant                    "Obj/Interventions       Row Name 04/23/25 1320          Motor Skills    Therapeutic Exercise --  able to perf B SLRs to remove pillows and reposition  -               User Key  (r) = Recorded By, (t) = Taken By, (c) = Cosigned By      Initials Name Provider Type    Laura Kulkarni PTA Physical Therapist Assistant                   Goals/Plan    No documentation.                  Clinical Impression       [facility] Name 04/23/25 1321          Pain    Pretreatment Pain Rating 10/10  -     Posttreatment Pain Rating 10/10  -     Pain Location extremity  -     Pain Side/Orientation left;lower  -     Pain Management Interventions premedicated for activity;exercise or physical activity utilized;positioning techniques utilized  -     Response to Pain Interventions activity participation with increased pain  -       Row Name 04/23/25 1321          Plan of Care Review    Plan of Care Reviewed With patient  -     Progress no change  -     Outcome Evaluation Pt agreed to PT/OT session, pt jonathan pivot tfer bed<>bs, used rwx, stood x1 prior to tfer then required seated rest break due to LLE pain, had meds but was asking if she could have anything else, alerted RN, pt jonathan STS several times due to clean-up assist from OT, pt unable to take side-steps today due to pain, plans to OR later today for debridement (\"wash-out\")per pt, pt will need SNU vs Acute pending progress  -[facility] Name 04/23/25 1321          Therapy Assessment/Plan (PT)    Rehab Potential (PT) limited  -     Criteria for Skilled Interventions Met (PT) yes  -[facility] Name 04/23/25 1321          Vital Signs    O2 Delivery Pre Treatment room air  -JM       Row Name 04/23/25 1321          Positioning and Restraints    Pre-Treatment Position in bed  -     Post Treatment Position bed  -     In Bed side lying left;call light within reach;encouraged to call for assist;exit alarm on;notified nsg;side rails up x3;LLE elevated  -               " User Key  (r) = Recorded By, (t) = Taken By, (c) = Cosigned By      Initials Name Provider Type    Laura Kulkarni PTA Physical Therapist Assistant                   Outcome Measures       Row Name 04/23/25 1325          How much help from another person do you currently need...    Turning from your back to your side while in flat bed without using bedrails? 4  -JM     Moving from lying on back to sitting on the side of a flat bed without bedrails? 3  -JM     Moving to and from a bed to a chair (including a wheelchair)? 2  -JM     Standing up from a chair using your arms (e.g., wheelchair, bedside chair)? 2  -JM     Climbing 3-5 steps with a railing? 1  -JM     To walk in hospital room? 1  -JM     AM-PAC 6 Clicks Score (PT) 13  -JM     Highest Level of Mobility Goal 4 --> Transfer to chair/commode  -               User Key  (r) = Recorded By, (t) = Taken By, (c) = Cosigned By      Initials Name Provider Type    Laura Kulkarni PTA Physical Therapist Assistant                                 Physical Therapy Education       Title: PT OT SLP Therapies (In Progress)       Topic: Physical Therapy (Done)       Point: Mobility training (Done)       Learning Progress Summary            Patient Acceptance, E,TB,D, VU,NR by TIFF at 4/23/2025 1326    Eager, E,TB,D, VU,NR by TIFF at 4/21/2025 1034    Acceptance, E,D, VU,NR by MS at 4/20/2025 1124    Acceptance, E, NR by MR at 4/19/2025 1647    Acceptance, E,D, NR by TIFF at 4/17/2025 1809    Acceptance, E, NR by MARISSA at 4/15/2025 1324                      Point: Home exercise program (Done)       Learning Progress Summary            Patient Acceptance, E,TB,D, VU,NR by TIFF at 4/23/2025 1326    Eager, E,TB,D, VU,NR by TIFF at 4/21/2025 1034    Acceptance, E,D, VU,NR by MS at 4/20/2025 1124    Acceptance, E, NR by MR at 4/19/2025 1647    Acceptance, E,D, NR by TIFF at 4/17/2025 1809                      Point: Body mechanics (Done)       Learning Progress Summary           "  Patient Acceptance, E,TB,D, VU,NR by  at 4/23/2025 1326    Eager, E,TB,D, VU,NR by  at 4/21/2025 1034    Acceptance, E,D, VU,NR by MS at 4/20/2025 1124    Acceptance, E, NR by MR at 4/19/2025 1647    Acceptance, E,D, NR by JM at 4/17/2025 1809    Acceptance, E, NR by DJ at 4/15/2025 1324                      Point: Precautions (Done)       Learning Progress Summary            Patient Acceptance, E,TB,D, VU,NR by  at 4/23/2025 1326    Eager, E,TB,D, VU,NR by  at 4/21/2025 1034    Acceptance, E,D, VU,NR by MS at 4/20/2025 1124    Acceptance, E, NR by MR at 4/19/2025 1647    Acceptance, E,D, NR by JM at 4/17/2025 1809    Acceptance, E, NR by DJ at 4/15/2025 1324                                      User Key       Initials Effective Dates Name Provider Type Discipline     03/07/18 -  Laura Gooden, PTA Physical Therapist Assistant PT    MS 06/16/21 -  Misael Cerda, PT Physical Therapist PT    DJ 10/25/19 -  Meka Araiza, PT Physical Therapist PT    MR 08/03/23 -  Mary Jane Kelly, RN Registered Nurse Nurse                  PT Recommendation and Plan     Progress: no change  Outcome Evaluation: Pt agreed to PT/OT session, pt jonathan pivot tfer bed<>bsc, used rwx, stood x1 prior to tfer then required seated rest break due to LLE pain, had meds but was asking if she could have anything else, alerted RN, pt jonathan STS several times due to clean-up assist from OT, pt unable to take side-steps today due to pain, plans to OR later today for debridement (\"wash-out\")per pt, pt will need SNU vs Acute pending progress     Time Calculation:         PT Charges       Row Name 04/23/25 1326             Time Calculation    Start Time 1007  -      Stop Time 1036  -      Time Calculation (min) 29 min  -      PT Received On 04/23/25  -      PT - Next Appointment 04/24/25  -TIFF                User Key  (r) = Recorded By, (t) = Taken By, (c) = Cosigned By      Initials Name Provider Type    Laura Kulkarni, ABRAHAM " Physical Therapist Assistant                  Therapy Charges for Today       Code Description Service Date Service Provider Modifiers Qty    [id number]  PT THERAPEUTIC ACT EA 15 MIN 4/23/2025 Laura Gooden, PTA GP 2            PT G-Codes  Outcome Measure Options: AM-PAC 6 Clicks Basic Mobility (PT)  AM-PAC 6 Clicks Score (PT): 13  AM-PAC 6 Clicks Score (OT): 14  PT Discharge Summary  Anticipated Discharge Disposition (PT): skilled nursing facility, inpatient rehabilitation facility (pending progress)    Laura Gooden PTA  4/23/2025

## 2025-04-23 NOTE — PROGRESS NOTES
"   LOS: 13 days   Patient Care Team:  Sandra Kaba MD as PCP - General (Family Medicine)  Marisol Nazario, RN as Ambulatory  ([facility])    Chief Complaint: clamminess     Interval History: She went to preop for debridement today but was noted to be clammy and pale. Her pulse was in the low 100s. Her BG was ~110. Her BP was normal. She just says she was hungry and anxious and tired; no CP or SOA.     Objective   Vital Signs  Temp:  [98.3 °F (36.8 °C)-99 °F (37.2 °C)] 98.7 °F (37.1 °C)  Heart Rate:  [73-86] 76  Resp:  [16-24] 16  BP: (109-127)/(62-73) 127/62    Intake/Output Summary (Last 24 hours) at 4/23/2025 1607  Last data filed at 4/22/2025 1700  Gross per 24 hour   Intake 240 ml   Output --   Net 240 ml       Last Weight and Admission Weight        04/23/25  1530   Weight: 86.6 kg (191 lb)     Flowsheet Rows      Flowsheet Row First Filed Value   Admission Height 167.6 cm (65.98\") Documented at 04/12/2025 1714   Admission Weight 71 kg (156 lb 8.4 oz) Documented at 04/10/2025 1700                    Physical Exam  Vitals reviewed.   Constitutional:       Appearance: She is well-developed.   HENT:      Head: Normocephalic.      Nose: Nose normal.      Mouth/Throat:      Mouth: Mucous membranes are dry.   Eyes:      Conjunctiva/sclera: Conjunctivae normal.   Neck:      Vascular: No JVD.   Cardiovascular:      Rate and Rhythm: Normal rate and regular rhythm.      Heart sounds: Normal heart sounds.   Pulmonary:      Effort: Pulmonary effort is normal.      Breath sounds: Normal breath sounds.   Abdominal:      Palpations: Abdomen is soft.      Tenderness: There is no abdominal tenderness.   Musculoskeletal:         General: No swelling.      Cervical back: Normal range of motion.   Skin:     General: Skin is warm and dry.      Coloration: Skin is pale.   Neurological:      General: No focal deficit present.      Mental Status: She is alert.      Cranial Nerves: No cranial nerve deficit. "   Psychiatric:         Mood and Affect: Mood normal.         Results Review:      Results from last 7 days   Lab Units 04/23/25  0543 04/22/25  1508 04/22/25  0452 04/21/25  0640   SODIUM mmol/L 137  --  138 133*   POTASSIUM mmol/L 4.2 4.3 3.6 3.7   CHLORIDE mmol/L 100  --  99 97*   CO2 mmol/L 30.1*  --  32.1* 28.9   BUN mg/dL 9  --  10 6*   CREATININE mg/dL 0.74  --  0.68 0.63   GLUCOSE mg/dL 117*  --  148* 145*   CALCIUM mg/dL 8.3*  --  8.2* 7.8*         Results from last 7 days   Lab Units 04/23/25  0543 04/22/25  0453 04/21/25  1521 04/21/25  0640   WBC 10*3/mm3 19.23* 22.37*  --  25.32*   HEMOGLOBIN g/dL 7.6* 7.9* 8.0* 7.4*   HEMATOCRIT % 23.8* 25.4* 25.6* 24.0*   PLATELETS 10*3/mm3 404 402  --  415     Results from last 7 days   Lab Units 04/23/25  0543 04/22/25  0452 04/21/25  0640   APTT seconds 83.0* 68.7* 79.5*         Results from last 7 days   Lab Units 04/23/25  0543   MAGNESIUM mg/dL 1.6           I reviewed the patient's new clinical results.  I personally viewed and interpreted the patient's EKG/Telemetry data        Medication Review:   arformoterol, 15 mcg, Nebulization, BID - RT  aspirin, 81 mg, Oral, Daily   Or  aspirin, 300 mg, Rectal, Daily  atorvastatin, 40 mg, Oral, Nightly  budesonide, 0.5 mg, Nebulization, BID - RT  clopidogrel, 75 mg, Oral, Daily  insulin regular, 2-7 Units, Subcutaneous, Q6H  metoprolol tartrate, 25 mg, Oral, Q12H  pantoprazole, 40 mg, Oral, Q AM  revefenacin, 175 mcg, Nebulization, Daily - RT  senna-docusate sodium, 2 tablet, Oral, BID  sodium hypochlorite, , Topical, Daily        heparin, 12 Units/kg/hr, Last Rate: 18 Units/kg/hr (04/22/25 2342)  lactated ringers, 9 mL/hr, Last Rate: 9 mL/hr (04/23/25 1340)        Assessment & Plan     I suspect that the episode she had in pre-op was due to being NPO and nervous. Her EKG shows ongoing evolution of her prior infarct and nothing new or acute. Her VS and BG were really not all that abnormal. I checked a stat echo to make  sure there wasn't a pericardial effusion and there was not. Her EF is ~50% with ongoing inferior hypokinesis.    She can return to the OR at any time; consider supplementing with D5 fluids while NPO.     No new cardiac issues. Resume heparin when okay to do so. When no more procedures are planned, please switch to DOAC + clopidogrel.     German Cancino MD  04/23/25  16:07 EDT

## 2025-04-23 NOTE — ANESTHESIA PREPROCEDURE EVALUATION
Anesthesia Evaluation                  Airway   Comment: Respirations Cold not evauate  Dental      Pulmonary    (+) COPD,shortness of breath  Cardiovascular     (+) hypertension, past MI , CAD, cardiac stents , CHF , PVD, hyperlipidemia      Neuro/Psych  (+) headaches  GI/Hepatic/Renal/Endo    (+) GERD, diabetes mellitus    Musculoskeletal     Abdominal    Substance History      OB/GYN          Other                    Anesthesia Plan    ASA 4     general     intravenous induction     Anesthetic plan, risks, benefits, and alternatives have been provided, discussed and informed consent has been obtained with: patient.    CODE STATUS:    Code Status (Patient has no pulse and is not breathing): CPR (Attempt to Resuscitate)  Medical Interventions (Patient has pulse or is breathing): Full Support  Level Of Support Discussed With: Patient

## 2025-04-23 NOTE — PROGRESS NOTES
ID NOTE    CC: f/u leukocytosis    Subj: No acute events.  She reports ongoing pain in the left calf.  She is going to the OR today for debridement of necrotic tissue.  She is stable off of cefazolin.    Medications:    Current Facility-Administered Medications:     acetaminophen (TYLENOL) tablet 650 mg, 650 mg, Oral, Q4H PRN, 650 mg at 04/18/25 0049 **OR** acetaminophen (TYLENOL) 160 MG/5ML oral solution 650 mg, 650 mg, Oral, Q4H PRN **OR** acetaminophen (TYLENOL) suppository 650 mg, 650 mg, Rectal, Q4H PRN, Misael Lawton MD, 650 mg at 04/13/25 0923    acetaminophen (TYLENOL) tablet 650 mg, 650 mg, Oral, Q4H PRN **OR** acetaminophen (TYLENOL) 160 MG/5ML oral solution 650 mg, 650 mg, Oral, Q4H PRN **OR** acetaminophen (TYLENOL) suppository 650 mg, 650 mg, Rectal, Q4H PRN, Misael Lawton MD    acetaminophen (TYLENOL) tablet 650 mg, 650 mg, Oral, Q4H PRN, 650 mg at 04/22/25 0845 **OR** acetaminophen (TYLENOL) suppository 650 mg, 650 mg, Rectal, Q4H PRN, Misael Lawton MD    albuterol (PROVENTIL) nebulizer solution 0.083% 2.5 mg/3mL, 2.5 mg, Nebulization, Q6H PRN, Misael Lawton MD    aluminum-magnesium hydroxide-simethicone (MAALOX MAX) 400-400-40 MG/5ML suspension 15 mL, 15 mL, Oral, Q6H PRN, Misael Lawton MD    arformoterol (BROVANA) nebulizer solution 15 mcg, 15 mcg, Nebulization, BID - RT, Misael Lawton MD, 15 mcg at 04/23/25 0754    aspirin EC tablet 81 mg, 81 mg, Oral, Daily, 81 mg at 04/23/25 0812 **OR** aspirin suppository 300 mg, 300 mg, Rectal, Daily, Misael Lawton MD    atorvastatin (LIPITOR) tablet 40 mg, 40 mg, Oral, Nightly, Misael Lawton MD, 40 mg at 04/22/25 2056    sennosides-docusate (PERICOLACE) 8.6-50 MG per tablet 2 tablet, 2 tablet, Oral, BID, 2 tablet at 04/23/25 0812 **AND** polyethylene glycol (MIRALAX) packet 17 g, 17 g, Oral, Daily PRN **AND** bisacodyl (DULCOLAX) EC tablet 5 mg, 5 mg, Oral, Daily PRN **AND** bisacodyl (DULCOLAX) suppository 10 mg, 10 mg, Rectal, Daily  PRN, Misael Lawton MD    budesonide (PULMICORT) nebulizer solution 0.5 mg, 0.5 mg, Nebulization, BID - RT, Misael Lawton MD, 0.5 mg at 04/23/25 0754    Calcium Replacement - Follow Nurse / BPA Driven Protocol, , Not Applicable, PRN, Misale Lawton MD    clopidogrel (PLAVIX) tablet 75 mg, 75 mg, Oral, Daily, Misael Lawton MD, 75 mg at 04/23/25 0812    dextrose (D50W) (25 g/50 mL) IV injection 25 g, 25 g, Intravenous, Q15 Min PRN, Misael Lawton MD    dextrose (GLUTOSE) oral gel 15 g, 15 g, Oral, Q15 Min PRN, Misael Lawton MD    glucagon (GLUCAGEN) injection 1 mg, 1 mg, Intramuscular, Q15 Min PRN, Misael Lawton MD    heparin (porcine) 5000 UNIT/ML injection 2,100-4,000 Units, 30-57.9 Units/kg, Intravenous, Q6H PRN, Misael Lawton MD, 2,100 Units at 04/16/25 0615    heparin 60274 units/250 mL (100 units/mL) in 0.45 % NaCl infusion, 12 Units/kg/hr, Intravenous, Titrated, Misael Lawton MD, Last Rate: 12.78 mL/hr at 04/22/25 2342, 18 Units/kg/hr at 04/22/25 2342    HYDROcodone-acetaminophen (NORCO) 7.5-325 MG per tablet 1 tablet, 1 tablet, Oral, Q4H PRN, Raimundo Lin MD, 1 tablet at 04/23/25 0609    HYDROmorphone (DILAUDID) injection 1 mg, 1 mg, Intravenous, Q2H PRN, 1 mg at 04/23/25 0812 **AND** naloxone (NARCAN) injection 0.4 mg, 0.4 mg, Intravenous, Q5 Min PRN, Raimundo Lin MD    insulin regular (humuLIN R,novoLIN R) injection 2-7 Units, 2-7 Units, Subcutaneous, Q6H, Misael Lawton MD, 2 Units at 04/23/25 0045    Magnesium Standard Dose Replacement - Follow Nurse / BPA Driven Protocol, , Not Applicable, PRN, Misael Lawton MD    metoprolol tartrate (LOPRESSOR) tablet 25 mg, 25 mg, Oral, Q12H, Misael Lawton MD, 25 mg at 04/23/25 0812    nitroglycerin (NITROSTAT) SL tablet 0.4 mg, 0.4 mg, Sublingual, Q5 Min PRN, Misael Lawton MD    nitroglycerin (NITROSTAT) SL tablet 0.4 mg, 0.4 mg, Sublingual, Q5 Min PRN, Misael Lawton MD    nitroglycerin (NITROSTAT) SL tablet 0.4 mg, 0.4  mg, Sublingual, Q5 Min PRN, Misael Lawton MD    ondansetron ODT (ZOFRAN-ODT) disintegrating tablet 4 mg, 4 mg, Oral, Q6H PRN **OR** ondansetron (ZOFRAN) injection 4 mg, 4 mg, Intravenous, Q6H PRN, Misael Lawton MD    ondansetron ODT (ZOFRAN-ODT) disintegrating tablet 4 mg, 4 mg, Oral, Q6H PRN **OR** ondansetron (ZOFRAN) injection 4 mg, 4 mg, Intravenous, Q6H PRN, Misael Lawton MD    pantoprazole (PROTONIX) EC tablet 40 mg, 40 mg, Oral, Q AM, Misael Lawton MD, 40 mg at 04/23/25 0609    Phosphorus Replacement - Follow Nurse / BPA Driven Protocol, , Not Applicable, PRN, Misael Lawton MD    Potassium Replacement - Follow Nurse / BPA Driven Protocol, , Not Applicable, PRN, Misael Lawton MD    revefenacin (YUPELRI) nebulizer solution 175 mcg, 175 mcg, Nebulization, Daily - RT, Misael Lawton MD, 175 mcg at 04/23/25 0754    sodium hypochlorite (DAKIN'S 1/4 STRENGTH) 0.125 % topical solution 0.125% solution, , Topical, Daily, Raimundo Lin MD, Given at 04/22/25 1610      Objective   Vital Signs   Temp:  [98 °F (36.7 °C)-99 °F (37.2 °C)] 98.3 °F (36.8 °C)  Heart Rate:  [74-86] 80  Resp:  [16-24] 16  BP: (111-116)/(67-73) 111/73    Physical Exam:   General: NAD, lying in bed, very nice  Eyes: no scleral icterus  ENT: no thrush, edentulous  Cardiovascular: NR  Respiratory: no wheezing  GI: Abdomen is not distended  MSK: LLE wrapped; no erythema outside of the dressing    Labs:   CBC, BMP, and wound cultures reviewed today  Lab Results   Component Value Date    WBC 19.23 (H) 04/23/2025    HGB 7.6 (L) 04/23/2025    HCT 23.8 (L) 04/23/2025    MCV 85.0 04/23/2025     04/23/2025     Lab Results   Component Value Date    GLUCOSE 117 (H) 04/23/2025    CALCIUM 8.3 (L) 04/23/2025     04/23/2025    K 4.2 04/23/2025    CO2 30.1 (H) 04/23/2025     04/23/2025    BUN 9 04/23/2025    CREATININE 0.74 04/23/2025    EGFR 90.5 04/23/2025    BCR 12.2 04/23/2025    ANIONGAP 6.9 04/23/2025          Microbiology:  4/8 BCx: Negative  4/12 BCx: Negative  4/18 LLE wound Cx: Negative, final    Isolation:  No active isolations    ASSESSMENT/PLAN:  Leukocytosis  Inferior STEMI s/p mechanical thrombectomy  Cardiogenic shock -resolved  Ischemic enteritis and colitis  Ileus  Left ventricular thrombus  Critical ischemia left lower extremity due to embolism  Status post open thrombectomy and angiogram  Status post 4 compartment fasciotomy  Post-surgical bleeding and hematoma  COPD due to tobacco abuse    WBC remains elevated but is trending down.  As mentioned in prior notes, I suspect it is likely to be multifactorial and ultimately a stress response.  Multiple sets of blood cultures and wound cultures have been negative.  I will follow-up the details of the operative report.  If there are any concerns for infection, please send fluid/tissue for microbiologic evaluation and I will follow-up the results.    ID will follow.

## 2025-04-23 NOTE — PROGRESS NOTES
[facility]   Surgical Progress Note    Patient Name: Desiree Garcia  : 1960  MRN: [id number]  Date of admission: 4/10/2025  Surgical Procedures Since Admission:  Procedure(s):  EMBOLECTOMY MECHANICAL, FOUR COMPARTMENT FASCIOTOMY  ARTERIOGRAM LOWER EXTREMITY  Surgeon:  Misael Lawton MD  Status:  11 Days Post-Op  -------------------    Procedure(s):  LOWER EXTREMITY DEBRIDEMENT  Surgeon:  Misael Lawton MD  Status:  5 Days Post-Op  -------------------  **Not Performed**    Procedure(s):  Left leg fasciotomy debridement with possible wound VAC placement.  Surgeon:  Raimundo Lin MD  Status:  * Surgery not performed *  -------------------    Procedure(s):  Left lower extremity fasciotomy debridement with possible wound VAC placement  Surgeon:  Raimundo Lin MD  Status:  * No surgery found *  -------------------    Subjective   Subjective     Chief Complaint: Left leg ischemia follow-up.    History of Present Illness   Patient with increased work of breathing in the preoperative holding area with diaphoresis and overall feeling of doom.    Review of Systems    Objective   Objective     Vitals:   Temp:  [98.1 °F (36.7 °C)-99 °F (37.2 °C)] 98.3 °F (36.8 °C)  Heart Rate:  [73-86] 73  Resp:  [16-24] 16  BP: (109-116)/(66-73) 109/66  Output by Drain (mL) 25 0701 - 25 1900 25 1901 - 25 0700 25 0701 - 25 1443 Range Total   External Urinary Catheter           Physical Exam  Constitutional:       Appearance: She is well-developed.   Pulmonary:      Effort: Pulmonary effort is normal. No respiratory distress.   Abdominal:      General: There is no distension.      Palpations: Abdomen is soft.   Neurological:      Mental Status: She is alert and oriented to person, place, and time.           Result Review    Result Review:  I have personally reviewed the results from the time of this admission to 2025 14:43 EDT and agree with these findings:  [x]  Laboratory  list / accordion  []  Microbiology  []  Radiology  []  EKG/Telemetry   []  Cardiology/Vascular   []  Pathology  []  Old records  []  Other:  Most notable findings include:       Results from last 7 days   Lab Units 04/23/25  0543 04/22/25  0453 04/21/25  1521 04/21/25  0640 04/20/25  0551 04/19/25  0401 04/18/25  0520 04/17/25  0435   WBC 10*3/mm3 19.23* 22.37*  --  25.32* 22.85* 28.23* 27.78* 26.91*   HEMOGLOBIN g/dL 7.6* 7.9* 8.0* 7.4* 7.9* 6.5* 7.1* 7.9*   PLATELETS 10*3/mm3 404 402  --  415 446 415 409 399     Results from last 7 days   Lab Units 04/23/25  0543 04/22/25  1508 04/22/25  0452 04/21/25  0640 04/20/25  0551 04/19/25  0401 04/18/25  0520 04/17/25  0435   SODIUM mmol/L 137  --  138 133* 139 138 140 138   POTASSIUM mmol/L 4.2 4.3 3.6 3.7 4.3 4.5 3.4* 4.1   CHLORIDE mmol/L 100  --  99 97* 102 103 105 102   CO2 mmol/L 30.1*  --  32.1* 28.9 29.0 26.7 27.4 28.0   BUN mg/dL 9  --  10 6* 8 6* 6* 7*   CREATININE mg/dL 0.74  --  0.68 0.63 0.73 0.63 0.69 0.64   GLUCOSE mg/dL 117*  --  148* 145* 132* 178* 143* 113*   MAGNESIUM mg/dL 1.6  --  1.7 1.6 2.0 2.2 1.8 1.9   PHOSPHORUS mg/dL 2.9  --  3.1 3.2 2.9 2.5 2.6 2.7   Estimated Creatinine Clearance: 85.4 mL/min (by C-G formula based on SCr of 0.74 mg/dL).    Lab Results   Component Value Date    HGBA1C 9.40 (H) 04/09/2025    HGBA1C 9.30 (H) 03/13/2025    HGBA1C 9.40 (H) 12/23/2024     Glucose   Date/Time Value Ref Range Status   04/23/2025 1356 116 70 - 130 mg/dL Final   04/23/2025 1113 131 (H) 70 - 130 mg/dL Final   04/23/2025 0731 136 (H) 70 - 130 mg/dL Final   04/23/2025 0605 133 (H) 70 - 130 mg/dL Final   04/23/2025 0037 196 (H) 70 - 130 mg/dL Final   04/22/2025 1535 167 (H) 70 - 130 mg/dL Final   04/22/2025 1131 221 (H) 70 - 130 mg/dL Final   04/22/2025 0603 182 (H) 70 - 130 mg/dL Final       Assessment & Plan   Assessment / Plan     Brief Patient Summary:  Desiree Garcia is a 65 y.o. female who multiple medical issues.  Vascular is following for  ischemia of the left lower extremity.    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  ([facility])  4/18/2025: Debridement of left leg fasciotomies ([facility])    4/19/2025: Postop day #1 from fasciotomy debridement.  Dressings down today.  Minimal oozing noted muscles appear viable as pictured below.  There is some ischemic changes to the skin on the anterior margin of the lateral fasciotomies will continue to monitor.  I redressed the wounds with the assistance of the nurse with dilute Betadine soaked gauze.  Will plan on leaving intact upon my evaluation for tomorrow.  She has a strong dorsalis pedis artery signal.  She has weak dorsiflexion of her left foot.      4/20/2025: Postop day 2 fasciotomy debridement.  Will continue with once daily Dakin's dressing changes.  Will continue to lower the anterior skin on the lateral fasciotomy to demarcate.  This will likely require debridement sometime next week once further demarcation has occurred.    4/21/2025: Postop day 3 fasciotomy debridement.  With some anterior ischemic changes to the flap on the lateral compartment.  Will place her on the surgery schedule for Wednesday to debride this.  If things appear well at that time we will also place wound VAC.    4/22/2025: Postop day #4 fasciotomy debridement.  Plan on surgical debridement of the ischemic skin on the left anterior lateral fasciotomy with wound VAC placement tomorrow.  Will hold heparin infusion when patient goes down to preoperative holding tomorrow.    4/23/2025: Patient was in preop holding for planned/nonemergent left fasciotomy debridement.  Here she appeared diaphoretic with somewhat labored and irregular breathing and overall feeling of doom.  Anesthesia concerned  with change in EKG.  Currently I have minimal concerns for any sort of undrained sepsis or urgent nature of the operation in her leg.  I am going to remove her from the surgery schedule today.  I personally called cardiology who will review the case and see the patient.  I have tentatively placed her back on the OR schedule for Friday assuming medically more stable.    VTE Prophylaxis:  Pharmacologic & mechanical VTE prophylaxis orders are present.        Raimundo Lin MD

## 2025-04-23 NOTE — THERAPY TREATMENT NOTE
Patient Name: Desiree Garcia  : 1960    MRN: [id number]                              Today's Date: 2025       Admit Date: 4/10/2025    Visit Dx:     ICD-10-CM ICD-9-CM   1. 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  I74.3 444.22     Patient Active Problem List   Diagnosis    Type 2 diabetes mellitus, without long-term current use of insulin    COPD (chronic obstructive pulmonary disease)    Depression with anxiety    Esophageal reflux    Forgetfulness    Leg paresthesia    Lumbar spinal stenosis    Migraines    Night sweat    Sciatica    Dyspnea on exertion    Thoracic or lumbosacral neuritis or radiculitis    Left wrist pain    Sprain of left wrist    Arthritis of knee, left    Contusion of left knee    Nondisplaced fracture of trapezium bone of left wrist with routine healing    Chronic left shoulder pain    Coronary artery disease involving native heart without angina pectoris    Hypertension, essential    Mixed hyperlipidemia    Cigarette nicotine dependence with nicotine-induced disorder    Altered bowel habits    Diarrhea    Rectal bleeding    Abdominal bloating    Allergic rhinitis due to food    Elevated troponin    Acute diastolic CHF (congestive heart failure)    History of ST elevation myocardial infarction (STEMI)    STEMI (ST elevation myocardial infarction)    Acute thrombus of left ventricle    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     Past Medical History:   Diagnosis Date    Arthritis     Cervical cancer screening     COPD (chronic obstructive pulmonary disease)     Coronary artery disease involving native heart without angina pectoris 2021    primary angioplasty and stent placement to mid right coronary artery with good angiographic results on 2021 after STEMI    Depression with anxiety     Diabetes mellitus     Forgetfulness     Gastric reflux     Hypertension     Leg paresthesia 2017    Right    Limb swelling     Lumbago 2017    Low  back pain    Lumbar spinal stenosis 2017    L4/5 moderate to severe central canal stenosis    Lumbosacral radiculopathy 2017    Right    Migraines     Night sweat     Reflux esophagitis     Shortness of breath     ST elevation myocardial infarction (STEMI) (CMS/[facility]) 2021    Stomach disorder      Past Surgical History:   Procedure Laterality Date    ABDOMINAL SURGERY      3 C-SECTIONS    ARTERIOGRAM LOWER EXTREMITY Left 2025    Procedure: ARTERIOGRAM LOWER EXTREMITY;  Surgeon: Misael Lawton MD;  Location: [facility] OR;  Service: Vascular;  Laterality: Left;    CARDIAC CATHETERIZATION      CARDIAC CATHETERIZATION N/A 2021    Procedure: Left Heart Cath;  Surgeon: Sidney Xiao MD;  Location: [facility] CATH INVASIVE LOCATION;  Service: Cardiology;  Laterality: N/A;    CARDIAC CATHETERIZATION N/A 2025    Procedure: Left Heart Cath;  Surgeon: James Verdugo MD;  Location: [facility] CATH INVASIVE LOCATION;  Service: Cardiology;  Laterality: N/A;     SECTION      x 3    CHOLECYSTECTOMY      COLONOSCOPY      COLONOSCOPY N/A 3/25/2025    Procedure: COLONOSCOPY WITH BIOPSIES AND COLD AND HOT SNARE POLYPECTOMIES;  Surgeon: Heber Najera MD;  Location: [facility] ENDOSCOPY;  Service: Gastroenterology;  Laterality: N/A;  COLON POLYPS    CORONARY STENT PLACEMENT      EMBOLECTOMY Left 2025    Procedure: EMBOLECTOMY MECHANICAL, FOUR COMPARTMENT FASCIOTOMY;  Surgeon: Misael Lawton MD;  Location: [facility] OR;  Service: Vascular;  Laterality: Left;    ENDOSCOPY      2007    FOOT SURGERY Right     HAND SURGERY      HYSTERECTOMY  1985    INCISION AND DRAINAGE LEG Left 2025    Procedure: LOWER EXTREMITY DEBRIDEMENT;  Surgeon: Misael Lawton MD;  Location: [facility] OR;  Service: Vascular;  Laterality: Left;      General Information       Row Name 25 1047          OT Time and Intention    Subjective Information complains of;pain;fatigue  -RB      Document Type therapy note (daily note)  -RB     Mode of Treatment individual therapy;occupational therapy  -RB     Patient Effort good  -RB       Row Name 04/23/25 1047          General Information    Patient Profile Reviewed yes  -RB       Row Name 04/23/25 1047          Cognition    Orientation Status (Cognition) oriented x 3  -RB               User Key  (r) = Recorded By, (t) = Taken By, (c) = Cosigned By      Initials Name Provider Type    RB Zabrina Lyles OT Occupational Therapist                     Mobility/ADL's       Row Name 04/23/25 1047          Bed Mobility    Bed Mobility supine-sit;sit-supine;scooting/bridging  -RB     Scooting/Bridging [place] (Bed Mobility) minimum assist (75% patient effort)  -RB     Supine-Sit [place] (Bed Mobility) standby assist;verbal cues  -RB     Sit-Supine [place] (Bed Mobility) standby assist;verbal cues  -RB     Bed Mobility, Safety Issues decreased use of legs for bridging/pushing  -RB     Assistive Device (Bed Mobility) bed rails  -RB       Row Name 04/23/25 1047          Transfers    Transfers toilet transfer  -RB     Comment, (Transfers) The pt completed SPS transfers to and from the [facility] per pt request/tolerance  -RB       Row Name 04/23/25 1047          Toilet Transfer    Type (Toilet Transfer) sit-stand;stand-sit  -RB     [place] Level (Toilet Transfer) moderate assist (50% patient effort);2 person assist;verbal cues  -RB     Assistive Device (Toilet Transfer) grab bars/safety frame;walker, front-wheeled  -RB       Row Name 04/23/25 1047          Functional Mobility    Functional Mobility- Ind. Level not tested;unable to perform  -RB       Row Name 04/23/25 1047          Activities of Daily Living    BADL Assessment/Intervention toileting  -RB       Row Name 04/23/25 1047          Toileting Assessment/Training    [place] Level (Toileting) toileting skills;dependent (less than 25% patient effort);change pad/brief;adjust/manage  clothing;perform perineal hygiene  -RB     Position (Toileting) supported sitting;supported standing  -RB               User Key  (r) = Recorded By, (t) = Taken By, (c) = Cosigned By      Initials Name Provider Type    Zabrina Solano OT Occupational Therapist                   Obj/Interventions       Row Name 04/23/25 1052          Balance    Comment, Balance SBA/CGA sitting balance, Mod A x2 for standing balance (NWB per pt request/tolerance)  -RB               User Key  (r) = Recorded By, (t) = Taken By, (c) = Cosigned By      Initials Name Provider Type    Zabrina Solano OT Occupational Therapist                   Goals/Plan    No documentation.                  Clinical Impression       Row Name 04/23/25 1053          Pain Assessment    Pretreatment Pain Rating 10/10  -RB     Posttreatment Pain Rating 10/10  -RB     Pain Location calf  -RB     Pain Side/Orientation left  -RB       Row Name 04/23/25 1053          Plan of Care Review    Plan of Care Reviewed With patient  -RB     Progress no change  -RB     Outcome Evaluation The pt participated in OT this morning. SBA provided for bed mobility - verbal cues required for technique and decreasing impulsive movements. SBA sitting balance. Mod A x2 to stand and pivot over to a BSC. Total A required for hygiene and brief management following a bowel movement. Mod A x2 required a second time to pivot back to bed. She requests no staff assist with LLE positioning, increased time required for her to manage independently.      Anticipated Discharge Disposition (OT): skilled nursing facility, inpatient rehabilitation facility  -RB       Row Name 04/23/25 1053          Therapy Assessment/Plan (OT)    Rehab Potential (OT) good  -RB     Criteria for Skilled Therapeutic Interventions Met (OT) yes;skilled treatment is necessary  -RB     Therapy Frequency (OT) 5 times/wk  -RB       Row Name 04/23/25 1053          Therapy Plan Review/Discharge Plan (OT)     Anticipated Discharge Disposition (OT) skilled nursing facility;inpatient rehabilitation facility  -RB       Row Name 04/23/25 1053          Positioning and Restraints    Pre-Treatment Position in bed  -RB     Post Treatment Position bed  -RB     In Bed notified nsg;supine;encouraged to call for assist;exit alarm on;fowlers;call light within reach  -RB               User Key  (r) = Recorded By, (t) = Taken By, (c) = Cosigned By      Initials Name Provider Type    Zabrina Solano OT Occupational Therapist                   Outcome Measures    No documentation.                     OT Recommendation and Plan  Therapy Frequency (OT): 5 times/wk  Plan of Care Review  Plan of Care Reviewed With: patient  Progress: no change  Outcome Evaluation: The pt participated in OT this morning. SBA provided for bed mobility - verbal cues required for technique and decreasing impulsive movements. SBA sitting balance. Mod A x2 to stand and pivot over to a BSC. Total A required for hygiene and brief management following a bowel movement. Mod A x2 required a second time to pivot back to bed. She requests no staff assist with LLE positioning, increased time required for her to manage independently.      Anticipated Discharge Disposition (OT): skilled nursing facility, inpatient rehabilitation facility     Time Calculation:         Time Calculation- OT       Row Name 04/23/25 1044             Time Calculation- OT    OT Start Time 1010  -RB      OT Stop Time 1034  -RB      OT Time Calculation (min) 24 min  -RB      Total Timed Code Minutes- OT 24 minute(s)  -RB      OT Received On 04/23/25  -RB         Timed Charges    71583 - OT Self Care/Mgmt Minutes 24  -RB         Total Minutes    Timed Charges Total Minutes 24  -RB       Total Minutes 24  -RB                User Key  (r) = Recorded By, (t) = Taken By, (c) = Cosigned By      Initials Name Provider Type    Zabrina Solano OT Occupational Therapist                  Therapy  Charges for Today       Code Description Service Date Service Provider Modifiers Qty    [id number] HC OT SELF CARE/MGMT/TRAIN EA 15 MIN 4/23/2025 Zabrina Lyles OT GO 2                 Zabrina Lyles OT  4/23/2025

## 2025-04-23 NOTE — PROGRESS NOTES
Name: Desiree Garcia ADMIT: 4/10/2025   : 1960  PCP: Sandra Kaba MD    MRN: [id number] LOS: 13 days   AGE/SEX: 65 y.o. female  ROOM: [facility]     Subjective   Subjective   Patient seen and examined at bedside, she is weepy but without distress.  She is tolerating diet.  Planning for possible surgery on Wednesday.  No overt bleeding on heparin drip    2025  No overnight events, patient without distress.  Plan for surgery tomorrow.  Admits to breakthrough pain at times but otherwise doing okay    2025  Patient seen and examined at bedside, plan is for surgery later today.         Objective   Objective   Vital Signs  Temp:  [98.1 °F (36.7 °C)-99 °F (37.2 °C)] 98.3 °F (36.8 °C)  Heart Rate:  [77-86] 79  Resp:  [16-24] 16  BP: (111-116)/(67-73) 111/68  SpO2:  [96 %-100 %] 98 %  on   ;   Device (Oxygen Therapy): room air  Body mass index is 30.97 kg/m².  Physical Exam  Constitutional:       Appearance: Normal appearance. She is ill-appearing.   HENT:      Head: Normocephalic and atraumatic.      Nose: No congestion.      Mouth/Throat:      Pharynx: No oropharyngeal exudate.   Eyes:      General: No scleral icterus.  Cardiovascular:      Heart sounds: No murmur heard.     No friction rub. No gallop.   Pulmonary:      Effort: No respiratory distress.      Breath sounds: No wheezing, rhonchi or rales.      Comments: Stable on room air  Abdominal:      General: There is no distension.      Tenderness: There is no abdominal tenderness. There is no guarding.   Musculoskeletal:         General: No deformity or signs of injury.      Cervical back: No rigidity.      Comments: Left leg is bandaged, bandaging is clean/dry/intact   Skin:     Coloration: Skin is not jaundiced.      Findings: No bruising or lesion.   Neurological:      General: No focal deficit present.      Mental Status: She is alert and oriented to person, place, and time.      Motor: Weakness present.       Results Review     I  reviewed the patient's new clinical results.  Results from last 7 days   Lab Units 04/23/25  0543 04/22/25  0453 04/21/25  1521 04/21/25  0640 04/20/25  0551   WBC 10*3/mm3 19.23* 22.37*  --  25.32* 22.85*   HEMOGLOBIN g/dL 7.6* 7.9* 8.0* 7.4* 7.9*   PLATELETS 10*3/mm3 404 402  --  415 446     Results from last 7 days   Lab Units 04/23/25  0543 04/22/25  1508 04/22/25  0452 04/21/25  0640 04/20/25  0551   SODIUM mmol/L 137  --  138 133* 139   POTASSIUM mmol/L 4.2 4.3 3.6 3.7 4.3   CHLORIDE mmol/L 100  --  99 97* 102   CO2 mmol/L 30.1*  --  32.1* 28.9 29.0   BUN mg/dL 9  --  10 6* 8   CREATININE mg/dL 0.74  --  0.68 0.63 0.73   GLUCOSE mg/dL 117*  --  148* 145* 132*   EGFR mL/min/1.73 90.5  --  97.4 99.2 92.0     Results from last 7 days   Lab Units 04/23/25  0543 04/22/25  0452 04/21/25  0640 04/20/25  0551   ALBUMIN g/dL 2.4* 2.7* 2.4* 2.7*     Results from last 7 days   Lab Units 04/23/25  0543 04/22/25  0452 04/21/25  0640 04/20/25  0551   CALCIUM mg/dL 8.3* 8.2* 7.8* 7.9*   ALBUMIN g/dL 2.4* 2.7* 2.4* 2.7*   MAGNESIUM mg/dL 1.6 1.7 1.6 2.0   PHOSPHORUS mg/dL 2.9 3.1 3.2 2.9       Glucose   Date/Time Value Ref Range Status   04/23/2025 1113 131 (H) 70 - 130 mg/dL Final   04/23/2025 0731 136 (H) 70 - 130 mg/dL Final   04/23/2025 0605 133 (H) 70 - 130 mg/dL Final   04/23/2025 0037 196 (H) 70 - 130 mg/dL Final   04/22/2025 1535 167 (H) 70 - 130 mg/dL Final   04/22/2025 1131 221 (H) 70 - 130 mg/dL Final   04/22/2025 0603 182 (H) 70 - 130 mg/dL Final       No radiology results for the last day    I have personally reviewed all medications:  Scheduled Medications  arformoterol, 15 mcg, Nebulization, BID - RT  aspirin, 81 mg, Oral, Daily   Or  aspirin, 300 mg, Rectal, Daily  atorvastatin, 40 mg, Oral, Nightly  budesonide, 0.5 mg, Nebulization, BID - RT  clopidogrel, 75 mg, Oral, Daily  insulin regular, 2-7 Units, Subcutaneous, Q6H  metoprolol tartrate, 25 mg, Oral, Q12H  pantoprazole, 40 mg, Oral, Q AM  revefenacin, 175  mcg, Nebulization, Daily - RT  senna-docusate sodium, 2 tablet, Oral, BID  sodium hypochlorite, , Topical, Daily    Infusions  heparin, 12 Units/kg/hr, Last Rate: 18 Units/kg/hr (04/22/25 2342)    Diet  NPO Diet NPO Type: Sips with Meds    I have personally reviewed:  [x]  Laboratory   [x]  Microbiology   [x]  Radiology   [x]  EKG/Telemetry  [x]  Cardiology/Vascular   []  Pathology    []  Records       Assessment/Plan     Active Hospital Problems    Diagnosis  POA   • 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 [I74.3]  Unknown   • Acute thrombus of left ventricle [I24.0]  Yes   • Altered bowel habits [R19.4]  Yes   • Leg paresthesia [R20.2]  Yes      Resolved Hospital Problems    Diagnosis Date Resolved POA   • **Cardiogenic shock [R57.0] 04/15/2025 Yes       65 y.o. female admitted with Cardiogenic shock.    #Left lower extremity embolism and thrombosis  #Left ventricular thrombus with showering emboli of kidney, spleen, bile  #Severe PAD    -Status post open left iliofemoral, profunda femoris and superficial femoral artery thromboembolectomy with left common femoral endarterectomy and repair, 4 compartment fasciotomy of left lower extremity with left femoropopliteal angioplasty and stent placement x 2 on 4/12/2025    -Status post left lateral fasciotomy site debridement and left medial fasciotomy site debridement on 4/18/2025    -Vascular is planning left anterior lateral fasciotomy debridement with possible wound VAC placement today    - Continue heparin drip    -Plan to switch to an oral NOAC once cleared by vascular, loading dose not needed    - Hematology has signed off    - Pain control    - Aspirin and Plavix daily    - Statin      #Leukocytosis    - ID consulted, suspects WBC elevation is multifactorial as cultures are negative to date    - Antibiotics discontinued    - Continue to monitor    - WBC 25.32 > 22.37 > 19.23    #Cardiogenic shock  #Inferior STEMI    -Status post JOE x 2 on 4/8/2025    -  Resolved    - Aspirin 81 mg daily, Plavix 75 mg daily, atorvastatin 40 mg p.o. daily    - Depressive 25 mg p.o. twice daily    - Continue heparin drip, plan to transition to DOAC with long-term dual therapy    - Cardiology has signed off    #COPD    - Brovana nebs twice daily    - Pulmicort twice daily    - Yupelri nebs daily    #Ileus    - Ileus has resolved per surgery, NG removed, surgery signed off    #NIA    - Resolved, creatinine stable    - Nephrology has signed off    #Anemia    - Hemoglobin 7.4 > 7.9 > 7.6, suspect blood loss from surgery    - No overt bleeding, continue to monitor closely is on heparin drip    - Transfuse maintain hemoglobin greater than 7    #GERD    - PPI          Continue on heparin drip  for DVT prophylaxis.  Full code.  Discussed with patient and nursing staff.  Anticipate discharge home with  vs SNU facility next week.  Expected Discharge Date: 4/25/2025; Expected Discharge Time:       Jaya Rocha DO  [facility]  04/23/25  12:22 EDT

## 2025-04-23 NOTE — PLAN OF CARE
The pt participated in OT this morning. SBA provided for bed mobility - verbal cues required for technique and decreasing impulsive movements. SBA sitting balance. Mod A x2 to stand and pivot over to a BSC. Total A required for hygiene and brief management following a bowel movement. Mod A x2 required a second time to pivot back to bed. She requests no staff assist with LLE positioning, increased time required for her to manage independently.     Anticipated Discharge Disposition (OT): skilled nursing facility, inpatient rehabilitation facility

## 2025-04-23 NOTE — PLAN OF CARE
"Goal Outcome Evaluation:  Plan of Care Reviewed With: patient        Progress: no change  Outcome Evaluation: Pt agreed to PT/OT session, pt jonathan pivot tfer bed<>bsc, used rwx, stood x1 prior to tfer then required seated rest break due to LLE pain, had meds but was asking if she could have anything else, alerted RN, pt jonathan STS several times due to clean-up assist from OT, pt unable to take side-steps today due to pain, plans to OR later today for debridement (\"wash-out\")per pt, pt will need SNU vs Acute pending progress    Anticipated Discharge Disposition (PT): skilled nursing facility, inpatient rehabilitation facility (pending progress)                        "

## 2025-04-23 NOTE — PERIOPERATIVE NURSING NOTE
Surgery cancelled for today per Dr. Lin. Patient to return to [facility]. Report called to ERNESTO Brice.

## 2025-04-23 NOTE — PLAN OF CARE
Goal Outcome Evaluation:           Progress: no change  Outcome Evaluation: pt needs PRN meds to control pain and even then rating pain 8-9/10. pt attempted to use bedside commade and had great difficulty standing to pivot 2 steps to the bed. pt was suppose to have surgery but got too worked up and HR elevated so they got a cardiac consult that came back normal to her baseline and rescheduled surgery for friday at 10:46. wound care was provided

## 2025-04-23 NOTE — PLAN OF CARE
Goal Outcome Evaluation:   VSS. A&OX4. Pain managed with PRN pain medication. Continues on heparin gtt. Dressing to LLE CDI. NPO since midnight. Call light in reach.

## 2025-04-24 LAB
ALBUMIN SERPL-MCNC: 2.2 G/DL (ref 3.5–5.2)
ANION GAP SERPL CALCULATED.3IONS-SCNC: 11 MMOL/L (ref 5–15)
APTT PPP: 70.3 SECONDS (ref 22.7–35.4)
BASOPHILS # BLD AUTO: 0.08 10*3/MM3 (ref 0–0.2)
BASOPHILS NFR BLD AUTO: 0.5 % (ref 0–1.5)
BUN SERPL-MCNC: 12 MG/DL (ref 8–23)
BUN/CREAT SERPL: 17.9 (ref 7–25)
CALCIUM SPEC-SCNC: 8.5 MG/DL (ref 8.6–10.5)
CHLORIDE SERPL-SCNC: 96 MMOL/L (ref 98–107)
CO2 SERPL-SCNC: 29 MMOL/L (ref 22–29)
CREAT SERPL-MCNC: 0.67 MG/DL (ref 0.57–1)
DEPRECATED RDW RBC AUTO: 45.8 FL (ref 37–54)
EGFRCR SERPLBLD CKD-EPI 2021: 97.7 ML/MIN/1.73
EOSINOPHIL # BLD AUTO: 0.11 10*3/MM3 (ref 0–0.4)
EOSINOPHIL NFR BLD AUTO: 0.6 % (ref 0.3–6.2)
ERYTHROCYTE [DISTWIDTH] IN BLOOD BY AUTOMATED COUNT: 14.7 % (ref 12.3–15.4)
GLUCOSE BLDC GLUCOMTR-MCNC: 140 MG/DL (ref 70–130)
GLUCOSE BLDC GLUCOMTR-MCNC: 145 MG/DL (ref 70–130)
GLUCOSE BLDC GLUCOMTR-MCNC: 166 MG/DL (ref 70–130)
GLUCOSE BLDC GLUCOMTR-MCNC: 169 MG/DL (ref 70–130)
GLUCOSE SERPL-MCNC: 166 MG/DL (ref 65–99)
HCT VFR BLD AUTO: 23.7 % (ref 34–46.6)
HGB BLD-MCNC: 7.4 G/DL (ref 12–15.9)
IMM GRANULOCYTES # BLD AUTO: 0.34 10*3/MM3 (ref 0–0.05)
IMM GRANULOCYTES NFR BLD AUTO: 1.9 % (ref 0–0.5)
LYMPHOCYTES # BLD AUTO: 2.35 10*3/MM3 (ref 0.7–3.1)
LYMPHOCYTES NFR BLD AUTO: 13.3 % (ref 19.6–45.3)
MAGNESIUM SERPL-MCNC: 1.6 MG/DL (ref 1.6–2.4)
MCH RBC QN AUTO: 27.1 PG (ref 26.6–33)
MCHC RBC AUTO-ENTMCNC: 31.2 G/DL (ref 31.5–35.7)
MCV RBC AUTO: 86.8 FL (ref 79–97)
MONOCYTES # BLD AUTO: 1.35 10*3/MM3 (ref 0.1–0.9)
MONOCYTES NFR BLD AUTO: 7.6 % (ref 5–12)
NEUTROPHILS NFR BLD AUTO: 13.47 10*3/MM3 (ref 1.7–7)
NEUTROPHILS NFR BLD AUTO: 76.1 % (ref 42.7–76)
NRBC BLD AUTO-RTO: 0.1 /100 WBC (ref 0–0.2)
PHOSPHATE SERPL-MCNC: 3.4 MG/DL (ref 2.5–4.5)
PLATELET # BLD AUTO: 437 10*3/MM3 (ref 140–450)
PMV BLD AUTO: 9.5 FL (ref 6–12)
POTASSIUM SERPL-SCNC: 3.8 MMOL/L (ref 3.5–5.2)
QT INTERVAL: 362 MS
QTC INTERVAL: 421 MS
RBC # BLD AUTO: 2.73 10*6/MM3 (ref 3.77–5.28)
SODIUM SERPL-SCNC: 136 MMOL/L (ref 136–145)
WBC NRBC COR # BLD AUTO: 17.7 10*3/MM3 (ref 3.4–10.8)

## 2025-04-24 PROCEDURE — 80069 RENAL FUNCTION PANEL: CPT | Performed by: SURGERY

## 2025-04-24 PROCEDURE — 97530 THERAPEUTIC ACTIVITIES: CPT

## 2025-04-24 PROCEDURE — 25010000002 HEPARIN (PORCINE) 25000-0.45 UT/250ML-% SOLUTION: Performed by: SURGERY

## 2025-04-24 PROCEDURE — 85025 COMPLETE CBC W/AUTO DIFF WBC: CPT | Performed by: SURGERY

## 2025-04-24 PROCEDURE — 99024 POSTOP FOLLOW-UP VISIT: CPT | Performed by: SURGERY

## 2025-04-24 PROCEDURE — 94664 DEMO&/EVAL PT USE INHALER: CPT

## 2025-04-24 PROCEDURE — 97110 THERAPEUTIC EXERCISES: CPT

## 2025-04-24 PROCEDURE — 99232 SBSQ HOSP IP/OBS MODERATE 35: CPT | Performed by: INTERNAL MEDICINE

## 2025-04-24 PROCEDURE — 94799 UNLISTED PULMONARY SVC/PX: CPT

## 2025-04-24 PROCEDURE — 85730 THROMBOPLASTIN TIME PARTIAL: CPT | Performed by: STUDENT IN AN ORGANIZED HEALTH CARE EDUCATION/TRAINING PROGRAM

## 2025-04-24 PROCEDURE — 82948 REAGENT STRIP/BLOOD GLUCOSE: CPT

## 2025-04-24 PROCEDURE — 63710000001 INSULIN REGULAR HUMAN PER 5 UNITS: Performed by: SURGERY

## 2025-04-24 PROCEDURE — 83735 ASSAY OF MAGNESIUM: CPT | Performed by: SURGERY

## 2025-04-24 PROCEDURE — 25010000002 MAGNESIUM SULFATE 2 GM/50ML SOLUTION: Performed by: STUDENT IN AN ORGANIZED HEALTH CARE EDUCATION/TRAINING PROGRAM

## 2025-04-24 PROCEDURE — 63710000001 REVEFENACIN 175 MCG/3ML SOLUTION: Performed by: SURGERY

## 2025-04-24 PROCEDURE — 25010000002 HYDROMORPHONE 1 MG/ML SOLUTION: Performed by: SURGERY

## 2025-04-24 RX ORDER — DEXTROSE, SODIUM CHLORIDE, SODIUM LACTATE, POTASSIUM CHLORIDE, AND CALCIUM CHLORIDE 5; .6; .31; .03; .02 G/100ML; G/100ML; G/100ML; G/100ML; G/100ML
75 INJECTION, SOLUTION INTRAVENOUS CONTINUOUS
Status: ACTIVE | OUTPATIENT
Start: 2025-04-25 | End: 2025-04-25

## 2025-04-24 RX ORDER — MAGNESIUM SULFATE HEPTAHYDRATE 40 MG/ML
2 INJECTION, SOLUTION INTRAVENOUS ONCE
Status: COMPLETED | OUTPATIENT
Start: 2025-04-24 | End: 2025-04-24

## 2025-04-24 RX ORDER — DEXTROSE MONOHYDRATE AND SODIUM CHLORIDE 5; .9 G/100ML; G/100ML
50 INJECTION, SOLUTION INTRAVENOUS CONTINUOUS
Status: ACTIVE | OUTPATIENT
Start: 2025-04-25 | End: 2025-04-25

## 2025-04-24 RX ADMIN — REVEFENACIN 175 MCG: 175 SOLUTION RESPIRATORY (INHALATION) at 10:08

## 2025-04-24 RX ADMIN — ASPIRIN 81 MG CHEWABLE TABLET 81 MG: 81 TABLET CHEWABLE at 09:08

## 2025-04-24 RX ADMIN — ARFORMOTEROL TARTRATE 15 MCG: 15 SOLUTION RESPIRATORY (INHALATION) at 10:07

## 2025-04-24 RX ADMIN — INSULIN HUMAN 2 UNITS: 100 INJECTION, SOLUTION PARENTERAL at 17:52

## 2025-04-24 RX ADMIN — HYDROCODONE BITARTRATE AND ACETAMINOPHEN 1 TABLET: 7.5; 325 TABLET ORAL at 15:46

## 2025-04-24 RX ADMIN — HYDROMORPHONE HYDROCHLORIDE 1 MG: 1 INJECTION, SOLUTION INTRAMUSCULAR; INTRAVENOUS; SUBCUTANEOUS at 06:24

## 2025-04-24 RX ADMIN — BUDESONIDE 0.5 MG: 0.5 INHALANT RESPIRATORY (INHALATION) at 19:42

## 2025-04-24 RX ADMIN — HYDROMORPHONE HYDROCHLORIDE 1 MG: 1 INJECTION, SOLUTION INTRAMUSCULAR; INTRAVENOUS; SUBCUTANEOUS at 09:08

## 2025-04-24 RX ADMIN — CLOPIDOGREL BISULFATE 75 MG: 75 TABLET, FILM COATED ORAL at 09:08

## 2025-04-24 RX ADMIN — METOPROLOL TARTRATE 25 MG: 25 TABLET, FILM COATED ORAL at 09:08

## 2025-04-24 RX ADMIN — METOPROLOL TARTRATE 25 MG: 25 TABLET, FILM COATED ORAL at 21:01

## 2025-04-24 RX ADMIN — HEPARIN SODIUM 18 UNITS/KG/HR: 10000 INJECTION, SOLUTION INTRAVENOUS at 13:06

## 2025-04-24 RX ADMIN — HYDROCODONE BITARTRATE AND ACETAMINOPHEN 1 TABLET: 7.5; 325 TABLET ORAL at 21:00

## 2025-04-24 RX ADMIN — BUDESONIDE 0.5 MG: 0.5 INHALANT RESPIRATORY (INHALATION) at 10:08

## 2025-04-24 RX ADMIN — ARFORMOTEROL TARTRATE 15 MCG: 15 SOLUTION RESPIRATORY (INHALATION) at 19:43

## 2025-04-24 RX ADMIN — DAKIN'S SOLUTION 0.125% (QUARTER STRENGTH): 0.12 SOLUTION at 15:47

## 2025-04-24 RX ADMIN — PANTOPRAZOLE SODIUM 40 MG: 40 TABLET, DELAYED RELEASE ORAL at 06:24

## 2025-04-24 RX ADMIN — HYDROMORPHONE HYDROCHLORIDE 1 MG: 1 INJECTION, SOLUTION INTRAMUSCULAR; INTRAVENOUS; SUBCUTANEOUS at 19:25

## 2025-04-24 RX ADMIN — ATORVASTATIN CALCIUM 40 MG: 20 TABLET, FILM COATED ORAL at 21:00

## 2025-04-24 RX ADMIN — MAGNESIUM SULFATE IN WATER FOR 2 G: 40 INJECTION INTRAVENOUS at 09:12

## 2025-04-24 RX ADMIN — HYDROMORPHONE HYDROCHLORIDE 1 MG: 1 INJECTION, SOLUTION INTRAMUSCULAR; INTRAVENOUS; SUBCUTANEOUS at 03:01

## 2025-04-24 RX ADMIN — INSULIN HUMAN 2 UNITS: 100 INJECTION, SOLUTION PARENTERAL at 06:23

## 2025-04-24 NOTE — THERAPY TREATMENT NOTE
Patient Name: Desiree Garcia  : 1960    MRN: [id number]                              Today's Date: 2025       Admit Date: 4/10/2025    Visit Dx:     ICD-10-CM ICD-9-CM   1. 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  I74.3 444.22     Patient Active Problem List   Diagnosis    Type 2 diabetes mellitus, without long-term current use of insulin    COPD (chronic obstructive pulmonary disease)    Depression with anxiety    Esophageal reflux    Forgetfulness    Leg paresthesia    Lumbar spinal stenosis    Migraines    Night sweat    Sciatica    Dyspnea on exertion    Thoracic or lumbosacral neuritis or radiculitis    Left wrist pain    Sprain of left wrist    Arthritis of knee, left    Contusion of left knee    Nondisplaced fracture of trapezium bone of left wrist with routine healing    Chronic left shoulder pain    Coronary artery disease involving native heart without angina pectoris    Hypertension, essential    Mixed hyperlipidemia    Cigarette nicotine dependence with nicotine-induced disorder    Altered bowel habits    Diarrhea    Rectal bleeding    Abdominal bloating    Allergic rhinitis due to food    Elevated troponin    Acute diastolic CHF (congestive heart failure)    History of ST elevation myocardial infarction (STEMI)    STEMI (ST elevation myocardial infarction)    Acute thrombus of left ventricle    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     Past Medical History:   Diagnosis Date    Arthritis     Cervical cancer screening     COPD (chronic obstructive pulmonary disease)     Coronary artery disease involving native heart without angina pectoris 2021    primary angioplasty and stent placement to mid right coronary artery with good angiographic results on 2021 after STEMI    Depression with anxiety     Diabetes mellitus     Forgetfulness     Gastric reflux     Hypertension     Leg paresthesia 2017    Right    Limb swelling     Lumbago 2017    Low  back pain    Lumbar spinal stenosis 2017    L4/5 moderate to severe central canal stenosis    Lumbosacral radiculopathy 2017    Right    Migraines     Night sweat     Reflux esophagitis     Shortness of breath     ST elevation myocardial infarction (STEMI) (CMS/[facility]) 2021    Stomach disorder      Past Surgical History:   Procedure Laterality Date    ABDOMINAL SURGERY      3 C-SECTIONS    ARTERIOGRAM LOWER EXTREMITY Left 2025    Procedure: ARTERIOGRAM LOWER EXTREMITY;  Surgeon: Misael Lawton MD;  Location: [facility] OR;  Service: Vascular;  Laterality: Left;    CARDIAC CATHETERIZATION      CARDIAC CATHETERIZATION N/A 2021    Procedure: Left Heart Cath;  Surgeon: Sidney Xiao MD;  Location: [facility] CATH INVASIVE LOCATION;  Service: Cardiology;  Laterality: N/A;    CARDIAC CATHETERIZATION N/A 2025    Procedure: Left Heart Cath;  Surgeon: James Verdugo MD;  Location: [facility] CATH INVASIVE LOCATION;  Service: Cardiology;  Laterality: N/A;     SECTION      x 3    CHOLECYSTECTOMY      COLONOSCOPY      COLONOSCOPY N/A 3/25/2025    Procedure: COLONOSCOPY WITH BIOPSIES AND COLD AND HOT SNARE POLYPECTOMIES;  Surgeon: Heber Najera MD;  Location: [facility] ENDOSCOPY;  Service: Gastroenterology;  Laterality: N/A;  COLON POLYPS    CORONARY STENT PLACEMENT      EMBOLECTOMY Left 2025    Procedure: EMBOLECTOMY MECHANICAL, FOUR COMPARTMENT FASCIOTOMY;  Surgeon: Misael Lawton MD;  Location: [facility] OR;  Service: Vascular;  Laterality: Left;    ENDOSCOPY      2007    FOOT SURGERY Right     HAND SURGERY      HYSTERECTOMY  1985    INCISION AND DRAINAGE LEG Left 2025    Procedure: LOWER EXTREMITY DEBRIDEMENT;  Surgeon: Misael Lawton MD;  Location: [facility] OR;  Service: Vascular;  Laterality: Left;      General Information       Row Name 25 1710          Physical Therapy Time and Intention    Document Type therapy note (daily note)  -TIFF      Mode of Treatment co-treatment;physical therapy;occupational therapy  -       Row Name 04/24/25 1710          General Information    Patient Profile Reviewed yes  -     Existing Precautions/Restrictions fall  -       Row Name 04/24/25 1710          Safety Issues/Impairments Affecting Functional Mobility    Impairments Affecting Function (Mobility) balance;endurance/activity tolerance;strength;pain  -     Comment, Safety Issues/Impairments (Mobility) PT/OT cotreatment appropriate due to pt acuity level and to maxmize therapeutic benefit and for safety of patient and staff  -               User Key  (r) = Recorded By, (t) = Taken By, (c) = Cosigned By      Initials Name Provider Type    Laura Kulkarni, ABRAHAM Physical Therapist Assistant                   Mobility       Row Name 04/24/25 1710          Bed Mobility    Scooting/Bridging Bent (Bed Mobility) verbal cues  cued to use UEs and R LE to assist to EOB, required rest break to complete  -     Supine-Sit Bent (Bed Mobility) standby assist;supervision  -     Sit-Supine Bent (Bed Mobility) not tested  -     Assistive Device (Bed Mobility) head of bed elevated  -[facility] Name 04/24/25 1710          Bed-Chair Transfer    Bed-Chair Bent (Transfers) contact guard;2 person assist;verbal cues  -     Assistive Device (Bed-Chair Transfers) walker, front-wheeled  -     Comment, (Bed-Chair Transfer) pt jonathan bed-chair w/rwx, did have to cue for safe sequencing and waiting to sit until at chair  -[facility] Name 04/24/25 1710          Sit-Stand Transfer    Sit-Stand Bent (Transfers) contact guard;2 person assist  -     Assistive Device (Sit-Stand Transfers) walker, front-wheeled  -[facility] Name 04/24/25 1710          Gait/Stairs (Locomotion)    Comment, (Gait/Stairs) 2 shuffles w/R foot to get into chair  -               User Key  (r) = Recorded By, (t) = Taken By, (c) = Cosigned By      Initials Name  Provider Type    Laura Kulkarni PTA Physical Therapist Assistant                   Obj/Interventions       Row Name 04/24/25 1713          Motor Skills    Therapeutic Exercise --  SLRs , hip abd/add, QS,GS,APs x10 reps once in recliner, pt requesting theraband  -               User Key  (r) = Recorded By, (t) = Taken By, (c) = Cosigned By      Initials Name Provider Type    Laura Kulkarni PTA Physical Therapist Assistant                   Goals/Plan    No documentation.                  Clinical Impression       Row Name 04/24/25 1714          Pain    Pretreatment Pain Rating 7/10  -     Posttreatment Pain Rating 7/10  -     Pain Location extremity  -     Pain Side/Orientation left;lower  -     Pain Management Interventions positioning techniques utilized;exercise or physical activity utilized;movement retraining implemented  -     Response to Pain Interventions activity participation with tolerable pain  -     Pre/Posttreatment Pain Comment pt reporting less pain with supine position, but once EOB and after tfer incr 7/10  -       Row Name 04/24/25 1714          Plan of Care Review    Plan of Care Reviewed With patient  -     Progress improving  -     Outcome Evaluation Pt agreed to PT/OT session, pt jonathan bed to chair this date with rwx, pt jonathan LE exer x10 reps , pt still impulsive, but followed cues for safety well this date, pt reported pain 7/10 LLE, encouraged elevation of LLE once in recliner, plans SNU /RHB at [place], possible procedure in OR 4/25  -       Row Name 04/24/25 1714          Therapy Assessment/Plan (PT)    Rehab Potential (PT) fair  -     Criteria for Skilled Interventions Met (PT) yes  -       Row Name 04/24/25 1714          Positioning and Restraints    Pre-Treatment Position in bed  -     Post Treatment Position chair  -JM     In Chair reclined;call light within reach;encouraged to call for assist;exit alarm on;notified nsg;LLE elevated  educ NA on returning  pt to bed fr recliner towards RLE for safety  -               User Key  (r) = Recorded By, (t) = Taken By, (c) = Cosigned By      Initials Name Provider Type    Laura Kulkarni PTA Physical Therapist Assistant                   Outcome Measures       Row Name 04/24/25 1720 04/24/25 0845       How much help from another person do you currently need...    Turning from your back to your side while in flat bed without using bedrails? 4  - 4  -KP    Moving from lying on back to sitting on the side of a flat bed without bedrails? 3  -JM 3  -KP    Moving to and from a bed to a chair (including a wheelchair)? 3  -JM 2  -KP    Standing up from a chair using your arms (e.g., wheelchair, bedside chair)? 3  - 2  -KP    Climbing 3-5 steps with a railing? 1  - 1  -KP    To walk in hospital room? 1  - 1  -KP    AM-PAC 6 Clicks Score (PT) 15  - 13  -KP    Highest Level of Mobility Goal 4 --> Transfer to chair/commode  - 4 --> Transfer to chair/commode  -              User Key  (r) = Recorded By, (t) = Taken By, (c) = Cosigned By      Initials Name Provider Type    Laura Kulkarni PTA Physical Therapist Assistant    Gisselle Celestin, RN Registered Nurse                                 Physical Therapy Education       Title: PT OT SLP Therapies (Done)       Topic: Physical Therapy (Done)       Point: Mobility training (Done)       Learning Progress Summary            Patient Acceptance, E,TB,D, VU,NR by TIFF at 4/24/2025 1720    Acceptance, E,TB,D, VU,NR by TIFF at 4/23/2025 1326    Eager, E,TB,D, VU,NR by TIFF at 4/21/2025 1034    Acceptance, E,D, VU,NR by MS at 4/20/2025 1124    Acceptance, E, NR by  at 4/19/2025 1647    Acceptance, E,D, NR by TIFF at 4/17/2025 1809    Acceptance, E, NR by MARISSA at 4/15/2025 1324                      Point: Home exercise program (Done)       Learning Progress Summary            Patient Acceptance, E,TB,D, VU,NR by TIFF at 4/24/2025 1720    Acceptance, E,TB,D, VU,NR by TIFF at 4/23/2025  1326    Eager, E,TB,D, VU,NR by JM at 4/21/2025 1034    Acceptance, E,D, VU,NR by MS at 4/20/2025 1124    Acceptance, E, NR by MR at 4/19/2025 1647    Acceptance, E,D, NR by JM at 4/17/2025 1809                      Point: Body mechanics (Done)       Learning Progress Summary            Patient Acceptance, E,TB,D, VU,NR by TIFF at 4/24/2025 1720    Acceptance, E,TB,D, VU,NR by JM at 4/23/2025 1326    Eager, E,TB,D, VU,NR by  at 4/21/2025 1034    Acceptance, E,D, VU,NR by MS at 4/20/2025 1124    Acceptance, E, NR by MR at 4/19/2025 1647    Acceptance, E,D, NR by JM at 4/17/2025 1809    Acceptance, E, NR by DJ at 4/15/2025 1324                      Point: Precautions (Done)       Learning Progress Summary            Patient Acceptance, E,TB,D, VU,NR by TIFF at 4/24/2025 1720    Acceptance, E,TB,D, VU,NR by JM at 4/23/2025 1326    Eager, E,TB,D, VU,NR by TIFF at 4/21/2025 1034    Acceptance, E,D, VU,NR by MS at 4/20/2025 1124    Acceptance, E, NR by MR at 4/19/2025 1647    Acceptance, E,D, NR by JM at 4/17/2025 1809    Acceptance, E, NR by DJ at 4/15/2025 1324                                      User Key       Initials Effective Dates Name Provider Type Discipline     03/07/18 -  Laura Gooden PTA Physical Therapist Assistant PT    MS 06/16/21 -  Misael Cerda, PT Physical Therapist PT    DJ 10/25/19 -  Meka Araiza, PT Physical Therapist PT    MR 08/03/23 -  Mary Jane Kelly, RN Registered Nurse Nurse                  PT Recommendation and Plan     Progress: improving  Outcome Evaluation: Pt agreed to PT/OT session, pt jonathan bed to chair this date with rwx, pt jonathan LE exer x10 reps , pt still impulsive, but followed cues for safety well this date, pt reported pain 7/10 LLE, encouraged elevation of LLE once in recliner, plans SNU /RHB at [place], possible procedure in OR 4/25     Time Calculation:         PT Charges       Row Name 04/24/25 8161             Time Calculation    Start Time 1020  -JM      Stop Time 1043  -JM       Time Calculation (min) 23 min  -TIFF      PT Received On 04/24/25  -TIFF      PT - Next Appointment 04/25/25  -TIFF                User Key  (r) = Recorded By, (t) = Taken By, (c) = Cosigned By      Initials Name Provider Type    Laura Kulkarni PTA Physical Therapist Assistant                  Therapy Charges for Today       Code Description Service Date Service Provider Modifiers Qty    54410867971 HC PT THERAPEUTIC ACT EA 15 MIN 4/23/2025 Laura Gooden PTA GP 2    84128224074 HC PT THERAPEUTIC ACT EA 15 MIN 4/24/2025 Laura Gooden PTA GP 1    45632402272 HC PT THER PROC EA 15 MIN 4/24/2025 Laura Gooden, ABRAHAM GP 1            PT G-Codes  Outcome Measure Options: AM-PAC 6 Clicks Basic Mobility (PT)  AM-PAC 6 Clicks Score (PT): 15  AM-PAC 6 Clicks Score (OT): 14  PT Discharge Summary  Anticipated Discharge Disposition (PT): skilled nursing facility, inpatient rehabilitation facility    Laura Gooden PTA  4/24/2025

## 2025-04-24 NOTE — PROGRESS NOTES
ID NOTE    CC: f/u leukocytosis    Subj: OR canceled yesterday due to anxiety and diaphoresis.  Cardiology evaluated the patient and thought it was more likely due to anxiety than purely cardiac etiology.  She is back on the OR for tomorrow.  She remains afebrile off of antibiotics.  WBC elevated but stable at 17.      Medications:    Current Facility-Administered Medications:     acetaminophen (TYLENOL) tablet 650 mg, 650 mg, Oral, Q4H PRN, 650 mg at 04/18/25 0049 **OR** acetaminophen (TYLENOL) 160 MG/5ML oral solution 650 mg, 650 mg, Oral, Q4H PRN **OR** acetaminophen (TYLENOL) suppository 650 mg, 650 mg, Rectal, Q4H PRN, Raimundo Lin MD, 650 mg at 04/13/25 0923    acetaminophen (TYLENOL) tablet 650 mg, 650 mg, Oral, Q4H PRN **OR** acetaminophen (TYLENOL) 160 MG/5ML oral solution 650 mg, 650 mg, Oral, Q4H PRN **OR** acetaminophen (TYLENOL) suppository 650 mg, 650 mg, Rectal, Q4H PRN, Raimundo Lin MD    acetaminophen (TYLENOL) tablet 650 mg, 650 mg, Oral, Q4H PRN, 650 mg at 04/22/25 0845 **OR** acetaminophen (TYLENOL) suppository 650 mg, 650 mg, Rectal, Q4H PRN, Raimundo Lin MD    albuterol (PROVENTIL) nebulizer solution 0.083% 2.5 mg/3mL, 2.5 mg, Nebulization, Q6H PRN, Raimundo Lin MD    aluminum-magnesium hydroxide-simethicone (MAALOX MAX) 400-400-40 MG/5ML suspension 15 mL, 15 mL, Oral, Q6H PRN, Raimundo Lin MD    arformoterol (BROVANA) nebulizer solution 15 mcg, 15 mcg, Nebulization, BID - RT, Raimundo Lin MD, 15 mcg at 04/23/25 1930    aspirin EC tablet 81 mg, 81 mg, Oral, Daily, 81 mg at 04/24/25 0908 **OR** aspirin suppository 300 mg, 300 mg, Rectal, Daily, Raimundo Lin MD    atorvastatin (LIPITOR) tablet 40 mg, 40 mg, Oral, Nightly, Raimundo Lin MD, 40 mg at 04/23/25 2033    sennosides-docusate (PERICOLACE) 8.6-50 MG per tablet 2 tablet, 2 tablet, Oral, BID, 2 tablet at 04/23/25 2033 **AND** polyethylene glycol (MIRALAX) packet 17 g, 17 g, Oral, Daily PRN **AND**  bisacodyl (DULCOLAX) EC tablet 5 mg, 5 mg, Oral, Daily PRN **AND** bisacodyl (DULCOLAX) suppository 10 mg, 10 mg, Rectal, Daily PRN, Raimundo Lin MD    budesonide (PULMICORT) nebulizer solution 0.5 mg, 0.5 mg, Nebulization, BID - RT, Raimundo Lin MD, 0.5 mg at 04/23/25 1931    Calcium Replacement - Follow Nurse / BPA Driven Protocol, , Not Applicable, PRN, Raimundo Lin MD    clopidogrel (PLAVIX) tablet 75 mg, 75 mg, Oral, Daily, Raimundo Lin MD, 75 mg at 04/24/25 0908    dextrose (D50W) (25 g/50 mL) IV injection 25 g, 25 g, Intravenous, Q15 Min PRN, Raimundo Lin MD    dextrose (GLUTOSE) oral gel 15 g, 15 g, Oral, Q15 Min PRN, Raimundo Lin MD    glucagon (GLUCAGEN) injection 1 mg, 1 mg, Intramuscular, Q15 Min PRN, Raimundo Lin MD    heparin (porcine) 5000 UNIT/ML injection 2,100-4,000 Units, 30-57.9 Units/kg (Order-Specific), Intravenous, Q6H PRN, Raimundo Lin MD    heparin 18237 units/250 mL (100 units/mL) in 0.45 % NaCl infusion, 12 Units/kg/hr, Intravenous, Titrated, Misael Lawton MD, Last Rate: 12.78 mL/hr at 04/23/25 1716, 18 Units/kg/hr at 04/23/25 1716    HYDROcodone-acetaminophen (NORCO) 7.5-325 MG per tablet 1 tablet, 1 tablet, Oral, Q4H PRN, Raimundo Lin MD, 1 tablet at 04/23/25 1516    HYDROmorphone (DILAUDID) injection 1 mg, 1 mg, Intravenous, Q2H PRN, 1 mg at 04/24/25 0908 **AND** naloxone (NARCAN) injection 0.4 mg, 0.4 mg, Intravenous, Q5 Min PRN, Raimundo Lin MD    insulin regular (humuLIN R,novoLIN R) injection 2-7 Units, 2-7 Units, Subcutaneous, Q6H, Raimundo Lin MD, 2 Units at 04/24/25 0623    lactated ringers infusion, 9 mL/hr, Intravenous, Continuous, Mandi Macias MD, Last Rate: 9 mL/hr at 04/23/25 1340, 9 mL/hr at 04/23/25 1340    Magnesium Standard Dose Replacement - Follow Nurse / BPA Driven Protocol, , Not Applicable, PRN, Raimundo Lin MD    metoprolol tartrate (LOPRESSOR) tablet 25 mg, 25 mg, Oral, Q12H, Misael Lawton MD,  25 mg at 04/24/25 0908    nitroglycerin (NITROSTAT) SL tablet 0.4 mg, 0.4 mg, Sublingual, Q5 Min PRN, Raimundo Lin MD    nitroglycerin (NITROSTAT) SL tablet 0.4 mg, 0.4 mg, Sublingual, Q5 Min PRN, Raimundo Lin MD    nitroglycerin (NITROSTAT) SL tablet 0.4 mg, 0.4 mg, Sublingual, Q5 Min PRN, Raimundo Lin MD    ondansetron ODT (ZOFRAN-ODT) disintegrating tablet 4 mg, 4 mg, Oral, Q6H PRN **OR** ondansetron (ZOFRAN) injection 4 mg, 4 mg, Intravenous, Q6H PRN, Raimundo Lin MD    ondansetron ODT (ZOFRAN-ODT) disintegrating tablet 4 mg, 4 mg, Oral, Q6H PRN **OR** ondansetron (ZOFRAN) injection 4 mg, 4 mg, Intravenous, Q6H PRN, Raimundo Lin MD    pantoprazole (PROTONIX) EC tablet 40 mg, 40 mg, Oral, Q AM, Raimundo Lin MD, 40 mg at 04/24/25 0624    Phosphorus Replacement - Follow Nurse / BPA Driven Protocol, , Not Applicable, PRN, Raimundo Lin MD    Potassium Replacement - Follow Nurse / BPA Driven Protocol, , Not Applicable, PRN, Misael Lawton MD    revefenacin (YUPELRI) nebulizer solution 175 mcg, 175 mcg, Nebulization, Daily - RT, Raimundo Lin MD, 175 mcg at 04/23/25 0754    sodium hypochlorite (DAKIN'S 1/4 STRENGTH) 0.125 % topical solution 0.125% solution, , Topical, Daily, Raimundo Lin MD, Given at 04/22/25 1610      Objective   Vital Signs   Temp:  [97.6 °F (36.4 °C)-98.8 °F (37.1 °C)] 98.8 °F (37.1 °C)  Heart Rate:  [73-82] 77  Resp:  [16] 16  BP: (109-127)/(62-69) 117/69    Physical Exam:   General: NAD, lying in bed, very nice, just finished getting cleaned up  Eyes: no scleral icterus  ENT: no thrush, edentulous  Cardiovascular: Normal rate  Respiratory: no wheezing  GI: Abdomen is not distended, not tender  MSK: LLE wrapped; no erythema outside of the dressing    Labs:   CBC, BMP, and magnesium reviewed today  Lab Results   Component Value Date    WBC 17.70 (H) 04/24/2025    HGB 7.4 (L) 04/24/2025    HCT 23.7 (L) 04/24/2025    MCV 86.8 04/24/2025      04/24/2025     Lab Results   Component Value Date    GLUCOSE 166 (H) 04/24/2025    CALCIUM 8.5 (L) 04/24/2025     04/24/2025    K 3.8 04/24/2025    CO2 29.0 04/24/2025    CL 96 (L) 04/24/2025    BUN 12 04/24/2025    CREATININE 0.67 04/24/2025    EGFR 97.7 04/24/2025    BCR 17.9 04/24/2025    ANIONGAP 11.0 04/24/2025   Magnesium 1.6      Microbiology:  4/8 BCx: Negative  4/12 BCx: Negative  4/18 LLE wound Cx: Negative, final    Isolation:  No active isolations    ASSESSMENT/PLAN:  Leukocytosis -stable  Inferior STEMI s/p mechanical thrombectomy  Cardiogenic shock -resolved  Ischemic enteritis and colitis  Ileus  Left ventricular thrombus  Critical ischemia left lower extremity due to embolism  Status post open thrombectomy and angiogram  Status post 4 compartment fasciotomy  Post-surgical bleeding and hematoma  COPD due to tobacco abuse    WBC remains elevated but is stable.  She remains afebrile.  As mentioned in prior notes, I suspect the leukocytosis is likely to be multifactorial and ultimately a stress response.  Multiple sets of blood cultures and wound cultures have been negative.  I will follow-up the details of the operative report tomorrow.  If there are any concerns for infection, please send fluid/tissue for microbiologic evaluation and I will follow-up the results.    ID will follow.

## 2025-04-24 NOTE — PLAN OF CARE
Goal Outcome Evaluation:  Plan of Care Reviewed With: patient           Outcome Evaluation: Pt participates in skilled tx progressing standing tolerance, functional transfers to improve participation in out of bed ADLs. Pt requires <50% assist of two persons to safely complete stand pivot transfer. Pt somewhat impulsive, and continues with guarding of LLE due to pain, cues provided to improve BLE position and BUE offset with RW. IPOT to continue to follow with recommendations for IRF at discharge pending ongoing progress.    Anticipated Discharge Disposition (OT): inpatient rehabilitation facility

## 2025-04-24 NOTE — PLAN OF CARE
Goal Outcome Evaluation:            Pt tolerated medications well. Heparin gtt currently infusing at 18units/hr. Complained of pain and was given medications per MAR at request. Remains NSR on monitor at this time. Pulse check q4h. In bed at this time with call light in reach.

## 2025-04-24 NOTE — PLAN OF CARE
Problem: Adult Inpatient Plan of Care  Goal: Plan of Care Review  Outcome: Progressing  Flowsheets (Taken 4/24/2025 1524)  Outcome Evaluation: patient is alert and oriented. VSS up in the chair. NPO after midnight.  Goal: Patient-Specific Goal (Individualized)  Outcome: Progressing  Goal: Absence of Hospital-Acquired Illness or Injury  Outcome: Progressing  Intervention: Identify and Manage Fall Risk  Recent Flowsheet Documentation  Taken 4/24/2025 1420 by Gisselle Tuttle RN  Safety Promotion/Fall Prevention:   clutter free environment maintained   activity supervised  Taken 4/24/2025 1207 by Gisselle Tuttle RN  Safety Promotion/Fall Prevention:   clutter free environment maintained   activity supervised  Taken 4/24/2025 1005 by Gisselle Tuttle RN  Safety Promotion/Fall Prevention:   clutter free environment maintained   activity supervised  Taken 4/24/2025 0845 by Gisselle Tuttle RN  Safety Promotion/Fall Prevention:   clutter free environment maintained   activity supervised  Intervention: Prevent Skin Injury  Recent Flowsheet Documentation  Taken 4/24/2025 0845 by Gisselle Tuttle RN  Body Position: position changed independently  Intervention: Prevent and Manage VTE (Venous Thromboembolism) Risk  Recent Flowsheet Documentation  Taken 4/24/2025 0845 by Gisselle Tuttle RN  VTE Prevention/Management: (Heparin gtt) other (see comments)  Intervention: Prevent Infection  Recent Flowsheet Documentation  Taken 4/24/2025 1420 by Gisselle Tuttle RN  Infection Prevention: single patient room provided  Taken 4/24/2025 1207 by Gisselle Tuttle RN  Infection Prevention: single patient room provided  Taken 4/24/2025 1005 by Gisselle Tuttle RN  Infection Prevention: single patient room provided  Taken 4/24/2025 0845 by Gisselle Tuttle RN  Infection Prevention: single patient room provided  Goal: Optimal Comfort and Wellbeing  Outcome: Progressing  Intervention: Provide Person-Centered Care  Recent Flowsheet Documentation  Taken 4/24/2025  0845 by Gisselle Tuttle RN  Trust Relationship/Rapport:   care explained   choices provided  Goal: Readiness for Transition of Care  Outcome: Progressing     Problem: Skin Injury Risk Increased  Goal: Skin Health and Integrity  Outcome: Progressing  Intervention: Optimize Skin Protection  Recent Flowsheet Documentation  Taken 4/24/2025 0845 by Gisselle Tuttle RN  Head of Bed (HOB) Positioning: HOB elevated     Problem: Sepsis/Septic Shock  Goal: Optimal Coping  Outcome: Progressing  Intervention: Support Patient and Family Response  Recent Flowsheet Documentation  Taken 4/24/2025 0845 by Gisselle Tuttle RN  Family/Support System Care: self-care encouraged  Goal: Absence of Bleeding  Outcome: Progressing  Goal: Blood Glucose Level Within Target Range  Outcome: Progressing  Goal: Absence of Infection Signs and Symptoms  Outcome: Progressing  Intervention: Initiate Sepsis Management  Recent Flowsheet Documentation  Taken 4/24/2025 1420 by Gisselle Tuttle RN  Infection Prevention: single patient room provided  Taken 4/24/2025 1207 by Gisselle Tuttle RN  Infection Prevention: single patient room provided  Taken 4/24/2025 1005 by Gisselle Tuttle RN  Infection Prevention: single patient room provided  Taken 4/24/2025 0845 by Gisselle Tuttle RN  Infection Prevention: single patient room provided  Goal: Optimal Nutrition Delivery  Outcome: Progressing     Problem: Fall Injury Risk  Goal: Absence of Fall and Fall-Related Injury  Outcome: Progressing  Intervention: Identify and Manage Contributors  Recent Flowsheet Documentation  Taken 4/24/2025 1420 by Gisselle Tuttle RN  Medication Review/Management: medications reviewed  Taken 4/24/2025 1207 by Gisselle Tuttle RN  Medication Review/Management: medications reviewed  Taken 4/24/2025 1005 by Gisselle Tuttle RN  Medication Review/Management: medications reviewed  Taken 4/24/2025 0845 by Gisselle Tuttle RN  Medication Review/Management: medications reviewed  Intervention: Promote  Injury-Free Environment  Recent Flowsheet Documentation  Taken 4/24/2025 1420 by Gisselle Tuttle RN  Safety Promotion/Fall Prevention:   clutter free environment maintained   activity supervised  Taken 4/24/2025 1207 by Gisselle Tuttle RN  Safety Promotion/Fall Prevention:   clutter free environment maintained   activity supervised  Taken 4/24/2025 1005 by Gisselle uTttle RN  Safety Promotion/Fall Prevention:   clutter free environment maintained   activity supervised  Taken 4/24/2025 0845 by Gisselle Tuttle RN  Safety Promotion/Fall Prevention:   clutter free environment maintained   activity supervised     Problem: Pain Acute  Goal: Optimal Pain Control and Function  Outcome: Progressing  Intervention: Optimize Psychosocial Wellbeing  Recent Flowsheet Documentation  Taken 4/24/2025 0845 by Gisselle Tuttle RN  Diversional Activities: television  Intervention: Prevent or Manage Pain  Recent Flowsheet Documentation  Taken 4/24/2025 1420 by Gisselle Tuttle RN  Medication Review/Management: medications reviewed  Taken 4/24/2025 1207 by Gisselle Tuttle RN  Medication Review/Management: medications reviewed  Taken 4/24/2025 1005 by Gisselle Tuttle RN  Medication Review/Management: medications reviewed  Taken 4/24/2025 0845 by Gisselle Tuttle RN  Medication Review/Management: medications reviewed     Problem: Heart Failure  Goal: Optimal Coping  Outcome: Progressing  Intervention: Support Psychosocial Response  Recent Flowsheet Documentation  Taken 4/24/2025 0845 by Gisselle Tuttle RN  Family/Support System Care: self-care encouraged  Goal: Optimal Cardiac Output and Blood Flow  Outcome: Progressing  Goal: Stable Heart Rate and Rhythm  Outcome: Progressing  Goal: Fluid and Electrolyte Balance  Outcome: Progressing  Goal: Optimal Functional Ability  Outcome: Progressing  Goal: Improved Oral Intake  Outcome: Progressing  Goal: Effective Oxygenation and Ventilation  Outcome: Progressing  Intervention: Optimize Oxygenation and  Ventilation  Recent Flowsheet Documentation  Taken 4/24/2025 0845 by Gisselle Tuttle RN  Head of Bed (HOB) Positioning: HOB elevated  Goal: Effective Breathing Pattern During Sleep  Outcome: Progressing  Intervention: Monitor and Manage Obstructive Sleep Apnea  Recent Flowsheet Documentation  Taken 4/24/2025 1420 by Gisselle Tuttle RN  Medication Review/Management: medications reviewed  Taken 4/24/2025 1207 by Gisselle Tuttle RN  Medication Review/Management: medications reviewed  Taken 4/24/2025 1005 by Gisselle Tuttle RN  Medication Review/Management: medications reviewed  Taken 4/24/2025 0845 by Gisselle Tuttle RN  Medication Review/Management: medications reviewed     Problem: VTE (Venous Thromboembolism)  Goal: Tissue Perfusion  Outcome: Progressing  Intervention: Optimize Tissue Perfusion  Recent Flowsheet Documentation  Taken 4/24/2025 0845 by Gisselle Tuttle RN  VTE Prevention/Management: (Heparin gtt) other (see comments)  Goal: Optimal Right Ventricular Function  Outcome: Progressing   Goal Outcome Evaluation:              Outcome Evaluation: patient is alert and oriented. VSS up in the chair. NPO after midnight.

## 2025-04-24 NOTE — PLAN OF CARE
Goal Outcome Evaluation:  Plan of Care Reviewed With: patient        Progress: improving  Outcome Evaluation: Pt agreed to PT/OT session, pt jonathan bed to chair this date with rwx, pt jonathan LE exer x10 reps , pt still impulsive, but followed cues for safety well this date, pt reported pain 7/10 LLE, encouraged elevation of LLE once in recliner, plans SNU /RHB at DC, possible procedure in OR 4/25    Anticipated Discharge Disposition (PT): skilled nursing facility, inpatient rehabilitation facility

## 2025-04-24 NOTE — PROGRESS NOTES
Name: Desiree Garcia ADMIT: 4/10/2025   : 1960  PCP: Sandra Kaba MD    MRN: [id number] LOS: 14 days   AGE/SEX: 65 y.o. female  ROOM: [facility]     Subjective   Subjective   Patient seen and examined at bedside, she is weepy but without distress.  She is tolerating diet.  Planning for possible surgery on Wednesday.  No overt bleeding on heparin drip    2025  No overnight events, patient without distress.  Plan for surgery tomorrow.  Admits to breakthrough pain at times but otherwise doing okay    2025  Patient seen and examined at bedside, plan is for surgery later today.    2025  Surgery was canceled yesterday as patient became anxious and diaphoretic before surgery.  Currently without distress though notes her leg pain continues.  Tolerating heparin drip.       Objective   Objective   Vital Signs  Temp:  [97.6 °F (36.4 °C)-98.8 °F (37.1 °C)] 98.4 °F (36.9 °C)  Heart Rate:  [73-82] 77  Resp:  [16-18] 18  BP: (109-127)/(62-73) 126/73  SpO2:  [94 %-100 %] 100 %  on  Flow (L/min) (Oxygen Therapy):  [0] 0;   Device (Oxygen Therapy): room air  Body mass index is 27.75 kg/m².  Physical Exam  Constitutional:       Appearance: Normal appearance. She is ill-appearing.   HENT:      Head: Normocephalic and atraumatic.      Nose: No congestion.      Mouth/Throat:      Pharynx: No oropharyngeal exudate.   Eyes:      General: No scleral icterus.  Cardiovascular:      Heart sounds: No murmur heard.     No friction rub. No gallop.   Pulmonary:      Effort: No respiratory distress.      Breath sounds: No wheezing, rhonchi or rales.      Comments: Stable on room air  Abdominal:      General: There is no distension.      Tenderness: There is no abdominal tenderness. There is no guarding.   Musculoskeletal:         General: No deformity or signs of injury.      Cervical back: No rigidity.      Comments: Left leg is bandaged, bandaging is clean/dry/intact   Skin:     Coloration: Skin is not jaundiced.       Findings: No bruising or lesion.   Neurological:      General: No focal deficit present.      Mental Status: She is alert and oriented to person, place, and time.      Motor: Weakness present.       Results Review     I reviewed the patient's new clinical results.  Results from last 7 days   Lab Units 04/24/25  0619 04/23/25  0543 04/22/25  0453 04/21/25  1521 04/21/25  0640   WBC 10*3/mm3 17.70* 19.23* 22.37*  --  25.32*   HEMOGLOBIN g/dL 7.4* 7.6* 7.9* 8.0* 7.4*   PLATELETS 10*3/mm3 437 404 402  --  415     Results from last 7 days   Lab Units 04/24/25  0620 04/23/25  0543 04/22/25  1508 04/22/25  0452 04/21/25  0640   SODIUM mmol/L 136 137  --  138 133*   POTASSIUM mmol/L 3.8 4.2 4.3 3.6 3.7   CHLORIDE mmol/L 96* 100  --  99 97*   CO2 mmol/L 29.0 30.1*  --  32.1* 28.9   BUN mg/dL 12 9  --  10 6*   CREATININE mg/dL 0.67 0.74  --  0.68 0.63   GLUCOSE mg/dL 166* 117*  --  148* 145*   EGFR mL/min/1.73 97.7 90.5  --  97.4 99.2     Results from last 7 days   Lab Units 04/24/25  0620 04/23/25  0543 04/22/25  0452 04/21/25  0640   ALBUMIN g/dL 2.2* 2.4* 2.7* 2.4*     Results from last 7 days   Lab Units 04/24/25  0620 04/23/25  0543 04/22/25  0452 04/21/25  0640   CALCIUM mg/dL 8.5* 8.3* 8.2* 7.8*   ALBUMIN g/dL 2.2* 2.4* 2.7* 2.4*   MAGNESIUM mg/dL 1.6 1.6 1.7 1.6   PHOSPHORUS mg/dL 3.4 2.9 3.1 3.2       Glucose   Date/Time Value Ref Range Status   04/24/2025 1154 145 (H) 70 - 130 mg/dL Final   04/24/2025 0608 169 (H) 70 - 130 mg/dL Final   04/24/2025 0026 140 (H) 70 - 130 mg/dL Final   04/23/2025 2055 179 (H) 70 - 130 mg/dL Final   04/23/2025 1628 126 70 - 130 mg/dL Final   04/23/2025 1356 116 70 - 130 mg/dL Final   04/23/2025 1113 131 (H) 70 - 130 mg/dL Final       No radiology results for the last day    I have personally reviewed all medications:  Scheduled Medications  arformoterol, 15 mcg, Nebulization, BID - RT  aspirin, 81 mg, Oral, Daily   Or  aspirin, 300 mg, Rectal, Daily  atorvastatin, 40 mg, Oral,  Nightly  budesonide, 0.5 mg, Nebulization, BID - RT  clopidogrel, 75 mg, Oral, Daily  insulin regular, 2-7 Units, Subcutaneous, Q6H  metoprolol tartrate, 25 mg, Oral, Q12H  pantoprazole, 40 mg, Oral, Q AM  revefenacin, 175 mcg, Nebulization, Daily - RT  senna-docusate sodium, 2 tablet, Oral, BID  sodium hypochlorite, , Topical, Daily    Infusions  heparin, 12 Units/kg/hr, Last Rate: 18 Units/kg/hr (04/23/25 1716)  lactated ringers, 9 mL/hr, Last Rate: 9 mL/hr (04/23/25 1340)    Diet  Diet: Diabetic; Consistent Carbohydrate; Fluid Consistency: Thin (IDDSI 0)    I have personally reviewed:  [x]  Laboratory   [x]  Microbiology   [x]  Radiology   [x]  EKG/Telemetry  [x]  Cardiology/Vascular   []  Pathology    []  Records       Assessment/Plan     Active Hospital Problems    Diagnosis  POA   • 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 [I74.3]  Unknown   • Acute thrombus of left ventricle [I24.0]  Yes   • Altered bowel habits [R19.4]  Yes   • Leg paresthesia [R20.2]  Yes      Resolved Hospital Problems    Diagnosis Date Resolved POA   • **Cardiogenic shock [R57.0] 04/15/2025 Yes       65 y.o. female admitted with Cardiogenic shock.    #Left lower extremity embolism and thrombosis  #Left ventricular thrombus with showering emboli of kidney, spleen, bile  #Severe PAD    -Status post open left iliofemoral, profunda femoris and superficial femoral artery thromboembolectomy with left common femoral endarterectomy and repair, 4 compartment fasciotomy of left lower extremity with left femoropopliteal angioplasty and stent placement x 2 on 4/12/2025    -Status post left lateral fasciotomy site debridement and left medial fasciotomy site debridement on 4/18/2025    - Continue heparin drip    -Plan to switch to an oral NOAC once cleared by vascular, loading dose not needed    - Hematology has signed off    - Pain control    - Aspirin and Plavix daily    - Statin    - Surgery canceled on 4/23/2025 as patient became anxious  and diaphoretic in preop, cardiology reevaluated patient and cleared her for further surgery.    - Plan for left fasciotomy debridement with possible wound VAC placement tomorrow    - Pain control    - N.p.o. after midnight    - Start D5 NS at 50 an hour for 20 hours at midnight    #Leukocytosis    - ID consulted, suspects WBC elevation is multifactorial as cultures are negative to date    - Antibiotics discontinued    - Continue to monitor    - WBC 25.32 > 22.37 > 19.23 > 17.7    #Cardiogenic shock  #Inferior STEMI    -Status post JOE x 2 on 4/8/2025    - Resolved    - Aspirin 81 mg daily, Plavix 75 mg daily, atorvastatin 40 mg p.o. daily    - Lopressor 25 mg p.o. twice daily    - Continue heparin drip, plan to transition to DOAC with long-term dual therapy    - Cardiology has cleared for surgery tomorrow    #COPD    - Brovana nebs twice daily    - Pulmicort twice daily    - Yupelri nebs daily    #Ileus    - Ileus has resolved per surgery, NG removed, surgery signed off    #NIA    - Resolved, creatinine stable    - Nephrology has signed off    #Anemia    - Hemoglobin 7.4 > 7.9 > 7.6 > 7.4, suspect blood loss from surgery    - No overt bleeding, continue to monitor closely is on heparin drip    - Transfuse maintain hemoglobin greater than 7    #GERD    - PPI          Continue on heparin drip  for DVT prophylaxis.  Full code.  Discussed with patient and nursing staff.  Anticipate discharge home with  vs SNU facility next week.  Expected Discharge Date: 4/28/2025; Expected Discharge Time:       DO [facility]  04/24/25  12:55 EDT

## 2025-04-24 NOTE — CASE MANAGEMENT/SOCIAL WORK
Continued Stay Note  [facility]     Patient Name: Desiree Garcia  MRN: [id number]  Today's Date: 4/24/2025    Admit Date: 4/10/2025    Plan: [facility]   Discharge Plan       Row Name 04/24/25 1626       Plan    Plan [facility]    Patient/Family in Agreement with Plan yes    Plan Comments Met with pt in room. She confirmed her plan is to go to [facility] for rehab at discharge. She will need transportation. She will need precert when medically stable. She denied any other needs at this time. CCP following. Garrison BARRIENTOS RN                   Discharge Codes    No documentation.                 Expected Discharge Date and Time       Expected Discharge Date Expected Discharge Time    Apr 28, 2025               Garrison Harrison RN

## 2025-04-24 NOTE — THERAPY TREATMENT NOTE
Patient Name: Desiree Garcia  : 1960    MRN: [id number]                              Today's Date: 2025       Admit Date: 4/10/2025    Visit Dx:     ICD-10-CM ICD-9-CM   1. 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  I74.3 444.22     Patient Active Problem List   Diagnosis    Type 2 diabetes mellitus, without long-term current use of insulin    COPD (chronic obstructive pulmonary disease)    Depression with anxiety    Esophageal reflux    Forgetfulness    Leg paresthesia    Lumbar spinal stenosis    Migraines    Night sweat    Sciatica    Dyspnea on exertion    Thoracic or lumbosacral neuritis or radiculitis    Left wrist pain    Sprain of left wrist    Arthritis of knee, left    Contusion of left knee    Nondisplaced fracture of trapezium bone of left wrist with routine healing    Chronic left shoulder pain    Coronary artery disease involving native heart without angina pectoris    Hypertension, essential    Mixed hyperlipidemia    Cigarette nicotine dependence with nicotine-induced disorder    Altered bowel habits    Diarrhea    Rectal bleeding    Abdominal bloating    Allergic rhinitis due to food    Elevated troponin    Acute diastolic CHF (congestive heart failure)    History of ST elevation myocardial infarction (STEMI)    STEMI (ST elevation myocardial infarction)    Acute thrombus of left ventricle    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     Past Medical History:   Diagnosis Date    Arthritis     Cervical cancer screening     COPD (chronic obstructive pulmonary disease)     Coronary artery disease involving native heart without angina pectoris 2021    primary angioplasty and stent placement to mid right coronary artery with good angiographic results on 2021 after STEMI    Depression with anxiety     Diabetes mellitus     Forgetfulness     Gastric reflux     Hypertension     Leg paresthesia 2017    Right    Limb swelling     Lumbago 2017    Low  back pain    Lumbar spinal stenosis 2017    L4/5 moderate to severe central canal stenosis    Lumbosacral radiculopathy 2017    Right    Migraines     Night sweat     Reflux esophagitis     Shortness of breath     ST elevation myocardial infarction (STEMI) (CMS/[facility]) 2021    Stomach disorder      Past Surgical History:   Procedure Laterality Date    ABDOMINAL SURGERY      3 C-SECTIONS    ARTERIOGRAM LOWER EXTREMITY Left 2025    Procedure: ARTERIOGRAM LOWER EXTREMITY;  Surgeon: Misael Lawton MD;  Location: [facility] OR;  Service: Vascular;  Laterality: Left;    CARDIAC CATHETERIZATION      CARDIAC CATHETERIZATION N/A 2021    Procedure: Left Heart Cath;  Surgeon: Sidney Xiao MD;  Location: [facility] CATH INVASIVE LOCATION;  Service: Cardiology;  Laterality: N/A;    CARDIAC CATHETERIZATION N/A 2025    Procedure: Left Heart Cath;  Surgeon: James Verdugo MD;  Location: [facility] CATH INVASIVE LOCATION;  Service: Cardiology;  Laterality: N/A;     SECTION      x 3    CHOLECYSTECTOMY      COLONOSCOPY      COLONOSCOPY N/A 3/25/2025    Procedure: COLONOSCOPY WITH BIOPSIES AND COLD AND HOT SNARE POLYPECTOMIES;  Surgeon: Heber Najera MD;  Location: [facility] ENDOSCOPY;  Service: Gastroenterology;  Laterality: N/A;  COLON POLYPS    CORONARY STENT PLACEMENT      EMBOLECTOMY Left 2025    Procedure: EMBOLECTOMY MECHANICAL, FOUR COMPARTMENT FASCIOTOMY;  Surgeon: Misael Lawton MD;  Location: [facility] OR;  Service: Vascular;  Laterality: Left;    ENDOSCOPY      2007    FOOT SURGERY Right     HAND SURGERY      HYSTERECTOMY  1985    INCISION AND DRAINAGE LEG Left 2025    Procedure: LOWER EXTREMITY DEBRIDEMENT;  Surgeon: Misael Lawton MD;  Location: [facility] OR;  Service: Vascular;  Laterality: Left;      General Information       Row Name 25 1055          OT Time and Intention    Subjective Information pain  -HE     Document Type therapy  note (daily note)  -HE     Mode of Treatment co-treatment;physical therapy;occupational therapy  -HE     Patient Effort good  -HE     Symptoms Noted During/After Treatment increased pain  -       Row Name 04/24/25 1055          General Information    Patient Profile Reviewed yes  -HE     Existing Precautions/Restrictions fall  -       Row Name 04/24/25 1055          Safety Issues/Impairments Affecting Functional Mobility    Safety Issues Affecting Function (Mobility) impulsivity;safety precautions follow-through/compliance  -     Impairments Affecting Function (Mobility) balance;endurance/activity tolerance;strength;pain  -     Comment, Safety Issues/Impairments (Mobility) co-tx indicating to safely progress functional transfers, reduce safety concerns with pt impulsive  -HE               User Key  (r) = Recorded By, (t) = Taken By, (c) = Cosigned By      Initials Name Provider Type    HE Yu Dickey OT Occupational Therapist                     Mobility/ADL's       Row Name 04/24/25 1056          Bed Mobility    Bed Mobility supine-sit  -     Supine-Sit [place] (Bed Mobility) standby assist;supervision  -     Assistive Device (Bed Mobility) head of bed elevated  -       Row Name 04/24/25 1056          Transfers    Transfers sit-stand transfer;bed-chair transfer  -       Row Name 04/24/25 1056          Bed-Chair Transfer    Bed-Chair [place] (Transfers) contact guard;2 person assist;verbal cues  -     Assistive Device (Bed-Chair Transfers) walker, front-wheeled  -     Comment, (Bed-Chair Transfer) assist for safety with verbal cues for safe technique, pt attempting to scoot over to chair, cues to stand initially, pt continues to cross L leg/keep off of floor 2/2 pain  -       Row Name 04/24/25 1056          Sit-Stand Transfer    Sit-Stand [place] (Transfers) contact guard;2 person assist  -     Assistive Device (Sit-Stand Transfers) walker, front-wheeled  -       Row Name  "04/24/25 1056          Stand-Sit Transfer    Stand-Sit [place] (Transfers) contact guard;2 person assist  -HE     Assistive Device (Stand-Sit Transfers) walker, front-wheeled  -       Row Name 04/24/25 1056          Lower Body Dressing Assessment/Training    Assistive Devices (Lower Body Dressing) --  declines participation in ADLs stating nursing had \"washed her face 2-3 times today\"  -HE               User Key  (r) = Recorded By, (t) = Taken By, (c) = Cosigned By      Initials Name Provider Type    Yu Will OT Occupational Therapist                   Obj/Interventions       [facility] Name 04/24/25 1100          Balance    Comment, Balance CGA for brief stand to pivot to chair with RW  -               User Key  (r) = Recorded By, (t) = Taken By, (c) = Cosigned By      Initials Name Provider Type    Yu Will OT Occupational Therapist                   Goals/Plan       Row Name 04/24/25 1101          Transfer Goal 1 (OT)    Progress/Outcome (Transfer Goal 1, OT) goal ongoing  -HE       Row Name 04/24/25 1101          Bathing Goal 1 (OT)    Progress/Outcomes (Bathing Goal 1, OT) goal ongoing  -HE       Row Name 04/24/25 1101          Dressing Goal 1 (OT)    Progress/Outcome (Dressing Goal 1, OT) goal ongoing  -HE       Row Name 04/24/25 1101          Toileting Goal 1 (OT)    Progress/Outcome (Toileting Goal 1, OT) goal ongoing  -[facility] Name 04/24/25 1101          Grooming Goal 1 (OT)    Progress/Outcome (Grooming Goal 1, OT) goal ongoing  -HE       Row Name 04/24/25 1101          Strength Goal 1 (OT)    Progress/Outcome (Strength Goal 1, OT) goal ongoing  -               User Key  (r) = Recorded By, (t) = Taken By, (c) = Cosigned By      Initials Name Provider Type    Yu Will OT Occupational Therapist                   Clinical Impression       [facility] Name 04/24/25 1100          Pain Assessment    Posttreatment Pain Rating 7/10  -HE     Pre/Posttreatment Pain Comment pain in LLE " following transfer  -       Row Name 04/24/25 1101          Plan of Care Review    Plan of Care Reviewed With patient  -HE     Outcome Evaluation Pt participates in skilled tx progressing standing tolerance, functional transfers to improve participation in out of bed ADLs. Pt requires <50% assist of two persons to safely complete stand pivot transfer. Pt somewhat impulsive, and continues with guarding of LLE due to pain, cues provided to improve BLE position and BUE offset with RW. IPOT to continue to follow with recommendations for IRF at discharge pending ongoing progress.  -[facility] Name 04/24/25 1101          Therapy Assessment/Plan (OT)    Rehab Potential (OT) good  -     Criteria for Skilled Therapeutic Interventions Met (OT) yes  -HE     Therapy Frequency (OT) 5 times/wk  -[facility] Name 04/24/25 1101          Therapy Plan Review/Discharge Plan (OT)    Anticipated Discharge Disposition (OT) inpatient rehabilitation facility  -       Row Name 04/24/25 1101          Vital Signs    O2 Delivery Pre Treatment room air  -HE     O2 Delivery Intra Treatment room air  -HE     O2 Delivery Post Treatment room air  -[facility] Name 04/24/25 1101          Positioning and Restraints    Pre-Treatment Position in bed  -HE     Post Treatment Position chair  -HE     In Chair notified nsg;reclined;call light within reach;encouraged to call for assist;exit alarm on;LLE elevated  -HE               User Key  (r) = Recorded By, (t) = Taken By, (c) = Cosigned By      Initials Name Provider Type     Yu Dickey, OT Occupational Therapist                   Outcome Measures       Row Name 04/24/25 0845          How much help from another person do you currently need...    Turning from your back to your side while in flat bed without using bedrails? 4  -KP     Moving from lying on back to sitting on the side of a flat bed without bedrails? 3  -KP     Moving to and from a bed to a chair (including a wheelchair)? 2   -KP     Standing up from a chair using your arms (e.g., wheelchair, bedside chair)? 2  -KP     Climbing 3-5 steps with a railing? 1  -KP     To walk in hospital room? 1  -KP     AM-PAC 6 Clicks Score (PT) 13  -KP     Highest Level of Mobility Goal 4 --> Transfer to chair/commode  -               User Key  (r) = Recorded By, (t) = Taken By, (c) = Cosigned By      Initials Name Provider Type     Gisselle Tuttle, RN Registered Nurse                    Occupational Therapy Education       Title: PT OT SLP Therapies (Done)       Topic: Occupational Therapy (Done)       Point: ADL training (Done)       Learning Progress Summary            Patient Acceptance, E, VU,NR by HE at 4/24/2025 1104    Comment: Pt educated on goals of session, OT POC, transfer safety. Continue education.                      Point: Home exercise program (Done)       Learning Progress Summary            Patient Acceptance, E, VU,NR by HE at 4/24/2025 1104    Comment: Pt educated on goals of session, OT POC, transfer safety. Continue education.                      Point: Precautions (Done)       Learning Progress Summary            Patient Acceptance, E, VU,NR by HE at 4/24/2025 1104    Comment: Pt educated on goals of session, OT POC, transfer safety. Continue education.                      Point: Body mechanics (Done)       Learning Progress Summary            Patient Acceptance, E, VU,NR by HE at 4/24/2025 1104    Comment: Pt educated on goals of session, OT POC, transfer safety. Continue education.                                      User Key       Initials Effective Dates Name Provider Type Discipline     02/18/25 -  Yu Dickey OT Occupational Therapist OT                  OT Recommendation and Plan  Therapy Frequency (OT): 5 times/wk  Plan of Care Review  Plan of Care Reviewed With: patient  Outcome Evaluation: Pt participates in skilled tx progressing standing tolerance, functional transfers to improve participation in out of bed  ADLs. Pt requires <50% assist of two persons to safely complete stand pivot transfer. Pt somewhat impulsive, and continues with guarding of LLE due to pain, cues provided to improve BLE position and BUE offset with RW. IPOT to continue to follow with recommendations for IRF at discharge pending ongoing progress.     Time Calculation:         Time Calculation- OT       Row Name 04/24/25 1104             Time Calculation- OT    OT Start Time 1020  -HE      OT Stop Time 1043  -HE      OT Time Calculation (min) 23 min  -HE      OT Received On 04/24/25  -HE      OT - Next Appointment 04/25/25  -HE         Timed Charges    39869 - OT Therapeutic Activity Minutes 23  -HE         Total Minutes    Timed Charges Total Minutes 23  -HE       Total Minutes 23  -HE                User Key  (r) = Recorded By, (t) = Taken By, (c) = Cosigned By      Initials Name Provider Type    Yu Will OT Occupational Therapist                  Therapy Charges for Today       Code Description Service Date Service Provider Modifiers Qty    [id number]  OT THERAPEUTIC ACT EA 15 MIN 4/24/2025 Yu Dickey OT GO 2                 Yu Dickey OT  4/24/2025

## 2025-04-24 NOTE — PROGRESS NOTES
[facility]   Surgical Progress Note    Patient Name: Desiree Garcia  : 1960  MRN: [id number]  Date of admission: 4/10/2025  Surgical Procedures Since Admission:  Procedure(s):  EMBOLECTOMY MECHANICAL, FOUR COMPARTMENT FASCIOTOMY  ARTERIOGRAM LOWER EXTREMITY  Surgeon:  Misael Lawton MD  Status:  12 Days Post-Op  -------------------    Procedure(s):  LOWER EXTREMITY DEBRIDEMENT  Surgeon:  Misael Lawton MD  Status:  6 Days Post-Op  -------------------  **Canceled**    Procedure(s):  Left leg fasciotomy debridement with possible wound VAC placement.  Surgeon:  Raimundo Lin MD  Status:  * Surgery not performed *  -------------------    Procedure(s):  Left lower extremity fasciotomy debridement with possible wound VAC placement  Surgeon:  Raimundo Lin MD  Status:  * No surgery found *  -------------------    Subjective   Subjective     Chief Complaint: Left leg ischemia follow-up.    History of Present Illness   Resting quite comfortably in her hospital room.    Review of Systems    Objective   Objective     Vitals:   Temp:  [97.6 °F (36.4 °C)-98.8 °F (37.1 °C)] 98.4 °F (36.9 °C)  Heart Rate:  [73-82] 77  Resp:  [16-18] 18  BP: (109-127)/(62-73) 126/73  Flow (L/min) (Oxygen Therapy):  [0] 0  Output by Drain (mL) 25 0701 - 25 1900 25 1901 - 25 0700 25 0701 - 25 1451 Range Total   External Urinary Catheter  100  100       Physical Exam  Constitutional:       Appearance: She is well-developed.   Pulmonary:      Effort: Pulmonary effort is normal. No respiratory distress.   Abdominal:      General: There is no distension.      Palpations: Abdomen is soft.   Neurological:      Mental Status: She is alert and oriented to person, place, and time.           Result Review    Result Review:  I have personally reviewed the results from the time of this admission to 2025 14:51 EDT and agree with these findings:  [x]  Laboratory list / accordion  []   Microbiology  []  Radiology  []  EKG/Telemetry   []  Cardiology/Vascular   []  Pathology  []  Old records  []  Other:  Most notable findings include:       Results from last 7 days   Lab Units 04/24/25  0619 04/23/25  0543 04/22/25  0453 04/21/25  1521 04/21/25  0640 04/20/25  0551 04/19/25  0401 04/18/25  0520   WBC 10*3/mm3 17.70* 19.23* 22.37*  --  25.32* 22.85* 28.23* 27.78*   HEMOGLOBIN g/dL 7.4* 7.6* 7.9* 8.0* 7.4* 7.9* 6.5* 7.1*   PLATELETS 10*3/mm3 437 404 402  --  415 446 415 409     Results from last 7 days   Lab Units 04/24/25  0620 04/23/25  0543 04/22/25  1508 04/22/25  0452 04/21/25  0640 04/20/25  0551 04/19/25  0401 04/18/25  0520   SODIUM mmol/L 136 137  --  138 133* 139 138 140   POTASSIUM mmol/L 3.8 4.2 4.3 3.6 3.7 4.3 4.5 3.4*   CHLORIDE mmol/L 96* 100  --  99 97* 102 103 105   CO2 mmol/L 29.0 30.1*  --  32.1* 28.9 29.0 26.7 27.4   BUN mg/dL 12 9  --  10 6* 8 6* 6*   CREATININE mg/dL 0.67 0.74  --  0.68 0.63 0.73 0.63 0.69   GLUCOSE mg/dL 166* 117*  --  148* 145* 132* 178* 143*   MAGNESIUM mg/dL 1.6 1.6  --  1.7 1.6 2.0 2.2 1.8   PHOSPHORUS mg/dL 3.4 2.9  --  3.1 3.2 2.9 2.5 2.6   Estimated Creatinine Clearance: 89.5 mL/min (by C-G formula based on SCr of 0.67 mg/dL).    Lab Results   Component Value Date    HGBA1C 9.40 (H) 04/09/2025    HGBA1C 9.30 (H) 03/13/2025    HGBA1C 9.40 (H) 12/23/2024     Glucose   Date/Time Value Ref Range Status   04/24/2025 1154 145 (H) 70 - 130 mg/dL Final   04/24/2025 0608 169 (H) 70 - 130 mg/dL Final   04/24/2025 0026 140 (H) 70 - 130 mg/dL Final   04/23/2025 2055 179 (H) 70 - 130 mg/dL Final   04/23/2025 1628 126 70 - 130 mg/dL Final   04/23/2025 1356 116 70 - 130 mg/dL Final   04/23/2025 1113 131 (H) 70 - 130 mg/dL Final   04/23/2025 0731 136 (H) 70 - 130 mg/dL Final       Assessment & Plan   Assessment / Plan     Brief Patient Summary:  Desiree Garcia is a 65 y.o. female who multiple medical issues.  Vascular is following for ischemia of the left lower  extremity.    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  ([facility])  4/18/2025: Debridement of left leg fasciotomies ([facility])    4/19/2025: Postop day #1 from fasciotomy debridement.  Dressings down today.  Minimal oozing noted muscles appear viable as pictured below.  There is some ischemic changes to the skin on the anterior margin of the lateral fasciotomies will continue to monitor.  I redressed the wounds with the assistance of the nurse with dilute Betadine soaked gauze.  Will plan on leaving intact upon my evaluation for tomorrow.  She has a strong dorsalis pedis artery signal.  She has weak dorsiflexion of her left foot.      4/20/2025: Postop day 2 fasciotomy debridement.  Will continue with once daily Dakin's dressing changes.  Will continue to lower the anterior skin on the lateral fasciotomy to demarcate.  This will likely require debridement sometime next week once further demarcation has occurred.    4/21/2025: Postop day 3 fasciotomy debridement.  With some anterior ischemic changes to the flap on the lateral compartment.  Will place her on the surgery schedule for Wednesday to debride this.  If things appear well at that time we will also place wound VAC.    4/22/2025: Postop day #4 fasciotomy debridement.  Plan on surgical debridement of the ischemic skin on the left anterior lateral fasciotomy with wound VAC placement tomorrow.  Will hold heparin infusion when patient goes down to preoperative holding tomorrow.    4/23/2025: Patient was in preop holding for planned/nonemergent left fasciotomy debridement.  Here she appeared diaphoretic with somewhat labored and irregular breathing and overall feeling of doom.  Anesthesia concerned with change in EKG.  Currently  I have minimal concerns for any sort of undrained sepsis or urgent nature of the operation in her leg.  I am going to remove her from the surgery schedule today.  I personally called cardiology who will review the case and see the patient.  I have tentatively placed her back on the OR schedule for Friday assuming medically more stable.    4/24/2025: Appreciate cardiology's assistance.  Will start D5 LR at midnight.  Plans for surgical debridement of the left leg tomorrow with wound VAC placement.    VTE Prophylaxis:  Pharmacologic & mechanical VTE prophylaxis orders are present.        Raimundo Lin MD

## 2025-04-25 ENCOUNTER — ANESTHESIA (OUTPATIENT)
Dept: PERIOP | Facility: HOSPITAL | Age: 65
End: 2025-04-25
Payer: MEDICARE

## 2025-04-25 ENCOUNTER — ANESTHESIA EVENT (OUTPATIENT)
Dept: PERIOP | Facility: HOSPITAL | Age: 65
End: 2025-04-25
Payer: MEDICARE

## 2025-04-25 LAB
ABO GROUP BLD: NORMAL
ALBUMIN SERPL-MCNC: 2.5 G/DL (ref 3.5–5.2)
ANION GAP SERPL CALCULATED.3IONS-SCNC: 5.9 MMOL/L (ref 5–15)
ANION GAP SERPL CALCULATED.3IONS-SCNC: 6.7 MMOL/L (ref 5–15)
APTT PPP: 37.4 SECONDS (ref 22.7–35.4)
APTT PPP: 40.2 SECONDS (ref 22.7–35.4)
APTT PPP: 58.5 SECONDS (ref 22.7–35.4)
BASOPHILS # BLD AUTO: 0.11 10*3/MM3 (ref 0–0.2)
BASOPHILS NFR BLD AUTO: 0.8 % (ref 0–1.5)
BLD GP AB SCN SERPL QL: NEGATIVE
BUN SERPL-MCNC: 8 MG/DL (ref 8–23)
BUN SERPL-MCNC: 9 MG/DL (ref 8–23)
BUN/CREAT SERPL: 11 (ref 7–25)
BUN/CREAT SERPL: 11.7 (ref 7–25)
CALCIUM SPEC-SCNC: 8.5 MG/DL (ref 8.6–10.5)
CALCIUM SPEC-SCNC: 8.5 MG/DL (ref 8.6–10.5)
CHLORIDE SERPL-SCNC: 101 MMOL/L (ref 98–107)
CHLORIDE SERPL-SCNC: 99 MMOL/L (ref 98–107)
CO2 SERPL-SCNC: 31.3 MMOL/L (ref 22–29)
CO2 SERPL-SCNC: 32.1 MMOL/L (ref 22–29)
CREAT SERPL-MCNC: 0.73 MG/DL (ref 0.57–1)
CREAT SERPL-MCNC: 0.77 MG/DL (ref 0.57–1)
DEPRECATED RDW RBC AUTO: 44.7 FL (ref 37–54)
DEPRECATED RDW RBC AUTO: 45.8 FL (ref 37–54)
EGFRCR SERPLBLD CKD-EPI 2021: 86.3 ML/MIN/1.73
EGFRCR SERPLBLD CKD-EPI 2021: 92 ML/MIN/1.73
EOSINOPHIL # BLD AUTO: 0.17 10*3/MM3 (ref 0–0.4)
EOSINOPHIL NFR BLD AUTO: 1.2 % (ref 0.3–6.2)
ERYTHROCYTE [DISTWIDTH] IN BLOOD BY AUTOMATED COUNT: 14.7 % (ref 12.3–15.4)
ERYTHROCYTE [DISTWIDTH] IN BLOOD BY AUTOMATED COUNT: 14.8 % (ref 12.3–15.4)
GLUCOSE BLDC GLUCOMTR-MCNC: 152 MG/DL (ref 70–130)
GLUCOSE BLDC GLUCOMTR-MCNC: 157 MG/DL (ref 70–130)
GLUCOSE BLDC GLUCOMTR-MCNC: 162 MG/DL (ref 70–130)
GLUCOSE BLDC GLUCOMTR-MCNC: 166 MG/DL (ref 70–130)
GLUCOSE BLDC GLUCOMTR-MCNC: 99 MG/DL (ref 70–130)
GLUCOSE SERPL-MCNC: 129 MG/DL (ref 65–99)
GLUCOSE SERPL-MCNC: 149 MG/DL (ref 65–99)
HCT VFR BLD AUTO: 23.6 % (ref 34–46.6)
HCT VFR BLD AUTO: 24.2 % (ref 34–46.6)
HGB BLD-MCNC: 7.2 G/DL (ref 12–15.9)
HGB BLD-MCNC: 7.5 G/DL (ref 12–15.9)
IMM GRANULOCYTES # BLD AUTO: 0.24 10*3/MM3 (ref 0–0.05)
IMM GRANULOCYTES NFR BLD AUTO: 1.6 % (ref 0–0.5)
INR PPP: 1.24 (ref 0.9–1.1)
LYMPHOCYTES # BLD AUTO: 2.21 10*3/MM3 (ref 0.7–3.1)
LYMPHOCYTES NFR BLD AUTO: 15.2 % (ref 19.6–45.3)
MAGNESIUM SERPL-MCNC: 1.8 MG/DL (ref 1.6–2.4)
MCH RBC QN AUTO: 26.4 PG (ref 26.6–33)
MCH RBC QN AUTO: 26.5 PG (ref 26.6–33)
MCHC RBC AUTO-ENTMCNC: 30.5 G/DL (ref 31.5–35.7)
MCHC RBC AUTO-ENTMCNC: 31 G/DL (ref 31.5–35.7)
MCV RBC AUTO: 85.2 FL (ref 79–97)
MCV RBC AUTO: 86.8 FL (ref 79–97)
MONOCYTES # BLD AUTO: 1.4 10*3/MM3 (ref 0.1–0.9)
MONOCYTES NFR BLD AUTO: 9.6 % (ref 5–12)
NEUTROPHILS NFR BLD AUTO: 10.45 10*3/MM3 (ref 1.7–7)
NEUTROPHILS NFR BLD AUTO: 71.6 % (ref 42.7–76)
NRBC BLD AUTO-RTO: 0.1 /100 WBC (ref 0–0.2)
PHOSPHATE SERPL-MCNC: 3.8 MG/DL (ref 2.5–4.5)
PLATELET # BLD AUTO: 416 10*3/MM3 (ref 140–450)
PLATELET # BLD AUTO: 451 10*3/MM3 (ref 140–450)
PMV BLD AUTO: 9.4 FL (ref 6–12)
PMV BLD AUTO: 9.4 FL (ref 6–12)
POTASSIUM SERPL-SCNC: 3.8 MMOL/L (ref 3.5–5.2)
POTASSIUM SERPL-SCNC: 4.2 MMOL/L (ref 3.5–5.2)
PROTHROMBIN TIME: 15.5 SECONDS (ref 11.7–14.2)
RBC # BLD AUTO: 2.72 10*6/MM3 (ref 3.77–5.28)
RBC # BLD AUTO: 2.84 10*6/MM3 (ref 3.77–5.28)
RH BLD: POSITIVE
SODIUM SERPL-SCNC: 137 MMOL/L (ref 136–145)
SODIUM SERPL-SCNC: 139 MMOL/L (ref 136–145)
T&S EXPIRATION DATE: NORMAL
WBC NRBC COR # BLD AUTO: 12.96 10*3/MM3 (ref 3.4–10.8)
WBC NRBC COR # BLD AUTO: 14.58 10*3/MM3 (ref 3.4–10.8)

## 2025-04-25 PROCEDURE — 25010000002 FENTANYL CITRATE (PF) 50 MCG/ML SOLUTION: Performed by: NURSE ANESTHETIST, CERTIFIED REGISTERED

## 2025-04-25 PROCEDURE — 25010000002 HEPARIN (PORCINE) 25000-0.45 UT/250ML-% SOLUTION: Performed by: SURGERY

## 2025-04-25 PROCEDURE — 85610 PROTHROMBIN TIME: CPT | Performed by: STUDENT IN AN ORGANIZED HEALTH CARE EDUCATION/TRAINING PROGRAM

## 2025-04-25 PROCEDURE — 85730 THROMBOPLASTIN TIME PARTIAL: CPT | Performed by: SURGERY

## 2025-04-25 PROCEDURE — 94799 UNLISTED PULMONARY SVC/PX: CPT

## 2025-04-25 PROCEDURE — 97608 NEG PRS WND THER NDME>50SQCM: CPT | Performed by: SURGERY

## 2025-04-25 PROCEDURE — 85027 COMPLETE CBC AUTOMATED: CPT | Performed by: STUDENT IN AN ORGANIZED HEALTH CARE EDUCATION/TRAINING PROGRAM

## 2025-04-25 PROCEDURE — 83735 ASSAY OF MAGNESIUM: CPT | Performed by: SURGERY

## 2025-04-25 PROCEDURE — 25810000003 LACTATED RINGERS PER 1000 ML: Performed by: STUDENT IN AN ORGANIZED HEALTH CARE EDUCATION/TRAINING PROGRAM

## 2025-04-25 PROCEDURE — 11046 DBRDMT MUSC&/FSCA EA ADDL: CPT | Performed by: SURGERY

## 2025-04-25 PROCEDURE — 86900 BLOOD TYPING SEROLOGIC ABO: CPT | Performed by: STUDENT IN AN ORGANIZED HEALTH CARE EDUCATION/TRAINING PROGRAM

## 2025-04-25 PROCEDURE — 86901 BLOOD TYPING SEROLOGIC RH(D): CPT | Performed by: STUDENT IN AN ORGANIZED HEALTH CARE EDUCATION/TRAINING PROGRAM

## 2025-04-25 PROCEDURE — 25010000002 HYDROMORPHONE 1 MG/ML SOLUTION: Performed by: SURGERY

## 2025-04-25 PROCEDURE — 80069 RENAL FUNCTION PANEL: CPT | Performed by: SURGERY

## 2025-04-25 PROCEDURE — 25810000003 DEXTROSE 5% IN LACTATED RINGERS PER 1000 ML: Performed by: SURGERY

## 2025-04-25 PROCEDURE — 63710000001 INSULIN REGULAR HUMAN PER 5 UNITS: Performed by: SURGERY

## 2025-04-25 PROCEDURE — 82948 REAGENT STRIP/BLOOD GLUCOSE: CPT

## 2025-04-25 PROCEDURE — 80048 BASIC METABOLIC PNL TOTAL CA: CPT | Performed by: STUDENT IN AN ORGANIZED HEALTH CARE EDUCATION/TRAINING PROGRAM

## 2025-04-25 PROCEDURE — 0JBP0ZZ EXCISION OF LEFT LOWER LEG SUBCUTANEOUS TISSUE AND FASCIA, OPEN APPROACH: ICD-10-PCS | Performed by: SURGERY

## 2025-04-25 PROCEDURE — 99232 SBSQ HOSP IP/OBS MODERATE 35: CPT | Performed by: INTERNAL MEDICINE

## 2025-04-25 PROCEDURE — 85025 COMPLETE CBC W/AUTO DIFF WBC: CPT | Performed by: SURGERY

## 2025-04-25 PROCEDURE — 25010000002 ONDANSETRON PER 1 MG: Performed by: NURSE ANESTHETIST, CERTIFIED REGISTERED

## 2025-04-25 PROCEDURE — 25010000002 PROPOFOL 10 MG/ML EMULSION: Performed by: NURSE ANESTHETIST, CERTIFIED REGISTERED

## 2025-04-25 PROCEDURE — 25010000002 PHENYLEPHRINE 10 MG/ML SOLUTION: Performed by: NURSE ANESTHETIST, CERTIFIED REGISTERED

## 2025-04-25 PROCEDURE — 25010000002 HYDROMORPHONE 1 MG/ML SOLUTION: Performed by: NURSE ANESTHETIST, CERTIFIED REGISTERED

## 2025-04-25 PROCEDURE — 25010000002 CEFAZOLIN PER 500 MG: Performed by: SURGERY

## 2025-04-25 PROCEDURE — 86850 RBC ANTIBODY SCREEN: CPT | Performed by: STUDENT IN AN ORGANIZED HEALTH CARE EDUCATION/TRAINING PROGRAM

## 2025-04-25 PROCEDURE — 25010000002 HYDROMORPHONE PER 4 MG: Performed by: NURSE ANESTHETIST, CERTIFIED REGISTERED

## 2025-04-25 PROCEDURE — 85730 THROMBOPLASTIN TIME PARTIAL: CPT | Performed by: STUDENT IN AN ORGANIZED HEALTH CARE EDUCATION/TRAINING PROGRAM

## 2025-04-25 PROCEDURE — 11043 DBRDMT MUSC&/FSCA 1ST 20/<: CPT | Performed by: SURGERY

## 2025-04-25 RX ORDER — LIDOCAINE HYDROCHLORIDE 10 MG/ML
0.5 INJECTION, SOLUTION INFILTRATION; PERINEURAL ONCE AS NEEDED
Status: DISCONTINUED | OUTPATIENT
Start: 2025-04-25 | End: 2025-04-25 | Stop reason: HOSPADM

## 2025-04-25 RX ORDER — IPRATROPIUM BROMIDE AND ALBUTEROL SULFATE 2.5; .5 MG/3ML; MG/3ML
3 SOLUTION RESPIRATORY (INHALATION) ONCE AS NEEDED
Status: DISCONTINUED | OUTPATIENT
Start: 2025-04-25 | End: 2025-04-25 | Stop reason: HOSPADM

## 2025-04-25 RX ORDER — ONDANSETRON 2 MG/ML
4 INJECTION INTRAMUSCULAR; INTRAVENOUS ONCE AS NEEDED
Status: DISCONTINUED | OUTPATIENT
Start: 2025-04-25 | End: 2025-04-25 | Stop reason: HOSPADM

## 2025-04-25 RX ORDER — HYDROCODONE BITARTRATE AND ACETAMINOPHEN 5; 325 MG/1; MG/1
1 TABLET ORAL ONCE AS NEEDED
Status: COMPLETED | OUTPATIENT
Start: 2025-04-25 | End: 2025-04-25

## 2025-04-25 RX ORDER — MIDAZOLAM HYDROCHLORIDE 1 MG/ML
1 INJECTION, SOLUTION INTRAMUSCULAR; INTRAVENOUS
Status: DISCONTINUED | OUTPATIENT
Start: 2025-04-25 | End: 2025-04-25 | Stop reason: HOSPADM

## 2025-04-25 RX ORDER — MAGNESIUM HYDROXIDE 1200 MG/15ML
LIQUID ORAL AS NEEDED
Status: DISCONTINUED | OUTPATIENT
Start: 2025-04-25 | End: 2025-04-25 | Stop reason: HOSPADM

## 2025-04-25 RX ORDER — SODIUM CHLORIDE 0.9 % (FLUSH) 0.9 %
3-10 SYRINGE (ML) INJECTION AS NEEDED
Status: DISCONTINUED | OUTPATIENT
Start: 2025-04-25 | End: 2025-04-25 | Stop reason: HOSPADM

## 2025-04-25 RX ORDER — HYDRALAZINE HYDROCHLORIDE 20 MG/ML
5 INJECTION INTRAMUSCULAR; INTRAVENOUS
Status: DISCONTINUED | OUTPATIENT
Start: 2025-04-25 | End: 2025-04-25 | Stop reason: HOSPADM

## 2025-04-25 RX ORDER — LABETALOL HYDROCHLORIDE 5 MG/ML
5 INJECTION, SOLUTION INTRAVENOUS
Status: DISCONTINUED | OUTPATIENT
Start: 2025-04-25 | End: 2025-04-25 | Stop reason: HOSPADM

## 2025-04-25 RX ORDER — NALOXONE HCL 0.4 MG/ML
0.4 VIAL (ML) INJECTION
Status: DISCONTINUED | OUTPATIENT
Start: 2025-04-25 | End: 2025-04-30

## 2025-04-25 RX ORDER — FLUMAZENIL 0.1 MG/ML
0.2 INJECTION INTRAVENOUS AS NEEDED
Status: DISCONTINUED | OUTPATIENT
Start: 2025-04-25 | End: 2025-04-25 | Stop reason: HOSPADM

## 2025-04-25 RX ORDER — NALOXONE HCL 0.4 MG/ML
0.2 VIAL (ML) INJECTION AS NEEDED
Status: DISCONTINUED | OUTPATIENT
Start: 2025-04-25 | End: 2025-04-25 | Stop reason: HOSPADM

## 2025-04-25 RX ORDER — ONDANSETRON 2 MG/ML
INJECTION INTRAMUSCULAR; INTRAVENOUS AS NEEDED
Status: DISCONTINUED | OUTPATIENT
Start: 2025-04-25 | End: 2025-04-25 | Stop reason: SURG

## 2025-04-25 RX ORDER — SODIUM CHLORIDE, SODIUM LACTATE, POTASSIUM CHLORIDE, CALCIUM CHLORIDE 600; 310; 30; 20 MG/100ML; MG/100ML; MG/100ML; MG/100ML
9 INJECTION, SOLUTION INTRAVENOUS CONTINUOUS
Status: DISCONTINUED | OUTPATIENT
Start: 2025-04-25 | End: 2025-04-25

## 2025-04-25 RX ORDER — FENTANYL CITRATE 50 UG/ML
25 INJECTION, SOLUTION INTRAMUSCULAR; INTRAVENOUS
Status: DISCONTINUED | OUTPATIENT
Start: 2025-04-25 | End: 2025-04-25 | Stop reason: HOSPADM

## 2025-04-25 RX ORDER — FENTANYL CITRATE 50 UG/ML
INJECTION, SOLUTION INTRAMUSCULAR; INTRAVENOUS AS NEEDED
Status: DISCONTINUED | OUTPATIENT
Start: 2025-04-25 | End: 2025-04-25 | Stop reason: SURG

## 2025-04-25 RX ORDER — PROPOFOL 10 MG/ML
VIAL (ML) INTRAVENOUS AS NEEDED
Status: DISCONTINUED | OUTPATIENT
Start: 2025-04-25 | End: 2025-04-25 | Stop reason: SURG

## 2025-04-25 RX ORDER — FENTANYL CITRATE 50 UG/ML
50 INJECTION, SOLUTION INTRAMUSCULAR; INTRAVENOUS ONCE AS NEEDED
Status: DISCONTINUED | OUTPATIENT
Start: 2025-04-25 | End: 2025-04-25 | Stop reason: HOSPADM

## 2025-04-25 RX ORDER — DROPERIDOL 2.5 MG/ML
0.62 INJECTION, SOLUTION INTRAMUSCULAR; INTRAVENOUS
Status: DISCONTINUED | OUTPATIENT
Start: 2025-04-25 | End: 2025-04-25 | Stop reason: HOSPADM

## 2025-04-25 RX ORDER — HYDROCODONE BITARTRATE AND ACETAMINOPHEN 7.5; 325 MG/1; MG/1
1 TABLET ORAL EVERY 4 HOURS PRN
Status: DISCONTINUED | OUTPATIENT
Start: 2025-04-25 | End: 2025-05-04

## 2025-04-25 RX ORDER — PANTOPRAZOLE SODIUM 40 MG/10ML
40 INJECTION, POWDER, LYOPHILIZED, FOR SOLUTION INTRAVENOUS EVERY 12 HOURS SCHEDULED
Status: DISCONTINUED | OUTPATIENT
Start: 2025-04-25 | End: 2025-05-06 | Stop reason: HOSPADM

## 2025-04-25 RX ORDER — PHENYLEPHRINE HYDROCHLORIDE 10 MG/ML
INJECTION INTRAVENOUS AS NEEDED
Status: DISCONTINUED | OUTPATIENT
Start: 2025-04-25 | End: 2025-04-25 | Stop reason: SURG

## 2025-04-25 RX ORDER — SODIUM CHLORIDE 0.9 % (FLUSH) 0.9 %
3 SYRINGE (ML) INJECTION EVERY 12 HOURS SCHEDULED
Status: DISCONTINUED | OUTPATIENT
Start: 2025-04-25 | End: 2025-04-25 | Stop reason: HOSPADM

## 2025-04-25 RX ORDER — MIDAZOLAM HYDROCHLORIDE 1 MG/ML
0.5 INJECTION, SOLUTION INTRAMUSCULAR; INTRAVENOUS ONCE
Status: DISCONTINUED | OUTPATIENT
Start: 2025-04-25 | End: 2025-04-25 | Stop reason: HOSPADM

## 2025-04-25 RX ORDER — HYDROCODONE BITARTRATE AND ACETAMINOPHEN 7.5; 325 MG/1; MG/1
1 TABLET ORAL EVERY 4 HOURS PRN
Status: DISCONTINUED | OUTPATIENT
Start: 2025-04-25 | End: 2025-04-25 | Stop reason: HOSPADM

## 2025-04-25 RX ORDER — EPHEDRINE SULFATE 50 MG/ML
5 INJECTION, SOLUTION INTRAVENOUS ONCE AS NEEDED
Status: DISCONTINUED | OUTPATIENT
Start: 2025-04-25 | End: 2025-04-25 | Stop reason: HOSPADM

## 2025-04-25 RX ORDER — HYDROMORPHONE HYDROCHLORIDE 1 MG/ML
0.25 INJECTION, SOLUTION INTRAMUSCULAR; INTRAVENOUS; SUBCUTANEOUS
Status: DISCONTINUED | OUTPATIENT
Start: 2025-04-25 | End: 2025-04-25 | Stop reason: HOSPADM

## 2025-04-25 RX ADMIN — PANTOPRAZOLE SODIUM 40 MG: 40 INJECTION, POWDER, FOR SOLUTION INTRAVENOUS at 20:27

## 2025-04-25 RX ADMIN — HYDROMORPHONE HYDROCHLORIDE 0.25 MG: 1 INJECTION, SOLUTION INTRAMUSCULAR; INTRAVENOUS; SUBCUTANEOUS at 12:58

## 2025-04-25 RX ADMIN — FENTANYL CITRATE 50 MCG: 50 INJECTION, SOLUTION INTRAMUSCULAR; INTRAVENOUS at 11:43

## 2025-04-25 RX ADMIN — SODIUM CHLORIDE 2000 MG: 900 INJECTION INTRAVENOUS at 11:34

## 2025-04-25 RX ADMIN — HYDROMORPHONE HYDROCHLORIDE 1 MG: 1 INJECTION, SOLUTION INTRAMUSCULAR; INTRAVENOUS; SUBCUTANEOUS at 08:57

## 2025-04-25 RX ADMIN — HYDROMORPHONE HYDROCHLORIDE 0.5 MG: 1 INJECTION, SOLUTION INTRAMUSCULAR; INTRAVENOUS; SUBCUTANEOUS at 12:18

## 2025-04-25 RX ADMIN — HYDROMORPHONE HYDROCHLORIDE 1 MG: 1 INJECTION, SOLUTION INTRAMUSCULAR; INTRAVENOUS; SUBCUTANEOUS at 00:20

## 2025-04-25 RX ADMIN — INSULIN HUMAN 2 UNITS: 100 INJECTION, SOLUTION PARENTERAL at 06:31

## 2025-04-25 RX ADMIN — BUDESONIDE 0.5 MG: 0.5 INHALANT RESPIRATORY (INHALATION) at 21:00

## 2025-04-25 RX ADMIN — HYDROMORPHONE HYDROCHLORIDE 0.25 MG: 1 INJECTION, SOLUTION INTRAMUSCULAR; INTRAVENOUS; SUBCUTANEOUS at 12:43

## 2025-04-25 RX ADMIN — HEPARIN SODIUM 19 UNITS/KG/HR: 10000 INJECTION, SOLUTION INTRAVENOUS at 08:51

## 2025-04-25 RX ADMIN — HEPARIN SODIUM 12 UNITS/KG/HR: 10000 INJECTION, SOLUTION INTRAVENOUS at 14:50

## 2025-04-25 RX ADMIN — SODIUM CHLORIDE, POTASSIUM CHLORIDE, SODIUM LACTATE AND CALCIUM CHLORIDE 9 ML/HR: 600; 310; 30; 20 INJECTION, SOLUTION INTRAVENOUS at 11:29

## 2025-04-25 RX ADMIN — FENTANYL CITRATE 6 MCG: 50 INJECTION, SOLUTION INTRAMUSCULAR; INTRAVENOUS at 11:40

## 2025-04-25 RX ADMIN — PHENYLEPHRINE HYDROCHLORIDE 100 MCG: 10 INJECTION INTRAVENOUS at 11:46

## 2025-04-25 RX ADMIN — PANTOPRAZOLE SODIUM 40 MG: 40 TABLET, DELAYED RELEASE ORAL at 05:44

## 2025-04-25 RX ADMIN — PROPOFOL 30 MG: 10 INJECTION, EMULSION INTRAVENOUS at 11:44

## 2025-04-25 RX ADMIN — ARFORMOTEROL TARTRATE 15 MCG: 15 SOLUTION RESPIRATORY (INHALATION) at 21:00

## 2025-04-25 RX ADMIN — METOPROLOL TARTRATE 25 MG: 25 TABLET, FILM COATED ORAL at 20:26

## 2025-04-25 RX ADMIN — HYDROCODONE BITARTRATE AND ACETAMINOPHEN 1 TABLET: 7.5; 325 TABLET ORAL at 05:44

## 2025-04-25 RX ADMIN — INSULIN HUMAN 2 UNITS: 100 INJECTION, SOLUTION PARENTERAL at 17:36

## 2025-04-25 RX ADMIN — HYDROMORPHONE HYDROCHLORIDE 0.25 MG: 1 INJECTION, SOLUTION INTRAMUSCULAR; INTRAVENOUS; SUBCUTANEOUS at 12:33

## 2025-04-25 RX ADMIN — ATORVASTATIN CALCIUM 40 MG: 20 TABLET, FILM COATED ORAL at 20:26

## 2025-04-25 RX ADMIN — HYDROMORPHONE HYDROCHLORIDE 1 MG: 1 INJECTION, SOLUTION INTRAMUSCULAR; INTRAVENOUS; SUBCUTANEOUS at 16:21

## 2025-04-25 RX ADMIN — HYDROMORPHONE HYDROCHLORIDE 1 MG: 1 INJECTION, SOLUTION INTRAMUSCULAR; INTRAVENOUS; SUBCUTANEOUS at 20:26

## 2025-04-25 RX ADMIN — HYDROCODONE BITARTRATE AND ACETAMINOPHEN 1 TABLET: 7.5; 325 TABLET ORAL at 22:16

## 2025-04-25 RX ADMIN — PROPOFOL 150 MG: 10 INJECTION, EMULSION INTRAVENOUS at 11:40

## 2025-04-25 RX ADMIN — HYDROCODONE BITARTRATE AND ACETAMINOPHEN 1 TABLET: 5; 325 TABLET ORAL at 12:56

## 2025-04-25 RX ADMIN — HYDROMORPHONE HYDROCHLORIDE 0.25 MG: 1 INJECTION, SOLUTION INTRAMUSCULAR; INTRAVENOUS; SUBCUTANEOUS at 12:35

## 2025-04-25 RX ADMIN — INSULIN HUMAN 2 UNITS: 100 INJECTION, SOLUTION PARENTERAL at 00:22

## 2025-04-25 RX ADMIN — ONDANSETRON 4 MG: 2 INJECTION, SOLUTION INTRAMUSCULAR; INTRAVENOUS at 12:08

## 2025-04-25 RX ADMIN — METOPROLOL TARTRATE 25 MG: 25 TABLET, FILM COATED ORAL at 08:53

## 2025-04-25 RX ADMIN — SODIUM CHLORIDE, SODIUM LACTATE, POTASSIUM CHLORIDE, CALCIUM CHLORIDE AND DEXTROSE MONOHYDRATE 75 ML/HR: 5; 600; 310; 30; 20 INJECTION, SOLUTION INTRAVENOUS at 00:20

## 2025-04-25 RX ADMIN — HYDROCODONE BITARTRATE AND ACETAMINOPHEN 1 TABLET: 7.5; 325 TABLET ORAL at 17:42

## 2025-04-25 RX ADMIN — HYDROMORPHONE HYDROCHLORIDE 1 MG: 1 INJECTION, SOLUTION INTRAMUSCULAR; INTRAVENOUS; SUBCUTANEOUS at 03:05

## 2025-04-25 NOTE — PROGRESS NOTES
Name: Desiree Garcia ADMIT: 4/10/2025   : 1960  PCP: Sandra Kaba MD    MRN: [id number] LOS: 15 days   AGE/SEX: 65 y.o. female  ROOM: [facility]     Subjective   Subjective   Patient seen and examined at bedside, she is weepy but without distress.  She is tolerating diet.  Planning for possible surgery on Wednesday.  No overt bleeding on heparin drip    2025  No overnight events, patient without distress.  Plan for surgery tomorrow.  Admits to breakthrough pain at times but otherwise doing okay    2025  Patient seen and examined at bedside, plan is for surgery later today.    2025  Surgery was canceled yesterday as patient became anxious and diaphoretic before surgery.  Currently without distress though notes her leg pain continues.  Tolerating heparin drip.    2025  No overnight events, patient without distress.  Hemoglobin low there is no overt bleeding.  Plan for surgery today.       Objective   Objective   Vital Signs  Temp:  [98.4 °F (36.9 °C)-98.8 °F (37.1 °C)] 98.7 °F (37.1 °C)  Heart Rate:  [71-85] 79  Resp:  [16-18] 16  BP: (113-126)/(62-77) 113/62  SpO2:  [94 %-100 %] 97 %  on   ;   Device (Oxygen Therapy): room air  Body mass index is 28.22 kg/m².  Physical Exam  Constitutional:       Appearance: Normal appearance. She is ill-appearing.   HENT:      Head: Normocephalic and atraumatic.      Nose: No congestion.      Mouth/Throat:      Pharynx: No oropharyngeal exudate.   Eyes:      General: No scleral icterus.  Cardiovascular:      Heart sounds: No murmur heard.     No friction rub. No gallop.   Pulmonary:      Effort: No respiratory distress.      Breath sounds: No wheezing, rhonchi or rales.      Comments: Stable on room air  Abdominal:      General: There is no distension.      Tenderness: There is no abdominal tenderness. There is no guarding.   Musculoskeletal:         General: No deformity or signs of injury.      Cervical back: No rigidity.      Comments: Left  leg is bandaged, bandaging is clean/dry/intact   Skin:     Coloration: Skin is not jaundiced.      Findings: No bruising or lesion.   Neurological:      General: No focal deficit present.      Mental Status: She is alert and oriented to person, place, and time.      Motor: Weakness present.       Results Review     I reviewed the patient's new clinical results.  Results from last 7 days   Lab Units 04/25/25  0609 04/24/25  0619 04/23/25  0543 04/22/25  0453   WBC 10*3/mm3 14.58* 17.70* 19.23* 22.37*   HEMOGLOBIN g/dL 7.2* 7.4* 7.6* 7.9*   PLATELETS 10*3/mm3 416 437 404 402     Results from last 7 days   Lab Units 04/25/25  0609 04/24/25  0620 04/23/25  0543 04/22/25  1508 04/22/25  0452   SODIUM mmol/L 139 136 137  --  138   POTASSIUM mmol/L 3.8 3.8 4.2 4.3 3.6   CHLORIDE mmol/L 101 96* 100  --  99   CO2 mmol/L 31.3* 29.0 30.1*  --  32.1*   BUN mg/dL 8 12 9  --  10   CREATININE mg/dL 0.73 0.67 0.74  --  0.68   GLUCOSE mg/dL 129* 166* 117*  --  148*   EGFR mL/min/1.73 92.0 97.7 90.5  --  97.4     Results from last 7 days   Lab Units 04/25/25  0609 04/24/25  0620 04/23/25  0543 04/22/25  0452   ALBUMIN g/dL 2.5* 2.2* 2.4* 2.7*     Results from last 7 days   Lab Units 04/25/25  0609 04/24/25  0620 04/23/25  0543 04/22/25  0452   CALCIUM mg/dL 8.5* 8.5* 8.3* 8.2*   ALBUMIN g/dL 2.5* 2.2* 2.4* 2.7*   MAGNESIUM mg/dL 1.8 1.6 1.6 1.7   PHOSPHORUS mg/dL 3.8 3.4 2.9 3.1       Glucose   Date/Time Value Ref Range Status   04/25/2025 0617 152 (H) 70 - 130 mg/dL Final   04/25/2025 0012 162 (H) 70 - 130 mg/dL Final   04/24/2025 1704 166 (H) 70 - 130 mg/dL Final   04/24/2025 1154 145 (H) 70 - 130 mg/dL Final   04/24/2025 0608 169 (H) 70 - 130 mg/dL Final   04/24/2025 0026 140 (H) 70 - 130 mg/dL Final   04/23/2025 2055 179 (H) 70 - 130 mg/dL Final       No radiology results for the last day    I have personally reviewed all medications:  Scheduled Medications  arformoterol, 15 mcg, Nebulization, BID - RT  aspirin, 81 mg, Oral,  Daily   Or  aspirin, 300 mg, Rectal, Daily  atorvastatin, 40 mg, Oral, Nightly  budesonide, 0.5 mg, Nebulization, BID - RT  clopidogrel, 75 mg, Oral, Daily  insulin regular, 2-7 Units, Subcutaneous, Q6H  metoprolol tartrate, 25 mg, Oral, Q12H  pantoprazole, 40 mg, Oral, Q AM  revefenacin, 175 mcg, Nebulization, Daily - RT  senna-docusate sodium, 2 tablet, Oral, BID  sodium hypochlorite, , Topical, Daily    Infusions  dextrose 5 % and lactated Ringer's, 75 mL/hr, Last Rate: 75 mL/hr (04/25/25 0020)  dextrose 5 % and sodium chloride 0.9 %, 50 mL/hr  heparin, 12 Units/kg/hr, Last Rate: 19 Units/kg/hr (04/25/25 0744)    Diet  NPO Diet NPO Type: Sips with Meds    I have personally reviewed:  [x]  Laboratory   [x]  Microbiology   [x]  Radiology   [x]  EKG/Telemetry  [x]  Cardiology/Vascular   []  Pathology    []  Records       Assessment/Plan     Active Hospital Problems    Diagnosis  POA   • 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 [I74.3]  Unknown   • Acute thrombus of left ventricle [I24.0]  Yes   • Altered bowel habits [R19.4]  Yes   • Leg paresthesia [R20.2]  Yes      Resolved Hospital Problems    Diagnosis Date Resolved POA   • **Cardiogenic shock [R57.0] 04/15/2025 Yes       65 y.o. female admitted with Cardiogenic shock.    #Left lower extremity embolism and thrombosis  #Left ventricular thrombus with showering emboli of kidney, spleen, bile  #Severe PAD    -Status post open left iliofemoral, profunda femoris and superficial femoral artery thromboembolectomy with left common femoral endarterectomy and repair, 4 compartment fasciotomy of left lower extremity with left femoropopliteal angioplasty and stent placement x 2 on 4/12/2025    -Status post left lateral fasciotomy site debridement and left medial fasciotomy site debridement on 4/18/2025    - Continue heparin drip    -Plan to switch to an oral NOAC once cleared by vascular, loading dose not needed    - Hematology has signed off    - Pain control     - Aspirin and Plavix daily    - Statin    - Surgery canceled on 4/23/2025 as patient became anxious and diaphoretic in preop, cardiology reevaluated patient and cleared her for further surgery.    - Plan for left fasciotomy debridement with possible wound VAC placement today    - Pain control    - N.p.o. after midnight    - Continue t D5 NS at 50 an hour for 20 hours     #Leukocytosis    - ID consulted, suspects WBC elevation is multifactorial as cultures are negative to date    - Antibiotics discontinued    - Continue to monitor    - WBC 25.32 > 22.37 > 19.23 > 17.7 > 14.58    #Cardiogenic shock  #Inferior STEMI    -Status post JOE x 2 on 4/8/2025    - Resolved    - Aspirin 81 mg daily, Plavix 75 mg daily, atorvastatin 40 mg p.o. daily    - Lopressor 25 mg p.o. twice daily    - Continue heparin drip, plan to transition to DOAC with long-term dual therapy    - Cardiology has cleared for surgery tomorrow    #COPD    - Brovana nebs twice daily    - Pulmicort twice daily    - Yupelri nebs daily    #Ileus    - Ileus has resolved per surgery, NG removed, surgery signed off    #NIA    - Resolved, creatinine stable    - Nephrology has signed off    #Anemia    - Hemoglobin 7.4 > 7.9 > 7.6 > 7.4 > 7.2, suspect blood loss from surgery    - No overt bleeding, continue to monitor closely is on heparin drip    - Transfuse maintain hemoglobin greater than 7    #GERD    - PPI          Continue on heparin drip  for DVT prophylaxis.  Full code.  Discussed with patient and nursing staff.  Anticipate discharge home with  vs SNU facility next week.  Expected Discharge Date: 4/28/2025; Expected Discharge Time:       Jaya Rocha DO  [facility]  04/25/25  08:35 EDT

## 2025-04-25 NOTE — ANESTHESIA POSTPROCEDURE EVALUATION
Patient: Desiree Garcia    Procedure Summary       Date: 04/25/25 Room / Location: [facility] OR [address] [place] [address] MAIN OR    Anesthesia Start: 1133 Anesthesia Stop: 1225    Procedure: Left lower extremity fasciotomy debridement with possible wound VAC placement (Left) Diagnosis:       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      (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 [I74.3])    Surgeons: Raimundo Lin MD Provider: Yong Chambers MD    Anesthesia Type: general ASA Status: 4            Anesthesia Type: general    Vitals  Vitals Value Taken Time   /69 04/25/25 13:15   Temp 36.9 °C (98.4 °F) 04/25/25 12:25   Pulse 77 04/25/25 13:26   Resp 12 04/25/25 12:25   SpO2 96 % 04/25/25 13:26   Vitals shown include unfiled device data.        Post Anesthesia Care and Evaluation    Patient location during evaluation: bedside  Patient participation: complete - patient participated  Level of consciousness: awake and alert  Pain management: adequate    Airway patency: patent  Anesthetic complications: No anesthetic complications  PONV Status: controlled  Cardiovascular status: blood pressure returned to baseline and acceptable  Respiratory status: acceptable  Hydration status: acceptable

## 2025-04-25 NOTE — PLAN OF CARE
Goal Outcome Evaluation:              Outcome Evaluation: VSS(see chart), AOx4, prn pain medication to manage symptoms, rm air, wound vac in place, continue plan of care

## 2025-04-25 NOTE — ANESTHESIA PREPROCEDURE EVALUATION
" Anesthesia Evaluation     no history of anesthetic complications:   NPO Solid Status: > 8 hours  NPO Liquid Status: > 2 hours           Airway   Mallampati: II  TM distance: >3 FB  No difficulty expected  Comment: Respirations Cold not evauate  Dental    (+) edentulous    Pulmonary    (+) COPD,shortness of breath  Cardiovascular     ECG reviewed  Rhythm: regular    (+) hypertension, past MI  <3 months, CAD, cardiac stents (4/8/25) within the past 12 months , CHF , PVD, hyperlipidemia      Neuro/Psych  (+) headaches  GI/Hepatic/Renal/Endo    (+) GERD, diabetes mellitus using insulin    Musculoskeletal     (+) back pain, radiculopathy  Abdominal    Substance History      OB/GYN          Other   arthritis,     ROS/Med Hx Other: Case cancelled on 4/23 due to diaphoresis, \"impending sense of doom\" and possible EKG changes. Given her co-morb. and new cardiac stent, cardiology was informed for further recs.They did not recommend further interventions prior to proceeding.                   Anesthesia Plan    ASA 4     general     (/86   Pulse 81   Temp 37.3 °C (99.1 °F) (Oral)   Resp 16   Ht 167.6 cm (66\")   Wt 79.3 kg (174 lb 13.2 oz)   SpO2 97%   BMI 28.22 kg/m²   )  intravenous induction     Anesthetic plan, risks, benefits, and alternatives have been provided, discussed and informed consent has been obtained with: patient.      CODE STATUS:    Code Status (Patient has no pulse and is not breathing): CPR (Attempt to Resuscitate)  Medical Interventions (Patient has pulse or is breathing): Full Support  Level Of Support Discussed With: Patient      "

## 2025-04-25 NOTE — OP NOTE
Date of Admission:  4/10/2025  Today's Date:  04/25/25  Raimundo Lin MD  [facility]    Preoperative Diagnosis:   Acute peripheral arterial disease status post revascularization and fasciotomies.  Full-thickness anterior flap necrosis of the lateral fasciotomy    Postoperative Diagnosis:   Same    Procedure Performed:   -Sharp excisional selective skin and soft tissue debridement down to and including fat measuring 8 x 12 cm.  -Wound VAC placement to lateral fasciotomy site    Surgeon:   Raimundo Lin MD    Assistant:    Naida Rodriguez [facility], Provided critical assistance in exposure, retraction, and suction that overall decrease blood loss and operative time.    Anesthesia:   General    Estimated Blood Loss:   25-50 cc    Findings:    Successful debridement without any evidence of residual infection and viable tissue seen with the exception of the necrotic anterior flap of the lateral fasciotomy multiple severe acute and chronic medical comorbidities who had a cold arterial    Implants:    Nothing was implanted during the procedure    Staff:   Circulator: Aaron Goodson RN  Scrub Person: Anupama Guzman Melissa  Assistant: Naida Rodriguez CSA    Specimen:   none    Complications:   none    Dispo:   to PACU    Indication for procedure:   65 y.o. female with multiple acute and chronic medical comorbidities who had a cold left lower extremity status post revascularization and fasciotomies.  Over time the lateral fasciotomy has become ischemic on the anterior margin with full-thickness necrotic skin.  She submits today for debridement and wound VAC placement.  Risk benefits discussed    Description of procedure:   The patient was taken to the operating room.  Anesthesia was induced without difficulty.  Surgical sites were prepped and draped in usual sterile manner.  A full surgical timeout was performed.  Utilizing the Bovie electrocautery at a cut setting, the skin and soft tissue were sharply  excisionally selectively debrided down to and including fat on the anterior full thickness necrosis muscle.  The wound was thoroughly irrigated.  Good hemostasis was obtained with Bovie electrocautery.  Medial base fasciotomy incision was cleansed, irrigated.  I did elect to pack it with iodoform packing gauze and that will be removed on Monday.  Wound VAC was placed on the lateral fasciotomy incision/wound.  At case completion, all counts were correct x2.  The patient tolerated the procedure well.        At case completion all instrument count, needle count, and sponge counts were correct x2.    Raimundo Lin MD  04/25/25     Active Hospital Problems    Diagnosis  POA    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 [I74.3]  Unknown    Acute thrombus of left ventricle [I24.0]  Yes    Altered bowel habits [R19.4]  Yes    Leg paresthesia [R20.2]  Yes      Resolved Hospital Problems    Diagnosis Date Resolved POA    **Cardiogenic shock [R57.0] 04/15/2025 Yes

## 2025-04-25 NOTE — PROGRESS NOTES
Nutrition Services    Patient Name: Desiree Garcia  YOB: 1960  MRN: [id number]  Admission date: 4/10/2025    PROGRESS NOTE      Encounter Information: Follow up  S/p left lower extremity fasciotomy debridement       PO Diet: Diet: Regular/House, Diabetic, Cardiac; Healthy Heart (2-3 Na+); Consistent Carbohydrate; Fluid Consistency: Thin (IDDSI 0)   PO Supplements: Matt   PO Intake:  75%       Current nutrition support: -   Nutrition support review: -       Weight: Weight: 79.3 kg (174 lb 13.2 oz) (04/25/25 0537)       Medications: reviewed   Labs: reviewed Glu 152   skin Wound vac   GI Function:  last bowel movement: 4/24       Nutrition Intervention Updates: Continue Matt supplements to help with wound healing.  RD to follow     Results from last 7 days   Lab Units 04/25/25  0609 04/24/25  0620 04/23/25  0543   SODIUM mmol/L 139 136 137   POTASSIUM mmol/L 3.8 3.8 4.2   CHLORIDE mmol/L 101 96* 100   CO2 mmol/L 31.3* 29.0 30.1*   BUN mg/dL 8 12 9   CREATININE mg/dL 0.73 0.67 0.74   CALCIUM mg/dL 8.5* 8.5* 8.3*   GLUCOSE mg/dL 129* 166* 117*     Results from last 7 days   Lab Units 04/25/25  0609 04/24/25  0620 04/24/25  0619 04/23/25  0543   MAGNESIUM mg/dL 1.8 1.6  --  1.6   PHOSPHORUS mg/dL 3.8 3.4  --  2.9   HEMOGLOBIN g/dL 7.2*  --    < > 7.6*   HEMATOCRIT % 23.6*  --    < > 23.8*    < > = values in this interval not displayed.     COVID19   Date Value Ref Range Status   03/17/2025 Not Detected Not Detected - Ref. Range Final     Lab Results   Component Value Date    HGBA1C 9.40 (H) 04/09/2025       RD to follow up per protocol.    Electronically signed by:  Maddy Ma RD  04/25/25 16:40 EDT

## 2025-04-25 NOTE — PROGRESS NOTES
ID NOTE    CC: f/u leukocytosis    Subj: No fever.  WBC down to 14.  Patient seen in PACU after sharp excisional skin and soft tissue debridement down to and including fat with placement of wound VAC.  Reviewed operative note.  No evidence of infection.    Medications:    Current Facility-Administered Medications:     [Transfer Hold] acetaminophen (TYLENOL) tablet 650 mg, 650 mg, Oral, Q4H PRN, 650 mg at 04/18/25 0049 **OR** [Transfer Hold] acetaminophen (TYLENOL) 160 MG/5ML oral solution 650 mg, 650 mg, Oral, Q4H PRN **OR** [Transfer Hold] acetaminophen (TYLENOL) suppository 650 mg, 650 mg, Rectal, Q4H PRN, Raimundo Lin MD, 650 mg at 04/13/25 0923    [Transfer Hold] acetaminophen (TYLENOL) tablet 650 mg, 650 mg, Oral, Q4H PRN **OR** [Transfer Hold] acetaminophen (TYLENOL) 160 MG/5ML oral solution 650 mg, 650 mg, Oral, Q4H PRN **OR** [Transfer Hold] acetaminophen (TYLENOL) suppository 650 mg, 650 mg, Rectal, Q4H PRN, Raimundo Lin MD    [Transfer Hold] acetaminophen (TYLENOL) tablet 650 mg, 650 mg, Oral, Q4H PRN, 650 mg at 04/22/25 0845 **OR** [Transfer Hold] acetaminophen (TYLENOL) suppository 650 mg, 650 mg, Rectal, Q4H PRN, Raimundo iLn MD    [Transfer Hold] albuterol (PROVENTIL) nebulizer solution 0.083% 2.5 mg/3mL, 2.5 mg, Nebulization, Q6H PRN, Raimundo Lin MD    [Transfer Hold] aluminum-magnesium hydroxide-simethicone (MAALOX MAX) 400-400-40 MG/5ML suspension 15 mL, 15 mL, Oral, Q6H PRN, Raimundo Lin MD    [Transfer Hold] arformoterol (BROVANA) nebulizer solution 15 mcg, 15 mcg, Nebulization, BID - RT, Raimundo Lin MD, 15 mcg at 04/24/25 1943    [Transfer Hold] aspirin EC tablet 81 mg, 81 mg, Oral, Daily, 81 mg at 04/24/25 0908 **OR** [Transfer Hold] aspirin suppository 300 mg, 300 mg, Rectal, Daily, Raimundo Lin MD    [Transfer Hold] atorvastatin (LIPITOR) tablet 40 mg, 40 mg, Oral, Nightly, Raimundo Lin MD, 40 mg at 04/24/25 2100    [Transfer Hold] sennosides-docusate  (PERICOLACE) 8.6-50 MG per tablet 2 tablet, 2 tablet, Oral, BID, 2 tablet at 04/23/25 2033 **AND** [Transfer Hold] polyethylene glycol (MIRALAX) packet 17 g, 17 g, Oral, Daily PRN **AND** [Transfer Hold] bisacodyl (DULCOLAX) EC tablet 5 mg, 5 mg, Oral, Daily PRN **AND** [Transfer Hold] bisacodyl (DULCOLAX) suppository 10 mg, 10 mg, Rectal, Daily PRN, Raimundo Lin MD    [Transfer Hold] budesonide (PULMICORT) nebulizer solution 0.5 mg, 0.5 mg, Nebulization, BID - RT, Raimundo Lin MD, 0.5 mg at 04/24/25 1942    [Transfer Hold] Calcium Replacement - Follow Nurse / BPA Driven Protocol, , Not Applicable, PRN, Raimundo Lin MD    [Transfer Hold] clopidogrel (PLAVIX) tablet 75 mg, 75 mg, Oral, Daily, Raimundo Lin MD, 75 mg at 04/24/25 0908    [Transfer Hold] dextrose (D50W) (25 g/50 mL) IV injection 25 g, 25 g, Intravenous, Q15 Min PRN, Raimundo Lin MD    [Transfer Hold] dextrose (GLUTOSE) oral gel 15 g, 15 g, Oral, Q15 Min PRN, Raimundo Lin MD    dextrose 5 % and sodium chloride 0.9 % infusion, 50 mL/hr, Intravenous, Continuous, Jaya Rocha,     droperidol (INAPSINE) injection 0.625 mg, 0.625 mg, Intravenous, Q20 Min PRN **OR** droperidol (INAPSINE) injection 0.625 mg, 0.625 mg, Intramuscular, Q20 Min PRN, Emperatriz Mcgee CRNA    ePHEDrine injection 5 mg, 5 mg, Intravenous, Once PRN, Emperatriz Mcgee CRNA    fentaNYL citrate (PF) (SUBLIMAZE) injection 25 mcg, 25 mcg, Intravenous, Q5 Min PRN, Emperatriz Mcgee CRNA    flumazenil (ROMAZICON) injection 0.2 mg, 0.2 mg, Intravenous, PRN, Emperatriz Mcgee CRNA    [Transfer Hold] glucagon (GLUCAGEN) injection 1 mg, 1 mg, Intramuscular, Q15 Min PRN, Raimundo Lin MD    [Transfer Hold] heparin (porcine) 5000 UNIT/ML injection 2,100-4,000 Units, 30-57.9 Units/kg (Order-Specific), Intravenous, Q6H PRN, Raimundo Lin MD    heparin 50066 units/250 mL (100 units/mL) in 0.45 % NaCl infusion, 12 Units/kg/hr, Intravenous, Titrated, Misael Lawton,  MD, Stopped at 04/25/25 0951    hydrALAZINE (APRESOLINE) injection 5 mg, 5 mg, Intravenous, Q10 Min PRN, Emperatriz Mcgee CRNA    HYDROcodone-acetaminophen (NORCO) 5-325 MG per tablet 1 tablet, 1 tablet, Oral, Once PRN, Emperatriz Mcgee CRNA    [Transfer Hold] HYDROcodone-acetaminophen (NORCO) 7.5-325 MG per tablet 1 tablet, 1 tablet, Oral, Q4H PRN, Raimundo Lin MD, 1 tablet at 04/25/25 0544    HYDROcodone-acetaminophen (NORCO) 7.5-325 MG per tablet 1 tablet, 1 tablet, Oral, Q4H PRN, Emperatriz Mcgee CRNA    HYDROmorphone (DILAUDID) injection 0.25 mg, 0.25 mg, Intravenous, Q5 Min PRN, Emperatriz Mcgee CRNA, 0.25 mg at 04/25/25 1243    [Transfer Hold] HYDROmorphone (DILAUDID) injection 1 mg, 1 mg, Intravenous, Q2H PRN, 1 mg at 04/25/25 0857 **AND** [Transfer Hold] naloxone (NARCAN) injection 0.4 mg, 0.4 mg, Intravenous, Q5 Min PRN, Raimundo Lin MD    [Transfer Hold] insulin regular (humuLIN R,novoLIN R) injection 2-7 Units, 2-7 Units, Subcutaneous, Q6H, Raimundo Lin MD, 2 Units at 04/25/25 0631    ipratropium-albuterol (DUO-NEB) nebulizer solution 3 mL, 3 mL, Nebulization, Once PRN, Emperatriz Mcgee CRNA    labetalol (NORMODYNE,TRANDATE) injection 5 mg, 5 mg, Intravenous, Q5 Min PRN, Emperatriz Mcgee CRNA    lactated ringers infusion, 9 mL/hr, Intravenous, Continuous, Rebecca Augustine MD, Last Rate: 9 mL/hr at 04/25/25 1133, Restarted at 04/25/25 1219    lactated ringers infusion, 9 mL/hr, Intravenous, Continuous, Rebecca Augustine MD    [Transfer Hold] Magnesium Standard Dose Replacement - Follow Nurse / BPA Driven Protocol, , Not Applicable, PRN, Raimundo Lin MD    metoprolol tartrate (LOPRESSOR) tablet 25 mg, 25 mg, Oral, Q12H, Misael Lawton MD, 25 mg at 04/25/25 0853    naloxone (NARCAN) injection 0.2 mg, 0.2 mg, Intravenous, PRN, Emperatriz Mcgee CRNA    [Transfer Hold] nitroglycerin (NITROSTAT) SL tablet 0.4 mg, 0.4 mg, Sublingual, Q5 Min PRN, Raimundo Lin MD    [Transfer Hold]  nitroglycerin (NITROSTAT) SL tablet 0.4 mg, 0.4 mg, Sublingual, Q5 Min PRN, Raimundo Lin MD    [Transfer Hold] nitroglycerin (NITROSTAT) SL tablet 0.4 mg, 0.4 mg, Sublingual, Q5 Min PRN, Raimundo Lin MD    [Transfer Hold] ondansetron ODT (ZOFRAN-ODT) disintegrating tablet 4 mg, 4 mg, Oral, Q6H PRN **OR** [Transfer Hold] ondansetron (ZOFRAN) injection 4 mg, 4 mg, Intravenous, Q6H PRN, Raimundo Lin MD    [Transfer Hold] ondansetron ODT (ZOFRAN-ODT) disintegrating tablet 4 mg, 4 mg, Oral, Q6H PRN **OR** [Transfer Hold] ondansetron (ZOFRAN) injection 4 mg, 4 mg, Intravenous, Q6H PRN, Raimundo Lin MD    ondansetron (ZOFRAN) injection 4 mg, 4 mg, Intravenous, Once PRN, Emperatriz Mcgee CRNA    [Transfer Hold] pantoprazole (PROTONIX) EC tablet 40 mg, 40 mg, Oral, Q AM, Raimundo Lin MD, 40 mg at 04/25/25 0544    [Transfer Hold] Phosphorus Replacement - Follow Nurse / BPA Driven Protocol, , Not Applicable, PRN, Raimundo Lin MD    Potassium Replacement - Follow Nurse / BPA Driven Protocol, , Not Applicable, PRN, Misael Lawton MD    [Transfer Hold] revefenacin (YUPELRI) nebulizer solution 175 mcg, 175 mcg, Nebulization, Daily - RT, Raimundo Lin MD, 175 mcg at 04/24/25 1008    [Transfer Hold] sodium hypochlorite (DAKIN'S 1/4 STRENGTH) 0.125 % topical solution 0.125% solution, , Topical, Daily, Raimundo Lin MD, Given at 04/24/25 1547    Facility-Administered Medications Ordered in Other Encounters:     fentaNYL citrate (PF) (SUBLIMAZE) injection, , Intravenous, PRN, Emperatriz Mcgee CRNA, 50 mcg at 04/25/25 1143    HYDROmorphone (DILAUDID) injection, , Intravenous, PRN, Emperatriz Mcgee CRNA, 0.5 mg at 04/25/25 1218    ondansetron (ZOFRAN) injection, , Intravenous, PRN, Emperatriz Mcgee CRNA, 4 mg at 04/25/25 1208    phenylephrine (BRIDGER-SYNEPHRINE) injection, , Intravenous, PRN, Emperatriz Mcgee CRNA, 100 mcg at 04/25/25 1146    propofol (DIPRIVAN) injection, , Intravenous, PRN, Emperatriz Mcgee  J, CRNA, 30 mg at 04/25/25 1144      Objective   Vital Signs   Temp:  [98.6 °F (37 °C)-99.1 °F (37.3 °C)] 99.1 °F (37.3 °C)  Heart Rate:  [71-85] 81  Resp:  [16-18] 16  BP: (107-120)/(62-86) 107/86    Physical Exam:   General: NAD, lying in bed in PACU  Eyes: no scleral icterus  ENT: NC in naris, edentulous  Cardiovascular: NR  Respiratory: no wheezing  GI: Abdomen is not distended, not tender  MSK: LLE wrapped; new wound VAC in place    Labs:   CBC, BMP, and magnesium reviewed today  Lab Results   Component Value Date    WBC 14.58 (H) 04/25/2025    HGB 7.2 (L) 04/25/2025    HCT 23.6 (L) 04/25/2025    MCV 86.8 04/25/2025     04/25/2025     Lab Results   Component Value Date    GLUCOSE 129 (H) 04/25/2025    CALCIUM 8.5 (L) 04/25/2025     04/25/2025    K 3.8 04/25/2025    CO2 31.3 (H) 04/25/2025     04/25/2025    BUN 8 04/25/2025    CREATININE 0.73 04/25/2025    EGFR 92.0 04/25/2025    BCR 11.0 04/25/2025    ANIONGAP 6.7 04/25/2025   Magnesium 1.8      Microbiology:  4/8 BCx: Negative  4/12 BCx: Negative  4/18 LLE wound Cx: Negative, final    Isolation:  No active isolations    ASSESSMENT/PLAN:  Leukocytosis -improved  Inferior STEMI s/p mechanical thrombectomy  Cardiogenic shock -resolved  Ischemic enteritis and colitis  Ileus  Left ventricular thrombus  Critical ischemia left lower extremity due to embolism  Status post open thrombectomy and angiogram  Status post 4 compartment fasciotomy  Post-surgical bleeding and hematoma  COPD due to tobacco abuse    WBC trending down.  There is a chance it could increase tomorrow after today's surgery.  She remains afebrile.  No evidence of infection per my review of the operative note.  Multiple sets of blood and wound cultures have been negative.  Previous profound leukocytosis thought to be multifactorial stress response.    She has been off of antibiotics for several days, and I do not see an indication to resume them at this time.    ID will sign off but  will remain available at [phone] if any new ID questions or concerns arise.

## 2025-04-26 LAB
ALBUMIN SERPL-MCNC: 2.7 G/DL (ref 3.5–5.2)
ANION GAP SERPL CALCULATED.3IONS-SCNC: 9 MMOL/L (ref 5–15)
APTT PPP: 41.3 SECONDS (ref 22.7–35.4)
APTT PPP: 66.4 SECONDS (ref 22.7–35.4)
BASOPHILS # BLD AUTO: 0.13 10*3/MM3 (ref 0–0.2)
BASOPHILS NFR BLD AUTO: 1 % (ref 0–1.5)
BUN SERPL-MCNC: 7 MG/DL (ref 8–23)
BUN/CREAT SERPL: 11.1 (ref 7–25)
CALCIUM SPEC-SCNC: 8.3 MG/DL (ref 8.6–10.5)
CHLORIDE SERPL-SCNC: 99 MMOL/L (ref 98–107)
CO2 SERPL-SCNC: 29 MMOL/L (ref 22–29)
CREAT SERPL-MCNC: 0.63 MG/DL (ref 0.57–1)
DEPRECATED RDW RBC AUTO: 44 FL (ref 37–54)
EGFRCR SERPLBLD CKD-EPI 2021: 99.2 ML/MIN/1.73
EOSINOPHIL # BLD AUTO: 0.13 10*3/MM3 (ref 0–0.4)
EOSINOPHIL NFR BLD AUTO: 1 % (ref 0.3–6.2)
ERYTHROCYTE [DISTWIDTH] IN BLOOD BY AUTOMATED COUNT: 14.4 % (ref 12.3–15.4)
GLUCOSE BLDC GLUCOMTR-MCNC: 122 MG/DL (ref 70–130)
GLUCOSE BLDC GLUCOMTR-MCNC: 154 MG/DL (ref 70–130)
GLUCOSE BLDC GLUCOMTR-MCNC: 160 MG/DL (ref 70–130)
GLUCOSE BLDC GLUCOMTR-MCNC: 171 MG/DL (ref 70–130)
GLUCOSE SERPL-MCNC: 117 MG/DL (ref 65–99)
HCT VFR BLD AUTO: 23.4 % (ref 34–46.6)
HGB BLD-MCNC: 7.3 G/DL (ref 12–15.9)
IMM GRANULOCYTES # BLD AUTO: 0.16 10*3/MM3 (ref 0–0.05)
IMM GRANULOCYTES NFR BLD AUTO: 1.2 % (ref 0–0.5)
INR PPP: 1.27 (ref 0.9–1.1)
LYMPHOCYTES # BLD AUTO: 1.91 10*3/MM3 (ref 0.7–3.1)
LYMPHOCYTES NFR BLD AUTO: 14.7 % (ref 19.6–45.3)
MAGNESIUM SERPL-MCNC: 1.7 MG/DL (ref 1.6–2.4)
MCH RBC QN AUTO: 26.2 PG (ref 26.6–33)
MCHC RBC AUTO-ENTMCNC: 31.2 G/DL (ref 31.5–35.7)
MCV RBC AUTO: 83.9 FL (ref 79–97)
MONOCYTES # BLD AUTO: 1.46 10*3/MM3 (ref 0.1–0.9)
MONOCYTES NFR BLD AUTO: 11.2 % (ref 5–12)
NEUTROPHILS NFR BLD AUTO: 70.9 % (ref 42.7–76)
NEUTROPHILS NFR BLD AUTO: 9.2 10*3/MM3 (ref 1.7–7)
NRBC BLD AUTO-RTO: 0 /100 WBC (ref 0–0.2)
PHOSPHATE SERPL-MCNC: 3.7 MG/DL (ref 2.5–4.5)
PLATELET # BLD AUTO: 456 10*3/MM3 (ref 140–450)
PMV BLD AUTO: 9.2 FL (ref 6–12)
POTASSIUM SERPL-SCNC: 3.8 MMOL/L (ref 3.5–5.2)
PROTHROMBIN TIME: 15.9 SECONDS (ref 11.7–14.2)
RBC # BLD AUTO: 2.79 10*6/MM3 (ref 3.77–5.28)
SODIUM SERPL-SCNC: 137 MMOL/L (ref 136–145)
WBC NRBC COR # BLD AUTO: 12.99 10*3/MM3 (ref 3.4–10.8)

## 2025-04-26 PROCEDURE — 63710000001 INSULIN REGULAR HUMAN PER 5 UNITS: Performed by: SURGERY

## 2025-04-26 PROCEDURE — 85610 PROTHROMBIN TIME: CPT | Performed by: STUDENT IN AN ORGANIZED HEALTH CARE EDUCATION/TRAINING PROGRAM

## 2025-04-26 PROCEDURE — 85730 THROMBOPLASTIN TIME PARTIAL: CPT | Performed by: SURGERY

## 2025-04-26 PROCEDURE — 25010000002 HEPARIN (PORCINE) 25000-0.45 UT/250ML-% SOLUTION: Performed by: STUDENT IN AN ORGANIZED HEALTH CARE EDUCATION/TRAINING PROGRAM

## 2025-04-26 PROCEDURE — 83735 ASSAY OF MAGNESIUM: CPT | Performed by: SURGERY

## 2025-04-26 PROCEDURE — 94799 UNLISTED PULMONARY SVC/PX: CPT

## 2025-04-26 PROCEDURE — 63710000001 REVEFENACIN 175 MCG/3ML SOLUTION: Performed by: SURGERY

## 2025-04-26 PROCEDURE — 99024 POSTOP FOLLOW-UP VISIT: CPT | Performed by: STUDENT IN AN ORGANIZED HEALTH CARE EDUCATION/TRAINING PROGRAM

## 2025-04-26 PROCEDURE — 94761 N-INVAS EAR/PLS OXIMETRY MLT: CPT

## 2025-04-26 PROCEDURE — 99222 1ST HOSP IP/OBS MODERATE 55: CPT | Performed by: INTERNAL MEDICINE

## 2025-04-26 PROCEDURE — 80069 RENAL FUNCTION PANEL: CPT | Performed by: SURGERY

## 2025-04-26 PROCEDURE — 94664 DEMO&/EVAL PT USE INHALER: CPT

## 2025-04-26 PROCEDURE — 85025 COMPLETE CBC W/AUTO DIFF WBC: CPT | Performed by: SURGERY

## 2025-04-26 PROCEDURE — 25010000002 HYDROMORPHONE 1 MG/ML SOLUTION: Performed by: SURGERY

## 2025-04-26 PROCEDURE — 82948 REAGENT STRIP/BLOOD GLUCOSE: CPT

## 2025-04-26 RX ORDER — HEPARIN SODIUM 5000 [USP'U]/ML
40-80 INJECTION, SOLUTION INTRAVENOUS; SUBCUTANEOUS EVERY 6 HOURS PRN
Status: DISCONTINUED | OUTPATIENT
Start: 2025-04-26 | End: 2025-05-01

## 2025-04-26 RX ORDER — HEPARIN SODIUM 10000 [USP'U]/100ML
18 INJECTION, SOLUTION INTRAVENOUS
Status: DISCONTINUED | OUTPATIENT
Start: 2025-04-26 | End: 2025-05-01

## 2025-04-26 RX ADMIN — METOPROLOL TARTRATE 25 MG: 25 TABLET, FILM COATED ORAL at 20:42

## 2025-04-26 RX ADMIN — METOPROLOL TARTRATE 25 MG: 25 TABLET, FILM COATED ORAL at 08:31

## 2025-04-26 RX ADMIN — ARFORMOTEROL TARTRATE 15 MCG: 15 SOLUTION RESPIRATORY (INHALATION) at 11:04

## 2025-04-26 RX ADMIN — INSULIN HUMAN 2 UNITS: 100 INJECTION, SOLUTION PARENTERAL at 12:32

## 2025-04-26 RX ADMIN — HYDROMORPHONE HYDROCHLORIDE 1 MG: 1 INJECTION, SOLUTION INTRAMUSCULAR; INTRAVENOUS; SUBCUTANEOUS at 06:30

## 2025-04-26 RX ADMIN — HYDROCODONE BITARTRATE AND ACETAMINOPHEN 1 TABLET: 7.5; 325 TABLET ORAL at 10:05

## 2025-04-26 RX ADMIN — HYDROMORPHONE HYDROCHLORIDE 1 MG: 1 INJECTION, SOLUTION INTRAMUSCULAR; INTRAVENOUS; SUBCUTANEOUS at 09:04

## 2025-04-26 RX ADMIN — REVEFENACIN 175 MCG: 175 SOLUTION RESPIRATORY (INHALATION) at 11:04

## 2025-04-26 RX ADMIN — BUDESONIDE 0.5 MG: 0.5 INHALANT RESPIRATORY (INHALATION) at 21:31

## 2025-04-26 RX ADMIN — PANTOPRAZOLE SODIUM 40 MG: 40 INJECTION, POWDER, FOR SOLUTION INTRAVENOUS at 20:42

## 2025-04-26 RX ADMIN — INSULIN HUMAN 2 UNITS: 100 INJECTION, SOLUTION PARENTERAL at 00:21

## 2025-04-26 RX ADMIN — HYDROCODONE BITARTRATE AND ACETAMINOPHEN 1 TABLET: 7.5; 325 TABLET ORAL at 20:42

## 2025-04-26 RX ADMIN — HYDROMORPHONE HYDROCHLORIDE 1 MG: 1 INJECTION, SOLUTION INTRAMUSCULAR; INTRAVENOUS; SUBCUTANEOUS at 13:37

## 2025-04-26 RX ADMIN — HEPARIN SODIUM 18 UNITS/KG/HR: 10000 INJECTION, SOLUTION INTRAVENOUS at 14:46

## 2025-04-26 RX ADMIN — HYDROMORPHONE HYDROCHLORIDE 1 MG: 1 INJECTION, SOLUTION INTRAMUSCULAR; INTRAVENOUS; SUBCUTANEOUS at 22:54

## 2025-04-26 RX ADMIN — HYDROCODONE BITARTRATE AND ACETAMINOPHEN 1 TABLET: 7.5; 325 TABLET ORAL at 14:46

## 2025-04-26 RX ADMIN — ASPIRIN 81 MG CHEWABLE TABLET 81 MG: 81 TABLET CHEWABLE at 08:25

## 2025-04-26 RX ADMIN — HYDROMORPHONE HYDROCHLORIDE 1 MG: 1 INJECTION, SOLUTION INTRAMUSCULAR; INTRAVENOUS; SUBCUTANEOUS at 16:45

## 2025-04-26 RX ADMIN — ATORVASTATIN CALCIUM 40 MG: 20 TABLET, FILM COATED ORAL at 20:42

## 2025-04-26 RX ADMIN — BUDESONIDE 0.5 MG: 0.5 INHALANT RESPIRATORY (INHALATION) at 11:04

## 2025-04-26 RX ADMIN — HYDROMORPHONE HYDROCHLORIDE 1 MG: 1 INJECTION, SOLUTION INTRAMUSCULAR; INTRAVENOUS; SUBCUTANEOUS at 18:54

## 2025-04-26 RX ADMIN — ARFORMOTEROL TARTRATE 15 MCG: 15 SOLUTION RESPIRATORY (INHALATION) at 21:31

## 2025-04-26 RX ADMIN — PANTOPRAZOLE SODIUM 40 MG: 40 INJECTION, POWDER, FOR SOLUTION INTRAVENOUS at 08:31

## 2025-04-26 RX ADMIN — INSULIN HUMAN 2 UNITS: 100 INJECTION, SOLUTION PARENTERAL at 18:01

## 2025-04-26 RX ADMIN — HYDROMORPHONE HYDROCHLORIDE 1 MG: 1 INJECTION, SOLUTION INTRAMUSCULAR; INTRAVENOUS; SUBCUTANEOUS at 03:45

## 2025-04-26 RX ADMIN — HYDROCODONE BITARTRATE AND ACETAMINOPHEN 1 TABLET: 7.5; 325 TABLET ORAL at 02:55

## 2025-04-26 RX ADMIN — HYDROMORPHONE HYDROCHLORIDE 1 MG: 1 INJECTION, SOLUTION INTRAMUSCULAR; INTRAVENOUS; SUBCUTANEOUS at 00:21

## 2025-04-26 NOTE — PROGRESS NOTES
Name: Desiree Garcia ADMIT: 4/10/2025   : 1960  PCP: Sandra Kaba MD    MRN: [id number] LOS: 16 days   AGE/SEX: 65 y.o. female  ROOM: [facility]     Subjective   Subjective   Patient seen and examined at bedside, she is weepy but without distress.  She is tolerating diet.  Planning for possible surgery on Wednesday.  No overt bleeding on heparin drip    2025  No overnight events, patient without distress.  Plan for surgery tomorrow.  Admits to breakthrough pain at times but otherwise doing okay    2025  Patient seen and examined at bedside, plan is for surgery later today.    2025  Surgery was canceled yesterday as patient became anxious and diaphoretic before surgery.  Currently without distress though notes her leg pain continues.  Tolerating heparin drip.    2025  No overnight events, patient without distress.  Hemoglobin low there is no overt bleeding.  Plan for surgery today.    2025  Patient had some blood in stool last night and heparin drip along with Plavix were stopped.  Has been cleared by GI this morning and plan to restart.  Patient admits to pain in the back of her upper left leg.  She underwent left lower extremity debridement along with wound VAC placement yesterday.       Objective   Objective   Vital Signs  Temp:  [97.9 °F (36.6 °C)-98.7 °F (37.1 °C)] 98 °F (36.7 °C)  Heart Rate:  [74-83] 80  Resp:  [16] 16  BP: (103-116)/(61-72) 103/69  SpO2:  [91 %-100 %] 93 %  on  Flow (L/min) (Oxygen Therapy):  [1] 1;   Device (Oxygen Therapy): room air  Body mass index is 29.53 kg/m².  Physical Exam  Constitutional:       Appearance: Normal appearance. She is ill-appearing.   HENT:      Head: Normocephalic and atraumatic.      Nose: No congestion.      Mouth/Throat:      Pharynx: No oropharyngeal exudate.   Eyes:      General: No scleral icterus.  Cardiovascular:      Heart sounds: No murmur heard.     No friction rub. No gallop.   Pulmonary:      Effort: No  respiratory distress.      Breath sounds: No wheezing, rhonchi or rales.      Comments: Stable on room air  Abdominal:      General: There is no distension.      Tenderness: There is no abdominal tenderness. There is no guarding.   Musculoskeletal:         General: No deformity or signs of injury.      Cervical back: No rigidity.      Comments: Left leg is bandaged with wound VAC placement.  Left posterior thigh tenderness.   Skin:     Coloration: Skin is not jaundiced.      Findings: No bruising or lesion.   Neurological:      General: No focal deficit present.      Mental Status: She is alert and oriented to person, place, and time.      Motor: Weakness present.       Results Review     I reviewed the patient's new clinical results.  Results from last 7 days   Lab Units 04/26/25  0737 04/25/25 1833 04/25/25  0609 04/24/25  0619   WBC 10*3/mm3 12.99* 12.96* 14.58* 17.70*   HEMOGLOBIN g/dL 7.3* 7.5* 7.2* 7.4*   PLATELETS 10*3/mm3 456* 451* 416 437     Results from last 7 days   Lab Units 04/26/25  0737 04/25/25  1833 04/25/25  0609 04/24/25  0620   SODIUM mmol/L 137 137 139 136   POTASSIUM mmol/L 3.8 4.2 3.8 3.8   CHLORIDE mmol/L 99 99 101 96*   CO2 mmol/L 29.0 32.1* 31.3* 29.0   BUN mg/dL 7* 9 8 12   CREATININE mg/dL 0.63 0.77 0.73 0.67   GLUCOSE mg/dL 117* 149* 129* 166*   EGFR mL/min/1.73 99.2 86.3 92.0 97.7     Results from last 7 days   Lab Units 04/26/25  0737 04/25/25  0609 04/24/25  0620 04/23/25  0543   ALBUMIN g/dL 2.7* 2.5* 2.2* 2.4*     Results from last 7 days   Lab Units 04/26/25  0737 04/25/25  1833 04/25/25  0609 04/24/25  0620 04/23/25  0543   CALCIUM mg/dL 8.3* 8.5* 8.5* 8.5* 8.3*   ALBUMIN g/dL 2.7*  --  2.5* 2.2* 2.4*   MAGNESIUM mg/dL 1.7  --  1.8 1.6 1.6   PHOSPHORUS mg/dL 3.7  --  3.8 3.4 2.9       Glucose   Date/Time Value Ref Range Status   04/26/2025 1205 154 (H) 70 - 130 mg/dL Final   04/26/2025 0610 122 70 - 130 mg/dL Final   04/26/2025 0015 160 (H) 70 - 130 mg/dL Final   04/25/2025 2137  166 (H) 70 - 130 mg/dL Final   04/25/2025 1611 157 (H) 70 - 130 mg/dL Final   04/25/2025 1246 99 70 - 130 mg/dL Final   04/25/2025 0617 152 (H) 70 - 130 mg/dL Final       No radiology results for the last day    I have personally reviewed all medications:  Scheduled Medications  arformoterol, 15 mcg, Nebulization, BID - RT  aspirin, 81 mg, Oral, Daily   Or  aspirin, 300 mg, Rectal, Daily  atorvastatin, 40 mg, Oral, Nightly  budesonide, 0.5 mg, Nebulization, BID - RT  clopidogrel, 75 mg, Oral, Daily  insulin regular, 2-7 Units, Subcutaneous, Q6H  metoprolol tartrate, 25 mg, Oral, Q12H  pantoprazole, 40 mg, Intravenous, Q12H  revefenacin, 175 mcg, Nebulization, Daily - RT  senna-docusate sodium, 2 tablet, Oral, BID  sodium hypochlorite, , Topical, Daily    Infusions  heparin, 18 Units/kg/hr    Diet  Diet: Regular/House, Diabetic, Cardiac; Healthy Heart (2-3 Na+); Consistent Carbohydrate; Fluid Consistency: Thin (IDDSI 0)    I have personally reviewed:  [x]  Laboratory   [x]  Microbiology   [x]  Radiology   [x]  EKG/Telemetry  [x]  Cardiology/Vascular   []  Pathology    []  Records       Assessment/Plan     Active Hospital Problems    Diagnosis  POA   • 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 [I74.3]  Unknown   • Acute thrombus of left ventricle [I24.0]  Yes   • Altered bowel habits [R19.4]  Yes   • Leg paresthesia [R20.2]  Yes      Resolved Hospital Problems    Diagnosis Date Resolved POA   • **Cardiogenic shock [R57.0] 04/15/2025 Yes       65 y.o. female admitted with Cardiogenic shock.    #Left lower extremity embolism and thrombosis  #Left ventricular thrombus with showering emboli of kidney, spleen, bile  #Severe PAD    -Status post open left iliofemoral, profunda femoris and superficial femoral artery thromboembolectomy with left common femoral endarterectomy and repair, 4 compartment fasciotomy of left lower extremity with left femoropopliteal angioplasty and stent placement x 2 on 4/12/2025     -Status post left lateral fasciotomy site debridement and left medial fasciotomy site debridement on 4/18/2025    - s/p left fasciotomy debridement with wound vac placement on 4/25/25    - Continue heparin drip    -Plan to switch to an oral NOAC once cleared by vascular, loading dose not needed    - Hematology has signed off    - Pain control    - Aspirin and Plavix daily    - Statin    - Surgery canceled on 4/23/2025 as patient became anxious and diaphoretic in preop, cardiology reevaluated patient and cleared her for further surgery.    - Pain control    - N.p.o. after midnight    #Rectal bleeding    - Patient with rectal bleeding postop on 4/25/2025    - Heparin drip was stopped and Plavix held    - Hemoglobin was stable    - GI consulted and suspects hemorrhoidal bleeding    - Patient should be able to tolerate Plavix and IV heparin, heparin restarted    - Continue to trend hemoglobin and transfuse to maintain hemoglobin greater than 7.0    - resolved    #Leukocytosis    - ID consulted, suspects WBC elevation is multifactorial as cultures are negative to date    - Antibiotics discontinued    - Continue to monitor    - WBC 25.32 > 22.37 > 19.23 > 17.7 > 14.58 > 12.99    #Cardiogenic shock  #Inferior STEMI    -Status post JOE x 2 on 4/8/2025    - Resolved    - Aspirin 81 mg daily, Plavix 75 mg daily, atorvastatin 40 mg p.o. daily    - Lopressor 25 mg p.o. twice daily    - Continue heparin drip, plan to transition to DOAC with long-term dual therapy    - Cardiology has cleared for surgery tomorrow    #COPD    - Brovana nebs twice daily    - Pulmicort twice daily    - Yupelri nebs daily    #Ileus    - Ileus has resolved per surgery, NG removed, surgery signed off    #NIA    - Resolved, creatinine stable    - Nephrology has signed off    #Anemia    - Hemoglobin 7.3, suspect blood loss from surgery    - No overt bleeding, continue to monitor closely is on heparin drip    - Transfuse maintain hemoglobin greater than  7    #GERD    - PPI          Continue on heparin drip  for DVT prophylaxis.  Full code.  Discussed with patient and nursing staff.  Anticipate discharge home with HH vs SNU facility next week.  Expected Discharge Date: 4/28/2025; Expected Discharge Time:       Jaya Rocha DO  [facility]  04/26/25  13:54 EDT

## 2025-04-26 NOTE — CONSULTS
[facility]  Initial Inpatient Consult Note    Referring Provider: Dr. Rocha    Reason for Consultation: Rectal bleeding    Subjective     History of present illness:    65 y.o. female who I am asked to see for rectal bleeding.  Patient tells me the rectal bleeding was very minimal.  She does admit to just having a colonoscopy 1 month ago with removal of polyps.  She was admitted on April 8, 2025 as a transfer from [place] with abdominal pain.  Found to have cardiogenic shock.  Inferior myocardial infarction taken to the Cath Lab.  She underwent thrombectomy, percutaneous coronary intervention with stent placement.  She had renal and splenic infarcts found on admission.  TTE showing left ventricular thrombus.  On admission she was seen by general surgery for sigmoid colon ischemia enteritis and ileus.  General surgery did not perform any intervention.  She was also found to have critical vague ischemia, embolism.  Vascular surgery took her for open thrombectomy and endarterectomy with fasciotomy 2 weeks ago.  She is being followed by vascular surgery, infectious disease.    Currently off Plavix      Past Medical History:  Past Medical History:   Diagnosis Date    Arthritis     Cervical cancer screening     COPD (chronic obstructive pulmonary disease)     Coronary artery disease involving native heart without angina pectoris 09/30/2021    primary angioplasty and stent placement to mid right coronary artery with good angiographic results on 6/14/2021 after STEMI    Depression with anxiety     Diabetes mellitus     Forgetfulness     Gastric reflux     Hypertension     Leg paresthesia 01/20/2017    Right    Limb swelling     Lumbago 01/20/2017    Low back pain    Lumbar spinal stenosis 02/23/2017    L4/5 moderate to severe central canal stenosis    Lumbosacral radiculopathy 01/20/2017    Right    Migraines     Night sweat     Reflux esophagitis     Shortness of breath     ST elevation myocardial  infarction (STEMI) (CMS/[facility]) 2021    Stomach disorder      Past Surgical History:  Past Surgical History:   Procedure Laterality Date    ABDOMINAL SURGERY      3 C-SECTIONS    ARTERIOGRAM LOWER EXTREMITY Left 2025    Procedure: ARTERIOGRAM LOWER EXTREMITY;  Surgeon: Misael Lawton MD;  Location: [facility] OR;  Service: Vascular;  Laterality: Left;    CARDIAC CATHETERIZATION      CARDIAC CATHETERIZATION N/A 2021    Procedure: Left Heart Cath;  Surgeon: Sidney Xiao MD;  Location: [facility] CATH INVASIVE LOCATION;  Service: Cardiology;  Laterality: N/A;    CARDIAC CATHETERIZATION N/A 2025    Procedure: Left Heart Cath;  Surgeon: James Verdugo MD;  Location: [facility] CATH INVASIVE LOCATION;  Service: Cardiology;  Laterality: N/A;     SECTION      x 3    CHOLECYSTECTOMY      COLONOSCOPY      COLONOSCOPY N/A 3/25/2025    Procedure: COLONOSCOPY WITH BIOPSIES AND COLD AND HOT SNARE POLYPECTOMIES;  Surgeon: Heber Najera MD;  Location: [facility] ENDOSCOPY;  Service: Gastroenterology;  Laterality: N/A;  COLON POLYPS    CORONARY STENT PLACEMENT      EMBOLECTOMY Left 2025    Procedure: EMBOLECTOMY MECHANICAL, FOUR COMPARTMENT FASCIOTOMY;  Surgeon: Misael Lawton MD;  Location: [facility] OR;  Service: Vascular;  Laterality: Left;    ENDOSCOPY      2007    FOOT SURGERY Right     HAND SURGERY      HYSTERECTOMY  1985    INCISION AND DRAINAGE LEG Left 2025    Procedure: LOWER EXTREMITY DEBRIDEMENT;  Surgeon: Misael Lawton MD;  Location: [facility] OR;  Service: Vascular;  Laterality: Left;      Social History:   Social History     Tobacco Use    Smoking status: Every Day     Current packs/day: 1.00     Average packs/day: 1 pack/day for 58.3 years (58.3 ttl pk-yrs)     Types: Cigarettes     Start date: 1967     Passive exposure: Current    Smokeless tobacco: Never    Tobacco comments:     Smoked 21-30 years   Substance Use Topics    Alcohol use: Never      Comment: Does not drink      Family History:  Family History   Problem Relation Age of Onset    Stroke Mother     Lung cancer Mother         Unspecified    Arthritis Mother     Stroke Father     Heart disease Father     Diabetes Father         Unspecified type    Arthritis Father     Heart attack Father     Stroke Sister     Arthritis Sister     Breast cancer Sister     Stroke Brother     Heart disease Brother     Diabetes Brother         Unspecified type    Arthritis Brother     Colon cancer Neg Hx        Home Meds:  Medications Prior to Admission   Medication Sig Dispense Refill Last Dose/Taking    acetaminophen (TYLENOL) 325 MG tablet Take 2 tablets by mouth Every 4 (Four) Hours As Needed for Mild Pain or Fever (temperature greater than 101F).       albuterol (PROVENTIL) (2.5 MG/3ML) 0.083% nebulizer solution Take 2.5 mg by nebulization Every 6 (Six) Hours As Needed for Wheezing or Shortness of Air.       aspirin 81 MG EC tablet Take 1 tablet by mouth Daily.       atorvastatin (LIPITOR) 40 MG tablet Take 1 tablet by mouth Every Night for 90 days. 90 tablet 3     budesonide (PULMICORT) 0.5 MG/2ML nebulizer solution Take 2 mL by nebulization 2 (Two) Times a Day.       dextrose (GLUTOSE) 40 % gel Take 15 g by mouth Every 15 (Fifteen) Minutes As Needed for Low Blood Sugar (Blood sugar less than 70).       glucagon (GLUCAGEN) 1 MG injection Inject 1 mg into the appropriate muscle as directed by prescriber Every 15 (Fifteen) Minutes As Needed (Blood Glucose Less Than 70).       insulin glargine (LANTUS, SEMGLEE) 100 UNIT/ML injection Inject 10 Units under the skin into the appropriate area as directed Daily.       insulin regular (humuLIN R,novoLIN R) 100 UNIT/ML injection Inject 2-7 Units under the skin into the appropriate area as directed Every 6 (Six) Hours.       Jardiance 25 MG tablet tablet Take 1 tablet by mouth Daily.       [] mupirocin (BACTROBAN) 2 % nasal ointment 1 Application by Each Nare route 2  (Two) Times a Day for 7 doses.       nitroglycerin (NITROSTAT) 0.4 MG SL tablet Place 1 tablet under the tongue Every 5 (Five) Minutes As Needed for Chest Pain (Systolic BP Greater Than 100). Take no more than 3 doses in 15 minutes.       ondansetron (ZOFRAN) 2 mg/mL injection Infuse 2 mL into a venous catheter Every 6 (Six) Hours As Needed for Nausea or Vomiting.       ticagrelor (BRILINTA) 90 MG tablet tablet Take 1 tablet by mouth 2 (Two) Times a Day.        Current Meds:   arformoterol, 15 mcg, Nebulization, BID - RT  aspirin, 81 mg, Oral, Daily   Or  aspirin, 300 mg, Rectal, Daily  atorvastatin, 40 mg, Oral, Nightly  budesonide, 0.5 mg, Nebulization, BID - RT  clopidogrel, 75 mg, Oral, Daily  insulin regular, 2-7 Units, Subcutaneous, Q6H  metoprolol tartrate, 25 mg, Oral, Q12H  pantoprazole, 40 mg, Intravenous, Q12H  revefenacin, 175 mcg, Nebulization, Daily - RT  senna-docusate sodium, 2 tablet, Oral, BID  sodium hypochlorite, , Topical, Daily      Allergies:  Allergies   Allergen Reactions    Tramadol Itching     High severity per pt     Review of Systems  There is weakness of fatigue all other systems reviewed and negative     Objective     Vital Signs  Temp:  [97.9 °F (36.6 °C)-98.7 °F (37.1 °C)] 98 °F (36.7 °C)  Heart Rate:  [66-83] 80  Resp:  [12-16] 16  BP: (103-130)/(61-99) 103/69  Physical Exam:  General Appearance:    Alert, cooperative, in no acute distress   Head:    Normocephalic, without obvious abnormality, atraumatic   Eyes:          Conjunctivae and sclerae normal, no icterus   Throat:   No thrush, oral mucosa moist   Neck:   Supple, no adenopathy   Lungs:     Clear to auscultation bilaterally    Heart:    Regular rhythm and normal rate    Chest Wall:    No abnormalities observed   Abdomen:     Soft, nondistended, nontender; normal bowel sounds   Extremities:   No edema, no redness   Skin:   No bruising or rash   Psychiatric:   Normal mood and insight     Results Review:   I reviewed the  patient's new clinical results.    Results from last 7 days   Lab Units 04/26/25  0737 04/25/25  1833 04/25/25  0609   WBC 10*3/mm3 12.99* 12.96* 14.58*   HEMOGLOBIN g/dL 7.3* 7.5* 7.2*   HEMATOCRIT % 23.4* 24.2* 23.6*   PLATELETS 10*3/mm3 456* 451* 416     Results from last 7 days   Lab Units 04/26/25  0737 04/25/25  1833 04/25/25  0609   SODIUM mmol/L 137 137 139   POTASSIUM mmol/L 3.8 4.2 3.8   CHLORIDE mmol/L 99 99 101   CO2 mmol/L 29.0 32.1* 31.3*   BUN mg/dL 7* 9 8   CREATININE mg/dL 0.63 0.77 0.73   CALCIUM mg/dL 8.3* 8.5* 8.5*   GLUCOSE mg/dL 117* 149* 129*     Results from last 7 days   Lab Units 04/25/25  1904   INR  1.24*     Lab Results   Lab Value Date/Time    LIPASE 73 (H) 04/08/2025 1728    LIPASE 107 (H) 10/06/2024 1250       Radiology:  CT Abdomen Pelvis With Contrast   Final Result      XR Abdomen KUB   Final Result       1. Interval advancement of NG tube with the tip and sideport in the   gastric fundus.   2. Stable gas distended small bowel loops       This report was finalized on 4/12/2025 1:58 PM by Dr. Jose Daniel Dallas M.D on Workstation: [id number]          XR Abdomen KUB   Final Result       1. NG tube tip is in the gastric fundus and the sideport is at the GE   junction. Consider advancement.   2. Gas distended small bowel with suggestion of thickening of circular   folds and gas distended hepatic flexure of the colon, similar.       This report was finalized on 4/12/2025 12:36 PM by Dr. Jose Daniel Dallas M.D on Workstation: [id number]          Arteriogram (Autofinalize)   Final Result      CT Angio Abdominal Aorta Bilateral Iliofem Runoff   Final Result       1. The patient has a short segment occlusion of the right superficial   femoral artery in the proximal to mid thigh. It reconstitutes, but does   show narrowing. There is further plaque noted more distally, with   associated moderate stenosis. I do think the right anterior tibial,   peroneal, and posterior tibial arteries are  continuous to the foot.   2. Occlusion of the left common femoral artery, noted on prior imaging.   There is reconstitution of the left superficial femoral artery within   the mid to distal thigh. Left anterior tibial artery appears to   terminate within the distal calf. Left peroneal artery terminates at the   ankle. Left posterior tibial artery is continuous to the foot.   3. Areas of infarction are again noted within the spleen and left   kidney.   4. Distention of the ascending and transverse colon, along with   distended loops of small bowel with associated wall thickening and   edema. Again, this is favored to reflect ischemic colitis/enteritis. No   pneumatosis or free air is seen. There is some free fluid which is noted   within the abdomen and pelvis.       Radiation dose reduction techniques were utilized, including automated   exposure control and exposure modulation based on body size.           This report was finalized on 4/12/2025 5:25 AM by Dr. Jennifer Jo M.D on Workstation: [id number]          XR Abdomen KUB   Final Result      CT Angiogram Abdomen Pelvis   Final Result   Addendum (preliminary) 1 of 1   ADDENDUM:   04 11 25 05:21 Call Doctor Regarding Above results, called NP Karlene Eldridge     on 04 11 05:21 (-04:00)            Final            Communications:       Call Doctor Above results      Electronically signed by Jose Albarran MD on 04-11-25 at 0511          Assessment & Plan   Active Hospital Problems    Diagnosis     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     Acute thrombus of left ventricle     Altered bowel habits     Leg paresthesia        Assessment:  Shock-resolved  Myocardial infarction-stent placement-on Plavix  Rectal bleeding  Ischemic enteritis and colitis  Ischemia of left lower extremity status post thrombectomy  Left ventricular thrombus with emboli to kidney spleen  Left fasciotomy debridement yesterday  Leukocytosis  COPD  Acute kidney  injury  Ileus  anemia    Plan:  She tells me the rectal bleeding was minimal  HGB has not changed in the last 24 hours and that is even after wound debridement likely a bit of blood loss from surgery  I see no indication to rush into repeat colonoscopy as she just had one done 4 weeks ago, incidentally hemorrhoids were found on that test.  That is likely source of rectal bleeding in the last 24 hours.  She did have hot snare polypectomy 4 weeks ago but we are likely passed the post polypectomy bleeding time frame now being 31 days out.  If she truly is bleeding from a polyp site it will become more voluminous and profuse over the next 24 to 48 hours.  Again this is unlikely, bleeding is likely from hemorrhoids.  Follow hemoglobin, transfuse for any hemoglobin less than 7.0  Okay to continue Plavix with recent drug-eluting stent  Okay to continue IV heparin as long as no further voluminous rectal bleeding occurs      I discussed the patient's findings and my recommendations with patient and nursing staff.    Teo Vang MD

## 2025-04-26 NOTE — PLAN OF CARE
Goal Outcome Evaluation:              Outcome Evaluation: VSS(see chart), room air, AOx4, PRN medication adminstered, wound vac in place, heparin drip, continue plan of care

## 2025-04-26 NOTE — PLAN OF CARE
Goal Outcome Evaluation:              Outcome Evaluation: VSS. RA, 1L NC while asleep. A/Ox4. PRN pain medication administered. Wound vac with ace wrap in place. Sutures present at groin site AMANDO.

## 2025-04-26 NOTE — PROGRESS NOTES
[place] [place]    Patient Name: Desiree Garcia  : 1960  MRN: [id number]  Date of admission: 4/10/2025  Surgical Procedures Since Admission:  Procedure(s):  EMBOLECTOMY MECHANICAL, FOUR COMPARTMENT FASCIOTOMY  ARTERIOGRAM LOWER EXTREMITY  Surgeon:  Misael Lawton MD  Status:  14 Days Post-Op  -------------------    Procedure(s):  LOWER EXTREMITY DEBRIDEMENT  Surgeon:  Misael Lawton MD  Status:  8 Days Post-Op  -------------------  **Canceled**    Procedure(s):  Left leg fasciotomy debridement with possible wound VAC placement.  Surgeon:  Raimundo Lin MD  Status:  * Surgery not performed *  -------------------    Procedure(s):  Left lower extremity fasciotomy debridement with possible wound VAC placement  Surgeon:  Raimundo Lin MD  Status:  1 Day Post-Op  -------------------    Subjective   Subjective     Chief Complaint: left posterior thigh pain    History of Present Illness   Bloody bowel movement last night, heparin held.  Hb stable and no transfusion needed.  GI evaluated and bleeding due to hemorrhoids, heparin restarted without further bloody BM.  Patient concerned about left posterior thigh pain, which has been ongoing for weeks now.  She is concerned she has \"a clot\" there.      Review of Systems   Constitutional:  Negative for chills and fever.       Objective   Objective     Vitals:   Temp:  [98 °F (36.7 °C)-98.7 °F (37.1 °C)] 98.2 °F (36.8 °C)  Heart Rate:  [76-86] 86  Resp:  [16] 16  BP: (102-116)/(61-72) 102/62  Flow (L/min) (Oxygen Therapy):  [1] 1    Physical Exam      Doppler PT/DP signals bilaterally  Left posterior thigh- tender to palpation over left hamstring muscle    Result Review    Result Review:  I have personally reviewed the results from the time of this admission to 2025 18:13 EDT and agree with these findings:  [x]  Laboratory list / accordion  []  Microbiology  []  Radiology  []  EKG/Telemetry   []  Cardiology/Vascular "   []  Pathology  []  Old records  []  Other:        Assessment & Plan   Assessment / Plan     Brief Patient Summary:  Desiree Garcia is a 65 y.o. female who has an LV thrombus and had extensive thromboembolic events, including lower extremity thromboembolic events that required revascularization.      Active Hospital Problems:   Active Hospital Problems    Diagnosis    • 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    • Acute thrombus of left ventricle    • Altered bowel habits    • Leg paresthesia      Plan:   -back on heparin for anticoagulation.  Left fasciotomy debrided and wound vac in place, will leave over the weekend and check again next week  -will eventually need transition to eliquis once surgical debridements have concluded, will continue heparin for now  -discussed with patient that she is on full dose anticoagulation, which would cover her for any DVT.  Her exam is stable so she does not have any significant arterial clot in the left leg.  I do not recommend a venous duplex as it would not change our management and I do not think she would tolerate the exam due to pain.  She understood.    VTE Prophylaxis:  Pharmacologic & mechanical VTE prophylaxis orders are present.        Kristin Loja MD     Any information that has been copied and pasted into this note has been reviewed and edited to represent today's findings.

## 2025-04-27 LAB
ALBUMIN SERPL-MCNC: 2.4 G/DL (ref 3.5–5.2)
ANION GAP SERPL CALCULATED.3IONS-SCNC: 9 MMOL/L (ref 5–15)
APTT PPP: 60.4 SECONDS (ref 22.7–35.4)
APTT PPP: 95.5 SECONDS (ref 22.7–35.4)
APTT PPP: 98 SECONDS (ref 22.7–35.4)
BASOPHILS # BLD AUTO: 0.13 10*3/MM3 (ref 0–0.2)
BASOPHILS NFR BLD AUTO: 1.2 % (ref 0–1.5)
BUN SERPL-MCNC: 7 MG/DL (ref 8–23)
BUN/CREAT SERPL: 11.9 (ref 7–25)
CALCIUM SPEC-SCNC: 8.1 MG/DL (ref 8.6–10.5)
CHLORIDE SERPL-SCNC: 100 MMOL/L (ref 98–107)
CO2 SERPL-SCNC: 28 MMOL/L (ref 22–29)
CREAT SERPL-MCNC: 0.59 MG/DL (ref 0.57–1)
DEPRECATED RDW RBC AUTO: 44.2 FL (ref 37–54)
EGFRCR SERPLBLD CKD-EPI 2021: 100.8 ML/MIN/1.73
EOSINOPHIL # BLD AUTO: 0.19 10*3/MM3 (ref 0–0.4)
EOSINOPHIL NFR BLD AUTO: 1.7 % (ref 0.3–6.2)
ERYTHROCYTE [DISTWIDTH] IN BLOOD BY AUTOMATED COUNT: 14.5 % (ref 12.3–15.4)
GLUCOSE BLDC GLUCOMTR-MCNC: 137 MG/DL (ref 70–130)
GLUCOSE BLDC GLUCOMTR-MCNC: 143 MG/DL (ref 70–130)
GLUCOSE BLDC GLUCOMTR-MCNC: 152 MG/DL (ref 70–130)
GLUCOSE BLDC GLUCOMTR-MCNC: 163 MG/DL (ref 70–130)
GLUCOSE BLDC GLUCOMTR-MCNC: 195 MG/DL (ref 70–130)
GLUCOSE SERPL-MCNC: 143 MG/DL (ref 65–99)
HCT VFR BLD AUTO: 23.3 % (ref 34–46.6)
HGB BLD-MCNC: 7.2 G/DL (ref 12–15.9)
IMM GRANULOCYTES # BLD AUTO: 0.14 10*3/MM3 (ref 0–0.05)
IMM GRANULOCYTES NFR BLD AUTO: 1.3 % (ref 0–0.5)
LYMPHOCYTES # BLD AUTO: 1.91 10*3/MM3 (ref 0.7–3.1)
LYMPHOCYTES NFR BLD AUTO: 17.6 % (ref 19.6–45.3)
MAGNESIUM SERPL-MCNC: 1.7 MG/DL (ref 1.6–2.4)
MCH RBC QN AUTO: 26.2 PG (ref 26.6–33)
MCHC RBC AUTO-ENTMCNC: 30.9 G/DL (ref 31.5–35.7)
MCV RBC AUTO: 84.7 FL (ref 79–97)
MONOCYTES # BLD AUTO: 1.17 10*3/MM3 (ref 0.1–0.9)
MONOCYTES NFR BLD AUTO: 10.8 % (ref 5–12)
NEUTROPHILS NFR BLD AUTO: 67.4 % (ref 42.7–76)
NEUTROPHILS NFR BLD AUTO: 7.32 10*3/MM3 (ref 1.7–7)
NRBC BLD AUTO-RTO: 0.2 /100 WBC (ref 0–0.2)
PHOSPHATE SERPL-MCNC: 3.5 MG/DL (ref 2.5–4.5)
PLATELET # BLD AUTO: 418 10*3/MM3 (ref 140–450)
PMV BLD AUTO: 9 FL (ref 6–12)
POTASSIUM SERPL-SCNC: 3.6 MMOL/L (ref 3.5–5.2)
POTASSIUM SERPL-SCNC: 4.2 MMOL/L (ref 3.5–5.2)
RBC # BLD AUTO: 2.75 10*6/MM3 (ref 3.77–5.28)
SODIUM SERPL-SCNC: 137 MMOL/L (ref 136–145)
WBC NRBC COR # BLD AUTO: 10.86 10*3/MM3 (ref 3.4–10.8)

## 2025-04-27 PROCEDURE — 99232 SBSQ HOSP IP/OBS MODERATE 35: CPT | Performed by: INTERNAL MEDICINE

## 2025-04-27 PROCEDURE — 63710000001 INSULIN REGULAR HUMAN PER 5 UNITS: Performed by: SURGERY

## 2025-04-27 PROCEDURE — 97530 THERAPEUTIC ACTIVITIES: CPT

## 2025-04-27 PROCEDURE — 94799 UNLISTED PULMONARY SVC/PX: CPT

## 2025-04-27 PROCEDURE — 85025 COMPLETE CBC W/AUTO DIFF WBC: CPT | Performed by: SURGERY

## 2025-04-27 PROCEDURE — 99024 POSTOP FOLLOW-UP VISIT: CPT | Performed by: STUDENT IN AN ORGANIZED HEALTH CARE EDUCATION/TRAINING PROGRAM

## 2025-04-27 PROCEDURE — 80069 RENAL FUNCTION PANEL: CPT | Performed by: SURGERY

## 2025-04-27 PROCEDURE — 84132 ASSAY OF SERUM POTASSIUM: CPT | Performed by: STUDENT IN AN ORGANIZED HEALTH CARE EDUCATION/TRAINING PROGRAM

## 2025-04-27 PROCEDURE — 85730 THROMBOPLASTIN TIME PARTIAL: CPT | Performed by: STUDENT IN AN ORGANIZED HEALTH CARE EDUCATION/TRAINING PROGRAM

## 2025-04-27 PROCEDURE — 25010000002 HEPARIN (PORCINE) 25000-0.45 UT/250ML-% SOLUTION: Performed by: STUDENT IN AN ORGANIZED HEALTH CARE EDUCATION/TRAINING PROGRAM

## 2025-04-27 PROCEDURE — 25010000002 HYDROMORPHONE 1 MG/ML SOLUTION: Performed by: SURGERY

## 2025-04-27 PROCEDURE — 94664 DEMO&/EVAL PT USE INHALER: CPT

## 2025-04-27 PROCEDURE — 83735 ASSAY OF MAGNESIUM: CPT | Performed by: SURGERY

## 2025-04-27 PROCEDURE — 94761 N-INVAS EAR/PLS OXIMETRY MLT: CPT

## 2025-04-27 PROCEDURE — 63710000001 REVEFENACIN 175 MCG/3ML SOLUTION: Performed by: SURGERY

## 2025-04-27 PROCEDURE — 82948 REAGENT STRIP/BLOOD GLUCOSE: CPT

## 2025-04-27 PROCEDURE — 85730 THROMBOPLASTIN TIME PARTIAL: CPT | Performed by: SURGERY

## 2025-04-27 RX ORDER — POTASSIUM CHLORIDE 1500 MG/1
40 TABLET, EXTENDED RELEASE ORAL EVERY 4 HOURS
Status: COMPLETED | OUTPATIENT
Start: 2025-04-27 | End: 2025-04-27

## 2025-04-27 RX ADMIN — HYDROMORPHONE HYDROCHLORIDE 1 MG: 1 INJECTION, SOLUTION INTRAMUSCULAR; INTRAVENOUS; SUBCUTANEOUS at 08:07

## 2025-04-27 RX ADMIN — ASPIRIN 81 MG CHEWABLE TABLET 81 MG: 81 TABLET CHEWABLE at 08:07

## 2025-04-27 RX ADMIN — ARFORMOTEROL TARTRATE 15 MCG: 15 SOLUTION RESPIRATORY (INHALATION) at 20:05

## 2025-04-27 RX ADMIN — HYDROCODONE BITARTRATE AND ACETAMINOPHEN 1 TABLET: 7.5; 325 TABLET ORAL at 14:38

## 2025-04-27 RX ADMIN — PANTOPRAZOLE SODIUM 40 MG: 40 INJECTION, POWDER, FOR SOLUTION INTRAVENOUS at 20:41

## 2025-04-27 RX ADMIN — BUDESONIDE 0.5 MG: 0.5 INHALANT RESPIRATORY (INHALATION) at 09:49

## 2025-04-27 RX ADMIN — HEPARIN SODIUM 18 UNITS/KG/HR: 10000 INJECTION, SOLUTION INTRAVENOUS at 07:04

## 2025-04-27 RX ADMIN — INSULIN HUMAN 2 UNITS: 100 INJECTION, SOLUTION PARENTERAL at 12:03

## 2025-04-27 RX ADMIN — BUDESONIDE 0.5 MG: 0.5 INHALANT RESPIRATORY (INHALATION) at 20:05

## 2025-04-27 RX ADMIN — HYDROCODONE BITARTRATE AND ACETAMINOPHEN 1 TABLET: 7.5; 325 TABLET ORAL at 22:20

## 2025-04-27 RX ADMIN — HYDROMORPHONE HYDROCHLORIDE 1 MG: 1 INJECTION, SOLUTION INTRAMUSCULAR; INTRAVENOUS; SUBCUTANEOUS at 23:19

## 2025-04-27 RX ADMIN — METOPROLOL TARTRATE 25 MG: 25 TABLET, FILM COATED ORAL at 20:41

## 2025-04-27 RX ADMIN — METOPROLOL TARTRATE 25 MG: 25 TABLET, FILM COATED ORAL at 08:07

## 2025-04-27 RX ADMIN — PANTOPRAZOLE SODIUM 40 MG: 40 INJECTION, POWDER, FOR SOLUTION INTRAVENOUS at 08:07

## 2025-04-27 RX ADMIN — HYDROMORPHONE HYDROCHLORIDE 1 MG: 1 INJECTION, SOLUTION INTRAMUSCULAR; INTRAVENOUS; SUBCUTANEOUS at 17:37

## 2025-04-27 RX ADMIN — INSULIN HUMAN 2 UNITS: 100 INJECTION, SOLUTION PARENTERAL at 23:44

## 2025-04-27 RX ADMIN — HYDROMORPHONE HYDROCHLORIDE 1 MG: 1 INJECTION, SOLUTION INTRAMUSCULAR; INTRAVENOUS; SUBCUTANEOUS at 11:52

## 2025-04-27 RX ADMIN — HYDROCODONE BITARTRATE AND ACETAMINOPHEN 1 TABLET: 7.5; 325 TABLET ORAL at 06:44

## 2025-04-27 RX ADMIN — HEPARIN SODIUM 20 UNITS/KG/HR: 10000 INJECTION, SOLUTION INTRAVENOUS at 23:45

## 2025-04-27 RX ADMIN — INSULIN HUMAN 2 UNITS: 100 INJECTION, SOLUTION PARENTERAL at 06:44

## 2025-04-27 RX ADMIN — SENNOSIDES AND DOCUSATE SODIUM 2 TABLET: 50; 8.6 TABLET ORAL at 20:41

## 2025-04-27 RX ADMIN — HYDROCODONE BITARTRATE AND ACETAMINOPHEN 1 TABLET: 7.5; 325 TABLET ORAL at 02:20

## 2025-04-27 RX ADMIN — ARFORMOTEROL TARTRATE 15 MCG: 15 SOLUTION RESPIRATORY (INHALATION) at 09:46

## 2025-04-27 RX ADMIN — POTASSIUM CHLORIDE 40 MEQ: 1500 TABLET, EXTENDED RELEASE ORAL at 14:38

## 2025-04-27 RX ADMIN — HYDROCODONE BITARTRATE AND ACETAMINOPHEN 1 TABLET: 7.5; 325 TABLET ORAL at 10:24

## 2025-04-27 RX ADMIN — CLOPIDOGREL BISULFATE 75 MG: 75 TABLET, FILM COATED ORAL at 08:07

## 2025-04-27 RX ADMIN — REVEFENACIN 175 MCG: 175 SOLUTION RESPIRATORY (INHALATION) at 09:52

## 2025-04-27 RX ADMIN — HYDROMORPHONE HYDROCHLORIDE 1 MG: 1 INJECTION, SOLUTION INTRAMUSCULAR; INTRAVENOUS; SUBCUTANEOUS at 03:06

## 2025-04-27 RX ADMIN — ATORVASTATIN CALCIUM 40 MG: 20 TABLET, FILM COATED ORAL at 20:41

## 2025-04-27 RX ADMIN — POTASSIUM CHLORIDE 40 MEQ: 1500 TABLET, EXTENDED RELEASE ORAL at 11:16

## 2025-04-27 RX ADMIN — HYDROCODONE BITARTRATE AND ACETAMINOPHEN 1 TABLET: 7.5; 325 TABLET ORAL at 18:13

## 2025-04-27 NOTE — PROGRESS NOTES
[facility]  Inpatient Progress Note    Reason for Follow Up: Rectal bleeding   Subjective     Interval History:   No further rectal bleeding per patient report    Current Facility-Administered Medications:     acetaminophen (TYLENOL) tablet 650 mg, 650 mg, Oral, Q4H PRN, 650 mg at 04/18/25 0049 **OR** acetaminophen (TYLENOL) 160 MG/5ML oral solution 650 mg, 650 mg, Oral, Q4H PRN **OR** acetaminophen (TYLENOL) suppository 650 mg, 650 mg, Rectal, Q4H PRN, Raimundo Lin MD, 650 mg at 04/13/25 0923    acetaminophen (TYLENOL) tablet 650 mg, 650 mg, Oral, Q4H PRN **OR** acetaminophen (TYLENOL) 160 MG/5ML oral solution 650 mg, 650 mg, Oral, Q4H PRN **OR** acetaminophen (TYLENOL) suppository 650 mg, 650 mg, Rectal, Q4H PRN, Raimundo Lin MD    acetaminophen (TYLENOL) tablet 650 mg, 650 mg, Oral, Q4H PRN, 650 mg at 04/22/25 0845 **OR** acetaminophen (TYLENOL) suppository 650 mg, 650 mg, Rectal, Q4H PRN, Raimundo iLn MD    albuterol (PROVENTIL) nebulizer solution 0.083% 2.5 mg/3mL, 2.5 mg, Nebulization, Q6H PRN, Raimundo Lin MD    aluminum-magnesium hydroxide-simethicone (MAALOX MAX) 400-400-40 MG/5ML suspension 15 mL, 15 mL, Oral, Q6H PRN, Raimundo Lin MD    arformoterol (BROVANA) nebulizer solution 15 mcg, 15 mcg, Nebulization, BID - RT, Raimundo Lin MD, 15 mcg at 04/27/25 0946    aspirin EC tablet 81 mg, 81 mg, Oral, Daily, 81 mg at 04/27/25 0807 **OR** aspirin suppository 300 mg, 300 mg, Rectal, Daily, Raimundo Lin MD    atorvastatin (LIPITOR) tablet 40 mg, 40 mg, Oral, Nightly, Raimundo Lin MD, 40 mg at 04/26/25 2042    sennosides-docusate (PERICOLACE) 8.6-50 MG per tablet 2 tablet, 2 tablet, Oral, BID, 2 tablet at 04/23/25 2033 **AND** polyethylene glycol (MIRALAX) packet 17 g, 17 g, Oral, Daily PRN **AND** bisacodyl (DULCOLAX) EC tablet 5 mg, 5 mg, Oral, Daily PRN **AND** bisacodyl (DULCOLAX) suppository 10 mg, 10 mg, Rectal, Daily PRN, Raimundo Lin,  MD    budesonide (PULMICORT) nebulizer solution 0.5 mg, 0.5 mg, Nebulization, BID - RT, Raimundo Lin MD, 0.5 mg at 04/27/25 0949    Calcium Replacement - Follow Nurse / BPA Driven Protocol, , Not Applicable, PRN, Raimundo iLn MD    clopidogrel (PLAVIX) tablet 75 mg, 75 mg, Oral, Daily, Raimundo Lin MD, 75 mg at 04/27/25 0807    dextrose (D50W) (25 g/50 mL) IV injection 25 g, 25 g, Intravenous, Q15 Min PRN, Raimundo Lin MD    dextrose (GLUTOSE) oral gel 15 g, 15 g, Oral, Q15 Min PRN, Raimundo Lin MD    glucagon (GLUCAGEN) injection 1 mg, 1 mg, Intramuscular, Q15 Min PRN, Raimundo Lin MD    heparin (porcine) 5000 UNIT/ML injection 3,300-6,600 Units, 40-80 Units/kg, Intravenous, Q6H PRN, Jaya Rocha DO    heparin 09574 units/250 mL (100 units/mL) in 0.45 % NaCl infusion, 18 Units/kg/hr, Intravenous, Titrated, Jaya Rocha DO, Last Rate: 14.94 mL/hr at 04/27/25 0704, 18 Units/kg/hr at 04/27/25 0704    HYDROcodone-acetaminophen (NORCO) 7.5-325 MG per tablet 1 tablet, 1 tablet, Oral, Q4H PRN, Raimundo Lin MD, 1 tablet at 04/27/25 1024    HYDROmorphone (DILAUDID) injection 1 mg, 1 mg, Intravenous, Q2H PRN, 1 mg at 04/27/25 0807 **AND** naloxone (NARCAN) injection 0.4 mg, 0.4 mg, Intravenous, Q5 Min PRN, Raimundo Lin MD    insulin regular (humuLIN R,novoLIN R) injection 2-7 Units, 2-7 Units, Subcutaneous, Q6H, Raimundo Lin MD, 2 Units at 04/27/25 0644    Magnesium Standard Dose Replacement - Follow Nurse / BPA Driven Protocol, , Not Applicable, PRN, Raimundo Lin MD    metoprolol tartrate (LOPRESSOR) tablet 25 mg, 25 mg, Oral, Q12H, Raimundo Lin MD, 25 mg at 04/27/25 0807    nitroglycerin (NITROSTAT) SL tablet 0.4 mg, 0.4 mg, Sublingual, Q5 Min PRN, Raimundo Lin MD    nitroglycerin (NITROSTAT) SL tablet 0.4 mg, 0.4 mg, Sublingual, Q5 Min PRN, Raimundo Lin MD    nitroglycerin (NITROSTAT) SL tablet 0.4 mg, 0.4 mg, Sublingual, Q5 Min PRN, Raimundo Lin  MD DIONNE    ondansetron ODT (ZOFRAN-ODT) disintegrating tablet 4 mg, 4 mg, Oral, Q6H PRN **OR** ondansetron (ZOFRAN) injection 4 mg, 4 mg, Intravenous, Q6H PRN, Raimundo Lin MD    ondansetron ODT (ZOFRAN-ODT) disintegrating tablet 4 mg, 4 mg, Oral, Q6H PRN **OR** ondansetron (ZOFRAN) injection 4 mg, 4 mg, Intravenous, Q6H PRN, Raimundo Lin MD    pantoprazole (PROTONIX) injection 40 mg, 40 mg, Intravenous, Q12H, Jaya Rocha DO, 40 mg at 04/27/25 0807    Phosphorus Replacement - Follow Nurse / BPA Driven Protocol, , Not Applicable, PRN, Raimundo Lin MD    potassium chloride (KLOR-CON M20) CR tablet 40 mEq, 40 mEq, Oral, Q4H, Jaya Rocha DO, 40 mEq at 04/27/25 1116    Potassium Replacement - Follow Nurse / BPA Driven Protocol, , Not Applicable, PRN, Raimundo Lin MD    revefenacin (YUPELRI) nebulizer solution 175 mcg, 175 mcg, Nebulization, Daily - RT, Raimundo Lin MD, 175 mcg at 04/27/25 0952  Review of Systems:    There is weakness and fatigue all other systems reviewed and    Objective     Vital Signs  Temp:  [98.2 °F (36.8 °C)-99 °F (37.2 °C)] 98.5 °F (36.9 °C)  Heart Rate:  [67-86] 67  Resp:  [16-18] 18  BP: (102-116)/(53-69) 116/69  Body mass index is 29.53 kg/m².    Intake/Output Summary (Last 24 hours) at 4/27/2025 1140  Last data filed at 4/27/2025 0900  Gross per 24 hour   Intake 840 ml   Output 500 ml   Net 340 ml     I/O this shift:  In: 240 [P.O.:240]  Out: -      Physical Exam:   General: patient awake, alert and cooperative   Eyes: Normal lids and lashes, no scleral icterus   Neck: supple, normal ROM   Skin: warm and dry, not jaundiced   Cardiovascular: regular rhythm and rate, no murmurs auscultated   Pulm: clear to auscultation bilaterally, regular and unlabored   Abdomen: soft, nontender, nondistended; normal bowel sounds   Extremities: no rash or edema   Psychiatric: Normal mood and behavior; memory intact     Results Review:     I reviewed the patient's new clinical  results.    Results from last 7 days   Lab Units 04/27/25  0611 04/26/25  0737 04/25/25  1833   WBC 10*3/mm3 10.86* 12.99* 12.96*   HEMOGLOBIN g/dL 7.2* 7.3* 7.5*   HEMATOCRIT % 23.3* 23.4* 24.2*   PLATELETS 10*3/mm3 418 456* 451*     Results from last 7 days   Lab Units 04/27/25  0611 04/26/25  0737 04/25/25  1833   SODIUM mmol/L 137 137 137   POTASSIUM mmol/L 3.6 3.8 4.2   CHLORIDE mmol/L 100 99 99   CO2 mmol/L 28.0 29.0 32.1*   BUN mg/dL 7* 7* 9   CREATININE mg/dL 0.59 0.63 0.77   CALCIUM mg/dL 8.1* 8.3* 8.5*   GLUCOSE mg/dL 143* 117* 149*     Results from last 7 days   Lab Units 04/26/25  1759 04/25/25  1904   INR  1.27* 1.24*     Lab Results   Lab Value Date/Time    LIPASE 73 (H) 04/08/2025 1728    LIPASE 107 (H) 10/06/2024 1250       Radiology:  CT Abdomen Pelvis With Contrast   Final Result      XR Abdomen KUB   Final Result       1. Interval advancement of NG tube with the tip and sideport in the   gastric fundus.   2. Stable gas distended small bowel loops       This report was finalized on 4/12/2025 1:58 PM by Dr. Jose Daniel Dallas M.D on Workstation: [id number]          XR Abdomen KUB   Final Result       1. NG tube tip is in the gastric fundus and the sideport is at the GE   junction. Consider advancement.   2. Gas distended small bowel with suggestion of thickening of circular   folds and gas distended hepatic flexure of the colon, similar.       This report was finalized on 4/12/2025 12:36 PM by Dr. Jose Daniel Dallas M.D on Workstation: [id number]          Arteriogram (Autofinalize)   Final Result      CT Angio Abdominal Aorta Bilateral Iliofem Runoff   Final Result       1. The patient has a short segment occlusion of the right superficial   femoral artery in the proximal to mid thigh. It reconstitutes, but does   show narrowing. There is further plaque noted more distally, with   associated moderate stenosis. I do think the right anterior tibial,   peroneal, and posterior tibial arteries are  continuous to the foot.   2. Occlusion of the left common femoral artery, noted on prior imaging.   There is reconstitution of the left superficial femoral artery within   the mid to distal thigh. Left anterior tibial artery appears to   terminate within the distal calf. Left peroneal artery terminates at the   ankle. Left posterior tibial artery is continuous to the foot.   3. Areas of infarction are again noted within the spleen and left   kidney.   4. Distention of the ascending and transverse colon, along with   distended loops of small bowel with associated wall thickening and   edema. Again, this is favored to reflect ischemic colitis/enteritis. No   pneumatosis or free air is seen. There is some free fluid which is noted   within the abdomen and pelvis.       Radiation dose reduction techniques were utilized, including automated   exposure control and exposure modulation based on body size.           This report was finalized on 4/12/2025 5:25 AM by Dr. Jennifer Jo M.D on Workstation: [id number]          XR Abdomen KUB   Final Result      CT Angiogram Abdomen Pelvis   Final Result   Addendum (preliminary) 1 of 1   ADDENDUM:   04 11 25 05:21 Call Doctor Regarding Above results, called NP Karlene Eldridge     on 04 11 05:21 (-04:00)            Final            Communications:       Call Doctor Above results      Electronically signed by Jose Albarran MD on 04-11-25 at 0511          Assessment & Plan     Active Hospital Problems    Diagnosis     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     Acute thrombus of left ventricle     Altered bowel habits     Leg paresthesia        Assessment:  Shock-resolved  Myocardial infarction-stent placement-on Plavix  Rectal bleeding  Ischemic enteritis and colitis  Ischemia of left lower extremity status post thrombectomy  Left ventricular thrombus with emboli to kidney spleen  Left fasciotomy debridement yesterday  Leukocytosis  COPD  Acute kidney  injury  Ileus  anemia     Plan:  She tells me the rectal bleeding was minimal, hemoglobin stable  HGB has not changed in the last 48 hours and that is even after wound debridement likely a bit of blood loss from surgery  I see no indication to rush into repeat colonoscopy as she just had one done 4 weeks ago, incidentally hemorrhoids were found on that test.  That is likely source of rectal bleeding in the last 24 hours.  She did have hot snare polypectomy 4 weeks ago but we are likely passed the post polypectomy bleeding time frame now being 31 days out.  If she truly is bleeding from a polyp site it will become more voluminous and profuse over the next 24 to 48 hours.  Again this is unlikely, bleeding is likely from hemorrhoids.  Follow hemoglobin, transfuse for any hemoglobin less than 7.0  Okay to continue Plavix with recent drug-eluting stent  Okay to continue IV heparin as long as no further voluminous rectal bleeding occurs  No further GI recommendations please call with questions        I discussed the patients findings and my recommendations with patient and nursing staff.    Teo Vang MD

## 2025-04-27 NOTE — PLAN OF CARE
Goal Outcome Evaluation:  Plan of Care Reviewed With: patient           Outcome Evaluation: Pt required some increased assistance secondary to pain. Pt unable to tolerate bearing weight on LLE to allow for taking steps therefore pt shuffles on R foot toward HOB. Pt reports she had just gotten to and [place] [facility] [place]. PT bebo continue to follow to address strength, mobility, and gait.    Anticipated Discharge Disposition (PT): skilled nursing facility

## 2025-04-27 NOTE — THERAPY TREATMENT NOTE
Patient Name: Desiree Garcia  : 1960    MRN: [id number]                              Today's Date: 2025       Admit Date: 4/10/2025    Visit Dx:     ICD-10-CM ICD-9-CM   1. 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  I74.3 444.22     Patient Active Problem List   Diagnosis    Type 2 diabetes mellitus, without long-term current use of insulin    COPD (chronic obstructive pulmonary disease)    Depression with anxiety    Esophageal reflux    Forgetfulness    Leg paresthesia    Lumbar spinal stenosis    Migraines    Night sweat    Sciatica    Dyspnea on exertion    Thoracic or lumbosacral neuritis or radiculitis    Left wrist pain    Sprain of left wrist    Arthritis of knee, left    Contusion of left knee    Nondisplaced fracture of trapezium bone of left wrist with routine healing    Chronic left shoulder pain    Coronary artery disease involving native heart without angina pectoris    Hypertension, essential    Mixed hyperlipidemia    Cigarette nicotine dependence with nicotine-induced disorder    Altered bowel habits    Diarrhea    Rectal bleeding    Abdominal bloating    Allergic rhinitis due to food    Elevated troponin    Acute diastolic CHF (congestive heart failure)    History of ST elevation myocardial infarction (STEMI)    STEMI (ST elevation myocardial infarction)    Acute thrombus of left ventricle    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     Past Medical History:   Diagnosis Date    Arthritis     Cervical cancer screening     COPD (chronic obstructive pulmonary disease)     Coronary artery disease involving native heart without angina pectoris 2021    primary angioplasty and stent placement to mid right coronary artery with good angiographic results on 2021 after STEMI    Depression with anxiety     Diabetes mellitus     Forgetfulness     Gastric reflux     Hypertension     Leg paresthesia 2017    Right    Limb swelling     Lumbago 2017    Low  back pain    Lumbar spinal stenosis 2017    L4/5 moderate to severe central canal stenosis    Lumbosacral radiculopathy 2017    Right    Migraines     Night sweat     Reflux esophagitis     Shortness of breath     ST elevation myocardial infarction (STEMI) (CMS/[facility]) 2021    Stomach disorder      Past Surgical History:   Procedure Laterality Date    ABDOMINAL SURGERY      3 C-SECTIONS    ARTERIOGRAM LOWER EXTREMITY Left 2025    Procedure: ARTERIOGRAM LOWER EXTREMITY;  Surgeon: Misael Lawton MD;  [facility];  Service: Vascular;  Laterality: Left;    CARDIAC CATHETERIZATION      CARDIAC CATHETERIZATION N/A 2021    Procedure: Left Heart Cath;  Surgeon: Sidney Xiao MD;  Location: [facility] CATH INVASIVE LOCATION;  Service: Cardiology;  Laterality: N/A;    CARDIAC CATHETERIZATION N/A 2025    Procedure: Left Heart Cath;  Surgeon: James Verdugo MD;  Location: [facility] CATH INVASIVE LOCATION;  Service: Cardiology;  Laterality: N/A;     SECTION      x 3    CHOLECYSTECTOMY      COLONOSCOPY      COLONOSCOPY N/A 3/25/2025    Procedure: COLONOSCOPY WITH BIOPSIES AND COLD AND HOT SNARE POLYPECTOMIES;  Surgeon: Heber Najera MD;  Location: [facility] ENDOSCOPY;  Service: Gastroenterology;  Laterality: N/A;  COLON POLYPS    CORONARY STENT PLACEMENT      EMBOLECTOMY Left 2025    Procedure: EMBOLECTOMY MECHANICAL, FOUR COMPARTMENT FASCIOTOMY;  Surgeon: Misael Lawton MD;  [facility];  Service: Vascular;  Laterality: Left;    ENDOSCOPY      2007    FOOT SURGERY Right     HAND SURGERY      HYSTERECTOMY  1985    INCISION AND DRAINAGE LEG Left 2025    Procedure: LOWER EXTREMITY DEBRIDEMENT;  Surgeon: Misael Lawton MD;  Location: [facility] OR;  Service: Vascular;  Laterality: Left;      General Information       Row Name 25 1612          Physical Therapy Time and Intention    Document Type therapy note (daily note)  -      Mode of Treatment individual therapy;physical therapy  -       Row Name 04/27/25 1612          Cognition    Orientation Status (Cognition) oriented x 3  -       Row Name 04/27/25 1612          Safety Issues/Impairments Affecting Functional Mobility    Impairments Affecting Function (Mobility) balance;endurance/activity tolerance;strength;pain  -CH               User Key  (r) = Recorded By, (t) = Taken By, (c) = Cosigned By      Initials Name Provider Type     Marie Grossman, PT Physical Therapist                   Mobility       Row Name 04/27/25 1612          Bed Mobility    Bed Mobility supine-sit;sit-supine  -CH     Supine-Sit [place] (Bed Mobility) verbal cues;nonverbal cues (demo/gesture);contact guard  -CH     Sit-Supine [place] (Bed Mobility) verbal cues;nonverbal cues (demo/gesture);contact guard  -     Assistive Device (Bed Mobility) bed rails;head of bed elevated  -       Row Name 04/27/25 1612          Sit-Stand Transfer    Sit-Stand [place] (Transfers) nonverbal cues (demo/gesture);verbal cues;minimum assist (75% patient effort)  -     Assistive Device (Sit-Stand Transfers) walker, front-wheeled  -CH     Comment, (Sit-Stand Transfer) pt performed sit to stand x2, pt performing TTWB on LLE secondary to pain  -       Row Name 04/27/25 1612          Gait/Stairs (Locomotion)    [place] Level (Gait) verbal cues;nonverbal cues (demo/gesture);minimum assist (75% patient effort)  -     Distance in Feet (Gait) 2  x2, scooting on R foot toward HOB  -     Deviations/Abnormal Patterns (Gait) sharlene decreased  -     Bilateral Gait Deviations forward flexed posture  -     Left Sided Gait Deviations weight shift ability decreased  -CH     Comment, (Gait/Stairs) attempted to take steps forward but pt unable to lift body using walker, performed scooting on R foot to get to HOB  -               User Key  (r) = Recorded By, (t) = Taken By, (c) = Cosigned By      Initials  Name Provider Type    Marie Harrison, PT Physical Therapist                   Obj/Interventions    No documentation.                  Goals/Plan    No documentation.                  Clinical Impression       Row Name 04/27/25 1614          Pain    Pretreatment Pain Rating 9/10  -CH     Posttreatment Pain Rating 9/10  -     Pain Location extremity  -     Pain Side/Orientation left  -     Pain Management Interventions exercise or physical activity utilized  -     Response to Pain Interventions activity participation with increased pain  -       Row Name 04/27/25 1614          Plan of Care Review    Plan of Care Reviewed With patient  -     Outcome Evaluation Pt required some increased assistance secondary to pain. Pt unable to tolerate bearing weight on LLE to allow for taking steps therefore pt shuffles on R foot toward HOB. Pt reports she had just gotten to and from [facility]. PT bebo continue to follow to address strength, mobility, and gait.  -       Row Name 04/27/25 1614          Positioning and Restraints    Pre-Treatment Position in bed  -CH     Post Treatment Position bed  -     In Bed supine;notified nsg;call light within reach;exit alarm on;encouraged to call for assist;with family/caregiver  -               User Key  (r) = Recorded By, (t) = Taken By, (c) = Cosigned By      Initials Name Provider Type     Marie Grossman, PT Physical Therapist                   Outcome Measures       Row Name 04/27/25 1616 04/27/25 8778       How much help from another person do you currently need...    Turning from your back to your side while in flat bed without using bedrails? 3  -CH 4  -GG    Moving from lying on back to sitting on the side of a flat bed without bedrails? 3  -CH 3  -GG    Moving to and from a bed to a chair (including a wheelchair)? 3  -CH 3  -GG    Standing up from a chair using your arms (e.g., wheelchair, bedside chair)? 3  -CH 3  -GG    Climbing 3-5 steps with a railing? 1   - 2  -GG    To walk in hospital room? 1  -CH 1  -GG    AM-PAC 6 Clicks Score (PT) 14  -CH 16  -GG    Highest Level of Mobility Goal 4 --> Transfer to chair/commode  - 5 --> Static standing  -GG      Row Name 04/27/25 0807          How much help from another person do you currently need...    Turning from your back to your side while in flat bed without using bedrails? 4  -GG     Moving from lying on back to sitting on the side of a flat bed without bedrails? 3  -GG     Moving to and from a bed to a chair (including a wheelchair)? 3  -GG     Standing up from a chair using your arms (e.g., wheelchair, bedside chair)? 3  -GG     Climbing 3-5 steps with a railing? 2  -GG     To walk in hospital room? 1  -GG     AM-PAC 6 Clicks Score (PT) 16  -GG     Highest Level of Mobility Goal 5 --> Static standing  -       Row Name 04/27/25 1616          Functional Assessment    Outcome Measure Options AM-PAC 6 Clicks Basic Mobility (PT)  -               User Key  (r) = Recorded By, (t) = Taken By, (c) = Cosigned By      Initials Name Provider Type     Marie Grossman PT Physical Therapist     Terese Sadler RN Registered Nurse                                 Physical Therapy Education       Title: PT OT SLP Therapies (Done)       Topic: Physical Therapy (Done)       Point: Mobility training (Done)       Learning Progress Summary            Patient Acceptance, E,TB,D, VU,NR by  at 4/27/2025 1617    Acceptance, E,TB,D, VU,NR by TIFF at 4/24/2025 1720    Acceptance, E,TB,D, VU,NR by TIFF at 4/23/2025 1326    Eager, E,TB,D, VU,NR by TIFF at 4/21/2025 1034    Acceptance, E,D, VU,NR by MS at 4/20/2025 1124    Acceptance, E, NR by MR at 4/19/2025 1647    Acceptance, E,D, NR by TIFF at 4/17/2025 1809    Acceptance, E, NR by MARISSA at 4/15/2025 1324                      Point: Home exercise program (Done)       Learning Progress Summary            Patient Acceptance, E,TB,D, VU,NR by  at 4/27/2025 1617    Acceptance, E,TB,D, VU,NR  by TIFF at 4/24/2025 1720    Acceptance, E,TB,D, VU,NR by JM at 4/23/2025 1326    Eager, E,TB,D, VU,NR by JM at 4/21/2025 1034    Acceptance, E,D, VU,NR by MS at 4/20/2025 1124    Acceptance, E, NR by MR at 4/19/2025 1647    Acceptance, E,D, NR by TIFF at 4/17/2025 1809                      Point: Body mechanics (Done)       Learning Progress Summary            Patient Acceptance, E,TB,D, VU,NR by  at 4/27/2025 1617    Acceptance, E,TB,D, VU,NR by TIFF at 4/24/2025 1720    Acceptance, E,TB,D, VU,NR by TIFF at 4/23/2025 1326    Eager, E,TB,D, VU,NR by TIFF at 4/21/2025 1034    Acceptance, E,D, VU,NR by MS at 4/20/2025 1124    Acceptance, E, NR by MR at 4/19/2025 1647    Acceptance, E,D, NR by TIFF at 4/17/2025 1809    Acceptance, E, NR by MRAISSA at 4/15/2025 1324                      Point: Precautions (Done)       Learning Progress Summary            Patient Acceptance, E,TB,D, VU,NR by  at 4/27/2025 1617    Acceptance, E,TB,D, VU,NR by  at 4/24/2025 1720    Acceptance, E,TB,D, VU,NR by TIFF at 4/23/2025 1326    Eager, E,TB,D, VU,NR by TIFF at 4/21/2025 1034    Acceptance, E,D, VU,NR by MS at 4/20/2025 1124    Acceptance, E, NR by MR at 4/19/2025 1647    Acceptance, E,D, NR by TIFF at 4/17/2025 1809    Acceptance, E, NR by MARISSA at 4/15/2025 1324                                      User Key       Initials Effective Dates Name Provider Type Discipline     06/16/21 -  Marie Grossman, PT Physical Therapist PT    TIFF 03/07/18 -  Laura Gooden PTA Physical Therapist Assistant PT    MS 06/16/21 -  Cerda, Misael L, PT Physical Therapist PT    DJ 10/25/19 -  Meka Araiza, PT Physical Therapist PT    MR 08/03/23 -  Mary Jane Kelly, RN Registered Nurse Nurse                  PT Recommendation and Plan     Outcome Evaluation: Pt required some increased assistance secondary to pain. Pt unable to tolerate bearing weight on LLE to allow for taking steps therefore pt shuffles on R foot toward HOB. Pt reports she had just gotten to and  from [facility]. PT ebbo continue to follow to address strength, mobility, and gait.     Time Calculation:         PT Charges       Row Name 04/27/25 1617             Time Calculation    Start Time 1439  -      Stop Time 1458  -      Time Calculation (min) 19 min  -CH      PT Received On 04/27/25  -      PT - Next Appointment 04/28/25  -         Time Calculation- PT    Total Timed Code Minutes- PT 19 minute(s)  -CH         Timed Charges    99669 - PT Therapeutic Activity Minutes 19  -CH         Total Minutes    Timed Charges Total Minutes 19  -CH       Total Minutes 19  -CH                User Key  (r) = Recorded By, (t) = Taken By, (c) = Cosigned By      Initials Name Provider Type     Marie Grossman, PT Physical Therapist                  Therapy Charges for Today       Code Description Service Date Service Provider Modifiers Qty    [id number]  PT THERAPEUTIC ACT EA 15 MIN 4/27/2025 Marie Grossman PT GP 1            PT G-Codes  Outcome Measure Options: AM-PAC 6 Clicks Basic Mobility (PT)  AM-PAC 6 Clicks Score (PT): 14  AM-PAC 6 Clicks Score (OT): 14  PT Discharge Summary  Anticipated Discharge Disposition (PT): skilled nursing facility    Marie Grossman, TERRENCE  4/27/2025

## 2025-04-27 NOTE — PLAN OF CARE
Goal Outcome Evaluation:              Outcome Evaluation: VSS. A/Ox4. Pt c/o pain. managed for short periods with PRN medication. Wound vac and external catheter in place.

## 2025-04-27 NOTE — PROGRESS NOTES
Name: Desiree Garcia ADMIT: 4/10/2025   : 1960  PCP: Sandra Kaba MD    MRN: [id number] LOS: 17 days   AGE/SEX: 65 y.o. female  ROOM: [facility]     Subjective   Subjective   Patient seen and examined at bedside, she is weepy but without distress.  She is tolerating diet.  Planning for possible surgery on Wednesday.  No overt bleeding on heparin drip    2025  No overnight events, patient without distress.  Plan for surgery tomorrow.  Admits to breakthrough pain at times but otherwise doing okay    2025  Patient seen and examined at bedside, plan is for surgery later today.    2025  Surgery was canceled yesterday as patient became anxious and diaphoretic before surgery.  Currently without distress though notes her leg pain continues.  Tolerating heparin drip.    2025  No overnight events, patient without distress.  Hemoglobin low there is no overt bleeding.  Plan for surgery today.    2025  Patient had some blood in stool last night and heparin drip along with Plavix were stopped.  Has been cleared by GI this morning and plan to restart.  Patient admits to pain in the back of her upper left leg.  She underwent left lower extremity debridement along with wound VAC placement yesterday.    2025  Patient seen and examined at bedside, complains of continued left leg pain.  She is weepy.  Denies further GI bleeding.       Objective   Objective   Vital Signs  Temp:  [97.5 °F (36.4 °C)-99 °F (37.2 °C)] 97.5 °F (36.4 °C)  Heart Rate:  [67-86] 77  Resp:  [16-18] 16  BP: (102-116)/(53-69) 115/63  SpO2:  [90 %-100 %] 95 %  on   ;   Device (Oxygen Therapy): room air  Body mass index is 29.53 kg/m².  Physical Exam  Constitutional:       Appearance: Normal appearance. She is ill-appearing.   HENT:      Head: Normocephalic and atraumatic.      Nose: No congestion.      Mouth/Throat:      Pharynx: No oropharyngeal exudate.   Eyes:      General: No scleral icterus.  Cardiovascular:       Heart sounds: No murmur heard.     No friction rub. No gallop.   Pulmonary:      Effort: No respiratory distress.      Breath sounds: No wheezing, rhonchi or rales.      Comments: Stable on room air  Abdominal:      General: There is no distension.      Tenderness: There is no abdominal tenderness. There is no guarding.   Musculoskeletal:         General: No deformity or signs of injury.      Cervical back: No rigidity.      Comments: Left leg is bandaged with wound VAC placement.  Left posterior thigh tenderness.   Skin:     Coloration: Skin is not jaundiced.      Findings: No bruising or lesion.   Neurological:      General: No focal deficit present.      Mental Status: She is alert and oriented to person, place, and time.      Motor: Weakness present.       Results Review     I reviewed the patient's new clinical results.  Results from last 7 days   Lab Units 04/27/25  0611 04/26/25  0737 04/25/25  1833 04/25/25  0609   WBC 10*3/mm3 10.86* 12.99* 12.96* 14.58*   HEMOGLOBIN g/dL 7.2* 7.3* 7.5* 7.2*   PLATELETS 10*3/mm3 418 456* 451* 416     Results from last 7 days   Lab Units 04/27/25  0611 04/26/25  0737 04/25/25  1833 04/25/25  0609   SODIUM mmol/L 137 137 137 139   POTASSIUM mmol/L 3.6 3.8 4.2 3.8   CHLORIDE mmol/L 100 99 99 101   CO2 mmol/L 28.0 29.0 32.1* 31.3*   BUN mg/dL 7* 7* 9 8   CREATININE mg/dL 0.59 0.63 0.77 0.73   GLUCOSE mg/dL 143* 117* 149* 129*   EGFR mL/min/1.73 100.8 99.2 86.3 92.0     Results from last 7 days   Lab Units 04/27/25  0611 04/26/25  0737 04/25/25  0609 04/24/25  0620   ALBUMIN g/dL 2.4* 2.7* 2.5* 2.2*     Results from last 7 days   Lab Units 04/27/25  0611 04/26/25  0737 04/25/25  1833 04/25/25  0609 04/24/25  0620   CALCIUM mg/dL 8.1* 8.3* 8.5* 8.5* 8.5*   ALBUMIN g/dL 2.4* 2.7*  --  2.5* 2.2*   MAGNESIUM mg/dL 1.7 1.7  --  1.8 1.6   PHOSPHORUS mg/dL 3.5 3.7  --  3.8 3.4       Glucose   Date/Time Value Ref Range Status   04/27/2025 1159 195 (H) 70 - 130 mg/dL Final   04/27/2025  0604 152 (H) 70 - 130 mg/dL Final   04/27/2025 0020 143 (H) 70 - 130 mg/dL Final   04/26/2025 1758 171 (H) 70 - 130 mg/dL Final   04/26/2025 1205 154 (H) 70 - 130 mg/dL Final   04/26/2025 0610 122 70 - 130 mg/dL Final   04/26/2025 0015 160 (H) 70 - 130 mg/dL Final       No radiology results for the last day    I have personally reviewed all medications:  Scheduled Medications  arformoterol, 15 mcg, Nebulization, BID - RT  aspirin, 81 mg, Oral, Daily   Or  aspirin, 300 mg, Rectal, Daily  atorvastatin, 40 mg, Oral, Nightly  budesonide, 0.5 mg, Nebulization, BID - RT  clopidogrel, 75 mg, Oral, Daily  insulin regular, 2-7 Units, Subcutaneous, Q6H  metoprolol tartrate, 25 mg, Oral, Q12H  pantoprazole, 40 mg, Intravenous, Q12H  potassium chloride ER, 40 mEq, Oral, Q4H  revefenacin, 175 mcg, Nebulization, Daily - RT  senna-docusate sodium, 2 tablet, Oral, BID    Infusions  heparin, 18 Units/kg/hr, Last Rate: 18 Units/kg/hr (04/27/25 0704)    Diet  Diet: Regular/House, Diabetic, Cardiac; Healthy Heart (2-3 Na+); Consistent Carbohydrate; Fluid Consistency: Thin (IDDSI 0)    I have personally reviewed:  [x]  Laboratory   [x]  Microbiology   [x]  Radiology   [x]  EKG/Telemetry  [x]  Cardiology/Vascular   []  Pathology    []  Records       Assessment/Plan     Active Hospital Problems    Diagnosis  POA   • 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 [I74.3]  Unknown   • Acute thrombus of left ventricle [I24.0]  Yes   • Altered bowel habits [R19.4]  Yes   • Leg paresthesia [R20.2]  Yes      Resolved Hospital Problems    Diagnosis Date Resolved POA   • **Cardiogenic shock [R57.0] 04/15/2025 Yes       65 y.o. female admitted with Cardiogenic shock.    #Left lower extremity embolism and thrombosis  #Left ventricular thrombus with showering emboli of kidney, spleen, bile  #Severe PAD    -Status post open left iliofemoral, profunda femoris and superficial femoral artery thromboembolectomy with left common femoral  endarterectomy and repair, 4 compartment fasciotomy of left lower extremity with left femoropopliteal angioplasty and stent placement x 2 on 4/12/2025    -Status post left lateral fasciotomy site debridement and left medial fasciotomy site debridement on 4/18/2025    - s/p left fasciotomy debridement with wound vac placement on 4/25/25    - Continue heparin drip    -Plan to switch to an oral NOAC once cleared by vascular, loading dose not needed    - Hematology has signed off    - Pain control    - Aspirin and Plavix daily    - Statin    - Pain control    -Wound VAC with bloody drainage    - Tolerating diet    #Rectal bleeding    - Patient with rectal bleeding postop on 4/25/2025    - Heparin drip was stopped and Plavix held    - Hemoglobin was stable    - GI consulted and suspects hemorrhoidal bleeding    - Patient should be able to tolerate Plavix and IV heparin, heparin restarted    - Continue to trend hemoglobin and transfuse to maintain hemoglobin greater than 7.0    - resolved    - Denies further GI bleeding    #Leukocytosis    - ID consulted, suspects WBC elevation is multifactorial as cultures are negative to date    - Antibiotics discontinued    - Continue to monitor    - WBC 25.32 > 22.37 > 19.23 > 17.7 > 14.58 > 12.99 > 10.86    #Cardiogenic shock  #Inferior STEMI    -Status post JOE x 2 on 4/8/2025    - Resolved    - Aspirin 81 mg daily, Plavix 75 mg daily, atorvastatin 40 mg p.o. daily    - Lopressor 25 mg p.o. twice daily    - Continue heparin drip, plan to transition to DOAC with long-term dual therapy      #COPD    - Brovana nebs twice daily    - Pulmicort twice daily    - Yupelri nebs daily    #Ileus    - Ileus has resolved per surgery, NG removed, surgery signed off    #NIA    - Resolved, creatinine stable    - Nephrology has signed off    #Anemia    - Hemoglobin 7.3 > 7.2, suspect blood loss from surgery    - No overt bleeding, continue to monitor closely is on heparin drip    - Transfuse maintain  hemoglobin greater than 7    #GERD    - PPI          Continue on heparin drip  for DVT prophylaxis.  Full code.  Discussed with patient and nursing staff.  Anticipate discharge home with HH vs SNU facility next week.  Expected Discharge Date: 4/28/2025; Expected Discharge Time:       Jaya Rocha DO  [facility]  04/27/25  13:15 EDT

## 2025-04-27 NOTE — PROGRESS NOTES
[facility]   Vascular Surgery Progress Note    Patient Name: Desiree Garcia  : 1960  MRN: [id number]  Date of admission: 4/10/2025  Surgical Procedures Since Admission:  Procedure(s):  EMBOLECTOMY MECHANICAL, FOUR COMPARTMENT FASCIOTOMY  ARTERIOGRAM LOWER EXTREMITY  Surgeon:  Misael Lawton MD  Status:  15 Days Post-Op  -------------------    Procedure(s):  LOWER EXTREMITY DEBRIDEMENT  Surgeon:  Misael Lawotn MD  Status:  9 Days Post-Op  -------------------  **Canceled**    Procedure(s):  Left leg fasciotomy debridement with possible wound VAC placement.  Surgeon:  Raimundo Lin MD  Status:  * Surgery not performed *  -------------------    Procedure(s):  Left lower extremity fasciotomy debridement with possible wound VAC placement  Surgeon:  Raimundo Lin MD  Status:  2 Days Post-Op  -------------------    Subjective   Subjective     Chief Complaint: abdominal pain, constipation    History of Present Illness   No acute events overnight.  No bloody Bms.  Complaining of abdominal cramping, constipation.  Passing gas but no BM for > 24 hours.  No nausea/vomiting.  She has declined stool softeners for the last 3 days.    Review of Systems   Constitutional:  Negative for chills and fever.       Objective   Objective     Vitals:   Temp:  [97.5 °F (36.4 °C)-99 °F (37.2 °C)] 98.3 °F (36.8 °C)  Heart Rate:  [67-84] 79  Resp:  [16-18] 16  BP: (103-124)/(53-69) 124/69    Physical Exam      Doppler PT/DP signals bilaterally  Abd soft, mildly tender to palpation over mid abdomen, mildly distended;     Result Review    Result Review:  I have personally reviewed the results from the time of this admission to 2025 18:29 EDT and agree with these findings:  [x]  Laboratory list / accordion  []  Microbiology  []  Radiology  []  EKG/Telemetry   []  Cardiology/Vascular   []  Pathology  []  Old records  []  Other:        Assessment & Plan   Assessment / Plan     Brief Patient Summary:  Desiree BRITTON  Jose is a 65 y.o. female who has an LV thrombus and had extensive thromboembolic events, including lower extremity thromboembolic events that required revascularization.      Active Hospital Problems:   Active Hospital Problems    Diagnosis    • 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    • Acute thrombus of left ventricle    • Altered bowel habits    • Leg paresthesia      Plan:   -back on heparin for anticoagulation.  Left fasciotomy debrided and wound vac in place, will leave over the weekend and check again next week  -will eventually need transition to eliquis once surgical debridements have concluded, will continue heparin for now  -abdominal pain/constipation: multifactorial, likely ileus from immobility/post op/ narcotics.  Encouraged ambulation and encouraged patient to take her bowel regimen    VTE Prophylaxis:  Pharmacologic & mechanical VTE prophylaxis orders are present.        Kristin Loja MD     Any information that has been copied and pasted into this note has been reviewed and edited to represent today's findings.

## 2025-04-28 LAB
ALBUMIN SERPL-MCNC: 2.4 G/DL (ref 3.5–5.2)
ANION GAP SERPL CALCULATED.3IONS-SCNC: 8 MMOL/L (ref 5–15)
APTT PPP: 104.8 SECONDS (ref 22.7–35.4)
APTT PPP: 109.7 SECONDS (ref 22.7–35.4)
APTT PPP: 93.6 SECONDS (ref 22.7–35.4)
BASOPHILS # BLD AUTO: 0.14 10*3/MM3 (ref 0–0.2)
BASOPHILS NFR BLD AUTO: 1.3 % (ref 0–1.5)
BUN SERPL-MCNC: 6 MG/DL (ref 8–23)
BUN/CREAT SERPL: 8.1 (ref 7–25)
CALCIUM SPEC-SCNC: 8.2 MG/DL (ref 8.6–10.5)
CHLORIDE SERPL-SCNC: 101 MMOL/L (ref 98–107)
CO2 SERPL-SCNC: 28 MMOL/L (ref 22–29)
CREAT SERPL-MCNC: 0.74 MG/DL (ref 0.57–1)
DEPRECATED RDW RBC AUTO: 45.1 FL (ref 37–54)
EGFRCR SERPLBLD CKD-EPI 2021: 90.5 ML/MIN/1.73
EOSINOPHIL # BLD AUTO: 0.16 10*3/MM3 (ref 0–0.4)
EOSINOPHIL NFR BLD AUTO: 1.5 % (ref 0.3–6.2)
ERYTHROCYTE [DISTWIDTH] IN BLOOD BY AUTOMATED COUNT: 14.7 % (ref 12.3–15.4)
GLUCOSE BLDC GLUCOMTR-MCNC: 135 MG/DL (ref 70–130)
GLUCOSE BLDC GLUCOMTR-MCNC: 218 MG/DL (ref 70–130)
GLUCOSE SERPL-MCNC: 113 MG/DL (ref 65–99)
HCT VFR BLD AUTO: 23 % (ref 34–46.6)
HGB BLD-MCNC: 7.2 G/DL (ref 12–15.9)
IMM GRANULOCYTES # BLD AUTO: 0.15 10*3/MM3 (ref 0–0.05)
IMM GRANULOCYTES NFR BLD AUTO: 1.4 % (ref 0–0.5)
LYMPHOCYTES # BLD AUTO: 2.08 10*3/MM3 (ref 0.7–3.1)
LYMPHOCYTES NFR BLD AUTO: 19.2 % (ref 19.6–45.3)
MAGNESIUM SERPL-MCNC: 1.6 MG/DL (ref 1.6–2.4)
MCH RBC QN AUTO: 26.9 PG (ref 26.6–33)
MCHC RBC AUTO-ENTMCNC: 31.3 G/DL (ref 31.5–35.7)
MCV RBC AUTO: 85.8 FL (ref 79–97)
MONOCYTES # BLD AUTO: 1.17 10*3/MM3 (ref 0.1–0.9)
MONOCYTES NFR BLD AUTO: 10.8 % (ref 5–12)
NEUTROPHILS NFR BLD AUTO: 65.8 % (ref 42.7–76)
NEUTROPHILS NFR BLD AUTO: 7.11 10*3/MM3 (ref 1.7–7)
NRBC BLD AUTO-RTO: 0 /100 WBC (ref 0–0.2)
PHOSPHATE SERPL-MCNC: 3.1 MG/DL (ref 2.5–4.5)
PLATELET # BLD AUTO: 421 10*3/MM3 (ref 140–450)
PMV BLD AUTO: 9.1 FL (ref 6–12)
POTASSIUM SERPL-SCNC: 4.2 MMOL/L (ref 3.5–5.2)
RBC # BLD AUTO: 2.68 10*6/MM3 (ref 3.77–5.28)
SODIUM SERPL-SCNC: 137 MMOL/L (ref 136–145)
WBC NRBC COR # BLD AUTO: 10.81 10*3/MM3 (ref 3.4–10.8)

## 2025-04-28 PROCEDURE — 94799 UNLISTED PULMONARY SVC/PX: CPT

## 2025-04-28 PROCEDURE — 85025 COMPLETE CBC W/AUTO DIFF WBC: CPT | Performed by: SURGERY

## 2025-04-28 PROCEDURE — 80069 RENAL FUNCTION PANEL: CPT | Performed by: SURGERY

## 2025-04-28 PROCEDURE — 83735 ASSAY OF MAGNESIUM: CPT | Performed by: SURGERY

## 2025-04-28 PROCEDURE — 25010000002 HEPARIN (PORCINE) 25000-0.45 UT/250ML-% SOLUTION: Performed by: STUDENT IN AN ORGANIZED HEALTH CARE EDUCATION/TRAINING PROGRAM

## 2025-04-28 PROCEDURE — 97110 THERAPEUTIC EXERCISES: CPT

## 2025-04-28 PROCEDURE — 25010000002 HYDROMORPHONE 1 MG/ML SOLUTION: Performed by: SURGERY

## 2025-04-28 PROCEDURE — 63710000001 REVEFENACIN 175 MCG/3ML SOLUTION: Performed by: SURGERY

## 2025-04-28 PROCEDURE — 85730 THROMBOPLASTIN TIME PARTIAL: CPT | Performed by: STUDENT IN AN ORGANIZED HEALTH CARE EDUCATION/TRAINING PROGRAM

## 2025-04-28 PROCEDURE — 82948 REAGENT STRIP/BLOOD GLUCOSE: CPT

## 2025-04-28 PROCEDURE — 85730 THROMBOPLASTIN TIME PARTIAL: CPT | Performed by: SURGERY

## 2025-04-28 PROCEDURE — 99024 POSTOP FOLLOW-UP VISIT: CPT | Performed by: SURGERY

## 2025-04-28 RX ADMIN — ASPIRIN 81 MG CHEWABLE TABLET 81 MG: 81 TABLET CHEWABLE at 08:06

## 2025-04-28 RX ADMIN — CLOPIDOGREL BISULFATE 75 MG: 75 TABLET, FILM COATED ORAL at 08:06

## 2025-04-28 RX ADMIN — HYDROCODONE BITARTRATE AND ACETAMINOPHEN 1 TABLET: 7.5; 325 TABLET ORAL at 12:05

## 2025-04-28 RX ADMIN — HYDROMORPHONE HYDROCHLORIDE 1 MG: 1 INJECTION, SOLUTION INTRAMUSCULAR; INTRAVENOUS; SUBCUTANEOUS at 04:37

## 2025-04-28 RX ADMIN — HYDROCODONE BITARTRATE AND ACETAMINOPHEN 1 TABLET: 7.5; 325 TABLET ORAL at 16:57

## 2025-04-28 RX ADMIN — ATORVASTATIN CALCIUM 40 MG: 20 TABLET, FILM COATED ORAL at 20:46

## 2025-04-28 RX ADMIN — HYDROMORPHONE HYDROCHLORIDE 1 MG: 1 INJECTION, SOLUTION INTRAMUSCULAR; INTRAVENOUS; SUBCUTANEOUS at 14:21

## 2025-04-28 RX ADMIN — SENNOSIDES AND DOCUSATE SODIUM 2 TABLET: 50; 8.6 TABLET ORAL at 08:06

## 2025-04-28 RX ADMIN — ARFORMOTEROL TARTRATE 15 MCG: 15 SOLUTION RESPIRATORY (INHALATION) at 09:22

## 2025-04-28 RX ADMIN — HYDROCODONE BITARTRATE AND ACETAMINOPHEN 1 TABLET: 7.5; 325 TABLET ORAL at 03:39

## 2025-04-28 RX ADMIN — METOPROLOL TARTRATE 25 MG: 25 TABLET, FILM COATED ORAL at 20:46

## 2025-04-28 RX ADMIN — HEPARIN SODIUM 16 UNITS/KG/HR: 10000 INJECTION, SOLUTION INTRAVENOUS at 18:30

## 2025-04-28 RX ADMIN — HYDROCODONE BITARTRATE AND ACETAMINOPHEN 1 TABLET: 7.5; 325 TABLET ORAL at 20:46

## 2025-04-28 RX ADMIN — REVEFENACIN 175 MCG: 175 SOLUTION RESPIRATORY (INHALATION) at 09:26

## 2025-04-28 RX ADMIN — HYDROMORPHONE HYDROCHLORIDE 1 MG: 1 INJECTION, SOLUTION INTRAMUSCULAR; INTRAVENOUS; SUBCUTANEOUS at 10:04

## 2025-04-28 RX ADMIN — METOPROLOL TARTRATE 25 MG: 25 TABLET, FILM COATED ORAL at 08:05

## 2025-04-28 RX ADMIN — HYDROCODONE BITARTRATE AND ACETAMINOPHEN 1 TABLET: 7.5; 325 TABLET ORAL at 08:05

## 2025-04-28 RX ADMIN — PANTOPRAZOLE SODIUM 40 MG: 40 INJECTION, POWDER, FOR SOLUTION INTRAVENOUS at 08:05

## 2025-04-28 RX ADMIN — ARFORMOTEROL TARTRATE 15 MCG: 15 SOLUTION RESPIRATORY (INHALATION) at 20:04

## 2025-04-28 RX ADMIN — BUDESONIDE 0.5 MG: 0.5 INHALANT RESPIRATORY (INHALATION) at 09:24

## 2025-04-28 RX ADMIN — HYDROMORPHONE HYDROCHLORIDE 1 MG: 1 INJECTION, SOLUTION INTRAMUSCULAR; INTRAVENOUS; SUBCUTANEOUS at 18:29

## 2025-04-28 RX ADMIN — PANTOPRAZOLE SODIUM 40 MG: 40 INJECTION, POWDER, FOR SOLUTION INTRAVENOUS at 20:46

## 2025-04-28 RX ADMIN — BUDESONIDE 0.5 MG: 0.5 INHALANT RESPIRATORY (INHALATION) at 20:03

## 2025-04-28 NOTE — PROGRESS NOTES
[facility]   Surgical Progress Note    Patient Name: Desiree Garcia  : 1960  MRN: [id number]  Date of admission: 4/10/2025  Surgical Procedures Since Admission:  Procedure(s):  EMBOLECTOMY MECHANICAL, FOUR COMPARTMENT FASCIOTOMY  ARTERIOGRAM LOWER EXTREMITY  Surgeon:  Misael Lawton MD  Status:  16 Days Post-Op  -------------------    Procedure(s):  LOWER EXTREMITY DEBRIDEMENT  Surgeon:  Misael Lawton MD  Status:  10 Days Post-Op  -------------------  **Canceled**    Procedure(s):  Left leg fasciotomy debridement with possible wound VAC placement.  Surgeon:  Raimundo Lin MD  Status:  * Surgery not performed *  -------------------    Procedure(s):  Left lower extremity fasciotomy debridement with possible wound VAC placement  Surgeon:  Raimundo Lin MD  Status:  3 Days Post-Op  -------------------    Subjective   Subjective     Chief Complaint: Left leg ischemia follow-up.    History of Present Illness   Resting quite comfortably in her hospital room.  Pain in the left lower extremity.    Review of Systems    Objective   Objective     Vitals:   Temp:  [97.5 °F (36.4 °C)-99 °F (37.2 °C)] 98.4 °F (36.9 °C)  Heart Rate:  [67-85] 81  Resp:  [16-18] 16  BP: (110-124)/(63-78) 110/65  Output by Drain (mL) 25 0701 - 25 1900 25 1901 - 25 0700 25 0701 - 25 0737 Range Total   External Urinary Catheter           Physical Exam  Constitutional:       Appearance: She is well-developed.   Pulmonary:      Effort: Pulmonary effort is normal. No respiratory distress.   Abdominal:      General: There is no distension.      Palpations: Abdomen is soft.   Neurological:      Mental Status: She is alert and oriented to person, place, and time.           Result Review    Result Review:  I have personally reviewed the results from the time of this admission to 2025 07:37 EDT and agree with these findings:  [x]  Laboratory list / accordion  []  Microbiology  []   Radiology  []  EKG/Telemetry   []  Cardiology/Vascular   []  Pathology  []  Old records  []  Other:  Most notable findings include:       Results from last 7 days   Lab Units 04/28/25  0402 04/27/25  0611 04/26/25  0737 04/25/25  1833 04/25/25  0609 04/24/25  0619 04/23/25  0543   WBC 10*3/mm3 10.81* 10.86* 12.99* 12.96* 14.58* 17.70* 19.23*   HEMOGLOBIN g/dL 7.2* 7.2* 7.3* 7.5* 7.2* 7.4* 7.6*   PLATELETS 10*3/mm3 421 418 456* 451* 416 437 404     Results from last 7 days   Lab Units 04/28/25  0402 04/27/25  1845 04/27/25  0611 04/26/25  0737 04/25/25  1833 04/25/25  0609 04/24/25  0620 04/23/25  0543 04/22/25  1508 04/22/25  0452   SODIUM mmol/L 137  --  137 137 137 139 136 137  --  138   POTASSIUM mmol/L 4.2 4.2 3.6 3.8 4.2 3.8 3.8 4.2   < > 3.6   CHLORIDE mmol/L 101  --  100 99 99 101 96* 100  --  99   CO2 mmol/L 28.0  --  28.0 29.0 32.1* 31.3* 29.0 30.1*  --  32.1*   BUN mg/dL 6*  --  7* 7* 9 8 12 9  --  10   CREATININE mg/dL 0.74  --  0.59 0.63 0.77 0.73 0.67 0.74  --  0.68   GLUCOSE mg/dL 113*  --  143* 117* 149* 129* 166* 117*  --  148*   MAGNESIUM mg/dL 1.6  --  1.7 1.7  --  1.8 1.6 1.6  --  1.7   PHOSPHORUS mg/dL 3.1  --  3.5 3.7  --  3.8 3.4 2.9  --  3.1    < > = values in this interval not displayed.   Estimated Creatinine Clearance: 83.4 mL/min (by C-G formula based on SCr of 0.74 mg/dL).  Results from last 7 days   Lab Units 04/26/25  1759 04/25/25  1904   PROTIME Seconds 15.9* 15.5*   INR  1.27* 1.24*     Lab Results   Component Value Date    HGBA1C 9.40 (H) 04/09/2025    HGBA1C 9.30 (H) 03/13/2025    HGBA1C 9.40 (H) 12/23/2024     Glucose   Date/Time Value Ref Range Status   04/28/2025 0609 135 (H) 70 - 130 mg/dL Final   04/27/2025 2331 163 (H) 70 - 130 mg/dL Final   04/27/2025 1738 137 (H) 70 - 130 mg/dL Final   04/27/2025 1159 195 (H) 70 - 130 mg/dL Final   04/27/2025 0604 152 (H) 70 - 130 mg/dL Final   04/27/2025 0020 143 (H) 70 - 130 mg/dL Final   04/26/2025 1758 171 (H) 70 - 130 mg/dL Final    04/26/2025 1205 154 (H) 70 - 130 mg/dL Final       Assessment & Plan   Assessment / Plan     Brief Patient Summary:  Desiree Garcia is a 65 y.o. female who multiple medical issues.  Vascular is following for ischemia of the left lower extremity.    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  ([facility])  4/18/2025: Debridement of left leg fasciotomies ([facility])    4/19/2025: Postop day #1 from fasciotomy debridement.  Dressings down today.  Minimal oozing noted muscles appear viable as pictured below.  There is some ischemic changes to the skin on the anterior margin of the lateral fasciotomies will continue to monitor.  I redressed the wounds with the assistance of the nurse with dilute Betadine soaked gauze.  Will plan on leaving intact upon my evaluation for tomorrow.  She has a strong dorsalis pedis artery signal.  She has weak dorsiflexion of her left foot.      4/20/2025: Postop day 2 fasciotomy debridement.  Will continue with once daily Dakin's dressing changes.  Will continue to lower the anterior skin on the lateral fasciotomy to demarcate.  This will likely require debridement sometime next week once further demarcation has occurred.    4/21/2025: Postop day 3 fasciotomy debridement.  With some anterior ischemic changes to the flap on the lateral compartment.  Will place her on the surgery schedule for Wednesday to debride this.  If things appear well at that time we will also place wound VAC.    4/22/2025: Postop day #4 fasciotomy debridement.  Plan on surgical debridement of the ischemic skin on the left anterior lateral fasciotomy with wound VAC placement tomorrow.  Will hold heparin infusion when patient goes down to preoperative holding  tomorrow.    4/23/2025: Patient was in preop holding for planned/nonemergent left fasciotomy debridement.  Here she appeared diaphoretic with somewhat labored and irregular breathing and overall feeling of doom.  Anesthesia concerned with change in EKG.  Currently I have minimal concerns for any sort of undrained sepsis or urgent nature of the operation in her leg.  I am going to remove her from the surgery schedule today.  I personally called cardiology who will review the case and see the patient.  I have tentatively placed her back on the OR schedule for Friday assuming medically more stable.    4/24/2025: Appreciate cardiology's assistance.  Will start D5 LR at midnight.  Plans for surgical debridement of the left leg tomorrow with wound VAC placement.    4/25/2025: Excisional debridement of anterior flap of the lateral fasciotomy ischemic tissue with wound VAC placement.    4/28/2025: Will premedicate with 1 mg IV Dilaudid prior to wound VAC change.  Will ask wound care team to change today.    VTE Prophylaxis:  Pharmacologic & mechanical VTE prophylaxis orders are present.        Raimundo Lin MD

## 2025-04-28 NOTE — PLAN OF CARE
The pt was agreeable to bed level exercises this AM. The pt reported high pain and was planning to request her IV pain meds for her dressing change. No OOB activity occurred today. OT to continue following.       Anticipated Discharge Disposition (OT): skilled nursing facility, inpatient rehabilitation facility

## 2025-04-28 NOTE — PLAN OF CARE
Goal Outcome Evaluation:  Plan of Care Reviewed With: patient        Progress: improving  Outcome Evaluation: vss, pain controlled by prn pain meds. hep gtt continues. purewick in place just while pt is sleeping. wound vac in place, dressing CDI. pt rested well during shift. labs in am.

## 2025-04-28 NOTE — PLAN OF CARE
Problem: Adult Inpatient Plan of Care  Goal: Plan of Care Review  Outcome: Progressing  Goal: Patient-Specific Goal (Individualized)  Outcome: Progressing  Goal: Absence of Hospital-Acquired Illness or Injury  Outcome: Progressing  Intervention: Identify and Manage Fall Risk  Recent Flowsheet Documentation  Taken 4/28/2025 1400 by Luann Gibbons RN  Safety Promotion/Fall Prevention:   assistive device/personal items within reach   clutter free environment maintained   fall prevention program maintained   nonskid shoes/slippers when out of bed   safety round/check completed   room organization consistent  Taken 4/28/2025 1200 by Luann Gibbons RN  Safety Promotion/Fall Prevention:   assistive device/personal items within reach   clutter free environment maintained   fall prevention program maintained   lighting adjusted   nonskid shoes/slippers when out of bed   room organization consistent   safety round/check completed  Taken 4/28/2025 1000 by Luann Gibbons RN  Safety Promotion/Fall Prevention:   assistive device/personal items within reach   clutter free environment maintained   fall prevention program maintained   lighting adjusted   nonskid shoes/slippers when out of bed   safety round/check completed   room organization consistent  Taken 4/28/2025 0805 by Luann Gibbons RN  Safety Promotion/Fall Prevention:   assistive device/personal items within reach   clutter free environment maintained   fall prevention program maintained   lighting adjusted   nonskid shoes/slippers when out of bed  Intervention: Prevent Skin Injury  Recent Flowsheet Documentation  Taken 4/28/2025 0805 by Luann Gibbons RN  Skin Protection: incontinence pads utilized  Intervention: Prevent Infection  Recent Flowsheet Documentation  Taken 4/28/2025 1400 by Luann Gibbons RN  Infection Prevention: single patient room provided  Taken 4/28/2025 1200 by Luann Gibbons RN  Infection Prevention: single patient room provided  Taken  4/28/2025 1000 by Luann Gibbons RN  Infection Prevention: single patient room provided  Taken 4/28/2025 0805 by Luann Gibbons RN  Infection Prevention: single patient room provided  Goal: Optimal Comfort and Wellbeing  Outcome: Progressing  Intervention: Monitor Pain and Promote Comfort  Recent Flowsheet Documentation  Taken 4/28/2025 1421 by Luann Gibbons RN  Pain Management Interventions: pain medication given  Taken 4/28/2025 1205 by Luann Gibbons RN  Pain Management Interventions: pain medication given  Taken 4/28/2025 1004 by Luann Gibbons RN  Pain Management Interventions: pain medication given  Taken 4/28/2025 0805 by Luann Gibbons RN  Pain Management Interventions: pain medication given  Intervention: Provide Person-Centered Care  Recent Flowsheet Documentation  Taken 4/28/2025 0805 by Luann Gibbons RN  Trust Relationship/Rapport: care explained  Goal: Readiness for Transition of Care  Outcome: Progressing   Goal Outcome Evaluation:         AAO4, SR-ST, RA, left leg wound vac changed today by wound care, L groin site with small amount of purulent drainage, cleansed with NS, applied border dressing in attempt to keep dry, it is under a fold and continuously moist, VSS, pain meds given PRN, Heparin gtt at 18 units.Recheck aptt at 1740 and adjust appropriately.

## 2025-04-28 NOTE — NURSING NOTE
CWOCN- patient with fasciotomy sites had VAC placed last week. VAC change today. Photos updated. Patient had 1 mg dilaudid prior. She did decent with the VAC dressing but she did want to scream, she said.   Lateral wound is moist, healthy. Medial incision has opening at the proximal end. Confirmed with Dr Lin to place VAC over this as well. We bridged the 2 wounds together. Partial thickness areas in the periwounds were covered with opticel ag and secured with VAC drape.     04/28/25 1435   Wound 04/12/25 1055 Left lateral leg Surgical Open Surgical Incision   Placement Date/Time: 04/12/25 1055   Side: Left  Orientation: lateral  Location: leg  Primary Wound Type: Surgical  Secondary Wound Type - Surgical: Open Surgical Incision   Wound Image    Dressing Appearance intact   Base clean;moist;red;exposed structure   Periwound moist;pink  (partial thickness skin loss)   Periwound Temperature warm   Periwound Skin Turgor soft   Edges open   Wound Length (cm) 15.5 cm   Wound Width (cm) 5.5 cm   Wound Depth (cm) 0.2 cm   Wound Surface Area (cm^2) 66.96 cm^2   Wound Volume (cm^3) 8.927 cm^3   Drainage Characteristics/Odor serosanguineous   Drainage Amount small   Care, Wound cleansed with;sterile normal saline;negative pressure wound therapy   Dressing Care dressing changed   Periwound Care barrier film applied;topical treatment applied   Wound 04/12/25 1055 Left medial leg Surgical Open Surgical Incision   Placement Date/Time: 04/12/25 1055   Side: Left  Orientation: medial  Location: leg  Primary Wound Type: Surgical  Secondary Wound Type - Surgical: Open Surgical Incision   Wound Image    Dressing Appearance dressing loose;moist drainage   Closure Staples   Base moist;red   Periwound moist;pink  (denuded- partial thickness skin loss)   Periwound Temperature warm   Periwound Skin Turgor soft   Wound Length (cm) 11 cm   Wound Width (cm)   (open wound 4x1x0.5cm)   Drainage Characteristics/Odor serosanguineous    Drainage Amount small   Care, Wound cleansed with;sterile normal saline;negative pressure wound therapy   Dressing Care dressing changed   Periwound Care barrier film applied;topical treatment applied   NPWT (Negative Pressure Wound Therapy) 25   Placement Date/Time: 25   Additional Comments: Dr. Lin placed in surgery   Therapy Setting continuous therapy   Dressing foam, black;transparent dressing;gauze, nonadherent   Contact Layer silicone   Pressure Setting 125 mmHg   Sponges Inserted   (1 piece to each wound (lateral, medial) with versatel over each wound)   Sponges Removed 1  (from lateral wound- no prior vac on the medial wound)     Recommendations:    [] Therapy settin        mmHg  [] [place]am Dressing, Small  [] [place]am Dressing, Medium  [] [place]am Dressing, Large  [x] [place] Dressing, Medium  [] [place] Bridge XG Dressing  [] [place]am Silver Dressing  [x] Contact layer (mepitel, versatel)  [] Small white foam   [] Large white foam

## 2025-04-28 NOTE — PROGRESS NOTES
Name: Desiree Garcia ADMIT: 4/10/2025   : 1960  PCP: Sandra Kaba MD    MRN: [id number] LOS: 18 days   AGE/SEX: 65 y.o. female  ROOM: [facility]     Subjective   Subjective   Patient seen and examined at bedside, she is weepy but without distress.  She is tolerating diet.  Planning for possible surgery on Wednesday.  No overt bleeding on heparin drip    2025  No overnight events, patient without distress.  Plan for surgery tomorrow.  Admits to breakthrough pain at times but otherwise doing okay    2025  Patient seen and examined at bedside, plan is for surgery later today.    2025  Surgery was canceled yesterday as patient became anxious and diaphoretic before surgery.  Currently without distress though notes her leg pain continues.  Tolerating heparin drip.    2025  No overnight events, patient without distress.  Hemoglobin low there is no overt bleeding.  Plan for surgery today.    2025  Patient had some blood in stool last night and heparin drip along with Plavix were stopped.  Has been cleared by GI this morning and plan to restart.  Patient admits to pain in the back of her upper left leg.  She underwent left lower extremity debridement along with wound VAC placement yesterday.    2025  Patient seen and examined at bedside, complains of continued left leg pain.  She is weepy.  Denies further GI bleeding.    2025  No overnight events, pain currently controlled.  Wound care is to change wound VAC today.         Objective   Objective   Vital Signs  Temp:  [97.5 °F (36.4 °C)-99 °F (37.2 °C)] 98.2 °F (36.8 °C)  Heart Rate:  [77-85] 79  Resp:  [16-18] 18  BP: (110-124)/(63-78) 115/67  SpO2:  [94 %-98 %] 94 %  on   ;   Device (Oxygen Therapy): room air  Body mass index is 29.53 kg/m².  Physical Exam  Constitutional:       Appearance: Normal appearance. She is ill-appearing.   HENT:      Head: Normocephalic and atraumatic.      Nose: No congestion.       ED phlebotomist notified patient needs 2nd set of blood culture.   Mouth/Throat:      Pharynx: No oropharyngeal exudate.   Eyes:      General: No scleral icterus.  Cardiovascular:      Heart sounds: No murmur heard.     No friction rub. No gallop.   Pulmonary:      Effort: No respiratory distress.      Breath sounds: No wheezing, rhonchi or rales.      Comments: Stable on room air  Abdominal:      General: There is no distension.      Tenderness: There is no abdominal tenderness. There is no guarding.   Musculoskeletal:         General: No deformity or signs of injury.      Cervical back: No rigidity.      Comments: Left leg is bandaged with wound VAC placement.  Left posterior thigh tenderness.   Skin:     Coloration: Skin is not jaundiced.      Findings: No bruising or lesion.   Neurological:      General: No focal deficit present.      Mental Status: She is alert and oriented to person, place, and time.      Motor: Weakness present.       Results Review     I reviewed the patient's new clinical results.  Results from last 7 days   Lab Units 04/28/25  0402 04/27/25  0611 04/26/25  0737 04/25/25  1833   WBC 10*3/mm3 10.81* 10.86* 12.99* 12.96*   HEMOGLOBIN g/dL 7.2* 7.2* 7.3* 7.5*   PLATELETS 10*3/mm3 421 418 456* 451*     Results from last 7 days   Lab Units 04/28/25  0402 04/27/25  1845 04/27/25  0611 04/26/25  0737 04/25/25  1833   SODIUM mmol/L 137  --  137 137 137   POTASSIUM mmol/L 4.2 4.2 3.6 3.8 4.2   CHLORIDE mmol/L 101  --  100 99 99   CO2 mmol/L 28.0  --  28.0 29.0 32.1*   BUN mg/dL 6*  --  7* 7* 9   CREATININE mg/dL 0.74  --  0.59 0.63 0.77   GLUCOSE mg/dL 113*  --  143* 117* 149*   EGFR mL/min/1.73 90.5  --  100.8 99.2 86.3     Results from last 7 days   Lab Units 04/28/25  0402 04/27/25  0611 04/26/25  0737 04/25/25  0609   ALBUMIN g/dL 2.4* 2.4* 2.7* 2.5*     Results from last 7 days   Lab Units 04/28/25  0402 04/27/25  0611 04/26/25  0737 04/25/25  1833 04/25/25  0609   CALCIUM mg/dL 8.2* 8.1* 8.3* 8.5* 8.5*   ALBUMIN g/dL 2.4* 2.4* 2.7*  --  2.5*   MAGNESIUM mg/dL  1.6 1.7 1.7  --  1.8   PHOSPHORUS mg/dL 3.1 3.5 3.7  --  3.8       Glucose   Date/Time Value Ref Range Status   04/28/2025 0609 135 (H) 70 - 130 mg/dL Final   04/27/2025 2331 163 (H) 70 - 130 mg/dL Final   04/27/2025 1738 137 (H) 70 - 130 mg/dL Final   04/27/2025 1159 195 (H) 70 - 130 mg/dL Final   04/27/2025 0604 152 (H) 70 - 130 mg/dL Final   04/27/2025 0020 143 (H) 70 - 130 mg/dL Final   04/26/2025 1758 171 (H) 70 - 130 mg/dL Final       No radiology results for the last day    I have personally reviewed all medications:  Scheduled Medications  arformoterol, 15 mcg, Nebulization, BID - RT  aspirin, 81 mg, Oral, Daily   Or  aspirin, 300 mg, Rectal, Daily  atorvastatin, 40 mg, Oral, Nightly  budesonide, 0.5 mg, Nebulization, BID - RT  clopidogrel, 75 mg, Oral, Daily  insulin regular, 2-7 Units, Subcutaneous, Q6H  metoprolol tartrate, 25 mg, Oral, Q12H  pantoprazole, 40 mg, Intravenous, Q12H  revefenacin, 175 mcg, Nebulization, Daily - RT  senna-docusate sodium, 2 tablet, Oral, BID    Infusions  heparin, 18 Units/kg/hr, Last Rate: 18 Units/kg/hr (04/28/25 1140)    Diet  Diet: Regular/House, Diabetic, Cardiac; Healthy Heart (2-3 Na+); Consistent Carbohydrate; Fluid Consistency: Thin (IDDSI 0)    I have personally reviewed:  [x]  Laboratory   [x]  Microbiology   [x]  Radiology   [x]  EKG/Telemetry  [x]  Cardiology/Vascular   []  Pathology    []  Records       Assessment/Plan     Active Hospital Problems    Diagnosis  POA   • 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 [I74.3]  Unknown   • Acute thrombus of left ventricle [I24.0]  Yes   • Altered bowel habits [R19.4]  Yes   • Leg paresthesia [R20.2]  Yes      Resolved Hospital Problems    Diagnosis Date Resolved POA   • **Cardiogenic shock [R57.0] 04/15/2025 Yes       65 y.o. female admitted with Cardiogenic shock.    #Left lower extremity embolism and thrombosis  #Left ventricular thrombus with showering emboli of kidney, spleen, bile  #Severe PAD    -Status  post open left iliofemoral, profunda femoris and superficial femoral artery thromboembolectomy with left common femoral endarterectomy and repair, 4 compartment fasciotomy of left lower extremity with left femoropopliteal angioplasty and stent placement x 2 on 4/12/2025    -Status post left lateral fasciotomy site debridement and left medial fasciotomy site debridement on 4/18/2025    - s/p left fasciotomy debridement with wound vac placement on 4/25/25    - Continue heparin drip    -Plan to switch to an oral NOAC once cleared by vascular, loading dose not needed    - Hematology has signed off    - Pain control    - Aspirin and Plavix daily    - Statin    - Pain control    -Wound VAC with bloody drainage    - Wound VAC to be changed today    - Plan SNF at discharge    #Rectal bleeding    - Patient with rectal bleeding postop on 4/25/2025    - Heparin drip was stopped and Plavix held    - Hemoglobin was stable    - GI consulted and suspects hemorrhoidal bleeding and has signed off    - Patient should be able to tolerate Plavix and IV heparin, heparin restarted    - Continue to trend hemoglobin and transfuse to maintain hemoglobin greater than 7.0    - resolved    - Denies further GI bleeding    #Leukocytosis    - ID consulted, suspects WBC elevation is multifactorial as cultures are negative to date    - Antibiotics discontinued    - Continue to monitor    - WBC improved    #Cardiogenic shock  #Inferior STEMI    -Status post JOE x 2 on 4/8/2025    - Resolved    - Aspirin 81 mg daily, Plavix 75 mg daily, atorvastatin 40 mg p.o. daily    - Lopressor 25 mg p.o. twice daily    - Continue heparin drip, plan to transition to DOAC with long-term dual therapy    - Has been cleared for Plavix without aspirin once transition to DOAC    - Cardiology has signed off      #COPD    -Chronic, stable on room air    - Brovana nebs twice daily    - Pulmicort twice daily    - Yupelri nebs daily    #Ileus    - Ileus has resolved per  surgery, NG removed, surgery signed off    #NIA    - Resolved, creatinine stable    - Nephrology has signed off    #Anemia    - Hemoglobin 7.3 > 7.2 > 7.2, suspect blood loss from surgery    - No overt bleeding, continue to monitor closely is on heparin drip    - Transfuse maintain hemoglobin greater than 7    #GERD    - PPI          Continue on heparin drip  for DVT prophylaxis.  Full code.  Discussed with patient and nursing staff.  Anticipate discharge home with HH vs SNU facility next week.  Expected Discharge Date: 4/28/2025; Expected Discharge Time:       Jaya Rocha DO  [facility]  04/28/25  11:57 EDT

## 2025-04-28 NOTE — THERAPY TREATMENT NOTE
Patient Name: Desiree Garcia  : 1960    MRN: [id number]                              Today's Date: 2025       Admit Date: 4/10/2025    Visit Dx:     ICD-10-CM ICD-9-CM   1. 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  I74.3 444.22     Patient Active Problem List   Diagnosis    Type 2 diabetes mellitus, without long-term current use of insulin    COPD (chronic obstructive pulmonary disease)    Depression with anxiety    Esophageal reflux    Forgetfulness    Leg paresthesia    Lumbar spinal stenosis    Migraines    Night sweat    Sciatica    Dyspnea on exertion    Thoracic or lumbosacral neuritis or radiculitis    Left wrist pain    Sprain of left wrist    Arthritis of knee, left    Contusion of left knee    Nondisplaced fracture of trapezium bone of left wrist with routine healing    Chronic left shoulder pain    Coronary artery disease involving native heart without angina pectoris    Hypertension, essential    Mixed hyperlipidemia    Cigarette nicotine dependence with nicotine-induced disorder    Altered bowel habits    Diarrhea    Rectal bleeding    Abdominal bloating    Allergic rhinitis due to food    Elevated troponin    Acute diastolic CHF (congestive heart failure)    History of ST elevation myocardial infarction (STEMI)    STEMI (ST elevation myocardial infarction)    Acute thrombus of left ventricle    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     Past Medical History:   Diagnosis Date    Arthritis     Cervical cancer screening     COPD (chronic obstructive pulmonary disease)     Coronary artery disease involving native heart without angina pectoris 2021    primary angioplasty and stent placement to mid right coronary artery with good angiographic results on 2021 after STEMI    Depression with anxiety     Diabetes mellitus     Forgetfulness     Gastric reflux     Hypertension     Leg paresthesia 2017    Right    Limb swelling     Lumbago 2017    Low  back pain    Lumbar spinal stenosis 2017    L4/5 moderate to severe central canal stenosis    Lumbosacral radiculopathy 2017    Right    Migraines     Night sweat     Reflux esophagitis     Shortness of breath     ST elevation myocardial infarction (STEMI) (CMS/[facility]) 2021    Stomach disorder      Past Surgical History:   Procedure Laterality Date    ABDOMINAL SURGERY      3 C-SECTIONS    ARTERIOGRAM LOWER EXTREMITY Left 2025    Procedure: ARTERIOGRAM LOWER EXTREMITY;  Surgeon: Misael Lawton MD;  Location: [facility] OR;  Service: Vascular;  Laterality: Left;    CARDIAC CATHETERIZATION      CARDIAC CATHETERIZATION N/A 2021    Procedure: Left Heart Cath;  Surgeon: Sidney Xiao MD;  Location: [facility] CATH INVASIVE LOCATION;  Service: Cardiology;  Laterality: N/A;    CARDIAC CATHETERIZATION N/A 2025    Procedure: Left Heart Cath;  Surgeon: James Verdugo MD;  Location: [facility] CATH INVASIVE LOCATION;  Service: Cardiology;  Laterality: N/A;     SECTION      x 3    CHOLECYSTECTOMY      COLONOSCOPY      COLONOSCOPY N/A 3/25/2025    Procedure: COLONOSCOPY WITH BIOPSIES AND COLD AND HOT SNARE POLYPECTOMIES;  Surgeon: Heber Najera MD;  Location: [facility] ENDOSCOPY;  Service: Gastroenterology;  Laterality: N/A;  COLON POLYPS    CORONARY STENT PLACEMENT      EMBOLECTOMY Left 2025    Procedure: EMBOLECTOMY MECHANICAL, FOUR COMPARTMENT FASCIOTOMY;  Surgeon: Misael Lawton MD;  Location: [facility] OR;  Service: Vascular;  Laterality: Left;    ENDOSCOPY      2007    FOOT SURGERY Right     HAND SURGERY      HYSTERECTOMY  1985    INCISION AND DRAINAGE LEG Left 2025    Procedure: LOWER EXTREMITY DEBRIDEMENT;  Surgeon: Misael Lawton MD;  Location: [facility] OR;  Service: Vascular;  Laterality: Left;    INCISION AND DRAINAGE LEG Left 2025    Procedure: Left lower extremity fasciotomy debridement with possible wound VAC placement;  Surgeon:  Raimundo Lin MD;  Location: [facility] MAIN OR;  Service: Vascular;  Laterality: Left;      General Information       Row Name 04/28/25 1602          OT Time and Intention    Subjective Information complains of;pain  -RB     Document Type therapy note (daily note)  -RB     Patient Effort poor  -RB       Row Name 04/28/25 1602          General Information    Patient Profile Reviewed yes  -RB       Row Name 04/28/25 1602          Cognition    Orientation Status (Cognition) oriented x 3  -RB               User Key  (r) = Recorded By, (t) = Taken By, (c) = Cosigned By      Initials Name Provider Type    RB Zabrina Lyles OTR/L, CSRS Occupational Therapist                     Mobility/ADL's       Row Name 04/28/25 1602          Bed Mobility    Comment, (Bed Mobility) supine in bed - declined all bed mobility  -RB               User Key  (r) = Recorded By, (t) = Taken By, (c) = Cosigned By      Initials Name Provider Type    Zabrina Solano, OTR/L, CSRS Occupational Therapist                   Obj/Interventions       Row Name 04/28/25 1556          Vision Assessment/Intervention    Visual Impairment/Limitations WFL  -RB       Row Name 04/28/25 1556          Motor Skills    Therapeutic Exercise shoulder;elbow/forearm  the pt engaged in gentle BUE exercises as well as several LLE ankle pumps with guidance and education on the importance of exercises outside of therapy hours  -RB               User Key  (r) = Recorded By, (t) = Taken By, (c) = Cosigned By      Initials Name Provider Type    Zabrina Solano, OTR/L, CSRS Occupational Therapist                   Goals/Plan    No documentation.                  Clinical Impression       Row Name 04/28/25 1552          Pain Assessment    Pretreatment Pain Rating 10/10  -RB     Posttreatment Pain Rating 10/10  -RB     Pain Location extremity  -RB     Pain Side/Orientation left  -RB     Pain Management Interventions exercise or physical activity utilized;nursing  notified  -RB       Row Name 04/28/25 1552          Plan of Care Review    Plan of Care Reviewed With patient;family  -RB     Progress no change  -RB     Outcome Evaluation The pt was agreeable to bed level exercises this AM. The pt reported high pain and was planning to request her IV pain meds for her dressing change. No OOB activity occurred today. OT to continue following.       Anticipated Discharge Disposition (OT): skilled nursing facility, inpatient rehabilitation facility  -RB       Row Name 04/28/25 1552          Therapy Assessment/Plan (OT)    Rehab Potential (OT) good  -RB     Criteria for Skilled Therapeutic Interventions Met (OT) yes;skilled treatment is necessary  -RB     Therapy Frequency (OT) 5 times/wk  -RB       Row Name 04/28/25 1552          Therapy Plan Review/Discharge Plan (OT)    Anticipated Discharge Disposition (OT) inpatient rehabilitation facility;skilled nursing facility  -RB       Row Name 04/28/25 1552          Positioning and Restraints    Pre-Treatment Position in bed  -RB     Post Treatment Position bed  -RB     In Bed notified nsg;supine;encouraged to call for assist;exit alarm on;fowlers;call light within reach  -RB               User Key  (r) = Recorded By, (t) = Taken By, (c) = Cosigned By      Initials Name Provider Type    RB Zabrina Lyles, OTR/L, CSRS Occupational Therapist                   Outcome Measures       Row Name 04/28/25 0805          How much help from another person do you currently need...    Turning from your back to your side while in flat bed without using bedrails? 3  -CG     Moving from lying on back to sitting on the side of a flat bed without bedrails? 3  -CG     Moving to and from a bed to a chair (including a wheelchair)? 3  -CG     Standing up from a chair using your arms (e.g., wheelchair, bedside chair)? 3  -CG     Climbing 3-5 steps with a railing? 1  -CG     To walk in hospital room? 1  -CG     AM-PAC 6 Clicks Score (PT) 14  -CG     Highest  Level of Mobility Goal 4 --> Transfer to chair/commode  -CG               User Key  (r) = Recorded By, (t) = Taken By, (c) = Cosigned By      Initials Name Provider Type    Luann Nunez, RN Registered Nurse                      OT Recommendation and Plan  Therapy Frequency (OT): 5 times/wk  Plan of Care Review  Plan of Care Reviewed With: patient, family  Progress: no change  Outcome Evaluation: The pt was agreeable to bed level exercises this AM. The pt reported high pain and was planning to request her IV pain meds for her dressing change. No OOB activity occurred today. OT to continue following.       Anticipated Discharge Disposition (OT): skilled nursing facility, inpatient rehabilitation facility     Time Calculation:         Time Calculation- OT       Row Name 04/28/25 1552             Time Calculation- OT    OT Start Time 0850  -RB      OT Stop Time 0900  -RB      OT Time Calculation (min) 10 min  -RB      Total Timed Code Minutes- OT 10 minute(s)  -RB      OT Received On 04/28/25  -RB      OT - Next Appointment 04/29/25  -RB         Timed Charges    92608 - OT Therapeutic Exercise Minutes 10  -RB         Total Minutes    Timed Charges Total Minutes 10  -RB       Total Minutes 10  -RB                User Key  (r) = Recorded By, (t) = Taken By, (c) = Cosigned By      Initials Name Provider Type    RB Zabrina Lyles OTR/L, CSRS Occupational Therapist                  Therapy Charges for Today       Code Description Service Date Service Provider Modifiers Qty    [id number]  OT THER PROC EA 15 MIN 4/28/2025 Zabrina Lyles OTR/L, CSRS GO 1                 Zabrina Rafal, OTR/L, CSRS  4/28/2025

## 2025-04-29 LAB
ALBUMIN SERPL-MCNC: 2.4 G/DL (ref 3.5–5.2)
ANION GAP SERPL CALCULATED.3IONS-SCNC: 8.5 MMOL/L (ref 5–15)
APTT PPP: 67.3 SECONDS (ref 22.7–35.4)
APTT PPP: 98.3 SECONDS (ref 22.7–35.4)
BASOPHILS # BLD AUTO: 0.12 10*3/MM3 (ref 0–0.2)
BASOPHILS NFR BLD AUTO: 1.4 % (ref 0–1.5)
BUN SERPL-MCNC: 7 MG/DL (ref 8–23)
BUN/CREAT SERPL: 11.1 (ref 7–25)
CALCIUM SPEC-SCNC: 8.5 MG/DL (ref 8.6–10.5)
CHLORIDE SERPL-SCNC: 102 MMOL/L (ref 98–107)
CO2 SERPL-SCNC: 28.5 MMOL/L (ref 22–29)
CREAT SERPL-MCNC: 0.63 MG/DL (ref 0.57–1)
DEPRECATED RDW RBC AUTO: 47.1 FL (ref 37–54)
EGFRCR SERPLBLD CKD-EPI 2021: 99.2 ML/MIN/1.73
EOSINOPHIL # BLD AUTO: 0.16 10*3/MM3 (ref 0–0.4)
EOSINOPHIL NFR BLD AUTO: 1.9 % (ref 0.3–6.2)
ERYTHROCYTE [DISTWIDTH] IN BLOOD BY AUTOMATED COUNT: 14.9 % (ref 12.3–15.4)
GLUCOSE BLDC GLUCOMTR-MCNC: 126 MG/DL (ref 70–130)
GLUCOSE BLDC GLUCOMTR-MCNC: 127 MG/DL (ref 70–130)
GLUCOSE BLDC GLUCOMTR-MCNC: 138 MG/DL (ref 70–130)
GLUCOSE BLDC GLUCOMTR-MCNC: 158 MG/DL (ref 70–130)
GLUCOSE BLDC GLUCOMTR-MCNC: 163 MG/DL (ref 70–130)
GLUCOSE SERPL-MCNC: 101 MG/DL (ref 65–99)
HCT VFR BLD AUTO: 24.3 % (ref 34–46.6)
HGB BLD-MCNC: 7.2 G/DL (ref 12–15.9)
IMM GRANULOCYTES # BLD AUTO: 0.09 10*3/MM3 (ref 0–0.05)
IMM GRANULOCYTES NFR BLD AUTO: 1.1 % (ref 0–0.5)
LYMPHOCYTES # BLD AUTO: 1.76 10*3/MM3 (ref 0.7–3.1)
LYMPHOCYTES NFR BLD AUTO: 20.7 % (ref 19.6–45.3)
MAGNESIUM SERPL-MCNC: 1.7 MG/DL (ref 1.6–2.4)
MCH RBC QN AUTO: 26.1 PG (ref 26.6–33)
MCHC RBC AUTO-ENTMCNC: 29.6 G/DL (ref 31.5–35.7)
MCV RBC AUTO: 88 FL (ref 79–97)
MONOCYTES # BLD AUTO: 0.77 10*3/MM3 (ref 0.1–0.9)
MONOCYTES NFR BLD AUTO: 9.1 % (ref 5–12)
NEUTROPHILS NFR BLD AUTO: 5.59 10*3/MM3 (ref 1.7–7)
NEUTROPHILS NFR BLD AUTO: 65.8 % (ref 42.7–76)
NRBC BLD AUTO-RTO: 0 /100 WBC (ref 0–0.2)
PHOSPHATE SERPL-MCNC: 4 MG/DL (ref 2.5–4.5)
PLATELET # BLD AUTO: 446 10*3/MM3 (ref 140–450)
PMV BLD AUTO: 9.2 FL (ref 6–12)
POTASSIUM SERPL-SCNC: 3.9 MMOL/L (ref 3.5–5.2)
RBC # BLD AUTO: 2.76 10*6/MM3 (ref 3.77–5.28)
SODIUM SERPL-SCNC: 139 MMOL/L (ref 136–145)
WBC NRBC COR # BLD AUTO: 8.49 10*3/MM3 (ref 3.4–10.8)

## 2025-04-29 PROCEDURE — 82948 REAGENT STRIP/BLOOD GLUCOSE: CPT

## 2025-04-29 PROCEDURE — 94664 DEMO&/EVAL PT USE INHALER: CPT

## 2025-04-29 PROCEDURE — 25010000002 HYDROMORPHONE 1 MG/ML SOLUTION: Performed by: SURGERY

## 2025-04-29 PROCEDURE — 83735 ASSAY OF MAGNESIUM: CPT | Performed by: SURGERY

## 2025-04-29 PROCEDURE — 94799 UNLISTED PULMONARY SVC/PX: CPT

## 2025-04-29 PROCEDURE — 63710000001 INSULIN REGULAR HUMAN PER 5 UNITS: Performed by: SURGERY

## 2025-04-29 PROCEDURE — 25010000002 HEPARIN (PORCINE) 25000-0.45 UT/250ML-% SOLUTION: Performed by: STUDENT IN AN ORGANIZED HEALTH CARE EDUCATION/TRAINING PROGRAM

## 2025-04-29 PROCEDURE — 99024 POSTOP FOLLOW-UP VISIT: CPT | Performed by: SURGERY

## 2025-04-29 PROCEDURE — 94761 N-INVAS EAR/PLS OXIMETRY MLT: CPT

## 2025-04-29 PROCEDURE — 85025 COMPLETE CBC W/AUTO DIFF WBC: CPT | Performed by: SURGERY

## 2025-04-29 PROCEDURE — 85730 THROMBOPLASTIN TIME PARTIAL: CPT | Performed by: STUDENT IN AN ORGANIZED HEALTH CARE EDUCATION/TRAINING PROGRAM

## 2025-04-29 PROCEDURE — 63710000001 REVEFENACIN 175 MCG/3ML SOLUTION: Performed by: SURGERY

## 2025-04-29 PROCEDURE — 80069 RENAL FUNCTION PANEL: CPT | Performed by: SURGERY

## 2025-04-29 PROCEDURE — 94760 N-INVAS EAR/PLS OXIMETRY 1: CPT

## 2025-04-29 RX ORDER — FAMOTIDINE 10 MG/ML
20 INJECTION, SOLUTION INTRAVENOUS EVERY 12 HOURS SCHEDULED
Status: DISCONTINUED | OUTPATIENT
Start: 2025-04-29 | End: 2025-04-29

## 2025-04-29 RX ADMIN — SENNOSIDES AND DOCUSATE SODIUM 2 TABLET: 50; 8.6 TABLET ORAL at 08:59

## 2025-04-29 RX ADMIN — METOPROLOL TARTRATE 25 MG: 25 TABLET, FILM COATED ORAL at 21:35

## 2025-04-29 RX ADMIN — HYDROCODONE BITARTRATE AND ACETAMINOPHEN 1 TABLET: 7.5; 325 TABLET ORAL at 00:30

## 2025-04-29 RX ADMIN — HYDROCODONE BITARTRATE AND ACETAMINOPHEN 1 TABLET: 7.5; 325 TABLET ORAL at 13:39

## 2025-04-29 RX ADMIN — ARFORMOTEROL TARTRATE 15 MCG: 15 SOLUTION RESPIRATORY (INHALATION) at 19:43

## 2025-04-29 RX ADMIN — ASPIRIN 81 MG CHEWABLE TABLET 81 MG: 81 TABLET CHEWABLE at 08:59

## 2025-04-29 RX ADMIN — REVEFENACIN 175 MCG: 175 SOLUTION RESPIRATORY (INHALATION) at 07:11

## 2025-04-29 RX ADMIN — BUDESONIDE 0.5 MG: 0.5 INHALANT RESPIRATORY (INHALATION) at 19:43

## 2025-04-29 RX ADMIN — ATORVASTATIN CALCIUM 40 MG: 20 TABLET, FILM COATED ORAL at 21:35

## 2025-04-29 RX ADMIN — HYDROCODONE BITARTRATE AND ACETAMINOPHEN 1 TABLET: 7.5; 325 TABLET ORAL at 21:43

## 2025-04-29 RX ADMIN — PANTOPRAZOLE SODIUM 40 MG: 40 INJECTION, POWDER, FOR SOLUTION INTRAVENOUS at 08:59

## 2025-04-29 RX ADMIN — HYDROCODONE BITARTRATE AND ACETAMINOPHEN 1 TABLET: 7.5; 325 TABLET ORAL at 17:33

## 2025-04-29 RX ADMIN — CLOPIDOGREL BISULFATE 75 MG: 75 TABLET, FILM COATED ORAL at 08:59

## 2025-04-29 RX ADMIN — METOPROLOL TARTRATE 25 MG: 25 TABLET, FILM COATED ORAL at 08:59

## 2025-04-29 RX ADMIN — SENNOSIDES AND DOCUSATE SODIUM 2 TABLET: 50; 8.6 TABLET ORAL at 21:35

## 2025-04-29 RX ADMIN — HEPARIN SODIUM 18 UNITS/KG/HR: 10000 INJECTION, SOLUTION INTRAVENOUS at 10:56

## 2025-04-29 RX ADMIN — BUDESONIDE 0.5 MG: 0.5 INHALANT RESPIRATORY (INHALATION) at 07:11

## 2025-04-29 RX ADMIN — ARFORMOTEROL TARTRATE 15 MCG: 15 SOLUTION RESPIRATORY (INHALATION) at 07:11

## 2025-04-29 RX ADMIN — INSULIN HUMAN 2 UNITS: 100 INJECTION, SOLUTION PARENTERAL at 08:59

## 2025-04-29 RX ADMIN — HYDROMORPHONE HYDROCHLORIDE 1 MG: 1 INJECTION, SOLUTION INTRAMUSCULAR; INTRAVENOUS; SUBCUTANEOUS at 11:06

## 2025-04-29 RX ADMIN — HYDROCODONE BITARTRATE AND ACETAMINOPHEN 1 TABLET: 7.5; 325 TABLET ORAL at 06:00

## 2025-04-29 RX ADMIN — HYDROMORPHONE HYDROCHLORIDE 1 MG: 1 INJECTION, SOLUTION INTRAMUSCULAR; INTRAVENOUS; SUBCUTANEOUS at 09:01

## 2025-04-29 RX ADMIN — PANTOPRAZOLE SODIUM 40 MG: 40 INJECTION, POWDER, FOR SOLUTION INTRAVENOUS at 21:35

## 2025-04-29 RX ADMIN — INSULIN HUMAN 2 UNITS: 100 INJECTION, SOLUTION PARENTERAL at 00:30

## 2025-04-29 NOTE — PROGRESS NOTES
[facility]   Surgical Progress Note    Patient Name: Desiree Garcia  : 1960  MRN: [id number]  Date of admission: 4/10/2025  Surgical Procedures Since Admission:  Procedure(s):  EMBOLECTOMY MECHANICAL, FOUR COMPARTMENT FASCIOTOMY  ARTERIOGRAM LOWER EXTREMITY  Surgeon:  Misael Lawton MD  Status:  17 Days Post-Op  -------------------    Procedure(s):  LOWER EXTREMITY DEBRIDEMENT  Surgeon:  Misael Lawton MD  Status:  11 Days Post-Op  -------------------  **Canceled**    Procedure(s):  Left leg fasciotomy debridement with possible wound VAC placement.  Surgeon:  Raimundo Lin MD  Status:  * Surgery not performed *  -------------------    Procedure(s):  Left lower extremity fasciotomy debridement with possible wound VAC placement  Surgeon:  Raimundo Lin MD  Status:  4 Days Post-Op  -------------------    Subjective   Subjective     Chief Complaint: Left leg ischemia follow-up.    History of Present Illness   Resting quite comfortably in her hospital room.  Pain in the left lower extremity.    Review of Systems    Objective   Objective     Vitals:   Temp:  [98 °F (36.7 °C)-99 °F (37.2 °C)] 98.6 °F (37 °C)  Heart Rate:  [74-84] 80  Resp:  [18] 18  BP: (114-122)/(62-69) 116/62  Flow (L/min) (Oxygen Therapy):  [2] 2  Output by Drain (mL) 25 0701 - 25 1900 25 1901 - 25 0700 25 0701 - 25 0823 Range Total   Patient has no LDAs of requested type attached.       Physical Exam  Constitutional:       Appearance: She is well-developed.   Pulmonary:      Effort: Pulmonary effort is normal. No respiratory distress.   Abdominal:      General: There is no distension.      Palpations: Abdomen is soft.   Neurological:      Mental Status: She is alert and oriented to person, place, and time.           Result Review    Result Review:  I have personally reviewed the results from the time of this admission to 2025 08:23 EDT and agree with these findings:  [x]  Laboratory  list / accordion  []  Microbiology  []  Radiology  []  EKG/Telemetry   []  Cardiology/Vascular   []  Pathology  []  Old records  []  Other:  Most notable findings include:       Results from last 7 days   Lab Units 04/28/25  0402 04/27/25  0611 04/26/25  0737 04/25/25  1833 04/25/25  0609 04/24/25  0619 04/23/25  0543   WBC 10*3/mm3 10.81* 10.86* 12.99* 12.96* 14.58* 17.70* 19.23*   HEMOGLOBIN g/dL 7.2* 7.2* 7.3* 7.5* 7.2* 7.4* 7.6*   PLATELETS 10*3/mm3 421 418 456* 451* 416 437 404     Results from last 7 days   Lab Units 04/28/25  0402 04/27/25  1845 04/27/25  0611 04/26/25  0737 04/25/25  1833 04/25/25  0609 04/24/25  0620 04/23/25  0543   SODIUM mmol/L 137  --  137 137 137 139 136 137   POTASSIUM mmol/L 4.2 4.2 3.6 3.8 4.2 3.8 3.8 4.2   CHLORIDE mmol/L 101  --  100 99 99 101 96* 100   CO2 mmol/L 28.0  --  28.0 29.0 32.1* 31.3* 29.0 30.1*   BUN mg/dL 6*  --  7* 7* 9 8 12 9   CREATININE mg/dL 0.74  --  0.59 0.63 0.77 0.73 0.67 0.74   GLUCOSE mg/dL 113*  --  143* 117* 149* 129* 166* 117*   MAGNESIUM mg/dL 1.6  --  1.7 1.7  --  1.8 1.6 1.6   PHOSPHORUS mg/dL 3.1  --  3.5 3.7  --  3.8 3.4 2.9   Estimated Creatinine Clearance: 83.4 mL/min (by C-G formula based on SCr of 0.74 mg/dL).  Results from last 7 days   Lab Units 04/26/25  1759 04/25/25  1904   PROTIME Seconds 15.9* 15.5*   INR  1.27* 1.24*     Lab Results   Component Value Date    HGBA1C 9.40 (H) 04/09/2025    HGBA1C 9.30 (H) 03/13/2025    HGBA1C 9.40 (H) 12/23/2024     Glucose   Date/Time Value Ref Range Status   04/29/2025 0557 163 (H) 70 - 130 mg/dL Final   04/29/2025 0017 158 (H) 70 - 130 mg/dL Final   04/28/2025 1207 218 (H) 70 - 130 mg/dL Final   04/28/2025 0609 135 (H) 70 - 130 mg/dL Final   04/27/2025 2331 163 (H) 70 - 130 mg/dL Final   04/27/2025 1738 137 (H) 70 - 130 mg/dL Final   04/27/2025 1159 195 (H) 70 - 130 mg/dL Final   04/27/2025 0604 152 (H) 70 - 130 mg/dL Final       Assessment & Plan   Assessment / Plan     Brief Patient Summary:  Desiree  TOBI Garcia is a 65 y.o. female who multiple medical issues.  Vascular is following for ischemia of the left lower extremity.    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  ([facility])  4/18/2025: Debridement of left leg fasciotomies ([facility])    4/19/2025: Postop day #1 from fasciotomy debridement.  Dressings down today.  Minimal oozing noted muscles appear viable as pictured below.  There is some ischemic changes to the skin on the anterior margin of the lateral fasciotomies will continue to monitor.  I redressed the wounds with the assistance of the nurse with dilute Betadine soaked gauze.  Will plan on leaving intact upon my evaluation for tomorrow.  She has a strong dorsalis pedis artery signal.  She has weak dorsiflexion of her left foot.      4/20/2025: Postop day 2 fasciotomy debridement.  Will continue with once daily Dakin's dressing changes.  Will continue to lower the anterior skin on the lateral fasciotomy to demarcate.  This will likely require debridement sometime next week once further demarcation has occurred.    4/21/2025: Postop day 3 fasciotomy debridement.  With some anterior ischemic changes to the flap on the lateral compartment.  Will place her on the surgery schedule for Wednesday to debride this.  If things appear well at that time we will also place wound VAC.    4/22/2025: Postop day #4 fasciotomy debridement.  Plan on surgical debridement of the ischemic skin on the left anterior lateral fasciotomy with wound VAC placement tomorrow.  Will hold heparin infusion when patient goes down to preoperative holding tomorrow.    4/23/2025: Patient was in preop holding for planned/nonemergent left fasciotomy debridement.  Here she appeared diaphoretic with  somewhat labored and irregular breathing and overall feeling of doom.  Anesthesia concerned with change in EKG.  Currently I have minimal concerns for any sort of undrained sepsis or urgent nature of the operation in her leg.  I am going to remove her from the surgery schedule today.  I personally called cardiology who will review the case and see the patient.  I have tentatively placed her back on the OR schedule for Friday assuming medically more stable.    4/24/2025: Appreciate cardiology's assistance.  Will start D5 LR at midnight.  Plans for surgical debridement of the left leg tomorrow with wound VAC placement.    4/25/2025: Excisional debridement of anterior flap of the lateral fasciotomy ischemic tissue with wound VAC placement.    4/28/2025: Will premedicate with 1 mg IV Dilaudid prior to wound VAC change.  Will ask wound care team to change today.    4/29/2025: Will change wound VAC tomorrow.  Things remain stable.  Will ask plastic surgery to get involved for eventual skin grafting.  Concerns for taking her off anticoagulants for skin graft will have to be managed aggressively.    VTE Prophylaxis:  Pharmacologic & mechanical VTE prophylaxis orders are present.      Misael Lawton MD

## 2025-04-29 NOTE — PLAN OF CARE
Goal Outcome Evaluation:   Problem: Adult Inpatient Plan of Care  Goal: Plan of Care Review  Outcome: Progressing   A&Ox4. RA. NSR. Very tearful and anxious for most of the day, refused to work with PT despite dilaudid (x2) and norco(x1). R groin dressing changed. RLE wound vac on 125mmhg suction. Hep gtt @ 16units/kg/hr. Plastic surgery consult today to be addressed tomorrow per vascular.

## 2025-04-29 NOTE — CASE MANAGEMENT/SOCIAL WORK
Continued Stay Note  [facility]     Patient Name: Desiree Garcia  MRN: [id number]  Today's Date: 4/29/2025    Admit Date: 4/10/2025    Plan: [facility]   Discharge Plan       Row Name 04/29/25 1647       Plan    Plan [facility]    Patient/Family in Agreement with Plan yes    Plan Comments Plan remains for pt to go to rehab at [facility] at discharge. She will need transportation. She will need precert with medicall stable. No other needs assessed at this time. CCP following. Garrison BARRIENTOS RN                   Discharge Codes    No documentation.                 Expected Discharge Date and Time       Expected Discharge Date Expected Discharge Time    May 1, 2025               Garrison Harrison RN

## 2025-04-29 NOTE — PLAN OF CARE
Goal Outcome Evaluation:  Plan of Care Reviewed With: patient        Progress: no change  Outcome Evaluation: vss, pain controlled by prn pain meds. wound vac @ 125. dressing CDI, purwick in place while sleeping. hep gtt infusing, recheck @ 0830. labs in am. pt rested well during shift.

## 2025-04-29 NOTE — SIGNIFICANT NOTE
04/29/25 1609   OTHER   Discipline physical therapist   Rehab Time/Intention   Session Not Performed patient unavailable for treatment  (Pt in tears, c/o pain and feeling sick all day. Discussed with RN. Will follow up tomorrow)   Recommendation   PT - Next Appointment 04/30/25

## 2025-04-29 NOTE — PROGRESS NOTES
Name: Desiree Garcia ADMIT: 4/10/2025   : 1960  PCP: Sandra Kaba MD    MRN: [id number] LOS: 19 days   AGE/SEX: 65 y.o. female  ROOM: [facility]     Subjective   Subjective   Patient awake and resting in bed, she is having pain and discomfort in LLE.       Objective   Objective   Vital Signs  Temp:  [98 °F (36.7 °C)-99.2 °F (37.3 °C)] 99.2 °F (37.3 °C)  Heart Rate:  [74-82] 78  Resp:  [18] 18  BP: ()/(50-69) 95/50  SpO2:  [96 %-100 %] 100 %  on  Flow (L/min) (Oxygen Therapy):  [2] 2;   Device (Oxygen Therapy): room air  Body mass index is 29.53 kg/m².  Physical Exam  Vitals and nursing note reviewed.   Constitutional:       General: She is not in acute distress.     Comments: Chronically ill appearing   Cardiovascular:      Rate and Rhythm: Normal rate.      Pulses: Normal pulses.      Heart sounds: Normal heart sounds.   Pulmonary:      Effort: Pulmonary effort is normal.      Breath sounds: No wheezing.   Abdominal:      Palpations: Abdomen is soft.      Tenderness: There is no abdominal tenderness.   Musculoskeletal:         General: Tenderness present.      Comments: LLE wrapped, wound vac   Skin:     General: Skin is warm and dry.   Neurological:      General: No focal deficit present.      Mental Status: She is alert and oriented to person, place, and time.       Results Review     I reviewed the patient's new clinical results.  Results from last 7 days   Lab Units 25  0821 25  0402 25  0611 25  0737   WBC 10*3/mm3 8.49 10.81* 10.86* 12.99*   HEMOGLOBIN g/dL 7.2* 7.2* 7.2* 7.3*   PLATELETS 10*3/mm3 446 421 418 456*     Results from last 7 days   Lab Units 25  0821 25  0402 25  1845 25  0611 25  0737   SODIUM mmol/L 139 137  --  137 137   POTASSIUM mmol/L 3.9 4.2 4.2 3.6 3.8   CHLORIDE mmol/L 102 101  --  100 99   CO2 mmol/L 28.5 28.0  --  28.0 29.0   BUN mg/dL 7* 6*  --  7* 7*   CREATININE mg/dL 0.63 0.74  --  0.59 0.63   GLUCOSE  mg/dL 101* 113*  --  143* 117*   EGFR mL/min/1.73 99.2 90.5  --  100.8 99.2     Results from last 7 days   Lab Units 04/29/25  0821 04/28/25  0402 04/27/25  0611 04/26/25  0737   ALBUMIN g/dL 2.4* 2.4* 2.4* 2.7*     Results from last 7 days   Lab Units 04/29/25  0821 04/28/25  0402 04/27/25  0611 04/26/25  0737   CALCIUM mg/dL 8.5* 8.2* 8.1* 8.3*   ALBUMIN g/dL 2.4* 2.4* 2.4* 2.7*   MAGNESIUM mg/dL 1.7 1.6 1.7 1.7   PHOSPHORUS mg/dL 4.0 3.1 3.5 3.7       Glucose   Date/Time Value Ref Range Status   04/29/2025 1220 126 70 - 130 mg/dL Final   04/29/2025 0557 163 (H) 70 - 130 mg/dL Final   04/29/2025 0017 158 (H) 70 - 130 mg/dL Final   04/28/2025 1207 218 (H) 70 - 130 mg/dL Final   04/28/2025 0609 135 (H) 70 - 130 mg/dL Final   04/27/2025 2331 163 (H) 70 - 130 mg/dL Final   04/27/2025 1738 137 (H) 70 - 130 mg/dL Final       No radiology results for the last day    I have personally reviewed all medications:  Scheduled Medications  arformoterol, 15 mcg, Nebulization, BID - RT  aspirin, 81 mg, Oral, Daily   Or  aspirin, 300 mg, Rectal, Daily  atorvastatin, 40 mg, Oral, Nightly  budesonide, 0.5 mg, Nebulization, BID - RT  clopidogrel, 75 mg, Oral, Daily  insulin regular, 2-7 Units, Subcutaneous, Q6H  metoprolol tartrate, 25 mg, Oral, Q12H  pantoprazole, 40 mg, Intravenous, Q12H  revefenacin, 175 mcg, Nebulization, Daily - RT  senna-docusate sodium, 2 tablet, Oral, BID    Infusions  heparin, 18 Units/kg/hr, Last Rate: 18 Units/kg/hr (04/29/25 0241)    Diet  Diet: Regular/House, Diabetic, Cardiac; Healthy Heart (2-3 Na+); Consistent Carbohydrate; Fluid Consistency: Thin (IDDSI 0)    I have personally reviewed:  [x]  Laboratory   [x]  Microbiology   [x]  Radiology   [x]  EKG/Telemetry  [x]  Cardiology/Vascular   []  Pathology    []  Records       Assessment/Plan     Active Hospital Problems    Diagnosis  POA   • 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 [I74.3]  Unknown   • Acute thrombus of left ventricle [I24.0]   Yes   • Altered bowel habits [R19.4]  Yes   • Leg paresthesia [R20.2]  Yes      Resolved Hospital Problems    Diagnosis Date Resolved POA   • **Cardiogenic shock [R57.0] 04/15/2025 Yes       65 y.o. female admitted with Cardiogenic shock.    Left lower extremity embolism and thrombosis  Left ventricular thrombus with showering emboli of kidney, spleen, bile  Severe PAD  Acute post-procedural pain  - S/P open left iliofemoral, profunda femoris and superficial femoral artery thromboembolectomy with left common femoral endarterectomy and repair, 4 compartment fasciotomy of left lower extremity with left femoropopliteal angioplasty and stent placement x 2 on 4/12/2025  - S/P left lateral fasciotomy site debridement and left medial fasciotomy site debridement on 4/18/2025  - S/P left fasciotomy debridement with wound vac placement on 4/25/25.  - Patient on ASA, Plavix, heparin drip. Tentative plan for transition to oral AC prior to discharge.   - Hematology followed, has since signed off.  - Wound vac in place, changed previously.  - SNF at discharge, continue with medications for pain.   - Vascular following. Will continue to follow their plans/recommendations. Greatly appreciate their help.    Rectal bleeding  Acute post-hemorrhagic anemia  - Patient with rectal bleeding postop on 4/25/2025; Heparin drip was stopped and Plavix held  - GI consulted and suspected hemorrhoidal bleeding and has signed off  - Patient should be able to tolerate Plavix and IV heparin, heparin restarted.  - She remains on heparin at present, hemoglobin does remain low, but stable, however, near cutoff for needing transfusion, so need to closely monitor this moving forward.  - Order repeat CBC in AM for reassessment. Transfuse for hemoglobin <7.    Cardiogenic shock  Inferior STEMI  - Status post JOE x 2 on 4/8/2025  - Aspirin 81 mg daily, Plavix 75 mg daily, atorvastatin 40 mg p.o. daily  - Lopressor 25 mg p.o. twice daily  - Continue heparin  drip, plan to transition to DOAC with long-term dual therapy  - Has been cleared for Plavix without aspirin once transition to DOAC  - Cardiology has signed off    Type 2 diabetes with hyperglycemia  - A1c. 9.40% (04/09/25). Glucose elevated, but acceptable for most part.  - Continue with SSI as ordered. Adjust regimen as needed, treat hypoglycemia per protocol if develops.    Hypertension  - BP is soft, need to closely monitor this. May have to adjust BB dosing if worsens.     COPD  - Appears stable from this perspective at present.  No evidence to suggest acute exacerbation.  Continue current medications as prescribed and closely monitor.    Leukocytosis  - ID consulted, suspects WBC elevation is multifactorial as cultures are negative to date; antibiotics discontinued.  - WBC has improved, continue to monitor.    Ileus  - Ileus has resolved per surgery, NG removed, surgery signed off     NIA  - Resolved, creatinine stable  - Nephrology has signed off     GERD  - PPI      All workup, problems and plans new to me today. First day taking care of patient.    Heparin drip  for DVT prophylaxis.  Full code.  Discussed with patient and nursing staff.  Anticipate discharge to SNU facility later this week.  Expected Discharge Date: 4/30/2025; Expected Discharge Time:       Tj Ramirez DO  [facility]  04/29/25

## 2025-04-30 LAB
ALBUMIN SERPL-MCNC: 2.3 G/DL (ref 3.5–5.2)
ANION GAP SERPL CALCULATED.3IONS-SCNC: 9 MMOL/L (ref 5–15)
APTT PPP: 80.6 SECONDS (ref 22.7–35.4)
BASOPHILS # BLD AUTO: 0.12 10*3/MM3 (ref 0–0.2)
BASOPHILS NFR BLD AUTO: 1.4 % (ref 0–1.5)
BUN SERPL-MCNC: 7 MG/DL (ref 8–23)
BUN/CREAT SERPL: 10 (ref 7–25)
CALCIUM SPEC-SCNC: 8.2 MG/DL (ref 8.6–10.5)
CHLORIDE SERPL-SCNC: 101 MMOL/L (ref 98–107)
CO2 SERPL-SCNC: 28 MMOL/L (ref 22–29)
CREAT SERPL-MCNC: 0.7 MG/DL (ref 0.57–1)
DEPRECATED RDW RBC AUTO: 47.4 FL (ref 37–54)
EGFRCR SERPLBLD CKD-EPI 2021: 96.7 ML/MIN/1.73
EOSINOPHIL # BLD AUTO: 0.16 10*3/MM3 (ref 0–0.4)
EOSINOPHIL NFR BLD AUTO: 1.9 % (ref 0.3–6.2)
ERYTHROCYTE [DISTWIDTH] IN BLOOD BY AUTOMATED COUNT: 14.9 % (ref 12.3–15.4)
GLUCOSE BLDC GLUCOMTR-MCNC: 112 MG/DL (ref 70–130)
GLUCOSE BLDC GLUCOMTR-MCNC: 116 MG/DL (ref 70–130)
GLUCOSE BLDC GLUCOMTR-MCNC: 155 MG/DL (ref 70–130)
GLUCOSE BLDC GLUCOMTR-MCNC: 179 MG/DL (ref 70–130)
GLUCOSE SERPL-MCNC: 112 MG/DL (ref 65–99)
HCT VFR BLD AUTO: 25.3 % (ref 34–46.6)
HGB BLD-MCNC: 7.4 G/DL (ref 12–15.9)
IMM GRANULOCYTES # BLD AUTO: 0.11 10*3/MM3 (ref 0–0.05)
IMM GRANULOCYTES NFR BLD AUTO: 1.3 % (ref 0–0.5)
LYMPHOCYTES # BLD AUTO: 1.97 10*3/MM3 (ref 0.7–3.1)
LYMPHOCYTES NFR BLD AUTO: 23.5 % (ref 19.6–45.3)
MAGNESIUM SERPL-MCNC: 1.6 MG/DL (ref 1.6–2.4)
MCH RBC QN AUTO: 25.6 PG (ref 26.6–33)
MCHC RBC AUTO-ENTMCNC: 29.2 G/DL (ref 31.5–35.7)
MCV RBC AUTO: 87.5 FL (ref 79–97)
MONOCYTES # BLD AUTO: 0.88 10*3/MM3 (ref 0.1–0.9)
MONOCYTES NFR BLD AUTO: 10.5 % (ref 5–12)
NEUTROPHILS NFR BLD AUTO: 5.15 10*3/MM3 (ref 1.7–7)
NEUTROPHILS NFR BLD AUTO: 61.4 % (ref 42.7–76)
NRBC BLD AUTO-RTO: 0 /100 WBC (ref 0–0.2)
PHOSPHATE SERPL-MCNC: 3.9 MG/DL (ref 2.5–4.5)
PLATELET # BLD AUTO: 451 10*3/MM3 (ref 140–450)
PMV BLD AUTO: 9.1 FL (ref 6–12)
POTASSIUM SERPL-SCNC: 3.8 MMOL/L (ref 3.5–5.2)
RBC # BLD AUTO: 2.89 10*6/MM3 (ref 3.77–5.28)
SODIUM SERPL-SCNC: 138 MMOL/L (ref 136–145)
WBC NRBC COR # BLD AUTO: 8.39 10*3/MM3 (ref 3.4–10.8)

## 2025-04-30 PROCEDURE — 94761 N-INVAS EAR/PLS OXIMETRY MLT: CPT

## 2025-04-30 PROCEDURE — 25010000002 HYDROMORPHONE 1 MG/ML SOLUTION: Performed by: STUDENT IN AN ORGANIZED HEALTH CARE EDUCATION/TRAINING PROGRAM

## 2025-04-30 PROCEDURE — 94799 UNLISTED PULMONARY SVC/PX: CPT

## 2025-04-30 PROCEDURE — 63710000001 REVEFENACIN 175 MCG/3ML SOLUTION: Performed by: SURGERY

## 2025-04-30 PROCEDURE — 80069 RENAL FUNCTION PANEL: CPT | Performed by: SURGERY

## 2025-04-30 PROCEDURE — 25010000002 HYDROMORPHONE 1 MG/ML SOLUTION: Performed by: SURGERY

## 2025-04-30 PROCEDURE — 25010000002 HEPARIN (PORCINE) 25000-0.45 UT/250ML-% SOLUTION: Performed by: STUDENT IN AN ORGANIZED HEALTH CARE EDUCATION/TRAINING PROGRAM

## 2025-04-30 PROCEDURE — 97110 THERAPEUTIC EXERCISES: CPT

## 2025-04-30 PROCEDURE — 85025 COMPLETE CBC W/AUTO DIFF WBC: CPT | Performed by: SURGERY

## 2025-04-30 PROCEDURE — 99024 POSTOP FOLLOW-UP VISIT: CPT | Performed by: SURGERY

## 2025-04-30 PROCEDURE — 83735 ASSAY OF MAGNESIUM: CPT | Performed by: SURGERY

## 2025-04-30 PROCEDURE — 94760 N-INVAS EAR/PLS OXIMETRY 1: CPT

## 2025-04-30 PROCEDURE — 63710000001 ONDANSETRON ODT 4 MG TABLET DISPERSIBLE: Performed by: SURGERY

## 2025-04-30 PROCEDURE — 94664 DEMO&/EVAL PT USE INHALER: CPT

## 2025-04-30 PROCEDURE — 63710000001 INSULIN REGULAR HUMAN PER 5 UNITS: Performed by: SURGERY

## 2025-04-30 PROCEDURE — 97530 THERAPEUTIC ACTIVITIES: CPT

## 2025-04-30 PROCEDURE — 82948 REAGENT STRIP/BLOOD GLUCOSE: CPT

## 2025-04-30 PROCEDURE — 85730 THROMBOPLASTIN TIME PARTIAL: CPT | Performed by: STUDENT IN AN ORGANIZED HEALTH CARE EDUCATION/TRAINING PROGRAM

## 2025-04-30 RX ORDER — NALOXONE HCL 0.4 MG/ML
0.4 VIAL (ML) INJECTION
Status: DISCONTINUED | OUTPATIENT
Start: 2025-04-30 | End: 2025-05-06 | Stop reason: HOSPADM

## 2025-04-30 RX ADMIN — ONDANSETRON 4 MG: 4 TABLET, ORALLY DISINTEGRATING ORAL at 21:38

## 2025-04-30 RX ADMIN — BUDESONIDE 0.5 MG: 0.5 INHALANT RESPIRATORY (INHALATION) at 09:49

## 2025-04-30 RX ADMIN — HYDROCODONE BITARTRATE AND ACETAMINOPHEN 1 TABLET: 7.5; 325 TABLET ORAL at 11:14

## 2025-04-30 RX ADMIN — ARFORMOTEROL TARTRATE 15 MCG: 15 SOLUTION RESPIRATORY (INHALATION) at 09:50

## 2025-04-30 RX ADMIN — INSULIN HUMAN 2 UNITS: 100 INJECTION, SOLUTION PARENTERAL at 11:14

## 2025-04-30 RX ADMIN — PANTOPRAZOLE SODIUM 40 MG: 40 INJECTION, POWDER, FOR SOLUTION INTRAVENOUS at 20:00

## 2025-04-30 RX ADMIN — ARFORMOTEROL TARTRATE 15 MCG: 15 SOLUTION RESPIRATORY (INHALATION) at 19:43

## 2025-04-30 RX ADMIN — PANTOPRAZOLE SODIUM 40 MG: 40 INJECTION, POWDER, FOR SOLUTION INTRAVENOUS at 09:02

## 2025-04-30 RX ADMIN — HYDROMORPHONE HYDROCHLORIDE 1 MG: 1 INJECTION, SOLUTION INTRAMUSCULAR; INTRAVENOUS; SUBCUTANEOUS at 21:37

## 2025-04-30 RX ADMIN — SENNOSIDES AND DOCUSATE SODIUM 2 TABLET: 50; 8.6 TABLET ORAL at 09:02

## 2025-04-30 RX ADMIN — ASPIRIN 81 MG CHEWABLE TABLET 81 MG: 81 TABLET CHEWABLE at 09:02

## 2025-04-30 RX ADMIN — METOPROLOL TARTRATE 25 MG: 25 TABLET, FILM COATED ORAL at 09:02

## 2025-04-30 RX ADMIN — CLOPIDOGREL BISULFATE 75 MG: 75 TABLET, FILM COATED ORAL at 09:02

## 2025-04-30 RX ADMIN — REVEFENACIN 175 MCG: 175 SOLUTION RESPIRATORY (INHALATION) at 09:50

## 2025-04-30 RX ADMIN — HYDROMORPHONE HYDROCHLORIDE 1 MG: 1 INJECTION, SOLUTION INTRAMUSCULAR; INTRAVENOUS; SUBCUTANEOUS at 14:16

## 2025-04-30 RX ADMIN — HEPARIN SODIUM 16 UNITS/KG/HR: 10000 INJECTION, SOLUTION INTRAVENOUS at 05:45

## 2025-04-30 RX ADMIN — HYDROCODONE BITARTRATE AND ACETAMINOPHEN 1 TABLET: 7.5; 325 TABLET ORAL at 05:46

## 2025-04-30 RX ADMIN — ATORVASTATIN CALCIUM 40 MG: 20 TABLET, FILM COATED ORAL at 20:00

## 2025-04-30 RX ADMIN — METOPROLOL TARTRATE 25 MG: 25 TABLET, FILM COATED ORAL at 20:01

## 2025-04-30 RX ADMIN — HYDROCODONE BITARTRATE AND ACETAMINOPHEN 1 TABLET: 7.5; 325 TABLET ORAL at 18:38

## 2025-04-30 RX ADMIN — HYDROMORPHONE HYDROCHLORIDE 1 MG: 1 INJECTION, SOLUTION INTRAMUSCULAR; INTRAVENOUS; SUBCUTANEOUS at 09:05

## 2025-04-30 RX ADMIN — BUDESONIDE 0.5 MG: 0.5 INHALANT RESPIRATORY (INHALATION) at 19:45

## 2025-04-30 RX ADMIN — SENNOSIDES AND DOCUSATE SODIUM 2 TABLET: 50; 8.6 TABLET ORAL at 20:00

## 2025-04-30 NOTE — PLAN OF CARE
Goal Outcome Evaluation:            A/O, 2LNC PRN, SR, hep gtt infusing per protocol, prn medication for pain management, RLE NPWT, will continue to monitor

## 2025-04-30 NOTE — CASE MANAGEMENT/SOCIAL WORK
Continued Stay Note  [facility]     Patient Name: Desiree Garcia  MRN: [id number]  Today's Date: 4/30/2025    Admit Date: 4/10/2025    Plan: Home with HH   Discharge Plan       Row Name 04/30/25 1733       Plan    Plan Home with HH    Patient/Family in Agreement with Plan yes    Plan Comments Met with pt in room. She stated that her plan is to go home with her son and daughter in law at discharge. Family will provide transportation. Called and left a message with pt's son to discuss discharge. Discussed home health with the patient and she is agreeable. Referral entered. She denied any other needs at this time. CCP following. Garrison BARRIENTOS RN                   Discharge Codes    No documentation.                 Expected Discharge Date and Time       Expected Discharge Date Expected Discharge Time    May 1, 2025               Garrison Harrison RN

## 2025-04-30 NOTE — PROGRESS NOTES
Name: Desiree Garcia ADMIT: 4/10/2025   : 1960  PCP: Sandra Kaba MD    MRN: [id number] LOS: 20 days   AGE/SEX: 65 y.o. female  ROOM: [facility]     Subjective   Subjective   Patient awake and resting in bed, doing okay at present, still having some pain/discomfort, but improved with medications.       Objective   Objective   Vital Signs  Temp:  [98.1 °F (36.7 °C)-99.3 °F (37.4 °C)] 98.2 °F (36.8 °C)  Heart Rate:  [59-79] 68  Resp:  [18] 18  BP: ()/(50-71) 107/61  SpO2:  [92 %-100 %] 100 %  on  Flow (L/min) (Oxygen Therapy):  [2] 2;   Device (Oxygen Therapy): nasal cannula  Body mass index is 29.53 kg/m².  Physical Exam  Vitals and nursing note reviewed.   Constitutional:       General: She is not in acute distress.     Comments: Chronically ill appearing   Cardiovascular:      Rate and Rhythm: Normal rate.      Pulses: Normal pulses.      Heart sounds: Normal heart sounds.   Pulmonary:      Effort: Pulmonary effort is normal.      Breath sounds: No wheezing.   Abdominal:      General: There is no distension.      Palpations: Abdomen is soft.      Tenderness: There is no abdominal tenderness.   Musculoskeletal:         General: Tenderness present.      Comments: LLE wrapped, wound vac   Skin:     General: Skin is warm and dry.   Neurological:      General: No focal deficit present.      Mental Status: She is alert and oriented to person, place, and time.       Results Review     I reviewed the patient's new clinical results.  Results from last 7 days   Lab Units 25  0525 25  0821 25  0402 25  0611   WBC 10*3/mm3 8.39 8.49 10.81* 10.86*   HEMOGLOBIN g/dL 7.4* 7.2* 7.2* 7.2*   PLATELETS 10*3/mm3 451* 446 421 418     Results from last 7 days   Lab Units 25  0525 25  0821 25  0402 25  1845 25  0611   SODIUM mmol/L 138 139 137  --  137   POTASSIUM mmol/L 3.8 3.9 4.2 4.2 3.6   CHLORIDE mmol/L 101 102 101  --  100   CO2 mmol/L 28.0 28.5 28.0  --   28.0   BUN mg/dL 7* 7* 6*  --  7*   CREATININE mg/dL 0.70 0.63 0.74  --  0.59   GLUCOSE mg/dL 112* 101* 113*  --  143*   EGFR mL/min/1.73 96.7 99.2 90.5  --  100.8     Results from last 7 days   Lab Units 04/30/25  0525 04/29/25  0821 04/28/25  0402 04/27/25  0611   ALBUMIN g/dL 2.3* 2.4* 2.4* 2.4*     Results from last 7 days   Lab Units 04/30/25  0525 04/29/25  0821 04/28/25  0402 04/27/25  0611   CALCIUM mg/dL 8.2* 8.5* 8.2* 8.1*   ALBUMIN g/dL 2.3* 2.4* 2.4* 2.4*   MAGNESIUM mg/dL 1.6 1.7 1.6 1.7   PHOSPHORUS mg/dL 3.9 4.0 3.1 3.5       Glucose   Date/Time Value Ref Range Status   04/30/2025 0553 112 70 - 130 mg/dL Final   04/29/2025 2035 138 (H) 70 - 130 mg/dL Final   04/29/2025 1542 127 70 - 130 mg/dL Final   04/29/2025 1220 126 70 - 130 mg/dL Final   04/29/2025 0557 163 (H) 70 - 130 mg/dL Final   04/29/2025 0017 158 (H) 70 - 130 mg/dL Final   04/28/2025 1207 218 (H) 70 - 130 mg/dL Final       No radiology results for the last day    I have personally reviewed all medications:  Scheduled Medications  arformoterol, 15 mcg, Nebulization, BID - RT  aspirin, 81 mg, Oral, Daily   Or  aspirin, 300 mg, Rectal, Daily  atorvastatin, 40 mg, Oral, Nightly  budesonide, 0.5 mg, Nebulization, BID - RT  clopidogrel, 75 mg, Oral, Daily  insulin regular, 2-7 Units, Subcutaneous, Q6H  metoprolol tartrate, 25 mg, Oral, Q12H  pantoprazole, 40 mg, Intravenous, Q12H  revefenacin, 175 mcg, Nebulization, Daily - RT  senna-docusate sodium, 2 tablet, Oral, BID    Infusions  heparin, 18 Units/kg/hr, Last Rate: 16 Units/kg/hr (04/30/25 0545)    Diet  Diet: Regular/House, Diabetic, Cardiac; Healthy Heart (2-3 Na+); Consistent Carbohydrate; Fluid Consistency: Thin (IDDSI 0)    I have personally reviewed:  [x]  Laboratory   [x]  Microbiology   []  Radiology   [x]  EKG/Telemetry  []  Cardiology/Vascular   []  Pathology    []  Records       Assessment/Plan     Active Hospital Problems    Diagnosis  POA   • Arterial embolism and thrombosis of  lower extremity [I74.3]  Unknown   • Acute thrombus of left ventricle [I24.0]  Yes   • Altered bowel habits [R19.4]  Yes   • Leg paresthesia [R20.2]  Yes      Resolved Hospital Problems    Diagnosis Date Resolved POA   • **Cardiogenic shock [R57.0] 04/15/2025 Yes       65 y.o. female admitted with Cardiogenic shock.    Left lower extremity embolism and thrombosis  Left ventricular thrombus with showering emboli of kidney, spleen, bile  Severe PAD  Acute post-procedural pain  - S/P open left iliofemoral, profunda femoris and superficial femoral artery thromboembolectomy with left common femoral endarterectomy and repair, 4 compartment fasciotomy of left lower extremity with left femoropopliteal angioplasty and stent placement x 2 on 4/12/2025  - S/P left lateral fasciotomy site debridement and left medial fasciotomy site debridement on 4/18/2025  - S/P left fasciotomy debridement with wound vac placement on 4/25/25.  - Patient on ASA, Plavix, heparin drip. Tentative plan for transition to oral AC prior to discharge.   - Hematology followed, has since signed off.  - Wound vac in place, changed previously.  - SNF at discharge, continue with medications for pain.   - Vascular following. Will continue to follow their plans/recommendations. Greatly appreciate their help.    Rectal bleeding  Acute post-hemorrhagic anemia  - Patient with rectal bleeding postop on 4/25/2025; Heparin drip was stopped and Plavix held  - GI consulted and suspected hemorrhoidal bleeding and has signed off.  - Heparin later restarted, she remains on heparin at present, hemoglobin does remain low, but stable, however, near cutoff for needing transfusion, so need to closely monitor this moving forward.  - Order repeat CBC in AM for reassessment. Transfuse for hemoglobin <7.    Cardiogenic shock  Inferior STEMI  - Status post JOE x 2 on 4/8/2025  - Aspirin 81 mg daily, Plavix 75 mg daily, atorvastatin 40 mg p.o. daily, Metoprolol 25 mg PO BID.  -  Tolerating current treatments well. Cardiology previously followed, per prior notes: Continue heparin drip, plan to transition to DOAC with long-term dual therapy. Has been cleared for Plavix without aspirin once transition to DOAC. Cardiology has since signed off.  - Cardiology has signed off    Type 2 diabetes with hyperglycemia  - A1c. 9.40% (04/09/25). Glucose elevated, but acceptable for most part.  - Continue with SSI as ordered. Adjust regimen as needed, treat hypoglycemia per protocol if develops.    Hypertension  - BP is soft at times, no overt hypotension noted, but need to closely monitor this. Trend BP to guide further management.      COPD  - Appears stable from this perspective at present.  No evidence to suggest acute exacerbation.  Continue current medications as prescribed and closely monitor.    Leukocytosis  - ID consulted, suspected WBC elevation is multifactorial as cultures are negative to date; antibiotics discontinued.  - WBC has improved, continue to monitor.    Ileus  - Ileus has resolved per surgery, NG removed, surgery signed off     NIA  - Resolved, creatinine stable  - Nephrology has signed off     GERD  - PPI    Portions of this text have been copied and I have reviewed these. Documentation accurate as of 04/30/25.       Heparin drip  for DVT prophylaxis.  Full code.  Discussed with patient, nursing staff, and CCP.  Anticipate discharge to SNU facility once precert has been obtained. Cleared by consultants  Expected Discharge Date: 5/1/2025; Expected Discharge Time:       Tj Ramirez DO  [facility]  04/30/25

## 2025-04-30 NOTE — THERAPY TREATMENT NOTE
Patient Name: Desiree Garcia  : 1960    MRN: [id number]                              Today's Date: 2025       Admit Date: 4/10/2025    Visit Dx:     ICD-10-CM ICD-9-CM   1. 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  I74.3 444.22     Patient Active Problem List   Diagnosis    Type 2 diabetes mellitus, without long-term current use of insulin    COPD (chronic obstructive pulmonary disease)    Depression with anxiety    Esophageal reflux    Forgetfulness    Leg paresthesia    Lumbar spinal stenosis    Migraines    Night sweat    Sciatica    Dyspnea on exertion    Thoracic or lumbosacral neuritis or radiculitis    Left wrist pain    Sprain of left wrist    Arthritis of knee, left    Contusion of left knee    Nondisplaced fracture of trapezium bone of left wrist with routine healing    Chronic left shoulder pain    Coronary artery disease involving native heart without angina pectoris    Hypertension, essential    Mixed hyperlipidemia    Cigarette nicotine dependence with nicotine-induced disorder    Altered bowel habits    Diarrhea    Rectal bleeding    Abdominal bloating    Allergic rhinitis due to food    Elevated troponin    Acute diastolic CHF (congestive heart failure)    History of ST elevation myocardial infarction (STEMI)    STEMI (ST elevation myocardial infarction)    Acute thrombus of left ventricle    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     Past Medical History:   Diagnosis Date    Arthritis     Cervical cancer screening     COPD (chronic obstructive pulmonary disease)     Coronary artery disease involving native heart without angina pectoris 2021    primary angioplasty and stent placement to mid right coronary artery with good angiographic results on 2021 after STEMI    Depression with anxiety     Diabetes mellitus     Forgetfulness     Gastric reflux     Hypertension     Leg paresthesia 2017    Right    Limb swelling     Lumbago 2017    Low  back pain    Lumbar spinal stenosis 2017    L4/5 moderate to severe central canal stenosis    Lumbosacral radiculopathy 2017    Right    Migraines     Night sweat     Reflux esophagitis     Shortness of breath     ST elevation myocardial infarction (STEMI) (CMS/[facility]) 2021    Stomach disorder      Past Surgical History:   Procedure Laterality Date    ABDOMINAL SURGERY      3 C-SECTIONS    ARTERIOGRAM LOWER EXTREMITY Left 2025    Procedure: ARTERIOGRAM LOWER EXTREMITY;  Surgeon: Misael Lawton MD;  Location: [facility] OR;  Service: Vascular;  Laterality: Left;    CARDIAC CATHETERIZATION      CARDIAC CATHETERIZATION N/A 2021    Procedure: Left Heart Cath;  Surgeon: Sidney Xiao MD;  Location: [facility] CATH INVASIVE LOCATION;  Service: Cardiology;  Laterality: N/A;    CARDIAC CATHETERIZATION N/A 2025    Procedure: Left Heart Cath;  Surgeon: James Verdugo MD;  Location: [facility] CATH INVASIVE LOCATION;  Service: Cardiology;  Laterality: N/A;     SECTION      x 3    CHOLECYSTECTOMY      COLONOSCOPY      COLONOSCOPY N/A 3/25/2025    Procedure: COLONOSCOPY WITH BIOPSIES AND COLD AND HOT SNARE POLYPECTOMIES;  Surgeon: Heber Najera MD;  Location: [facility] ENDOSCOPY;  Service: Gastroenterology;  Laterality: N/A;  COLON POLYPS    CORONARY STENT PLACEMENT      EMBOLECTOMY Left 2025    Procedure: EMBOLECTOMY MECHANICAL, FOUR COMPARTMENT FASCIOTOMY;  Surgeon: Misael Lawton MD;  Location: [facility] OR;  Service: Vascular;  Laterality: Left;    ENDOSCOPY      2007    FOOT SURGERY Right     HAND SURGERY      HYSTERECTOMY  1985    INCISION AND DRAINAGE LEG Left 2025    Procedure: LOWER EXTREMITY DEBRIDEMENT;  Surgeon: Misael Lawton MD;  Location: [facility] OR;  Service: Vascular;  Laterality: Left;    INCISION AND DRAINAGE LEG Left 2025    Procedure: Left lower extremity fasciotomy debridement with possible wound VAC placement;  Surgeon:  Raimundo Lin MD;  Location: [facility] MAIN OR;  Service: Vascular;  Laterality: Left;      General Information       Row Name 04/30/25 1705          Physical Therapy Time and Intention    Document Type evaluation;therapy note (daily note)  -     Mode of Treatment individual therapy;physical therapy  -       Row Name 04/30/25 1705          General Information    Existing Precautions/Restrictions fall  -       Row Name 04/30/25 1705          Cognition    Orientation Status (Cognition) oriented x 3  -       Row Name 04/30/25 1705          Safety Issues/Impairments Affecting Functional Mobility    Impairments Affecting Function (Mobility) balance;strength;pain;postural/trunk control;endurance/activity tolerance  -     Comment, Safety Issues/Impairments (Mobility) Gait belt and non skid socks donned  -               User Key  (r) = Recorded By, (t) = Taken By, (c) = Cosigned By      Initials Name Provider Type     Misael Richmond, PT Physical Therapist                   Mobility       Row Name 04/30/25 1705          Bed Mobility    Bed Mobility supine-sit;sit-supine;scooting/bridging  -     Scooting/Bridging [place] (Bed Mobility) standby assist  -     Supine-Sit [place] (Bed Mobility) standby assist  -     Sit-Supine [place] (Bed Mobility) standby assist  -     Assistive Device (Bed Mobility) bed rails;head of bed elevated  -       Row Name 04/30/25 1705          Sit-Stand Transfer    Sit-Stand [place] (Transfers) contact guard;1 person assist  -     Assistive Device (Sit-Stand Transfers) walker, front-wheeled  -       Row Name 04/30/25 1705          Gait/Stairs (Locomotion)    [place] Level (Gait) contact guard;1 person assist  -     Assistive Device (Gait) walker, front-wheeled  -     Patient was able to Ambulate yes  -     Distance in Feet (Gait) 2  -     Comment, (Gait/Stairs) side steps to HOB  -               User Key  (r) = Recorded By, (t) = Taken  By, (c) = Cosigned By      Initials Name Provider Type     Misael Richmond PT Physical Therapist                   Obj/Interventions       Row Name 04/30/25 1705          Balance    Balance Assessment sitting static balance;sitting dynamic balance;standing static balance;standing dynamic balance  -     Static Sitting Balance standby assist  -     Dynamic Sitting Balance standby assist  -     Position, Sitting Balance sitting edge of bed  -     Static Standing Balance contact guard  -     Dynamic Standing Balance contact guard;minimal assist  -     Position/Device Used, Standing Balance walker, front-wheeled  -     Balance Interventions sitting;standing;sit to stand;static;minimal challenge;dynamic  -               User Key  (r) = Recorded By, (t) = Taken By, (c) = Cosigned By      Initials Name Provider Type     Misael Richmond PT Physical Therapist                   Goals/Plan    No documentation.                  Clinical Impression       Row Name 04/30/25 1706          Pain    Pretreatment Pain Rating 6/10  -     Posttreatment Pain Rating 6/10  -     Pain Location extremity  -     Pain Side/Orientation left;lower  -     Pain Management Interventions nursing notified  -       Row Name 04/30/25 1706          Plan of Care Review    Plan of Care Reviewed With patient  -     Progress improving  -     Outcome Evaluation Pt supine in bed upon entry for PT treatment session on this date and is agreeable to participation with some encouragement and education. Ppt is able to complete bed mobility with SBA on this date. Once seated EOB patient is able to complete STS initially with min A but is able to complete a second later in the session with CGA. Pt is able to complete side steps up to the HOB on this date with cues for proper AD placement and appropriate posture and mechanics. Pt is tearful throughout the session with high levels of pain but is able to complete without any overt LOB.  Pt remains appropriate for skilled PT services to address ongoing deficits while in the hospital. Anticipate d/c to IRF at time of leaving the hospital.  -       Row Name 04/30/25 1706          Therapy Assessment/Plan (PT)    Criteria for Skilled Interventions Met (PT) yes  -     Therapy Frequency (PT) 6 times/wk  -       Row Name 04/30/25 1706          Vital Signs    O2 Delivery Pre Treatment supplemental O2  -     O2 Delivery Intra Treatment room air  -     O2 Delivery Post Treatment supplemental O2  -     Pre Patient Position Supine  -MH     Intra Patient Position Standing  -     Post Patient Position Supine  -       Row Name 04/30/25 1706          Positioning and Restraints    Pre-Treatment Position in bed  -     Post Treatment Position bed  -MH     In Bed notified nsg;exit alarm on;call light within reach;encouraged to call for assist;fowdane  -               User Key  (r) = Recorded By, (t) = Taken By, (c) = Cosigned By      Initials Name Provider Type     Misael Richmond, PT Physical Therapist                   Outcome Measures       Row Name 04/30/25 1707 04/30/25 0900       How much help from another person do you currently need...    Turning from your back to your side while in flat bed without using bedrails? 4  - 3  -SW    Moving from lying on back to sitting on the side of a flat bed without bedrails? 4  - 3  -SW    Moving to and from a bed to a chair (including a wheelchair)? 3  - 3  -SW    Standing up from a chair using your arms (e.g., wheelchair, bedside chair)? 3  - 3  -SW    Climbing 3-5 steps with a railing? 1  - 1  -SW    To walk in hospital room? 1  - 1  -SW    AM-PAC 6 Clicks Score (PT) 16  - 14  -SW    Highest Level of Mobility Goal 5 --> Static standing  - 4 --> Transfer to chair/commode  -      Row Name 04/30/25 1318          Modified Jimbo Scale    Modified Jimbo Scale 4 - Moderately severe disability.  Unable to walk without assistance, and unable  to attend to own bodily needs without assistance.  -ELIZABETH       Row Name 04/30/25 1707 04/30/25 1318       Functional Assessment    Outcome Measure Options AM-PAC 6 Clicks Basic Mobility (PT)  - AM-PAC 6 Clicks Daily Activity (OT);Modified Jimbo  -ELIZABETH              User Key  (r) = Recorded By, (t) = Taken By, (c) = Cosigned By      Initials Name Provider Type    Sol Dupont, RN Registered Nurse    Misael Diaz, PT Physical Therapist    Yu Will OT Occupational Therapist                                 Physical Therapy Education       Title: PT OT SLP Therapies (Done)       Topic: Physical Therapy (Done)       Point: Mobility training (Done)       Learning Progress Summary            Patient Acceptance, E, NR,VU by  at 4/30/2025 1707    Acceptance, E,TB,D, VU,NR by  at 4/27/2025 1617    Acceptance, E,TB,D, VU,NR by  at 4/24/2025 1720    Acceptance, E,TB,D, VU,NR by TIFF at 4/23/2025 1326    Eager, E,TB,D, VU,NR by TIFF at 4/21/2025 1034    Acceptance, E,D, VU,NR by MS at 4/20/2025 1124    Acceptance, E, NR by MR at 4/19/2025 1647    Acceptance, E,D, NR by TIFF at 4/17/2025 1809    Acceptance, E, NR by MARISSA at 4/15/2025 1324                      Point: Home exercise program (Done)       Learning Progress Summary            Patient Acceptance, E, NR,VU by  at 4/30/2025 1707    Acceptance, E,TB,D, VU,NR by  at 4/27/2025 1617    Acceptance, E,TB,D, VU,NR by TIFF at 4/24/2025 1720    Acceptance, E,TB,D, VU,NR by TIFF at 4/23/2025 1326    Eager, E,TB,D, VU,NR by TIFF at 4/21/2025 1034    Acceptance, E,D, VU,NR by MS at 4/20/2025 1124    Acceptance, E, NR by MR at 4/19/2025 1647    Acceptance, E,D, NR by TIFF at 4/17/2025 1809                      Point: Body mechanics (Done)       Learning Progress Summary            Patient Acceptance, E, NR,VU by  at 4/30/2025 1707    Acceptance, E,TB,D, VU,NR by JERARDO at 4/27/2025 1617    Acceptance, E,TB,D, VU,NR by TIFF at 4/24/2025 1720    Acceptance, E,TB,D, VU,NR by TIFF  at 4/23/2025 1326    Eager, E,TB,D, VU,NR by  at 4/21/2025 1034    Acceptance, E,D, VU,NR by MS at 4/20/2025 1124    Acceptance, E, NR by MR at 4/19/2025 1647    Acceptance, E,D, NR by TIFF at 4/17/2025 1809    Acceptance, E, NR by MARISSA at 4/15/2025 1324                      Point: Precautions (Done)       Learning Progress Summary            Patient Acceptance, E, NR,VU by  at 4/30/2025 1707    Acceptance, E,TB,D, VU,NR by  at 4/27/2025 1617    Acceptance, E,TB,D, VU,NR by  at 4/24/2025 1720    Acceptance, E,TB,D, VU,NR by  at 4/23/2025 1326    Eager, E,TB,D, VU,NR by  at 4/21/2025 1034    Acceptance, E,D, VU,NR by MS at 4/20/2025 1124    Acceptance, E, NR by MR at 4/19/2025 1647    Acceptance, E,D, NR by TIFF at 4/17/2025 1809    Acceptance, E, NR by MARISSA at 4/15/2025 1324                                      User Key       Initials Effective Dates Name Provider Type Discipline     06/16/21 -  Marie Grossman, PT Physical Therapist PT     03/07/18 -  Laura Gooden PTA Physical Therapist Assistant PT    MS 06/16/21 -  Misael Creda, PT Physical Therapist PT     10/25/19 -  Meka Araiza, TERRENCE Physical Therapist PT     08/03/23 -  Mary Jane Kelly, RN Registered Nurse Nurse     09/11/24 -  Misael Richmond, PT Physical Therapist PT                  PT Recommendation and Plan     Progress: improving  Outcome Evaluation: Pt supine in bed upon entry for PT treatment session on this date and is agreeable to participation with some encouragement and education. Ppt is able to complete bed mobility with SBA on this date. Once seated EOB patient is able to complete STS initially with min A but is able to complete a second later in the session with CGA. Pt is able to complete side steps up to the HOB on this date with cues for proper AD placement and appropriate posture and mechanics. Pt is tearful throughout the session with high levels of pain but is able to complete without any overt LOB. Pt remains  appropriate for skilled PT services to address ongoing deficits while in the hospital. Anticipate d/c to IRF at time of leaving the hospital.     Time Calculation:         PT Charges       Row Name 04/30/25 1704             Time Calculation    Start Time 1600  -      Stop Time 1616  -      Time Calculation (min) 16 min  -      PT Received On 04/30/25  -      PT - Next Appointment 05/01/25  -         Time Calculation- PT    Total Timed Code Minutes- PT 16 minute(s)  -MH         Timed Charges    11198 - PT Therapeutic Activity Minutes 16  -MH         Total Minutes    Timed Charges Total Minutes 16  -MH       Total Minutes 16  -MH                User Key  (r) = Recorded By, (t) = Taken By, (c) = Cosigned By      Initials Name Provider Type     Misael Richmond, TERRENCE Physical Therapist                  Therapy Charges for Today       Code Description Service Date Service Provider Modifiers Qty    [id number]  PT THERAPEUTIC ACT EA 15 MIN 4/30/2025 Misael Richmond, PT GP 1            PT G-Codes  Outcome Measure Options: AM-PAC 6 Clicks Basic Mobility (PT)  AM-PAC 6 Clicks Score (PT): 16  AM-PAC 6 Clicks Score (OT): 16  Modified Jimbo Scale: 4 - Moderately severe disability.  Unable to walk without assistance, and unable to attend to own bodily needs without assistance.  PT Discharge Summary  Anticipated Discharge Disposition (PT): inpatient rehabilitation facility    Misael Richmond PT  4/30/2025

## 2025-04-30 NOTE — PROGRESS NOTES
Nutrition Services    Patient Name: Desiree Garcia  YOB: 1960  MRN: [id number]  Admission date: 4/10/2025    PROGRESS NOTE      Encounter Information: Follow up  S/p left lower extremity fasciotomy debridement       PO Diet: Diet: Regular/House, Diabetic, Cardiac; Healthy Heart (2-3 Na+); Consistent Carbohydrate; Fluid Consistency: Thin (IDDSI 0)   PO Supplements: Matt--dislikes, will try some Boost Glucose   PO Intake:  %       Current nutrition support: -   Nutrition support review: -       Weight: Weight: 83 kg (182 lb 15.7 oz) (04/26/25 0649)       Medications: reviewed pericolace, protonix   Labs: reviewed Alb 2.3   skin Surgical wound, Wound vac   GI Function:  last bowel movement: 4/28       Nutrition Intervention Updates: Good po intake encouraged with all meals  Will try Boost Glucerna for extra protein to help with healing.  RD to follow     Results from last 7 days   Lab Units 04/30/25  0525 04/29/25  0821 04/28/25  0402   SODIUM mmol/L 138 139 137   POTASSIUM mmol/L 3.8 3.9 4.2   CHLORIDE mmol/L 101 102 101   CO2 mmol/L 28.0 28.5 28.0   BUN mg/dL 7* 7* 6*   CREATININE mg/dL 0.70 0.63 0.74   CALCIUM mg/dL 8.2* 8.5* 8.2*   GLUCOSE mg/dL 112* 101* 113*     Results from last 7 days   Lab Units 04/30/25  0525 04/29/25  0821 04/28/25  0402   MAGNESIUM mg/dL 1.6 1.7 1.6   PHOSPHORUS mg/dL 3.9 4.0 3.1   HEMOGLOBIN g/dL 7.4* 7.2* 7.2*   HEMATOCRIT % 25.3* 24.3* 23.0*     COVID19   Date Value Ref Range Status   03/17/2025 Not Detected Not Detected - Ref. Range Final     Lab Results   Component Value Date    HGBA1C 9.40 (H) 04/09/2025       RD to follow up per protocol.    Electronically signed by:  Maddy Ma RD  04/30/25 09:55 EDT

## 2025-04-30 NOTE — NURSING NOTE
CWOCN- f/u for wound VAC dressing change. Patient premedicated with IV dilaudid and prior she had PO. She did tolerated the dressing about the same as Monday. She would have a difficult time still with just PO meds.     Photos updated and message sent to Dr Lawton. No change in current plan. Improvement noted to the tissue. Next change is Friday.        04/30/25 1426   Wound 04/12/25 1055 Left lateral leg Surgical Open Surgical Incision   Placement Date/Time: 04/12/25 1055   Side: Left  Orientation: lateral  Location: leg  Primary Wound Type: Surgical  Secondary Wound Type - Surgical: Open Surgical Incision   Wound Image    Dressing Appearance intact   Base moist;red;pink;exposed structure   Periwound pink;moist   Periwound Temperature warm   Periwound Skin Turgor soft   Edges open   Drainage Characteristics/Odor serosanguineous   Drainage Amount small   Care, Wound cleansed with;sterile normal saline;negative pressure wound therapy   Dressing Care dressing changed   Periwound Care barrier film applied;topical treatment applied   Wound 04/12/25 1055 Left medial leg Surgical Open Surgical Incision   Placement Date/Time: 04/12/25 1055   Side: Left  Orientation: medial  Location: leg  Primary Wound Type: Surgical  Secondary Wound Type - Surgical: Open Surgical Incision   Wound Image    Dressing Appearance intact   Closure Staples   Base moist;pink;red   Periwound moist;pink   Periwound Temperature warm   Periwound Skin Turgor soft   Edges open   Drainage Characteristics/Odor serosanguineous   Drainage Amount scant   Care, Wound cleansed with;sterile normal saline;negative pressure wound therapy   Dressing Care dressing changed   Periwound Care barrier film applied;topical treatment applied   NPWT (Negative Pressure Wound Therapy) 04/25/25 1205   Placement Date/Time: 04/25/25 1205   Additional Comments: Dr. Lin placed in surgery   Therapy Setting continuous therapy   Dressing foam, black;gauze,  nonadherent;transparent dressing   Contact Layer silicone   Pressure Setting 125 mmHg   Sponges Inserted   (1 piece to each wound with versatel over the lateral wound)   Sponges Removed   (removed all black foam & versatel from both medial and lateral)

## 2025-04-30 NOTE — DISCHARGE PLACEMENT REQUEST
"Bin Catalan (65 y.o. Female)       Date of Birth   1960    Social Security Number       Address   [address] [place], [place] [address]    Home Phone   [phone]    MRN   [id number]       [facility]    Marital Status   Legally                             Admission Date   4/10/2025    Admission Type   Urgent    Admitting Provider   Bryn Merino MD    Attending Provider   Tj Ramirez DO    Department, Room/Bed   [address], [address]/1       Discharge Date       Discharge Disposition       Discharge Destination                                 Attending Provider: Tj Ramirez DO    Allergies: Tramadol    Isolation: None   Infection: None   Code Status: CPR    Ht: 167.6 cm (66\")   Wt: 83 kg (182 lb 15.7 oz)    Admission Cmt: None   Principal Problem: Cardiogenic shock [R57.0]                   Active Insurance as of 4/10/2025       Primary Coverage       Payor Plan Insurance Group Employer/Plan Group    HUMANA MEDICARE REPLACEMENT HU[facility]IC[facility] [id number]       Payor Plan Address Payor Plan Phone Number Payor Plan Fax Number Effective Dates    [address] [address] [phone]  1/1/2024 - None Entered    [facility] [id number]         Subscriber Name Subscriber Birth Date Member ID       BIN CATALAN 1960 [id number]               Secondary Coverage       Payor Plan Insurance Group Employer/Plan Group    [facility] [id number]       Payor Plan Address Payor Plan Phone Number Payor Plan Fax Number Effective Dates    [address] [address]   1/1/2021 - None Entered    [facility] [id number]         Subscriber Name Subscriber Birth Date Member ID       BIN CATALAN 1960 [id number]                     Emergency Contacts        (Rel.) Home Phone Work Phone Mobile Phone    Yulisa Chowdhury (Sister) -- [phone] --    Angel CRUZ (Son) -- -- [phone]    Yulisa Chowdhury " (Sister) [phone] -- [phone]    Yana CRUZ (Relative) -- -- [phone]    jonnie pritchard (Brother) -- -- [phone]

## 2025-04-30 NOTE — PLAN OF CARE
Goal Outcome Evaluation:  Plan of Care Reviewed With: patient        Progress: improving  Outcome Evaluation: Pt supine in bed upon entry for PT treatment session on this date and is agreeable to participation with some encouragement and education. Ppt is able to complete bed mobility with SBA on this date. Once seated EOB patient is able to complete STS initially with min A but is able to complete a second later in the session with CGA. Pt is able to complete side steps up to the HOB on this date with cues for proper AD placement and appropriate posture and mechanics. Pt is tearful throughout the session with high levels of pain but is able to complete without any overt LOB. Pt remains appropriate for skilled PT services to address ongoing deficits while in the hospital. Anticipate d/c to IRF at time of leaving the hospital.    Anticipated Discharge Disposition (PT): inpatient rehabilitation facility

## 2025-04-30 NOTE — PLAN OF CARE
"Goal Outcome Evaluation:  Plan of Care Reviewed With: patient           Outcome Evaluation: Pt participates in skilled tx progressing bed mobility, sitting balance for self care participation. Pt completes with SBA-supervision for safety, but tearful throughout limiting additional OOB participation. Pt reporting she \"just wants to go home\", seemingly upset regarding pain and current functional status. IPOT to continue to follow to progress goal-oriented activity with continued recommendations for rehab stay at discharge.    Anticipated Discharge Disposition (OT): inpatient rehabilitation facility                        "

## 2025-04-30 NOTE — PLAN OF CARE
Problem: Adult Inpatient Plan of Care  Goal: Plan of Care Review  Outcome: Progressing  Flowsheets (Taken 4/30/2025 1706 by Misael Richmond, PT)  Progress: improving  Goal: Patient-Specific Goal (Individualized)  Outcome: Progressing  Goal: Absence of Hospital-Acquired Illness or Injury  Outcome: Progressing  Intervention: Identify and Manage Fall Risk  Recent Flowsheet Documentation  Taken 4/30/2025 1635 by Sol Grady RN  Safety Promotion/Fall Prevention:   safety round/check completed   nonskid shoes/slippers when out of bed   fall prevention program maintained   clutter free environment maintained  Taken 4/30/2025 1415 by Sol Grady RN  Safety Promotion/Fall Prevention:   safety round/check completed   fall prevention program maintained   clutter free environment maintained  Intervention: Prevent Skin Injury  Recent Flowsheet Documentation  Taken 4/30/2025 1635 by Sol Grady RN  Body Position:   position maintained   left   legs elevated   side-lying  Taken 4/30/2025 1415 by Sol Grady RN  Body Position:   position changed independently   side-lying   right  Taken 4/30/2025 0900 by Sol Grady RN  Skin Protection: incontinence pads utilized  Intervention: Prevent and Manage VTE (Venous Thromboembolism) Risk  Recent Flowsheet Documentation  Taken 4/30/2025 0900 by Sol Grady RN  VTE Prevention/Management: (hep Gtt) other (see comments)  Intervention: Prevent Infection  Recent Flowsheet Documentation  Taken 4/30/2025 1635 by Sol Grady RN  Infection Prevention: single patient room provided  Taken 4/30/2025 1415 by Sol Grady RN  Infection Prevention: single patient room provided  Goal: Optimal Comfort and Wellbeing  Outcome: Progressing  Intervention: Monitor Pain and Promote Comfort  Recent Flowsheet Documentation  Taken 4/30/2025 1635 by Sol Grady RN  Pain Management Interventions: pain management plan reviewed with patient/caregiver  Taken 4/30/2025  1415 by Sol Grady RN  Pain Management Interventions:   pain management plan reviewed with patient/caregiver   pain medication given  Taken 4/30/2025 0900 by Sol Grady RN  Pain Management Interventions:   pain management plan reviewed with patient/caregiver   pain medication given  Intervention: Provide Person-Centered Care  Recent Flowsheet Documentation  Taken 4/30/2025 1635 by Sol Grady RN  Trust Relationship/Rapport:   care explained   questions answered  Taken 4/30/2025 1415 by Sol Grady RN  Trust Relationship/Rapport:   care explained   reassurance provided  Taken 4/30/2025 0900 by Sol Grady RN  Trust Relationship/Rapport:   care explained   questions answered  Goal: Readiness for Transition of Care  Outcome: Progressing     Problem: Skin Injury Risk Increased  Goal: Skin Health and Integrity  Outcome: Progressing  Intervention: Optimize Skin Protection  Recent Flowsheet Documentation  Taken 4/30/2025 1635 by Sol Grady RN  Activity Management: activity encouraged  Head of Bed (HOB) Positioning: HOB at 15 degrees  Taken 4/30/2025 1415 by Sol Grady RN  Activity Management: activity encouraged  Pressure Reduction Techniques: frequent weight shift encouraged  Head of Bed (HOB) Positioning: HOB flat  Pressure Reduction Devices:   alternating pressure pump (YARON)   positioning supports utilized  Taken 4/30/2025 0900 by Sol Grady RN  Pressure Reduction Techniques:   frequent weight shift encouraged   pressure points protected  Pressure Reduction Devices:   alternating pressure pump (YARON)   positioning supports utilized  Skin Protection: incontinence pads utilized     Problem: Sepsis/Septic Shock  Goal: Optimal Coping  Outcome: Progressing  Goal: Absence of Bleeding  Outcome: Progressing  Intervention: Monitor and Manage Bleeding  Recent Flowsheet Documentation  Taken 4/30/2025 0900 by Sol Grady RN  Bleeding Precautions: monitored for signs of  bleeding  Bleeding Management: dressing monitored  Goal: Blood Glucose Level Within Target Range  Outcome: Progressing  Intervention: Optimize Glycemic Control  Recent Flowsheet Documentation  Taken 4/30/2025 0900 by Sol Grady RN  Hyperglycemia Management: blood glucose monitored  Goal: Absence of Infection Signs and Symptoms  Outcome: Progressing  Intervention: Initiate Sepsis Management  Recent Flowsheet Documentation  Taken 4/30/2025 1635 by Sol Grady RN  Infection Prevention: single patient room provided  Taken 4/30/2025 1415 by Sol Grady RN  Infection Prevention: single patient room provided  Intervention: Promote Recovery  Recent Flowsheet Documentation  Taken 4/30/2025 1635 by Sol Grady RN  Activity Management: activity encouraged  Taken 4/30/2025 1415 by Sol Grady RN  Activity Management: activity encouraged  Goal: Optimal Nutrition Delivery  Outcome: Progressing     Problem: Fall Injury Risk  Goal: Absence of Fall and Fall-Related Injury  Outcome: Progressing  Intervention: Identify and Manage Contributors  Recent Flowsheet Documentation  Taken 4/30/2025 1415 by Sol Grady RN  Medication Review/Management: medications reviewed  Taken 4/30/2025 0900 by Sol Grady RN  Medication Review/Management: medications reviewed  Intervention: Promote Injury-Free Environment  Recent Flowsheet Documentation  Taken 4/30/2025 1635 by Sol Grady RN  Safety Promotion/Fall Prevention:   safety round/check completed   nonskid shoes/slippers when out of bed   fall prevention program maintained   clutter free environment maintained  Taken 4/30/2025 1415 by Sol Grady RN  Safety Promotion/Fall Prevention:   safety round/check completed   fall prevention program maintained   clutter free environment maintained     Problem: Pain Acute  Goal: Optimal Pain Control and Function  Outcome: Progressing  Intervention: Develop Pain Management Plan  Recent Flowsheet  Documentation  Taken 4/30/2025 1635 by Sol Grady RN  Pain Management Interventions: pain management plan reviewed with patient/caregiver  Taken 4/30/2025 1415 by Sol Grady RN  Pain Management Interventions:   pain management plan reviewed with patient/caregiver   pain medication given  Taken 4/30/2025 0900 by Sol Grady RN  Pain Management Interventions:   pain management plan reviewed with patient/caregiver   pain medication given  Intervention: Prevent or Manage Pain  Recent Flowsheet Documentation  Taken 4/30/2025 1415 by Sol Grady RN  Medication Review/Management: medications reviewed  Taken 4/30/2025 0900 by Sol Grady RN  Medication Review/Management: medications reviewed     Problem: Heart Failure  Goal: Optimal Coping  Outcome: Progressing  Goal: Optimal Cardiac Output and Blood Flow  Outcome: Progressing  Goal: Stable Heart Rate and Rhythm  Outcome: Progressing  Goal: Fluid and Electrolyte Balance  Outcome: Progressing  Goal: Optimal Functional Ability  Outcome: Progressing  Intervention: Optimize Functional Ability  Recent Flowsheet Documentation  Taken 4/30/2025 1635 by Sol Grady RN  Activity Management: activity encouraged  Taken 4/30/2025 1415 by Sol Grady RN  Activity Management: activity encouraged  Goal: Improved Oral Intake  Outcome: Progressing  Goal: Effective Oxygenation and Ventilation  Outcome: Progressing  Intervention: Promote Airway Secretion Clearance  Recent Flowsheet Documentation  Taken 4/30/2025 1635 by Sol Grady RN  Activity Management: activity encouraged  Taken 4/30/2025 1415 by Sol Grady RN  Activity Management: activity encouraged  Intervention: Optimize Oxygenation and Ventilation  Recent Flowsheet Documentation  Taken 4/30/2025 1635 by Sol Grady RN  Head of Bed (HOB) Positioning: HOB at 15 degrees  Taken 4/30/2025 1415 by Sol Grady RN  Head of Bed (HOB) Positioning: HOB flat  Goal: Effective  Breathing Pattern During Sleep  Outcome: Progressing  Intervention: Monitor and Manage Obstructive Sleep Apnea  Recent Flowsheet Documentation  Taken 4/30/2025 1415 by Sol Grady, RN  Medication Review/Management: medications reviewed  Taken 4/30/2025 0900 by Sol Grady, RN  Medication Review/Management: medications reviewed     Problem: VTE (Venous Thromboembolism)  Goal: Tissue Perfusion  Outcome: Progressing  Intervention: Optimize Tissue Perfusion  Recent Flowsheet Documentation  Taken 4/30/2025 0900 by Sol Grady, RN  Bleeding Precautions: monitored for signs of bleeding  VTE Prevention/Management: (hep Gtt) other (see comments)  Goal: Optimal Right Ventricular Function  Outcome: Progressing   Goal Outcome Evaluation:

## 2025-04-30 NOTE — PROGRESS NOTES
[facility]   Surgical Progress Note    Patient Name: Desiree Garcia  : 1960  MRN: [id number]  Date of admission: 4/10/2025  Surgical Procedures Since Admission:  Procedure(s):  EMBOLECTOMY MECHANICAL, FOUR COMPARTMENT FASCIOTOMY  ARTERIOGRAM LOWER EXTREMITY  Surgeon:  Misael Lawton MD  Status:  18 Days Post-Op  -------------------    Procedure(s):  LOWER EXTREMITY DEBRIDEMENT  Surgeon:  Misael Lawton MD  Status:  12 Days Post-Op  -------------------  **Canceled**    Procedure(s):  Left leg fasciotomy debridement with possible wound VAC placement.  Surgeon:  Raimundo Lin MD  Status:  * Surgery not performed *  -------------------    Procedure(s):  Left lower extremity fasciotomy debridement with possible wound VAC placement  Surgeon:  Raimundo Lin MD  Status:  5 Days Post-Op  -------------------    Subjective   Subjective     Chief Complaint: Left leg ischemia follow-up.    History of Present Illness   Resting quite comfortably in her hospital room.  Pain in the left lower extremity.    Review of Systems    Objective   Objective     Vitals:   Temp:  [98.1 °F (36.7 °C)-99.3 °F (37.4 °C)] 98.2 °F (36.8 °C)  Heart Rate:  [68-79] 76  Resp:  [18] 18  BP: ()/(50-71) 107/61  Flow (L/min) (Oxygen Therapy):  [2] 2  Output by Drain (mL) 25 0701 - 25 1900 25 1901 - 25 0700 25 0701 - 25 0807 Range Total   Patient has no LDAs of requested type attached.     Physical exam.  Wound VAC in place.  Will await pictures with change today.        Result Review    Result Review:  I have personally reviewed the results from the time of this admission to 2025 08:07 EDT and agree with these findings:  [x]  Laboratory list / accordion  []  Microbiology  []  Radiology  []  EKG/Telemetry   []  Cardiology/Vascular   []  Pathology  []  Old records  []  Other:  Most notable findings include:       Results from last 7 days   Lab Units 25  0525 25  0866  04/28/25  0402 04/27/25  0611 04/26/25  0737 04/25/25  1833 04/25/25  0609   WBC 10*3/mm3 8.39 8.49 10.81* 10.86* 12.99* 12.96* 14.58*   HEMOGLOBIN g/dL 7.4* 7.2* 7.2* 7.2* 7.3* 7.5* 7.2*   PLATELETS 10*3/mm3 451* 446 421 418 456* 451* 416     Results from last 7 days   Lab Units 04/30/25  0525 04/29/25  0821 04/28/25  0402 04/27/25  1845 04/27/25  0611 04/26/25  0737 04/25/25  1833 04/25/25  0609 04/24/25  0620   SODIUM mmol/L 138 139 137  --  137 137 137 139 136   POTASSIUM mmol/L 3.8 3.9 4.2 4.2 3.6 3.8 4.2 3.8 3.8   CHLORIDE mmol/L 101 102 101  --  100 99 99 101 96*   CO2 mmol/L 28.0 28.5 28.0  --  28.0 29.0 32.1* 31.3* 29.0   BUN mg/dL 7* 7* 6*  --  7* 7* 9 8 12   CREATININE mg/dL 0.70 0.63 0.74  --  0.59 0.63 0.77 0.73 0.67   GLUCOSE mg/dL 112* 101* 113*  --  143* 117* 149* 129* 166*   MAGNESIUM mg/dL 1.6 1.7 1.6  --  1.7 1.7  --  1.8 1.6   PHOSPHORUS mg/dL 3.9 4.0 3.1  --  3.5 3.7  --  3.8 3.4   Estimated Creatinine Clearance: 88.2 mL/min (by C-G formula based on SCr of 0.7 mg/dL).  Results from last 7 days   Lab Units 04/26/25  1759 04/25/25  1904   PROTIME Seconds 15.9* 15.5*   INR  1.27* 1.24*     Lab Results   Component Value Date    HGBA1C 9.40 (H) 04/09/2025    HGBA1C 9.30 (H) 03/13/2025    HGBA1C 9.40 (H) 12/23/2024     Glucose   Date/Time Value Ref Range Status   04/30/2025 0553 112 70 - 130 mg/dL Final   04/29/2025 2035 138 (H) 70 - 130 mg/dL Final   04/29/2025 1542 127 70 - 130 mg/dL Final   04/29/2025 1220 126 70 - 130 mg/dL Final   04/29/2025 0557 163 (H) 70 - 130 mg/dL Final   04/29/2025 0017 158 (H) 70 - 130 mg/dL Final   04/28/2025 1207 218 (H) 70 - 130 mg/dL Final   04/28/2025 0609 135 (H) 70 - 130 mg/dL Final       Assessment & Plan   Assessment / Plan     Brief Patient Summary:  Desiree Garcia is a 65 y.o. female who multiple medical issues.  Vascular is following for ischemia of the left lower extremity.    Active Hospital Problems:   Active Hospital Problems    Diagnosis    • Arterial  embolism and thrombosis of lower extremity    • Acute thrombus of left ventricle    • Altered bowel habits    • Leg paresthesia      Plan:   4/12/2025: Open left leg thrombectomy with 4 compartment fasciotomies.  ([facility])  4/18/2025: Debridement of left leg fasciotomies ([facility])    4/19/2025: Postop day #1 from fasciotomy debridement.  Dressings down today.  Minimal oozing noted muscles appear viable as pictured below.  There is some ischemic changes to the skin on the anterior margin of the lateral fasciotomies will continue to monitor.  I redressed the wounds with the assistance of the nurse with dilute Betadine soaked gauze.  Will plan on leaving intact upon my evaluation for tomorrow.  She has a strong dorsalis pedis artery signal.  She has weak dorsiflexion of her left foot.      4/20/2025: Postop day 2 fasciotomy debridement.  Will continue with once daily Dakin's dressing changes.  Will continue to lower the anterior skin on the lateral fasciotomy to demarcate.  This will likely require debridement sometime next week once further demarcation has occurred.    4/21/2025: Postop day 3 fasciotomy debridement.  With some anterior ischemic changes to the flap on the lateral compartment.  Will place her on the surgery schedule for Wednesday to debride this.  If things appear well at that time we will also place wound VAC.    4/22/2025: Postop day #4 fasciotomy debridement.  Plan on surgical debridement of the ischemic skin on the left anterior lateral fasciotomy with wound VAC placement tomorrow.  Will hold heparin infusion when patient goes down to preoperative holding tomorrow.    4/23/2025: Patient was in preop holding for planned/nonemergent left fasciotomy debridement.  Here she appeared diaphoretic with somewhat labored and irregular breathing and overall feeling of doom.  Anesthesia concerned with change in EKG.  Currently I have minimal concerns for any sort of undrained sepsis or urgent nature of the operation in  her leg.  I am going to remove her from the surgery schedule today.  I personally called cardiology who will review the case and see the patient.  I have tentatively placed her back on the OR schedule for Friday assuming medically more stable.    4/24/2025: Appreciate cardiology's assistance.  Will start D5 LR at midnight.  Plans for surgical debridement of the left leg tomorrow with wound VAC placement.    4/25/2025: Excisional debridement of anterior flap of the lateral fasciotomy ischemic tissue with wound VAC placement.    4/28/2025: Will premedicate with 1 mg IV Dilaudid prior to wound VAC change.  Will ask wound care team to change today.    4/29/2025: Will change wound VAC tomorrow.  Things remain stable.  Will ask plastic surgery to get involved for eventual skin grafting.  Concerns for taking her off anticoagulants for skin graft will have to be managed aggressively.    4/30/2025.  Awaiting wound VAC change by wound care and nursing today. Pictures will be placed in chart.  Awaiting opinion on skin grafting by plastic surgery.    VTE Prophylaxis:  Pharmacologic & mechanical VTE prophylaxis orders are present.      Misael Lawton MD

## 2025-04-30 NOTE — THERAPY PROGRESS REPORT/RE-CERT
Patient Name: Desiree Garcia  : 1960    MRN: [id number]                              Today's Date: 2025       Admit Date: 4/10/2025    Visit Dx:     ICD-10-CM ICD-9-CM   1. 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  I74.3 444.22     Patient Active Problem List   Diagnosis    Type 2 diabetes mellitus, without long-term current use of insulin    COPD (chronic obstructive pulmonary disease)    Depression with anxiety    Esophageal reflux    Forgetfulness    Leg paresthesia    Lumbar spinal stenosis    Migraines    Night sweat    Sciatica    Dyspnea on exertion    Thoracic or lumbosacral neuritis or radiculitis    Left wrist pain    Sprain of left wrist    Arthritis of knee, left    Contusion of left knee    Nondisplaced fracture of trapezium bone of left wrist with routine healing    Chronic left shoulder pain    Coronary artery disease involving native heart without angina pectoris    Hypertension, essential    Mixed hyperlipidemia    Cigarette nicotine dependence with nicotine-induced disorder    Altered bowel habits    Diarrhea    Rectal bleeding    Abdominal bloating    Allergic rhinitis due to food    Elevated troponin    Acute diastolic CHF (congestive heart failure)    History of ST elevation myocardial infarction (STEMI)    STEMI (ST elevation myocardial infarction)    Acute thrombus of left ventricle    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     Past Medical History:   Diagnosis Date    Arthritis     Cervical cancer screening     COPD (chronic obstructive pulmonary disease)     Coronary artery disease involving native heart without angina pectoris 2021    primary angioplasty and stent placement to mid right coronary artery with good angiographic results on 2021 after STEMI    Depression with anxiety     Diabetes mellitus     Forgetfulness     Gastric reflux     Hypertension     Leg paresthesia 2017    Right    Limb swelling     Lumbago 2017    Low  back pain    Lumbar spinal stenosis 2017    L4/5 moderate to severe central canal stenosis    Lumbosacral radiculopathy 2017    Right    Migraines     Night sweat     Reflux esophagitis     Shortness of breath     ST elevation myocardial infarction (STEMI) (CMS/[facility]ly [facility]) 2021    Stomach disorder      Past Surgical History:   Procedure Laterality Date    ABDOMINAL SURGERY      3 C-SECTIONS    ARTERIOGRAM LOWER EXTREMITY Left 2025    Procedure: ARTERIOGRAM LOWER EXTREMITY;  Surgeon: Misael Lawton MD;  Location: [facility] OR;  Service: Vascular;  Laterality: Left;    CARDIAC CATHETERIZATION      CARDIAC CATHETERIZATION N/A 2021    Procedure: Left Heart Cath;  Surgeon: Sidney Xiao MD;  Location: [facility] CATH INVASIVE LOCATION;  Service: Cardiology;  Laterality: N/A;    CARDIAC CATHETERIZATION N/A 2025    Procedure: Left Heart Cath;  Surgeon: James Verdugo MD;  Location: [facility] CATH INVASIVE LOCATION;  Service: Cardiology;  Laterality: N/A;     SECTION      x 3    CHOLECYSTECTOMY      COLONOSCOPY      COLONOSCOPY N/A 3/25/2025    Procedure: COLONOSCOPY WITH BIOPSIES AND COLD AND HOT SNARE POLYPECTOMIES;  Surgeon: Heber Najera MD;  Location: [facility] ENDOSCOPY;  Service: Gastroenterology;  Laterality: N/A;  COLON POLYPS    CORONARY STENT PLACEMENT      EMBOLECTOMY Left 2025    Procedure: EMBOLECTOMY MECHANICAL, FOUR COMPARTMENT FASCIOTOMY;  Surgeon: Misael Lawton MD;  Location: [facility] OR;  Service: Vascular;  Laterality: Left;    ENDOSCOPY      2007    FOOT SURGERY Right     HAND SURGERY      HYSTERECTOMY  1985    INCISION AND DRAINAGE LEG Left 2025    Procedure: LOWER EXTREMITY DEBRIDEMENT;  Surgeon: Misael Lawton MD;  Location: [facility] OR;  Service: Vascular;  Laterality: Left;    INCISION AND DRAINAGE LEG Left 2025    Procedure: Left lower extremity fasciotomy debridement with possible wound VAC placement;  Surgeon:  Raimundo Lin MD;  Location: [facility] OR;  Service: Vascular;  Laterality: Left;      General Information       Row Name 04/30/25 1150          OT Time and Intention    Subjective Information pain  -HE     Mode of Treatment individual therapy  -HE     Patient Effort adequate  -HE     Symptoms Noted During/After Treatment increased pain  -HE       Row Name 04/30/25 1150          General Information    Patient Profile Reviewed yes  -HE     Existing Precautions/Restrictions fall  -       Row Name 04/30/25 1150          Safety Issues/Impairments Affecting Functional Mobility    Impairments Affecting Function (Mobility) endurance/activity tolerance;pain;strength;balance  -HE               User Key  (r) = Recorded By, (t) = Taken By, (c) = Cosigned By      Initials Name Provider Type    Yu Will OT Occupational Therapist                     Mobility/ADL's       Row Name 04/30/25 1150          Bed Mobility    Bed Mobility supine-sit;sit-supine;scooting/bridging  -     Scooting/Bridging [place] (Bed Mobility) supervision  -HE     Supine-Sit [place] (Bed Mobility) supervision  -HE     Sit-Supine [place] (Bed Mobility) supervision  -HE     Assistive Device (Bed Mobility) bed rails;head of bed elevated  -       Row Name 04/30/25 1150          Transfers    Comment, (Transfers) pt tearful with all bed mobility, declined transfer training  -       Row Name 04/30/25 1150          Activities of Daily Living    BADL Assessment/Intervention grooming  -       Row Name 04/30/25 1150          Grooming Assessment/Training    [place] Level (Grooming) wash face, hands;standby assist  -HE     Position (Grooming) edge of bed sitting  -HE               User Key  (r) = Recorded By, (t) = Taken By, (c) = Cosigned By      Initials Name Provider Type    Yu Will OT Occupational Therapist                   Obj/Interventions       Row Name 04/30/25 1152          Balance    Comment, Balance  SBA - supervision seated EOB  -HE               User Key  (r) = Recorded By, (t) = Taken By, (c) = Cosigned By      Initials Name Provider Type    Yu Will OT Occupational Therapist                   Goals/Plan       Row Name 04/30/25 1317 04/30/25 1155       Transfer Goal 1 (OT)    Time Frame (Transfer Goal 1, OT) 2 weeks  -HE --    Progress/Outcome (Transfer Goal 1, OT) -- goal ongoing  -[facility] Name 04/30/25 1317 04/30/25 1155       Bathing Goal 1 (OT)    Time Frame (Bathing Goal 1, OT) 2 weeks  - --    Progress/Outcomes (Bathing Goal 1, OT) -- goal ongoing  -[facility] Name 04/30/25 1317 04/30/25 1155       Dressing Goal 1 (OT)    Time Frame (Dressing Goal 1, OT) 2 weeks  - --    Progress/Outcome (Dressing Goal 1, OT) -- goal ongoing  -HE      Row Name 04/30/25 1317 04/30/25 1155       Toileting Goal 1 (OT)    Time Frame (Toileting Goal 1, OT) 2 weeks  - --    Progress/Outcome (Toileting Goal 1, OT) -- goal ongoing  -[facility] Name 04/30/25 1317 04/30/25 1155       Grooming Goal 1 (OT)    Time Frame (Grooming Goal 1, OT) 2 weeks  - --    Progress/Outcome (Grooming Goal 1, OT) -- goal ongoing  -[facility] Name 04/30/25 1317 04/30/25 1155       Strength Goal 1 (OT)    Time Frame (Strength Goal 1, OT) 2 weeks  - --    Progress/Outcome (Strength Goal 1, OT) -- goal ongoing  -HE      Row Name 04/30/25 1317          Therapy Assessment/Plan (OT)    Planned Therapy Interventions (OT) activity tolerance training;functional balance retraining;BADL retraining;patient/caregiver education/training;transfer/mobility retraining;strengthening exercise;occupation/activity based interventions  -HE               User Key  (r) = Recorded By, (t) = Taken By, (c) = Cosigned By      Initials Name Provider Type    Yu Will OT Occupational Therapist                   Clinical Impression       Row Name 04/30/25 1152          Pain Assessment    Pre/Posttreatment Pain Comment pt did not quantify pain,  "tearful throughout session reporting \"i just want to go home\"  -       Row Name 04/30/25 1152          Plan of Care Review    Plan of Care Reviewed With patient  -HE     Outcome Evaluation Pt participates in skilled tx progressing bed mobility, sitting balance for self care participation. Pt completes with SBA-supervision for safety, but tearful throughout limiting additional OOB participation. Pt reporting she \"just wants to go home\", seemingly upset regarding pain and current functional status. IPOT to continue to follow to progress goal-oriented activity with continued recommendations for rehab stay at discharge.  -       Row Name 04/30/25 1152          Therapy Assessment/Plan (OT)    Rehab Potential (OT) good  -     Criteria for Skilled Therapeutic Interventions Met (OT) yes;skilled treatment is necessary  -     Therapy Frequency (OT) 5 times/wk  -       Row Name 04/30/25 1152          Therapy Plan Review/Discharge Plan (OT)    Anticipated Discharge Disposition (OT) inpatient rehabilitation facility  -       Row Name 04/30/25 1152          Vital Signs    Intra SpO2 (%) 95  -       Row Name 04/30/25 1152          Positioning and Restraints    Pre-Treatment Position in bed  -HE     Post Treatment Position bed  -HE     In Bed side lying left;call light within reach;encouraged to call for assist;exit alarm on  -HE               User Key  (r) = Recorded By, (t) = Taken By, (c) = Cosigned By      Initials Name Provider Type     Yu Dickey, OT Occupational Therapist                   Outcome Measures       Row Name 04/30/25 5539          How much help from another is currently needed...    Putting on and taking off regular lower body clothing? 2  -HE     Bathing (including washing, rinsing, and drying) 2  -HE     Toileting (which includes using toilet bed pan or urinal) 2  -HE     Putting on and taking off regular upper body clothing 3  -HE     Taking care of personal grooming (such as brushing " teeth) 3  -HE     Eating meals 4  -HE     AM-PAC 6 Clicks Score (OT) 16  -       Row Name 04/30/25 1318          Modified [place] Scale    Modified [place] Scale 4 - Moderately severe disability.  Unable to walk without assistance, and unable to attend to own bodily needs without assistance.  -       Row Name 04/30/25 1318          Functional Assessment    Outcome Measure Options AM-PAC 6 Clicks Daily Activity (OT);Modified [place]  -HE               User Key  (r) = Recorded By, (t) = Taken By, (c) = Cosigned By      Initials Name Provider Type     Yu Dickey OT Occupational Therapist                    Occupational Therapy Education       Title: PT OT SLP Therapies (Done)       Topic: Occupational Therapy (Done)       Point: ADL training (Done)       Learning Progress Summary            Patient Acceptance, E, VU by  at 4/30/2025 1156    Comment: Pt educated on goals of session, continued POC.                      Point: Home exercise program (Done)       Learning Progress Summary            Patient Acceptance, E, VU by  at 4/30/2025 1156    Comment: Pt educated on goals of session, continued POC.                      Point: Precautions (Done)       Learning Progress Summary            Patient Acceptance, E, VU by  at 4/30/2025 1156    Comment: Pt educated on goals of session, continued POC.                      Point: Body mechanics (Done)       Learning Progress Summary            Patient Acceptance, E, VU by  at 4/30/2025 1156    Comment: Pt educated on goals of session, continued POC.                                      User Key       Initials Effective Dates Name Provider Type [facility] 02/18/25 -  Yu Dickey OT Occupational Therapist OT                  OT Recommendation and Plan  Planned Therapy Interventions (OT): activity tolerance training, functional balance retraining, BADL retraining, patient/caregiver education/training, transfer/mobility retraining, strengthening exercise,  "occupation/activity based interventions  Therapy Frequency (OT): 5 times/wk  Plan of Care Review  Plan of Care Reviewed With: patient  Outcome Evaluation: Pt participates in skilled tx progressing bed mobility, sitting balance for self care participation. Pt completes with SBA-supervision for safety, but tearful throughout limiting additional OOB participation. Pt reporting she \"just wants to go home\", seemingly upset regarding pain and current functional status. IPOT to continue to follow to progress goal-oriented activity with continued recommendations for rehab stay at discharge.     Time Calculation:         Time Calculation- OT       Row Name 04/30/25 1156             Time Calculation- OT    OT Start Time 1112  -      OT Stop Time 1127  -      OT Time Calculation (min) 15 min  -      OT Received On 04/30/25  -      OT - Next Appointment 05/01/25  -      OT Goal Re-Cert Due Date 05/14/25  -                User Key  (r) = Recorded By, (t) = Taken By, (c) = Cosigned By      Initials Name Provider Type    HE Yu Dickey OT Occupational Therapist                  Therapy Charges for Today       Code Description Service Date Service Provider Modifiers Qty    [id number]  OT THER PROC EA 15 MIN 4/30/2025 Yu Dickey OT GO 1                 Yu Dickey OT  4/30/2025  "

## 2025-05-01 LAB
ALBUMIN SERPL-MCNC: 2.3 G/DL (ref 3.5–5.2)
ANION GAP SERPL CALCULATED.3IONS-SCNC: 8.1 MMOL/L (ref 5–15)
APTT PPP: 74.2 SECONDS (ref 22.7–35.4)
BASOPHILS # BLD AUTO: 0.1 10*3/MM3 (ref 0–0.2)
BASOPHILS NFR BLD AUTO: 1.3 % (ref 0–1.5)
BUN SERPL-MCNC: 8 MG/DL (ref 8–23)
BUN/CREAT SERPL: 11.1 (ref 7–25)
CALCIUM SPEC-SCNC: 8.4 MG/DL (ref 8.6–10.5)
CHLORIDE SERPL-SCNC: 102 MMOL/L (ref 98–107)
CO2 SERPL-SCNC: 29.9 MMOL/L (ref 22–29)
CREAT SERPL-MCNC: 0.72 MG/DL (ref 0.57–1)
DEPRECATED RDW RBC AUTO: 44.8 FL (ref 37–54)
EGFRCR SERPLBLD CKD-EPI 2021: 93.5 ML/MIN/1.73
EOSINOPHIL # BLD AUTO: 0.18 10*3/MM3 (ref 0–0.4)
EOSINOPHIL NFR BLD AUTO: 2.3 % (ref 0.3–6.2)
ERYTHROCYTE [DISTWIDTH] IN BLOOD BY AUTOMATED COUNT: 14.7 % (ref 12.3–15.4)
GLUCOSE BLDC GLUCOMTR-MCNC: 108 MG/DL (ref 70–130)
GLUCOSE BLDC GLUCOMTR-MCNC: 114 MG/DL (ref 70–130)
GLUCOSE BLDC GLUCOMTR-MCNC: 118 MG/DL (ref 70–130)
GLUCOSE BLDC GLUCOMTR-MCNC: 123 MG/DL (ref 70–130)
GLUCOSE BLDC GLUCOMTR-MCNC: 126 MG/DL (ref 70–130)
GLUCOSE SERPL-MCNC: 104 MG/DL (ref 65–99)
HCT VFR BLD AUTO: 23.8 % (ref 34–46.6)
HGB BLD-MCNC: 7.3 G/DL (ref 12–15.9)
IMM GRANULOCYTES # BLD AUTO: 0.09 10*3/MM3 (ref 0–0.05)
IMM GRANULOCYTES NFR BLD AUTO: 1.1 % (ref 0–0.5)
LYMPHOCYTES # BLD AUTO: 1.85 10*3/MM3 (ref 0.7–3.1)
LYMPHOCYTES NFR BLD AUTO: 23.4 % (ref 19.6–45.3)
MAGNESIUM SERPL-MCNC: 1.7 MG/DL (ref 1.6–2.4)
MCH RBC QN AUTO: 25.9 PG (ref 26.6–33)
MCHC RBC AUTO-ENTMCNC: 30.7 G/DL (ref 31.5–35.7)
MCV RBC AUTO: 84.4 FL (ref 79–97)
MONOCYTES # BLD AUTO: 0.77 10*3/MM3 (ref 0.1–0.9)
MONOCYTES NFR BLD AUTO: 9.7 % (ref 5–12)
NEUTROPHILS NFR BLD AUTO: 4.93 10*3/MM3 (ref 1.7–7)
NEUTROPHILS NFR BLD AUTO: 62.2 % (ref 42.7–76)
NRBC BLD AUTO-RTO: 0 /100 WBC (ref 0–0.2)
PHOSPHATE SERPL-MCNC: 3.7 MG/DL (ref 2.5–4.5)
PLATELET # BLD AUTO: 458 10*3/MM3 (ref 140–450)
PMV BLD AUTO: 9 FL (ref 6–12)
POTASSIUM SERPL-SCNC: 3.8 MMOL/L (ref 3.5–5.2)
RBC # BLD AUTO: 2.82 10*6/MM3 (ref 3.77–5.28)
SODIUM SERPL-SCNC: 140 MMOL/L (ref 136–145)
WBC NRBC COR # BLD AUTO: 7.92 10*3/MM3 (ref 3.4–10.8)

## 2025-05-01 PROCEDURE — 25010000002 HEPARIN (PORCINE) 25000-0.45 UT/250ML-% SOLUTION: Performed by: STUDENT IN AN ORGANIZED HEALTH CARE EDUCATION/TRAINING PROGRAM

## 2025-05-01 PROCEDURE — 85025 COMPLETE CBC W/AUTO DIFF WBC: CPT | Performed by: SURGERY

## 2025-05-01 PROCEDURE — 25010000002 HYDROMORPHONE 1 MG/ML SOLUTION: Performed by: STUDENT IN AN ORGANIZED HEALTH CARE EDUCATION/TRAINING PROGRAM

## 2025-05-01 PROCEDURE — 94760 N-INVAS EAR/PLS OXIMETRY 1: CPT

## 2025-05-01 PROCEDURE — 83735 ASSAY OF MAGNESIUM: CPT | Performed by: SURGERY

## 2025-05-01 PROCEDURE — 99024 POSTOP FOLLOW-UP VISIT: CPT | Performed by: SURGERY

## 2025-05-01 PROCEDURE — 94799 UNLISTED PULMONARY SVC/PX: CPT

## 2025-05-01 PROCEDURE — 94761 N-INVAS EAR/PLS OXIMETRY MLT: CPT

## 2025-05-01 PROCEDURE — 94664 DEMO&/EVAL PT USE INHALER: CPT

## 2025-05-01 PROCEDURE — 63710000001 REVEFENACIN 175 MCG/3ML SOLUTION: Performed by: SURGERY

## 2025-05-01 PROCEDURE — 82948 REAGENT STRIP/BLOOD GLUCOSE: CPT

## 2025-05-01 PROCEDURE — 80069 RENAL FUNCTION PANEL: CPT | Performed by: SURGERY

## 2025-05-01 PROCEDURE — 85730 THROMBOPLASTIN TIME PARTIAL: CPT | Performed by: SURGERY

## 2025-05-01 RX ADMIN — HEPARIN SODIUM 16 UNITS/KG/HR: 10000 INJECTION, SOLUTION INTRAVENOUS at 00:58

## 2025-05-01 RX ADMIN — ARFORMOTEROL TARTRATE 15 MCG: 15 SOLUTION RESPIRATORY (INHALATION) at 19:34

## 2025-05-01 RX ADMIN — BUDESONIDE 0.5 MG: 0.5 INHALANT RESPIRATORY (INHALATION) at 19:36

## 2025-05-01 RX ADMIN — PANTOPRAZOLE SODIUM 40 MG: 40 INJECTION, POWDER, FOR SOLUTION INTRAVENOUS at 08:07

## 2025-05-01 RX ADMIN — PANTOPRAZOLE SODIUM 40 MG: 40 INJECTION, POWDER, FOR SOLUTION INTRAVENOUS at 21:41

## 2025-05-01 RX ADMIN — APIXABAN 5 MG: 5 TABLET, FILM COATED ORAL at 08:59

## 2025-05-01 RX ADMIN — METOPROLOL TARTRATE 25 MG: 25 TABLET, FILM COATED ORAL at 08:07

## 2025-05-01 RX ADMIN — CLOPIDOGREL BISULFATE 75 MG: 75 TABLET, FILM COATED ORAL at 08:07

## 2025-05-01 RX ADMIN — APIXABAN 5 MG: 5 TABLET, FILM COATED ORAL at 21:41

## 2025-05-01 RX ADMIN — HYDROMORPHONE HYDROCHLORIDE 1 MG: 1 INJECTION, SOLUTION INTRAMUSCULAR; INTRAVENOUS; SUBCUTANEOUS at 00:58

## 2025-05-01 RX ADMIN — HYDROCODONE BITARTRATE AND ACETAMINOPHEN 1 TABLET: 7.5; 325 TABLET ORAL at 21:41

## 2025-05-01 RX ADMIN — METOPROLOL TARTRATE 25 MG: 25 TABLET, FILM COATED ORAL at 21:41

## 2025-05-01 RX ADMIN — HYDROCODONE BITARTRATE AND ACETAMINOPHEN 1 TABLET: 7.5; 325 TABLET ORAL at 16:05

## 2025-05-01 RX ADMIN — ASPIRIN 81 MG CHEWABLE TABLET 81 MG: 81 TABLET CHEWABLE at 08:07

## 2025-05-01 RX ADMIN — HYDROCODONE BITARTRATE AND ACETAMINOPHEN 1 TABLET: 7.5; 325 TABLET ORAL at 07:52

## 2025-05-01 RX ADMIN — SENNOSIDES AND DOCUSATE SODIUM 2 TABLET: 50; 8.6 TABLET ORAL at 08:07

## 2025-05-01 RX ADMIN — BUDESONIDE 0.5 MG: 0.5 INHALANT RESPIRATORY (INHALATION) at 07:09

## 2025-05-01 RX ADMIN — ATORVASTATIN CALCIUM 40 MG: 20 TABLET, FILM COATED ORAL at 21:41

## 2025-05-01 RX ADMIN — HYDROMORPHONE HYDROCHLORIDE 1 MG: 1 INJECTION, SOLUTION INTRAMUSCULAR; INTRAVENOUS; SUBCUTANEOUS at 07:52

## 2025-05-01 RX ADMIN — POLYETHYLENE GLYCOL 3350 17 G: 17 POWDER, FOR SOLUTION ORAL at 08:06

## 2025-05-01 RX ADMIN — REVEFENACIN 175 MCG: 175 SOLUTION RESPIRATORY (INHALATION) at 07:10

## 2025-05-01 RX ADMIN — SENNOSIDES AND DOCUSATE SODIUM 2 TABLET: 50; 8.6 TABLET ORAL at 21:41

## 2025-05-01 RX ADMIN — ARFORMOTEROL TARTRATE 15 MCG: 15 SOLUTION RESPIRATORY (INHALATION) at 07:08

## 2025-05-01 NOTE — CASE MANAGEMENT/SOCIAL WORK
Continued Stay Note  [facility]     Patient Name: Desiree Garcia  MRN: [id number]  Today's Date: 5/1/2025    Admit Date: 4/10/2025    Plan: Home with family and HH   Discharge Plan       Row Name 05/01/25 1354       Plan    Plan Home with family and HH    Patient/Family in Agreement with Plan yes    Plan Comments Met with pt and spoke with daughter in law on the phone. Pt confirmed her plan is to go home with her son and daughter in law and home health at discharge. Family will provide transportation. [facility] unable to accept, [facility] referral entered. Wound vac order pending. Pt will be going to her son's house at discharge, [address] [place] [address]. They also requested a referral to [facility] for a wheelchair. Referral entered. She denied any other needs at this time. CCP following. Garrison BARRIENTOS RN                   Discharge Codes    No documentation.                 Expected Discharge Date and Time       Expected Discharge Date Expected Discharge Time    May 1, 2025               Garrison Harrison RN

## 2025-05-01 NOTE — PROGRESS NOTES
Name: Desiree Garcia ADMIT: 4/10/2025   : 1960  PCP: Sandra Kaba MD    MRN: [id number] LOS: 21 days   AGE/SEX: 65 y.o. female  ROOM: [facility]     Subjective   Subjective   Patient awake and resting in bed, having some ongoing pain and discomfort in leg.       Objective   Objective   Vital Signs  Temp:  [97.6 °F (36.4 °C)-98.5 °F (36.9 °C)] 98.5 °F (36.9 °C)  Heart Rate:  [50-84] 84  Resp:  [18] 18  BP: ()/(47-76) 106/65  SpO2:  [89 %-100 %] 96 %  on  Flow (L/min) (Oxygen Therapy):  [2] 2;   Device (Oxygen Therapy): room air  Body mass index is 29.53 kg/m².  Physical Exam  Vitals and nursing note reviewed.   Constitutional:       General: She is not in acute distress.     Comments: Chronically ill appearing   Cardiovascular:      Rate and Rhythm: Normal rate.      Pulses: Normal pulses.      Heart sounds: Normal heart sounds.   Pulmonary:      Effort: Pulmonary effort is normal.      Breath sounds: No wheezing.   Abdominal:      General: There is no distension.      Palpations: Abdomen is soft.      Tenderness: There is no abdominal tenderness. There is no guarding.   Musculoskeletal:         General: Tenderness present.      Comments: wound vac   Skin:     General: Skin is warm and dry.   Neurological:      General: No focal deficit present.      Mental Status: She is alert and oriented to person, place, and time.       Results Review     I reviewed the patient's new clinical results.  Results from last 7 days   Lab Units 25  0402   WBC 10*3/mm3 7.92 8.39 8.49 10.81*   HEMOGLOBIN g/dL 7.3* 7.4* 7.2* 7.2*   PLATELETS 10*3/mm3 458* 451* 446 421     Results from last 7 days   Lab Units 25  0436 25  0525  0825  0402   SODIUM mmol/L 140 138 139 137   POTASSIUM mmol/L 3.8 3.8 3.9 4.2   CHLORIDE mmol/L 102 101 102 101   CO2 mmol/L 29.9* 28.0 28.5 28.0   BUN mg/dL 8 7* 7* 6*   CREATININE mg/dL 0.72 0.70 0.63 0.74    GLUCOSE mg/dL 104* 112* 101* 113*   EGFR mL/min/1.73 93.5 96.7 99.2 90.5     Results from last 7 days   Lab Units 05/01/25  0436 04/30/25  0525 04/29/25  0821 04/28/25  0402   ALBUMIN g/dL 2.3* 2.3* 2.4* 2.4*     Results from last 7 days   Lab Units 05/01/25  0436 04/30/25  0525 04/29/25  0821 04/28/25  0402   CALCIUM mg/dL 8.4* 8.2* 8.5* 8.2*   ALBUMIN g/dL 2.3* 2.3* 2.4* 2.4*   MAGNESIUM mg/dL 1.7 1.6 1.7 1.6   PHOSPHORUS mg/dL 3.7 3.9 4.0 3.1       Glucose   Date/Time Value Ref Range Status   05/01/2025 0549 114 70 - 130 mg/dL Final   05/01/2025 0054 126 70 - 130 mg/dL Final   04/30/2025 2032 155 (H) 70 - 130 mg/dL Final   04/30/2025 1548 116 70 - 130 mg/dL Final   04/30/2025 1035 179 (H) 70 - 130 mg/dL Final   04/30/2025 0553 112 70 - 130 mg/dL Final   04/29/2025 2035 138 (H) 70 - 130 mg/dL Final       No radiology results for the last day    I have personally reviewed all medications:  Scheduled Medications  apixaban, 5 mg, Oral, Q12H  arformoterol, 15 mcg, Nebulization, BID - RT  aspirin, 81 mg, Oral, Daily   Or  aspirin, 300 mg, Rectal, Daily  atorvastatin, 40 mg, Oral, Nightly  budesonide, 0.5 mg, Nebulization, BID - RT  clopidogrel, 75 mg, Oral, Daily  insulin regular, 2-7 Units, Subcutaneous, Q6H  metoprolol tartrate, 25 mg, Oral, Q12H  pantoprazole, 40 mg, Intravenous, Q12H  revefenacin, 175 mcg, Nebulization, Daily - RT  senna-docusate sodium, 2 tablet, Oral, BID    Infusions     Diet  Diet: Regular/House, Diabetic, Cardiac; Healthy Heart (2-3 Na+); Consistent Carbohydrate; Fluid Consistency: Thin (IDDSI 0)    I have personally reviewed:  [x]  Laboratory   [x]  Microbiology   []  Radiology   [x]  EKG/Telemetry  []  Cardiology/Vascular   []  Pathology    []  Records       Assessment/Plan     Active Hospital Problems    Diagnosis  POA   • 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 [I74.3]  Unknown   • Acute thrombus of left ventricle [I24.0]  Yes   • Altered bowel habits [R19.4]  Yes   • Leg  paresthesia [R20.2]  Yes      Resolved Hospital Problems    Diagnosis Date Resolved POA   • **Cardiogenic shock [R57.0] 04/15/2025 Yes       65 y.o. female admitted with Cardiogenic shock.    Left lower extremity embolism and thrombosis  Left ventricular thrombus with showering emboli of kidney, spleen, bile  Severe PAD  Acute post-procedural pain  - S/P open left iliofemoral, profunda femoris and superficial femoral artery thromboembolectomy with left common femoral endarterectomy and repair, 4 compartment fasciotomy of left lower extremity with left femoropopliteal angioplasty and stent placement x 2 on 4/12/2025  - S/P left lateral fasciotomy site debridement and left medial fasciotomy site debridement on 4/18/2025  - S/P left fasciotomy debridement with wound vac placement on 4/25/25. Wound vac in place, changed previously.  - Patient on ASA, Plavix, heparin drip. Discussed with Dr. Lawton, starting Eliquis today. Will continue to follow their plans/recommendations. Greatly appreciate their help. Need staples removed from left calf prior to discharge.  - SNF at discharge, continue with medications for pain.     Rectal bleeding  Acute post-hemorrhagic anemia  - Patient with rectal bleeding postop on 4/25/2025; Heparin drip was stopped and Plavix held  - GI consulted and suspected hemorrhoidal bleeding and has signed off.  - Heparin later restarted, hemoglobin does remain low, but stable, however, near cutoff for needing transfusion, so need to closely monitor this moving forward. She is going to be transitioned to Eliquis today.  - Order repeat CBC in AM for reassessment. Transfuse for hemoglobin <7.    Cardiogenic shock  Inferior STEMI  - Status post JOE x 2 on 4/8/2025  - Aspirin 81 mg daily, Plavix 75 mg daily, atorvastatin 40 mg p.o. daily, Metoprolol 25 mg PO BID.  - Tolerating current treatments well. Cardiology previously followed, per prior notes: Continue heparin drip, plan to transition to DOAC with  long-term dual therapy. Has been cleared for Plavix without aspirin once transition to DOAC. Cardiology has since signed off.  - Can plan to stop ASA, once Eliquis is started as above.    Type 2 diabetes with hyperglycemia  - A1c. 9.40% (04/09/25). Glucose elevated, but acceptable for most part.  - Continue with SSI as ordered. Adjust regimen as needed, treat hypoglycemia per protocol if develops.    Hypertension  - BP is soft at times, no overt hypotension noted, but need to closely monitor this. Trend BP to guide further management.      COPD  - Appears stable from this perspective at present.  No evidence to suggest acute exacerbation.  Continue current medications as prescribed and closely monitor.    Leukocytosis  - ID consulted, suspected WBC elevation is multifactorial as cultures are negative to date; antibiotics discontinued.  - WBC has improved, continue to monitor.    Ileus  - Ileus has resolved per surgery, NG removed, surgery signed off     NIA  - Resolved, creatinine stable  - Nephrology has signed off     GERD  - PPI    Portions of this text have been copied and I have reviewed these. Documentation accurate as of 05/01/25.       Heparin drip  for DVT prophylaxis.  Full code.  Discussed with patient, nursing staff, and CCP.  Anticipate discharge to SNU facility once precert has been obtained.   Expected Discharge Date: 5/1/2025; Expected Discharge Time:       DO [facility]  05/01/25

## 2025-05-01 NOTE — PROGRESS NOTES
[facility]   Surgical Progress Note    Patient Name: Desiree Garcia  : 1960  MRN: [id number]  Date of admission: 4/10/2025  Surgical Procedures Since Admission:  Procedure(s):  EMBOLECTOMY MECHANICAL, FOUR COMPARTMENT FASCIOTOMY  ARTERIOGRAM LOWER EXTREMITY  Surgeon:  Misael Lawton MD  Status:  19 Days Post-Op  -------------------    Procedure(s):  LOWER EXTREMITY DEBRIDEMENT  Surgeon:  Misael Lawton MD  Status:  13 Days Post-Op  -------------------  **Canceled**    Procedure(s):  Left leg fasciotomy debridement with possible wound VAC placement.  Surgeon:  Raimundo Lin MD  Status:  * Surgery not performed *  -------------------    Procedure(s):  Left lower extremity fasciotomy debridement with possible wound VAC placement  Surgeon:  Raimundo Lin MD  Status:  6 Days Post-Op  -------------------    Subjective   Subjective     Chief Complaint: Left leg ischemia follow-up.    History of Present Illness   Complains of pain with staple removal from left groin.  Required Dilaudid for the removal.  Otherwise stable and desires discharged home.    Review of Systems desires discharge to home    Objective   Objective     Vitals:   Temp:  [97.6 °F (36.4 °C)-98.5 °F (36.9 °C)] 98.5 °F (36.9 °C)  Heart Rate:  [50-79] 76  Resp:  [18] 18  BP: ()/(47-76) 106/65  Flow (L/min) (Oxygen Therapy):  [2] 2  Output by Drain (mL) 25 0701 - 25 1900 25 1901 - 25 0700 25 0701 - 25 0759 Range Total   External Urinary Catheter  300  300     Physical exam.  Wound VAC in place.  Left groin wound with some mild oozing and necrosis.  Staples removed and Betadine wet-to-dry dressing started.        Result Review    Result Review:  I have personally reviewed the results from the time of this admission to 2025 07:59 EDT and agree with these findings:  [x]  Laboratory list / accordion  []  Microbiology  []  Radiology  []  EKG/Telemetry   []  Cardiology/Vascular   []   Pathology  []  Old records  []  Other:  Most notable findings include:       Results from last 7 days   Lab Units 05/01/25  0436 04/30/25  0525 04/29/25  0821 04/28/25  0402 04/27/25  0611 04/26/25  0737 04/25/25  1833   WBC 10*3/mm3 7.92 8.39 8.49 10.81* 10.86* 12.99* 12.96*   HEMOGLOBIN g/dL 7.3* 7.4* 7.2* 7.2* 7.2* 7.3* 7.5*   PLATELETS 10*3/mm3 458* 451* 446 421 418 456* 451*     Results from last 7 days   Lab Units 05/01/25  0436 04/30/25  0525 04/29/25  0821 04/28/25  0402 04/27/25  1845 04/27/25  0611 04/26/25  0737 04/25/25  1833 04/25/25  0609   SODIUM mmol/L 140 138 139 137  --  137 137 137 139   POTASSIUM mmol/L 3.8 3.8 3.9 4.2 4.2 3.6 3.8 4.2 3.8   CHLORIDE mmol/L 102 101 102 101  --  100 99 99 101   CO2 mmol/L 29.9* 28.0 28.5 28.0  --  28.0 29.0 32.1* 31.3*   BUN mg/dL 8 7* 7* 6*  --  7* 7* 9 8   CREATININE mg/dL 0.72 0.70 0.63 0.74  --  0.59 0.63 0.77 0.73   GLUCOSE mg/dL 104* 112* 101* 113*  --  143* 117* 149* 129*   MAGNESIUM mg/dL 1.7 1.6 1.7 1.6  --  1.7 1.7  --  1.8   PHOSPHORUS mg/dL 3.7 3.9 4.0 3.1  --  3.5 3.7  --  3.8   Estimated Creatinine Clearance: 85.7 mL/min (by C-G formula based on SCr of 0.72 mg/dL).  Results from last 7 days   Lab Units 04/26/25  1759 04/25/25  1904   PROTIME Seconds 15.9* 15.5*   INR  1.27* 1.24*     Lab Results   Component Value Date    HGBA1C 9.40 (H) 04/09/2025    HGBA1C 9.30 (H) 03/13/2025    HGBA1C 9.40 (H) 12/23/2024     Glucose   Date/Time Value Ref Range Status   05/01/2025 0549 114 70 - 130 mg/dL Final   05/01/2025 0054 126 70 - 130 mg/dL Final   04/30/2025 2032 155 (H) 70 - 130 mg/dL Final   04/30/2025 1548 116 70 - 130 mg/dL Final   04/30/2025 1035 179 (H) 70 - 130 mg/dL Final   04/30/2025 0553 112 70 - 130 mg/dL Final   04/29/2025 2035 138 (H) 70 - 130 mg/dL Final   04/29/2025 1542 127 70 - 130 mg/dL Final       Assessment & Plan   Assessment / Plan     Brief Patient Summary:  Desiree Garcia is a 65 y.o. female who multiple medical issues.  Vascular  is following for ischemia of the left lower extremity.    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  ([facility])  4/18/2025: Debridement of left leg fasciotomies ([facility])    4/19/2025: Postop day #1 from fasciotomy debridement.  Dressings down today.  Minimal oozing noted muscles appear viable as pictured below.  There is some ischemic changes to the skin on the anterior margin of the lateral fasciotomies will continue to monitor.  I redressed the wounds with the assistance of the nurse with dilute Betadine soaked gauze.  Will plan on leaving intact upon my evaluation for tomorrow.  She has a strong dorsalis pedis artery signal.  She has weak dorsiflexion of her left foot.      4/20/2025: Postop day 2 fasciotomy debridement.  Will continue with once daily Dakin's dressing changes.  Will continue to lower the anterior skin on the lateral fasciotomy to demarcate.  This will likely require debridement sometime next week once further demarcation has occurred.    4/21/2025: Postop day 3 fasciotomy debridement.  With some anterior ischemic changes to the flap on the lateral compartment.  Will place her on the surgery schedule for Wednesday to debride this.  If things appear well at that time we will also place wound VAC.    4/22/2025: Postop day #4 fasciotomy debridement.  Plan on surgical debridement of the ischemic skin on the left anterior lateral fasciotomy with wound VAC placement tomorrow.  Will hold heparin infusion when patient goes down to preoperative holding tomorrow.    4/23/2025: Patient was in preop holding for planned/nonemergent left fasciotomy debridement.  Here she appeared diaphoretic with somewhat labored and irregular breathing and overall feeling of doom.   Anesthesia concerned with change in EKG.  Currently I have minimal concerns for any sort of undrained sepsis or urgent nature of the operation in her leg.  I am going to remove her from the surgery schedule today.  I personally called cardiology who will review the case and see the patient.  I have tentatively placed her back on the OR schedule for Friday assuming medically more stable.    4/24/2025: Appreciate cardiology's assistance.  Will start D5 LR at midnight.  Plans for surgical debridement of the left leg tomorrow with wound VAC placement.    4/25/2025: Excisional debridement of anterior flap of the lateral fasciotomy ischemic tissue with wound VAC placement.    4/28/2025: Will premedicate with 1 mg IV Dilaudid prior to wound VAC change.  Will ask wound care team to change today.    4/29/2025: Will change wound VAC tomorrow.  Things remain stable.  Will ask plastic surgery to get involved for eventual skin grafting.  Concerns for taking her off anticoagulants for skin graft will have to be managed aggressively.    4/30/2025.  Awaiting wound VAC change by wound care and nursing today. Pictures will be placed in chart.  Awaiting opinion on skin grafting by plastic surgery.    5/1/2025: Staples removed from left groin.  Will begin Betadine wet-to-dry dressings.  Will need all staples removed from the medial side of the left calf fasciotomies as well once the next wound VAC change.  Will reinitiate Eliquis and stop heparin 2 hours after initiation of Eliquis.  Patient cannot be off of anticoagulation in any time.  Likely discharge tomorrow to either home or rehab.  Needs to have staples removed from left medial calf prior to discharge.    VTE Prophylaxis:  Pharmacologic & mechanical VTE prophylaxis orders are present.      Misael Lawton MD

## 2025-05-02 LAB
ALBUMIN SERPL-MCNC: 2.5 G/DL (ref 3.5–5.2)
ANION GAP SERPL CALCULATED.3IONS-SCNC: 7.9 MMOL/L (ref 5–15)
APTT PPP: 38.3 SECONDS (ref 22.7–35.4)
BASOPHILS # BLD AUTO: 0.1 10*3/MM3 (ref 0–0.2)
BASOPHILS NFR BLD AUTO: 1.3 % (ref 0–1.5)
BUN SERPL-MCNC: 8 MG/DL (ref 8–23)
BUN/CREAT SERPL: 11.3 (ref 7–25)
CALCIUM SPEC-SCNC: 8.5 MG/DL (ref 8.6–10.5)
CHLORIDE SERPL-SCNC: 103 MMOL/L (ref 98–107)
CO2 SERPL-SCNC: 30.1 MMOL/L (ref 22–29)
CREAT SERPL-MCNC: 0.71 MG/DL (ref 0.57–1)
DEPRECATED RDW RBC AUTO: 47.1 FL (ref 37–54)
EGFRCR SERPLBLD CKD-EPI 2021: 95.1 ML/MIN/1.73
EOSINOPHIL # BLD AUTO: 0.11 10*3/MM3 (ref 0–0.4)
EOSINOPHIL NFR BLD AUTO: 1.4 % (ref 0.3–6.2)
ERYTHROCYTE [DISTWIDTH] IN BLOOD BY AUTOMATED COUNT: 15.2 % (ref 12.3–15.4)
GLUCOSE BLDC GLUCOMTR-MCNC: 108 MG/DL (ref 70–130)
GLUCOSE BLDC GLUCOMTR-MCNC: 138 MG/DL (ref 70–130)
GLUCOSE BLDC GLUCOMTR-MCNC: 143 MG/DL (ref 70–130)
GLUCOSE BLDC GLUCOMTR-MCNC: 155 MG/DL (ref 70–130)
GLUCOSE SERPL-MCNC: 106 MG/DL (ref 65–99)
HCT VFR BLD AUTO: 25.6 % (ref 34–46.6)
HGB BLD-MCNC: 7.8 G/DL (ref 12–15.9)
IMM GRANULOCYTES # BLD AUTO: 0.09 10*3/MM3 (ref 0–0.05)
IMM GRANULOCYTES NFR BLD AUTO: 1.2 % (ref 0–0.5)
LYMPHOCYTES # BLD AUTO: 1.51 10*3/MM3 (ref 0.7–3.1)
LYMPHOCYTES NFR BLD AUTO: 19.6 % (ref 19.6–45.3)
MAGNESIUM SERPL-MCNC: 1.7 MG/DL (ref 1.6–2.4)
MCH RBC QN AUTO: 26.2 PG (ref 26.6–33)
MCHC RBC AUTO-ENTMCNC: 30.5 G/DL (ref 31.5–35.7)
MCV RBC AUTO: 85.9 FL (ref 79–97)
MONOCYTES # BLD AUTO: 0.67 10*3/MM3 (ref 0.1–0.9)
MONOCYTES NFR BLD AUTO: 8.7 % (ref 5–12)
NEUTROPHILS NFR BLD AUTO: 5.21 10*3/MM3 (ref 1.7–7)
NEUTROPHILS NFR BLD AUTO: 67.8 % (ref 42.7–76)
NRBC BLD AUTO-RTO: 0 /100 WBC (ref 0–0.2)
PHOSPHATE SERPL-MCNC: 3.5 MG/DL (ref 2.5–4.5)
PLATELET # BLD AUTO: 459 10*3/MM3 (ref 140–450)
PMV BLD AUTO: 9 FL (ref 6–12)
POTASSIUM SERPL-SCNC: 3.9 MMOL/L (ref 3.5–5.2)
RBC # BLD AUTO: 2.98 10*6/MM3 (ref 3.77–5.28)
SODIUM SERPL-SCNC: 141 MMOL/L (ref 136–145)
WBC NRBC COR # BLD AUTO: 7.69 10*3/MM3 (ref 3.4–10.8)

## 2025-05-02 PROCEDURE — 80069 RENAL FUNCTION PANEL: CPT | Performed by: SURGERY

## 2025-05-02 PROCEDURE — 94799 UNLISTED PULMONARY SVC/PX: CPT

## 2025-05-02 PROCEDURE — 82948 REAGENT STRIP/BLOOD GLUCOSE: CPT

## 2025-05-02 PROCEDURE — 85025 COMPLETE CBC W/AUTO DIFF WBC: CPT | Performed by: SURGERY

## 2025-05-02 PROCEDURE — 94664 DEMO&/EVAL PT USE INHALER: CPT

## 2025-05-02 PROCEDURE — 83735 ASSAY OF MAGNESIUM: CPT | Performed by: SURGERY

## 2025-05-02 PROCEDURE — 25010000002 HYDROMORPHONE 1 MG/ML SOLUTION: Performed by: STUDENT IN AN ORGANIZED HEALTH CARE EDUCATION/TRAINING PROGRAM

## 2025-05-02 PROCEDURE — 94761 N-INVAS EAR/PLS OXIMETRY MLT: CPT

## 2025-05-02 PROCEDURE — 63710000001 REVEFENACIN 175 MCG/3ML SOLUTION: Performed by: SURGERY

## 2025-05-02 PROCEDURE — 99024 POSTOP FOLLOW-UP VISIT: CPT | Performed by: SURGERY

## 2025-05-02 PROCEDURE — 85730 THROMBOPLASTIN TIME PARTIAL: CPT | Performed by: SURGERY

## 2025-05-02 RX ORDER — SIMETHICONE 80 MG
80 TABLET,CHEWABLE ORAL 4 TIMES DAILY PRN
Status: DISCONTINUED | OUTPATIENT
Start: 2025-05-02 | End: 2025-05-06 | Stop reason: HOSPADM

## 2025-05-02 RX ADMIN — SENNOSIDES AND DOCUSATE SODIUM 2 TABLET: 50; 8.6 TABLET ORAL at 20:59

## 2025-05-02 RX ADMIN — SIMETHICONE 80 MG: 80 TABLET, CHEWABLE ORAL at 13:15

## 2025-05-02 RX ADMIN — SENNOSIDES AND DOCUSATE SODIUM 2 TABLET: 50; 8.6 TABLET ORAL at 08:52

## 2025-05-02 RX ADMIN — ATORVASTATIN CALCIUM 40 MG: 20 TABLET, FILM COATED ORAL at 20:59

## 2025-05-02 RX ADMIN — HYDROMORPHONE HYDROCHLORIDE 1 MG: 1 INJECTION, SOLUTION INTRAMUSCULAR; INTRAVENOUS; SUBCUTANEOUS at 18:29

## 2025-05-02 RX ADMIN — PANTOPRAZOLE SODIUM 40 MG: 40 INJECTION, POWDER, FOR SOLUTION INTRAVENOUS at 20:59

## 2025-05-02 RX ADMIN — HYDROCODONE BITARTRATE AND ACETAMINOPHEN 1 TABLET: 7.5; 325 TABLET ORAL at 10:48

## 2025-05-02 RX ADMIN — PANTOPRAZOLE SODIUM 40 MG: 40 INJECTION, POWDER, FOR SOLUTION INTRAVENOUS at 08:52

## 2025-05-02 RX ADMIN — REVEFENACIN 175 MCG: 175 SOLUTION RESPIRATORY (INHALATION) at 08:18

## 2025-05-02 RX ADMIN — ARFORMOTEROL TARTRATE 15 MCG: 15 SOLUTION RESPIRATORY (INHALATION) at 08:16

## 2025-05-02 RX ADMIN — ARFORMOTEROL TARTRATE 15 MCG: 15 SOLUTION RESPIRATORY (INHALATION) at 19:24

## 2025-05-02 RX ADMIN — BUDESONIDE 0.5 MG: 0.5 INHALANT RESPIRATORY (INHALATION) at 19:25

## 2025-05-02 RX ADMIN — HYDROCODONE BITARTRATE AND ACETAMINOPHEN 1 TABLET: 7.5; 325 TABLET ORAL at 20:59

## 2025-05-02 RX ADMIN — BUDESONIDE 0.5 MG: 0.5 INHALANT RESPIRATORY (INHALATION) at 08:17

## 2025-05-02 RX ADMIN — APIXABAN 5 MG: 5 TABLET, FILM COATED ORAL at 08:51

## 2025-05-02 RX ADMIN — HYDROCODONE BITARTRATE AND ACETAMINOPHEN 1 TABLET: 7.5; 325 TABLET ORAL at 16:49

## 2025-05-02 RX ADMIN — METOPROLOL TARTRATE 25 MG: 25 TABLET, FILM COATED ORAL at 20:59

## 2025-05-02 RX ADMIN — HYDROMORPHONE HYDROCHLORIDE 1 MG: 1 INJECTION, SOLUTION INTRAMUSCULAR; INTRAVENOUS; SUBCUTANEOUS at 13:05

## 2025-05-02 RX ADMIN — METOPROLOL TARTRATE 25 MG: 25 TABLET, FILM COATED ORAL at 08:51

## 2025-05-02 RX ADMIN — HYDROMORPHONE HYDROCHLORIDE 1 MG: 1 INJECTION, SOLUTION INTRAMUSCULAR; INTRAVENOUS; SUBCUTANEOUS at 09:24

## 2025-05-02 RX ADMIN — APIXABAN 5 MG: 5 TABLET, FILM COATED ORAL at 20:59

## 2025-05-02 RX ADMIN — CLOPIDOGREL BISULFATE 75 MG: 75 TABLET, FILM COATED ORAL at 10:48

## 2025-05-02 RX ADMIN — HYDROCODONE BITARTRATE AND ACETAMINOPHEN 1 TABLET: 7.5; 325 TABLET ORAL at 05:25

## 2025-05-02 NOTE — CASE MANAGEMENT/SOCIAL WORK
Post-Acute Authorization Submission      Post Acute Pre-Cert Documentation  Request Submitted by Facility - Type:: Hospital  Post-Acute Authorization Type Submitted:: SNF  Date Post Acute Pre-Cert Inititated per Facility: 05/02/25  Date Post Acute Pre-Cert Completed: 05/02/25  Accepting Facility: [facility] [place] [facility] Discharge Date Requested: 05/03/25  All Clinicals Submitted?: Yes  Had Accepting Facility at Time of Submission: Yes  Response Received from Insurance?: Approval  Response Communicated to:: , Accepting Facility Liaison, Accepting Facility Auth Department  Authorization Number:: [id number]  Post Acute Pre-Cert Initiated Comment: VALID TO ADMIT UPTO 5/7/25              Garrison Gallegos, PCT

## 2025-05-02 NOTE — CASE MANAGEMENT/SOCIAL WORK
Continued Stay Note  [facility]     Patient Name: Desiree Garcia  MRN: [id number]  Today's Date: 5/2/2025    Admit Date: 4/10/2025    Plan: [facility]   Discharge Plan       Row Name 05/02/25 1501       Plan    Plan [facility]    Patient/Family in Agreement with Plan yes    Plan Comments Met with pt and son in room. After discussing her discharge needs with the surgeon and primary RN, pt would like to go to [facility] rehab at discharge. She will need transportation. Spoke with [facility] who stated that they have a bed and can order a wound vac. Precert started and approved. Wound vac will arrive at [facility] by Monday. Pt denied any further needs at this time. CCP following. Garrison BARRIENTOS RN                   Discharge Codes    No documentation.                 Expected Discharge Date and Time       Expected Discharge Date Expected Discharge Time    May 2, 2025               Garrison Harrison RN

## 2025-05-02 NOTE — PLAN OF CARE
Goal Outcome Evaluation:  VSS. A&OX4. Pain managed with PRN pain medication. Wound vac to LLE @125. 02 overnight while sleeping. Possible d/c home today. Call light in reach.

## 2025-05-02 NOTE — PROGRESS NOTES
[facility]   Surgical Progress Note    Patient Name: Desiree Garcia  : 1960  MRN: [id number]  Date of admission: 4/10/2025  Surgical Procedures Since Admission:  Procedure(s):  EMBOLECTOMY MECHANICAL, FOUR COMPARTMENT FASCIOTOMY  ARTERIOGRAM LOWER EXTREMITY  Surgeon:  Misael Lawton MD  Status:  20 Days Post-Op  -------------------    Procedure(s):  LOWER EXTREMITY DEBRIDEMENT  Surgeon:  Misael Lawton MD  Status:  14 Days Post-Op  -------------------  **Canceled**    Procedure(s):  Left leg fasciotomy debridement with possible wound VAC placement.  Surgeon:  Raimundo Lin MD  Status:  * Surgery not performed *  -------------------    Procedure(s):  Left lower extremity fasciotomy debridement with possible wound VAC placement  Surgeon:  Raimundo Lin MD  Status:  7 Days Post-Op  -------------------    Subjective   Subjective     Chief Complaint: Left leg ischemia follow-up.    History of Present Illness   Wounds continue to hurt especially with dressing changes.  I do not believe that she would do well at home and I have recommended continued attempts to get her into rehab especially as she will not be able to get I wound VAC and home.    Review of Systems desires discharge to home but seems to now accept the possibility of rehab.  Son present and he wishes for rehab    Objective   Objective     Vitals:   Temp:  [98.1 °F (36.7 °C)-98.7 °F (37.1 °C)] 98.7 °F (37.1 °C)  Heart Rate:  [73-83] 73  Resp:  [16-18] 16  BP: (100-119)/(52-67) 118/61  Flow (L/min) (Oxygen Therapy):  [2-2.5] 2.5  Output by Drain (mL) 25 0701 - 25 1900 25 1901 - 25 0700 25 0701 - 25 1008 Range Total   External Urinary Catheter  300  300     Physical exam.  Wound VAC in place.  Left groin wound with some mild oozing and necrosis.  Staples removed and Betadine wet-to-dry dressing started.        Result Review    Result Review:  I have personally reviewed the results from the time  of this admission to 5/2/2025 10:08 EDT and agree with these findings:  [x]  Laboratory list / accordion  []  Microbiology  []  Radiology  []  EKG/Telemetry   []  Cardiology/Vascular   []  Pathology  []  Old records  []  Other:  Most notable findings include:       Results from last 7 days   Lab Units 05/02/25  0654 05/01/25  0436 04/30/25  0525 04/29/25  0821 04/28/25  0402 04/27/25  0611 04/26/25  0737   WBC 10*3/mm3 7.69 7.92 8.39 8.49 10.81* 10.86* 12.99*   HEMOGLOBIN g/dL 7.8* 7.3* 7.4* 7.2* 7.2* 7.2* 7.3*   PLATELETS 10*3/mm3 459* 458* 451* 446 421 418 456*     Results from last 7 days   Lab Units 05/02/25  0654 05/01/25  0436 04/30/25  0525 04/29/25  0821 04/28/25  0402 04/27/25  1845 04/27/25  0611 04/26/25  0737   SODIUM mmol/L 141 140 138 139 137  --  137 137   POTASSIUM mmol/L 3.9 3.8 3.8 3.9 4.2 4.2 3.6 3.8   CHLORIDE mmol/L 103 102 101 102 101  --  100 99   CO2 mmol/L 30.1* 29.9* 28.0 28.5 28.0  --  28.0 29.0   BUN mg/dL 8 8 7* 7* 6*  --  7* 7*   CREATININE mg/dL 0.71 0.72 0.70 0.63 0.74  --  0.59 0.63   GLUCOSE mg/dL 106* 104* 112* 101* 113*  --  143* 117*   MAGNESIUM mg/dL 1.7 1.7 1.6 1.7 1.6  --  1.7 1.7   PHOSPHORUS mg/dL 3.5 3.7 3.9 4.0 3.1  --  3.5 3.7   Estimated Creatinine Clearance: 87.8 mL/min (by C-G formula based on SCr of 0.71 mg/dL).  Results from last 7 days   Lab Units 04/26/25 1759 04/25/25  1904   PROTIME Seconds 15.9* 15.5*   INR  1.27* 1.24*     Lab Results   Component Value Date    HGBA1C 9.40 (H) 04/09/2025    HGBA1C 9.30 (H) 03/13/2025    HGBA1C 9.40 (H) 12/23/2024     Glucose   Date/Time Value Ref Range Status   05/02/2025 0524 108 70 - 130 mg/dL Final   05/01/2025 2026 123 70 - 130 mg/dL Final   05/01/2025 1629 108 70 - 130 mg/dL Final   05/01/2025 1123 118 70 - 130 mg/dL Final   05/01/2025 0549 114 70 - 130 mg/dL Final   05/01/2025 0054 126 70 - 130 mg/dL Final   04/30/2025 2032 155 (H) 70 - 130 mg/dL Final   04/30/2025 1548 116 70 - 130 mg/dL Final       Assessment & Plan    Assessment / Plan     Brief Patient Summary:  Desiree Garcia is a 65 y.o. female who multiple medical issues.  Vascular is following for ischemia of the left lower extremity.    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  ([facility])  4/18/2025: Debridement of left leg fasciotomies ([facility])    4/19/2025: Postop day #1 from fasciotomy debridement.  Dressings down today.  Minimal oozing noted muscles appear viable as pictured below.  There is some ischemic changes to the skin on the anterior margin of the lateral fasciotomies will continue to monitor.  I redressed the wounds with the assistance of the nurse with dilute Betadine soaked gauze.  Will plan on leaving intact upon my evaluation for tomorrow.  She has a strong dorsalis pedis artery signal.  She has weak dorsiflexion of her left foot.      4/20/2025: Postop day 2 fasciotomy debridement.  Will continue with once daily Dakin's dressing changes.  Will continue to lower the anterior skin on the lateral fasciotomy to demarcate.  This will likely require debridement sometime next week once further demarcation has occurred.    4/21/2025: Postop day 3 fasciotomy debridement.  With some anterior ischemic changes to the flap on the lateral compartment.  Will place her on the surgery schedule for Wednesday to debride this.  If things appear well at that time we will also place wound VAC.    4/22/2025: Postop day #4 fasciotomy debridement.  Plan on surgical debridement of the ischemic skin on the left anterior lateral fasciotomy with wound VAC placement tomorrow.  Will hold heparin infusion when patient goes down to preoperative holding tomorrow.    4/23/2025: Patient was in preop holding for planned/nonemergent left fasciotomy  debridement.  Here she appeared diaphoretic with somewhat labored and irregular breathing and overall feeling of doom.  Anesthesia concerned with change in EKG.  Currently I have minimal concerns for any sort of undrained sepsis or urgent nature of the operation in her leg.  I am going to remove her from the surgery schedule today.  I personally called cardiology who will review the case and see the patient.  I have tentatively placed her back on the OR schedule for Friday assuming medically more stable.    4/24/2025: Appreciate cardiology's assistance.  Will start D5 LR at midnight.  Plans for surgical debridement of the left leg tomorrow with wound VAC placement.    4/25/2025: Excisional debridement of anterior flap of the lateral fasciotomy ischemic tissue with wound VAC placement.    4/28/2025: Will premedicate with 1 mg IV Dilaudid prior to wound VAC change.  Will ask wound care team to change today.    4/29/2025: Will change wound VAC tomorrow.  Things remain stable.  Will ask plastic surgery to get involved for eventual skin grafting.  Concerns for taking her off anticoagulants for skin graft will have to be managed aggressively.    4/30/2025.  Awaiting wound VAC change by wound care and nursing today. Pictures will be placed in chart.  Awaiting opinion on skin grafting by plastic surgery.    5/1/2025: Staples removed from left groin.  Will begin Betadine wet-to-dry dressings.  Will need all staples removed from the medial side of the left calf fasciotomies as well once the next wound VAC change.  Will reinitiate Eliquis and stop heparin 2 hours after initiation of Eliquis.  Patient cannot be off of anticoagulation in any time.  Likely discharge tomorrow to either home or rehab.  Needs to have staples removed from left medial calf prior to discharge.    5/2/2025: All staples out now with some dehiscence of all wounds groin and both fasciotomy sites.  Will place Y connector wound VAC to all 3.  Have  recommended strongly the use of rehab over home discharge and the patient and her son finally are agreeable with the rehab.  Awaiting this plan.  This juncture we will follow-up on Monday if she is still here.  Please call for issues over the weekend.    VTE Prophylaxis:  Pharmacologic & mechanical VTE prophylaxis orders are present.      Msiael Lawton MD

## 2025-05-02 NOTE — PLAN OF CARE
Goal Outcome Evaluation:  Plan of Care Reviewed With: patient        Progress: improving  Outcome Evaluation: SR, RA, A/Ox4. Wound vac changed completed during shift. PRN Simethicone added per MD orders. PRN Norco and Dilaudid given for pain management. Plans to d/c to rehab pending medical clearance.

## 2025-05-02 NOTE — PROGRESS NOTES
Name: Desiree Garcia ADMIT: 4/10/2025   : 1960  PCP: Sandra Kaba MD    MRN: [id number] LOS: 22 days   AGE/SEX: 65 y.o. female  ROOM: [facility] (formerly [facility])     Subjective   Subjective   Patient awake and resting in bed, wound care and vascular surgery present at bedside, along with patient's son. She is having ongoing pain in left lower extremity.       Objective   Objective   Vital Signs  Temp:  [98.3 °F (36.8 °C)-98.7 °F (37.1 °C)] 98.7 °F (37.1 °C)  Heart Rate:  [73-83] 73  Resp:  [16-18] 16  BP: (115-119)/(61-67) 118/61  SpO2:  [95 %-100 %] 95 %  on  Flow (L/min) (Oxygen Therapy):  [2-2.5] 2.5;   Device (Oxygen Therapy): room air  Body mass index is 30.21 kg/m².  Physical Exam  Vitals and nursing note reviewed.   Constitutional:       General: She is not in acute distress.     Comments: Chronically ill appearing   Cardiovascular:      Rate and Rhythm: Normal rate.      Pulses: Normal pulses.      Heart sounds: Normal heart sounds.   Pulmonary:      Effort: Pulmonary effort is normal. No respiratory distress.      Breath sounds: No wheezing.   Abdominal:      General: Bowel sounds are normal.      Palpations: Abdomen is soft.      Tenderness: There is no abdominal tenderness.   Musculoskeletal:         General: Tenderness present.      Comments: Wound noted on LLE, ongoing tenderness   Skin:     General: Skin is warm and dry.   Neurological:      General: No focal deficit present.      Mental Status: She is alert and oriented to person, place, and time.       Results Review     I reviewed the patient's new clinical results.  Results from last 7 days   Lab Units 25  0654 25  0436 25  0525  0821   WBC 10*3/mm3 7.69 7.92 8.39 8.49   HEMOGLOBIN g/dL 7.8* 7.3* 7.4* 7.2*   PLATELETS 10*3/mm3 459* 458* 451* 446     Results from last 7 days   Lab Units 25  0654 25  0436 25  0525 25  0821   SODIUM mmol/L 141 140 138 139   POTASSIUM mmol/L 3.9 3.8 3.8 3.9   CHLORIDE  mmol/L 103 102 101 102   CO2 mmol/L 30.1* 29.9* 28.0 28.5   BUN mg/dL 8 8 7* 7*   CREATININE mg/dL 0.71 0.72 0.70 0.63   GLUCOSE mg/dL 106* 104* 112* 101*   EGFR mL/min/1.73 95.1 93.5 96.7 99.2     Results from last 7 days   Lab Units 05/02/25  0654 05/01/25  0436 04/30/25  0525 04/29/25  0821   ALBUMIN g/dL 2.5* 2.3* 2.3* 2.4*     Results from last 7 days   Lab Units 05/02/25  0654 05/01/25  0436 04/30/25  0525 04/29/25  0821   CALCIUM mg/dL 8.5* 8.4* 8.2* 8.5*   ALBUMIN g/dL 2.5* 2.3* 2.3* 2.4*   MAGNESIUM mg/dL 1.7 1.7 1.6 1.7   PHOSPHORUS mg/dL 3.5 3.7 3.9 4.0       Glucose   Date/Time Value Ref Range Status   05/02/2025 1057 143 (H) 70 - 130 mg/dL Final   05/02/2025 0524 108 70 - 130 mg/dL Final   05/01/2025 2026 123 70 - 130 mg/dL Final   05/01/2025 1629 108 70 - 130 mg/dL Final   05/01/2025 1123 118 70 - 130 mg/dL Final   05/01/2025 0549 114 70 - 130 mg/dL Final   05/01/2025 0054 126 70 - 130 mg/dL Final       No radiology results for the last day    I have personally reviewed all medications:  Scheduled Medications  apixaban, 5 mg, Oral, Q12H  arformoterol, 15 mcg, Nebulization, BID - RT  atorvastatin, 40 mg, Oral, Nightly  budesonide, 0.5 mg, Nebulization, BID - RT  clopidogrel, 75 mg, Oral, Daily  insulin regular, 2-7 Units, Subcutaneous, Q6H  metoprolol tartrate, 25 mg, Oral, Q12H  pantoprazole, 40 mg, Intravenous, Q12H  revefenacin, 175 mcg, Nebulization, Daily - RT  senna-docusate sodium, 2 tablet, Oral, BID    Infusions     Diet  Diet: Regular/House, Diabetic, Cardiac; Healthy Heart (2-3 Na+); Consistent Carbohydrate; Fluid Consistency: Thin (IDDSI 0)    I have personally reviewed:  [x]  Laboratory   []  Microbiology   []  Radiology   [x]  EKG/Telemetry  []  Cardiology/Vascular   []  Pathology    []  Records       Assessment/Plan     Active Hospital Problems    Diagnosis  POA   • 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 [I74.3]  Unknown   • Acute thrombus of left ventricle [I24.0]  Yes   •  Altered bowel habits [R19.4]  Yes   • Leg paresthesia [R20.2]  Yes      Resolved Hospital Problems    Diagnosis Date Resolved POA   • **Cardiogenic shock [R57.0] 04/15/2025 Yes       65 y.o. female admitted with Cardiogenic shock.    Left lower extremity embolism and thrombosis  Left ventricular thrombus with showering emboli of kidney, spleen, bile  Severe PAD  Acute post-procedural pain  - S/P open left iliofemoral, profunda femoris and superficial femoral artery thromboembolectomy with left common femoral endarterectomy and repair, 4 compartment fasciotomy of left lower extremity with left femoropopliteal angioplasty and stent placement x 2 on 4/12/2025  - S/P left lateral fasciotomy site debridement and left medial fasciotomy site debridement on 4/18/2025  - S/P left fasciotomy debridement with wound vac placement on 4/25/25. Wound vac in place, changed previously.  - Patient on Plavix, Eliquis, statin. Discussed with Vascular surgery, patient agreeable to SNF, CCP working on this. Will continue to follow their plans/recommendations. Greatly appreciate their help.  - She is still requiring IV Dilaudid for breakthrough pain, may need to adjust Norco, will monitor this.    Rectal bleeding  Acute post-hemorrhagic anemia  - Patient with rectal bleeding postop on 4/25/2025; Heparin drip was stopped and Plavix held  - GI consulted and suspected hemorrhoidal bleeding and has signed off.  - Both heparin and Plavix later restarted, which she tolerated well. Heparin since changed to Eliquis.  - Hemoglobin remains low, but no evidence of acute worsening at present.   - Order repeat CBC in AM for reassessment. Transfuse for hemoglobin <7.    Cardiogenic shock  Inferior STEMI  - Status post JOE x 2 on 4/8/2025. She was on ASA and Heparin drip---> has since been changed to Eliquis and these two medications have been stopped per recommendations.  - She also remains on Plavix 75 mg daily, atorvastatin 40 mg p.o. daily,  Metoprolol 25 mg PO BID. Tolerating current treatments well. Cardiology previously followed, she will need follow up with them in outpatient setting.  - Initial 2D Echo (04/10) showing EF 41-45% with mildly decreased LV systolic function; repeat echo (limited) on 04/23 showing improvement in EF to 50%, normal LV systolic function.    Type 2 diabetes with hyperglycemia  - A1c. 9.40% (04/09/25). Glucose elevated, but acceptable for most part.  - Continue with SSI as ordered. Adjust regimen as needed, treat hypoglycemia per protocol if develops. She is on Jardiance at home, can resume this at discharge.    Hypertension  - BP acceptable, but on lower end of normal range, so requires close monitoring. Trend BP to guide further management.      COPD  - Appears stable from this perspective at present.  No evidence to suggest acute exacerbation.  Continue current medications as prescribed and closely monitor.    Leukocytosis  - Noted previously, was evaluated by ID, etiology felt to be reactive, anabiotics were discontinued.  - WBC normalized on labs.    Ileus  - Ileus has resolved per surgery, NG removed, surgery signed off     NIA  - Resolved, creatinine stable  - Nephrology has signed off     GERD  - PPI    Portions of this text have been copied and I have reviewed these. Documentation accurate as of 05/02/25.       Eliquis  for DVT prophylaxis.  Full code.  Discussed with patient, family, nursing staff, consulting provider, and CCP.  Anticipate discharge to SNU facility once arrangements have been made.   Expected Discharge Date: 5/2/2025; Expected Discharge Time:       Tj Ramirez DO  [facility]  05/02/25

## 2025-05-02 NOTE — NURSING NOTE
"CWOCN- f/u for wound VAC dressings. Assessed with Dr Lawton. Dr Lawton and Dr Ramirez also spoke with patient about home vs rehab. Patient planning rehab now.     *The facility will need a machine that has a \"Y\" connector and 2 track pad kits OR they will need to order 2 machines if they cannot \"Y\" connect now that we are also placing the VAC on the left groin. There are 3 wounds- left lateral leg, left medial leg, left groin.     Patient tolerated the dressing changes better today. Her son was present and helpful.    Plan VAC change Monday. If patient leaves this weekend, VAC sponge should be removed and NS wet to dry dressing placed to all wounds for xfer with ABD and roll gauze.       05/02/25 1012   Wound 04/12/25 0943 Left anterior groin Surgical Open Surgical Incision   Placement Date/Time: 04/12/25 0943   Side: Left  Orientation: anterior  Location: groin  Primary Wound Type: Surgical  Secondary Wound Type - Surgical: Open Surgical Incision   Dressing Appearance moist drainage   Closure Approximated  (50% approximated)   Base moist;yellow   Periwound moist   Periwound Temperature warm   Periwound Skin Turgor soft   Wound Length (cm) 2 cm   Wound Width (cm) 1 cm   Wound Depth (cm) 1.5 cm   Wound Surface Area (cm^2) 1.57 cm^2   Wound Volume (cm^3) 1.571 cm^3   Tunneling [Depth (cm)/Location] 6   Drainage Characteristics/Odor serous   Drainage Amount small   Care, Wound cleansed with;sterile normal saline;negative pressure wound therapy   Dressing Care dressing changed   Periwound Care barrier film applied   Wound 04/12/25 1055 Left lateral leg Surgical Open Surgical Incision   Placement Date/Time: 04/12/25 1055   Side: Left  Orientation: lateral  Location: leg  Primary Wound Type: Surgical  Secondary Wound Type - Surgical: Open Surgical Incision   Dressing Appearance intact   Base moist;pink;red   Periwound pink;moist   Periwound Temperature warm   Periwound Skin Turgor soft   Edges open   Drainage " Characteristics/Odor serosanguineous   Drainage Amount small   Care, Wound cleansed with;sterile normal saline;negative pressure wound therapy   Dressing Care dressing changed   Periwound Care barrier film applied   Wound 04/12/25 1055 Left medial leg Surgical Open Surgical Incision   Placement Date/Time: 04/12/25 1055   Side: Left  Orientation: medial  Location: leg  Primary Wound Type: Surgical  Secondary Wound Type - Surgical: Open Surgical Incision   Dressing Appearance moist drainage   Base exposed structure;moist;red;other (see comments)  (incision opened completed by Dr Lawton after steven removed; clot in wound base)   Periwound moist;pink   Periwound Temperature warm   Periwound Skin Turgor soft   Edges open   Wound Length (cm) 8 cm   Wound Width (cm) 2 cm   Wound Depth (cm) 1 cm   Wound Surface Area (cm^2) 12.57 cm^2   Wound Volume (cm^3) 8.378 cm^3   Tunneling [Depth (cm)/Location] 7cm @6oclock   Undermining [Depth (cm)/Location] 3cm from 12-6oclock   Drainage Characteristics/Odor clots;serosanguineous   Drainage Amount moderate   Care, Wound cleansed with;sterile normal saline;negative pressure wound therapy   Dressing Care dressing changed   Periwound Care barrier film applied   NPWT (Negative Pressure Wound Therapy) 04/25/25 1205   Placement Date/Time: 04/25/25 1205   Additional Comments: Dr. Lin placed in surgery   Therapy Setting continuous therapy   Dressing foam, black;gauze, nonadherent;transparent dressing   Contact Layer silicone   Pressure Setting 125 mmHg   Sponges Inserted   (1 piece to medial & lateral LLE with versatel over wound bed; 1 piece to the left groin with versatel over the approximated area of the incision. Y connector between the 2 track pads.)   Sponges Removed   (1 piece black and versatel removed from medial & lateral leg wounds)   Finger sweep complete Yes

## 2025-05-03 LAB
ALBUMIN SERPL-MCNC: 2.6 G/DL (ref 3.5–5.2)
ANION GAP SERPL CALCULATED.3IONS-SCNC: 7.7 MMOL/L (ref 5–15)
APTT PPP: 36.2 SECONDS (ref 22.7–35.4)
BASOPHILS # BLD AUTO: 0.11 10*3/MM3 (ref 0–0.2)
BASOPHILS NFR BLD AUTO: 1.4 % (ref 0–1.5)
BUN SERPL-MCNC: 7 MG/DL (ref 8–23)
BUN/CREAT SERPL: 10.8 (ref 7–25)
CALCIUM SPEC-SCNC: 8.5 MG/DL (ref 8.6–10.5)
CHLORIDE SERPL-SCNC: 104 MMOL/L (ref 98–107)
CO2 SERPL-SCNC: 30.3 MMOL/L (ref 22–29)
CREAT SERPL-MCNC: 0.65 MG/DL (ref 0.57–1)
DEPRECATED RDW RBC AUTO: 45.3 FL (ref 37–54)
EGFRCR SERPLBLD CKD-EPI 2021: 98.5 ML/MIN/1.73
EOSINOPHIL # BLD AUTO: 0.19 10*3/MM3 (ref 0–0.4)
EOSINOPHIL NFR BLD AUTO: 2.5 % (ref 0.3–6.2)
ERYTHROCYTE [DISTWIDTH] IN BLOOD BY AUTOMATED COUNT: 15.1 % (ref 12.3–15.4)
GLUCOSE BLDC GLUCOMTR-MCNC: 108 MG/DL (ref 70–130)
GLUCOSE BLDC GLUCOMTR-MCNC: 127 MG/DL (ref 70–130)
GLUCOSE BLDC GLUCOMTR-MCNC: 158 MG/DL (ref 70–130)
GLUCOSE BLDC GLUCOMTR-MCNC: 192 MG/DL (ref 70–130)
GLUCOSE SERPL-MCNC: 102 MG/DL (ref 65–99)
HCT VFR BLD AUTO: 24.8 % (ref 34–46.6)
HGB BLD-MCNC: 7.6 G/DL (ref 12–15.9)
IMM GRANULOCYTES # BLD AUTO: 0.11 10*3/MM3 (ref 0–0.05)
IMM GRANULOCYTES NFR BLD AUTO: 1.4 % (ref 0–0.5)
LYMPHOCYTES # BLD AUTO: 1.87 10*3/MM3 (ref 0.7–3.1)
LYMPHOCYTES NFR BLD AUTO: 24.1 % (ref 19.6–45.3)
MAGNESIUM SERPL-MCNC: 1.6 MG/DL (ref 1.6–2.4)
MCH RBC QN AUTO: 25.8 PG (ref 26.6–33)
MCHC RBC AUTO-ENTMCNC: 30.6 G/DL (ref 31.5–35.7)
MCV RBC AUTO: 84.1 FL (ref 79–97)
MONOCYTES # BLD AUTO: 0.66 10*3/MM3 (ref 0.1–0.9)
MONOCYTES NFR BLD AUTO: 8.5 % (ref 5–12)
NEUTROPHILS NFR BLD AUTO: 4.81 10*3/MM3 (ref 1.7–7)
NEUTROPHILS NFR BLD AUTO: 62.1 % (ref 42.7–76)
NRBC BLD AUTO-RTO: 0 /100 WBC (ref 0–0.2)
PHOSPHATE SERPL-MCNC: 3.8 MG/DL (ref 2.5–4.5)
PLATELET # BLD AUTO: 444 10*3/MM3 (ref 140–450)
PMV BLD AUTO: 8.8 FL (ref 6–12)
POTASSIUM SERPL-SCNC: 3.9 MMOL/L (ref 3.5–5.2)
RBC # BLD AUTO: 2.95 10*6/MM3 (ref 3.77–5.28)
SODIUM SERPL-SCNC: 142 MMOL/L (ref 136–145)
WBC NRBC COR # BLD AUTO: 7.75 10*3/MM3 (ref 3.4–10.8)

## 2025-05-03 PROCEDURE — 85025 COMPLETE CBC W/AUTO DIFF WBC: CPT | Performed by: SURGERY

## 2025-05-03 PROCEDURE — 85730 THROMBOPLASTIN TIME PARTIAL: CPT | Performed by: SURGERY

## 2025-05-03 PROCEDURE — 82948 REAGENT STRIP/BLOOD GLUCOSE: CPT

## 2025-05-03 PROCEDURE — 83735 ASSAY OF MAGNESIUM: CPT | Performed by: SURGERY

## 2025-05-03 PROCEDURE — 80069 RENAL FUNCTION PANEL: CPT | Performed by: SURGERY

## 2025-05-03 PROCEDURE — 97530 THERAPEUTIC ACTIVITIES: CPT

## 2025-05-03 RX ADMIN — PANTOPRAZOLE SODIUM 40 MG: 40 INJECTION, POWDER, FOR SOLUTION INTRAVENOUS at 08:06

## 2025-05-03 RX ADMIN — HYDROCODONE BITARTRATE AND ACETAMINOPHEN 1 TABLET: 7.5; 325 TABLET ORAL at 05:05

## 2025-05-03 RX ADMIN — HYDROCODONE BITARTRATE AND ACETAMINOPHEN 1 TABLET: 7.5; 325 TABLET ORAL at 16:03

## 2025-05-03 RX ADMIN — APIXABAN 5 MG: 5 TABLET, FILM COATED ORAL at 20:31

## 2025-05-03 RX ADMIN — SENNOSIDES AND DOCUSATE SODIUM 2 TABLET: 50; 8.6 TABLET ORAL at 08:06

## 2025-05-03 RX ADMIN — APIXABAN 5 MG: 5 TABLET, FILM COATED ORAL at 08:06

## 2025-05-03 RX ADMIN — SENNOSIDES AND DOCUSATE SODIUM 2 TABLET: 50; 8.6 TABLET ORAL at 20:31

## 2025-05-03 RX ADMIN — METOPROLOL TARTRATE 25 MG: 25 TABLET, FILM COATED ORAL at 08:06

## 2025-05-03 RX ADMIN — PANTOPRAZOLE SODIUM 40 MG: 40 INJECTION, POWDER, FOR SOLUTION INTRAVENOUS at 20:31

## 2025-05-03 RX ADMIN — METOPROLOL TARTRATE 25 MG: 25 TABLET, FILM COATED ORAL at 20:31

## 2025-05-03 RX ADMIN — CLOPIDOGREL BISULFATE 75 MG: 75 TABLET, FILM COATED ORAL at 08:06

## 2025-05-03 RX ADMIN — HYDROCODONE BITARTRATE AND ACETAMINOPHEN 1 TABLET: 7.5; 325 TABLET ORAL at 20:31

## 2025-05-03 RX ADMIN — HYDROCODONE BITARTRATE AND ACETAMINOPHEN 1 TABLET: 7.5; 325 TABLET ORAL at 11:59

## 2025-05-03 RX ADMIN — ATORVASTATIN CALCIUM 40 MG: 20 TABLET, FILM COATED ORAL at 20:31

## 2025-05-03 RX ADMIN — SIMETHICONE 80 MG: 80 TABLET, CHEWABLE ORAL at 20:31

## 2025-05-03 NOTE — PLAN OF CARE
Problem: Adult Inpatient Plan of Care  Goal: Plan of Care Review  Outcome: Progressing  Flowsheets (Taken 5/3/2025 1658)  Progress: improving  Outcome Evaluation: VSS, room air, wound vac intact, pain controlled, unmotivated.  Plan of Care Reviewed With: patient  Goal: Patient-Specific Goal (Individualized)  Outcome: Progressing  Goal: Absence of Hospital-Acquired Illness or Injury  Outcome: Progressing  Intervention: Identify and Manage Fall Risk  Recent Flowsheet Documentation  Taken 5/3/2025 1603 by Gia Patel RN  Safety Promotion/Fall Prevention:   activity supervised   assistive device/personal items within reach   clutter free environment maintained   fall prevention program maintained   nonskid shoes/slippers when out of bed   room organization consistent   safety round/check completed  Taken 5/3/2025 1418 by Gia Patel RN  Safety Promotion/Fall Prevention:   activity supervised   assistive device/personal items within reach   clutter free environment maintained   fall prevention program maintained   nonskid shoes/slippers when out of bed   room organization consistent   safety round/check completed  Taken 5/3/2025 1200 by Gia Patel RN  Safety Promotion/Fall Prevention:   activity supervised   assistive device/personal items within reach   clutter free environment maintained   fall prevention program maintained   nonskid shoes/slippers when out of bed   room organization consistent   safety round/check completed  Taken 5/3/2025 1018 by Gia Patel RN  Safety Promotion/Fall Prevention:   activity supervised   assistive device/personal items within reach   clutter free environment maintained   fall prevention program maintained   nonskid shoes/slippers when out of bed   room organization consistent   safety round/check completed  Taken 5/3/2025 0805 by Gia Patel RN  Safety Promotion/Fall Prevention:   activity supervised   assistive device/personal items within  reach   clutter free environment maintained   fall prevention program maintained   nonskid shoes/slippers when out of bed   room organization consistent   safety round/check completed  Intervention: Prevent Skin Injury  Recent Flowsheet Documentation  Taken 5/3/2025 1603 by Gia Patel RN  Body Position: supine  Taken 5/3/2025 1418 by Gia Patel RN  Body Position: supine  Taken 5/3/2025 1200 by Gia Patel RN  Body Position: supine  Taken 5/3/2025 1018 by Gia Patel RN  Body Position:   right   turned   position changed independently  Taken 5/3/2025 0805 by Gia Patel RN  Body Position: position changed independently  Goal: Optimal Comfort and Wellbeing  Outcome: Progressing  Goal: Readiness for Transition of Care  Outcome: Progressing     Problem: Skin Injury Risk Increased  Goal: Skin Health and Integrity  Outcome: Progressing  Intervention: Optimize Skin Protection  Recent Flowsheet Documentation  Taken 5/3/2025 1603 by Gia Patel RN  Head of Bed (HOB) Positioning: HOB at 30-45 degrees  Taken 5/3/2025 1418 by Gia Patel RN  Activity Management: activity encouraged  Head of Bed (HOB) Positioning: HOB at 30-45 degrees  Taken 5/3/2025 1200 by Gia Patel RN  Head of Bed (HOB) Positioning: HOB at 30-45 degrees  Taken 5/3/2025 1018 by Gia Patel RN  Head of Bed (HOB) Positioning: HOB flat  Taken 5/3/2025 0805 by Gia Patel RN  Activity Management: activity encouraged  Head of Bed (HOB) Positioning: HOB at 30-45 degrees     Problem: Sepsis/Septic Shock  Goal: Optimal Coping  Outcome: Progressing  Goal: Absence of Bleeding  Outcome: Progressing  Goal: Blood Glucose Level Within Target Range  Outcome: Progressing  Goal: Absence of Infection Signs and Symptoms  Outcome: Progressing  Intervention: Promote Recovery  Recent Flowsheet Documentation  Taken 5/3/2025 1418 by Gia Patel RN  Activity Management: activity  encouraged  Taken 5/3/2025 0805 by Gia Patel RN  Activity Management: activity encouraged  Goal: Optimal Nutrition Delivery  Outcome: Progressing     Problem: Fall Injury Risk  Goal: Absence of Fall and Fall-Related Injury  Outcome: Progressing  Intervention: Promote Injury-Free Environment  Recent Flowsheet Documentation  Taken 5/3/2025 1603 by Gia Patel RN  Safety Promotion/Fall Prevention:   activity supervised   assistive device/personal items within reach   clutter free environment maintained   fall prevention program maintained   nonskid shoes/slippers when out of bed   room organization consistent   safety round/check completed  Taken 5/3/2025 1418 by Gia Patel RN  Safety Promotion/Fall Prevention:   activity supervised   assistive device/personal items within reach   clutter free environment maintained   fall prevention program maintained   nonskid shoes/slippers when out of bed   room organization consistent   safety round/check completed  Taken 5/3/2025 1200 by Gia Patel RN  Safety Promotion/Fall Prevention:   activity supervised   assistive device/personal items within reach   clutter free environment maintained   fall prevention program maintained   nonskid shoes/slippers when out of bed   room organization consistent   safety round/check completed  Taken 5/3/2025 1018 by Gia Patel RN  Safety Promotion/Fall Prevention:   activity supervised   assistive device/personal items within reach   clutter free environment maintained   fall prevention program maintained   nonskid shoes/slippers when out of bed   room organization consistent   safety round/check completed  Taken 5/3/2025 0805 by Gia Patel RN  Safety Promotion/Fall Prevention:   activity supervised   assistive device/personal items within reach   clutter free environment maintained   fall prevention program maintained   nonskid shoes/slippers when out of bed   room organization consistent    safety round/check completed     Problem: Pain Acute  Goal: Optimal Pain Control and Function  Outcome: Progressing     Problem: Heart Failure  Goal: Optimal Coping  Outcome: Progressing  Goal: Optimal Cardiac Output and Blood Flow  Outcome: Progressing  Goal: Stable Heart Rate and Rhythm  Outcome: Progressing  Goal: Fluid and Electrolyte Balance  Outcome: Progressing  Goal: Optimal Functional Ability  Outcome: Progressing  Intervention: Optimize Functional Ability  Recent Flowsheet Documentation  Taken 5/3/2025 1418 by Gia Patel RN  Activity Management: activity encouraged  Taken 5/3/2025 0805 by Gia Patel RN  Activity Management: activity encouraged  Goal: Improved Oral Intake  Outcome: Progressing  Goal: Effective Oxygenation and Ventilation  Outcome: Progressing  Intervention: Promote Airway Secretion Clearance  Recent Flowsheet Documentation  Taken 5/3/2025 1418 by Gia Patel RN  Activity Management: activity encouraged  Taken 5/3/2025 0805 by Gia Patel RN  Activity Management: activity encouraged  Intervention: Optimize Oxygenation and Ventilation  Recent Flowsheet Documentation  Taken 5/3/2025 1603 by Gia Patel RN  Head of Bed (HOB) Positioning: HOB at 30-45 degrees  Taken 5/3/2025 1418 by Gia Patel RN  Head of Bed (HOB) Positioning: HOB at 30-45 degrees  Taken 5/3/2025 1200 by Gia Patel RN  Head of Bed (HOB) Positioning: HOB at 30-45 degrees  Taken 5/3/2025 1018 by Gia Patel RN  Head of Bed (HOB) Positioning: HOB flat  Taken 5/3/2025 0805 by Gia Patel RN  Head of Bed (HOB) Positioning: HOB at 30-45 degrees  Goal: Effective Breathing Pattern During Sleep  Outcome: Progressing     Problem: VTE (Venous Thromboembolism)  Goal: Tissue Perfusion  Outcome: Progressing  Goal: Optimal Right Ventricular Function  Outcome: Progressing   Goal Outcome Evaluation:  Plan of Care Reviewed With: patient        Progress:  improving  Outcome Evaluation: VSS, room air, wound vac intact, pain controlled, unmotivated.

## 2025-05-03 NOTE — PLAN OF CARE
Goal Outcome Evaluation:   VSS. A&OX4. Pain managed with PRN pain medication. Wound vac intact. Plans to d/c to rehab possibly Monday. Call light in reach.

## 2025-05-03 NOTE — THERAPY PROGRESS REPORT/RE-CERT
Patient Name: Desiree Garcia  : 1960    MRN: [id number]                              Today's Date: 5/3/2025       Admit Date: 4/10/2025    Visit Dx:     ICD-10-CM ICD-9-CM   1. 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  I74.3 444.22   2. Mucopurulent chronic bronchitis  J41.1 491.1   3. Leg paresthesia  R20.2 782.0   4. Spinal stenosis of lumbar region, unspecified whether neurogenic claudication present  M48.061 724.02   5. Dyspnea on exertion  R06.09 786.09   6. Arthritis of knee, left  M17.12 716.96   7. Coronary artery disease involving native coronary artery of native heart without angina pectoris  I25.10 414.01   8. Acute diastolic CHF (congestive heart failure)  I50.31 428.31     428.0   9. Acute thrombus of left ventricle  I24.0 411.81     Patient Active Problem List   Diagnosis    Type 2 diabetes mellitus, without long-term current use of insulin    COPD (chronic obstructive pulmonary disease)    Depression with anxiety    Esophageal reflux    Forgetfulness    Leg paresthesia    Lumbar spinal stenosis    Migraines    Night sweat    Sciatica    Dyspnea on exertion    Thoracic or lumbosacral neuritis or radiculitis    Left wrist pain    Sprain of left wrist    Arthritis of knee, left    Contusion of left knee    Nondisplaced fracture of trapezium bone of left wrist with routine healing    Chronic left shoulder pain    Coronary artery disease involving native heart without angina pectoris    Hypertension, essential    Mixed hyperlipidemia    Cigarette nicotine dependence with nicotine-induced disorder    Altered bowel habits    Diarrhea    Rectal bleeding    Abdominal bloating    Allergic rhinitis due to food    Elevated troponin    Acute diastolic CHF (congestive heart failure)    History of ST elevation myocardial infarction (STEMI)    STEMI (ST elevation myocardial infarction)    Acute thrombus of left ventricle    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     Past Medical  History:   Diagnosis Date    Arthritis     Cervical cancer screening     COPD (chronic obstructive pulmonary disease)     Coronary artery disease involving native heart without angina pectoris 2021    primary angioplasty and stent placement to mid right coronary artery with good angiographic results on 2021 after STEMI    Depression with anxiety     Diabetes mellitus     Forgetfulness     Gastric reflux     Hypertension     Leg paresthesia 2017    Right    Limb swelling     Lumbago 2017    Low back pain    Lumbar spinal stenosis 2017    L4/5 moderate to severe central canal stenosis    Lumbosacral radiculopathy 2017    Right    Migraines     Night sweat     Reflux esophagitis     Shortness of breath     ST elevation myocardial infarction (STEMI) (CMS/[facility]) 2021    Stomach disorder      Past Surgical History:   Procedure Laterality Date    ABDOMINAL SURGERY      3 C-SECTIONS    ARTERIOGRAM LOWER EXTREMITY Left 2025    Procedure: ARTERIOGRAM LOWER EXTREMITY;  Surgeon: Misael Lawton MD;  Location: [facility] OR;  Service: Vascular;  Laterality: Left;    CARDIAC CATHETERIZATION      CARDIAC CATHETERIZATION N/A 2021    Procedure: Left Heart Cath;  Surgeon: Sidney Xiao MD;  Location: [facility] CATH INVASIVE LOCATION;  Service: Cardiology;  Laterality: N/A;    CARDIAC CATHETERIZATION N/A 2025    Procedure: Left Heart Cath;  Surgeon: James Verdugo MD;  Location: [facility] CATH INVASIVE LOCATION;  Service: Cardiology;  Laterality: N/A;     SECTION      x 3    CHOLECYSTECTOMY      COLONOSCOPY      COLONOSCOPY N/A 3/25/2025    Procedure: COLONOSCOPY WITH BIOPSIES AND COLD AND HOT SNARE POLYPECTOMIES;  Surgeon: Heber Najera MD;  Location: [facility] ENDOSCOPY;  Service: Gastroenterology;  Laterality: N/A;  COLON POLYPS    CORONARY STENT PLACEMENT      EMBOLECTOMY Left 2025    Procedure: EMBOLECTOMY MECHANICAL, FOUR COMPARTMENT FASCIOTOMY;   Surgeon: Misael Lawton MD;  Location: [facility] OR;  Service: Vascular;  Laterality: Left;    ENDOSCOPY      2007 2014    FOOT SURGERY Right     HAND SURGERY      HYSTERECTOMY  1985    INCISION AND DRAINAGE LEG Left 4/18/2025    Procedure: LOWER EXTREMITY DEBRIDEMENT;  Surgeon: Misael Lawton MD;  Location: [facility] MAIN OR;  Service: Vascular;  Laterality: Left;    INCISION AND DRAINAGE LEG Left 4/25/2025    Procedure: Left lower extremity fasciotomy debridement with possible wound VAC placement;  Surgeon: Raimundo Lin MD;  Location: [facility] OR;  Service: Vascular;  Laterality: Left;      General Information       Row Name 05/03/25 1500          Physical Therapy Time and Intention    Document Type therapy note (daily note)  -     Mode of Treatment individual therapy;physical therapy  -       Row Name 05/03/25 1500          General Information    Patient Profile Reviewed yes  -     Existing Precautions/Restrictions fall  -[facility] Name 05/03/25 1500          Cognition    Orientation Status (Cognition) oriented x 4  -       Row Name 05/03/25 1500          Safety Issues/Impairments Affecting Functional Mobility    Impairments Affecting Function (Mobility) balance;strength;pain;endurance/activity tolerance;range of motion (ROM)  -               User Key  (r) = Recorded By, (t) = Taken By, (c) = Cosigned By      Initials Name Provider Type     Anabelle Munguia, PT Physical Therapist                   Mobility       Row Name 05/03/25 1501          Bed Mobility    Bed Mobility supine-sit;sit-supine  -     Supine-Sit Keith (Bed Mobility) stand assist  -     Sit-Supine Keith (Bed Mobility) stand assist  -     Assistive Device (Bed Mobility) head of bed elevated;bed rails  -[facility] Name 05/03/25 1501          Sit-Stand Transfer    Sit-Stand Keith (Transfers) stand assist  -     Assistive Device (Sit-Stand Transfers) walker, front-wheeled  -      Comment, (Sit-Stand Transfer) Patient declined attempts to shift weight or even put L LE on floor.  -SM               User Key  (r) = Recorded By, (t) = Taken By, (c) = Cosigned By      Initials Name Provider Type     Anabelle Munguia PT Physical Therapist                   Obj/Interventions       Row Name 05/03/25 1502          Balance    Balance Assessment sitting static balance;sitting dynamic balance;standing static balance  -     Static Sitting Balance supervision  -     Dynamic Sitting Balance standby assist  -SM     Position, Sitting Balance sitting edge of bed  -     Static Standing Balance standby assist  -SM     Position/Device Used, Standing Balance supported;walker, front-wheeled  -SM     Balance Interventions sitting;standing;sit to stand;supported;static;dynamic  -SM               User Key  (r) = Recorded By, (t) = Taken By, (c) = Cosigned By      Initials Name Provider Type     Anabelle Munguia PT Physical Therapist                   Goals/Plan       Row Name 05/03/25 1505          Bed Mobility Goal 1 (PT)    Progress/Outcomes (Bed Mobility Goal 1, PT) goal met  -       Row Name 05/03/25 1505          Transfer Goal 1 (PT)    Activity/Assistive Device (Transfer Goal 1, PT) sit-to-stand/stand-to-sit;bed-to-chair/chair-to-bed  -     [place] Level/Cues Needed (Transfer Goal 1, PT) minimum assist (75% or more patient effort)  -SM     Time Frame (Transfer Goal 1, PT) 1 week  -SM     Progress/Outcome (Transfer Goal 1, PT) goal ongoing  -       Row Name 05/03/25 1505          Gait Training Goal 1 (PT)    Activity/Assistive Device (Gait Training Goal 1, PT) gait (walking locomotion);walker, rolling  -     [place] Level (Gait Training Goal 1, PT) minimum assist (75% or more patient effort)  -SM     Distance (Gait Training Goal 1, PT) 5ft  -SM     Time Frame (Gait Training Goal 1, PT) 1 week  -SM               User Key  (r) = Recorded By, (t) = Taken By, (c) = Cosigned By       Initials Name Provider Type     Anabelle Munguia PT Physical Therapist                   Clinical Impression       Row Name 05/03/25 1502          Pain    Pain Management Interventions exercise or physical activity utilized  -SM     Response to Pain Interventions activity participation with tolerable pain  -SM     Pre/Posttreatment Pain Comment Patient did not rate  -SM       Row Name 05/03/25 1502          Plan of Care Review    Plan of Care Reviewed With patient  -SM     Outcome Evaluation Patient seen for PT session this PM. Patient supine in bed upon arrival. Patient reports she does not see the point of working with therapy when she is planning to go to rehab. Attempted to educate. Patient tells therapist she does not know what she is doing. Patient completed all bed mobility with SBA. Patient stood from EOB with SBA and support of rwx. Patient declined to put weight through L LE. Attempted to provide feedback to help improve pain and mobility. Patient declined to attempt. Patient returned to supine in bed. PT will continue to monitor.  -[facility] Name 05/03/25 1502          Therapy Assessment/Plan (PT)    Therapy Frequency (PT) 5 times/wk  -[facility] Name 05/03/25 1502          Vital Signs    Pre Patient Position Supine  -SM     Intra Patient Position Standing  -SM     Post Patient Position Supine  -SM       [facility] Name 05/03/25 1502          Positioning and Restraints    Pre-Treatment Position in bed  -SM     Post Treatment Position bed  -SM     In Bed notified nsg;call light within reach;encouraged to call for assist;exit alarm on;fowlers  -               User Key  (r) = Recorded By, (t) = Taken By, (c) = Cosigned By      Initials Name Provider Type     Anabelle Munguia PT Physical Therapist                   Outcome Measures       [facility] Name 05/03/25 1506 05/03/25 0805       How much help from another person do you currently need...    Turning from your back to your side while in flat bed without  using bedrails? 4  -SM 4  -CE    Moving from lying on back to sitting on the side of a flat bed without bedrails? 4  -SM 4  -CE    Moving to and from a bed to a chair (including a wheelchair)? 1  -SM 3  -CE    Standing up from a chair using your arms (e.g., wheelchair, bedside chair)? 1  -SM 3  -CE    Climbing 3-5 steps with a railing? 1  -SM 2  -CE    To walk in hospital room? 1  -SM 2  -CE    AM-PAC 6 Clicks Score (PT) 12  -SM 18  -CE    Highest Level of Mobility Goal 4 --> Transfer to chair/commode  - 6 --> Walk 10 steps or more  -CE      Row Name 05/03/25 1506          Functional Assessment    Outcome Measure Options AM-PAC 6 Clicks Basic Mobility (PT)  -               User Key  (r) = Recorded By, (t) = Taken By, (c) = Cosigned By      Initials Name Provider Type    CE Gia Patel RN Registered Nurse     Anabelle Munguia PT Physical Therapist                                 Physical Therapy Education       Title: PT OT SLP Therapies (Done)       Topic: Physical Therapy (Done)       Point: Mobility training (Done)       Learning Progress Summary            Patient Acceptance, E, VU,NR by PAULINE at 5/3/2025 1506    Acceptance, E, NR,VU by  at 4/30/2025 1707    Acceptance, E,TB,D, VU,NR by  at 4/27/2025 1617    Acceptance, E,TB,D, VU,NR by TIFF at 4/24/2025 1720    Acceptance, E,TB,D, VU,NR by TIFF at 4/23/2025 1326    Eager, E,TB,D, VU,NR by TIFF at 4/21/2025 1034    Acceptance, E,D, VU,NR by MS at 4/20/2025 1124    Acceptance, E, NR by MR at 4/19/2025 1647    Acceptance, E,D, NR by TIFF at 4/17/2025 1809    Acceptance, E, NR by MARISSA at 4/15/2025 1324                      Point: Home exercise program (Done)       Learning Progress Summary            Patient Acceptance, E, VU,NR by PAULINE at 5/3/2025 1506    Acceptance, E, NR,VU by  at 4/30/2025 1707    Acceptance, E,TB,D, VU,NR by  at 4/27/2025 1617    Acceptance, E,TB,D, VU,NR by TIFF at 4/24/2025 1720    Acceptance, E,TB,D, VU,NR by TIFF at 4/23/2025 1326     Eager, E,TB,D, VU,NR by TIFF at 4/21/2025 1034    Acceptance, E,D, VU,NR by MS at 4/20/2025 1124    Acceptance, E, NR by MR at 4/19/2025 1647    Acceptance, E,D, NR by TIFF at 4/17/2025 1809                      Point: Body mechanics (Done)       Learning Progress Summary            Patient Acceptance, E, VU,NR by  at 5/3/2025 1506    Acceptance, E, NR,VU by  at 4/30/2025 1707    Acceptance, E,TB,D, VU,NR by  at 4/27/2025 1617    Acceptance, E,TB,D, VU,NR by TIFF at 4/24/2025 1720    Acceptance, E,TB,D, VU,NR by TIFF at 4/23/2025 1326    Eager, E,TB,D, VU,NR by TIFF at 4/21/2025 1034    Acceptance, E,D, VU,NR by MS at 4/20/2025 1124    Acceptance, E, NR by  at 4/19/2025 1647    Acceptance, E,D, NR by TIFF at 4/17/2025 1809    Acceptance, E, NR by MARISSA at 4/15/2025 1324                      Point: Precautions (Done)       Learning Progress Summary            Patient Acceptance, E, VU,NR by  at 5/3/2025 1506    Acceptance, E, NR,VU by  at 4/30/2025 1707    Acceptance, E,TB,D, VU,NR by  at 4/27/2025 1617    Acceptance, E,TB,D, VU,NR by TIFF at 4/24/2025 1720    Acceptance, E,TB,D, VU,NR by TIFF at 4/23/2025 1326    Eager, E,TB,D, VU,NR by TIFF at 4/21/2025 1034    Acceptance, E,D, VU,NR by MS at 4/20/2025 1124    Acceptance, E, NR by  at 4/19/2025 1647    Acceptance, E,D, NR by TIFF at 4/17/2025 1809    Acceptance, E, NR by MARISSA at 4/15/2025 1324                                      User Key       Initials Effective Dates Name Provider Type Discipline     06/16/21 -  Marie Grossman, PT Physical Therapist PT    JM 03/07/18 -  Laura Gooden, PTA Physical Therapist Assistant PT    MS 06/16/21 -  Misael Cerda, PT Physical Therapist PT    DJ 10/25/19 -  Meka Araiza, PT Physical Therapist PT     05/02/22 -  Anabelle Munguia, PT Physical Therapist PT    MR 08/03/23 -  Mary Jane Kelly, RN Registered Nurse Nurse     09/11/24 -  Misael Richmond, PT Physical Therapist PT                  PT Recommendation and Plan      Outcome Evaluation: Patient seen for PT session this PM. Patient supine in bed upon arrival. Patient reports she does not see the point of working with therapy when she is planning to go to rehab. Attempted to educate. Patient tells therapist she does not know what she is doing. Patient completed all bed mobility with SBA. Patient stood from EOB with SBA and support of rwx. Patient declined to put weight through L LE. Attempted to provide feedback to help improve pain and mobility. Patient declined to attempt. Patient returned to supine in bed. PT will continue to monitor.     Time Calculation:         PT Charges       Row Name 05/03/25 1507             Time Calculation    Start Time 1334  -SM      Stop Time 1343  -SM      Time Calculation (min) 9 min  -SM      PT Received On 05/03/25  -SM      PT - Next Appointment 05/05/25  -SM      PT Goal Re-Cert Due Date 05/10/25  -SM         Time Calculation- PT    Total Timed Code Minutes- PT 9 minute(s)  -SM         Timed Charges    73155 - PT Therapeutic Activity Minutes 9  -SM         Total Minutes    Timed Charges Total Minutes 9  -SM       Total Minutes 9  -SM                User Key  (r) = Recorded By, (t) = Taken By, (c) = Cosigned By      Initials Name Provider Type     Anabelle Munguia, TERRENCE Physical Therapist                  Therapy Charges for Today       Code Description Service Date Service Provider Modifiers Qty    [id number]  PT THERAPEUTIC ACT EA 15 MIN 5/3/2025 Anabelle Munguia PT GP 1            PT G-Codes  Outcome Measure Options: AM-PAC 6 Clicks Basic Mobility (PT)  AM-PAC 6 Clicks Score (PT): 12  AM-PAC 6 Clicks Score (OT): 16  Modified Jimbo Scale: 4 - Moderately severe disability.  Unable to walk without assistance, and unable to attend to own bodily needs without assistance.       Anabelle Munguia PT  5/3/2025

## 2025-05-03 NOTE — PROGRESS NOTES
Name: Desiree Garcia ADMIT: 4/10/2025   : 1960  PCP: Sandra Kaba MD    MRN: [id number] LOS: 23 days   AGE/SEX: 65 y.o. female  ROOM: [facility]     Subjective   Subjective   Patient awake and resting in bed, no acute events overnight, no new acute complaints, still having some pain in left leg.       Objective   Objective   Vital Signs  Temp:  [98.1 °F (36.7 °C)-98.6 °F (37 °C)] 98.6 °F (37 °C)  Heart Rate:  [69-72] 71  Resp:  [16] 16  BP: (100-110)/(56-73) 110/64  SpO2:  [99 %-100 %] 100 %  on  Flow (L/min) (Oxygen Therapy):  [2] 2;   Device (Oxygen Therapy): nasal cannula  Body mass index is 31.21 kg/m².  Physical Exam  Vitals and nursing note reviewed.   Constitutional:       General: She is not in acute distress.     Comments: Chronically ill appearing   Cardiovascular:      Rate and Rhythm: Normal rate.      Pulses: Normal pulses.      Heart sounds: Normal heart sounds.   Pulmonary:      Effort: Pulmonary effort is normal. No respiratory distress.      Breath sounds: No wheezing.   Abdominal:      General: Bowel sounds are normal.      Palpations: Abdomen is soft.      Tenderness: There is no abdominal tenderness.   Musculoskeletal:         General: Tenderness present.      Comments: Wound vac in place   Skin:     General: Skin is warm and dry.   Neurological:      Mental Status: She is alert.       Results Review     I reviewed the patient's new clinical results.  Results from last 7 days   Lab Units 25  0707 25  0654 25  04325  0525   WBC 10*3/mm3 7.75 7.69 7.92 8.39   HEMOGLOBIN g/dL 7.6* 7.8* 7.3* 7.4*   PLATELETS 10*3/mm3 444 459* 458* 451*     Results from last 7 days   Lab Units 25  0707 25  0654 25  0436 25  0525   SODIUM mmol/L 142 141 140 138   POTASSIUM mmol/L 3.9 3.9 3.8 3.8   CHLORIDE mmol/L 104 103 102 101   CO2 mmol/L 30.3* 30.1* 29.9* 28.0   BUN mg/dL 7* 8 8 7*   CREATININE mg/dL 0.65 0.71 0.72 0.70   GLUCOSE mg/dL 102* 106*  104* 112*   EGFR mL/min/1.73 98.5 95.1 93.5 96.7     Results from last 7 days   Lab Units 05/03/25  0707 05/02/25  0654 05/01/25  0436 04/30/25  0525   ALBUMIN g/dL 2.6* 2.5* 2.3* 2.3*     Results from last 7 days   Lab Units 05/03/25  0707 05/02/25  0654 05/01/25  0436 04/30/25  0525   CALCIUM mg/dL 8.5* 8.5* 8.4* 8.2*   ALBUMIN g/dL 2.6* 2.5* 2.3* 2.3*   MAGNESIUM mg/dL 1.6 1.7 1.7 1.6   PHOSPHORUS mg/dL 3.8 3.5 3.7 3.9       Glucose   Date/Time Value Ref Range Status   05/03/2025 0531 108 70 - 130 mg/dL Final   05/02/2025 2031 155 (H) 70 - 130 mg/dL Final   05/02/2025 1621 138 (H) 70 - 130 mg/dL Final   05/02/2025 1057 143 (H) 70 - 130 mg/dL Final   05/02/2025 0524 108 70 - 130 mg/dL Final   05/01/2025 2026 123 70 - 130 mg/dL Final   05/01/2025 1629 108 70 - 130 mg/dL Final       No radiology results for the last day    I have personally reviewed all medications:  Scheduled Medications  apixaban, 5 mg, Oral, Q12H  arformoterol, 15 mcg, Nebulization, BID - RT  atorvastatin, 40 mg, Oral, Nightly  budesonide, 0.5 mg, Nebulization, BID - RT  clopidogrel, 75 mg, Oral, Daily  insulin regular, 2-7 Units, Subcutaneous, Q6H  metoprolol tartrate, 25 mg, Oral, Q12H  pantoprazole, 40 mg, Intravenous, Q12H  revefenacin, 175 mcg, Nebulization, Daily - RT  senna-docusate sodium, 2 tablet, Oral, BID    Infusions     Diet  Diet: Regular/House, Diabetic, Cardiac; Healthy Heart (2-3 Na+); Consistent Carbohydrate; Fluid Consistency: Thin (IDDSI 0)    I have personally reviewed:  [x]  Laboratory   []  Microbiology   []  Radiology   [x]  EKG/Telemetry  []  Cardiology/Vascular   []  Pathology    []  Records       Assessment/Plan     Active Hospital Problems    Diagnosis  POA   • 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 [I74.3]  Unknown   • Acute thrombus of left ventricle [I24.0]  Yes   • Altered bowel habits [R19.4]  Yes   • Leg paresthesia [R20.2]  Yes      Resolved Hospital Problems    Diagnosis Date Resolved POA   •  **Cardiogenic shock [R57.0] 04/15/2025 Yes       65 y.o. female admitted with Cardiogenic shock.    Left lower extremity embolism and thrombosis  Left ventricular thrombus with showering emboli of kidney, spleen, bile  Severe PAD  Acute post-procedural pain  - S/P open left iliofemoral, profunda femoris and superficial femoral artery thromboembolectomy with left common femoral endarterectomy and repair, 4 compartment fasciotomy of left lower extremity with left femoropopliteal angioplasty and stent placement x 2 on 4/12/2025  - S/P left lateral fasciotomy site debridement and left medial fasciotomy site debridement on 4/18/2025  - S/P left fasciotomy debridement with wound vac placement on 4/25/25. Wound vac in place, changed previously.  - Patient on Plavix, Eliquis, statin. CCP working on placement to rehab.  - Will continue to follow Vascular plans/recommendations. Greatly appreciate their help.  - Continue medications for pain.    Rectal bleeding  Acute post-hemorrhagic anemia  - Patient with rectal bleeding postop on 4/25/2025; Heparin drip was stopped and Plavix held  - GI consulted and suspected hemorrhoidal bleeding and has signed off.  - Both heparin and Plavix later restarted, which she tolerated well. Heparin since changed to Eliquis.  - Hemoglobin remains low, but no evidence of acute worsening at present. No acute intervention warranted at present, but do need to continue with close monitoring.  - Order repeat CBC in AM for reassessment. Transfuse for hemoglobin <7.    Cardiogenic shock  Inferior STEMI  - Status post JOE x 2 on 4/8/2025. She was on ASA and Heparin drip---> has since been changed to Eliquis and these two medications have been stopped per recommendations.  - She also remains on Plavix 75 mg daily, atorvastatin 40 mg p.o. daily, Metoprolol 25 mg PO BID. Tolerating current treatments well. Cardiology previously followed, she will need follow up with them in outpatient setting.  - Initial 2D  Echo (04/10) showing EF 41-45% with mildly decreased LV systolic function; repeat echo (limited) on 04/23 showing improvement in EF to 50%, normal LV systolic function.  - Her BP is soft, unable to tolerate ACE/ARB at present, as this would likely lead to hypotension.    Type 2 diabetes with hyperglycemia  - A1c. 9.40% (04/09/25). Glucose elevated, but acceptable for most part.  - Continue with SSI as ordered. Adjust regimen as needed, treat hypoglycemia per protocol if develops. She is on Jardiance at home, can resume this at discharge.    Hypertension  - BP soft, continue with close monitoring of this.     COPD  - Appears stable from this perspective at present.  No evidence to suggest acute exacerbation.  Continue to monitor.    Leukocytosis  - Noted previously, was evaluated by ID, etiology felt to be reactive, anabiotics were discontinued.  - WBC normalized on labs.    Ileus  - Ileus has resolved per surgery, NG removed, surgery signed off     NIA  - Resolved, creatinine stable  - Nephrology has signed off     GERD  - PPI    Portions of this text have been copied and I have reviewed these. Documentation accurate as of 05/03/25.       Eliquis  for DVT prophylaxis.  Full code.  Discussed with patient and nursing staff.  Anticipate discharge to SNU facility once arrangements have been made.   Expected Discharge Date: 5/5/2025; Expected Discharge Time:       Tj Ramirez DO  [facility]  05/03/25

## 2025-05-03 NOTE — PLAN OF CARE
Goal Outcome Evaluation:  Plan of Care Reviewed With: patient           Outcome Evaluation: Patient seen for PT session this PM. Patient supine in bed upon arrival. Patient reports she does not see the point of working with therapy when she is planning to go to rehab. Attempted to educate. Patient tells therapist she does not know what she is doing. Patient completed all bed mobility with SBA. Patient stood from EOB with SBA and support of rwx. Patient declined to put weight through L LE. Attempted to provide feedback to help improve pain and mobility. Patient declined to attempt. Patient returned to supine in bed. PT will continue to monitor.

## 2025-05-04 LAB
ALBUMIN SERPL-MCNC: 2.8 G/DL (ref 3.5–5.2)
ANION GAP SERPL CALCULATED.3IONS-SCNC: 10 MMOL/L (ref 5–15)
APTT PPP: 36.6 SECONDS (ref 22.7–35.4)
BASOPHILS # BLD AUTO: 0.1 10*3/MM3 (ref 0–0.2)
BASOPHILS NFR BLD AUTO: 1.2 % (ref 0–1.5)
BUN SERPL-MCNC: 7 MG/DL (ref 8–23)
BUN/CREAT SERPL: 9 (ref 7–25)
CALCIUM SPEC-SCNC: 8.5 MG/DL (ref 8.6–10.5)
CHLORIDE SERPL-SCNC: 100 MMOL/L (ref 98–107)
CO2 SERPL-SCNC: 29 MMOL/L (ref 22–29)
CREAT SERPL-MCNC: 0.78 MG/DL (ref 0.57–1)
DEPRECATED RDW RBC AUTO: 45.5 FL (ref 37–54)
EGFRCR SERPLBLD CKD-EPI 2021: 84.9 ML/MIN/1.73
EOSINOPHIL # BLD AUTO: 0.2 10*3/MM3 (ref 0–0.4)
EOSINOPHIL NFR BLD AUTO: 2.4 % (ref 0.3–6.2)
ERYTHROCYTE [DISTWIDTH] IN BLOOD BY AUTOMATED COUNT: 15.1 % (ref 12.3–15.4)
GLUCOSE BLDC GLUCOMTR-MCNC: 112 MG/DL (ref 70–130)
GLUCOSE BLDC GLUCOMTR-MCNC: 112 MG/DL (ref 70–130)
GLUCOSE BLDC GLUCOMTR-MCNC: 171 MG/DL (ref 70–130)
GLUCOSE BLDC GLUCOMTR-MCNC: 190 MG/DL (ref 70–130)
GLUCOSE SERPL-MCNC: 130 MG/DL (ref 65–99)
HCT VFR BLD AUTO: 27.9 % (ref 34–46.6)
HGB BLD-MCNC: 8.5 G/DL (ref 12–15.9)
IMM GRANULOCYTES # BLD AUTO: 0.11 10*3/MM3 (ref 0–0.05)
IMM GRANULOCYTES NFR BLD AUTO: 1.3 % (ref 0–0.5)
LYMPHOCYTES # BLD AUTO: 2.12 10*3/MM3 (ref 0.7–3.1)
LYMPHOCYTES NFR BLD AUTO: 25.5 % (ref 19.6–45.3)
MAGNESIUM SERPL-MCNC: 1.7 MG/DL (ref 1.6–2.4)
MCH RBC QN AUTO: 25.8 PG (ref 26.6–33)
MCHC RBC AUTO-ENTMCNC: 30.5 G/DL (ref 31.5–35.7)
MCV RBC AUTO: 84.8 FL (ref 79–97)
MONOCYTES # BLD AUTO: 0.81 10*3/MM3 (ref 0.1–0.9)
MONOCYTES NFR BLD AUTO: 9.7 % (ref 5–12)
NEUTROPHILS NFR BLD AUTO: 4.98 10*3/MM3 (ref 1.7–7)
NEUTROPHILS NFR BLD AUTO: 59.9 % (ref 42.7–76)
NRBC BLD AUTO-RTO: 0 /100 WBC (ref 0–0.2)
PHOSPHATE SERPL-MCNC: 3.4 MG/DL (ref 2.5–4.5)
PLATELET # BLD AUTO: 484 10*3/MM3 (ref 140–450)
PMV BLD AUTO: 9.2 FL (ref 6–12)
POTASSIUM SERPL-SCNC: 3.4 MMOL/L (ref 3.5–5.2)
POTASSIUM SERPL-SCNC: 4.3 MMOL/L (ref 3.5–5.2)
RBC # BLD AUTO: 3.29 10*6/MM3 (ref 3.77–5.28)
SODIUM SERPL-SCNC: 139 MMOL/L (ref 136–145)
WBC NRBC COR # BLD AUTO: 8.32 10*3/MM3 (ref 3.4–10.8)

## 2025-05-04 PROCEDURE — 82948 REAGENT STRIP/BLOOD GLUCOSE: CPT

## 2025-05-04 PROCEDURE — 63710000001 INSULIN REGULAR HUMAN PER 5 UNITS: Performed by: SURGERY

## 2025-05-04 PROCEDURE — 83735 ASSAY OF MAGNESIUM: CPT | Performed by: SURGERY

## 2025-05-04 PROCEDURE — 94799 UNLISTED PULMONARY SVC/PX: CPT

## 2025-05-04 PROCEDURE — 63710000001 ONDANSETRON ODT 4 MG TABLET DISPERSIBLE: Performed by: SURGERY

## 2025-05-04 PROCEDURE — 80069 RENAL FUNCTION PANEL: CPT | Performed by: SURGERY

## 2025-05-04 PROCEDURE — 85730 THROMBOPLASTIN TIME PARTIAL: CPT | Performed by: SURGERY

## 2025-05-04 PROCEDURE — 84132 ASSAY OF SERUM POTASSIUM: CPT | Performed by: STUDENT IN AN ORGANIZED HEALTH CARE EDUCATION/TRAINING PROGRAM

## 2025-05-04 PROCEDURE — 85025 COMPLETE CBC W/AUTO DIFF WBC: CPT | Performed by: SURGERY

## 2025-05-04 RX ORDER — AMOXICILLIN 250 MG
2 CAPSULE ORAL 2 TIMES DAILY
Status: DISCONTINUED | OUTPATIENT
Start: 2025-05-04 | End: 2025-05-06 | Stop reason: HOSPADM

## 2025-05-04 RX ORDER — POLYETHYLENE GLYCOL 3350 17 G/17G
17 POWDER, FOR SOLUTION ORAL DAILY
Status: DISCONTINUED | OUTPATIENT
Start: 2025-05-04 | End: 2025-05-06 | Stop reason: HOSPADM

## 2025-05-04 RX ORDER — POTASSIUM CHLORIDE 1500 MG/1
40 TABLET, EXTENDED RELEASE ORAL EVERY 4 HOURS
Status: DISPENSED | OUTPATIENT
Start: 2025-05-04 | End: 2025-05-04

## 2025-05-04 RX ORDER — BISACODYL 5 MG/1
5 TABLET, DELAYED RELEASE ORAL DAILY PRN
Status: DISCONTINUED | OUTPATIENT
Start: 2025-05-04 | End: 2025-05-06 | Stop reason: HOSPADM

## 2025-05-04 RX ORDER — BISACODYL 10 MG
10 SUPPOSITORY, RECTAL RECTAL DAILY PRN
Status: DISCONTINUED | OUTPATIENT
Start: 2025-05-04 | End: 2025-05-06 | Stop reason: HOSPADM

## 2025-05-04 RX ORDER — HYDROCODONE BITARTRATE AND ACETAMINOPHEN 10; 325 MG/1; MG/1
1 TABLET ORAL EVERY 4 HOURS PRN
Refills: 0 | Status: DISCONTINUED | OUTPATIENT
Start: 2025-05-04 | End: 2025-05-06 | Stop reason: HOSPADM

## 2025-05-04 RX ORDER — POTASSIUM CHLORIDE 1.5 G/1.58G
40 POWDER, FOR SOLUTION ORAL EVERY 4 HOURS
Status: COMPLETED | OUTPATIENT
Start: 2025-05-04 | End: 2025-05-04

## 2025-05-04 RX ORDER — ALUMINA, MAGNESIA, AND SIMETHICONE 2400; 2400; 240 MG/30ML; MG/30ML; MG/30ML
15 SUSPENSION ORAL EVERY 4 HOURS PRN
Status: DISCONTINUED | OUTPATIENT
Start: 2025-05-04 | End: 2025-05-06 | Stop reason: HOSPADM

## 2025-05-04 RX ADMIN — HYDROCODONE BITARTRATE AND ACETAMINOPHEN 1 TABLET: 7.5; 325 TABLET ORAL at 03:57

## 2025-05-04 RX ADMIN — ONDANSETRON 4 MG: 4 TABLET, ORALLY DISINTEGRATING ORAL at 19:35

## 2025-05-04 RX ADMIN — POTASSIUM CHLORIDE 40 MEQ: 1.5 POWDER, FOR SOLUTION ORAL at 14:21

## 2025-05-04 RX ADMIN — SIMETHICONE 80 MG: 80 TABLET, CHEWABLE ORAL at 19:35

## 2025-05-04 RX ADMIN — INSULIN HUMAN 2 UNITS: 100 INJECTION, SOLUTION PARENTERAL at 11:27

## 2025-05-04 RX ADMIN — BUDESONIDE 0.5 MG: 0.5 INHALANT RESPIRATORY (INHALATION) at 21:01

## 2025-05-04 RX ADMIN — SENNOSIDES AND DOCUSATE SODIUM 2 TABLET: 50; 8.6 TABLET ORAL at 09:32

## 2025-05-04 RX ADMIN — ALUMINUM HYDROXIDE, MAGNESIUM HYDROXIDE, AND DIMETHICONE 15 ML: 400; 400; 40 SUSPENSION ORAL at 20:15

## 2025-05-04 RX ADMIN — SIMETHICONE 80 MG: 80 TABLET, CHEWABLE ORAL at 06:55

## 2025-05-04 RX ADMIN — HYDROCODONE BITARTRATE AND ACETAMINOPHEN 1 TABLET: 10; 325 TABLET ORAL at 20:15

## 2025-05-04 RX ADMIN — METOPROLOL TARTRATE 25 MG: 25 TABLET, FILM COATED ORAL at 09:33

## 2025-05-04 RX ADMIN — CLOPIDOGREL BISULFATE 75 MG: 75 TABLET, FILM COATED ORAL at 09:32

## 2025-05-04 RX ADMIN — POTASSIUM CHLORIDE 40 MEQ: 1.5 POWDER, FOR SOLUTION ORAL at 10:36

## 2025-05-04 RX ADMIN — SENNOSIDES AND DOCUSATE SODIUM 2 TABLET: 50; 8.6 TABLET ORAL at 20:15

## 2025-05-04 RX ADMIN — METOPROLOL TARTRATE 25 MG: 25 TABLET, FILM COATED ORAL at 20:15

## 2025-05-04 RX ADMIN — APIXABAN 5 MG: 5 TABLET, FILM COATED ORAL at 20:15

## 2025-05-04 RX ADMIN — ALUMINUM HYDROXIDE, MAGNESIUM HYDROXIDE, AND DIMETHICONE 15 ML: 400; 400; 40 SUSPENSION ORAL at 16:05

## 2025-05-04 RX ADMIN — PANTOPRAZOLE SODIUM 40 MG: 40 INJECTION, POWDER, FOR SOLUTION INTRAVENOUS at 09:33

## 2025-05-04 RX ADMIN — PANTOPRAZOLE SODIUM 40 MG: 40 INJECTION, POWDER, FOR SOLUTION INTRAVENOUS at 20:15

## 2025-05-04 RX ADMIN — ARFORMOTEROL TARTRATE 15 MCG: 15 SOLUTION RESPIRATORY (INHALATION) at 21:01

## 2025-05-04 RX ADMIN — POLYETHYLENE GLYCOL 3350 17 G: 17 POWDER, FOR SOLUTION ORAL at 14:21

## 2025-05-04 RX ADMIN — HYDROCODONE BITARTRATE AND ACETAMINOPHEN 1 TABLET: 7.5; 325 TABLET ORAL at 10:36

## 2025-05-04 RX ADMIN — HYDROCODONE BITARTRATE AND ACETAMINOPHEN 1 TABLET: 10; 325 TABLET ORAL at 14:25

## 2025-05-04 RX ADMIN — ATORVASTATIN CALCIUM 40 MG: 20 TABLET, FILM COATED ORAL at 20:15

## 2025-05-04 RX ADMIN — INSULIN HUMAN 2 UNITS: 100 INJECTION, SOLUTION PARENTERAL at 06:47

## 2025-05-04 RX ADMIN — APIXABAN 5 MG: 5 TABLET, FILM COATED ORAL at 09:32

## 2025-05-04 NOTE — PROGRESS NOTES
Name: Desiree Garcia ADMIT: 4/10/2025   : 1960  PCP: Sandra Kaba MD    MRN: [id number] LOS: 24 days   AGE/SEX: 65 y.o. female  ROOM: [facility]     Subjective   Subjective   Patient awake and resting in bed, having ongoing pain and discomfort in left lower extremity.        Objective   Objective   Vital Signs  Temp:  [98.2 °F (36.8 °C)-98.6 °F (37 °C)] 98.4 °F (36.9 °C)  Heart Rate:  [73-82] 73  Resp:  [16-18] 16  BP: (100-119)/(54-68) 109/57  SpO2:  [92 %] 92 %  on   ;   Device (Oxygen Therapy): room air  Body mass index is 31.78 kg/m².  Physical Exam  Vitals and nursing note reviewed.   Constitutional:       General: She is not in acute distress.     Comments: Chronically ill appearing   Cardiovascular:      Rate and Rhythm: Normal rate.      Pulses: Normal pulses.      Heart sounds: Normal heart sounds.   Pulmonary:      Effort: Pulmonary effort is normal. No respiratory distress.      Breath sounds: No wheezing.   Abdominal:      General: Bowel sounds are normal.      Palpations: Abdomen is soft.      Tenderness: There is no abdominal tenderness.   Musculoskeletal:         General: Swelling and tenderness present.      Comments: Wound vac in place   Skin:     General: Skin is warm and dry.   Neurological:      Mental Status: She is alert.       Results Review     I reviewed the patient's new clinical results.  Results from last 7 days   Lab Units 25  [id number] 25  0436   WBC 10*3/mm3 8.32 7.75 7.69 7.92   HEMOGLOBIN g/dL 8.5* 7.6* 7.8* 7.3*   PLATELETS 10*3/mm3 484* 444 459* 458*     Results from last 7 days   Lab Units 25  0707 25  0654 25  0436   SODIUM mmol/L 139 142 141 140   POTASSIUM mmol/L 3.4* 3.9 3.9 3.8   CHLORIDE mmol/L 100 104 103 102   CO2 mmol/L 29.0 30.3* 30.1* 29.9*   BUN mg/dL 7* 7* 8 8   CREATININE mg/dL 0.78 0.65 0.71 0.72   GLUCOSE mg/dL 130* 102* 106* 104*   EGFR mL/min/1.73 84.9 98.5 95.1 93.5      Results from last 7 days   Lab Units 05/04/25  0523 05/03/25  0707 05/02/25  0654 05/01/25  0436   ALBUMIN g/dL 2.8* 2.6* 2.5* 2.3*     Results from last 7 days   Lab Units 05/04/25  0523 05/03/25  0707 05/02/25  0654 05/01/25  0436   CALCIUM mg/dL 8.5* 8.5* 8.5* 8.4*   ALBUMIN g/dL 2.8* 2.6* 2.5* 2.3*   MAGNESIUM mg/dL 1.7 1.6 1.7 1.7   PHOSPHORUS mg/dL 3.4 3.8 3.5 3.7       Glucose   Date/Time Value Ref Range Status   05/04/2025 0623 171 (H) 70 - 130 mg/dL Final   05/03/2025 2017 158 (H) 70 - 130 mg/dL Final   05/03/2025 1606 127 70 - 130 mg/dL Final   05/03/2025 1240 192 (H) 70 - 130 mg/dL Final   05/03/2025 0531 108 70 - 130 mg/dL Final   05/02/2025 2031 155 (H) 70 - 130 mg/dL Final   05/02/2025 1621 138 (H) 70 - 130 mg/dL Final       No radiology results for the last day    I have personally reviewed all medications:  Scheduled Medications  apixaban, 5 mg, Oral, Q12H  arformoterol, 15 mcg, Nebulization, BID - RT  atorvastatin, 40 mg, Oral, Nightly  budesonide, 0.5 mg, Nebulization, BID - RT  clopidogrel, 75 mg, Oral, Daily  insulin regular, 2-7 Units, Subcutaneous, Q6H  metoprolol tartrate, 25 mg, Oral, Q12H  pantoprazole, 40 mg, Intravenous, Q12H  potassium chloride ER, 40 mEq, Oral, Q4H  potassium chloride, 40 mEq, Oral, Q4H  revefenacin, 175 mcg, Nebulization, Daily - RT  senna-docusate sodium, 2 tablet, Oral, BID    Infusions     Diet  Diet: Regular/House, Diabetic, Cardiac; Healthy Heart (2-3 Na+); Consistent Carbohydrate; Fluid Consistency: Thin (IDDSI 0)    I have personally reviewed:  [x]  Laboratory   []  Microbiology   []  Radiology   [x]  EKG/Telemetry  []  Cardiology/Vascular   []  Pathology    []  Records       Assessment/Plan     Active Hospital Problems    Diagnosis  POA   • 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 [I74.3]  Unknown   • Acute thrombus of left ventricle [I24.0]  Yes   • Altered bowel habits [R19.4]  Yes   • Leg paresthesia [R20.2]  Yes      Resolved Hospital Problems     Diagnosis Date Resolved POA   • **Cardiogenic shock [R57.0] 04/15/2025 Yes       65 y.o. female admitted with Cardiogenic shock.    Left lower extremity embolism and thrombosis  Left ventricular thrombus with showering emboli of kidney, spleen, bile  Severe PAD  Acute post-procedural pain  - S/P open left iliofemoral, profunda femoris and superficial femoral artery thromboembolectomy with left common femoral endarterectomy and repair, 4 compartment fasciotomy of left lower extremity with left femoropopliteal angioplasty and stent placement x 2 on 4/12/2025  - S/P left lateral fasciotomy site debridement and left medial fasciotomy site debridement on 4/18/2025  - S/P left fasciotomy debridement with wound vac placement on 4/25/25. Wound vac in place, changed previously.  - Patient on Plavix, Eliquis, statin. CCP working on placement to rehab.  - Will continue to follow Vascular plans/recommendations. Greatly appreciate Vascular help.  - Pain uncontrolled, increase dose of Norco now and monitor response.    Rectal bleeding  Acute post-hemorrhagic anemia  - Patient with rectal bleeding postop on 4/25/2025; Heparin drip was stopped and Plavix held  - GI consulted and suspected hemorrhoidal bleeding and has signed off.  - Both heparin and Plavix later restarted, which she tolerated well. Heparin since changed to Eliquis.  - Hemoglobin remains low, but no evidence of acute worsening at present. No acute intervention warranted at present, but do need to continue with close monitoring.  - Order repeat CBC in AM for reassessment. Transfuse for hemoglobin <7.    Cardiogenic shock  Inferior STEMI  - Status post JOE x 2 on 4/8/2025. She was on ASA and Heparin drip---> has since been changed to Eliquis and these two medications have been stopped per recommendations.  - She also remains on Plavix 75 mg daily, atorvastatin 40 mg p.o. daily, Metoprolol 25 mg PO BID. Tolerating current treatments well. Cardiology previously  followed, she will need follow up with them in outpatient setting.  - Initial 2D Echo (04/10) showing EF 41-45% with mildly decreased LV systolic function; repeat echo (limited) on 04/23 showing improvement in EF to 50%, normal LV systolic function.  - Her BP is soft, unable to tolerate ACE/ARB at present, as this would likely lead to hypotension.    Type 2 diabetes with hyperglycemia  - A1c. 9.40% (04/09/25). Glucose elevated, but acceptable for most part.  - Continue with SSI as ordered. Adjust regimen as needed, treat hypoglycemia per protocol if develops. She is on Jardiance at home, can resume this at discharge.    Hypokalemia  - K low on most recent labs; Will replete via electrolyte protocol. Follow up repeat labs to guide ongoing management decisions. Replete PRN per protocol. Continue to monitor    Hypertension  - BP soft, continue with close monitoring of this.     COPD  - Appears stable from this perspective at present.  No evidence to suggest acute exacerbation.  Continue to monitor.    Leukocytosis  - Noted previously, was evaluated by ID, etiology felt to be reactive, anabiotics were discontinued.  - WBC normalized on labs.    Ileus  - Ileus has resolved per surgery, NG removed, surgery signed off     NIA  - Resolved, creatinine stable  - Nephrology has signed off     GERD  - PPI    Portions of this text have been copied and I have reviewed these. Documentation accurate as of 05/04/25.       Eliquis  for DVT prophylaxis.  Full code.  Discussed with patient and nursing staff.  Anticipate discharge to SNU facility once arrangements have been made.   Expected Discharge Date: 5/5/2025; Expected Discharge Time:       DO [facility]  05/04/25

## 2025-05-04 NOTE — PLAN OF CARE
Goal Outcome Evaluation:   VSS. A&OX4. 02 while sleeping. Pain managed with PRN pain medication. Wound vac intact. Plans to d/c to rehab Monday. Call light in reach.

## 2025-05-05 VITALS
DIASTOLIC BLOOD PRESSURE: 65 MMHG | BODY MASS INDEX: 29.83 KG/M2 | RESPIRATION RATE: 16 BRPM | TEMPERATURE: 98.3 F | HEIGHT: 66 IN | SYSTOLIC BLOOD PRESSURE: 106 MMHG | WEIGHT: 185.63 LBS | OXYGEN SATURATION: 97 % | HEART RATE: 70 BPM

## 2025-05-05 PROBLEM — E11.65 TYPE 2 DIABETES MELLITUS WITH HYPERGLYCEMIA: Status: ACTIVE | Noted: 2025-05-05

## 2025-05-05 LAB
ALBUMIN SERPL-MCNC: 2.6 G/DL (ref 3.5–5.2)
ANION GAP SERPL CALCULATED.3IONS-SCNC: 4.5 MMOL/L (ref 5–15)
APTT PPP: 36.8 SECONDS (ref 22.7–35.4)
BASOPHILS # BLD AUTO: 0.12 10*3/MM3 (ref 0–0.2)
BASOPHILS NFR BLD AUTO: 1.4 % (ref 0–1.5)
BUN SERPL-MCNC: 8 MG/DL (ref 8–23)
BUN/CREAT SERPL: 10.4 (ref 7–25)
CALCIUM SPEC-SCNC: 8.4 MG/DL (ref 8.6–10.5)
CHLORIDE SERPL-SCNC: 102 MMOL/L (ref 98–107)
CO2 SERPL-SCNC: 31.5 MMOL/L (ref 22–29)
CREAT SERPL-MCNC: 0.77 MG/DL (ref 0.57–1)
DEPRECATED RDW RBC AUTO: 48.3 FL (ref 37–54)
EGFRCR SERPLBLD CKD-EPI 2021: 86.3 ML/MIN/1.73
EOSINOPHIL # BLD AUTO: 0.31 10*3/MM3 (ref 0–0.4)
EOSINOPHIL NFR BLD AUTO: 3.7 % (ref 0.3–6.2)
ERYTHROCYTE [DISTWIDTH] IN BLOOD BY AUTOMATED COUNT: 15.3 % (ref 12.3–15.4)
GLUCOSE BLDC GLUCOMTR-MCNC: 107 MG/DL (ref 70–130)
GLUCOSE BLDC GLUCOMTR-MCNC: 162 MG/DL (ref 70–130)
GLUCOSE SERPL-MCNC: 92 MG/DL (ref 65–99)
HCT VFR BLD AUTO: 28.2 % (ref 34–46.6)
HGB BLD-MCNC: 8.3 G/DL (ref 12–15.9)
IMM GRANULOCYTES # BLD AUTO: 0.11 10*3/MM3 (ref 0–0.05)
IMM GRANULOCYTES NFR BLD AUTO: 1.3 % (ref 0–0.5)
LYMPHOCYTES # BLD AUTO: 2.45 10*3/MM3 (ref 0.7–3.1)
LYMPHOCYTES NFR BLD AUTO: 29.3 % (ref 19.6–45.3)
MAGNESIUM SERPL-MCNC: 2 MG/DL (ref 1.6–2.4)
MCH RBC QN AUTO: 25.5 PG (ref 26.6–33)
MCHC RBC AUTO-ENTMCNC: 29.4 G/DL (ref 31.5–35.7)
MCV RBC AUTO: 86.8 FL (ref 79–97)
MONOCYTES # BLD AUTO: 0.9 10*3/MM3 (ref 0.1–0.9)
MONOCYTES NFR BLD AUTO: 10.8 % (ref 5–12)
NEUTROPHILS NFR BLD AUTO: 4.47 10*3/MM3 (ref 1.7–7)
NEUTROPHILS NFR BLD AUTO: 53.5 % (ref 42.7–76)
NRBC BLD AUTO-RTO: 0.2 /100 WBC (ref 0–0.2)
PHOSPHATE SERPL-MCNC: 3.4 MG/DL (ref 2.5–4.5)
PLATELET # BLD AUTO: 460 10*3/MM3 (ref 140–450)
PMV BLD AUTO: 9.3 FL (ref 6–12)
POTASSIUM SERPL-SCNC: 4.2 MMOL/L (ref 3.5–5.2)
RBC # BLD AUTO: 3.25 10*6/MM3 (ref 3.77–5.28)
SODIUM SERPL-SCNC: 138 MMOL/L (ref 136–145)
WBC NRBC COR # BLD AUTO: 8.36 10*3/MM3 (ref 3.4–10.8)

## 2025-05-05 PROCEDURE — 63710000001 ONDANSETRON ODT 4 MG TABLET DISPERSIBLE: Performed by: SURGERY

## 2025-05-05 PROCEDURE — 82948 REAGENT STRIP/BLOOD GLUCOSE: CPT

## 2025-05-05 PROCEDURE — 94664 DEMO&/EVAL PT USE INHALER: CPT

## 2025-05-05 PROCEDURE — 94761 N-INVAS EAR/PLS OXIMETRY MLT: CPT

## 2025-05-05 PROCEDURE — 94799 UNLISTED PULMONARY SVC/PX: CPT

## 2025-05-05 PROCEDURE — 99024 POSTOP FOLLOW-UP VISIT: CPT | Performed by: SURGERY

## 2025-05-05 PROCEDURE — 80069 RENAL FUNCTION PANEL: CPT | Performed by: SURGERY

## 2025-05-05 PROCEDURE — 63710000001 REVEFENACIN 175 MCG/3ML SOLUTION: Performed by: SURGERY

## 2025-05-05 PROCEDURE — 63710000001 INSULIN LISPRO (HUMAN) PER 5 UNITS: Performed by: STUDENT IN AN ORGANIZED HEALTH CARE EDUCATION/TRAINING PROGRAM

## 2025-05-05 PROCEDURE — 85025 COMPLETE CBC W/AUTO DIFF WBC: CPT | Performed by: SURGERY

## 2025-05-05 PROCEDURE — 83735 ASSAY OF MAGNESIUM: CPT | Performed by: SURGERY

## 2025-05-05 PROCEDURE — 85730 THROMBOPLASTIN TIME PARTIAL: CPT | Performed by: SURGERY

## 2025-05-05 PROCEDURE — 94760 N-INVAS EAR/PLS OXIMETRY 1: CPT

## 2025-05-05 RX ORDER — INSULIN LISPRO 100 [IU]/ML
2-7 INJECTION, SOLUTION INTRAVENOUS; SUBCUTANEOUS
Status: DISCONTINUED | OUTPATIENT
Start: 2025-05-05 | End: 2025-05-06 | Stop reason: HOSPADM

## 2025-05-05 RX ORDER — INSULIN LISPRO 100 [IU]/ML
2-7 INJECTION, SOLUTION INTRAVENOUS; SUBCUTANEOUS
Start: 2025-05-05

## 2025-05-05 RX ORDER — METOPROLOL SUCCINATE 50 MG/1
50 TABLET, EXTENDED RELEASE ORAL DAILY
Start: 2025-05-05

## 2025-05-05 RX ORDER — HYDROCODONE BITARTRATE AND ACETAMINOPHEN 10; 325 MG/1; MG/1
1 TABLET ORAL EVERY 4 HOURS PRN
Qty: 18 TABLET | Refills: 0 | Status: SHIPPED | OUTPATIENT
Start: 2025-05-05 | End: 2025-05-08

## 2025-05-05 RX ORDER — ARFORMOTEROL TARTRATE 15 UG/2ML
15 SOLUTION RESPIRATORY (INHALATION)
Status: CANCELLED
Start: 2025-05-05

## 2025-05-05 RX ORDER — CLOPIDOGREL BISULFATE 75 MG/1
75 TABLET ORAL DAILY
Start: 2025-05-06

## 2025-05-05 RX ORDER — SIMETHICONE 80 MG
80 TABLET,CHEWABLE ORAL 4 TIMES DAILY PRN
Start: 2025-05-05

## 2025-05-05 RX ORDER — FLUTICASONE FUROATE, UMECLIDINIUM BROMIDE AND VILANTEROL TRIFENATATE 100; 62.5; 25 UG/1; UG/1; UG/1
1 POWDER RESPIRATORY (INHALATION)
Start: 2025-05-05

## 2025-05-05 RX ORDER — DICYCLOMINE HYDROCHLORIDE 10 MG/1
10 CAPSULE ORAL 4 TIMES DAILY PRN
Start: 2025-05-05

## 2025-05-05 RX ADMIN — CLOPIDOGREL BISULFATE 75 MG: 75 TABLET, FILM COATED ORAL at 09:04

## 2025-05-05 RX ADMIN — ALUMINUM HYDROXIDE, MAGNESIUM HYDROXIDE, AND DIMETHICONE 15 ML: 400; 400; 40 SUSPENSION ORAL at 00:13

## 2025-05-05 RX ADMIN — HYDROCODONE BITARTRATE AND ACETAMINOPHEN 1 TABLET: 10; 325 TABLET ORAL at 15:33

## 2025-05-05 RX ADMIN — REVEFENACIN 175 MCG: 175 SOLUTION RESPIRATORY (INHALATION) at 09:20

## 2025-05-05 RX ADMIN — METOPROLOL TARTRATE 25 MG: 25 TABLET, FILM COATED ORAL at 20:15

## 2025-05-05 RX ADMIN — BUDESONIDE 0.5 MG: 0.5 INHALANT RESPIRATORY (INHALATION) at 20:05

## 2025-05-05 RX ADMIN — APIXABAN 5 MG: 5 TABLET, FILM COATED ORAL at 09:04

## 2025-05-05 RX ADMIN — METOPROLOL TARTRATE 25 MG: 25 TABLET, FILM COATED ORAL at 09:04

## 2025-05-05 RX ADMIN — ATORVASTATIN CALCIUM 40 MG: 20 TABLET, FILM COATED ORAL at 20:14

## 2025-05-05 RX ADMIN — ONDANSETRON 4 MG: 4 TABLET, ORALLY DISINTEGRATING ORAL at 18:08

## 2025-05-05 RX ADMIN — PANTOPRAZOLE SODIUM 40 MG: 40 INJECTION, POWDER, FOR SOLUTION INTRAVENOUS at 09:03

## 2025-05-05 RX ADMIN — POLYETHYLENE GLYCOL 3350 17 G: 17 POWDER, FOR SOLUTION ORAL at 09:03

## 2025-05-05 RX ADMIN — HYDROCODONE BITARTRATE AND ACETAMINOPHEN 1 TABLET: 10; 325 TABLET ORAL at 06:03

## 2025-05-05 RX ADMIN — ARFORMOTEROL TARTRATE 15 MCG: 15 SOLUTION RESPIRATORY (INHALATION) at 20:04

## 2025-05-05 RX ADMIN — BUDESONIDE 0.5 MG: 0.5 INHALANT RESPIRATORY (INHALATION) at 09:20

## 2025-05-05 RX ADMIN — SIMETHICONE 80 MG: 80 TABLET, CHEWABLE ORAL at 10:34

## 2025-05-05 RX ADMIN — SIMETHICONE 80 MG: 80 TABLET, CHEWABLE ORAL at 06:02

## 2025-05-05 RX ADMIN — APIXABAN 5 MG: 5 TABLET, FILM COATED ORAL at 20:14

## 2025-05-05 RX ADMIN — SENNOSIDES AND DOCUSATE SODIUM 2 TABLET: 50; 8.6 TABLET ORAL at 09:04

## 2025-05-05 RX ADMIN — HYDROCODONE BITARTRATE AND ACETAMINOPHEN 1 TABLET: 10; 325 TABLET ORAL at 00:13

## 2025-05-05 RX ADMIN — INSULIN LISPRO 2 UNITS: 100 INJECTION, SOLUTION INTRAVENOUS; SUBCUTANEOUS at 18:20

## 2025-05-05 RX ADMIN — HYDROCODONE BITARTRATE AND ACETAMINOPHEN 1 TABLET: 10; 325 TABLET ORAL at 09:52

## 2025-05-05 RX ADMIN — HYDROCODONE BITARTRATE AND ACETAMINOPHEN 1 TABLET: 10; 325 TABLET ORAL at 20:14

## 2025-05-05 RX ADMIN — ARFORMOTEROL TARTRATE 15 MCG: 15 SOLUTION RESPIRATORY (INHALATION) at 09:20

## 2025-05-05 NOTE — DISCHARGE PLACEMENT REQUEST
"Bin Catalan (65 y.o. Female)       Date of Birth   1960    Social Security Number       Address   [address] [place] [address]    Home Phone   [phone]    MRN   [id number]       [facility]    Marital Status   Legally                             Admission Date   4/10/2025    Admission Type   Urgent    Admitting Provider   Bryn Merino MD    Attending Provider   Tiffany Woods MD    Department, Room/Bed   [address], E565/1       Discharge Date       Discharge Disposition   Skilled Nursing Facility (DC - External)    Discharge Destination                                 Attending Provider: Tiffany Woods MD    Allergies: Tramadol    Isolation: None   Infection: None   Code Status: CPR    Ht: 167.6 cm (66\")   Wt: 84.2 kg (185 lb 10 oz)    Admission Cmt: None   Principal Problem: Cardiogenic shock [R57.0]                   Active Insurance as of 4/10/2025       Primary Coverage       Payor Plan Insurance Group Employer/Plan Group    HUMANA MEDICARE REPLACEMENT HUMANA MEDICARE ADVANTAGE Merged with [facility] [id number]       Payor Plan Address Payor Plan Phone Number Payor Plan Fax Number Effective Dates    [address] [address] [phone]  1/1/2024 - None Entered    [facility] [id number]         Subscriber Name Subscriber Birth Date Member ID       BIN CATALAN 1960 [id number]               Secondary Coverage       Payor Plan Insurance Group Employer/Plan Group    [facility] [id number]       Payor Plan Address Payor Plan Phone Number Payor Plan Fax Number Effective Dates    [address] [address]   1/1/2021 - None Entered    [facility] [address]         Subscriber Name Subscriber Birth Date Member ID       BIN CATALAN 1960 [id number]                     Emergency Contacts        (Rel.) Home Phone Work Phone Mobile Phone    Angel CRUZ (Son) -- -- [phone]    Yulisa Chowdhury " (Sister) [phone] -- [phone]    Yana CRUZ (Relative) -- -- [phone]    francheskajonnie (Brother) -- -- [phone]                 Discharge Summary        Tiffany Woods MD at 25 1321              Patient Name: Desiree Garcia  : 1960  MRN: [id number]    Date of Admission: 4/10/2025  Date of Discharge:  2025  Primary Care Physician: Sandra Kaba MD      Chief Complaint:   No chief complaint on file.      Discharge Diagnoses     Active Hospital Problems    Diagnosis  POA    Type 2 diabetes mellitus with hyperglycemia [E11.65]  Yes    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 [I74.3]  Yes    Acute thrombus of left ventricle [I24.0]  Yes    Altered bowel habits [R19.4]  Yes    Leg paresthesia [R20.2]  Yes      Resolved Hospital Problems    Diagnosis Date Resolved POA    **Cardiogenic shock [R57.0] 04/15/2025 Yes        Hospital Course     Ms. Garcia is a 65 y.o. female with a history of CAD with history of PCI, hypertension, hyperlipidemia, diabetes, COPD, peripheral vascular disease who presented to [facility] initially as a transfer from outside hospital for left ventricular thrombus evaluation.  Please see the admitting history and physical for further details.  She was admitted to the hospital for further evaluation and treatment.  The patient was admitted to [facility] from 25-4/10/25-she had presented from outside facility for abdominal pain, her EKG at that time showed ST elevation MI and she underwent emergent cardiac cath with placement of 2 drug-eluting stents at that time.  An echo performed which showed to have LV thrombus and she was transferred to [facility] for further evaluation.  While at [facility] she was started on and transferred with Levophed, milrinone and amiodarone for treatment of cardiogenic shock.    The patient began to complain about abdominal pain and a CT angiogram was performed which showed left kidney  and spleen infarctions as well as left SFA occlusion and possible ONEIDA occlusion likely related to embolization from LV thrombus.  General and vascular surgery were consulted.  The patient ultimately developed critical ischemia of the left leg with limb threatening changes.  She was taken for emergent embolectomy and 4 compartment fasciotomy with left femoral-popliteal stent placement with vascular surgery.  Her postoperative course was complicated by bleeding of the fasciotomy sites and she required incisional/excisional debridement of surgical sites.  She was treated with antibiotics and infectious disease was consulted; she has completed her antibiotics at the time of discharge.   Her hospital course was complicated by possible ischemic enteritis and colitis and she developed an ileus.  She was treated conservatively with NG tube decompression and serial abdominal exams by general surgery.  She has been tolerating a diet and having daily bowel movements at the time of discharge.  Serial imaging showed improvement of bowel dilation/ileus.   The patient did complain of some rectal bleeding while admitted and a GI consult was requested, the patient had had a colonoscopy approximately 4 weeks ago which did show hemorrhoids and are likely the source of rectal bleeding.  Her hemoglobin has been stable and they do not recommend repeat endoscopic evaluation at this time.    While admitted hematology also evaluate the patient given LV thrombus, they recommend DOAC at discharge, they also were able to discuss with cardiology, per cardiology the patient can discharge on DOAC and Plavix alone, no need for aspirin at this time.  The patient is stable for discharge, please see below for additional hospital problems and final recommendations.    Left lower extremity embolism and thrombosis  Left ventricular thrombus with showering emboli of kidney, spleen, bile  Severe PAD  Acute post-procedural pain  - S/P open left  iliofemoral, profunda femoris and superficial femoral artery thromboembolectomy with left common femoral endarterectomy and repair, 4 compartment fasciotomy of left lower extremity with left femoropopliteal angioplasty and stent placement x 2 on 4/12/2025  - S/P left lateral fasciotomy site debridement and left medial fasciotomy site debridement on 4/18/2025  - S/P left fasciotomy debridement with wound vac placement on 4/25/25. Wound vac in place  - Patient on Plavix, Eliquis, statin  - Needs vascular surgery follow-up in 4 to 6 weeks with ABIs     Rectal bleeding  Acute post-hemorrhagic anemia  - Patient with rectal bleeding postop on 4/25/2025; Heparin drip was stopped and Plavix held  - GI consulted and suspected hemorrhoidal bleeding and has signed off.  - Both heparin and Plavix later restarted, which she tolerated well. Heparin since changed to Eliquis.  - Hgb stable ~8.5     Cardiogenic shock  Inferior STEMI  - Status post JOE x 2 on 4/8/2025. She was on ASA and Heparin drip---> has since been changed to Eliquis/Plavix  - She also remains on Plavix 75 mg daily, atorvastatin 40 mg p.o. daily, Metoprolol 25 mg PO BID-changing to metoprolol succinate 50mg daily at discharge  - Initial 2D Echo (04/10) showing EF 41-45% with mildly decreased LV systolic function; repeat echo (limited) on 04/23 showing improvement in EF to 50%, normal LV systolic function.    Type 2 diabetes with hyperglycemia  - A1c. 9.40% (04/09/25). Glucose elevated, but acceptable for most part.  - Continue with SSI as ordered, resume home metformin at discharge  - Needs outpatient PCP follow-up for additional titration given poorly controlled A1c    Hypertension  - BP acceptable/well-controlled     COPD  - Appears stable from this perspective at present.  No evidence to suggest acute exacerbation.  Continue to monitor.  -Resume home Trelegy     Leukocytosis  - Noted previously, was evaluated by ID, etiology felt to be reactive, anabiotics  were discontinued.  - WBC normalized on labs     Ileus  - Ileus has resolved per surgery, NG removed, surgery signed off     NIA  - Resolved, creatinine stable  - Nephrology has signed off    Day of Discharge     Subjective:  Sitting up on the bedside commode, complaining of some abdominal cramping.  Agreeable to a trial of Bentyl.  She still feels ready for discharge today.    Physical Exam:  Temp:  [98 °F (36.7 °C)-98.8 °F (37.1 °C)] 98 °F (36.7 °C)  Heart Rate:  [65-80] 65  Resp:  [16-18] 18  BP: (107-137)/(56-73) 107/64  Body mass index is 29.96 kg/m².  Physical Exam  Vitals and nursing note reviewed.   Constitutional:       General: She is not in acute distress.     Comments: Chronically ill appearing   Cardiovascular:      Rate and Rhythm: Normal rate.      Pulses: Normal pulses.      Heart sounds: Normal heart sounds.   Pulmonary:      Effort: Pulmonary effort is normal. No respiratory distress.      Breath sounds: No wheezing.   Abdominal:      General: Bowel sounds are normal.      Palpations: Abdomen is soft.      Tenderness: There is no abdominal tenderness.   Musculoskeletal:         General: Swelling and tenderness present.      Comments: Wound vac in place   Skin:     General: Skin is warm and dry.   Neurological:      Mental Status: She is alert.         Consultants     Consult Orders (all) (From admission, onward)       Start     Ordered    04/30/25 1832  Inpatient Plastic Surgery Consult  Once        Comments: Pt will Follow up out patient, MD on vacation.   Specialty:  Plastic Surgery  Provider:  Nixon Rueda MD    04/30/25 1833    04/30/25 0813  Inpatient Plastic Surgery Consult  Once,   Status:  Canceled        Specialty:  Plastic Surgery  Provider:  (Not yet assigned)    04/30/25 0812    04/29/25 0825  Inpatient Plastic Surgery Consult  Once,   Status:  Canceled        Specialty:  Plastic Surgery  Provider:  (Not yet assigned)    04/29/25 0825    04/25/25 1852  Inpatient  Gastroenterology Consult  Once        Specialty:  Gastroenterology  Provider:  Teo Vagn MD    04/25/25 1852    04/23/25 1452  Inpatient Cardiology Consult  Once        Specialty:  Cardiology  Provider:  German Cancino MD    04/23/25 1452    04/18/25 0845  Inpatient Physical Medicine Rehab Consult  Once        Specialty:  Physical Medicine and Rehabilitation  Provider:  Jose Chau MD    04/18/25 0852    04/17/25 1238  Inpatient Internal Medicine Consult  Once        Specialty:  Internal Medicine  Provider:  Feroz Urban MD    04/17/25 1239    04/17/25 0751  Inpatient Infectious Diseases Consult  Once        Specialty:  Infectious Diseases  Provider:  Domenico Villegas MD    04/17/25 0750    04/12/25 1237  Hematology & Oncology Inpatient Consult  Once        Specialty:  Hematology and Oncology  Provider:  Chente Lomas II, MD    04/12/25 1236    04/11/25 0853  Inpatient Vascular Surgery Consult  Once        Specialty:  Vascular Surgery  Provider:  Misael Lawton MD    04/11/25 0853    04/11/25 0516  Inpatient General Surgery Consult  STAT        Specialty:  General Surgery  Provider:  Sharita Mendoza MD    04/11/25 0515    04/10/25 1801  Inpatient Nephrology Consult  Once        Specialty:  Nephrology  Provider:  Yamil Porter MD    04/10/25 1800    04/10/25 1703  Inpatient Cardiology Consult  Once        Specialty:  Cardiology  Provider:  Santi Erickson MD    04/10/25 1702    04/10/25 1703  Inpatient Cardiothoracic Surgery Consult  Once        Specialty:  Cardiothoracic Surgery  Provider:  Jr Héctor Cortez MD    04/10/25 1702    04/10/25 1655  Inpatient Cardiology Consult  Once,   Status:  Canceled        Specialty:  Cardiology  Provider:  Santi Erickson MD    04/10/25 1654    04/10/25 1654  Inpatient Cardiothoracic Surgery Consult  Once,   Status:  Canceled        Specialty:  Cardiothoracic Surgery  Provider:  Jr Héctor Cortez MD    04/10/25 1653                   Procedures     Imaging Results (All)       Procedure Component Value Units Date/Time    CT Abdomen Pelvis With Contrast [[id number]] Collected: 04/16/25 1225     Updated: 04/17/25 0833    Narrative:      CT ABDOMEN AND PELVIS WITH IV CONTRAST     HISTORY: 65-year-old female with left ventricular thrombus with  embolization and cardiogenic shock. Occlusion of distal left renal  artery with left renal infarcts, distal splenic artery with splenic  infarcts, ONEIDA occlusion with reconstitution distally, left superficial  femoral artery occlusion. Mechanical embolectomy and 4-compartment  fasciotomy left lower extremity.     TECHNIQUE: Radiation dose reduction techniques were utilized, including  automated exposure control and exposure modulation based on body size.   3 mm images were obtained through the abdomen and pelvis after the  administration of IV contrast. Compared with CTA abdomen and pelvis  04/12/2025.     FINDINGS:  1. Most of the colonic dilatation has resolved, but there is mild  persistent dilatation of the proximal transverse colon. The proximal  transverse colon appears mildly patulous and has a slightly thickened  wall. There is less dilatation of a long segment of proximal small  bowel, but there is a long segment of distal ileum which appears  slightly more dilated than previously, image 107. The distal/terminal  ileum is normal in caliber, stable. There is a slightly less thickened  appearance of some of the small bowel loops with less thickening of the  folds. There is no bowel pneumatosis.     There is a slightly larger small volume of free fluid, but there is also  new third spacing of fluid within the body wall. There are no fluid  collections. No free air.     2. Evolving splenic infarcts appear unchanged. Evolving left renal  infarcts appear slightly less defined.     3. There is no new abnormality at the liver, pancreas, adrenals, or  right kidney. Urinary bladder is significantly distended.  Consider a  Storm catheter.     4. There are postsurgical changes at the left groin. Slightly narrowed,  but the left common femoral artery is patent as is the visualized left  superficial femoral artery, series 3 image 132. There is a very small  amount of fluid at the left groin, but there is no discrete fluid  collection or hematoma.     5. There is no pneumonia or pleural effusions at the visualized lung  bases.        This report was finalized on 4/17/2025 8:30 AM by Dr. Aleida Perez M.D on  Workstation: [id number]       XR Abdomen KUB [[id number]] Collected: 04/12/25 1356     Updated: 04/12/25 1401    Narrative:      XR ABDOMEN KUB-        INDICATION: NG tube placement     COMPARISON: Abdominal radiograph April 12, 2025     TECHNIQUE: 1 view abdomen     FINDINGS:      NG tube tip and sideport are in the gastric fundus. Cholecystectomy  clips. Gas distended small bowel loops, with a loop in the left lower  quadrant measuring 4.1 cm. Prominent gas-filled right colon.       Impression:         1. Interval advancement of NG tube with the tip and sideport in the  gastric fundus.  2. Stable gas distended small bowel loops     This report was finalized on 4/12/2025 1:58 PM by Dr. Jose Daniel Dallas M.D on Workstation: [id number]       XR Abdomen KUB [[id number]] Collected: 04/12/25 1231     Updated: 04/12/25 1239    Narrative:      XR ABDOMEN KUB-        INDICATION: 1 view abdomen     COMPARISON: Abdominal radiograph April 11, 2025     TECHNIQUE: 1 view abdomen     FINDINGS:      NG tube tip is in the gastric fundus and the sideport is at the GE  junction. Cholecystectomy clips. Gas distended small bowel loops, with a  loop in the left mid abdomen measuring 4.2 cm. Mild thickening of small  bowel circular folds. Gas distended hepatic flexure of the colon.       Impression:         1. NG tube tip is in the gastric fundus and the sideport is at the GE  junction. Consider advancement.  2. Gas distended small bowel with  suggestion of thickening of circular  folds and gas distended hepatic flexure of the colon, similar.     This report was finalized on 4/12/2025 12:36 PM by Dr. Jose Daniel Dlalas M.D on Workstation: [id number]       Arteriogram (Autofinalize) [[id number]] Resulted: 04/12/25 1113     Updated: 04/12/25 1113    Narrative:      This procedure was auto-finalized with no dictation required.    CT Angio Abdominal Aorta Bilateral Iliofem Runoff [[id number]] Collected: 04/12/25 0506     Updated: 04/12/25 0528    Narrative:      CT ANGIOGRAM OF THE ABDOMEN AND PELVIS WITH BILATERAL LOWER EXTREMITY  RUNOFF     HISTORY: Loss of pulses within the lower extremity     COMPARISON: April 11, 2025     TECHNIQUE: Axial CT imaging was obtained through the abdomen and pelvis.  IV contrast was administered.     FINDINGS:  Images through the lung bases demonstrate some dependent atelectasis.     The celiac axis and superior mesenteric artery are widely patent, as are  the renal arteries bilaterally. Inferior mesenteric artery is occluded  at its origin, although there is distal reconstitution. The common iliac  arteries are widely patent bilaterally. There does appear to be a  moderate stenosis involving the proximal right internal iliac artery.  There is probably some mild narrowing involving the proximal left  internal iliac artery. The external iliac arteries are widely patent  bilaterally.     The right common femoral artery is patent, as is the profunda femoris.  Right superficial femoral artery has a short segment occlusion within  the proximal to mid thigh. It reconstitutes, but does appear somewhat  narrowed. There is also some calcified plaque noted more distally, with  associated moderate stenosis. Right popliteal artery is patent. There is  some narrowing at the origin of the right anterior tibial artery. It is  continuous to the ankle on delayed phase imaging. There is some  calcification involving the tibioperoneal trunk. The  peroneal artery and  posterior tibial artery are continuous to the foot.     There is an occlusion of the left common femoral artery. There is  reconstitution of the left profunda femoris artery. The left superficial  femoral artery remains occluded before reconstituting in the mid to  distal thigh. There is some irregularity and calcification of the distal  left superficial femoral artery, with mild to moderate narrowing. Left  popliteal artery is patent. Left anterior tibial artery appears to  terminate within the distal calf. Left posterior tibial artery is  continuous to the foot. Left peroneal artery appears to be continuous to  the ankle on delayed phase imaging.     No suspicious hepatic lesions are seen. Areas of infarction are again  noted within the spleen and left kidney. No hydronephrosis is seen on  either side. Excreted contrast material is noted within the urinary  bladder. Uterus is absent. The patient's ascending and transverse colon  are distended with air and fluid. The appendix is normal. There are  multiple distended loops of small bowel, which do appear to have wall  thickening and edema. Appearance is suspicious for ischemic  colitis/enteritis. No pneumatosis or free air is seen. There is a small  amount of free fluid which is noted within the abdomen and pelvis. No  acute osseous abnormalities are seen.       Impression:         1. The patient has a short segment occlusion of the right superficial  femoral artery in the proximal to mid thigh. It reconstitutes, but does  show narrowing. There is further plaque noted more distally, with  associated moderate stenosis. I do think the right anterior tibial,  peroneal, and posterior tibial arteries are continuous to the foot.  2. Occlusion of the left common femoral artery, noted on prior imaging.  There is reconstitution of the left superficial femoral artery within  the mid to distal thigh. Left anterior tibial artery appears to  terminate within  the distal calf. Left peroneal artery terminates at the  ankle. Left posterior tibial artery is continuous to the foot.  3. Areas of infarction are again noted within the spleen and left  kidney.  4. Distention of the ascending and transverse colon, along with  distended loops of small bowel with associated wall thickening and  edema. Again, this is favored to reflect ischemic colitis/enteritis. No  pneumatosis or free air is seen. There is some free fluid which is noted  within the abdomen and pelvis.     Radiation dose reduction techniques were utilized, including automated  exposure control and exposure modulation based on body size.        This report was finalized on 4/12/2025 5:25 AM by Dr. Jennifer Jo M.D on Workstation: [id number]       XR Abdomen KUB [[id number]] Collected: 04/11/25 0715     Updated: 04/11/25 0720    Narrative:      XR ABDOMEN KUB-     Clinical: Nasogastric tube placement     FINDINGS: Nasogastric tube tip is located within the gastric fundus,  this position is satisfactory. Distended loops of small bowel similar to  the previous CT. The remainder is unremarkable.     This report was finalized on 4/11/2025 7:17 AM by Dr. Baljinder Guevara M.D  on Workstation: [id number]       CT Angiogram Abdomen Pelvis [[id number]] Collected: 04/11/25 0512     Updated: 04/11/25 0522    Addenda:        ADDENDUM:  04 11 25 05:21 Call Doctor Regarding Above results, called NP Karlene Eldridge    on 04 11 05:21 (-04:00)      Signed: 04/11/25 0511 by Jose Albarran MD    Narrative:      CR  Patient: BIN CATALAN  Time Out: 05:11  Exam(s): CTA ABDOMEN + PELVIS     EXAM:    CT Angiography Abdomen and Pelvis With Intravenous Contrast    CLINICAL HISTORY:     Reason for exam: severe abdominal pain, nausea, concern for bowel   ischemia.    TECHNIQUE:    Axial computed tomographic angiography images of the abdomen and pelvis   with intravenous contrast with coronal and sagittal reformats.  CTDI is   16.01 mGy  and DLP is 578 mGy-cm.  This CT exam was performed according to   the principle of ALARA (As Low As Reasonably Achievable) by using one or   more of the following dose reduction techniques: automated exposure   control, adjustment of the mA and or kV according to patient size, and or   use of iterative reconstruction technique.    3D and MIP reconstructed images were created and reviewed.    COMPARISON:    No relevant prior studies available.    FINDINGS:     VASCULATURE: Left SFA occlusion from the origin through the entire   visualized portion    Aorta:  Atherosclerotic arterial calcifications: trace. No aortic   aneurysm or dissection. Arterial structures otherwise unremarkable.    Celiac trunk and mesenteric arteries:  ONEIDA thready patency, diminutive.    Celiac artery artery patent.  Superior mesenteric artery patent.    Renal arteries:  Grossly patent.    Iliac arteries:  Grossly patent.    Portal veins:  Unremarkable as visualized.  No portal venous gas.      Heart:  Cardiomegaly.  Mild coronary artery calcifications.     ABDOMEN:    Liver:  Consider additional evaluation of atypical liver morphology,   potentially incidental or chonic liver disease.    Gallbladder and bile ducts:  Cholecystectomy.    Pancreas:  Unremarkable.    Spleen:  Splenic scattered infarcts likely 20-30% of total parenchymal   volume.    Adrenals:  Unremarkable.    Kidneys and ureters:  Left renal multifocal infarcts, likely 50% of   parenchyma.    Stomach and bowel:  Sigmoid possible colitis, correlate for watershed   colonic ischemia.  Large bowel and probably small bowel ileus.  Long   segment extensive small bowel enteritis.  Colonic diverticula.  No   pneumatosis intestinalis.     PELVIS:    Appendix:  No findings to suggest acute appendicitis.    Bladder:  Unremarkable.    Reproductive:  Hysterectomy.     ABDOMEN and PELVIS:    Intraperitoneal space:  Unremarkable.  No pneumoperitoneum.    Bones joints:  No acute abnormality.     "Soft tissues:  Unremarkable.    Lymph nodes:  Unremarkable.    Other findings:  Vascular structures: Otherwise unremarkable.    IMPRESSION:       1.  Left renal and splenic infarcts , left SFA occlusion from origin   distally concerning for arterial thrombi embolism ONEIDA thready patency,   diminutive.  2.  Recommend evaluation for arterial thrombi embolism.  3.  Recommend vascular IR consultation  3.  Concerning for sigmoid colonic possible ischemia; correlate with   presentation.  4.  Long segment extensive small bowel enteritis. Large bowel and   probably small bowel ileus.  5  No bowel perforation or portal venous gas.  6.  See additional findings above.      Impression:            Communications:     Call Doctor Above results    Electronically signed by Jose Albarran MD on 04-11-25 at 0511            Pertinent Labs     Results from last 7 days   Lab Units 05/05/25  0540 05/04/25  0523 05/03/25  0707 05/02/25  0654   WBC 10*3/mm3 8.36 8.32 7.75 7.69   HEMOGLOBIN g/dL 8.3* 8.5* 7.6* 7.8*   PLATELETS 10*3/mm3 460* 484* 444 459*     Results from last 7 days   Lab Units 05/05/25  0540 05/04/25  1621 05/04/25  0523 05/03/25  0707 05/02/25  0654   SODIUM mmol/L 138  --  139 142 141   POTASSIUM mmol/L 4.2 4.3 3.4* 3.9 3.9   CHLORIDE mmol/L 102  --  100 104 103   CO2 mmol/L 31.5*  --  29.0 30.3* 30.1*   BUN mg/dL 8  --  7* 7* 8   CREATININE mg/dL 0.77  --  0.78 0.65 0.71   GLUCOSE mg/dL 92  --  130* 102* 106*   Estimated Creatinine Clearance: 80.8 mL/min (by C-G formula based on SCr of 0.77 mg/dL).  Results from last 7 days   Lab Units 05/05/25  0540 05/04/25  0523 05/03/25  0707 05/02/25  0654   ALBUMIN g/dL 2.6* 2.8* 2.6* 2.5*     Results from last 7 days   Lab Units 05/05/25  0540 05/04/25  0523 05/03/25  0707 05/02/25  0654   CALCIUM mg/dL 8.4* 8.5* 8.5* 8.5*   ALBUMIN g/dL 2.6* 2.8* 2.6* 2.5*   MAGNESIUM mg/dL 2.0 1.7 1.6 1.7   PHOSPHORUS mg/dL 3.4 3.4 3.8 3.5               Invalid input(s): \"LDLCALC\"        Test " Results Pending at Discharge       Discharge Details        Discharge Medications        New Medications        Instructions Start Date   apixaban 5 MG tablet tablet  Commonly known as: ELIQUIS   5 mg, Oral, Every 12 Hours Scheduled      clopidogrel 75 MG tablet  Commonly known as: PLAVIX   75 mg, Oral, Daily   Start Date: May 6, 2025     dicyclomine 10 MG capsule  Commonly known as: BENTYL   10 mg, Oral, 4 Times Daily PRN      HYDROcodone-acetaminophen  MG per tablet  Commonly known as: NORCO   1 tablet, Oral, Every 4 Hours PRN      insulin lispro 100 UNIT/ML injection  Commonly known as: HUMALOG/ADMELOG   2-7 Units, Subcutaneous, 4 Times Daily Before Meals & Nightly      metFORMIN 500 MG tablet  Commonly known as: GLUCOPHAGE   500 mg, Oral, 2 Times Daily With Meals      metoprolol succinate XL 50 MG 24 hr tablet  Commonly known as: Toprol XL   50 mg, Oral, Daily      simethicone 80 MG chewable tablet  Commonly known as: MYLICON   80 mg, Oral, 4 Times Daily PRN      Trelegy Ellipta 100-62.5-25 MCG/ACT inhaler  Generic drug: Fluticasone-Umeclidin-Vilant   1 puff, Inhalation, Daily - RT             Continue These Medications        Instructions Start Date   acetaminophen 325 MG tablet  Commonly known as: TYLENOL   650 mg, Oral, Every 4 Hours PRN      albuterol (2.5 MG/3ML) 0.083% nebulizer solution  Commonly known as: PROVENTIL   2.5 mg, Nebulization, Every 6 Hours PRN      atorvastatin 40 MG tablet  Commonly known as: LIPITOR   40 mg, Oral, Nightly      dextrose 40 % gel  Commonly known as: GLUTOSE   15 g, Oral, Every 15 Minutes PRN      glucagon 1 MG injection  Commonly known as: GLUCAGEN   1 mg, Intramuscular, Every 15 Minutes PRN      nitroglycerin 0.4 MG SL tablet  Commonly known as: NITROSTAT   0.4 mg, Sublingual, Every 5 Minutes PRN, Take no more than 3 doses in 15 minutes.             Stop These Medications      aspirin 81 MG EC tablet     budesonide 0.5 MG/2ML nebulizer solution  Commonly known as:  PULMICORT     insulin glargine 100 UNIT/ML injection  Commonly known as: LANTUS, SEMGLEE     insulin regular 100 UNIT/ML injection  Commonly known as: humuLIN R,novoLIN R     Jardiance 25 MG tablet tablet  Generic drug: empagliflozin     mupirocin 2 % nasal ointment  Commonly known as: BACTROBAN     ondansetron 2 mg/mL injection  Commonly known as: ZOFRAN     ticagrelor 90 MG tablet tablet  Commonly known as: BRILINTA              Allergies   Allergen Reactions    Tramadol Itching     High severity per pt         Discharge Disposition:  Skilled Nursing Facility (DC - External)    Discharge Diet:  Diet Order   Procedures    Diet: Regular/House, Diabetic, Cardiac; Healthy Heart (2-3 Na+); Consistent Carbohydrate; Fluid Consistency: Thin (IDDSI 0)       Discharge Activity:   Activity Instructions       Activity as Tolerated              CODE STATUS:    Code Status and Medical Interventions: CPR (Attempt to Resuscitate); Full Support   Ordered at: 04/18/25 0852     Code Status (Patient has no pulse and is not breathing):    CPR (Attempt to Resuscitate)     Medical Interventions (Patient has pulse or is breathing):    Full Support     Level Of Support Discussed With:    Patient       Future Appointments   Date Time Provider Department Center   6/5/2025  8:15 AM Consuelo Aponte APRN [facility] [place]   8/6/2025  1:30 PM Edith [facility]cocer [facility]     Additional Instructions for the Follow-ups that You Need to Schedule       Discharge Follow-up with PCP   As directed       Currently Documented PCP:    Sandra Kaba MD    PCP Phone Number:    [phone]     Follow Up Details: post-hospital follow up        Discharge Follow-up with Specified Provider: Vascular surgery in 4-6 weeks with ABIs   As directed      To: Vascular surgery in 4-6 weeks with ABIs               Contact information for follow-up providers       Misael Lawton MD Follow up in 1 month(s).    Specialty: Vascular Surgery  Contact  information:  [address] [facility] [address]  [facility] [address]  [phone]               Nixon Rueda MD. Schedule an appointment as soon as possible for a visit in 1 month(s).    Specialty: Plastic Surgery  Why: For wound recheck and possible skin grafts  Contact information:  [address] [facility] [address]  [phone]               Sandra Kaba MD .    Specialty: Family Medicine  Why: post-hospital follow up  Contact information:  [address]  [facility] [address]  [phone]                       Contact information for after-discharge care       Destination       [facility] [place] .    Service: Skilled Nursing  Contact information:  [address] [facility] [address] [phone]                     Home Medical Care       [facility]    Services: Home Nursing, Home Rehabilitation  Contact information:  [address]  [facility] [address] [phone]                                   Additional Instructions for the Follow-ups that You Need to Schedule       Discharge Follow-up with PCP   As directed       Currently Documented PCP:    Sandra Kaba MD    PCP Phone Number:    [phone]     Follow Up Details: post-hospital follow up        Discharge Follow-up with Specified Provider: Vascular surgery in 4-6 weeks with ABIs   As directed      To: Vascular surgery in 4-6 weeks with ABIs            Time Spent on Discharge:  I spent greater than 30 minutes on this discharge activity which included: face-to-face encounter with the patient, reviewing the data in the system, coordination of the care with the nursing staff as well as consultants, documentation, and entering orders.       Tiffany Woods MD  [facility]  05/05/25  13:21 EDT                Electronically signed by Tiffany Woods MD at 05/05/25 4924

## 2025-05-05 NOTE — NURSING NOTE
CWOCN- f/u for VAC dressing change. Plan for patient to discharge today to facility. CCP has confirmed they will have a VAC. Rn gave PO pain med.     Changed dressings- wounds all appear improved from Friday. We placed wet to dry dressings and secured for discharge. Dr Lawton confirmed all good and to f/u with him in 1 month, f/u with plastics as outpatient for skin grafting. I notified CCP of this for discharge orders/plan of care.     Patient did not tolerate the dressings as well today with PO meds as with IV. Encouraged breathing techniques as she appeared anxious. Offered encouragement that the wounds look better and she is healing.     WOC will sign off since patient is leaving. VAC returned to our office.       05/05/25 1036   Wound 04/12/25 0943 Left anterior groin Surgical Open Surgical Incision   Placement Date/Time: 04/12/25 0943   Side: Left  Orientation: anterior  Location: groin  Primary Wound Type: Surgical  Secondary Wound Type - Surgical: Open Surgical Incision   Wound Image    Dressing Appearance intact   Base moist;red   Periwound intact   Periwound Temperature warm   Periwound Skin Turgor soft   Edges open;rolled/closed  (bridge of approximated skin in center)   Drainage Characteristics/Odor serosanguineous   Drainage Amount small   Care, Wound cleansed with;sterile normal saline   Dressing Care dressing changed;border dressing;gauze, wet-to-moist   Periwound Care dry periwound area maintained   Wound 04/12/25 1055 Left lateral leg Surgical Open Surgical Incision   Placement Date/Time: 04/12/25 1055   Side: Left  Orientation: lateral  Location: leg  Primary Wound Type: Surgical  Secondary Wound Type - Surgical: Open Surgical Incision   Wound Image    Dressing Appearance intact   Base moist;red   Periwound moist;pink   Periwound Temperature warm   Periwound Skin Turgor soft   Edges open   Drainage Characteristics/Odor serosanguineous   Drainage Amount small   Care, Wound cleansed with;sterile normal  saline   Dressing Care dressing changed;abdominal pad;gauze, wet-to-moist;gauze   Periwound Care topical treatment applied;other (see comments)  (hydrofiber)   Wound Output (mL) 300   Wound 04/12/25 1055 Left medial leg Surgical Open Surgical Incision   Placement Date/Time: 04/12/25 1055   Side: Left  Orientation: medial  Location: leg  Primary Wound Type: Surgical  Secondary Wound Type - Surgical: Open Surgical Incision   Wound Image    Dressing Appearance intact   Base moist;red;exposed structure   Periwound moist;pink   Periwound Temperature warm   Periwound Skin Turgor soft   Edges open   Drainage Characteristics/Odor serosanguineous   Drainage Amount small   Care, Wound cleansed with;sterile normal saline   Dressing Care dressing changed;border dressing;gauze, wet-to-moist   Periwound Care topical treatment applied  (hydrofiber)   NPWT (Negative Pressure Wound Therapy) 04/25/25 1205   Placement Date/Time: 04/25/25 1205   Additional Comments: Dr. Lin placed in surgery   Therapy Setting vacuum off   Sponges Removed   (removed all sponge & versatel from 3 VAC sites- 1 piece to groin, 1 medial leg, 1 lateral leg)   Finger sweep complete Yes

## 2025-05-05 NOTE — DISCHARGE SUMMARY
Patient Name: Desiree Garcia  : 1960  MRN: [id number]    Date of Admission: 4/10/2025  Date of Discharge:  2025  Primary Care Physician: Sandra Kaba MD      Chief Complaint:   No chief complaint on file.      Discharge Diagnoses     Active Hospital Problems    Diagnosis  POA   • Type 2 diabetes mellitus with hyperglycemia [E11.65]  Yes   • 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 [I74.3]  Yes   • Acute thrombus of left ventricle [I24.0]  Yes   • Altered bowel habits [R19.4]  Yes   • Leg paresthesia [R20.2]  Yes      Resolved Hospital Problems    Diagnosis Date Resolved POA   • **Cardiogenic shock [R57.0] 04/15/2025 Yes        Hospital Course     Ms. Garcia is a 65 y.o. female with a history of CAD with history of PCI, hypertension, hyperlipidemia, diabetes, COPD, peripheral vascular disease who presented to [facility] initially as a transfer from outside hospital for left ventricular thrombus evaluation.  Please see the admitting history and physical for further details.  She was admitted to the hospital for further evaluation and treatment.  The patient was admitted to [facility] from 25-4/10/25-she had presented from outside facility for abdominal pain, her EKG at that time showed ST elevation MI and she underwent emergent cardiac cath with placement of 2 drug-eluting stents at that time.  An echo performed which showed to have LV thrombus and she was transferred to [facility] for further evaluation.  While at [place] she was started on and transferred with Levophed, milrinone and amiodarone for treatment of cardiogenic shock.    The patient began to complain about abdominal pain and a CT angiogram was performed which showed left kidney and spleen infarctions as well as left SFA occlusion and possible ONEIDA occlusion likely related to embolization from LV thrombus.  General and vascular surgery were consulted.  The patient ultimately  developed critical ischemia of the left leg with limb threatening changes.  She was taken for emergent embolectomy and 4 compartment fasciotomy with left femoral-popliteal stent placement with vascular surgery.  Her postoperative course was complicated by bleeding of the fasciotomy sites and she required incisional/excisional debridement of surgical sites.  She was treated with antibiotics and infectious disease was consulted; she has completed her antibiotics at the time of discharge.   Her hospital course was complicated by possible ischemic enteritis and colitis and she developed an ileus.  She was treated conservatively with NG tube decompression and serial abdominal exams by general surgery.  She has been tolerating a diet and having daily bowel movements at the time of discharge.  Serial imaging showed improvement of bowel dilation/ileus.   The patient did complain of some rectal bleeding while admitted and a GI consult was requested, the patient had had a colonoscopy approximately 4 weeks ago which did show hemorrhoids and are likely the source of rectal bleeding.  Her hemoglobin has been stable and they do not recommend repeat endoscopic evaluation at this time.    While admitted hematology also evaluate the patient given LV thrombus, they recommend DOAC at discharge, they also were able to discuss with cardiology, per cardiology the patient can discharge on DOAC and Plavix alone, no need for aspirin at this time.  The patient is stable for discharge, please see below for additional hospital problems and final recommendations.    Left lower extremity embolism and thrombosis  Left ventricular thrombus with showering emboli of kidney, spleen, bile  Severe PAD  Acute post-procedural pain  - S/P open left iliofemoral, profunda femoris and superficial femoral artery thromboembolectomy with left common femoral endarterectomy and repair, 4 compartment fasciotomy of left lower extremity with left femoropopliteal  angioplasty and stent placement x 2 on 4/12/2025  - S/P left lateral fasciotomy site debridement and left medial fasciotomy site debridement on 4/18/2025  - S/P left fasciotomy debridement with wound vac placement on 4/25/25. Wound vac in place  - Patient on Plavix, Eliquis, statin  - Needs vascular surgery follow-up in 4 to 6 weeks with ABIs     Rectal bleeding  Acute post-hemorrhagic anemia  - Patient with rectal bleeding postop on 4/25/2025; Heparin drip was stopped and Plavix held  - GI consulted and suspected hemorrhoidal bleeding and has signed off.  - Both heparin and Plavix later restarted, which she tolerated well. Heparin since changed to Eliquis.  - Hgb stable ~8.5     Cardiogenic shock  Inferior STEMI  - Status post JOE x 2 on 4/8/2025. She was on ASA and Heparin drip---> has since been changed to Eliquis/Plavix  - She also remains on Plavix 75 mg daily, atorvastatin 40 mg p.o. daily, Metoprolol 25 mg PO BID-changing to metoprolol succinate 50mg daily at discharge  - Initial 2D Echo (04/10) showing EF 41-45% with mildly decreased LV systolic function; repeat echo (limited) on 04/23 showing improvement in EF to 50%, normal LV systolic function.    Type 2 diabetes with hyperglycemia  - A1c. 9.40% (04/09/25). Glucose elevated, but acceptable for most part.  - Continue with SSI as ordered, resume home metformin at discharge  - Needs outpatient PCP follow-up for additional titration given poorly controlled A1c    Hypertension  - BP acceptable/well-controlled     COPD  - Appears stable from this perspective at present.  No evidence to suggest acute exacerbation.  Continue to monitor.  -Resume home Trelegy     Leukocytosis  - Noted previously, was evaluated by ID, etiology felt to be reactive, anabiotics were discontinued.  - WBC normalized on labs     Ileus  - Ileus has resolved per surgery, NG removed, surgery signed off     NIA  - Resolved, creatinine stable  - Nephrology has signed off    Day of Discharge      Subjective:  Sitting up on the bedside commode, complaining of some abdominal cramping.  Agreeable to a trial of Bentyl.  She still feels ready for discharge today.    Physical Exam:  Temp:  [98 °F (36.7 °C)-98.8 °F (37.1 °C)] 98 °F (36.7 °C)  Heart Rate:  [65-80] 65  Resp:  [16-18] 18  BP: (107-137)/(56-73) 107/64  Body mass index is 29.96 kg/m².  Physical Exam  Vitals and nursing note reviewed.   Constitutional:       General: She is not in acute distress.     Comments: Chronically ill appearing   Cardiovascular:      Rate and Rhythm: Normal rate.      Pulses: Normal pulses.      Heart sounds: Normal heart sounds.   Pulmonary:      Effort: Pulmonary effort is normal. No respiratory distress.      Breath sounds: No wheezing.   Abdominal:      General: Bowel sounds are normal.      Palpations: Abdomen is soft.      Tenderness: There is no abdominal tenderness.   Musculoskeletal:         General: Swelling and tenderness present.      Comments: Wound vac in place   Skin:     General: Skin is warm and dry.   Neurological:      Mental Status: She is alert.         Consultants     Consult Orders (all) (From admission, onward)       Start     Ordered    04/30/25 1832  Inpatient Plastic Surgery Consult  Once        Comments: Pt will Follow up out patient, MD on vacation.   Specialty:  Plastic Surgery  Provider:  Nixon Rueda MD    04/30/25 1833    04/30/25 0813  Inpatient Plastic Surgery Consult  Once,   Status:  Canceled        Specialty:  Plastic Surgery  Provider:  (Not yet assigned)    04/30/25 0812    04/29/25 0825  Inpatient Plastic Surgery Consult  Once,   Status:  Canceled        Specialty:  Plastic Surgery  Provider:  (Not yet assigned)    04/29/25 0825    04/25/25 1852  Inpatient Gastroenterology Consult  Once        Specialty:  Gastroenterology  Provider:  Teo Vang MD    04/25/25 1852    04/23/25 1452  Inpatient Cardiology Consult  Once        Specialty:  Cardiology  Provider:  German Cancino  MD LAWSON    04/23/25 1452    04/18/25 0845  Inpatient Physical Medicine Rehab Consult  Once        Specialty:  Physical Medicine and Rehabilitation  Provider:  Jose Chau MD    04/18/25 0852    04/17/25 1238  Inpatient Internal Medicine Consult  Once        Specialty:  Internal Medicine  Provider:  Feroz Urban MD    04/17/25 1239    04/17/25 0751  Inpatient Infectious Diseases Consult  Once        Specialty:  Infectious Diseases  Provider:  Domenico Villegas MD    04/17/25 0750    04/12/25 1237  Hematology & Oncology Inpatient Consult  Once        Specialty:  Hematology and Oncology  Provider:  Chente Lomas II, MD    04/12/25 1236    04/11/25 0853  Inpatient Vascular Surgery Consult  Once        Specialty:  Vascular Surgery  Provider:  Misael Lawton MD    04/11/25 0853    04/11/25 0516  Inpatient General Surgery Consult  STAT        Specialty:  General Surgery  Provider:  Sharita Mendoza MD    04/11/25 0515    04/10/25 1801  Inpatient Nephrology Consult  Once        Specialty:  Nephrology  Provider:  Yamil Porter MD    04/10/25 1800    04/10/25 1703  Inpatient Cardiology Consult  Once        Specialty:  Cardiology  Provider:  Santi Erickson MD    04/10/25 1702    04/10/25 1703  Inpatient Cardiothoracic Surgery Consult  Once        Specialty:  Cardiothoracic Surgery  Provider:  Jr Héctor Cortez MD    04/10/25 1702    04/10/25 1655  Inpatient Cardiology Consult  Once,   Status:  Canceled        Specialty:  Cardiology  Provider:  Santi Erickson MD    04/10/25 1654    04/10/25 1654  Inpatient Cardiothoracic Surgery Consult  Once,   Status:  Canceled        Specialty:  Cardiothoracic Surgery  Provider:  Jr Héctor Cortez MD    04/10/25 1653                  Procedures     Imaging Results (All)       Procedure Component Value Units Date/Time    CT Abdomen Pelvis With Contrast [[id number]] Collected: 04/16/25 1225     Updated: 04/17/25 0833    Narrative:      CT ABDOMEN AND  PELVIS WITH IV CONTRAST     HISTORY: 65-year-old female with left ventricular thrombus with  embolization and cardiogenic shock. Occlusion of distal left renal  artery with left renal infarcts, distal splenic artery with splenic  infarcts, ONEIDA occlusion with reconstitution distally, left superficial  femoral artery occlusion. Mechanical embolectomy and 4-compartment  fasciotomy left lower extremity.     TECHNIQUE: Radiation dose reduction techniques were utilized, including  automated exposure control and exposure modulation based on body size.   3 mm images were obtained through the abdomen and pelvis after the  administration of IV contrast. Compared with CTA abdomen and pelvis  04/12/2025.     FINDINGS:  1. Most of the colonic dilatation has resolved, but there is mild  persistent dilatation of the proximal transverse colon. The proximal  transverse colon appears mildly patulous and has a slightly thickened  wall. There is less dilatation of a long segment of proximal small  bowel, but there is a long segment of distal ileum which appears  slightly more dilated than previously, image 107. The distal/terminal  ileum is normal in caliber, stable. There is a slightly less thickened  appearance of some of the small bowel loops with less thickening of the  folds. There is no bowel pneumatosis.     There is a slightly larger small volume of free fluid, but there is also  new third spacing of fluid within the body wall. There are no fluid  collections. No free air.     2. Evolving splenic infarcts appear unchanged. Evolving left renal  infarcts appear slightly less defined.     3. There is no new abnormality at the liver, pancreas, adrenals, or  right kidney. Urinary bladder is significantly distended. Consider a  Storm catheter.     4. There are postsurgical changes at the left groin. Slightly narrowed,  but the left common femoral artery is patent as is the visualized left  superficial femoral artery, series 3 image  132. There is a very small  amount of fluid at the left groin, but there is no discrete fluid  collection or hematoma.     5. There is no pneumonia or pleural effusions at the visualized lung  bases.        This report was finalized on 4/17/2025 8:30 AM by Dr. Aleida Perez M.D on  Workstation: [id number]       XR Abdomen KUB [[id number]] Collected: 04/12/25 1356     Updated: 04/12/25 1401    Narrative:      XR ABDOMEN KUB-        INDICATION: NG tube placement     COMPARISON: Abdominal radiograph April 12, 2025     TECHNIQUE: 1 view abdomen     FINDINGS:      NG tube tip and sideport are in the gastric fundus. Cholecystectomy  clips. Gas distended small bowel loops, with a loop in the left lower  quadrant measuring 4.1 cm. Prominent gas-filled right colon.       Impression:         1. Interval advancement of NG tube with the tip and sideport in the  gastric fundus.  2. Stable gas distended small bowel loops     This report was finalized on 4/12/2025 1:58 PM by Dr. Jose Daniel Dallas M.D on Workstation: [id number]       XR Abdomen KUB [[id number]] Collected: 04/12/25 1231     Updated: 04/12/25 1239    Narrative:      XR ABDOMEN KUB-        INDICATION: 1 view abdomen     COMPARISON: Abdominal radiograph April 11, 2025     TECHNIQUE: 1 view abdomen     FINDINGS:      NG tube tip is in the gastric fundus and the sideport is at the GE  junction. Cholecystectomy clips. Gas distended small bowel loops, with a  loop in the left mid abdomen measuring 4.2 cm. Mild thickening of small  bowel circular folds. Gas distended hepatic flexure of the colon.       Impression:         1. NG tube tip is in the gastric fundus and the sideport is at the GE  junction. Consider advancement.  2. Gas distended small bowel with suggestion of thickening of circular  folds and gas distended hepatic flexure of the colon, similar.     This report was finalized on 4/12/2025 12:36 PM by Dr. Jose Daniel Dallas M.D on Workstation: [id number]        Arteriogram (Autofinalize) [[id number]] Resulted: 04/12/25 1113     Updated: 04/12/25 1113    Narrative:      This procedure was auto-finalized with no dictation required.    CT Angio Abdominal Aorta Bilateral Iliofem Runoff [[id number]] Collected: 04/12/25 0506     Updated: 04/12/25 0528    Narrative:      CT ANGIOGRAM OF THE ABDOMEN AND PELVIS WITH BILATERAL LOWER EXTREMITY  RUNOFF     HISTORY: Loss of pulses within the lower extremity     COMPARISON: April 11, 2025     TECHNIQUE: Axial CT imaging was obtained through the abdomen and pelvis.  IV contrast was administered.     FINDINGS:  Images through the lung bases demonstrate some dependent atelectasis.     The celiac axis and superior mesenteric artery are widely patent, as are  the renal arteries bilaterally. Inferior mesenteric artery is occluded  at its origin, although there is distal reconstitution. The common iliac  arteries are widely patent bilaterally. There does appear to be a  moderate stenosis involving the proximal right internal iliac artery.  There is probably some mild narrowing involving the proximal left  internal iliac artery. The external iliac arteries are widely patent  bilaterally.     The right common femoral artery is patent, as is the profunda femoris.  Right superficial femoral artery has a short segment occlusion within  the proximal to mid thigh. It reconstitutes, but does appear somewhat  narrowed. There is also some calcified plaque noted more distally, with  associated moderate stenosis. Right popliteal artery is patent. There is  some narrowing at the origin of the right anterior tibial artery. It is  continuous to the ankle on delayed phase imaging. There is some  calcification involving the tibioperoneal trunk. The peroneal artery and  posterior tibial artery are continuous to the foot.     There is an occlusion of the left common femoral artery. There is  reconstitution of the left profunda femoris artery. The left  superficial  femoral artery remains occluded before reconstituting in the mid to  distal thigh. There is some irregularity and calcification of the distal  left superficial femoral artery, with mild to moderate narrowing. Left  popliteal artery is patent. Left anterior tibial artery appears to  terminate within the distal calf. Left posterior tibial artery is  continuous to the foot. Left peroneal artery appears to be continuous to  the ankle on delayed phase imaging.     No suspicious hepatic lesions are seen. Areas of infarction are again  noted within the spleen and left kidney. No hydronephrosis is seen on  either side. Excreted contrast material is noted within the urinary  bladder. Uterus is absent. The patient's ascending and transverse colon  are distended with air and fluid. The appendix is normal. There are  multiple distended loops of small bowel, which do appear to have wall  thickening and edema. Appearance is suspicious for ischemic  colitis/enteritis. No pneumatosis or free air is seen. There is a small  amount of free fluid which is noted within the abdomen and pelvis. No  acute osseous abnormalities are seen.       Impression:         1. The patient has a short segment occlusion of the right superficial  femoral artery in the proximal to mid thigh. It reconstitutes, but does  show narrowing. There is further plaque noted more distally, with  associated moderate stenosis. I do think the right anterior tibial,  peroneal, and posterior tibial arteries are continuous to the foot.  2. Occlusion of the left common femoral artery, noted on prior imaging.  There is reconstitution of the left superficial femoral artery within  the mid to distal thigh. Left anterior tibial artery appears to  terminate within the distal calf. Left peroneal artery terminates at the  ankle. Left posterior tibial artery is continuous to the foot.  3. Areas of infarction are again noted within the spleen and left  kidney.  4.  Distention of the ascending and transverse colon, along with  distended loops of small bowel with associated wall thickening and  edema. Again, this is favored to reflect ischemic colitis/enteritis. No  pneumatosis or free air is seen. There is some free fluid which is noted  within the abdomen and pelvis.     Radiation dose reduction techniques were utilized, including automated  exposure control and exposure modulation based on body size.        This report was finalized on 4/12/2025 5:25 AM by Dr. Jennifer Jo M.D on Workstation: [id number]       XR Abdomen KUB [[id number]] Collected: 04/11/25 0715     Updated: 04/11/25 0720    Narrative:      XR ABDOMEN KUB-     Clinical: Nasogastric tube placement     FINDINGS: Nasogastric tube tip is located within the gastric fundus,  this position is satisfactory. Distended loops of small bowel similar to  the previous CT. The remainder is unremarkable.     This report was finalized on 4/11/2025 7:17 AM by Dr. Baljinder Guevara M.D  on Workstation: [id number]       CT Angiogram Abdomen Pelvis [[id number]] Collected: 04/11/25 0512     Updated: 04/11/25 0522    Addenda:        ADDENDUM:  04 11 25 05:21 Call Doctor Regarding Above results, called NP Karlene Eldridge    on 04 11 05:21 (-04:00)      Signed: 04/11/25 0511 by Jose Albarran MD    Narrative:      CR  Patient: BIN CATALAN  Time Out: 05:11  Exam(s): CTA ABDOMEN + PELVIS     EXAM:    CT Angiography Abdomen and Pelvis With Intravenous Contrast    CLINICAL HISTORY:     Reason for exam: severe abdominal pain, nausea, concern for bowel   ischemia.    TECHNIQUE:    Axial computed tomographic angiography images of the abdomen and pelvis   with intravenous contrast with coronal and sagittal reformats.  CTDI is   16.01 mGy and DLP is 578 mGy-cm.  This CT exam was performed according to   the principle of ALARA (As Low As Reasonably Achievable) by using one or   more of the following dose reduction techniques: automated  exposure   control, adjustment of the mA and or kV according to patient size, and or   use of iterative reconstruction technique.    3D and MIP reconstructed images were created and reviewed.    COMPARISON:    No relevant prior studies available.    FINDINGS:     VASCULATURE: Left SFA occlusion from the origin through the entire   visualized portion    Aorta:  Atherosclerotic arterial calcifications: trace. No aortic   aneurysm or dissection. Arterial structures otherwise unremarkable.    Celiac trunk and mesenteric arteries:  ONEIDA thready patency, diminutive.    Celiac artery artery patent.  Superior mesenteric artery patent.    Renal arteries:  Grossly patent.    Iliac arteries:  Grossly patent.    Portal veins:  Unremarkable as visualized.  No portal venous gas.      Heart:  Cardiomegaly.  Mild coronary artery calcifications.     ABDOMEN:    Liver:  Consider additional evaluation of atypical liver morphology,   potentially incidental or chonic liver disease.    Gallbladder and bile ducts:  Cholecystectomy.    Pancreas:  Unremarkable.    Spleen:  Splenic scattered infarcts likely 20-30% of total parenchymal   volume.    Adrenals:  Unremarkable.    Kidneys and ureters:  Left renal multifocal infarcts, likely 50% of   parenchyma.    Stomach and bowel:  Sigmoid possible colitis, correlate for watershed   colonic ischemia.  Large bowel and probably small bowel ileus.  Long   segment extensive small bowel enteritis.  Colonic diverticula.  No   pneumatosis intestinalis.     PELVIS:    Appendix:  No findings to suggest acute appendicitis.    Bladder:  Unremarkable.    Reproductive:  Hysterectomy.     ABDOMEN and PELVIS:    Intraperitoneal space:  Unremarkable.  No pneumoperitoneum.    Bones joints:  No acute abnormality.    Soft tissues:  Unremarkable.    Lymph nodes:  Unremarkable.    Other findings:  Vascular structures: Otherwise unremarkable.    IMPRESSION:       1.  Left renal and splenic infarcts , left SFA  "occlusion from origin   distally concerning for arterial thrombi embolism ONEIDA thready patency,   diminutive.  2.  Recommend evaluation for arterial thrombi embolism.  3.  Recommend vascular IR consultation  3.  Concerning for sigmoid colonic possible ischemia; correlate with   presentation.  4.  Long segment extensive small bowel enteritis. Large bowel and   probably small bowel ileus.  5  No bowel perforation or portal venous gas.  6.  See additional findings above.      Impression:            Communications:     Call Doctor Above results    Electronically signed by Jose Albarran MD on 04-11-25 at 0511            Pertinent Labs     Results from last 7 days   Lab Units 05/05/25  0540 05/04/25  0523 05/03/25  0707 05/02/25  0654   WBC 10*3/mm3 8.36 8.32 7.75 7.69   HEMOGLOBIN g/dL 8.3* 8.5* 7.6* 7.8*   PLATELETS 10*3/mm3 460* 484* 444 459*     Results from last 7 days   Lab Units 05/05/25  0540 05/04/25  1621 05/04/25 0523 05/03/25  0707 05/02/25  0654   SODIUM mmol/L 138  --  139 142 141   POTASSIUM mmol/L 4.2 4.3 3.4* 3.9 3.9   CHLORIDE mmol/L 102  --  100 104 103   CO2 mmol/L 31.5*  --  29.0 30.3* 30.1*   BUN mg/dL 8  --  7* 7* 8   CREATININE mg/dL 0.77  --  0.78 0.65 0.71   GLUCOSE mg/dL 92  --  130* 102* 106*   Estimated Creatinine Clearance: 80.8 mL/min (by C-G formula based on SCr of 0.77 mg/dL).  Results from last 7 days   Lab Units 05/05/25  0540 05/04/25  0523 05/03/25  0707 05/02/25  0654   ALBUMIN g/dL 2.6* 2.8* 2.6* 2.5*     Results from last 7 days   Lab Units 05/05/25  0540 05/04/25  0523 05/03/25  0707 05/02/25  0654   CALCIUM mg/dL 8.4* 8.5* 8.5* 8.5*   ALBUMIN g/dL 2.6* 2.8* 2.6* 2.5*   MAGNESIUM mg/dL 2.0 1.7 1.6 1.7   PHOSPHORUS mg/dL 3.4 3.4 3.8 3.5               Invalid input(s): \"LDLCALC\"        Test Results Pending at Discharge       Discharge Details        Discharge Medications        New Medications        Instructions Start Date   apixaban 5 MG tablet tablet  Commonly known as: " ELIQUIS   5 mg, Oral, Every 12 Hours Scheduled      clopidogrel 75 MG tablet  Commonly known as: PLAVIX   75 mg, Oral, Daily   Start Date: May 6, 2025     dicyclomine 10 MG capsule  Commonly known as: BENTYL   10 mg, Oral, 4 Times Daily PRN      HYDROcodone-acetaminophen  MG per tablet  Commonly known as: NORCO   1 tablet, Oral, Every 4 Hours PRN      insulin lispro 100 UNIT/ML injection  Commonly known as: HUMALOG/ADMELOG   2-7 Units, Subcutaneous, 4 Times Daily Before Meals & Nightly      metFORMIN 500 MG tablet  Commonly known as: GLUCOPHAGE   500 mg, Oral, 2 Times Daily With Meals      metoprolol succinate XL 50 MG 24 hr tablet  Commonly known as: Toprol XL   50 mg, Oral, Daily      simethicone 80 MG chewable tablet  Commonly known as: MYLICON   80 mg, Oral, 4 Times Daily PRN      Trelegy Ellipta 100-62.5-25 MCG/ACT inhaler  Generic drug: Fluticasone-Umeclidin-Vilant   1 puff, Inhalation, Daily - RT             Continue These Medications        Instructions Start Date   acetaminophen 325 MG tablet  Commonly known as: TYLENOL   650 mg, Oral, Every 4 Hours PRN      albuterol (2.5 MG/3ML) 0.083% nebulizer solution  Commonly known as: PROVENTIL   2.5 mg, Nebulization, Every 6 Hours PRN      atorvastatin 40 MG tablet  Commonly known as: LIPITOR   40 mg, Oral, Nightly      dextrose 40 % gel  Commonly known as: GLUTOSE   15 g, Oral, Every 15 Minutes PRN      glucagon 1 MG injection  Commonly known as: GLUCAGEN   1 mg, Intramuscular, Every 15 Minutes PRN      nitroglycerin 0.4 MG SL tablet  Commonly known as: NITROSTAT   0.4 mg, Sublingual, Every 5 Minutes PRN, Take no more than 3 doses in 15 minutes.             Stop These Medications      aspirin 81 MG EC tablet     budesonide 0.5 MG/2ML nebulizer solution  Commonly known as: PULMICORT     insulin glargine 100 UNIT/ML injection  Commonly known as: LANTUS, SEMGLEE     insulin regular 100 UNIT/ML injection  Commonly known as: humuLIN R,novoLIN R     Jardiance 25  MG tablet tablet  Generic drug: empagliflozin     mupirocin 2 % nasal ointment  Commonly known as: BACTROBAN     ondansetron 2 mg/mL injection  Commonly known as: ZOFRAN     ticagrelor 90 MG tablet tablet  Commonly known as: BRILINTA              Allergies   Allergen Reactions   • Tramadol Itching     High severity per pt         Discharge Disposition:  Skilled Nursing Facility (DC - External)    Discharge Diet:  Diet Order   Procedures   • Diet: Regular/House, Diabetic, Cardiac; Healthy Heart (2-3 Na+); Consistent Carbohydrate; Fluid Consistency: Thin (IDDSI 0)       Discharge Activity:   Activity Instructions       Activity as Tolerated              CODE STATUS:    Code Status and Medical Interventions: CPR (Attempt to Resuscitate); Full Support   Ordered at: 04/18/25 0852     Code Status (Patient has no pulse and is not breathing):    CPR (Attempt to Resuscitate)     Medical Interventions (Patient has pulse or is breathing):    Full Support     Level Of Support Discussed With:    Patient       Future Appointments   Date Time Provider Department Center   6/5/2025  8:15 AM Consuelo Aponte APRN [facility]   8/6/2025  1:30 PM [facility]coc[facility]     Additional Instructions for the Follow-ups that You Need to Schedule       Discharge Follow-up with PCP   As directed       Currently Documented PCP:    Sandra Kaba MD    PCP Phone Number:    [phone]     Follow Up Details: post-hospital follow up        Discharge Follow-up with Specified Provider: Vascular surgery in 4-6 weeks with ABIs   As directed      To: Vascular surgery in 4-6 weeks with ABIs               Contact information for follow-up providers       Misael Lawton MD Follow up in 1 month(s).    Specialty: Vascular Surgery  Contact information:  [address] [place], [place] [id number] [phone]               Nixon Rueda MD. Schedule an appointment as soon as possible for a visit in 1 month(s).    Specialty:  Plastic Surgery  Why: For wound recheck and possible skin grafts  Contact information:  [address] [facility] [address]  [phone]               Sandra Kaba MD .    Specialty: Family Medicine  Why: post-hospital follow up  Contact information:  [address]  [address]  [facility] [address]  [phone]                       Contact information for after-discharge care       Destination       [facility] .    Service: Skilled Nursing  Contact information:  [address]  [facility] [address] [phone]                     Home Medical Care       [facility] .    Services: Home Nursing, Home Rehabilitation  Contact information:  [address] [address]  [facility] [address] [phone]                                   Additional Instructions for the Follow-ups that You Need to Schedule       Discharge Follow-up with PCP   As directed       Currently Documented PCP:    Sandra Kaba MD    PCP Phone Number:    [phone]     Follow Up Details: post-hospital follow up        Discharge Follow-up with Specified Provider: Vascular surgery in 4-6 weeks with ABIs   As directed      To: Vascular surgery in 4-6 weeks with ABIs            Time Spent on Discharge:  I spent greater than 30 minutes on this discharge activity which included: face-to-face encounter with the patient, reviewing the data in the system, coordination of the care with the nursing staff as well as consultants, documentation, and entering orders.       Tiffany Woods MD  [facility]  05/05/25  13:21 EDT

## 2025-05-05 NOTE — PLAN OF CARE
Problem: Adult Inpatient Plan of Care  Goal: Plan of Care Review  Outcome: Progressing  Goal: Patient-Specific Goal (Individualized)  Outcome: Progressing  Goal: Absence of Hospital-Acquired Illness or Injury  Outcome: Progressing  Intervention: Identify and Manage Fall Risk  Recent Flowsheet Documentation  Taken 5/5/2025 0400 by Vivi Aleman RN  Safety Promotion/Fall Prevention:   activity supervised   assistive device/personal items within reach   clutter free environment maintained   fall prevention program maintained   nonskid shoes/slippers when out of bed   room organization consistent   safety round/check completed  Taken 5/5/2025 0201 by Vivi Aleman, RN  Safety Promotion/Fall Prevention:   activity supervised   assistive device/personal items within reach   clutter free environment maintained   fall prevention program maintained   nonskid shoes/slippers when out of bed   room organization consistent   safety round/check completed  Taken 5/5/2025 0013 by Vivi Aleman, RN  Safety Promotion/Fall Prevention:   activity supervised   assistive device/personal items within reach   clutter free environment maintained   fall prevention program maintained   nonskid shoes/slippers when out of bed   room organization consistent   safety round/check completed  Taken 5/4/2025 2200 by Vivi Aleman, RN  Safety Promotion/Fall Prevention:   activity supervised   assistive device/personal items within reach   clutter free environment maintained   fall prevention program maintained   nonskid shoes/slippers when out of bed   room organization consistent   safety round/check completed  Taken 5/4/2025 2015 by Vivi Aleman, RN  Safety Promotion/Fall Prevention:   activity supervised   assistive device/personal items within reach   clutter free environment maintained   fall prevention program maintained   nonskid shoes/slippers when out of bed   room organization consistent   safety round/check  completed  Intervention: Prevent Skin Injury  Recent Flowsheet Documentation  Taken 5/5/2025 0400 by Vivi Aleman RN  Body Position:   position changed independently   left   side-lying  Taken 5/5/2025 0201 by Vivi Aleman RN  Body Position:   left   side-lying   position changed independently  Taken 5/5/2025 0013 by Vivi Aleman RN  Body Position:   left   side-lying   position changed independently  Taken 5/4/2025 2200 by Vivi Aleman RN  Body Position:   supine   legs elevated   position changed independently  Taken 5/4/2025 2015 by Vivi Aleman RN  Body Position:   left   right   position changed independently  Intervention: Prevent and Manage VTE (Venous Thromboembolism) Risk  Recent Flowsheet Documentation  Taken 5/4/2025 2015 by Vivi Aleman RN  VTE Prevention/Management: (Eliquis) other (see comments)  Intervention: Prevent Infection  Recent Flowsheet Documentation  Taken 5/5/2025 0400 by Vivi Aleman RN  Infection Prevention:   cohorting utilized   environmental surveillance performed   hand hygiene promoted   rest/sleep promoted   single patient room provided  Taken 5/5/2025 0201 by Vivi Aleman RN  Infection Prevention:   cohorting utilized   environmental surveillance performed   hand hygiene promoted   rest/sleep promoted   single patient room provided  Taken 5/5/2025 0013 by Vivi Aleman RN  Infection Prevention:   cohorting utilized   environmental surveillance performed   hand hygiene promoted   single patient room provided   rest/sleep promoted  Taken 5/4/2025 2200 by Vivi Aleman RN  Infection Prevention:   cohorting utilized   environmental surveillance performed   hand hygiene promoted   rest/sleep promoted   single patient room provided  Taken 5/4/2025 2015 by Vivi Aleman RN  Infection Prevention:   cohorting utilized   environmental surveillance performed   hand hygiene promoted   rest/sleep promoted   single patient room provided  Goal: Optimal  Comfort and Wellbeing  Outcome: Progressing  Intervention: Monitor Pain and Promote Comfort  Recent Flowsheet Documentation  Taken 5/5/2025 0013 by Vivi Aleman RN  Pain Management Interventions:   pain medication given   pain management plan reviewed with patient/caregiver   pillow support provided   position adjusted  Intervention: Provide Person-Centered Care  Recent Flowsheet Documentation  Taken 5/5/2025 0201 by Vivi Aleman RN  Trust Relationship/Rapport:   care explained   choices provided  Taken 5/4/2025 2015 by Vivi Aleman RN  Trust Relationship/Rapport:   care explained   choices provided   emotional support provided   empathic listening provided   questions answered   questions encouraged   reassurance provided   thoughts/feelings acknowledged  Goal: Readiness for Transition of Care  Outcome: Progressing     Problem: Skin Injury Risk Increased  Goal: Skin Health and Integrity  Outcome: Progressing  Intervention: Optimize Skin Protection  Recent Flowsheet Documentation  Taken 5/5/2025 0400 by Vivi Aleman RN  Activity Management: activity minimized  Head of Bed (HOB) Positioning: HOB lowered  Taken 5/5/2025 0201 by Vivi Aleman RN  Activity Management: activity minimized  Head of Bed (HOB) Positioning: HOB lowered  Taken 5/5/2025 0013 by Vivi Aleman RN  Activity Management: activity minimized  Head of Bed (HOB) Positioning: HOB lowered  Taken 5/4/2025 2200 by Vivi Aleman RN  Activity Management: activity encouraged  Head of Bed (HOB) Positioning: HOB lowered  Taken 5/4/2025 2015 by Vivi Aleman RN  Activity Management: activity encouraged  Head of Bed (HOB) Positioning: HOB lowered     Problem: Sepsis/Septic Shock  Goal: Optimal Coping  Outcome: Progressing  Intervention: Support Patient and Family Response  Recent Flowsheet Documentation  Taken 5/5/2025 0201 by Vivi Aleman RN  Family/Support System Care:   self-care encouraged   support provided  Taken 5/4/2025 2015  by Borsch, Burnita, RN  Family/Support System Care:   self-care encouraged   support provided  Goal: Absence of Bleeding  Outcome: Progressing  Intervention: Monitor and Manage Bleeding  Recent Flowsheet Documentation  Taken 5/4/2025 2015 by Vivi Aleman RN  Bleeding Management: dressing monitored  Goal: Blood Glucose Level Within Target Range  Outcome: Progressing  Intervention: Optimize Glycemic Control  Recent Flowsheet Documentation  Taken 5/5/2025 0201 by Vivi Aleman RN  Hyperglycemia Management: blood glucose monitored  Hypoglycemia Management: glucagon given  Taken 5/4/2025 2015 by Vivi Aleman RN  Hyperglycemia Management: blood glucose monitored  Hypoglycemia Management: blood glucose monitored  Goal: Absence of Infection Signs and Symptoms  Outcome: Progressing  Intervention: Initiate Sepsis Management  Recent Flowsheet Documentation  Taken 5/5/2025 0400 by Vivi Aleman RN  Infection Prevention:   cohorting utilized   environmental surveillance performed   hand hygiene promoted   rest/sleep promoted   single patient room provided  Taken 5/5/2025 0201 by Vivi Aleman RN  Infection Prevention:   cohorting utilized   environmental surveillance performed   hand hygiene promoted   rest/sleep promoted   single patient room provided  Taken 5/5/2025 0013 by Vivi Aleman RN  Infection Prevention:   cohorting utilized   environmental surveillance performed   hand hygiene promoted   single patient room provided   rest/sleep promoted  Taken 5/4/2025 2200 by Vivi Aleman RN  Infection Prevention:   cohorting utilized   environmental surveillance performed   hand hygiene promoted   rest/sleep promoted   single patient room provided  Taken 5/4/2025 2015 by Vivi Aleman RN  Infection Prevention:   cohorting utilized   environmental surveillance performed   hand hygiene promoted   rest/sleep promoted   single patient room provided  Intervention: Promote Stabilization  Recent Flowsheet  Documentation  Taken 5/4/2025 2015 by Vivi Aleman RN  Fluid/Electrolyte Management: fluids provided  Intervention: Promote Recovery  Recent Flowsheet Documentation  Taken 5/5/2025 0400 by Vivi Aleman RN  Activity Management: activity minimized  Taken 5/5/2025 0201 by Vivi Aleman RN  Activity Management: activity minimized  Taken 5/5/2025 0013 by Vivi Aleman RN  Activity Management: activity minimized  Taken 5/4/2025 2200 by Vivi Aleman RN  Activity Management: activity encouraged  Taken 5/4/2025 2015 by Vivi Aleman RN  Activity Management: activity encouraged  Sleep/Rest Enhancement:   awakenings minimized   consistent schedule promoted   room darkened   noise level reduced  Goal: Optimal Nutrition Delivery  Outcome: Progressing     Problem: Fall Injury Risk  Goal: Absence of Fall and Fall-Related Injury  Outcome: Progressing  Intervention: Identify and Manage Contributors  Recent Flowsheet Documentation  Taken 5/5/2025 0400 by Vivi Aleman RN  Medication Review/Management: medications reviewed  Taken 5/5/2025 0201 by Vivi Aleman RN  Medication Review/Management: medications reviewed  Taken 5/5/2025 0013 by Vivi Aleman RN  Medication Review/Management: medications reviewed  Taken 5/4/2025 2200 by Vivi Aleman RN  Medication Review/Management: medications reviewed  Taken 5/4/2025 2015 by Vivi Aleman RN  Medication Review/Management: medications reviewed  Intervention: Promote Injury-Free Environment  Recent Flowsheet Documentation  Taken 5/5/2025 0400 by Vivi Aleman RN  Safety Promotion/Fall Prevention:   activity supervised   assistive device/personal items within reach   clutter free environment maintained   fall prevention program maintained   nonskid shoes/slippers when out of bed   room organization consistent   safety round/check completed  Taken 5/5/2025 0201 by Vivi Aleman RN  Safety Promotion/Fall Prevention:   activity supervised   assistive  device/personal items within reach   clutter free environment maintained   fall prevention program maintained   nonskid shoes/slippers when out of bed   room organization consistent   safety round/check completed  Taken 5/5/2025 0013 by Vivi Aleman RN  Safety Promotion/Fall Prevention:   activity supervised   assistive device/personal items within reach   clutter free environment maintained   fall prevention program maintained   nonskid shoes/slippers when out of bed   room organization consistent   safety round/check completed  Taken 5/4/2025 2200 by Vivi Aleman RN  Safety Promotion/Fall Prevention:   activity supervised   assistive device/personal items within reach   clutter free environment maintained   fall prevention program maintained   nonskid shoes/slippers when out of bed   room organization consistent   safety round/check completed  Taken 5/4/2025 2015 by Vivi Aleman RN  Safety Promotion/Fall Prevention:   activity supervised   assistive device/personal items within reach   clutter free environment maintained   fall prevention program maintained   nonskid shoes/slippers when out of bed   room organization consistent   safety round/check completed     Problem: Pain Acute  Goal: Optimal Pain Control and Function  Outcome: Progressing  Intervention: Optimize Psychosocial Wellbeing  Recent Flowsheet Documentation  Taken 5/5/2025 0201 by Vivi Aleman RN  Diversional Activities: television  Taken 5/4/2025 2015 by Vivi Aleman RN  Diversional Activities: television  Intervention: Develop Pain Management Plan  Recent Flowsheet Documentation  Taken 5/5/2025 0013 by Vivi Aleman RN  Pain Management Interventions:   pain medication given   pain management plan reviewed with patient/caregiver   pillow support provided   position adjusted  Intervention: Prevent or Manage Pain  Recent Flowsheet Documentation  Taken 5/5/2025 0400 by Vivi Aleman RN  Medication Review/Management:  medications reviewed  Taken 5/5/2025 0201 by Vivi Aleman RN  Medication Review/Management: medications reviewed  Taken 5/5/2025 0013 by Vivi Aleman RN  Medication Review/Management: medications reviewed  Taken 5/4/2025 2200 by Vivi Aleman RN  Medication Review/Management: medications reviewed  Taken 5/4/2025 2015 by Vivi Aleman RN  Sensory Stimulation Regulation:   care clustered   quiet environment promoted  Sleep/Rest Enhancement:   awakenings minimized   consistent schedule promoted   room darkened   noise level reduced  Medication Review/Management: medications reviewed     Problem: Heart Failure  Goal: Optimal Coping  Outcome: Progressing  Intervention: Support Psychosocial Response  Recent Flowsheet Documentation  Taken 5/5/2025 0201 by Vivi Aleman RN  Family/Support System Care:   self-care encouraged   support provided  Taken 5/4/2025 2015 by Vivi Aleman RN  Family/Support System Care:   self-care encouraged   support provided  Goal: Optimal Cardiac Output and Blood Flow  Outcome: Progressing  Goal: Stable Heart Rate and Rhythm  Outcome: Progressing  Goal: Fluid and Electrolyte Balance  Outcome: Progressing  Intervention: Monitor and Manage Fluid and Electrolyte Balance  Recent Flowsheet Documentation  Taken 5/4/2025 2015 by Vivi Aleman RN  Fluid/Electrolyte Management: fluids provided  Goal: Optimal Functional Ability  Outcome: Progressing  Intervention: Optimize Functional Ability  Recent Flowsheet Documentation  Taken 5/5/2025 0400 by Vivi Aleman RN  Activity Management: activity minimized  Taken 5/5/2025 0201 by Vivi Aleman RN  Activity Management: activity minimized  Taken 5/5/2025 0013 by Vivi Aleman RN  Activity Management: activity minimized  Taken 5/4/2025 2200 by Vivi Aleman RN  Activity Management: activity encouraged  Taken 5/4/2025 2015 by Vivi Aleman RN  Activity Management: activity encouraged  Goal: Improved Oral Intake  Outcome:  Progressing  Goal: Effective Oxygenation and Ventilation  Outcome: Progressing  Intervention: Promote Airway Secretion Clearance  Recent Flowsheet Documentation  Taken 5/5/2025 0400 by Vivi Aleman RN  Activity Management: activity minimized  Taken 5/5/2025 0201 by Vivi Aleman RN  Activity Management: activity minimized  Cough And Deep Breathing: done independently per patient  Taken 5/5/2025 0013 by Vivi Aleman RN  Activity Management: activity minimized  Taken 5/4/2025 2200 by Vivi Aleman RN  Activity Management: activity encouraged  Taken 5/4/2025 2015 by Vivi Aleman RN  Activity Management: activity encouraged  Cough And Deep Breathing: done independently per patient  Intervention: Optimize Oxygenation and Ventilation  Recent Flowsheet Documentation  Taken 5/5/2025 0400 by Vivi Aleman RN  Head of Bed (HOB) Positioning: HOB lowered  Taken 5/5/2025 0201 by Vivi Aleman RN  Head of Bed (HOB) Positioning: HOB lowered  Taken 5/5/2025 0013 by Vivi Aleman RN  Head of Bed (HOB) Positioning: HOB lowered  Taken 5/4/2025 2200 by Vivi Aleman RN  Head of Bed (HOB) Positioning: HOB lowered  Taken 5/4/2025 2015 by Vivi Aleman RN  Head of Bed (HOB) Positioning: HOB lowered  Goal: Effective Breathing Pattern During Sleep  Outcome: Progressing  Intervention: Monitor and Manage Obstructive Sleep Apnea  Recent Flowsheet Documentation  Taken 5/5/2025 0400 by Vivi Aleman RN  Medication Review/Management: medications reviewed  Taken 5/5/2025 0201 by Vivi Aleman RN  Medication Review/Management: medications reviewed  Taken 5/5/2025 0013 by Vivi Aleman RN  Medication Review/Management: medications reviewed  Taken 5/4/2025 2200 by Vivi Aleman RN  Medication Review/Management: medications reviewed  Taken 5/4/2025 2015 by Vivi Aleman RN  Medication Review/Management: medications reviewed     Problem: VTE (Venous Thromboembolism)  Goal: Tissue Perfusion  Outcome:  Progressing  Intervention: Optimize Tissue Perfusion  Recent Flowsheet Documentation  Taken 5/4/2025 2015 by Vivi Aleman, RN  VTE Prevention/Management: (Eliquis) other (see comments)  Goal: Optimal Right Ventricular Function  Outcome: Progressing   Goal Outcome Evaluation:

## 2025-05-05 NOTE — PROGRESS NOTES
[facility]   Surgical Progress Note    Patient Name: Desiree Garcia  : 1960  MRN: [id number]  Date of admission: 4/10/2025  Surgical Procedures Since Admission:  Procedure(s):  EMBOLECTOMY MECHANICAL, FOUR COMPARTMENT FASCIOTOMY  ARTERIOGRAM LOWER EXTREMITY  Surgeon:  Misael Lawton MD  Status:  23 Days Post-Op  -------------------    Procedure(s):  LOWER EXTREMITY DEBRIDEMENT  Surgeon:  Misael Lawton MD  Status:  17 Days Post-Op  -------------------  **Canceled**    Procedure(s):  Left leg fasciotomy debridement with possible wound VAC placement.  Surgeon:  Raimundo Lin MD  Status:  * Surgery not performed *  -------------------    Procedure(s):  Left lower extremity fasciotomy debridement with possible wound VAC placement  Surgeon:  Raimundo Lin MD  Status:  10 Days Post-Op  -------------------    Subjective   Subjective     Chief Complaint: Left leg ischemia follow-up.    History of Present Illness   Wounds continue to hurt especially with dressing changes.  I do not believe that she would do well at home and I have recommended continued attempts to get her into rehab especially as she will not be able to get I wound VAC and home.    Review of Systems desires discharge to home but seems to now accept the possibility of rehab.  Son present and he wishes for rehab    Objective   Objective     Vitals:   Temp:  [98.4 °F (36.9 °C)-98.8 °F (37.1 °C)] 98.8 °F (37.1 °C)  Heart Rate:  [65-80] 65  Resp:  [16-18] 18  BP: (109-137)/(56-73) 114/56  Flow (L/min) (Oxygen Therapy):  [2] 2  Output by Drain (mL) 25 0701 - 25 1900 25 1901 - 25 0700 25 0701 - 25 0753 Range Total   External Urinary Catheter         Physical exam.  Wound VAC in place.  Left groin wound with some mild oozing and necrosis.  Staples removed and Betadine wet-to-dry dressing started.        Result Review    Result Review:  I have personally reviewed the results from the time of this  admission to 5/5/2025 07:53 EDT and agree with these findings:  [x]  Laboratory list / accordion  []  Microbiology  []  Radiology  []  EKG/Telemetry   []  Cardiology/Vascular   []  Pathology  []  Old records  []  Other:  Most notable findings include:       Results from last 7 days   Lab Units 05/05/25  0540 05/04/25  0523 05/03/25  0707 05/02/25  0654 05/01/25  0436 04/30/25  0525 04/29/25  0821   WBC 10*3/mm3 8.36 8.32 7.75 7.69 7.92 8.39 8.49   HEMOGLOBIN g/dL 8.3* 8.5* 7.6* 7.8* 7.3* 7.4* 7.2*   PLATELETS 10*3/mm3 460* 484* 444 459* 458* 451* 446     Results from last 7 days   Lab Units 05/05/25  0540 05/04/25  1621 05/04/25  0523 05/03/25  0707 05/02/25  0654 05/01/25  0436 04/30/25  0525 04/29/25  0821   SODIUM mmol/L 138  --  139 142 141 140 138 139   POTASSIUM mmol/L 4.2 4.3 3.4* 3.9 3.9 3.8 3.8 3.9   CHLORIDE mmol/L 102  --  100 104 103 102 101 102   CO2 mmol/L 31.5*  --  29.0 30.3* 30.1* 29.9* 28.0 28.5   BUN mg/dL 8  --  7* 7* 8 8 7* 7*   CREATININE mg/dL 0.77  --  0.78 0.65 0.71 0.72 0.70 0.63   GLUCOSE mg/dL 92  --  130* 102* 106* 104* 112* 101*   MAGNESIUM mg/dL 2.0  --  1.7 1.6 1.7 1.7 1.6 1.7   PHOSPHORUS mg/dL 3.4  --  3.4 3.8 3.5 3.7 3.9 4.0   Estimated Creatinine Clearance: 80.8 mL/min (by C-G formula based on SCr of 0.77 mg/dL).        Lab Results   Component Value Date    HGBA1C 9.40 (H) 04/09/2025    HGBA1C 9.30 (H) 03/13/2025    HGBA1C 9.40 (H) 12/23/2024     Glucose   Date/Time Value Ref Range Status   05/05/2025 0523 107 70 - 130 mg/dL Final   05/04/2025 2026 112 70 - 130 mg/dL Final   05/04/2025 1557 112 70 - 130 mg/dL Final   05/04/2025 1120 190 (H) 70 - 130 mg/dL Final   05/04/2025 0623 171 (H) 70 - 130 mg/dL Final   05/03/2025 2017 158 (H) 70 - 130 mg/dL Final   05/03/2025 1606 127 70 - 130 mg/dL Final   05/03/2025 1240 192 (H) 70 - 130 mg/dL Final       Assessment & Plan   Assessment / Plan     Brief Patient Summary:  Desiree Garcia is a 65 y.o. female who multiple medical issues.   Vascular is following for ischemia of the left lower extremity.    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  ([facility])  4/18/2025: Debridement of left leg fasciotomies ([facility])    4/19/2025: Postop day #1 from fasciotomy debridement.  Dressings down today.  Minimal oozing noted muscles appear viable as pictured below.  There is some ischemic changes to the skin on the anterior margin of the lateral fasciotomies will continue to monitor.  I redressed the wounds with the assistance of the nurse with dilute Betadine soaked gauze.  Will plan on leaving intact upon my evaluation for tomorrow.  She has a strong dorsalis pedis artery signal.  She has weak dorsiflexion of her left foot.      4/20/2025: Postop day 2 fasciotomy debridement.  Will continue with once daily Dakin's dressing changes.  Will continue to lower the anterior skin on the lateral fasciotomy to demarcate.  This will likely require debridement sometime next week once further demarcation has occurred.    4/21/2025: Postop day 3 fasciotomy debridement.  With some anterior ischemic changes to the flap on the lateral compartment.  Will place her on the surgery schedule for Wednesday to debride this.  If things appear well at that time we will also place wound VAC.    4/22/2025: Postop day #4 fasciotomy debridement.  Plan on surgical debridement of the ischemic skin on the left anterior lateral fasciotomy with wound VAC placement tomorrow.  Will hold heparin infusion when patient goes down to preoperative holding tomorrow.    4/23/2025: Patient was in preop holding for planned/nonemergent left fasciotomy debridement.  Here she appeared diaphoretic with somewhat labored and irregular breathing and overall feeling of doom.   Anesthesia concerned with change in EKG.  Currently I have minimal concerns for any sort of undrained sepsis or urgent nature of the operation in her leg.  I am going to remove her from the surgery schedule today.  I personally called cardiology who will review the case and see the patient.  I have tentatively placed her back on the OR schedule for Friday assuming medically more stable.    4/24/2025: Appreciate cardiology's assistance.  Will start D5 LR at midnight.  Plans for surgical debridement of the left leg tomorrow with wound VAC placement.    4/25/2025: Excisional debridement of anterior flap of the lateral fasciotomy ischemic tissue with wound VAC placement.    4/28/2025: Will premedicate with 1 mg IV Dilaudid prior to wound VAC change.  Will ask wound care team to change today.    4/29/2025: Will change wound VAC tomorrow.  Things remain stable.  Will ask plastic surgery to get involved for eventual skin grafting.  Concerns for taking her off anticoagulants for skin graft will have to be managed aggressively.    4/30/2025.  Awaiting wound VAC change by wound care and nursing today. Pictures will be placed in chart.  Awaiting opinion on skin grafting by plastic surgery.    5/1/2025: Staples removed from left groin.  Will begin Betadine wet-to-dry dressings.  Will need all staples removed from the medial side of the left calf fasciotomies as well once the next wound VAC change.  Will reinitiate Eliquis and stop heparin 2 hours after initiation of Eliquis.  Patient cannot be off of anticoagulation in any time.  Likely discharge tomorrow to either home or rehab.  Needs to have staples removed from left medial calf prior to discharge.    5/2/2025: All staples out now with some dehiscence of all wounds groin and both fasciotomy sites.  Will place Y connector wound VAC to all 3.  Have recommended strongly the use of rehab over home discharge and the patient and her son finally are agreeable with the rehab.   Awaiting this plan.  This juncture we will follow-up on Monday if she is still here.  Please call for issues over the weekend.    5/5/2025: Patient states pain in the leg continues and is keeping her from ambulating.  VAC sites look good.  Will await pictures that is changed by wound care.  Patient complains of crampy abdominal pain and diarrhea.  Abdomen appears benign at this time.  Will sign off as patient going to rehab today.  Plans for follow-up in 4 to 6 weeks with ABIs.    VTE Prophylaxis:  Pharmacologic & mechanical VTE prophylaxis orders are present.      Misael Lawton MD

## 2025-05-05 NOTE — CASE MANAGEMENT/SOCIAL WORK
Continued Stay Note  [facility]     Patient Name: Desiree Garcia  MRN: [id number]  Today's Date: 5/5/2025    Admit Date: 4/10/2025    Plan: [facility]   Discharge Plan       Row Name 05/05/25 1524       Plan    Plan [facility]    Patient/Family in Agreement with Plan yes    Plan Comments Met with pt in room. She confirmed the plan is to go to [facility] for rehab at discharge. She will need EMS transportation. Spoke with [facility] who confirmed that they can accept her today and they have the wound vac on site. Updated primary RN and MD. Discharge order noted. Packet and EMS form complete and given to primary RN. Discharge summary faxed to facility. Primary RN confirmed that the patient is ready and report has been called. EMS transportation requested through [facility] dispatch. No further needs assessed at this time. CCP following. Garrison BARRIENTOS RN                   Discharge Codes    No documentation.                 Expected Discharge Date and Time       Expected Discharge Date Expected Discharge Time    May 5, 2025               Garrison Harrison RN

## 2025-05-06 NOTE — PLAN OF CARE
Goal Outcome Evaluation:      Pt left unit via EMS stretcher at 0008. A&OX4, VSS, NAD noted. All personal belongings sent with patient. All lines and monitors removed from pt. Notified accepting facility previously, at approximately 2200, that pt would not arrive there before 2300 and EMS transport had been delayed until approximately 2315. Accepting facility stated this was ok and they would still accept pt when she arrived.

## 2025-05-07 NOTE — CASE MANAGEMENT/SOCIAL WORK
Case Management Discharge Note      Final Note: [facility] via [facility]         Selected Continued Care - Discharged on 5/6/2025 Admission date: 4/10/2025 - Discharge disposition: Skilled Nursing Facility (DC - External)      Destination Coordination complete.      Service Provider Services Address Phone Fax Patient Preferred    [facility] Skilled Nursing [address], [place] [id number] [phone] --              Durable Medical Equipment    No services have been selected for the patient.                Dialysis/Infusion    No services have been selected for the patient.                Home Medical Care Coordination complete.      Service Provider Services Address Phone Fax Patient Preferred    [facility] [address], [address], [place] [address] [phone] --              Therapy    No services have been selected for the patient.                Community Resources    No services have been selected for the patient.                Community & DME    No services have been selected for the patient.                    Selected Continued Care - Episodes Includes continued care and service providers with selected services from the active episodes listed below          Transportation Services  Ambulance: [facility]    Final Discharge Disposition Code: 03 - skilled nursing facility (SNF)

## 2025-05-15 ENCOUNTER — LAB REQUISITION (OUTPATIENT)
Dept: LAB | Facility: HOSPITAL | Age: 65
End: 2025-05-15
Payer: MEDICARE

## 2025-05-15 ENCOUNTER — OFFICE VISIT (OUTPATIENT)
Dept: WOUND CARE | Facility: HOSPITAL | Age: 65
End: 2025-05-15
Payer: MEDICARE

## 2025-05-15 DIAGNOSIS — T81.328A DISRUPTION OR DEHISCENCE OF CLOSURE OF OTHER SPECIFIED INTERNAL OPERATION (SURGICAL) WOUND, INITIAL ENCOUNTER: ICD-10-CM

## 2025-05-15 PROCEDURE — 87186 SC STD MICRODIL/AGAR DIL: CPT | Performed by: NURSE PRACTITIONER

## 2025-05-15 PROCEDURE — 87070 CULTURE OTHR SPECIMN AEROBIC: CPT | Performed by: NURSE PRACTITIONER

## 2025-05-15 PROCEDURE — 87147 CULTURE TYPE IMMUNOLOGIC: CPT | Performed by: NURSE PRACTITIONER

## 2025-05-15 PROCEDURE — 87205 SMEAR GRAM STAIN: CPT | Performed by: NURSE PRACTITIONER

## 2025-05-15 PROCEDURE — G0463 HOSPITAL OUTPT CLINIC VISIT: HCPCS

## 2025-05-15 PROCEDURE — 87015 SPECIMEN INFECT AGNT CONCNTJ: CPT | Performed by: NURSE PRACTITIONER

## 2025-05-15 PROCEDURE — 87077 CULTURE AEROBIC IDENTIFY: CPT | Performed by: NURSE PRACTITIONER

## 2025-05-15 PROCEDURE — 87075 CULTR BACTERIA EXCEPT BLOOD: CPT | Performed by: NURSE PRACTITIONER

## 2025-05-18 LAB — BACTERIA SPEC ANAEROBE CULT: NORMAL

## 2025-05-19 LAB
BACTERIA SPEC AEROBE CULT: ABNORMAL
GRAM STN SPEC: ABNORMAL

## 2025-05-20 ENCOUNTER — TELEPHONE (OUTPATIENT)
Dept: CASE MANAGEMENT | Facility: OTHER | Age: 65
End: 2025-05-20
Payer: MEDICARE

## 2025-05-20 LAB — BACTERIA SPEC ANAEROBE CULT: NORMAL

## 2025-05-20 NOTE — TELEPHONE ENCOUNTER
UTR. No answer, unable to leave message.     Will try in 1 week. Unable to determine where patient is.     Marisol THOMPSON RN  Ambulatory

## 2025-05-27 ENCOUNTER — OFFICE VISIT (OUTPATIENT)
Dept: WOUND CARE | Facility: HOSPITAL | Age: 65
End: 2025-05-27
Payer: MEDICARE

## 2025-05-27 PROCEDURE — G0463 HOSPITAL OUTPT CLINIC VISIT: HCPCS

## 2025-05-28 ENCOUNTER — PATIENT OUTREACH (OUTPATIENT)
Dept: CASE MANAGEMENT | Facility: OTHER | Age: 65
End: 2025-05-28
Payer: MEDICARE

## 2025-05-28 DIAGNOSIS — E11.69 TYPE 2 DIABETES MELLITUS WITH OTHER SPECIFIED COMPLICATION, WITHOUT LONG-TERM CURRENT USE OF INSULIN: Primary | ICD-10-CM

## 2025-05-28 DIAGNOSIS — I25.10 CORONARY ARTERY DISEASE INVOLVING NATIVE CORONARY ARTERY OF NATIVE HEART WITHOUT ANGINA PECTORIS: ICD-10-CM

## 2025-05-28 PROCEDURE — 99490 CHRNC CARE MGMT STAFF 1ST 20: CPT | Performed by: FAMILY MEDICINE

## 2025-05-28 NOTE — OUTREACH NOTE
AMBULATORY CASE MANAGEMENT NOTE    Names and Relationships of Patient/Support Persons: Contact: Desiree Garcia; Relationship: Self -     CCM Interim Update  Patient stated she is still in [facility], no DC plan in place for patient at this time. Patient stated they are still working with her for therapy and she's getting stronger. Patient stated she is doing about the same at this time. Pain still remains an issue.     RN notified will call back to determine POC and DC and patient verbalized agreement.     Emailed Dr Kaba and Chayo and provided update. Chart review completed.     Education Documentation  No documentation found.        Marisol BARRON  Ambulatory Case Management    5/28/2025, 10:22 EDT

## 2025-05-30 ENCOUNTER — PATIENT OUTREACH (OUTPATIENT)
Dept: CASE MANAGEMENT | Facility: OTHER | Age: 65
End: 2025-05-30
Payer: MEDICARE

## 2025-05-30 ENCOUNTER — TELEPHONE (OUTPATIENT)
Dept: GASTROENTEROLOGY | Facility: CLINIC | Age: 65
End: 2025-05-30
Payer: MEDICARE

## 2025-05-30 DIAGNOSIS — F41.8 DEPRESSION WITH ANXIETY: ICD-10-CM

## 2025-05-30 DIAGNOSIS — E11.69 TYPE 2 DIABETES MELLITUS WITH OTHER SPECIFIED COMPLICATION, WITHOUT LONG-TERM CURRENT USE OF INSULIN: Primary | ICD-10-CM

## 2025-05-30 DIAGNOSIS — I25.10 CORONARY ARTERY DISEASE INVOLVING NATIVE CORONARY ARTERY OF NATIVE HEART WITHOUT ANGINA PECTORIS: ICD-10-CM

## 2025-05-30 NOTE — TELEPHONE ENCOUNTER
Call placed to patient in regard to 6/5/25 appointment. ALVIN Dunn is out of office.   Patient stated she will call back to reschedule once she clears up other medical issues.

## 2025-05-30 NOTE — OUTREACH NOTE
[facility] End of Month Documentation    This Chronic Medical Management Care Plan for Desiree Garcia, 65 y.o. female, has been monitored and managed; reviewed; revised and a new plan of care implemented for the month of May.  A cumulative time of 23  minutes was spent on this patient record this month, including chart review; phone call with patient; electronic communication with primary care provider.    Regarding the patient's problems: has Type 2 diabetes mellitus, without long-term current use of insulin; COPD (chronic obstructive pulmonary disease); Depression with anxiety; Esophageal reflux; Forgetfulness; Leg paresthesia; Lumbar spinal stenosis; Migraines; Night sweat; Sciatica; Dyspnea on exertion; Thoracic or lumbosacral neuritis or radiculitis; Left wrist pain; Sprain of left wrist; Arthritis of knee, left; Contusion of left knee; Nondisplaced fracture of trapezium bone of left wrist with routine healing; Chronic left shoulder pain; Coronary artery disease involving native heart without angina pectoris; Hypertension, essential; Mixed hyperlipidemia; Cigarette nicotine dependence with nicotine-induced disorder; Altered bowel habits; Diarrhea; Rectal bleeding; Abdominal bloating; Allergic rhinitis due to food; Elevated troponin; Acute diastolic CHF (congestive heart failure); History of ST elevation myocardial infarction (STEMI); STEMI (ST elevation myocardial infarction); Acute thrombus of left ventricle; 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; and Type 2 diabetes mellitus with hyperglycemia on their problem list., the following items were addressed: medical records; medications and any changes can be found within the plan section of the note.  A detailed listing of time spent for chronic care management is tracked within each outreach encounter.  Current medications include:  has a current medication list which includes the following prescription(s): acetaminophen, albuterol, apixaban,  atorvastatin, clopidogrel, dextrose, dicyclomine, trelegy ellipta, glucagon, insulin lispro, metformin, metoprolol succinate xl, nitroglycerin, and simethicone. and the patient is reported to be patient is compliant with medication protocol,  Medications are reported to be effective.  Regarding these diagnoses, referrals were made to the following provider(s):  NA.  All notes on chart for PCP to review.    The patient was monitored remotely for activity level; mood & behavior; pain; medications.    The patient's physical needs include:  needs assistance with ADLs; medication education; help taking medications as prescribed.     The patient's mental support needs include:  continued support    The patient's cognitive support needs include:  continued support    The patient's psychosocial support needs include:  continued support    The patient's functional needs include: DME    The patient's environmental needs include:  not applicable    Care Plan overall comments:  No data recorded    Refer to previous outreach notes for more information on the areas listed above.    Monthly Billing Diagnoses  (E11.69) Type 2 diabetes mellitus with other specified complication, without long-term current use of insulin    (I25.10) Coronary artery disease involving native coronary artery of native heart without angina pectoris    (F41.8) Depression with anxiety    Medications   Medications have been reconciled    Care Plan progress this month:      Recently Modified Care Plans Updates made since 4/29/2025 12:00 AM      No recently modified care plans.          Instructions   Patient was provided an electronic copy of care plan  CCM services were explained and offered and patient has accepted these services.  Patient has given their written consent to receive CCM services and understands that this includes the authorization of electronic communication of medical information with the other treating providers.  Patient understands that  they may stop CCM services at any time and these changes will be effective at the end of the calendar month and will effectively revocate the agreement of CCM services.  Patient understands that only one practitioner can furnish and be paid for CCM services during one calendar month.  Patient also understands that there may be co-payment or deductible fees in association with CCM services.  Patient will continue with at least monthly follow-up calls with the Ambulatory .    Marisol BARRON  Ambulatory Case Management    5/30/2025, 12:39 EDT

## 2025-06-03 ENCOUNTER — TELEPHONE (OUTPATIENT)
Dept: FAMILY MEDICINE CLINIC | Facility: CLINIC | Age: 65
End: 2025-06-03

## 2025-06-03 NOTE — TELEPHONE ENCOUNTER
Caller: HUDSON BECERRA    Relationship to patient: MARIAM     Best call back number: [phone]    Patient is needing: CATHY WITH MARIAM CALLED IN TO STATE THE PATIENT IS NEEDING TO GET HER A1C LEVEL CHECKED AS WELL, PLEASE ADVISE

## 2025-06-03 NOTE — TELEPHONE ENCOUNTER
Caller: Desiree Garcia    Relationship: Self    Best call back number: [phone]    What is the best time to reach you: ANYTIME    Who are you requesting to speak with (clinical staff, provider,  specific staff member): NURSE     What was the call regarding: PATIENT HAS A WOUND THAT IS NEEDING TO HAVE A DRESSING CHANGED AND JUST RECENTLY RELEASED FROM THE HOSPITAL.  HOSPITAL DID NOT SEND ANY TYPE OF PAPERWORK TO HAVE HOME HEALTH COME TO THE HOUSE TO DO THIS.  WANTED TO SEE IF PATIENT COULD GET IN TO HAVE THIS DONE BEFORE HER APPOINTMENT ON JUNE 11TH OR IF AN ORDER COULD BE SENT AS SOON AS POSSIBLE TO HAVE HOME HEALTH COME TO HER HOME

## 2025-06-04 ENCOUNTER — TELEPHONE (OUTPATIENT)
Dept: FAMILY MEDICINE CLINIC | Facility: CLINIC | Age: 65
End: 2025-06-04

## 2025-06-04 NOTE — TELEPHONE ENCOUNTER
Caller: Desiree Garcia    Relationship: Self    Best call back number: [phone]     What is the best time to reach you: ANYTIME     Who are you requesting to speak with (clinical staff, provider,  specific staff member): CLINICAL     What was the call regarding: PATIENT IS NEEDING A CLARIFICATION OF MEDICATIONS LIST NEEDS TO KNOW WHAT TO  TAKE FOR MAINTENANCE MEDICATIONS, PATIENT WILL ALSO HAVE NEW PRESCRIBED CONTINUATION OF PAIN MEDICATIONS FOLLOW UP ON 06/11/2025. PATIENT IS SOON TO CONTACT [facility] TO CLARIFY WHAT MEDICATIONS WERE PRESCRIBED.

## 2025-06-05 ENCOUNTER — OFFICE VISIT (OUTPATIENT)
Dept: FAMILY MEDICINE CLINIC | Facility: CLINIC | Age: 65
End: 2025-06-05
Payer: MEDICARE

## 2025-06-05 ENCOUNTER — HOSPITAL ENCOUNTER (OUTPATIENT)
Facility: HOSPITAL | Age: 65
Setting detail: OBSERVATION
Discharge: HOME | End: 2025-06-06
Attending: EMERGENCY MEDICINE | Admitting: EMERGENCY MEDICINE
Payer: MEDICARE

## 2025-06-05 ENCOUNTER — TELEPHONE (OUTPATIENT)
Dept: CASE MANAGEMENT | Facility: OTHER | Age: 65
End: 2025-06-05
Payer: MEDICARE

## 2025-06-05 ENCOUNTER — PATIENT OUTREACH (OUTPATIENT)
Dept: CASE MANAGEMENT | Facility: OTHER | Age: 65
End: 2025-06-05
Payer: MEDICARE

## 2025-06-05 VITALS
HEIGHT: 66 IN | HEART RATE: 88 BPM | WEIGHT: 158 LBS | BODY MASS INDEX: 25.39 KG/M2 | OXYGEN SATURATION: 95 % | TEMPERATURE: 97 F | DIASTOLIC BLOOD PRESSURE: 70 MMHG | SYSTOLIC BLOOD PRESSURE: 140 MMHG

## 2025-06-05 DIAGNOSIS — E55.9 VITAMIN D DEFICIENCY: ICD-10-CM

## 2025-06-05 DIAGNOSIS — Z86.718 HISTORY OF ARTERIAL EMBOLISM: ICD-10-CM

## 2025-06-05 DIAGNOSIS — I10 HYPERTENSION, ESSENTIAL: ICD-10-CM

## 2025-06-05 DIAGNOSIS — E11.69 TYPE 2 DIABETES MELLITUS WITH OTHER SPECIFIED COMPLICATION, WITHOUT LONG-TERM CURRENT USE OF INSULIN: Primary | ICD-10-CM

## 2025-06-05 DIAGNOSIS — Z09 HOSPITAL DISCHARGE FOLLOW-UP: Primary | ICD-10-CM

## 2025-06-05 DIAGNOSIS — T14.8XXA CHRONIC WOUND: Primary | ICD-10-CM

## 2025-06-05 DIAGNOSIS — E11.59 CORONARY ARTERY DISEASE DUE TO TYPE 2 DIABETES MELLITUS: ICD-10-CM

## 2025-06-05 DIAGNOSIS — I25.10 CORONARY ARTERY DISEASE INVOLVING NATIVE CORONARY ARTERY OF NATIVE HEART WITHOUT ANGINA PECTORIS: ICD-10-CM

## 2025-06-05 DIAGNOSIS — T81.89XD DRAINING POSTOPERATIVE WOUND, SUBSEQUENT ENCOUNTER: ICD-10-CM

## 2025-06-05 DIAGNOSIS — E78.2 MIXED HYPERLIPIDEMIA: ICD-10-CM

## 2025-06-05 DIAGNOSIS — I25.10 CORONARY ARTERY DISEASE DUE TO TYPE 2 DIABETES MELLITUS: ICD-10-CM

## 2025-06-05 DIAGNOSIS — E11.22 TYPE 2 DIABETES MELLITUS WITH CHRONIC KIDNEY DISEASE, WITHOUT LONG-TERM CURRENT USE OF INSULIN, UNSPECIFIED CKD STAGE: ICD-10-CM

## 2025-06-05 DIAGNOSIS — R60.0 PERIPHERAL EDEMA: ICD-10-CM

## 2025-06-05 DIAGNOSIS — F41.8 DEPRESSION WITH ANXIETY: ICD-10-CM

## 2025-06-05 DIAGNOSIS — S81.802A WOUND OF LEFT LOWER EXTREMITY, INITIAL ENCOUNTER: ICD-10-CM

## 2025-06-05 DIAGNOSIS — I23.6 LEFT VENTRICULAR THROMBOSIS FOLLOWING MI: ICD-10-CM

## 2025-06-05 DIAGNOSIS — I21.3 ST ELEVATION MYOCARDIAL INFARCTION (STEMI), UNSPECIFIED ARTERY: ICD-10-CM

## 2025-06-05 DIAGNOSIS — J44.9 CHRONIC OBSTRUCTIVE PULMONARY DISEASE, UNSPECIFIED COPD TYPE: ICD-10-CM

## 2025-06-05 LAB
25(OH)D3 SERPL-MCNC: 20.2 NG/ML (ref 30–100)
ALBUMIN SERPL-MCNC: 3.4 G/DL (ref 3.5–5.2)
ALBUMIN SERPL-MCNC: 3.5 G/DL (ref 3.5–5.2)
ALBUMIN/GLOB SERPL: 0.9 G/DL
ALBUMIN/GLOB SERPL: 1 G/DL
ALP SERPL-CCNC: 103 U/L (ref 39–117)
ALP SERPL-CCNC: 107 U/L (ref 39–117)
ALT SERPL W P-5'-P-CCNC: 6 U/L (ref 1–33)
ALT SERPL W P-5'-P-CCNC: 6 U/L (ref 1–33)
ANION GAP SERPL CALCULATED.3IONS-SCNC: 10.1 MMOL/L (ref 5–15)
ANION GAP SERPL CALCULATED.3IONS-SCNC: 11 MMOL/L (ref 5–15)
AST SERPL-CCNC: 11 U/L (ref 1–32)
AST SERPL-CCNC: 13 U/L (ref 1–32)
BASOPHILS # BLD AUTO: 0.07 10*3/MM3 (ref 0–0.2)
BASOPHILS # BLD AUTO: 0.1 10*3/MM3 (ref 0–0.2)
BASOPHILS NFR BLD AUTO: 0.8 % (ref 0–1.5)
BASOPHILS NFR BLD AUTO: 0.9 % (ref 0–1.5)
BILIRUB SERPL-MCNC: 0.4 MG/DL (ref 0–1.2)
BILIRUB SERPL-MCNC: 0.4 MG/DL (ref 0–1.2)
BUN SERPL-MCNC: 7 MG/DL (ref 8–23)
BUN SERPL-MCNC: 7 MG/DL (ref 8–23)
BUN/CREAT SERPL: 10.9 (ref 7–25)
BUN/CREAT SERPL: 9.7 (ref 7–25)
CALCIUM SPEC-SCNC: 9.1 MG/DL (ref 8.6–10.5)
CALCIUM SPEC-SCNC: 9.2 MG/DL (ref 8.6–10.5)
CHLORIDE SERPL-SCNC: 102 MMOL/L (ref 98–107)
CHLORIDE SERPL-SCNC: 103 MMOL/L (ref 98–107)
CO2 SERPL-SCNC: 26 MMOL/L (ref 22–29)
CO2 SERPL-SCNC: 28.9 MMOL/L (ref 22–29)
CREAT SERPL-MCNC: 0.64 MG/DL (ref 0.57–1)
CREAT SERPL-MCNC: 0.72 MG/DL (ref 0.57–1)
DEPRECATED RDW RBC AUTO: 45.3 FL (ref 37–54)
DEPRECATED RDW RBC AUTO: 46.1 FL (ref 37–54)
EGFRCR SERPLBLD CKD-EPI 2021: 93.5 ML/MIN/1.73
EGFRCR SERPLBLD CKD-EPI 2021: 98.8 ML/MIN/1.73
EOSINOPHIL # BLD AUTO: 0.1 10*3/MM3 (ref 0–0.4)
EOSINOPHIL # BLD AUTO: 0.15 10*3/MM3 (ref 0–0.4)
EOSINOPHIL NFR BLD AUTO: 1.1 % (ref 0.3–6.2)
EOSINOPHIL NFR BLD AUTO: 1.4 % (ref 0.3–6.2)
ERYTHROCYTE [DISTWIDTH] IN BLOOD BY AUTOMATED COUNT: 15.2 % (ref 12.3–15.4)
ERYTHROCYTE [DISTWIDTH] IN BLOOD BY AUTOMATED COUNT: 15.3 % (ref 12.3–15.4)
GLOBULIN UR ELPH-MCNC: 3.6 GM/DL
GLOBULIN UR ELPH-MCNC: 3.6 GM/DL
GLUCOSE BLDC GLUCOMTR-MCNC: 138 MG/DL (ref 70–130)
GLUCOSE SERPL-MCNC: 134 MG/DL (ref 65–99)
GLUCOSE SERPL-MCNC: 99 MG/DL (ref 65–99)
HCT VFR BLD AUTO: 31.5 % (ref 34–46.6)
HCT VFR BLD AUTO: 32.5 % (ref 34–46.6)
HGB BLD-MCNC: 9.6 G/DL (ref 12–15.9)
HGB BLD-MCNC: 9.7 G/DL (ref 12–15.9)
HOLD SPECIMEN: NORMAL
HOLD SPECIMEN: NORMAL
IMM GRANULOCYTES # BLD AUTO: 0.04 10*3/MM3 (ref 0–0.05)
IMM GRANULOCYTES # BLD AUTO: 0.04 10*3/MM3 (ref 0–0.05)
IMM GRANULOCYTES NFR BLD AUTO: 0.4 % (ref 0–0.5)
IMM GRANULOCYTES NFR BLD AUTO: 0.4 % (ref 0–0.5)
LYMPHOCYTES # BLD AUTO: 2.09 10*3/MM3 (ref 0.7–3.1)
LYMPHOCYTES # BLD AUTO: 3.99 10*3/MM3 (ref 0.7–3.1)
LYMPHOCYTES NFR BLD AUTO: 23.1 % (ref 19.6–45.3)
LYMPHOCYTES NFR BLD AUTO: 37 % (ref 19.6–45.3)
MCH RBC QN AUTO: 24.4 PG (ref 26.6–33)
MCH RBC QN AUTO: 25.4 PG (ref 26.6–33)
MCHC RBC AUTO-ENTMCNC: 29.5 G/DL (ref 31.5–35.7)
MCHC RBC AUTO-ENTMCNC: 30.8 G/DL (ref 31.5–35.7)
MCV RBC AUTO: 82.5 FL (ref 79–97)
MCV RBC AUTO: 82.5 FL (ref 79–97)
MONOCYTES # BLD AUTO: 0.7 10*3/MM3 (ref 0.1–0.9)
MONOCYTES # BLD AUTO: 0.84 10*3/MM3 (ref 0.1–0.9)
MONOCYTES NFR BLD AUTO: 7.7 % (ref 5–12)
MONOCYTES NFR BLD AUTO: 7.8 % (ref 5–12)
NEUTROPHILS NFR BLD AUTO: 5.67 10*3/MM3 (ref 1.7–7)
NEUTROPHILS NFR BLD AUTO: 52.5 % (ref 42.7–76)
NEUTROPHILS NFR BLD AUTO: 6.05 10*3/MM3 (ref 1.7–7)
NEUTROPHILS NFR BLD AUTO: 66.9 % (ref 42.7–76)
NRBC BLD AUTO-RTO: 0 /100 WBC (ref 0–0.2)
NRBC BLD AUTO-RTO: 0 /100 WBC (ref 0–0.2)
PLATELET # BLD AUTO: 413 10*3/MM3 (ref 140–450)
PLATELET # BLD AUTO: 424 10*3/MM3 (ref 140–450)
PMV BLD AUTO: 9.4 FL (ref 6–12)
PMV BLD AUTO: 9.7 FL (ref 6–12)
POTASSIUM SERPL-SCNC: 3.4 MMOL/L (ref 3.5–5.2)
POTASSIUM SERPL-SCNC: 3.8 MMOL/L (ref 3.5–5.2)
PREALB SERPL-MCNC: 12.6 MG/DL (ref 20–40)
PROT SERPL-MCNC: 7 G/DL (ref 6–8.5)
PROT SERPL-MCNC: 7.1 G/DL (ref 6–8.5)
RBC # BLD AUTO: 3.82 10*6/MM3 (ref 3.77–5.28)
RBC # BLD AUTO: 3.94 10*6/MM3 (ref 3.77–5.28)
SODIUM SERPL-SCNC: 139 MMOL/L (ref 136–145)
SODIUM SERPL-SCNC: 142 MMOL/L (ref 136–145)
WBC NRBC COR # BLD AUTO: 10.79 10*3/MM3 (ref 3.4–10.8)
WBC NRBC COR # BLD AUTO: 9.05 10*3/MM3 (ref 3.4–10.8)
WHOLE BLOOD HOLD COAG: NORMAL
WHOLE BLOOD HOLD SPECIMEN: NORMAL

## 2025-06-05 PROCEDURE — 80053 COMPREHEN METABOLIC PANEL: CPT

## 2025-06-05 PROCEDURE — G0378 HOSPITAL OBSERVATION PER HR: HCPCS

## 2025-06-05 PROCEDURE — 82306 VITAMIN D 25 HYDROXY: CPT | Performed by: FAMILY MEDICINE

## 2025-06-05 PROCEDURE — 82948 REAGENT STRIP/BLOOD GLUCOSE: CPT

## 2025-06-05 PROCEDURE — 84134 ASSAY OF PREALBUMIN: CPT | Performed by: FAMILY MEDICINE

## 2025-06-05 PROCEDURE — 85025 COMPLETE CBC W/AUTO DIFF WBC: CPT | Performed by: FAMILY MEDICINE

## 2025-06-05 PROCEDURE — 85025 COMPLETE CBC W/AUTO DIFF WBC: CPT

## 2025-06-05 PROCEDURE — 80053 COMPREHEN METABOLIC PANEL: CPT | Performed by: FAMILY MEDICINE

## 2025-06-05 PROCEDURE — 99024 POSTOP FOLLOW-UP VISIT: CPT | Performed by: SURGERY

## 2025-06-05 PROCEDURE — 99285 EMERGENCY DEPT VISIT HI MDM: CPT

## 2025-06-05 PROCEDURE — 94640 AIRWAY INHALATION TREATMENT: CPT

## 2025-06-05 PROCEDURE — 36415 COLL VENOUS BLD VENIPUNCTURE: CPT

## 2025-06-05 RX ORDER — ALBUTEROL SULFATE 0.83 MG/ML
2.5 SOLUTION RESPIRATORY (INHALATION) EVERY 6 HOURS PRN
Status: DISCONTINUED | OUTPATIENT
Start: 2025-06-05 | End: 2025-06-06 | Stop reason: HOSPADM

## 2025-06-05 RX ORDER — ACETAMINOPHEN 325 MG/1
650 TABLET ORAL EVERY 4 HOURS PRN
Status: DISCONTINUED | OUTPATIENT
Start: 2025-06-05 | End: 2025-06-06 | Stop reason: HOSPADM

## 2025-06-05 RX ORDER — SIMETHICONE 80 MG
80 TABLET,CHEWABLE ORAL 4 TIMES DAILY PRN
Status: DISCONTINUED | OUTPATIENT
Start: 2025-06-05 | End: 2025-06-06 | Stop reason: HOSPADM

## 2025-06-05 RX ORDER — METOPROLOL SUCCINATE 50 MG/1
50 TABLET, EXTENDED RELEASE ORAL DAILY
Start: 2025-06-05 | End: 2025-06-16 | Stop reason: SDUPTHER

## 2025-06-05 RX ORDER — SODIUM CHLORIDE 0.9 % (FLUSH) 0.9 %
10 SYRINGE (ML) INJECTION AS NEEDED
Status: DISCONTINUED | OUTPATIENT
Start: 2025-06-05 | End: 2025-06-06 | Stop reason: HOSPADM

## 2025-06-05 RX ORDER — ARFORMOTEROL TARTRATE 15 UG/2ML
15 SOLUTION RESPIRATORY (INHALATION)
Status: DISCONTINUED | OUTPATIENT
Start: 2025-06-05 | End: 2025-06-06 | Stop reason: HOSPADM

## 2025-06-05 RX ORDER — SPIRONOLACTONE 25 MG/1
25 TABLET ORAL DAILY
Status: DISCONTINUED | OUTPATIENT
Start: 2025-06-06 | End: 2025-06-06 | Stop reason: HOSPADM

## 2025-06-05 RX ORDER — INSULIN LISPRO 100 [IU]/ML
2-7 INJECTION, SOLUTION INTRAVENOUS; SUBCUTANEOUS
Status: DISCONTINUED | OUTPATIENT
Start: 2025-06-05 | End: 2025-06-06 | Stop reason: HOSPADM

## 2025-06-05 RX ORDER — ATORVASTATIN CALCIUM 20 MG/1
40 TABLET, FILM COATED ORAL NIGHTLY
Status: DISCONTINUED | OUTPATIENT
Start: 2025-06-05 | End: 2025-06-06 | Stop reason: HOSPADM

## 2025-06-05 RX ORDER — HYDROCODONE BITARTRATE AND ACETAMINOPHEN 10; 325 MG/1; MG/1
1 TABLET ORAL EVERY 6 HOURS PRN
COMMUNITY

## 2025-06-05 RX ORDER — POTASSIUM CHLORIDE 1500 MG/1
40 TABLET, EXTENDED RELEASE ORAL EVERY 4 HOURS
Status: DISPENSED | OUTPATIENT
Start: 2025-06-05 | End: 2025-06-06

## 2025-06-05 RX ORDER — SPIRONOLACTONE 25 MG/1
25 TABLET ORAL DAILY
COMMUNITY
End: 2025-06-16 | Stop reason: SDUPTHER

## 2025-06-05 RX ORDER — SODIUM CHLORIDE 9 MG/ML
40 INJECTION, SOLUTION INTRAVENOUS AS NEEDED
Status: DISCONTINUED | OUTPATIENT
Start: 2025-06-05 | End: 2025-06-06 | Stop reason: HOSPADM

## 2025-06-05 RX ORDER — BUSPIRONE HYDROCHLORIDE 5 MG/1
5 TABLET ORAL 3 TIMES DAILY PRN
Qty: 60 TABLET | Refills: 0 | Status: SHIPPED | OUTPATIENT
Start: 2025-06-05

## 2025-06-05 RX ORDER — HYDROCODONE BITARTRATE AND ACETAMINOPHEN 10; 325 MG/1; MG/1
1 TABLET ORAL EVERY 6 HOURS PRN
Refills: 0 | Status: DISCONTINUED | OUTPATIENT
Start: 2025-06-05 | End: 2025-06-06 | Stop reason: HOSPADM

## 2025-06-05 RX ORDER — LISINOPRIL 10 MG/1
TABLET ORAL
COMMUNITY
Start: 2025-06-04 | End: 2025-06-05 | Stop reason: SDUPTHER

## 2025-06-05 RX ORDER — SODIUM CHLORIDE 0.9 % (FLUSH) 0.9 %
10 SYRINGE (ML) INJECTION EVERY 12 HOURS SCHEDULED
Status: DISCONTINUED | OUTPATIENT
Start: 2025-06-05 | End: 2025-06-06 | Stop reason: HOSPADM

## 2025-06-05 RX ORDER — DEXTROSE MONOHYDRATE 25 G/50ML
25 INJECTION, SOLUTION INTRAVENOUS
Status: DISCONTINUED | OUTPATIENT
Start: 2025-06-05 | End: 2025-06-06 | Stop reason: HOSPADM

## 2025-06-05 RX ORDER — BUDESONIDE 0.5 MG/2ML
0.5 INHALANT ORAL
Status: DISCONTINUED | OUTPATIENT
Start: 2025-06-05 | End: 2025-06-06 | Stop reason: HOSPADM

## 2025-06-05 RX ORDER — METOPROLOL SUCCINATE 50 MG/1
50 TABLET, EXTENDED RELEASE ORAL DAILY
Status: DISCONTINUED | OUTPATIENT
Start: 2025-06-06 | End: 2025-06-06 | Stop reason: HOSPADM

## 2025-06-05 RX ORDER — CLOPIDOGREL BISULFATE 75 MG/1
75 TABLET ORAL DAILY
Status: DISCONTINUED | OUTPATIENT
Start: 2025-06-06 | End: 2025-06-06 | Stop reason: HOSPADM

## 2025-06-05 RX ORDER — NICOTINE POLACRILEX 4 MG
15 LOZENGE BUCCAL
Status: DISCONTINUED | OUTPATIENT
Start: 2025-06-05 | End: 2025-06-06 | Stop reason: HOSPADM

## 2025-06-05 RX ORDER — CLOPIDOGREL BISULFATE 75 MG/1
75 TABLET ORAL DAILY
Start: 2025-06-05 | End: 2025-06-16 | Stop reason: SDUPTHER

## 2025-06-05 RX ORDER — BUSPIRONE HYDROCHLORIDE 10 MG/1
5 TABLET ORAL 3 TIMES DAILY PRN
Status: DISCONTINUED | OUTPATIENT
Start: 2025-06-05 | End: 2025-06-06 | Stop reason: HOSPADM

## 2025-06-05 RX ORDER — LISINOPRIL 10 MG/1
10 TABLET ORAL DAILY
Qty: 30 TABLET | Refills: 1 | Status: SHIPPED | OUTPATIENT
Start: 2025-06-05

## 2025-06-05 RX ORDER — LISINOPRIL 10 MG/1
10 TABLET ORAL DAILY
Status: DISCONTINUED | OUTPATIENT
Start: 2025-06-06 | End: 2025-06-06 | Stop reason: HOSPADM

## 2025-06-05 RX ORDER — IBUPROFEN 600 MG/1
1 TABLET ORAL
Status: DISCONTINUED | OUTPATIENT
Start: 2025-06-05 | End: 2025-06-06 | Stop reason: HOSPADM

## 2025-06-05 RX ADMIN — METFORMIN HYDROCHLORIDE 500 MG: 500 TABLET ORAL at 21:32

## 2025-06-05 RX ADMIN — BUDESONIDE 0.5 MG: 0.5 SUSPENSION RESPIRATORY (INHALATION) at 21:55

## 2025-06-05 RX ADMIN — APIXABAN 5 MG: 5 TABLET, FILM COATED ORAL at 21:32

## 2025-06-05 RX ADMIN — ATORVASTATIN CALCIUM 40 MG: 20 TABLET, FILM COATED ORAL at 21:32

## 2025-06-05 RX ADMIN — HYDROCODONE BITARTRATE AND ACETAMINOPHEN 1 TABLET: 10; 325 TABLET ORAL at 21:32

## 2025-06-05 RX ADMIN — ARFORMOTEROL TARTRATE 15 MCG: 15 SOLUTION RESPIRATORY (INHALATION) at 21:55

## 2025-06-05 RX ADMIN — POTASSIUM CHLORIDE 40 MEQ: 1500 TABLET, EXTENDED RELEASE ORAL at 21:32

## 2025-06-05 NOTE — ED PROVIDER NOTES
EMERGENCY DEPARTMENT ENCOUNTER    Room Number:  19/19  PCP: Sandra Kaba MD  Historian: Patient, son    I initially evaluated the patient at 1639    HPI:  Chief Complaint: Wound check  A complete HPI/ROS/PMH/PSH/SH/FH are unobtainable due to: Nothing  Context: Desiree Garcia is a 65 y.o. female with a medical history of CAD, coronary artery stents, diabetes, hypertension, COPD who presents to the ED for an acute wound check.  Patient was admitted here in April 2025 for acute left leg ischemia.  She underwent acute revascularization procedure by Dr. Lawton.  Postoperative course was complicated by persistent bleeding which required 2 wound debridements and placement of a wound VAC.  Patient was discharged to a rehab facility.  Her wound VAC was being changed every other day.  It was last changed 6 days ago.  She was discharged home 4 days.  Patient states that home health was supposed to continue dressing changes at home but has not come to see her yet.  She has had persistent pain in her left leg but denies fever, chills, chest pain, shortness of breath, abdominal pain, vomiting, or new numbness/tingling/weakness in her left leg.  Patient has been going to the wound clinic and was last seen there last week.  She went there today thinking she had a follow-up appointment but it is not until next week.  It appears that she actually had an appointment with Dr. Lawton, vascular surgery, today but she missed this appointment by mistake.            PAST MEDICAL HISTORY  Active Ambulatory Problems     Diagnosis Date Noted    Type 2 diabetes mellitus, without long-term current use of insulin 06/14/2021    COPD (chronic obstructive pulmonary disease) 06/25/2021    Depression with anxiety 06/25/2021    Esophageal reflux 06/25/2021    Forgetfulness 06/25/2021    Leg paresthesia 01/20/2017    Lumbar spinal stenosis 02/23/2017    Migraines 06/25/2021    Night sweat 06/25/2021    Sciatica 01/20/2017    Dyspnea on exertion  06/25/2021    Thoracic or lumbosacral neuritis or radiculitis 01/20/2017    Left wrist pain 06/28/2021    Sprain of left wrist 07/02/2021    Arthritis of knee, left 07/02/2021    Contusion of left knee 07/16/2021    Nondisplaced fracture of trapezium bone of left wrist with routine healing 07/19/2021    Chronic left shoulder pain 08/16/2021    Coronary artery disease involving native heart without angina pectoris 06/14/2021    Hypertension, essential 09/30/2021    Mixed hyperlipidemia 09/30/2021    Cigarette nicotine dependence with nicotine-induced disorder 11/01/2021    Altered bowel habits 03/06/2025    Diarrhea 03/06/2025    Rectal bleeding 03/06/2025    Abdominal bloating 03/06/2025    Allergic rhinitis due to food 03/06/2025    Elevated troponin 03/17/2025    Acute diastolic CHF (congestive heart failure) 03/17/2025    History of ST elevation myocardial infarction (STEMI) 03/17/2025    STEMI (ST elevation myocardial infarction) 04/08/2025    Acute thrombus of left ventricle 04/10/2025    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 04/10/2025    Type 2 diabetes mellitus with hyperglycemia 05/05/2025     Resolved Ambulatory Problems     Diagnosis Date Noted    ST elevation myocardial infarction (STEMI) 06/14/2021    STEMI involving right coronary artery 06/14/2021    Acute kidney injury 06/17/2021    Arthritis 06/25/2021    Cervical cancer screening 06/25/2021    Gastric reflux 06/25/2021    Acute pain of left knee 06/28/2021    Left knee pain 07/02/2021    COPD with exacerbation 08/16/2021    Cardiogenic shock 04/10/2025     Past Medical History:   Diagnosis Date    Diabetes mellitus     Hypertension     Limb swelling     Lumbago 01/20/2017    Lumbosacral radiculopathy 01/20/2017    Reflux esophagitis     Shortness of breath     Stomach disorder          PAST SURGICAL HISTORY  Past Surgical History:   Procedure Laterality Date    ABDOMINAL SURGERY      3 C-SECTIONS    ARTERIOGRAM LOWER EXTREMITY Left  2025    Procedure: ARTERIOGRAM LOWER EXTREMITY;  Surgeon: Misael Lawton MD;  Location: [facility] OR;  Service: Vascular;  Laterality: Left;    CARDIAC CATHETERIZATION      CARDIAC CATHETERIZATION N/A 2021    Procedure: Left Heart Cath;  Surgeon: Sidney Xiao MD;  Location: [facility] CATH INVASIVE LOCATION;  Service: Cardiology;  Laterality: N/A;    CARDIAC CATHETERIZATION N/A 2025    Procedure: Left Heart Cath;  Surgeon: James Verdugo MD;  Location: [facility] CATH INVASIVE LOCATION;  Service: Cardiology;  Laterality: N/A;     SECTION      x 3    CHOLECYSTECTOMY      COLONOSCOPY      COLONOSCOPY N/A 3/25/2025    Procedure: COLONOSCOPY WITH BIOPSIES AND COLD AND HOT SNARE POLYPECTOMIES;  Surgeon: Heber Najera MD;  Location: [facility] ENDOSCOPY;  Service: Gastroenterology;  Laterality: N/A;  COLON POLYPS    CORONARY STENT PLACEMENT      EMBOLECTOMY Left 2025    Procedure: EMBOLECTOMY MECHANICAL, FOUR COMPARTMENT FASCIOTOMY;  Surgeon: Misael Lawton MD;  Location: [facility] OR;  Service: Vascular;  Laterality: Left;    ENDOSCOPY      2007    FOOT SURGERY Right     HAND SURGERY      HYSTERECTOMY  1985    INCISION AND DRAINAGE LEG Left 2025    Procedure: LOWER EXTREMITY DEBRIDEMENT;  Surgeon: Misael Lawton MD;  Location: [facility] OR;  Service: Vascular;  Laterality: Left;    INCISION AND DRAINAGE LEG Left 2025    Procedure: Left lower extremity fasciotomy debridement with possible wound VAC placement;  Surgeon: Raimundo Lin MD;  Location: [facility] OR;  Service: Vascular;  Laterality: Left;         FAMILY HISTORY  Family History   Problem Relation Age of Onset    Stroke Mother     Lung cancer Mother         Unspecified    Arthritis Mother     Stroke Father     Heart disease Father     Diabetes Father         Unspecified type    Arthritis Father     Heart attack Father     Stroke Sister     Arthritis Sister     Breast cancer Sister      Stroke Brother     Heart disease Brother     Diabetes Brother         Unspecified type    Arthritis Brother     Colon cancer Neg Hx          SOCIAL HISTORY  Social History     Socioeconomic History    Marital status: Legally    Tobacco Use    Smoking status: Every Day     Current packs/day: 1.00     Average packs/day: 1 pack/day for 58.4 years (58.4 ttl pk-yrs)     Types: Cigarettes     Start date: 1/14/1967     Passive exposure: Current    Smokeless tobacco: Never    Tobacco comments:     Smoked 21-30 years   Vaping Use    Vaping status: Never Used   Substance and Sexual Activity    Alcohol use: Never     Comment: Does not drink    Drug use: Not Currently     Types: Methamphetamines    Sexual activity: Defer         ALLERGIES  Tramadol    REVIEW OF SYSTEMS  Review of Systems  Included in HPI  All systems reviewed and negative except for those discussed in HPI.      PHYSICAL EXAM  ED Triage Vitals [06/05/25 1511]   Temp Heart Rate Resp BP SpO2   98.5 °F (36.9 °C) 99 18 -- 95 %      Temp src Heart Rate Source Patient Position BP Location FiO2 (%)   -- -- -- -- --       Physical Exam      GENERAL: Awake, alert, oriented x 3.  Chronically ill but nontoxic-appearing female who appears older than stated age.    HENT: NCAT, nares patent  EYES: no scleral icterus  CV: regular rhythm, normal rate, normal left pedal pulses, brisk cap refill in the left toes  RESPIRATORY: normal effort, clear to auscultation bilaterally  ABDOMEN: soft, nontender  MUSCULOSKELETAL: Left leg is nontender.  There is 2+ edema in the left lower leg and left foot  NEURO: Speech is normal.  No facial droop.  PSYCH:  calm, cooperative  SKIN: Wound VACs were present over the lateral and medial aspect of left lower leg.  Wound vacs were then removed by me without difficulty.  Underlying wounds have healthy appearing tissue.  There is mild pinkish erythema around both wounds.  There is no drainage, crepitus, or lymphangitis.  See images  below    Vital signs and nursing notes reviewed.                LAB RESULTS  Recent Results (from the past 24 hours)   Comprehensive metabolic panel    Collection Time: 06/05/25  9:34 AM    Specimen: Blood   Result Value Ref Range    Glucose 134 (H) 65 - 99 mg/dL    BUN 7.0 (L) 8.0 - 23.0 mg/dL    Creatinine 0.64 0.57 - 1.00 mg/dL    Sodium 139 136 - 145 mmol/L    Potassium 3.8 3.5 - 5.2 mmol/L    Chloride 102 98 - 107 mmol/L    CO2 26.0 22.0 - 29.0 mmol/L    Calcium 9.1 8.6 - 10.5 mg/dL    Total Protein 7.0 6.0 - 8.5 g/dL    Albumin 3.4 (L) 3.5 - 5.2 g/dL    ALT (SGPT) 6 1 - 33 U/L    AST (SGOT) 13 1 - 32 U/L    Alkaline Phosphatase 103 39 - 117 U/L    Total Bilirubin 0.4 0.0 - 1.2 mg/dL    Globulin 3.6 gm/dL    A/G Ratio 0.9 g/dL    BUN/Creatinine Ratio 10.9 7.0 - 25.0    Anion Gap 11.0 5.0 - 15.0 mmol/L    eGFR 98.8 >60.0 mL/min/1.73   Prealbumin    Collection Time: 06/05/25  9:34 AM    Specimen: Blood   Result Value Ref Range    Prealbumin 12.6 (L) 20.0 - 40.0 mg/dL   Vitamin D,25-Hydroxy    Collection Time: 06/05/25  9:34 AM    Specimen: Blood   Result Value Ref Range    25 Hydroxy, Vitamin D 20.2 (L) 30.0 - 100.0 ng/ml   CBC Auto Differential    Collection Time: 06/05/25  9:34 AM    Specimen: Blood   Result Value Ref Range    WBC 9.05 3.40 - 10.80 10*3/mm3    RBC 3.82 3.77 - 5.28 10*6/mm3    Hemoglobin 9.7 (L) 12.0 - 15.9 g/dL    Hematocrit 31.5 (L) 34.0 - 46.6 %    MCV 82.5 79.0 - 97.0 fL    MCH 25.4 (L) 26.6 - 33.0 pg    MCHC 30.8 (L) 31.5 - 35.7 g/dL    RDW 15.2 12.3 - 15.4 %    RDW-SD 45.3 37.0 - 54.0 fl    MPV 9.7 6.0 - 12.0 fL    Platelets 413 140 - 450 10*3/mm3    Neutrophil % 66.9 42.7 - 76.0 %    Lymphocyte % 23.1 19.6 - 45.3 %    Monocyte % 7.7 5.0 - 12.0 %    Eosinophil % 1.1 0.3 - 6.2 %    Basophil % 0.8 0.0 - 1.5 %    Immature Grans % 0.4 0.0 - 0.5 %    Neutrophils, Absolute 6.05 1.70 - 7.00 10*3/mm3    Lymphocytes, Absolute 2.09 0.70 - 3.10 10*3/mm3    Monocytes, Absolute 0.70 0.10 - 0.90  10*3/mm3    Eosinophils, Absolute 0.10 0.00 - 0.40 10*3/mm3    Basophils, Absolute 0.07 0.00 - 0.20 10*3/mm3    Immature Grans, Absolute 0.04 0.00 - 0.05 10*3/mm3    nRBC 0.0 0.0 - 0.2 /100 WBC   Comprehensive Metabolic Panel    Collection Time: 06/05/25  3:25 PM    Specimen: Arm, Right; Blood   Result Value Ref Range    Glucose 99 65 - 99 mg/dL    BUN 7.0 (L) 8.0 - 23.0 mg/dL    Creatinine 0.72 0.57 - 1.00 mg/dL    Sodium 142 136 - 145 mmol/L    Potassium 3.4 (L) 3.5 - 5.2 mmol/L    Chloride 103 98 - 107 mmol/L    CO2 28.9 22.0 - 29.0 mmol/L    Calcium 9.2 8.6 - 10.5 mg/dL    Total Protein 7.1 6.0 - 8.5 g/dL    Albumin 3.5 3.5 - 5.2 g/dL    ALT (SGPT) 6 1 - 33 U/L    AST (SGOT) 11 1 - 32 U/L    Alkaline Phosphatase 107 39 - 117 U/L    Total Bilirubin 0.4 0.0 - 1.2 mg/dL    Globulin 3.6 gm/dL    A/G Ratio 1.0 g/dL    BUN/Creatinine Ratio 9.7 7.0 - 25.0    Anion Gap 10.1 5.0 - 15.0 mmol/L    eGFR 93.5 >60.0 mL/min/1.73   CBC Auto Differential    Collection Time: 06/05/25  3:25 PM    Specimen: Arm, Right; Blood   Result Value Ref Range    WBC 10.79 3.40 - 10.80 10*3/mm3    RBC 3.94 3.77 - 5.28 10*6/mm3    Hemoglobin 9.6 (L) 12.0 - 15.9 g/dL    Hematocrit 32.5 (L) 34.0 - 46.6 %    MCV 82.5 79.0 - 97.0 fL    MCH 24.4 (L) 26.6 - 33.0 pg    MCHC 29.5 (L) 31.5 - 35.7 g/dL    RDW 15.3 12.3 - 15.4 %    RDW-SD 46.1 37.0 - 54.0 fl    MPV 9.4 6.0 - 12.0 fL    Platelets 424 140 - 450 10*3/mm3    Neutrophil % 52.5 42.7 - 76.0 %    Lymphocyte % 37.0 19.6 - 45.3 %    Monocyte % 7.8 5.0 - 12.0 %    Eosinophil % 1.4 0.3 - 6.2 %    Basophil % 0.9 0.0 - 1.5 %    Immature Grans % 0.4 0.0 - 0.5 %    Neutrophils, Absolute 5.67 1.70 - 7.00 10*3/mm3    Lymphocytes, Absolute 3.99 (H) 0.70 - 3.10 10*3/mm3    Monocytes, Absolute 0.84 0.10 - 0.90 10*3/mm3    Eosinophils, Absolute 0.15 0.00 - 0.40 10*3/mm3    Basophils, Absolute 0.10 0.00 - 0.20 10*3/mm3    Immature Grans, Absolute 0.04 0.00 - 0.05 10*3/mm3    nRBC 0.0 0.0 - 0.2 /100 WBC   Green  Top (Gel)    Collection Time: 06/05/25  3:25 PM   Result Value Ref Range    Extra Tube Hold for add-ons.    Lavender Top    Collection Time: 06/05/25  3:25 PM   Result Value Ref Range    Extra Tube hold for add-on    Gold Top - SST    Collection Time: 06/05/25  3:25 PM   Result Value Ref Range    Extra Tube Hold for add-ons.    Light Blue Top    Collection Time: 06/05/25  3:25 PM   Result Value Ref Range    Extra Tube Hold for add-ons.        Ordered the above labs and reviewed the results.        RADIOLOGY  No Radiology Exams Resulted Within Past 24 Hours    Ordered the above noted radiological studies. Reviewed by me in PACS.            PROCEDURES  Procedures        OUTPATIENT MEDICATION MANAGEMENT:  Current Facility-Administered Medications Ordered in Epic   Medication Dose Route Frequency Provider Last Rate Last Admin    Calcium Replacement - Follow Nurse / BPA Driven Protocol   Not Applicable PRN Blevens, Latesha C, APRN        Magnesium Standard Dose Replacement - Follow Nurse / BPA Driven Protocol   Not Applicable PRN Blevens, Latesha C, APRN        Phosphorus Replacement - Follow Nurse / BPA Driven Protocol   Not Applicable PRN Blevens, Latesha C, APRN        Potassium Replacement - Follow Nurse / BPA Driven Protocol   Not Applicable PRN Blevens, Latesha C, APRN        sodium chloride 0.9 % flush 10 mL  10 mL Intravenous Q12H Blevens, Latesha C, APRN        sodium chloride 0.9 % flush 10 mL  10 mL Intravenous PRN Blevens, Latesha C, APRN        sodium chloride 0.9 % infusion 40 mL  40 mL Intravenous PRN Blevens, Latesha C, APRN         Current Outpatient Medications Ordered in Epic   Medication Sig Dispense Refill    acetaminophen (TYLENOL) 325 MG tablet Take 2 tablets by mouth Every 4 (Four) Hours As Needed for Mild Pain or Fever (temperature greater than 101F).      albuterol (PROVENTIL) (2.5 MG/3ML) 0.083% nebulizer solution Take 2.5 mg by nebulization Every 6 (Six) Hours As Needed for Wheezing or Shortness of Air.       apixaban (ELIQUIS) 5 MG tablet tablet Take 1 tablet by mouth Every 12 (Twelve) Hours. Indications: Other - full anticoagulation      atorvastatin (LIPITOR) 40 MG tablet Take 1 tablet by mouth Every Night for 90 days. 90 tablet 3    busPIRone (BUSPAR) 5 MG tablet Take 1 tablet by mouth 3 (Three) Times a Day As Needed (anxiety). 60 tablet 0    clopidogrel (PLAVIX) 75 MG tablet Take 1 tablet by mouth Daily.      dextrose (GLUTOSE) 40 % gel Take 15 g by mouth Every 15 (Fifteen) Minutes As Needed for Low Blood Sugar (Blood sugar less than 70). (Patient not taking: Reported on 6/5/2025)      dicyclomine (BENTYL) 10 MG capsule Take 1 capsule by mouth 4 (Four) Times a Day As Needed for Abdominal Cramping. (Patient not taking: Reported on 6/5/2025)      Fluticasone-Umeclidin-Vilant (Trelegy Ellipta) 100-62.5-25 MCG/ACT inhaler Inhale 1 puff Daily.      glucagon (GLUCAGEN) 1 MG injection Inject 1 mg into the appropriate muscle as directed by prescriber Every 15 (Fifteen) Minutes As Needed (Blood Glucose Less Than 70). (Patient not taking: Reported on 6/5/2025)      insulin lispro (HUMALOG/ADMELOG) 100 UNIT/ML injection Inject 2-7 Units under the skin into the appropriate area as directed 4 (Four) Times a Day Before Meals & at Bedtime. (Patient not taking: Reported on 6/5/2025)      lisinopril (PRINIVIL,ZESTRIL) 10 MG tablet Take 1 tablet by mouth Daily. 30 tablet 1    metFORMIN (GLUCOPHAGE) 500 MG tablet Take 1 tablet by mouth 2 (Two) Times a Day With Meals.      metoprolol succinate XL (Toprol XL) 50 MG 24 hr tablet Take 1 tablet by mouth Daily.      nitroglycerin (NITROSTAT) 0.4 MG SL tablet Place 1 tablet under the tongue Every 5 (Five) Minutes As Needed for Chest Pain (Systolic BP Greater Than 100). Take no more than 3 doses in 15 minutes. (Patient not taking: Reported on 6/5/2025)      simethicone (MYLICON) 80 MG chewable tablet Chew 1 tablet 4 (Four) Times a Day As Needed for Flatulence.             MEDICATIONS GIVEN IN  ER  Medications   sodium chloride 0.9 % flush 10 mL (has no administration in time range)   sodium chloride 0.9 % flush 10 mL (has no administration in time range)   sodium chloride 0.9 % infusion 40 mL (has no administration in time range)   Potassium Replacement - Follow Nurse / BPA Driven Protocol (has no administration in time range)   Magnesium Standard Dose Replacement - Follow Nurse / BPA Driven Protocol (has no administration in time range)   Phosphorus Replacement - Follow Nurse / BPA Driven Protocol (has no administration in time range)   Calcium Replacement - Follow Nurse / BPA Driven Protocol (has no administration in time range)                   MEDICAL DECISION MAKING, PROGRESS, and CONSULTS    All labs have been independently reviewed by me.  All radiology studies have been reviewed by me and I have also reviewed the radiology report.   EKG's independently viewed and interpreted by me.  Discussion below represents my analysis of pertinent findings related to patient's condition, differential diagnosis, treatment plan and final disposition.      Additional sources:    - Discussed/ obtained information from independent historians: Son at bedside    - External (non-ED) record review: Patient saw her PCP earlier today for follow-up for hospital follow-up.  She had appointment with Dr. Lawton, vascular surgery, today but canceled it.  She was admitted here in April 2025 for left ventricular thrombus and cardiogenic shock.  She was transferred here from [facility] where she was admitted for an acute STEMI and had 2 stents placed.  She was found to have a left renal and splenic infarctions and left SFA and possible ONEIDA occlusion.  She developed critical left leg ischemia.  She was seen by vascular surgery and underwent emergent embolectomy and for compartment fasciotomy with a left femoropopliteal stent placement.  She had postoperative bleeding and underwent debridement of her surgical sites.  She was  treated with antibiotics and seen by infectious disease.    On 4/12/2025, patient underwent open left iliofemoral, profunda femoris and superficial femoral artery thromboembolectomy, left common femoral endarterectomy and primary repair, left femoropopliteal angioplasty and stent placement x 2, and 4 compartment fasciotomy.  This was performed by Dr. Lawton.  On 4/18, she underwent debridement of the left lateral and medial fasciotomy site.  On 4/25, she had a second debridement and wound VAC placement to the lateral fasciotomy site.    -Prescription drug monitoring program review:     N/A    - Chronic or social conditions impacting patient care (Social Determinants of Health): None          Orders placed during this visit:  Orders Placed This Encounter   Procedures    Comprehensive Metabolic Panel    [place] Draw    CBC Auto Differential    Basic Metabolic Panel    CBC Auto Differential    Diet: Regular/House; Fluid Consistency: Thin (IDDSI 0)    Vital Signs    Intake & Output    Weigh Patient    Oral Care    Saline Lock & Maintain IV Access    Code Status and Medical Interventions: CPR (Attempt to Resuscitate); Full Support    Inpatient Case Management  Consult    Inpatient Vascular Surgery Consult    Insert Peripheral IV    Initiate Emergency Department Observation Status    CBC & Differential    Green Top (Gel)    Lavender Top    Gold Top - SST    Light Blue Top         Additional orders considered but not ordered:          Differential diagnosis includes, but is not limited to:    Chronic wound, wound infection, cellulitis      Independent interpretation of labs, radiology studies, and discussions with consultants:  ED Course as of 06/05/25 1805   Thu Jun 05, 2025   1638 Temp: 98.5 °F (36.9 °C) [WH]   1638 Heart Rate: 99 [WH]   1638 Resp: 18 [WH]   1638 SpO2: 95 % [WH]   1638 Device (Oxygen Therapy): room air [WH]   1642 WBC: 10.79 [WH]   1643 Hemoglobin(!): 9.6  8.3 1 month ago [WH]   1643  Glucose: 99 [WH]   1643 BUN(!): 7.0 [WH]   1643 Creatinine: 0.72 [WH]   1643 Potassium(!): 3.4 [WH]   1715 ERNESTO Knight, tried to contact the wound care nurse without success.  Wounds will be dressed with wet-to-dry dressings. [WH]   1754 Test results and diagnoses were discussed with the patient and her son.  Shared decision making was discussed and admission was recommended so she can be evaluated by the wound care nurse as well as vascular surgery.  She was agreeable with this. [WH]   1758 Case discussed with YARON Charlton, she agrees to admit the patient to Dr. Gutierrez.  Pertinent history, exam findings, test results, ED course, and diagnoses were discussed with her. [WH]   1803 MDM: Patient presented to the ED for wound check.  She had vascular surgery and fasciotomy on her left leg in April 2025.  She has had a wound VAC since then.  Wound VAC is supposed to be changed every other day but was last changed a week ago.  She was recently discharged from rehab facility about home health was not arranged.  Here in the ED, she was afebrile.  Wound VAC was removed and wounds appear to be healing without evidence of infection.  Labs were unremarkable.  Wound care nurse was not available to replace the wound vacs.  Patient will be admitted for further evaluation. []      ED Course User Index  [] Kristian Gates MD         COMPLEXITY OF CARE  The patient requires admission.      DIAGNOSIS  Final diagnoses:   Chronic wound   Hypertension, essential   Mixed hyperlipidemia   History of arterial embolism         DISPOSITION  ADMISSION    Discussed treatment plan and reason for admission with pt/family and admitting physician.  Pt/family voiced understanding of the plan for admission for further testing/treatment as needed.               Latest Documented Vital Signs:  AS OF 18:05 EDT VITALS:    BP -    HR - 99  TEMP - 98.5 °F (36.9 °C)  O2 SATS - 95%            --    Please note that portions of this were completed with a  voice recognition program.       Note Disclaimer: At [facility], we believe that sharing information builds trust and better relationships. You are receiving this note because you are receiving care at [facility] or recently visited. It is possible you will see health information before a provider has talked with you about it. This kind of information can be easy to misunderstand. To help you fully understand what it means for your health, we urge you to discuss this note with your provider.             Kristian Gates MD  06/05/25 4110

## 2025-06-05 NOTE — OUTREACH NOTE
AMBULATORY CASE MANAGEMENT NOTE    Names and Relationships of Patient/Support Persons: Contact: Nichelle Baron; Relationship: Other  Contact: Dr Joy Lawrence; Relationship: Other -     Care Coordination  Dr Lawrence met with patient in office. Patient was sent home without HH services, diabetic clinic appt or blood thinners. Patient came in for follow up appt and has not been on blood thinners for 5 days. Patient was initially admitted for ventricular blood clot.     RN reached out to Nichelle Baron with [facility]. Referral sent for HH nursing, pt and ot.       Education Documentation  No documentation found.        Marisol BARRON  Ambulatory Case Management    6/5/2025, 14:37 EDT

## 2025-06-05 NOTE — H&P
[facility]   HISTORY AND PHYSICAL    Patient Name: Desiree Garcia  : 1960  MRN: [id number]  Primary Care Physician:  Sandra Kaba MD  Date of admission: 2025    Subjective   Subjective     Chief Complaint:   Chief Complaint   Patient presents with    Wound Check         HPI:    Desiree Garcia is a 65 y.o. female with medical history significant for COPD, DM, NSTEMI presents for a wound check to her left leg.  She underwent left common femoral embolectomy and left femoropopliteal angioplasty with 2 stents, 4 compartment fasciotomy, and wound debridement in April with Dr. Misael Lawton.  She was discharged on DAPT and a wound VAC to inpatient rehab.  She has been home for 4 days and has not been seen by home health for a wound VAC dressing change.  She was supposed to see Dr. Lawton in the office today but went to the wound care clinic by mistake and missed her appointment.  She reports baseline pain around her wound.  She denies any fevers or any new discomfort.  Wound bed is pink and moist.     Dr. Lawton with vascular surgery service evaluated, recommends immediate resumption of Eliquis and WEST.    Review of Systems   All systems were reviewed and negative except for: Left leg pain, will check      Personal History     Past Medical History:   Diagnosis Date    Arthritis     Cervical cancer screening     COPD (chronic obstructive pulmonary disease)     Coronary artery disease involving native heart without angina pectoris 2021    primary angioplasty and stent placement to mid right coronary artery with good angiographic results on 2021 after STEMI    Depression with anxiety     Diabetes mellitus     Forgetfulness     Gastric reflux     Hypertension     Leg paresthesia 2017    Right    Limb swelling     Lumbago 2017    Low back pain    Lumbar spinal stenosis 2017    L4/5 moderate to severe central canal stenosis    Lumbosacral radiculopathy 2017    Right     Migraines     Night sweat     Reflux esophagitis     Shortness of breath     ST elevation myocardial infarction (STEMI) (CMS/[facility]) 2021    Stomach disorder        Past Surgical History:   Procedure Laterality Date    ABDOMINAL SURGERY      3 C-SECTIONS    ARTERIOGRAM LOWER EXTREMITY Left 2025    Procedure: ARTERIOGRAM LOWER EXTREMITY;  Surgeon: Misael Lawton MD;  Location: [facility] OR;  Service: Vascular;  Laterality: Left;    CARDIAC CATHETERIZATION      CARDIAC CATHETERIZATION N/A 2021    Procedure: Left Heart Cath;  Surgeon: Sidney Xiao MD;  Location: [facility] CATH INVASIVE LOCATION;  Service: Cardiology;  Laterality: N/A;    CARDIAC CATHETERIZATION N/A 2025    Procedure: Left Heart Cath;  Surgeon: James Verdugo MD;  Location: [facility] CATH INVASIVE LOCATION;  Service: Cardiology;  Laterality: N/A;     SECTION      x 3    CHOLECYSTECTOMY      COLONOSCOPY      COLONOSCOPY N/A 3/25/2025    Procedure: COLONOSCOPY WITH BIOPSIES AND COLD AND HOT SNARE POLYPECTOMIES;  Surgeon: Heber Najera MD;  Location: [facility] ENDOSCOPY;  Service: Gastroenterology;  Laterality: N/A;  COLON POLYPS    CORONARY STENT PLACEMENT      EMBOLECTOMY Left 2025    Procedure: EMBOLECTOMY MECHANICAL, FOUR COMPARTMENT FASCIOTOMY;  Surgeon: Misael Lawton MD;  Location: [facility] OR;  Service: Vascular;  Laterality: Left;    ENDOSCOPY      2007    FOOT SURGERY Right     HAND SURGERY      HYSTERECTOMY  1985    INCISION AND DRAINAGE LEG Left 2025    Procedure: LOWER EXTREMITY DEBRIDEMENT;  Surgeon: Misael Lawton MD;  Location: [facility] OR;  Service: Vascular;  Laterality: Left;    INCISION AND DRAINAGE LEG Left 2025    Procedure: Left lower extremity fasciotomy debridement with possible wound VAC placement;  Surgeon: Raimundo Lin MD;  Location: [facility] OR;  Service: Vascular;  Laterality: Left;       Family History: family history includes Arthritis in  her brother, father, mother, and sister; Breast cancer in her sister; Diabetes in her brother and father; Heart attack in her father; Heart disease in her brother and father; Lung cancer in her mother; Stroke in her brother, father, mother, and sister. Otherwise pertinent FHx was reviewed and not pertinent to current issue.    Social History:  reports that she has been smoking cigarettes. She started smoking about 58 years ago. She has a 58.4 pack-year smoking history. She has been exposed to tobacco smoke. She has never used smokeless tobacco. She reports that she does not currently use drugs after having used the following drugs: Methamphetamines. She reports that she does not drink alcohol.    Home Medications:  Fluticasone-Umeclidin-Vilant, acetaminophen, albuterol, apixaban, atorvastatin, busPIRone, clopidogrel, dextrose, dicyclomine, glucagon, insulin lispro, lisinopril, metFORMIN, metoprolol succinate XL, nitroglycerin, and simethicone    Allergies:  Allergies   Allergen Reactions    Tramadol Itching     High severity per pt       Objective   Objective     Vitals:   Temp:  [97 °F (36.1 °C)-98.5 °F (36.9 °C)] 98.5 °F (36.9 °C)  Heart Rate:  [88-99] 99  Resp:  [18] 18  BP: (140)/(70) 140/70   Physical Exam:     Constitutional: Awake, alert. Well developed for age. Nontoxic appearing.   Eyes: PERRL x2, sclerae anicteric, no conjunctival injection. No EOM abnormalities.   HENT: NCAT, mucous membranes moist,   Neck: Supple, no thyromegaly, no lymphadenopathy, trachea midline  Respiratory: Clear to auscultation bilaterally, nonlabored respirations   Cardiovascular: RRR, no murmurs, rubs, or gallops, palpable pedal pulses bilaterally. No appreciable edema.   Gastrointestinal: Positive bowel sounds, soft, nontender, not distended.   Musculoskeletal: No bilateral ankle edema, no clubbing or cyanosis to extremities. No obvious deformities.   Psychiatric: Appropriate affect, cooperative. Converses appropriately for age.    Neurologic: Oriented x 3, strength symmetric in all extremities. Cranial nerves grossly intact to confrontation, speech clear  Skin: Left leg wound is clean.  Wound bed is pink and moist.     Result Review    Result Review:  I have personally reviewed the results from the time of this admission to 6/5/2025 19:20 EDT and agree with these findings:  [x]  Laboratory list / accordion  []  Microbiology  []  Radiology  []  EKG/Telemetry   []  Cardiology/Vascular   []  Pathology  [x]  Old records  []  Other:  Most notable findings include:     In the ED her hemoglobin was 9.6, which is near her baseline.  CMP is unremarkable.      Assessment & Plan   Assessment / Plan     Brief Patient Summary:  Desiree Garcia is a 65 y.o. female who was discharged home with a wound VAC s/p debridement to her left leg.  The dressing was last changed 5 days ago and she reports home health has not presented from home to provide care.  She accidentally missed her vascular surgery follow-up appointment and was referred to the ED for a wound check.    Active Hospital Problems:  Active Hospital Problems    Diagnosis     **Leg wound, left      Plan:     LLE wound  - Wound bed is pink and moist, appears clean.  No sign of cellulitis or infection.  - Wet-to-dry dressing placed in the ED.  - Wound care nurse consult to replace wound drape.  She has a [employer] wound vac.  - CCP consult to arrange for home health  - Vascular surgery following, ABIs ordered and pending  - Continue home Eliquis  - Analgesia as needed    VTE Prophylaxis:  Mechanical VTE prophylaxis orders are present.        CODE STATUS:    Code Status (Patient has no pulse and is not breathing): CPR (Attempt to Resuscitate)  Medical Interventions (Patient has pulse or is breathing): Full Support  Level Of Support Discussed With: Patient    Admission Status:  I believe this patient meets observation status.    Electronically signed by ALVIN Pineda, 06/05/25, 7:20 PM  EDT.        80 minutes has been spent by [facility] providers in the care of this patient while under observation status on this date 06/05/25      I have worn appropriate PPE during this patient encounter, sanitized my hands both with entering and exiting patient's room.    I have discussed plan of care with patient including advance care plan and/or surrogate decision maker.  Patient advises that their son, Angel Garcia, will be their primary surrogate decision maker

## 2025-06-05 NOTE — PROGRESS NOTES
Chief Complaint  Hospital Follow Up Visit    Subjective      Desiree Garcia is a 65 y.o. female who presents to [facility]     History of Present Illness  The patient is a 65-year-old female presenting for post-hospitalization and rehab transition of care. She is normally a primary care patient of Dr. Kaba, who is unavailable, so she is being seen by me today.    She was admitted on 04/08/2025 with an inferior ST-elevation MI, complete occlusion of multiple arteries, and renal infarct. She received 2 drug-eluting stents. Peripheral arterial obstructions were related to an acute left ventricular thrombus. She went into cardiogenic shock, was treated and stabilized, and transferred to [facility]. She was admitted on 04/10/2025 and discharged on 05/05/2025. Treated for critical ischemia of her left leg with 4-compartment fasciotomies and emergent embolectomy with left femoral-popliteal stent placement. Postoperatively, she had bleeding complications requiring incisional/excisional debridement. Discharged with a wound VAC and completed antibiotics. Infectious disease was consulted. Hospital course complicated by possible ischemic enteritis and colitis, ileus treated with NG tube decompression, and serial abdominal exams. Tolerated diet and had daily bowel movements at discharge. Serial imaging showed improvement of bowel dilation/ileus. Rectal bleeding attributed to hemorrhoids from a colonoscopy 4 weeks prior. Hemoglobin stabilized, no repeat endoscopy recommended. Hematology evaluated her left ventricular thrombus, recommended DOAC at discharge, ongoing Plavix treatment, no aspirin. Discharged to rehab at [facility].    Type 2 diabetes treated during hospital stay, recommended to resume home metformin at discharge. Additional outpatient follow-up for diabetes control given A1c of 9.4 on 04/09/2025.    No signs of acute COPD exacerbation, recommended to  "resume home Trelegy at discharge.    Creatinine stabilized, nephrology signed off for acute kidney injury.    Off anticoagulants for 5 days, concerned about potential blood clot formation. Experiencing leg pain attributed to blood clots, no abnormal swelling. History of irregular heartbeat noted during colonoscopy. Pain when sitting due to leg discomfort. Wound VAC in place, follow-up appointment for dressing change. Under care of Dr. Avalos, vascular surgeon, seen twice a week.    Reports elevated blood sugar levels yesterday. Not prescribed insulin upon discharge from [facility], continues metformin twice daily.    Resumed smoking half a pack of cigarettes daily after 2-month hospital stay.    Previously prescribed medication for anxiety or depression, does not recall the name.    History of vitamin D deficiency, previously prescribed vitamin D3.    PAST SURGICAL HISTORY:  - 4-compartment fasciotomies  - Emergent embolectomy with left femoral-popliteal stent placement  - Incisional/excisional debridement of surgical sites    SOCIAL HISTORY  Smokes half a pack a day.        Patient Care Team:  Sandra Kaba MD as PCP - General (Family Medicine)  Marisol Nazario RN as Ambulatory  ([facility])    Objective   Vital Signs:   Vitals:    06/05/25 0805   BP: 140/70   Pulse: 88   Temp: 97 °F (36.1 °C)   SpO2: 95%   Weight: 71.7 kg (158 lb)   Height: 167.6 cm (66\")     Body mass index is 25.5 kg/m².    Wt Readings from Last 3 Encounters:   06/05/25 73.9 kg (163 lb)   06/05/25 71.7 kg (158 lb)   05/05/25 84.2 kg (185 lb 10 oz)     BP Readings from Last 3 Encounters:   06/06/25 124/75   06/05/25 140/70   05/05/25 106/65       Health Maintenance   Topic Date Due    Pneumococcal Vaccine 50+ (1 of 2 - PCV) Never done    ZOSTER VACCINE (1 of 2) Never done    HEPATITIS C SCREENING  Never done    DIABETIC EYE EXAM  01/10/2024    DIABETIC FOOT EXAM  03/01/2024    COVID-19 Vaccine (1 - 2024-25 season) " Never done    INFLUENZA VACCINE  07/01/2025    HEMOGLOBIN A1C  10/09/2025    ANNUAL WELLNESS VISIT  01/23/2026    URINE MICROALBUMIN-CREATININE RATIO (uACR)  03/13/2026    LUNG CANCER SCREENING  04/08/2026    LIPID PANEL  04/09/2026    MAMMOGRAM  11/27/2026    COLORECTAL CANCER SCREENING  03/25/2028    TDAP/TD VACCINES (2 - Td or Tdap) 11/10/2032       Lab Results (last 24 hours)       ** No results found for the last 24 hours. **               Physical Exam     Physical Exam  General Appearance: Normal.  Vital signs: Within normal limits.  HEENT: Within normal limits.  Respiratory: Clear to auscultation, no wheezing, rales, or rhonchi.  Cardiovascular: Occasionally irregular rhythm.  Bilateral lower extremities, dressings noted, no abnormal surrounding erythema.  Skin: Warm and dry, no rash.  Neurological: Normal.      Result Review   The following data was reviewed by: Joy Lawrence MD on 06/05/2025:  [x]  Tests & Results  []  Hospitalization/Emergency Department/Urgent Care  []  Internal/External Consultant Notes    Results  - Laboratory Studies:    - A1c: 9.4% (04/09/2025)      Procedures          ASSESSMENT/PLAN  Diagnoses and all orders for this visit:    1. Hospital discharge follow-up (Primary)    2. ST elevation myocardial infarction (STEMI), unspecified artery    3. Left ventricular thrombosis following MI  -     Ambulatory Referral to Chronic Care Management Disease Education, Medication Adherence, Care Coordination, Caregiving/Support, High Risk for ED/Readmission, Barriers to Care    4. Coronary artery disease involving native coronary artery of native heart without angina pectoris  -     Ambulatory Referral to Chronic Care Management Disease Education, Medication Adherence, Care Coordination, Caregiving/Support, High Risk for ED/Readmission, Barriers to Care  -     CBC & Differential  -     Comprehensive metabolic panel    5. Type 2 diabetes mellitus with chronic kidney disease, without long-term  current use of insulin, unspecified CKD stage  -     Ambulatory Referral to Chronic Care Management Disease Education, Medication Adherence, Care Coordination, Caregiving/Support, High Risk for ED/Readmission, Barriers to Care  -     Comprehensive metabolic panel    6. Coronary artery disease due to type 2 diabetes mellitus  -     Ambulatory Referral to Chronic Care Management Disease Education, Medication Adherence, Care Coordination, Caregiving/Support, High Risk for ED/Readmission, Barriers to Care  -     Comprehensive metabolic panel    7. Vitamin D deficiency  -     Vitamin D,25-Hydroxy    8. Chronic obstructive pulmonary disease, unspecified COPD type  -     Ambulatory Referral to Chronic Care Management Disease Education, Medication Adherence, Care Coordination, Caregiving/Support, High Risk for ED/Readmission, Barriers to Care    9. Depression with anxiety  -     Ambulatory Referral to Chronic Care Management Disease Education, Medication Adherence, Care Coordination, Caregiving/Support, High Risk for ED/Readmission, Barriers to Care    10. Mixed hyperlipidemia  -     Ambulatory Referral to Chronic Care Management Disease Education, Medication Adherence, Care Coordination, Caregiving/Support, High Risk for ED/Readmission, Barriers to Care    11. Peripheral edema  -     Ambulatory Referral to Chronic Care Management Disease Education, Medication Adherence, Care Coordination, Caregiving/Support, High Risk for ED/Readmission, Barriers to Care  -     Prealbumin    12. Draining postoperative wound, subsequent encounter  -     Ambulatory Referral to Chronic Care Management Disease Education, Medication Adherence, Care Coordination, Caregiving/Support, High Risk for ED/Readmission, Barriers to Care  -     Prealbumin    Other orders  -     Discontinue: apixaban (ELIQUIS) 5 MG tablet tablet; Take 1 tablet by mouth Every 12 (Twelve) Hours. Indications: Other - full anticoagulation  -     Discontinue: clopidogrel  (PLAVIX) 75 MG tablet; Take 1 tablet by mouth Daily.  -     lisinopril (PRINIVIL,ZESTRIL) 10 MG tablet; Take 1 tablet by mouth Daily.  Dispense: 30 tablet; Refill: 1  -     busPIRone (BUSPAR) 5 MG tablet; Take 1 tablet by mouth 3 (Three) Times a Day As Needed (anxiety).  Dispense: 60 tablet; Refill: 0  -     Discontinue: metoprolol succinate XL (Toprol XL) 50 MG 24 hr tablet; Take 1 tablet by mouth Daily.  -     Discontinue: metFORMIN (GLUCOPHAGE) 500 MG tablet; Take 1 tablet by mouth 2 (Two) Times a Day With Meals.        Assessment & Plan  1. Post-hospitalization care.  - Admitted on 04/08/2025 with inferior ST elevation MI, complete occlusion of multiple arteries, and renal infarct.  - Received two drug-eluting stents; peripheral arterial obstructions related to acute left ventricular thrombus; treated for cardiogenic shock.  - Treated for critical ischemia of the left leg with four-compartment fasciotomies and emergent embolectomy; complications included bleeding requiring incisional/excisional debridement; discharged with wound VAC.  - Hospital course complicated by possible ischemic enteritis and colitis; developed ileus treated with NG tube decompression; tolerated diet and had daily bowel movements at discharge; hematology recommended DOAC at discharge; ongoing Plavix treatment, no aspirin.    2. Type 2 diabetes mellitus.  - Treated during hospital stay; recommended to resume home metformin at discharge.  - Additional outpatient follow-up recommended for diabetes control; A1c of 9.4 on 04/09/2025.  - Reports elevated blood sugar levels yesterday; continues metformin twice daily.    3. Chronic obstructive pulmonary disease (COPD).  - No signs of acute exacerbation during hospital stay; recommended to resume home Trelegy at discharge.  - Continues Trelegy inhaler.    4. Acute kidney injury.  - Creatinine stabilized prior to discharge; nephrology signed off.  - No further signs of acute kidney injury.    5.  Medication management.  - Off anticoagulants for 5 days; concerned about potential blood clot formation.  - Experiencing leg pain attributed to blood clots; no abnormal swelling.  - History of irregular heartbeat noted during colonoscopy; pain when sitting due to leg discomfort.  - Wound VAC in place; follow-up appointment for dressing change; under care of Dr. Avalos, vascular surgeon, seen twice a week.  - Refills ordered for Plavix, Eliquis, lisinopril, metoprolol, metformin, atorvastatin, and buspirone.    6. Smoking cessation.  - Resumed smoking half a pack daily after 2-month hospital stay.  - Advised to reduce smoking to five or fewer cigarettes per day; discussed potential benefits of switching to vaping to taper off nicotine.    7. Anxiety or depression.  - Previously prescribed medication for anxiety or depression; does not recall the name.  - Trial of buspirone recommended to manage anxiety and reduce smoking.    8. Vitamin D deficiency.  - History of vitamin D deficiency; previously prescribed vitamin D3.  - Vitamin D levels to be rechecked; potential need for regular or high-dose supplementation.    Follow-up  - Appointment scheduled in 2 weeks to assess blood sugars and overall progress.  - Labs ordered to check blood count, kidney and liver function, blood sugar levels, prealbumin, and vitamin D levels.                Desiree Garcia  reports that she has been smoking cigarettes. She started smoking about 58 years ago. She has a 58.4 pack-year smoking history. She has been exposed to tobacco smoke. She has never used smokeless tobacco. I have educated her on the risk of diseases from using tobacco products such as cancer, COPD, and heart disease.     I advised her to quit and she is not willing to quit.    I spent 5 minutes counseling the patient.               FOLLOW UP  No follow-ups on file.  Patient was given instructions and counseling regarding her condition or for health maintenance advice.  Please see specific information pulled into the AVS if appropriate.       Joy Lawrence MD  06/20/25  01:15 EDT    Part of this note may be an electronic transcription/translation of spoken language to printed text using the Dragon Dictation System.    Patient or patient representative verbalized consent for the use of Ambient Listening during the visit with  Joy Lawrence MD for chart documentation. 6/20/2025  08:58 EDT

## 2025-06-06 ENCOUNTER — PATIENT OUTREACH (OUTPATIENT)
Dept: CASE MANAGEMENT | Facility: OTHER | Age: 65
End: 2025-06-06
Payer: MEDICARE

## 2025-06-06 ENCOUNTER — APPOINTMENT (OUTPATIENT)
Dept: CARDIOLOGY | Facility: HOSPITAL | Age: 65
End: 2025-06-06
Payer: MEDICARE

## 2025-06-06 ENCOUNTER — READMISSION MANAGEMENT (OUTPATIENT)
Dept: CALL CENTER | Facility: HOSPITAL | Age: 65
End: 2025-06-06
Payer: MEDICARE

## 2025-06-06 VITALS
TEMPERATURE: 98.6 F | SYSTOLIC BLOOD PRESSURE: 124 MMHG | WEIGHT: 163 LBS | BODY MASS INDEX: 26.2 KG/M2 | OXYGEN SATURATION: 98 % | RESPIRATION RATE: 18 BRPM | HEART RATE: 70 BPM | DIASTOLIC BLOOD PRESSURE: 75 MMHG | HEIGHT: 66 IN

## 2025-06-06 DIAGNOSIS — I25.10 CORONARY ARTERY DISEASE INVOLVING NATIVE CORONARY ARTERY OF NATIVE HEART WITHOUT ANGINA PECTORIS: ICD-10-CM

## 2025-06-06 DIAGNOSIS — E11.69 TYPE 2 DIABETES MELLITUS WITH OTHER SPECIFIED COMPLICATION, WITHOUT LONG-TERM CURRENT USE OF INSULIN: ICD-10-CM

## 2025-06-06 DIAGNOSIS — F41.8 DEPRESSION WITH ANXIETY: Primary | ICD-10-CM

## 2025-06-06 LAB
ANION GAP SERPL CALCULATED.3IONS-SCNC: 11.6 MMOL/L (ref 5–15)
BASOPHILS # BLD AUTO: 0.11 10*3/MM3 (ref 0–0.2)
BASOPHILS NFR BLD AUTO: 0.9 % (ref 0–1.5)
BH CV LOWER ARTERIAL LEFT ABI RATIO: 1
BH CV LOWER ARTERIAL LEFT DORSALIS PEDIS SYS MAX: 111
BH CV LOWER ARTERIAL LEFT GREAT TOE SYS MAX: 120
BH CV LOWER ARTERIAL LEFT POST TIBIAL SYS MAX: 114
BH CV LOWER ARTERIAL LEFT TBI RATIO: 1.06
BH CV LOWER ARTERIAL RIGHT ABI RATIO: 0.74
BH CV LOWER ARTERIAL RIGHT DORSALIS PEDIS SYS MAX: 77
BH CV LOWER ARTERIAL RIGHT GREAT TOE SYS MAX: 130
BH CV LOWER ARTERIAL RIGHT POST TIBIAL SYS MAX: 84
BH CV LOWER ARTERIAL RIGHT TBI RATIO: 1.15
BUN SERPL-MCNC: 7 MG/DL (ref 8–23)
BUN/CREAT SERPL: 9.7 (ref 7–25)
CALCIUM SPEC-SCNC: 8.4 MG/DL (ref 8.6–10.5)
CHLORIDE SERPL-SCNC: 106 MMOL/L (ref 98–107)
CO2 SERPL-SCNC: 25.4 MMOL/L (ref 22–29)
CREAT SERPL-MCNC: 0.72 MG/DL (ref 0.57–1)
DEPRECATED RDW RBC AUTO: 46.2 FL (ref 37–54)
EGFRCR SERPLBLD CKD-EPI 2021: 93.5 ML/MIN/1.73
EOSINOPHIL # BLD AUTO: 0.19 10*3/MM3 (ref 0–0.4)
EOSINOPHIL NFR BLD AUTO: 1.6 % (ref 0.3–6.2)
ERYTHROCYTE [DISTWIDTH] IN BLOOD BY AUTOMATED COUNT: 15.3 % (ref 12.3–15.4)
GLUCOSE BLDC GLUCOMTR-MCNC: 116 MG/DL (ref 70–130)
GLUCOSE BLDC GLUCOMTR-MCNC: 118 MG/DL (ref 70–130)
GLUCOSE SERPL-MCNC: 112 MG/DL (ref 65–99)
HCT VFR BLD AUTO: 29.3 % (ref 34–46.6)
HGB BLD-MCNC: 8.9 G/DL (ref 12–15.9)
IMM GRANULOCYTES # BLD AUTO: 0.05 10*3/MM3 (ref 0–0.05)
IMM GRANULOCYTES NFR BLD AUTO: 0.4 % (ref 0–0.5)
LYMPHOCYTES # BLD AUTO: 3.78 10*3/MM3 (ref 0.7–3.1)
LYMPHOCYTES NFR BLD AUTO: 30.9 % (ref 19.6–45.3)
MCH RBC QN AUTO: 25.1 PG (ref 26.6–33)
MCHC RBC AUTO-ENTMCNC: 30.4 G/DL (ref 31.5–35.7)
MCV RBC AUTO: 82.8 FL (ref 79–97)
MONOCYTES # BLD AUTO: 1 10*3/MM3 (ref 0.1–0.9)
MONOCYTES NFR BLD AUTO: 8.2 % (ref 5–12)
NEUTROPHILS NFR BLD AUTO: 58 % (ref 42.7–76)
NEUTROPHILS NFR BLD AUTO: 7.1 10*3/MM3 (ref 1.7–7)
NRBC BLD AUTO-RTO: 0 /100 WBC (ref 0–0.2)
PLATELET # BLD AUTO: 383 10*3/MM3 (ref 140–450)
PMV BLD AUTO: 9.4 FL (ref 6–12)
POTASSIUM SERPL-SCNC: 3.8 MMOL/L (ref 3.5–5.2)
RBC # BLD AUTO: 3.54 10*6/MM3 (ref 3.77–5.28)
SODIUM SERPL-SCNC: 143 MMOL/L (ref 136–145)
UPPER ARTERIAL LEFT ARM BRACHIAL SYS MAX: 106
UPPER ARTERIAL RIGHT ARM BRACHIAL SYS MAX: 113
WBC NRBC COR # BLD AUTO: 12.23 10*3/MM3 (ref 3.4–10.8)

## 2025-06-06 PROCEDURE — 94664 DEMO&/EVAL PT USE INHALER: CPT

## 2025-06-06 PROCEDURE — 94799 UNLISTED PULMONARY SVC/PX: CPT

## 2025-06-06 PROCEDURE — 93922 UPR/L XTREMITY ART 2 LEVELS: CPT

## 2025-06-06 PROCEDURE — 82948 REAGENT STRIP/BLOOD GLUCOSE: CPT

## 2025-06-06 PROCEDURE — 99024 POSTOP FOLLOW-UP VISIT: CPT | Performed by: SURGERY

## 2025-06-06 PROCEDURE — 80048 BASIC METABOLIC PNL TOTAL CA: CPT

## 2025-06-06 PROCEDURE — 85025 COMPLETE CBC W/AUTO DIFF WBC: CPT

## 2025-06-06 PROCEDURE — G0378 HOSPITAL OBSERVATION PER HR: HCPCS

## 2025-06-06 PROCEDURE — 93922 UPR/L XTREMITY ART 2 LEVELS: CPT | Performed by: SURGERY

## 2025-06-06 PROCEDURE — 63710000001 REVEFENACIN 175 MCG/3ML SOLUTION

## 2025-06-06 PROCEDURE — 94761 N-INVAS EAR/PLS OXIMETRY MLT: CPT

## 2025-06-06 RX ORDER — SODIUM HYPOCHLORITE 1.25 MG/ML
SOLUTION TOPICAL 2 TIMES DAILY
Status: DISCONTINUED | OUTPATIENT
Start: 2025-06-06 | End: 2025-06-06 | Stop reason: HOSPADM

## 2025-06-06 RX ADMIN — CLOPIDOGREL BISULFATE 75 MG: 75 TABLET ORAL at 09:56

## 2025-06-06 RX ADMIN — APIXABAN 5 MG: 5 TABLET, FILM COATED ORAL at 09:56

## 2025-06-06 RX ADMIN — VORTIOXETINE 10 MG: 5 TABLET, FILM COATED ORAL at 09:55

## 2025-06-06 RX ADMIN — ARFORMOTEROL TARTRATE 15 MCG: 15 SOLUTION RESPIRATORY (INHALATION) at 07:02

## 2025-06-06 RX ADMIN — REVEFENACIN 175 MCG: 175 SOLUTION RESPIRATORY (INHALATION) at 07:00

## 2025-06-06 RX ADMIN — BUDESONIDE 0.5 MG: 0.5 SUSPENSION RESPIRATORY (INHALATION) at 07:01

## 2025-06-06 RX ADMIN — HYDROCODONE BITARTRATE AND ACETAMINOPHEN 1 TABLET: 10; 325 TABLET ORAL at 09:55

## 2025-06-06 RX ADMIN — METOPROLOL SUCCINATE 50 MG: 50 TABLET, EXTENDED RELEASE ORAL at 09:55

## 2025-06-06 RX ADMIN — HYDROCODONE BITARTRATE AND ACETAMINOPHEN 1 TABLET: 10; 325 TABLET ORAL at 05:52

## 2025-06-06 RX ADMIN — SPIRONOLACTONE 25 MG: 25 TABLET ORAL at 09:55

## 2025-06-06 NOTE — DISCHARGE PLACEMENT REQUEST
"Bin Catalan (65 y.o. Female)       Date of Birth   1960    Social Security Number       Address   [address] [place], [place] [address]    Home Phone   [phone]    MRN   [id number]       [facility]    Marital Status   Legally                             Admission Date   6/5/2025    Admission Type   Emergency    Admitting Provider   Shyam Gutierrez MD    Attending Provider   Abimbola Avilez MD    Department, Room/Bed   [facility] OBSERVATION, 129/1       Discharge Date       Discharge Disposition       Discharge Destination                                 Attending Provider: Abimbola Avilez MD    Allergies: Tramadol    Isolation: Contact   Infection: MRSA (05/18/25), Candida Auris (rule out) (06/06/25)   Code Status: CPR    Ht: 167.6 cm (66\")   Wt: 73.9 kg (163 lb)    Admission Cmt: None   Principal Problem: Leg wound, left [S81.802A]                   Active Insurance as of 6/5/2025       Primary Coverage       Payor Plan Insurance Group Employer/Plan Group    HUMANA MEDICARE REPLACEMENT HU[employer]NA MEDICARE ADVANTAGE [facility] [id number]       Payor Plan Address Payor Plan Phone Number Payor Plan Fax Number Effective Dates    [address] [address] [phone]  1/1/2024 - None Entered    [facility] [id number]         Subscriber Name Subscriber Birth Date Member ID       BNI CATALAN 1960 [id number]                     Emergency Contacts        (Rel.) Home Phone Work Phone Mobile Phone    NANCYAftabAngel (Son) -- -- [phone]    Yulisa Chowdhury (Sister) [phone] -- [phone]    Yana CRUZ (Relative) -- -- [phone]    jonnie pritchard (Brother) -- -- [phone]                "

## 2025-06-06 NOTE — PLAN OF CARE
Problem: Adult Inpatient Plan of Care  Goal: Plan of Care Review  Outcome: Progressing  Flowsheets (Taken 6/6/2025 0516)  Progress: no change  Outcome Evaluation: Pt admitted for wound care eval and set up home health for dressing changes and wound vac care. No acute distress overnight.  Plan of Care Reviewed With: patient  Goal: Patient-Specific Goal (Individualized)  Outcome: Progressing  Goal: Absence of Hospital-Acquired Illness or Injury  Outcome: Progressing  Intervention: Identify and Manage Fall Risk  Recent Flowsheet Documentation  Taken 6/6/2025 0400 by Valentine Mcgill RN  Safety Promotion/Fall Prevention:   nonskid shoes/slippers when out of bed   activity supervised   assistive device/personal items within reach   clutter free environment maintained   fall prevention program maintained   lighting adjusted   room organization consistent   safety round/check completed  Taken 6/6/2025 0200 by Valentine Mcgill RN  Safety Promotion/Fall Prevention:   activity supervised   assistive device/personal items within reach   clutter free environment maintained   fall prevention program maintained   mobility aid in reach   nonskid shoes/slippers when out of bed   room organization consistent   safety round/check completed  Taken 6/6/2025 0000 by Valentine Mcgill RN  Safety Promotion/Fall Prevention:   activity supervised   assistive device/personal items within reach   clutter free environment maintained   fall prevention program maintained   mobility aid in reach   nonskid shoes/slippers when out of bed   room organization consistent   safety round/check completed  Taken 6/5/2025 2200 by Valentine Mcgill RN  Safety Promotion/Fall Prevention:   activity supervised   assistive device/personal items within reach   clutter free environment maintained   fall prevention program maintained   mobility aid in reach   nonskid shoes/slippers when out of bed   room organization consistent   safety round/check  completed  Taken 6/5/2025 2020 by Valentine Mcgill RN  Safety Promotion/Fall Prevention:   activity supervised   assistive device/personal items within reach   clutter free environment maintained   fall prevention program maintained   lighting adjusted   mobility aid in reach   nonskid shoes/slippers when out of bed   room organization consistent   safety round/check completed  Intervention: Prevent Skin Injury  Recent Flowsheet Documentation  Taken 6/6/2025 0400 by Valentine Mcgill RN  Body Position: position changed independently  Taken 6/6/2025 0200 by Valentine Mcgill RN  Body Position: position changed independently  Taken 6/6/2025 0000 by Valentine Mcgill RN  Body Position: position changed independently  Taken 6/5/2025 2200 by Valentine Mcgill RN  Body Position: position changed independently  Taken 6/5/2025 2020 by Valentine Mcgill RN  Body Position: position changed independently  Intervention: Prevent and Manage VTE (Venous Thromboembolism) Risk  Recent Flowsheet Documentation  Taken 6/5/2025 2020 by Valentine Mcgill RN  VTE Prevention/Management: (wound on LLE, pt on eliquis) other (see comments)  Intervention: Prevent Infection  Recent Flowsheet Documentation  Taken 6/6/2025 0400 by Valentine Mcgill RN  Infection Prevention:   equipment surfaces disinfected   hand hygiene promoted   personal protective equipment utilized   rest/sleep promoted   single patient room provided  Taken 6/6/2025 0200 by Valentine Mcgill RN  Infection Prevention:   equipment surfaces disinfected   hand hygiene promoted   rest/sleep promoted   personal protective equipment utilized   single patient room provided  Taken 6/6/2025 0000 by Valentine Mcgill RN  Infection Prevention:   equipment surfaces disinfected   hand hygiene promoted   personal protective equipment utilized   rest/sleep promoted   single patient room provided  Taken 6/5/2025 2200 by Valentine Mcgill RN  Infection Prevention:   equipment surfaces  disinfected   hand hygiene promoted   personal protective equipment utilized   rest/sleep promoted   single patient room provided  Taken 6/5/2025 2020 by Valentine Mcgill RN  Infection Prevention:   equipment surfaces disinfected   hand hygiene promoted   personal protective equipment utilized   rest/sleep promoted   single patient room provided  Goal: Optimal Comfort and Wellbeing  Outcome: Progressing  Intervention: Monitor Pain and Promote Comfort  Recent Flowsheet Documentation  Taken 6/5/2025 2230 by Valentine Mcgill RN  Pain Management Interventions:   no interventions per patient request   quiet environment facilitated  Taken 6/5/2025 2132 by Valentine Mcgill RN  Pain Management Interventions:   pain management plan reviewed with patient/caregiver   pain medication given  Intervention: Provide Person-Centered Care  Recent Flowsheet Documentation  Taken 6/5/2025 2020 by Valentine Mcgill RN  Trust Relationship/Rapport:   care explained   choices provided   questions answered   questions encouraged   thoughts/feelings acknowledged  Goal: Readiness for Transition of Care  Outcome: Progressing  Intervention: Mutually Develop Transition Plan  Recent Flowsheet Documentation  Taken 6/5/2025 2033 by Valentine Mcgill RN  Transportation Anticipated: family or friend will provide  Patient/Family Anticipated Services at Transition: none  Patient/Family Anticipates Transition to: home with family  Taken 6/5/2025 2029 by Valentine Mcgill RN  Equipment Currently Used at Home:   cane, straight   wheelchair, motorized   wound care supplies   Goal Outcome Evaluation:  Plan of Care Reviewed With: patient        Progress: no change  Outcome Evaluation: Pt admitted for wound care eval and set up home health for dressing changes and wound vac care. No acute distress overnight.

## 2025-06-06 NOTE — NURSING NOTE
[facility] is gathering wound supplies for home. Wound care is doing extensive teaching of patient and family member for wound care.

## 2025-06-06 NOTE — DISCHARGE INSTR - OTHER ORDERS
"cleanse the lower leg wounds with Vashe solution on gauze. Then cut hydofera blue classic to the shape of the wound. Moisten the hydrofera blue with NS to soften the dressing. Secure with ABD pads and tape. Secure with roll gauze and light ACE from toes to knee. Change 2-3 times/week.     **IF no hydrofera blue is available, daily remove all dressings, cleanse with Vashe then apply Vashe moistened gauze to the wound bed (leave the gauze on the wounds). Secure with ABD and then roll gauze + light ACE wrap.     Products: Vashe (there is 1 bottle in the room and RN has ordered another)  Hydrofera blue classic- recommend the 6x6 dressing, [id number]  4x4 gauze  ABD pads  Roll gauze  4\" ACE wrap  NS  "

## 2025-06-06 NOTE — CASE MANAGEMENT/SOCIAL WORK
Discharge Planning Assessment  [facility]     Patient Name: Desiree Garcia  MRN: [id number]  Today's Date: 6/6/2025    Admit Date: 6/5/2025    Plan: Plans to go home with home health wound care , son to transport  to his home to assist with her care.   Discharge Needs Assessment    No documentation.                  Discharge Plan       Row Name 06/06/25 1414       Plan    Plan Plans to go home with home health wound care , son to transport  to his home to assist with her care.    Patient/Family in Agreement with Plan yes    Plan Comments ENtered room, identified self again. Notified the patient and her son that she has been accepted into [facility] for wound care therapy. She has also been given the option of using [facility] wound care after she has completed her home health with [facility], if she so desires. Pt has additional wound care supplies if she were to need to change the dressing for any reason. Pt verbalized understanding of [facility] services, and the need to remain vigilent about her wound care. CCP to follow for any new need not yet identified. SANJUANITA Bryant RN [facility]      Row Name 06/06/25 1142       Plan    Plan Plans to return to her son's home  and recieve home health wound care services    Roadmap to Recovery Yes    Patient/Family in Agreement with Plan yes    Provided Post Acute Provider List? Yes    Plan Comments Entered room, identified self and role of CCP nurse/discharge planner Verified demographics. Pt mailing address is [address] [place], [place] [id number].. SHe [place] [address] [place] [address] [place] [address].Her primary care doctor is  Sandra Kaba MD>  Her  [facility]. She was admitted to left lower leg wounds. SHe was discharged [facility] and went home, with a wound vac in place. She did not have home health  come to the home for unknown reasons.   She states she needs wound care and home health services. [facility].Pt uses a walker, and wheelchair for mobility. Waiting eval by wound care nurse.  CCP to follow for any further needs. SANJUANITA Brewster RN ccp                  Continued Care and Services - Discharged on 6/6/2025 Admission date: 6/5/2025 - Discharge disposition: Home or Self Care      Home Medical Care       Service Provider Request Status Services Address Phone Fax Patient Preferred    [facility] [facility] -- [address] [address] [phone] --       Internal Comment last updated by Adelina Brewster, RN 6/6/2025 1414    Contacted Magda Cm  Please call Alonso  [phone] to arrange appointments.  The wound care nurse at [facility] use a purple foam dressing item #[id number] if you just want to know what was placed on the pt at [facility] today.               [facility] Pending - Request Sent -- [address], [facility] [address] [phone] --       Internal Comment last updated by Adelina Brewster, RN 6/6/2025 1151    Call her Son Angel, at [phone] to set up HH services if accepted.               [facility] [place] Pending - Request Sent -- [address] [place] [id number] [phone] --       Internal Comment last updated by Adelina Brewster, RN 6/6/2025 1159    Contact the Son Angel [phone] for  scheduling home health services. She will be staying at his home  in [facility]. . Called the agency and the request is pending.               [facility] [place] Pending - Request Sent -- [address], [place] [id number] [phone] --    [facility] - Request Sent -- [address], [place] [address] [phone] --    [facility] [place]   Facility full -- [address] [place] [address] [phone] --       Internal Comment last updated by Adelina Bryant, RN 6/6/2025 1205    Placed call to Abimbola Barnett, she will review the request.                             Selected Continued Care - Episodes Includes continued care and service providers with selected services from the active episodes listed below          Selected Continued Care - Prior Encounters Includes continued care and service providers with selected services from prior encounters from 3/7/2025 to 6/6/2025      Discharged on 5/6/2025 Admission date: 4/10/2025 - Discharge disposition: Skilled Nursing Facility (DC - External)      Destination       Service Provider Services Address Phone Fax Patient Preferred    [facility] [place] [facility] [address] [facility] [address] [phone] --              Home Medical Care       Service Provider Services Address Phone Fax Patient Preferred    [facility] [address] [place] [address], [address] [address] [phone] --                          Expected Discharge Date and Time       Expected Discharge Date Expected Discharge Time    Jun 6, 2025            Demographic Summary    No documentation.                  Functional Status    No documentation.                  Psychosocial    No documentation.                  Abuse/Neglect    No documentation.                  Legal    No documentation.                  Substance Abuse    No documentation.                  Patient Forms    No documentation.                     Adelina Bryant, RN

## 2025-06-06 NOTE — DISCHARGE PLACEMENT REQUEST
"Bin Catalan (65 y.o. Female)       Date of Birth   1960    Social Security Number       Address   [address] [place], [place] [address]    Home Phone   [phone]    MRN   [id number]       [facility]    Marital Status   Legally                             Admission Date   6/5/2025    Admission Type   Emergency    Admitting Provider   Shyam Gutierrez MD    Attending Provider   Abimbola Avilez MD    Department, Room/Bed   [facility] OBSERVATION, 129/1       Discharge Date       Discharge Disposition       Discharge Destination                                 Attending Provider: Abimbola Avilez MD    Allergies: Tramadol    Isolation: Contact   Infection: MRSA (05/18/25), Candida Auris (rule out) (06/06/25)   Code Status: CPR    Ht: 167.6 cm (66\")   Wt: 73.9 kg (163 lb)    Admission Cmt: None   Principal Problem: Leg wound, left [S81.802A]                   Active Insurance as of 6/5/2025       Primary Coverage       Payor Plan Insurance Group Employer/Plan Group    HUMANA MEDICARE REPLACEMENT HUMANA MEDICARE ADVANTAGE [facility] [id number]       Payor Plan Address Payor Plan Phone Number Payor Plan Fax Number Effective Dates    [address] [address] [phone]  1/1/2024 - None Entered    [facility] [address]         Subscriber Name Subscriber Birth Date Member ID       BIN CATALAN 1960 [id number]                     Emergency Contacts        (Rel.) Home Phone Work Phone Mobile Phone    NANCYAftabAngel (Son) -- -- [phone]    Yulisa Chowdhury (Sister) [phone] -- [phone]    Yana CRUZ (Relative) -- -- [phone]    jonnie pritchard (Brother) -- -- [phone]                "

## 2025-06-06 NOTE — CASE MANAGEMENT/SOCIAL WORK
Discharge Planning Assessment  [facility]     Patient Name: Desiree Garcia  MRN: [id number]  Today's Date: 6/6/2025    Admit Date: 6/5/2025    Plan: Plans to return to her son's home  and recieve home health wound care services   Discharge Needs Assessment       Row Name 06/06/25 1115       Living Environment    People in Home child(radha), adult    Name(s) of People in Home Alonso Garcia, her son she will be living with him during her recovery.    Unique Family Situation concha will be living with her son Alonso,(Angel)    Current Living Arrangements home    Potentially Unsafe Housing Conditions none    In the past 12 months has the electric, gas, oil, or water company threatened to shut off services in your home? No    Primary Care Provided by child(radha)    Provides Primary Care For no one, unable/limited ability to care for self    Caregiving Concerns wound care vac operation and maintenance. ambulation abilitty/mobility. needs home care agency to monitor wound care progression    Family Caregiver if Needed child(radha), adult    Family Caregiver Names Alonso, (Angel)    Quality of Family Relationships helpful;involved;supportive    Able to Return to Prior Arrangements other (see comments)  she will need to have home health therapy.    Living Arrangement Comments She will need to be  followed by wound care.       Resource/Environmental Concerns    Resource/Environmental Concerns none    Transportation Concerns none       Transportation Needs    In the past 12 months, has lack of transportation kept you from medical appointments or from getting medications? no    In the past 12 months, has lack of transportation kept you from meetings, work, or from getting things needed for daily living? No       Food Insecurity    Within the past 12 months, you worried that your food would run out before you got the money to buy more. Never true    Within the past 12 months, the food you bought just didn't last and  you didn't have money to get more. Never true       Transition Planning    Patient/Family Anticipates Transition to home with family;home with help/services    Patient/Family Anticipated Services at Transition home health care;durable medical equipment    Transportation Anticipated family or friend will provide       Discharge Needs Assessment    Readmission Within the Last 30 Days current reason for admission unrelated to previous admission    Current Outpatient/Agency/Support Group homecare agency    Equipment Currently Used at Home bath bench;cane, straight;walker, rolling;wheelchair    Concerns to be Addressed adjustment to diagnosis/illness;care coordination/care conferences;discharge planning    Do you want help finding or keeping work or a job? Patient declined    Do you want help with school or training? For example, starting or completing job training or getting a high school diploma, GED or equivalent Patient declined    Concerns Comments Patient lives in [place] and will need a home care agnacy to follow her  treatmetns, [facility], and they did not adequestly set up home health services correctly per the patient.    Anticipated Changes Related to Illness inability to care for self    Equipment Needed After Discharge dressing device;negative pressure wound therapy device;wound care supplies    Outpatient/Agency/Support Group Needs homecare agency    Discharge Facility/Level of Care Needs home with home health    Provided Post Acute Provider List? Yes    Post Acute Provider List Home Health    Delivered To Support Person    Method of Delivery In person    Patient's Choice of Community Agency(s) concha and her son julio c selected many agaenies as possibilities [facility], [facility], [facility]    Current Discharge Risk chronically ill;dependent with mobility/activities of daily living;physical impairment    Discharge Coordination/Progress placed multiple  requests for home health wound care services. JOSE wound care nurse to provide evalutation                   Discharge Plan       Row Name 06/06/25 1142       Plan    Plan Plans to return to her son's home  and recieve home health wound care services    Roadmap to Recovery Yes    Patient/Family in Agreement with Plan yes    Provided Post Acute Provider List? Yes    Plan Comments Entered room, identified self and role of CCP nurse/discharge planner Verified demographics. Pt mailing address is [address] [address].. SHe will be staying at her son's [address]  [place] [address].Her primary care doctor is  Sandra Kaba MD>  Her  pharmacy is [facility] in  [facility]. She was admitted to left lower leg wounds. SHe was discharged fr [facility] in [facility] and went home, with a wound vac in place. She did not have home health  come to the home for unknown reasons.  She states she needs wound care and home health services. Placed multiple requests into [facility] for HH.Pt uses a walker, and wheelchair for mobility. Waiting eval by wound care nurse.  CCP to follow for any further needs. SANJUANITA Brewster, ERNESTO ccp                  Continued Care and Services - Admitted Since 6/5/2025       Home Medical Care       Service Provider Request Status Services Address Phone Fax Patient Preferred    [facility] Pending - Request Sent -- [address] [address] [phone] --       Internal Comment last updated by Adelina Brewster RN 6/6/2025 1114    [facility] -- [address] [place] [address] [phone] --    [facility] Pending - Request Sent -- [address] [place] [address] [phone] --    [facility] Pending - Request Sent -- [address], [facility] [address] [phone] --                  Selected Continued Care - Episodes Includes continued care and service providers with selected services from the active episodes listed below          Selected Continued Care - Prior Encounters Includes continued care and service providers with selected services from prior encounters from 3/7/2025 to 6/6/2025      Discharged on 5/6/2025 Admission date: 4/10/2025 - Discharge disposition: Skilled Nursing Facility (DC - External)      Destination       Service Provider Services Address Phone Fax Patient Preferred    [facility] [address], [facility] [address] [phone] --              Home Medical Care       Service Provider Services Address Phone Fax Patient Preferred    [facility] [address] [place] [address], [address] [address] [phone] --                          Expected Discharge Date and Time       Expected Discharge Date Expected Discharge Time    Jun 6, 2025            Demographic Summary       Row Name 06/06/25 1028       General Information    Admission Type observation    Arrived From emergency department    Required Notices Provided Observation Status Notice    Referral Source emergency department    Reason for Consult discharge planning;community resources;care coordination/care conference    Preferred Language English    General Information Comments patient mailing address is [facility] [place] [address]. Pt will be staying at her son's house to recover. That address [place] [facility] [address] [place] [place] [address]. Her son is Lor his phone number is [phone].       Contact Information    Permission Granted to Share Info With family/designee;    Contact Information Comments Jose Herman (shawn) [phone].  or her sister  Yulisa Chowdhury [phone].                   Functional Status       Row Name 06/06/25 1032       Functional Status    Usual Activity Tolerance moderate    Current Activity Tolerance fair    Functional Status Comments pt ambulation below baseline secondary to left leg wounds/pain / need for wound vac.       Physical Activity    On average, how many days per week do you engage in moderate to strenuous exercise (like a brisk walk)? 0 days    On average, how many minutes do you engage in exercise at this level? 0 min    Number of minutes of exercise per week 0       Assessment of Health Literacy    How often do you have someone help you read hospital materials? Occasionally    How often do you have problems learning about your medical condition because of difficulty understanding written information? Occasionally    How often do you have a problem understanding what is told to you about your medical condition? Occasionally    How confident are you filling out medical forms by yourself? Somewhat    Health Literacy Good       Functional Status, IADL    Medications independent    Meal Preparation assistive person  her son assists.    Housekeeping assistive person    Laundry assistive person    Shopping assistive person    If for any reason you need help with day-to-day activities such as bathing, preparing meals, shopping, managing finances, etc., do you get the help you need? I could use a little more help    IADL Comments Pt currently is dependent with most ADLs due to wound status.       Mental Status    General Appearance WDL X;appearance    General Appearance unkempt       Mental Status Summary    Recent Changes in Mental Status/Cognitive Functioning no changes    Mental Status Comments AAOx4       Employment/    Employment Status retired                   Psychosocial    No documentation.                  Abuse/Neglect    No documentation.                  Legal    No documentation.                   Substance Abuse    No documentation.                  Patient Forms    No documentation.                     Adelina Bryant RN

## 2025-06-06 NOTE — OUTREACH NOTE
AMBULATORY CASE MANAGEMENT NOTE    Names and Relationships of Patient/Support Persons: Contact: Dr Lawrence; Relationship: Other -     Care Coordination  Notified Dr Lawrence patient is in hospital for wound.     Chart reviewed.    Education Documentation  No documentation found.        Marisol BARRON  Ambulatory Case Management    6/6/2025, 09:26 EDT

## 2025-06-06 NOTE — PROGRESS NOTES
Pt admitted to the observation unit from the emergency department with concerns for needing wound care evaluation and vascular evaluation.  Has had prior revascularization secondary to DVTs, had to wound debridements and placement of a wound VAC.  Discharged to rehab.  Wound VAC was last changed 6 days ago since returning home, was previously being changed every other day.  Home health has not yet been to her house.  She needs wound/dressing care but denies concerns with the wound itself otherwise.     On exam,   General: No acute distress, nontoxic  HEENT: EOMI  Pulm: Symmetric chest rise, nonlabored breathing  CV: Regular rate and rhythm  GI: Nondistended  MSK: No deformity  Skin: Warm, dry  Neuro: Awake, alert, oriented x 4, moving all extremities, no focal deficits  Psych: Calm, cooperative    Vital signs and nursing notes reviewed.           Plan: Plan for monitoring overnight, vascular surgery to evaluate and wound care to evaluate.  Labs are stable and reassuring without any concerning's for acute worsening of infection or anemia.  Symptomatic care overnight.  All questions and concerns addressed.         MD Attestation Note    SHARED VISIT: This visit was performed by BOTH a physician and an APC. The substantive portion of the medical decision making was performed by this attesting physician who made or approved the management plan and takes responsibility for patient management. All studies in the APC note (if performed) were independently interpreted by me.

## 2025-06-06 NOTE — DISCHARGE SUMMARY
ED OBSERVATION PROGRESS/DISCHARGE SUMMARY    Date of Admission: 6/5/2025   LOS: 0 days   PCP: Sandra Kaba MD    Final Diagnosis: Fasciotomy site with pseudomonal colonization    Hospital Outcome:     65-year-old female with a medical history significant for COPD, diabetes, non-STEMI admitted to the ED patient for further management for wound check of her left lower extremity.  She underwent left common femoral embolectomy and left femoral-popliteal angioplasty with 2 stents, 4 compartment fasciotomy and wound debridement in April with Dr. Lawton.  She was discharged from rehab with home health services a few days ago, however they apparently did not go to the correct address and did not follow-up with her.  She also missed her appointment with vascular surgery.    CMP largely unremarkable.  She is anemic, this appears to be chronic.  White blood cell count trending up slightly today, 12.23.    Vascular surgery was consulted and they have seen the patient.  She had WEST which was normal.  They advised discontinuing wound VAC.  They will follow-up with her in 3 to 4 weeks for wound check.    Wound care evaluated patient and changed her dressing today.  We have arranged for home health.  I prescribed months of her anticoagulant and advised her that it was very important for her to her take her Eliquis and Plavix.  Advised patient to stop smoking.  She will be discharged home.    ROS:  General: no fevers, chills  Respiratory: no cough, dyspnea  Cardiovascular: no chest pain, palpitations  Abdomen: No abdominal pain, nausea, vomiting, or diarrhea  Neurologic: No focal weakness    Objective   Physical Exam:  I have reviewed the vital signs.  Temp:  [98.5 °F (36.9 °C)-99.3 °F (37.4 °C)] 98.6 °F (37 °C)  Heart Rate:  [70-95] 70  Resp:  [17-18] 18  BP: (101-124)/(56-77) 124/75  General Appearance:    Alert, cooperative, no distress  Head:    Normocephalic, atraumatic  Eyes:    Sclerae anicteric  Neck:   Supple, no  mass  Lungs: Clear to auscultation bilaterally, respirations unlabored  Heart: Regular rate and rhythm, S1 and S2 normal, no murmur, rub or gallop  Abdomen:  Soft, nontender, bowel sounds active, nondistended  Extremities: No clubbing, cyanosis, or edema to lower extremities  Pulses:  2+ and symmetric in distal lower extremities  Skin: Large wound left lower extremity with red viable tissue, foul-smelling drainage  Neurologic: Oriented x3, Normal strength to extremities    Results Review:    I have reviewed the labs, radiology results and diagnostic studies.    Results from last 7 days   Lab Units 06/06/25  0331   WBC 10*3/mm3 12.23*   HEMOGLOBIN g/dL 8.9*   HEMATOCRIT % 29.3*   PLATELETS 10*3/mm3 383     Results from last 7 days   Lab Units 06/06/25  0331 06/05/25  1525 06/05/25  0934   SODIUM mmol/L 143 142 139   POTASSIUM mmol/L 3.8 3.4* 3.8   CHLORIDE mmol/L 106 103 102   CO2 mmol/L 25.4 28.9 26.0   BUN mg/dL 7.0* 7.0* 7.0*   CREATININE mg/dL 0.72 0.72 0.64   CALCIUM mg/dL 8.4* 9.2 9.1   BILIRUBIN mg/dL  --  0.4 0.4   ALK PHOS U/L  --  107 103   ALT (SGPT) U/L  --  6 6   AST (SGOT) U/L  --  11 13   GLUCOSE mg/dL 112* 99 134*     Imaging Results (Last 24 Hours)       ** No results found for the last 24 hours. **            I have reviewed the medications.     Discharge Medications        Continue These Medications        Instructions Start Date   acetaminophen 325 MG tablet  Commonly known as: TYLENOL   650 mg, Oral, Every 4 Hours PRN      albuterol (2.5 MG/3ML) 0.083% nebulizer solution  Commonly known as: PROVENTIL   2.5 mg, Nebulization, Every 6 Hours PRN      atorvastatin 40 MG tablet  Commonly known as: LIPITOR   40 mg, Oral, Nightly      busPIRone 5 MG tablet  Commonly known as: BUSPAR   5 mg, Oral, 3 Times Daily PRN      clopidogrel 75 MG tablet  Commonly known as: PLAVIX   75 mg, Oral, Daily      dicyclomine 10 MG capsule  Commonly known as: BENTYL   10 mg, Oral, 4 Times Daily PRN      Eliquis 5 MG tablet  tablet  Generic drug: apixaban   5 mg, Oral, Every 12 Hours Scheduled      HYDROcodone-acetaminophen  MG per tablet  Commonly known as: NORCO   1 tablet, Every 6 Hours PRN      lisinopril 10 MG tablet  Commonly known as: PRINIVIL,ZESTRIL   10 mg, Oral, Daily      metFORMIN 500 MG tablet  Commonly known as: GLUCOPHAGE   500 mg, Oral, 2 Times Daily With Meals      metoprolol succinate XL 50 MG 24 hr tablet  Commonly known as: Toprol XL   50 mg, Oral, Daily      simethicone 80 MG chewable tablet  Commonly known as: MYLICON   80 mg, Oral, 4 Times Daily PRN      spironolactone 25 MG tablet  Commonly known as: ALDACTONE   25 mg, Daily      Trelegy Ellipta 100-62.5-25 MCG/ACT inhaler  Generic drug: Fluticasone-Umeclidin-Vilant   1 puff, Inhalation, Daily - RT      Trintellix 10 MG tablet tablet  Generic drug: Vortioxetine HBr   10 mg, Daily With Breakfast             Stop These Medications      dextrose 40 % gel  Commonly known as: GLUTOSE     glucagon 1 MG injection  Commonly known as: GLUCAGEN     insulin lispro 100 UNIT/ML injection  Commonly known as: HUMALOG/ADMELOG     nitroglycerin 0.4 MG SL tablet  Commonly known as: NITROSTAT             ---------------------------------------------------------------------------------------------  Assessment & Plan   Assessment/Problem List    Leg wound, left    Plan:    LLE wound  Fasciotomy site with pseudomonal colonization  Vascular surgery consulted  WEST normal  Wound care consulted  CCP consulted  Home health arranged  Continue home Eliquis and clopidogrel  Advised her to stop smoking  Follow-up with vascular surgery in 3 to 4 weeks     VTE Prophylaxis:  Mechanical VTE prophylaxis orders are present.    Disposition: Home with home health    Follow-up after Discharge: Vascular surgery, primary care    This note will serve as a discharge summary    Latesha De Leon, APRN 06/06/25 19:51 EDT    I have worn appropriate PPE during this patient encounter, sanitized my hands  both with entering and exiting patient's room.    32 minutes has been spent by [facility] providers in the care of this patient while under observation status on this date 06/06/25

## 2025-06-06 NOTE — NURSING NOTE
"CWOCN- consult for LLE. Patient known to use from prior admission and VAC placement. Dr Lawton reached out this am about requesting wound care products to treat pseudomonas & possibly a dressing that does not need to be changed too frequently for patient to keep up with. Wound has granulated to the surface and is no longer in need of NPWT.     Reviewed chart and assessed patient. Patient reports that she will be going to the wound clinic- she has been there prior as well. She has an appt there Tues.     Wound beds have filled in to the surface. Tissue is healthy, clean. Noted blue/green staining on the gauze. Left groin has closed but patient keeps a dressing over it. There is purple noted to the incision line and surrounding tissue- patient reports \"the wound doctor puts something on there that makes it blue.\" Likely some sort of gentian violet product.     Provided recommendations on use of hydrofera blue classic + Vashe, both of which will treat the topical bioburden and taught patient the dressing care. Patient was interested in learning & asked appropriate questions. I also attempted to teach the son- initially he paid attention but then slept in the chair.     Recommendations: cleanse the lower leg wounds with Vashe solution on gauze. Then cut hydofera blue classic to the shape of the wound. Moisten the hydrofera blue with NS to soften the dressing. Secure with ABD pads and tape. Secure with roll gauze and light ACE from toes to knee. Change 2-3 times/week. IF no hydrofera blue is available, daily apply Vashe moistened gauze to the wound bed. Leave the gauze on the wounds. Secure with ABD and then roll gauze + light ACE wrap.     I reviewed these instructions with the patient numerous times and she says that she understands.     Products: Vashe (there is 1 bottle in the room and RN has ordered another)  Hydrofera blue classic- recommend the 6x6 dressing, [id number]  4x4 gauze  ABD pads  Roll gauze  4\" ACE " wrap  NS    CCP will see if [facility] can provide the Hydrofera Blue Classic.    Patient seems knowledgeable of the plan of care and of her f/u appts.        06/06/25 1343   Wound 04/12/25 1055 Left medial leg Surgical Open Surgical Incision   Placement Date/Time: 04/12/25 1055   Side: Left  Orientation: medial  Location: leg  Primary Wound Type: Surgical  Secondary Wound Type - Surgical: Open Surgical Incision   Dressing Appearance moist drainage   Dressing Removed Type gauze   Confirmed Empty Wound Bed Yes, finger sweep performed   Base moist;pink;clean   Periwound pink   Periwound Temperature warm   Periwound Skin Turgor soft   Edges open   Wound Length (cm) 8 cm   Wound Width (cm) 3 cm   Wound Depth (cm) 0.1 cm   Wound Surface Area (cm^2) 18.85 cm^2   Wound Volume (cm^3) 1.257 cm^3   Drainage Characteristics/Odor serous  (blue/green staining to gauze)   Drainage Amount scant   Care, Wound cleansed with  (Vashe)   Dressing Care dressing changed;abdominal pad;elastic bandage;foam;gauze  (hydrofera blue classic moistened with NS)   Periwound Care dry periwound area maintained   Wound 04/12/25 1055 Left lateral leg Surgical Open Surgical Incision   Placement Date/Time: 04/12/25 1055   Side: Left  Orientation: lateral  Location: leg  Primary Wound Type: Surgical  Secondary Wound Type - Surgical: Open Surgical Incision   Dressing Appearance moist drainage   Dressing Removed Type gauze   Confirmed Empty Wound Bed Yes, visual inspection of wound bed   Base clean;granulating;moist;red   Periwound pink   Periwound Temperature warm   Periwound Skin Turgor soft   Edges open   Wound Length (cm) 13.5 cm   Wound Width (cm) 8 cm   Wound Depth (cm) 0.1 cm   Wound Surface Area (cm^2) 84.82 cm^2   Wound Volume (cm^3) 5.655 cm^3   Drainage Characteristics/Odor serous;other (see comments)  (blue/green staining noted)   Drainage Amount scant   Care, Wound cleansed with  (Vashe)   Dressing Care dressing changed;abdominal  pad;foam;gauze  (hydrofera blue classic moistened with NS)   Periwound Care dry periwound area maintained

## 2025-06-06 NOTE — NURSING NOTE
Patient is agitated and frequently leaves the unit against advice. She reports she is smoking outside. She also cries about her desires for cigarettes.

## 2025-06-06 NOTE — PLAN OF CARE
"Goal Outcome Evaluation:      Patient and family member repeatedly \"fell asleep\" or became disinterested during education. They have all belongings and wound care supplies. They will  meds at pharmacy. They are eager for discharge and trying to leave.                                       "

## 2025-06-06 NOTE — OUTREACH NOTE
Prep Survey      Flowsheet Row Responses   [facility] facility patient discharged from? [facility]   Is LACE score < 7 ? No   Eligibility [facility]   Date of Admission 06/05/25   Date of Discharge 06/06/25   Discharge Disposition Home or Self Care   Discharge diagnosis Leg wound, left   Does the patient have one of the following disease processes/diagnoses(primary or secondary)? Other   Does the patient have Home health ordered? Yes   What is the Home health agency?  [facility]    Is there a DME ordered? No   Prep survey completed? Yes            JOSEPH VASQUEZ - Registered Nurse

## 2025-06-06 NOTE — PROGRESS NOTES
[facility]   Surgical Progress Note    Patient Name: Desiree Garcia  : 1960  MRN: [id number]  Date of admission: 2025  Surgical Procedures Since Admission:    Subjective   Subjective     Chief Complaint: Fasciotomy site pseudomonal colonization    History of Present Illness   65-year-old woman with appears to be pseudomonal type colonization on fasciotomy sites.  This will need to be dealt with with local wound care.  Preferably Dakin's one quarter strength dressings twice daily would be the recommendation however I do not think the patient is capable of caring for herself with that we will ask wound care nursing to give an opinion on what can be done.  I do not believe wound VAC is probably going to be the best answer going forward at this time.    Review of Systems wants to go home    Objective   Objective     Vitals:   Temp:  [98.5 °F (36.9 °C)-99.3 °F (37.4 °C)] 99.3 °F (37.4 °C)  Heart Rate:  [75-99] 82  Resp:  [17-18] 18  BP: (101-113)/(56-93) 111/77    Physical Exam    Wounds on the fasciotomy sites look good.  Leg is viable on the left.  Result Review    Result Review:  I have personally reviewed the results from the time of this admission to 2025 09:45 EDT and agree with these findings:  [x]  Laboratory list / accordion  []  Microbiology  []  Radiology  []  EKG/Telemetry   [x]  Cardiology/Vascular   []  Pathology  []  Old records  []  Other:  Most notable findings include: White count of 12 noted.  Hemoglobin 9.  ABIs are within normal limits on the left leg mild to moderate on the right.      Results from last 7 days   Lab Units 25  0331 25  1525 25  0934   WBC 10*3/mm3 12.23* 10.79 9.05   HEMOGLOBIN g/dL 8.9* 9.6* 9.7*   PLATELETS 10*3/mm3 383 424 413     Results from last 7 days   Lab Units 25  0331 25  1525 25  0934   SODIUM mmol/L 143 142 139   POTASSIUM mmol/L 3.8 3.4* 3.8   CHLORIDE mmol/L 106 103 102   CO2 mmol/L 25.4 28.9 26.0   BUN  mg/dL 7.0* 7.0* 7.0*   CREATININE mg/dL 0.72 0.72 0.64   GLUCOSE mg/dL 112* 99 134*   Estimated Creatinine Clearance: 81.1 mL/min (by C-G formula based on SCr of 0.72 mg/dL).    Lab Results   Component Value Date    HGBA1C 9.40 (H) 04/09/2025    HGBA1C 9.30 (H) 03/13/2025    HGBA1C 9.40 (H) 12/23/2024     Glucose   Date/Time Value Ref Range Status   06/06/2025 0816 118 70 - 130 mg/dL Final   06/05/2025 2105 138 (H) 70 - 130 mg/dL Final       Assessment & Plan   Assessment / Plan     Brief Patient Summary:  Desiree Garcia is a 65 y.o. female who has stable wounds at the fasciotomy sites and viable foot.  She needs to continue anticoagulation for life and cannot really afford to miss it.  The fasciotomy sites are healing but need to decrease the bacterial overgrowth that appears to be pseudomonal on my exam.  Will ask wound care nursing for an opinion on what the best wound to try is.  At this point I really do not think she is going to benefit from wound VAC anymore as the wounds are flat.  She will need home health informed of the new dressings.  She is cleared for discharge home from our standpoint with follow-up in our office in 3 to 4 weeks for wound check.  WEST checked today looks good.    Active Hospital Problems:   Active Hospital Problems    Diagnosis     **Leg wound, left      Plan:   Wound care nursing to evaluate to give a better plan for work home wound care.  Patient has pseudomonal overgrowth based on the dressings I changed.  Hopeful for some antibacterial dressings to these areas.    VTE Prophylaxis:  Pharmacologic & mechanical VTE prophylaxis orders are present.        Misael Lawton MD

## 2025-06-09 ENCOUNTER — TELEPHONE (OUTPATIENT)
Dept: FAMILY MEDICINE CLINIC | Facility: CLINIC | Age: 65
End: 2025-06-09
Payer: MEDICARE

## 2025-06-09 ENCOUNTER — TRANSITIONAL CARE MANAGEMENT TELEPHONE ENCOUNTER (OUTPATIENT)
Dept: CALL CENTER | Facility: HOSPITAL | Age: 65
End: 2025-06-09
Payer: MEDICARE

## 2025-06-09 NOTE — OUTREACH NOTE
Call Center TCM Note      Flowsheet Row Responses   [facility] patient discharged from? [place]   Does the patient have one of the following disease processes/diagnoses(primary or secondary)? Other   TCM attempt successful? Yes   Call start time 1607   Call end time 1613   Discharge diagnosis Leg wound, left   Medication alerts for this patient meds reviewed with pt,  pt still had questions and was planning to reach out to her PCP   Meds reviewed with patient/caregiver? Yes   Is the patient having any side effects they believe may be caused by any medication additions or changes? No   Does the patient have all medications ordered at discharge? N/A   Is the patient taking all medications as directed (includes completed medication regime)? Yes   Does the patient have an appointment with their PCP within 7-14 days of discharge? No   Nursing Interventions Patient declined scheduling/rescheduling appointment at this time, Routed TCM call to PCP office   What is the Home health agency?  [facility]    Home health comments Scheduled to visit 6/11/25   Did the patient receive a copy of their discharge instructions? Yes   Nursing interventions Reviewed instructions with patient   What is the patient's perception of their health status since discharge? Improving  [Still having some pain]   Is the patient/caregiver able to teach back signs and symptoms related to disease process for when to call PCP? Yes   Is the patient/caregiver able to teach back signs and symptoms related to disease process for when to call 911? Yes   Is the patient/caregiver able to teach back the hierarchy of who to call/visit for symptoms/problems? PCP, Specialist, Home health nurse, Urgent Care, ED, 911 Yes   If the patient is a current smoker, are they able to teach back resources for cessation? [id number]   TCM call completed? Yes   Call end time 1613            Gladis CRUZ - Registered Nurse    6/9/2025, 16:14 EDT

## 2025-06-09 NOTE — TELEPHONE ENCOUNTER
"Patient called the office to say that her son has her taking a yellow, oval oblongish pill 4 times a day and she can't take it 4 times a day.  When I asked the patient what medication she is referring to, she stated I don't know the name.  I asked the patient what is the medication taken for and the patient stated she did not know.  I went over what we have currently listed as the patient's medication list and how often each medication is supposed to be taken and none of the names rang a bell to the patient.  Patient stated she would just tell her son that we do not have that she is supposed to take the pill 4 times a day.  I advised the patient that I can not state that is accurate as I do not know what medication she is referring to.  Patient stated the medication is from April.   I advised the patient to contact her pharmacy that filled her prescriptions to ask them what medications were filled in April that may fit the description she is giving.  I advised her once again I do not know what medication she is referring to and that I can not say how often she is supposed to take it when I do not know what it is.  Patient stated she would contact [facility] pharmacy to ask them since they filled her scripts in April but \"kept messing them up\" and she then switched to [facility].  "

## 2025-06-10 ENCOUNTER — TELEPHONE (OUTPATIENT)
Dept: GASTROENTEROLOGY | Facility: CLINIC | Age: 65
End: 2025-06-10
Payer: MEDICARE

## 2025-06-10 ENCOUNTER — OFFICE VISIT (OUTPATIENT)
Dept: WOUND CARE | Facility: HOSPITAL | Age: 65
End: 2025-06-10
Payer: MEDICARE

## 2025-06-10 ENCOUNTER — TELEPHONE (OUTPATIENT)
Dept: FAMILY MEDICINE CLINIC | Facility: CLINIC | Age: 65
End: 2025-06-10

## 2025-06-10 PROCEDURE — G0463 HOSPITAL OUTPT CLINIC VISIT: HCPCS

## 2025-06-10 NOTE — TELEPHONE ENCOUNTER
Caller: ANDRIA BOWEN    Relationship[facility]    Best call back number: [phone]    What orders are you requesting (i.e. lab or imaging): VERBAL ORDERS FOR NURSING START OF CARE     Additional notes: PATIENT IS SCHEDULED WITH [facility] TOMORROW, 06/11/2025, BUT THEY ARE NEEDING THE VERBAL ORDER.

## 2025-06-11 ENCOUNTER — TELEPHONE (OUTPATIENT)
Dept: FAMILY MEDICINE CLINIC | Facility: CLINIC | Age: 65
End: 2025-06-11

## 2025-06-11 NOTE — TELEPHONE ENCOUNTER
[facility] called office stated pt. Home care is being delayed due to pt not being available. Home health is expected to start care by end of week, will call office with updates.

## 2025-06-11 NOTE — CASE MANAGEMENT/SOCIAL WORK
Case Management Discharge Note      Final Note: Home with [facility] via family transport    Provided Post Acute Provider List?: Yes  Post Acute Provider List: Home Health  Delivered To: Support Person  Method of Delivery: In person    Selected Continued Care - Discharged on 6/6/2025 Admission date: 6/5/2025 - Discharge disposition: Home or Self Care      Destination    No services have been selected for the patient.                Durable Medical Equipment    No services have been selected for the patient.                Dialysis/Infusion    No services have been selected for the patient.                Home Medical Care    No services have been selected for the patient.                Therapy    No services have been selected for the patient.                Community Resources    No services have been selected for the patient.                Community & DME    No services have been selected for the patient.                    Selected Continued Care - Episodes Includes continued care and service providers with selected services from the active episodes listed below          Selected Continued Care - Prior Encounters Includes continued care and service providers with selected services from prior encounters from 3/7/2025 to 6/6/2025      Discharged on 5/6/2025 Admission date: 4/10/2025 - Discharge disposition: Skilled Nursing Facility (DC - External)      Destination       Service Provider Services Address Phone Fax Patient Preferred    [facility] [address] [address] [phone] --              Home Medical Care       Service Provider Services Address Phone Fax Patient Preferred    [facility] [address] [place] [address], [address] [address] [phone] --                          Transportation Services  Private: Car    Final Discharge  Disposition Code: 06 - home with home health care

## 2025-06-12 ENCOUNTER — TELEPHONE (OUTPATIENT)
Dept: FAMILY MEDICINE CLINIC | Facility: CLINIC | Age: 65
End: 2025-06-12

## 2025-06-12 NOTE — TELEPHONE ENCOUNTER
Called and spoke with Bette.  Verbal orders for wound care given.  Bette was advised that we have not received any medication list/forms from [facility].  Bette advised she would ask them to refax the form

## 2025-06-12 NOTE — TELEPHONE ENCOUNTER
Caller: Bette- RN- [facility]    Relationship:     Best call back number: [phone]     What is the best time to reach you: Anytime voicemail message may be left.     Who are you requesting to speak with (clinical staff, provider,  specific staff member): Clinical     What was the call regarding: Bette from [facility] is calling to inform that Home Health for Wound Care has been started as of 06/11/2025.  Also wanted check if a fax was received regarding patient medication.

## 2025-06-13 ENCOUNTER — TELEPHONE (OUTPATIENT)
Dept: CASE MANAGEMENT | Facility: OTHER | Age: 65
End: 2025-06-13
Payer: MEDICARE

## 2025-06-13 ENCOUNTER — PATIENT OUTREACH (OUTPATIENT)
Dept: CASE MANAGEMENT | Facility: OTHER | Age: 65
End: 2025-06-13
Payer: MEDICARE

## 2025-06-13 DIAGNOSIS — E11.69 TYPE 2 DIABETES MELLITUS WITH OTHER SPECIFIED COMPLICATION, WITHOUT LONG-TERM CURRENT USE OF INSULIN: ICD-10-CM

## 2025-06-13 DIAGNOSIS — F41.8 DEPRESSION WITH ANXIETY: Primary | ICD-10-CM

## 2025-06-13 NOTE — TELEPHONE ENCOUNTER
UTR. No answer, unable to leave message.     Will call again in 1 week    Marisol THOMPSON RN  Ambulatory

## 2025-06-16 ENCOUNTER — TELEPHONE (OUTPATIENT)
Dept: FAMILY MEDICINE CLINIC | Facility: CLINIC | Age: 65
End: 2025-06-16
Payer: MEDICARE

## 2025-06-16 DIAGNOSIS — E78.2 MIXED HYPERLIPIDEMIA: ICD-10-CM

## 2025-06-16 DIAGNOSIS — I25.10 CORONARY ARTERY DISEASE INVOLVING NATIVE CORONARY ARTERY OF NATIVE HEART WITHOUT ANGINA PECTORIS: ICD-10-CM

## 2025-06-16 RX ORDER — SIMETHICONE 80 MG
80 TABLET,CHEWABLE ORAL 4 TIMES DAILY PRN
Qty: 120 TABLET | Refills: 1 | Status: SHIPPED | OUTPATIENT
Start: 2025-06-16

## 2025-06-16 RX ORDER — ACETAMINOPHEN 325 MG/1
650 TABLET ORAL EVERY 4 HOURS PRN
Qty: 90 TABLET | Refills: 1 | Status: SHIPPED | OUTPATIENT
Start: 2025-06-16

## 2025-06-16 RX ORDER — METOPROLOL SUCCINATE 50 MG/1
50 TABLET, EXTENDED RELEASE ORAL DAILY
Qty: 90 TABLET | Refills: 1 | Status: SHIPPED | OUTPATIENT
Start: 2025-06-16 | End: 2025-09-14

## 2025-06-16 RX ORDER — CLOPIDOGREL BISULFATE 75 MG/1
75 TABLET ORAL DAILY
Qty: 90 TABLET | Refills: 1 | Status: SHIPPED | OUTPATIENT
Start: 2025-06-16 | End: 2025-09-14

## 2025-06-16 RX ORDER — ATORVASTATIN CALCIUM 40 MG/1
40 TABLET, FILM COATED ORAL NIGHTLY
Qty: 90 TABLET | Refills: 3 | Status: SHIPPED | OUTPATIENT
Start: 2025-06-16 | End: 2025-06-19 | Stop reason: SDUPTHER

## 2025-06-16 RX ORDER — ALBUTEROL SULFATE 0.83 MG/ML
2.5 SOLUTION RESPIRATORY (INHALATION) EVERY 6 HOURS PRN
Qty: 360 ML | Refills: 1 | Status: SHIPPED | OUTPATIENT
Start: 2025-06-16

## 2025-06-16 RX ORDER — SPIRONOLACTONE 25 MG/1
25 TABLET ORAL DAILY
Qty: 90 TABLET | Refills: 1 | Status: SHIPPED | OUTPATIENT
Start: 2025-06-16

## 2025-06-16 RX ORDER — DICYCLOMINE HYDROCHLORIDE 10 MG/1
10 CAPSULE ORAL 4 TIMES DAILY PRN
Qty: 120 CAPSULE | Refills: 1 | Status: SHIPPED | OUTPATIENT
Start: 2025-06-16

## 2025-06-16 NOTE — TELEPHONE ENCOUNTER
Medication refills were requested for Trintellix and Spironolacton.  These were sent to pharmacy but after review, these medications were discontinued when patient was hospitalized.  [facility] pharmacy called to advise them not to fill these prescriptions.  Pharmacist verbally stated understanding.

## 2025-06-17 ENCOUNTER — READMISSION MANAGEMENT (OUTPATIENT)
Dept: CALL CENTER | Facility: HOSPITAL | Age: 65
End: 2025-06-17
Payer: MEDICARE

## 2025-06-17 NOTE — OUTREACH NOTE
Medical Week 2 Survey      Flowsheet Row Responses   [facility] patient discharged from? [place]   Does the patient have one of the following disease processes/diagnoses(primary or secondary)? Other   Week 2 attempt successful? No   Unsuccessful attempts Attempt 1   Revoke Other  [Pt followed by Ambulatory Case Managment]            Sada BERG - Licensed Nurse

## 2025-06-18 ENCOUNTER — TELEPHONE (OUTPATIENT)
Dept: FAMILY MEDICINE CLINIC | Facility: CLINIC | Age: 65
End: 2025-06-18

## 2025-06-18 NOTE — TELEPHONE ENCOUNTER
Caller: Desiree Garcia    Relationship: Self    Best call back number: [phone]    Which medication are you concerned about:   Vortioxetine HBr (Trintellix) 10 MG tablet tablet   busPIRone (BUSPAR) 5 MG tablet     Who prescribed you this medication: DR. DÍAZ AND   DR. LUND    When did you start taking this medication: BUSIPRONE YESTERDAY AND VORTIOXETINE FOR SOME TIME     What are your concerns: SHOULD SHE BE TAKING BOTH OF THESE MEDICATIONS OR STOP ONE PLEASE CONTACT AND ADVISE       PATIENT HAS NOT TAKEN BOTH YET, SHE TOOK ONE BUSIPRONE YESTERDAY WHICH IS THE ONE LAST PRESCRIBED BY ANNIE LOPEZ

## 2025-06-18 NOTE — TELEPHONE ENCOUNTER
Caller: Desiree Garcia    Relationship: Self    Best call back number: [phone]    What medication are you requesting: BABY ASPIRIN       If a prescription is needed, what is your preferred pharmacy and phone number: [facility] [place] [id number] [place], [place] - [address] - [phone]  - [phone]      Additional notes: PATIENT HAS BEEN TAKING THIS MEDICATION FOR SOME TIME AND IT IS NOT ON HER CURRENT MEDICATION LIST  PLEASE CONTACT IF THERE ARE ANY QUESTIONS

## 2025-06-19 DIAGNOSIS — E78.2 MIXED HYPERLIPIDEMIA: ICD-10-CM

## 2025-06-19 DIAGNOSIS — I25.10 CORONARY ARTERY DISEASE INVOLVING NATIVE CORONARY ARTERY OF NATIVE HEART WITHOUT ANGINA PECTORIS: ICD-10-CM

## 2025-06-19 RX ORDER — ATORVASTATIN CALCIUM 40 MG/1
40 TABLET, FILM COATED ORAL NIGHTLY
Qty: 90 TABLET | Refills: 0 | Status: SHIPPED | OUTPATIENT
Start: 2025-06-19 | End: 2025-09-17

## 2025-06-19 NOTE — TELEPHONE ENCOUNTER
Caller: WILMAR - [facility]    Relationship: Home Health    Best call back number:        Requested Prescriptions:   Requested Prescriptions     Pending Prescriptions Disp Refills    atorvastatin (LIPITOR) 40 MG tablet 90 tablet 3     Sig: Take 1 tablet by mouth Every Night for 90 days.    apixaban (ELIQUIS) 5 MG tablet tablet 60 tablet 0     Sig: Take 1 tablet by mouth Every 12 (Twelve) Hours for 30 days. Indications: Other - full anticoagulation        Pharmacy where request should be sent: [address] - [phone]  - [phone] [facility]     Last office visit with prescribing clinician: 4/1/2025   Last telemedicine visit with prescribing clinician: Visit date not found   Next office visit with prescribing clinician: Visit date not found     Additional details provided by patient:      WILMAR SAID THE PATIENT PICKED UP ALL OF HER MEDICATIONS FROM THE PHARMACY BUT IS MISSING THESE TWO MEDICATIONS     Does the patient have less than a 3 day supply:  [x] Yes  [] No    Would you like a call back once the refill request has been completed: [] Yes [x] No    If the office needs to give you a call back, can they leave a voicemail: [] Yes [] No    Edie Zuluaga Rep   06/19/25 10:05 EDT

## 2025-06-23 NOTE — TELEPHONE ENCOUNTER
Patient was advised that aspirin was discontinued while in hospital since she was started on eliquis and plavix

## 2025-06-23 NOTE — TELEPHONE ENCOUNTER
Called and spoke with patient.  Patient advised that Trintellix was discontinued while she was in the hospital and to take the Buspar that Dr. Lawrence prescribed.

## 2025-06-24 ENCOUNTER — OFFICE VISIT (OUTPATIENT)
Dept: WOUND CARE | Facility: HOSPITAL | Age: 65
End: 2025-06-24
Payer: MEDICARE

## 2025-06-30 ENCOUNTER — PATIENT OUTREACH (OUTPATIENT)
Dept: CASE MANAGEMENT | Facility: OTHER | Age: 65
End: 2025-06-30
Payer: MEDICARE

## 2025-06-30 DIAGNOSIS — I25.10 CORONARY ARTERY DISEASE INVOLVING NATIVE CORONARY ARTERY OF NATIVE HEART WITHOUT ANGINA PECTORIS: ICD-10-CM

## 2025-06-30 DIAGNOSIS — E11.69 TYPE 2 DIABETES MELLITUS WITH OTHER SPECIFIED COMPLICATION, WITHOUT LONG-TERM CURRENT USE OF INSULIN: ICD-10-CM

## 2025-06-30 DIAGNOSIS — F41.8 DEPRESSION WITH ANXIETY: Primary | ICD-10-CM

## 2025-06-30 NOTE — OUTREACH NOTE
[facility] End of Month Documentation    This Chronic Medical Management Care Plan for Desiree Garcia, 65 y.o. female, has been revised; reviewed; monitored and managed and a new plan of care implemented for the month of June.  A cumulative time of 33  minutes was spent on this patient record this month, including face to face with provider; phone call with other providers; chart review.    Regarding the patient's problems: has Type 2 diabetes mellitus, without long-term current use of insulin; COPD (chronic obstructive pulmonary disease); Depression with anxiety; Esophageal reflux; Forgetfulness; Leg paresthesia; Lumbar spinal stenosis; Migraines; Night sweat; Sciatica; Dyspnea on exertion; Thoracic or lumbosacral neuritis or radiculitis; Left wrist pain; Sprain of left wrist; Arthritis of knee, left; Contusion of left knee; Nondisplaced fracture of trapezium bone of left wrist with routine healing; Chronic left shoulder pain; Coronary artery disease involving native heart without angina pectoris; Hypertension, essential; Mixed hyperlipidemia; Cigarette nicotine dependence with nicotine-induced disorder; Altered bowel habits; Diarrhea; Rectal bleeding; Abdominal bloating; Allergic rhinitis due to food; Elevated troponin; Acute diastolic CHF (congestive heart failure); History of ST elevation myocardial infarction (STEMI); STEMI (ST elevation myocardial infarction); Acute thrombus of left ventricle; 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; Type 2 diabetes mellitus with hyperglycemia; and Leg wound, left on their problem list., the following items were addressed: medical records; medications; changes to medical care and any changes can be found within the plan section of the note.  A detailed listing of time spent for chronic care management is tracked within each outreach encounter.  Current medications include:  has a current medication list which includes the following prescription(s): acetaminophen,  albuterol, apixaban, atorvastatin, buspirone, cholecalciferol, clopidogrel, dicyclomine, trelegy ellipta, hydrocodone-acetaminophen, lisinopril, metformin, metoprolol succinate xl, simethicone, spironolactone, and vortioxetine hbr. and the patient is reported to be patient is compliant with medication protocol,  Medications are reported to be effective.  Regarding these diagnoses, referrals were made to the following provider(s):  NA.  All notes on chart for PCP to review.    The patient was monitored remotely for activity level; mood & behavior; pain; medications.    The patient's physical needs include:  needs assistance with ADLs; medication education; help taking medications as prescribed.     The patient's mental support needs include:  continued support    The patient's cognitive support needs include:  continued support    The patient's psychosocial support needs include:  continued support    The patient's functional needs include: DME    The patient's environmental needs include:  not applicable    Care Plan overall comments:  No data recorded    Refer to previous outreach notes for more information on the areas listed above.    Monthly Billing Diagnoses  (F41.8) Depression with anxiety    (E11.69) Type 2 diabetes mellitus with other specified complication, without long-term current use of insulin    (I25.10) Coronary artery disease involving native coronary artery of native heart without angina pectoris    Medications   Medications have been reconciled    Care Plan progress this month:      Recently Modified Care Plans Updates made since 5/30/2025 12:00 AM      No recently modified care plans.            Instructions   Patient was provided an electronic copy of care plan  CCM services were explained and offered and patient has accepted these services.  Patient has given their written consent to receive CCM services and understands that this includes the authorization of electronic communication of medical  information with the other treating providers.  Patient understands that they may stop CCM services at any time and these changes will be effective at the end of the calendar month and will effectively revocate the agreement of CCM services.  Patient understands that only one practitioner can furnish and be paid for CCM services during one calendar month.  Patient also understands that there may be co-payment or deductible fees in association with CCM services.  Patient will continue with at least monthly follow-up calls with the Ambulatory .    Marisol BARRON  Ambulatory Case Management    6/30/2025, 13:37 EDT

## 2025-07-02 ENCOUNTER — TELEPHONE (OUTPATIENT)
Dept: CASE MANAGEMENT | Facility: OTHER | Age: 65
End: 2025-07-02
Payer: MEDICARE

## 2025-07-02 NOTE — TELEPHONE ENCOUNTER
UTR. No answer, unable to leave message. Patient remains at [facility] without DC date plan at this time. Continues with wound vac.     Marisol THOMPSON RN  Ambulatory

## 2025-07-03 ENCOUNTER — OFFICE VISIT (OUTPATIENT)
Age: 65
End: 2025-07-03
Payer: MEDICARE

## 2025-07-03 VITALS
HEART RATE: 87 BPM | BODY MASS INDEX: 26.32 KG/M2 | SYSTOLIC BLOOD PRESSURE: 132 MMHG | DIASTOLIC BLOOD PRESSURE: 81 MMHG | HEIGHT: 66 IN

## 2025-07-03 DIAGNOSIS — Z98.890 HISTORY OF FASCIOTOMY: ICD-10-CM

## 2025-07-03 DIAGNOSIS — I74.3 ARTERIAL EMBOLISM AND THROMBOSIS OF LOWER EXTREMITY: Primary | ICD-10-CM

## 2025-07-03 NOTE — PROGRESS NOTES
Patient Name: Desiree Garcia    MRN: [id number] Encounter Date: 07/03/2025      Consulting Service: Vascular Surgery    Referring Provider: No ref. provider found       CHIEF COMPLAINT:  Chief Complaint   Patient presents with    Post-op    Wound Check       Subjective    HPI: Desiree Garcia is a 65 y.o. female being seen for evaluation/management of peripheral vascular disease follow-up.  The patient has known peripheral vascular disease with symptoms of left arterial thromboembolism status post thrombectomy and stent placement.  Current symptoms of claudication at 1 block distance are noted without lifestyle limitation.  Prior interventions include endarterectomy and thromboembolectomy.  Currently the patient reports symptoms are improved.  Testing today includes none.  Their current medical regimen includes antiplatelet, anticoagulation, and cholesterol medications.  The patient has significant peripheral neuropathy.  Discussion as to the protection of feet and toes with prevention of injury including twice a day monitoring of feet and all digits, avoidance of walking barefoot, and the use of lightly colored socks to note any drainage were reviewed with the patient.  Plan moving forward will include continue to follow-up with noninvasive testing.    PAST MEDICAL HISTORY:   Past Medical History:   Diagnosis Date    Arthritis     Cervical cancer screening     COPD (chronic obstructive pulmonary disease)     Coronary artery disease involving native heart without angina pectoris 09/30/2021    primary angioplasty and stent placement to mid right coronary artery with good angiographic results on 6/14/2021 after STEMI    Depression with anxiety     Diabetes mellitus     Forgetfulness     Gastric reflux     Hypertension     Leg paresthesia 01/20/2017    Right    Limb swelling     Lumbago 01/20/2017    Low back pain    Lumbar spinal stenosis 02/23/2017    L4/5 moderate to severe central canal stenosis     Lumbosacral radiculopathy 2017    Right    Migraines     Night sweat     Reflux esophagitis     Shortness of breath     ST elevation myocardial infarction (STEMI) (CMS/[facility]) 2021    Stomach disorder       PAST SURGICAL HISTORY:   Past Surgical History:   Procedure Laterality Date    ABDOMINAL SURGERY      3 C-SECTIONS    ARTERIOGRAM LOWER EXTREMITY Left 2025    Procedure: ARTERIOGRAM LOWER EXTREMITY;  Surgeon: Misael Lawton MD;  Location: [facility] OR;  Service: Vascular;  Laterality: Left;    CARDIAC CATHETERIZATION      CARDIAC CATHETERIZATION N/A 2021    Procedure: Left Heart Cath;  Surgeon: Sidney Xiao MD;  Location: [facility] CATH INVASIVE LOCATION;  Service: Cardiology;  Laterality: N/A;    CARDIAC CATHETERIZATION N/A 2025    Procedure: Left Heart Cath;  Surgeon: James Verdugo MD;  Location: [facility] CATH INVASIVE LOCATION;  Service: Cardiology;  Laterality: N/A;     SECTION      x 3    CHOLECYSTECTOMY      COLONOSCOPY      COLONOSCOPY N/A 3/25/2025    Procedure: COLONOSCOPY WITH BIOPSIES AND COLD AND HOT SNARE POLYPECTOMIES;  Surgeon: Heber Najera MD;  Location: [facility] ENDOSCOPY;  Service: Gastroenterology;  Laterality: N/A;  COLON POLYPS    CORONARY STENT PLACEMENT      EMBOLECTOMY Left 2025    Procedure: EMBOLECTOMY MECHANICAL, FOUR COMPARTMENT FASCIOTOMY;  Surgeon: Misael Lawton MD;  Location: [facility] OR;  Service: Vascular;  Laterality: Left;    ENDOSCOPY      2007    FOOT SURGERY Right     HAND SURGERY      HYSTERECTOMY  1985    INCISION AND DRAINAGE LEG Left 2025    Procedure: LOWER EXTREMITY DEBRIDEMENT;  Surgeon: Misael Lawton MD;  Location: [facility] OR;  Service: Vascular;  Laterality: Left;    INCISION AND DRAINAGE LEG Left 2025    Procedure: Left lower extremity fasciotomy debridement with possible wound VAC placement;  Surgeon: Raimundo Lin MD;  Location: [facility] OR;  Service: Vascular;   Laterality: Left;      FAMILY HISTORY:   Family History   Problem Relation Age of Onset    Stroke Mother     Lung cancer Mother         Unspecified    Arthritis Mother     Stroke Father     Heart disease Father     Diabetes Father         Unspecified type    Arthritis Father     Heart attack Father     Stroke Sister     Arthritis Sister     Breast cancer Sister     Stroke Brother     Heart disease Brother     Diabetes Brother         Unspecified type    Arthritis Brother     Colon cancer Neg Hx       SOCIAL HISTORY:   Social History     Tobacco Use    Smoking status: Every Day     Current packs/day: 1.00     Average packs/day: 1 pack/day for 58.5 years (58.5 ttl pk-yrs)     Types: Cigarettes     Start date: 1/14/1967     Passive exposure: Current    Smokeless tobacco: Never    Tobacco comments:     Smoked 21-30 years   Vaping Use    Vaping status: Never Used   Substance Use Topics    Alcohol use: Never     Comment: Does not drink    Drug use: Not Currently     Types: Methamphetamines      MEDICATIONS:   Current Outpatient Medications on File Prior to Visit   Medication Sig Dispense Refill    acetaminophen (TYLENOL) 325 MG tablet Take 2 tablets by mouth Every 4 (Four) Hours As Needed for Mild Pain or Fever (temperature greater than 101F). 90 tablet 1    albuterol (PROVENTIL) (2.5 MG/3ML) 0.083% nebulizer solution Take 2.5 mg by nebulization Every 6 (Six) Hours As Needed for Wheezing or Shortness of Air. 360 mL 1    apixaban (ELIQUIS) 5 MG tablet tablet Take 1 tablet by mouth Every 12 (Twelve) Hours for 30 days. Indications: Other - full anticoagulation 60 tablet 0    atorvastatin (LIPITOR) 40 MG tablet Take 1 tablet by mouth Every Night for 90 days. 90 tablet 0    busPIRone (BUSPAR) 5 MG tablet Take 1 tablet by mouth 3 (Three) Times a Day As Needed (anxiety). 60 tablet 0    cholecalciferol (VITAMIN D3) 1.25 MG (64951 UT) capsule Take 1 capsule by mouth Every 7 (Seven) Days. 8 capsule 1    clopidogrel (PLAVIX) 75 MG  "tablet Take 1 tablet by mouth Daily for 90 days. 90 tablet 1    dicyclomine (BENTYL) 10 MG capsule Take 1 capsule by mouth 4 (Four) Times a Day As Needed for Abdominal Cramping. 120 capsule 1    Fluticasone-Umeclidin-Vilant (Trelegy Ellipta) 100-62.5-25 MCG/ACT inhaler Inhale 1 puff Daily.      lisinopril (PRINIVIL,ZESTRIL) 10 MG tablet Take 1 tablet by mouth Daily. 30 tablet 1    metFORMIN (GLUCOPHAGE) 500 MG tablet Take 1 tablet by mouth 2 (Two) Times a Day With Meals. 180 tablet 1    metoprolol succinate XL (Toprol XL) 50 MG 24 hr tablet Take 1 tablet by mouth Daily for 90 days. 90 tablet 1    simethicone (MYLICON) 80 MG chewable tablet Chew 1 tablet 4 (Four) Times a Day As Needed for Flatulence. 120 tablet 1    spironolactone (ALDACTONE) 25 MG tablet Take 1 tablet by mouth Daily. 90 tablet 1    Vortioxetine HBr (Trintellix) 10 MG tablet tablet Take 1 tablet by mouth Daily With Breakfast. 30 tablet 1    HYDROcodone-acetaminophen (NORCO)  MG per tablet Take 1 tablet by mouth Every 6 (Six) Hours As Needed for Moderate Pain. (Patient not taking: Reported on 7/3/2025)       No current facility-administered medications on file prior to visit.       ALLERGIES: Tramadol       Objective   Vitals:    07/03/25 1110   BP: 132/81   BP Location: Left arm   Patient Position: Sitting   Cuff Size: Adult   Pulse: 87   Height: 167.6 cm (65.98\")     Body mass index is 26.32 kg/m².          PHYSICAL EXAM:   Physical Exam   Left leg-fasciotomy site looks really good and is being treated through wound care center.  Left leg neuropathic changes have improved.  Left leg perfusion appears good.  Result Review   LABS:    CBC          5/5/2025    05:40 6/5/2025    09:34 6/5/2025    15:25 6/6/2025    03:31   CBC   WBC 8.36  9.05  10.79  12.23    RBC 3.25  3.82  3.94  3.54    Hemoglobin 8.3  9.7  9.6  8.9    Hematocrit 28.2  31.5  32.5  29.3    MCV 86.8  82.5  82.5  82.8    MCH 25.5  25.4  24.4  25.1    MCHC 29.4  30.8  29.5  30.4  "   RDW 15.3  15.2  15.3  15.3    Platelets 460  413  424  383      BMP          5/5/2025    05:40 6/5/2025    09:34 6/5/2025    15:25 6/6/2025    03:31   BMP   BUN 8  7.0  7.0  7.0    Creatinine 0.77  0.64  0.72  0.72    Sodium 138  139  142  143    Potassium 4.2  3.8  3.4  3.8    Chloride 102  102  103  106    CO2 31.5  26.0  28.9  25.4    Calcium 8.4  9.1  9.2  8.4      Lipid Panel          1/30/2025    13:50 4/9/2025    06:02   Lipid Panel   Total Cholesterol 161  147    Triglycerides 167  158    HDL Cholesterol 38  42    VLDL Cholesterol 29  27    LDL Cholesterol  94  78    LDL/HDL Ratio 2.36  1.75       INR          3/17/2025    14:32 4/10/2025    12:44 4/25/2025    19:04 4/26/2025    17:59   Common Labs   INR 1.04  1.36  1.24  1.27       A1C Last 3 Results          12/23/2024    14:30 3/13/2025    15:55 4/9/2025    05:59   HGBA1C Last 3 Results   Hemoglobin A1C 9.40  9.30  9.40         Results Review:       I reviewed the patient's new clinical results.    The following radiologic or non-invasive studies have been reviewed by me:   Doppler Ankle Brachial Index Single Level CAR 06/06/2025    Interpretation Summary    Right Conclusion: The right WEST is moderately reduced. Normal digital pressures.    Left Conclusion: The left WEST is normal. Normal digital pressures.     No radiology results for the last 30 days.                ASSESSMENT/PLAN:   Diagnoses and all orders for this visit:    1. 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 (Primary)  -     Doppler Ankle Brachial Index Single Level CAR; Future  -     Duplex Lower Extremity Art / Grafts - Left CAR; Future    2. History of fasciotomy  -     Doppler Ankle Brachial Index Single Level CAR; Future  -     Duplex Lower Extremity Art / Grafts - Left CAR; Future       65 y.o. female with improving fasciotomy sites being managed in the wound care center.  The left leg actually looks quite good and organized at her 3-month follow-up scan with left graft  scan and ABIs.  This looks like a huge wound for this he has a wound who believes is out of the danger of losing her leg acutely.  She is suzy continue her Plavix and Eliquis lifelong.  At this point in time we are going to see her back in 3 months for WEST and graft scan and I expect full healing of the fasciotomy sites.  Dr. Lowbs excellent care.    I discussed the plan with the patient who is agreeable to the plan of care at this point. Thank you for this consult.   Follow Up  Return in about 3 months (around 10/3/2025).    Misael Lawton MD   07/03/25

## 2025-07-07 ENCOUNTER — OFFICE VISIT (OUTPATIENT)
Dept: FAMILY MEDICINE CLINIC | Facility: CLINIC | Age: 65
End: 2025-07-07
Payer: MEDICARE

## 2025-07-07 VITALS
TEMPERATURE: 97.8 F | BODY MASS INDEX: 23.3 KG/M2 | HEART RATE: 106 BPM | DIASTOLIC BLOOD PRESSURE: 70 MMHG | WEIGHT: 145 LBS | SYSTOLIC BLOOD PRESSURE: 122 MMHG | HEIGHT: 66 IN | OXYGEN SATURATION: 95 %

## 2025-07-07 DIAGNOSIS — J34.89 RHINORRHEA: ICD-10-CM

## 2025-07-07 DIAGNOSIS — M25.551 BILATERAL HIP PAIN: Primary | ICD-10-CM

## 2025-07-07 DIAGNOSIS — M25.552 BILATERAL HIP PAIN: Primary | ICD-10-CM

## 2025-07-07 DIAGNOSIS — M16.0 ARTHRITIS OF BOTH HIPS: ICD-10-CM

## 2025-07-07 DIAGNOSIS — R05.2 SUBACUTE COUGH: ICD-10-CM

## 2025-07-07 PROCEDURE — 3078F DIAST BP <80 MM HG: CPT | Performed by: NURSE PRACTITIONER

## 2025-07-07 PROCEDURE — 3046F HEMOGLOBIN A1C LEVEL >9.0%: CPT | Performed by: NURSE PRACTITIONER

## 2025-07-07 PROCEDURE — 1160F RVW MEDS BY RX/DR IN RCRD: CPT | Performed by: NURSE PRACTITIONER

## 2025-07-07 PROCEDURE — 3074F SYST BP LT 130 MM HG: CPT | Performed by: NURSE PRACTITIONER

## 2025-07-07 PROCEDURE — 1159F MED LIST DOCD IN RCRD: CPT | Performed by: NURSE PRACTITIONER

## 2025-07-07 PROCEDURE — 99214 OFFICE O/P EST MOD 30 MIN: CPT | Performed by: NURSE PRACTITIONER

## 2025-07-07 PROCEDURE — 1125F AMNT PAIN NOTED PAIN PRSNT: CPT | Performed by: NURSE PRACTITIONER

## 2025-07-07 RX ORDER — LIDOCAINE 50 MG/G
2 PATCH TOPICAL EVERY 24 HOURS
Qty: 60 PATCH | Refills: 1 | Status: SHIPPED | OUTPATIENT
Start: 2025-07-07

## 2025-07-07 NOTE — PROGRESS NOTES
Chief Complaint  Hip Pain (Both hips , tylenol isn't helping ), Cough (X 1 week), and Sinus Problem (Sinus drainage )    Subjective        Medical History: has a past medical history of Arthritis, Cervical cancer screening, COPD (chronic obstructive pulmonary disease), Coronary artery disease involving native heart without angina pectoris (2021), Depression with anxiety, Diabetes mellitus, Forgetfulness, Gastric reflux, Hypertension, Leg paresthesia (2017), Limb swelling, Lumbago (2017), Lumbar spinal stenosis (2017), Lumbosacral radiculopathy (2017), Migraines, Night sweat, Reflux esophagitis, Shortness of breath, ST elevation myocardial infarction (STEMI) (CMS/[facility]) (2021), and Stomach disorder.     Surgical History: has a past surgical history that includes Abdominal surgery; Cardiac catheterization;  section; Cholecystectomy; Colonoscopy (); Esophagogastroduodenoscopy; Foot surgery (Right); Hand surgery; Cardiac catheterization (N/A, 2021); Coronary stent placement; Hysterectomy (); Colonoscopy (N/A, 3/25/2025); Cardiac catheterization (N/A, 2025); Embolectomy (Left, 2025); THORACOSCOPY WITH BIOPSY (Left, 2025); incision and drainage leg (Left, 2025); and incision and drainage leg (Left, 2025).     Family History: family history includes Arthritis in her brother, father, mother, and sister; Breast cancer in her sister; Diabetes in her brother and father; Heart attack in her father; Heart disease in her brother and father; Lung cancer in her mother; Stroke in her brother, father, mother, and sister.     Social History: reports that she has been smoking cigarettes. She started smoking about 58 years ago. She has a 58.5 pack-year smoking history. She has been exposed to tobacco smoke. She has never used smokeless tobacco. She reports that she does not currently use drugs after having used the following drugs: Methamphetamines. She  reports that she does not drink alcohol.    Desiree Garcia presents to [facility]    History of Present Illness    History of Present Illness  The patient presents for hip pain, cough, blood clots, and diabetes.    She reports experiencing hip pain, which she describes as more severe than the discomfort from her previous blood clots. She has been advised to undergo a hip replacement due to advanced arthritis in both hips. However, she does not experience hip pain but rather leg pain that has been present since November or January. She consulted with Dr. Yanez, who recommended a hip replacement. A steroid injection in her hip in February provided temporary relief, but the pain returned after a day. She has not been prescribed any pain medication for her hip and finds that Tylenol does not alleviate her pain. Lidocaine patches have provided some relief.    She also mentions a persistent cough that worsens when she lies down. She has been prescribed cough syrup with codeine, which she takes as needed. She continues to use Trilogy, although not daily, and finds it beneficial.    She has undergone multiple procedures to remove blood clots from various locations, including her heart. She is currently on Eliquis 5 mg twice daily and is unsure of the duration of this treatment. She is also under the care of a wound specialist, whom she visits bi-weekly, and receives home visits for bandage changes.    She is unsure of her current blood sugar levels as she misplaced her glucometer. Her last recorded A1c was 9.4. She was administered insulin during her hospital stay if her blood sugar exceeded 200, but she is not currently on insulin therapy. She has lost weight, dropping from 157 to 147 pounds, and attributes this to poor appetite and inadequate food intake.    PAST SURGICAL HISTORY:  - Embolectomy  - Compartment fasciotomy      Objective   Vital Signs:   /70   Pulse 106    "Temp 97.8 °F (36.6 °C)   Ht 166.4 cm (65.5\")   Wt 65.8 kg (145 lb)   SpO2 95%   BMI 23.76 kg/m²       Wt Readings from Last 3 Encounters:   07/07/25 65.8 kg (145 lb)   06/05/25 73.9 kg (163 lb)   06/05/25 71.7 kg (158 lb)        BP Readings from Last 3 Encounters:   07/07/25 122/70   07/03/25 132/81   06/06/25 124/75        BMI is within normal parameters. No other follow-up for BMI required.       Physical Exam  Vitals reviewed.   Constitutional:       General: She is not in acute distress.     Appearance: Normal appearance. She is well-developed. She is ill-appearing (chronically).   HENT:      Head: Normocephalic and atraumatic.      Nose: Congestion and rhinorrhea present.      Mouth/Throat:      Mouth: Mucous membranes are moist.      Pharynx: No oropharyngeal exudate or posterior oropharyngeal erythema.   Eyes:      Conjunctiva/sclera: Conjunctivae normal.      Pupils: Pupils are equal, round, and reactive to light.   Cardiovascular:      Rate and Rhythm: Normal rate and regular rhythm.      Heart sounds: No murmur heard.  Pulmonary:      Effort: Pulmonary effort is normal.      Breath sounds: Normal breath sounds. No wheezing or rhonchi.   Musculoskeletal:         General: Tenderness (over l hip and bilateral lower back) present.   Skin:     General: Skin is warm and dry.      Comments: Left lower leg ace applied per home health wound   Neurological:      Mental Status: She is alert and oriented to person, place, and time.      Cranial Nerves: No cranial nerve deficit.   Psychiatric:         Mood and Affect: Mood normal. Affect is tearful.         Behavior: Behavior normal.         Thought Content: Thought content normal.         Judgment: Judgment normal.        Result Review :  The following data was reviewed by ALVIN Maurer on 07/07/2025.  Common labs          5/5/2025    05:40 6/5/2025    09:34 6/5/2025    15:25 6/6/2025    03:31   Common Labs   Glucose 92  134  99  112    BUN 8  7.0  " 7.0  7.0    Creatinine 0.77  0.64  0.72  0.72    Sodium 138  139  142  143    Potassium 4.2  3.8  3.4  3.8    Chloride 102  102  103  106    Calcium 8.4  9.1  9.2  8.4    Albumin 2.6  3.4  3.5     Total Bilirubin  0.4  0.4     Alkaline Phosphatase  103  107     AST (SGOT)  13  11     ALT (SGPT)  6  6     WBC 8.36  9.05  10.79  12.23    Hemoglobin 8.3  9.7  9.6  8.9    Hematocrit 28.2  31.5  32.5  29.3    Platelets 460  413  424  383      Data reviewed : previous hospital d/c notes     XR Abdomen KUB  Result Date: 4/12/2025   1. Interval advancement of NG tube with the tip and sideport in the gastric fundus. 2. Stable gas distended small bowel loops  This report was finalized on 4/12/2025 1:58 PM by Dr. Jose Daniel Dallas M.D on Workstation: [id number]      XR Abdomen KUB  Result Date: 4/12/2025   1. NG tube tip is in the gastric fundus and the sideport is at the GE junction. Consider advancement. 2. Gas distended small bowel with suggestion of thickening of circular folds and gas distended hepatic flexure of the colon, similar.  This report was finalized on 4/12/2025 12:36 PM by Dr. Jose Daniel Dallas M.D on Workstation: [id number]      CT Angio Abdominal Aorta Bilateral Iliofem Runoff  Result Date: 4/12/2025   1. The patient has a short segment occlusion of the right superficial femoral artery in the proximal to mid thigh. It reconstitutes, but does show narrowing. There is further plaque noted more distally, with associated moderate stenosis. I do think the right anterior tibial, peroneal, and posterior tibial arteries are continuous to the foot. 2. Occlusion of the left common femoral artery, noted on prior imaging. There is reconstitution of the left superficial femoral artery within the mid to distal thigh. Left anterior tibial artery appears to terminate within the distal calf. Left peroneal artery terminates at the ankle. Left posterior tibial artery is continuous to the foot. 3. Areas of infarction are again  noted within the spleen and left kidney. 4. Distention of the ascending and transverse colon, along with distended loops of small bowel with associated wall thickening and edema. Again, this is favored to reflect ischemic colitis/enteritis. No pneumatosis or free air is seen. There is some free fluid which is noted within the abdomen and pelvis.  Radiation dose reduction techniques were utilized, including automated exposure control and exposure modulation based on body size.   This report was finalized on 4/12/2025 5:25 AM by Dr. Jennifer Jo M.D on Workstation: [id number]      CT Angiogram Abdomen Pelvis  Addendum Date: 4/11/2025  ADDENDUM: 04 11 25 05:21 Call Doctor Regarding Above results, called NP Karlene Eldridge  on 04 11 05:21 (-04:00)     Result Date: 4/11/2025  Communications:  Call Doctor Above results Electronically signed by Jose Albarran MD on 04-11-25 at 0511    CT Angiogram Abdomen Pelvis  Result Date: 4/8/2025  Impression: 1.Complete occlusion of the left superficial femoral artery. 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 4.Areas of hypoenhancement noted within the spleen, most significantly anteriorly, concerning for splenic infarcts. 5.Hypoenhancement of the inferior aspect of the left kidney, consistent with renal infarct. 6.Complete occlusion of the ONEIDA with reconstitution distally. 7.Multiple prominent fluid-filled loops of small bowel noted throughout the abdomen. No significant distention to suggest bowel obstruction. 8.Scattered pulmonary nodules, the largest of which measures 0.6 cm. Multiple indeterminate pulmonary nodules. The largest one is solid and measures 6 mm. For multiple nodules, with the most suspicious nodule being solid and measuring 6-8 mm, recommend CT at 3-6 months. Then, for a low-risk patient, consider CT at 18-24 months. For a high-risk patient, if the nodule is stable at 3-6  months, recommend CT at 18-24 months. Follow-up intervals may vary according to size and risk. Electronically Signed: Julian Rosenthal DO  4/8/2025 7:11 PM EDT  Workstation ID: [id number]    CT Angiogram Chest  Result Date: 4/8/2025  Impression: 1.Complete occlusion of the left superficial femoral artery. 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 4.Areas of hypoenhancement noted within the spleen, most significantly anteriorly, concerning for splenic infarcts. 5.Hypoenhancement of the inferior aspect of the left kidney, consistent with renal infarct. 6.Complete occlusion of the ONEIDA with reconstitution distally. 7.Multiple prominent fluid-filled loops of small bowel noted throughout the abdomen. No significant distention to suggest bowel obstruction. 8.Scattered pulmonary nodules, the largest of which measures 0.6 cm. Multiple indeterminate pulmonary nodules. The largest one is solid and measures 6 mm. For multiple nodules, with the most suspicious nodule being solid and measuring 6-8 mm, recommend CT at 3-6 months. Then, for a low-risk patient, consider CT at 18-24 months. For a high-risk patient, if the nodule is stable at 3-6 months, recommend CT at 18-24 months. Follow-up intervals may vary according to size and risk. Electronically Signed: Julian Rosenthal DO  4/8/2025 7:11 PM EDT  Workstation ID: [id number]          Current Outpatient Medications on File Prior to Visit   Medication Sig Dispense Refill    acetaminophen (TYLENOL) 325 MG tablet Take 2 tablets by mouth Every 4 (Four) Hours As Needed for Mild Pain or Fever (temperature greater than 101F). 90 tablet 1    albuterol (PROVENTIL) (2.5 MG/3ML) 0.083% nebulizer solution Take 2.5 mg by nebulization Every 6 (Six) Hours As Needed for Wheezing or Shortness of Air. 360 mL 1    atorvastatin (LIPITOR) 40 MG tablet Take 1 tablet by mouth Every Night for 90 days. 90 tablet 0     busPIRone (BUSPAR) 5 MG tablet Take 1 tablet by mouth 3 (Three) Times a Day As Needed (anxiety). 60 tablet 0    cholecalciferol (VITAMIN D3) 1.25 MG (02628 UT) capsule Take 1 capsule by mouth Every 7 (Seven) Days. 8 capsule 1    clopidogrel (PLAVIX) 75 MG tablet Take 1 tablet by mouth Daily for 90 days. 90 tablet 1    dicyclomine (BENTYL) 10 MG capsule Take 1 capsule by mouth 4 (Four) Times a Day As Needed for Abdominal Cramping. 120 capsule 1    Fluticasone-Umeclidin-Vilant (Trelegy Ellipta) 100-62.5-25 MCG/ACT inhaler Inhale 1 puff Daily.      lisinopril (PRINIVIL,ZESTRIL) 10 MG tablet Take 1 tablet by mouth Daily. 30 tablet 1    metFORMIN (GLUCOPHAGE) 500 MG tablet Take 1 tablet by mouth 2 (Two) Times a Day With Meals. 180 tablet 1    metoprolol succinate XL (Toprol XL) 50 MG 24 hr tablet Take 1 tablet by mouth Daily for 90 days. 90 tablet 1    simethicone (MYLICON) 80 MG chewable tablet Chew 1 tablet 4 (Four) Times a Day As Needed for Flatulence. 120 tablet 1    spironolactone (ALDACTONE) 25 MG tablet Take 1 tablet by mouth Daily. 90 tablet 1    Vortioxetine HBr (Trintellix) 10 MG tablet tablet Take 1 tablet by mouth Daily With Breakfast. 30 tablet 1    [DISCONTINUED] apixaban (ELIQUIS) 5 MG tablet tablet Take 1 tablet by mouth Every 12 (Twelve) Hours for 30 days. Indications: Other - full anticoagulation 60 tablet 0    [DISCONTINUED] HYDROcodone-acetaminophen (NORCO)  MG per tablet Take 1 tablet by mouth Every 6 (Six) Hours As Needed for Moderate Pain. (Patient not taking: Reported on 7/3/2025)       No current facility-administered medications on file prior to visit.      XR HIP W OR WO PELVIS 2-3 VIEW LEFT     Date of Exam: 3/19/2025 10:17 AM EDT     Indication: To r/o fracture     Comparison: 3/17/2025     Findings:  There is osteopenia. There is marked joint space narrowing in both the right and left hip. There is subchondral sclerosis and cyst formation bilaterally, worse on the right than the  left. There are no acute fractures identified.     IMPRESSION:  Impression:  1.No evidence of acute fracture or dislocation.  2.Advanced degenerative changes in both hips.        Assessment and Plan  Diagnoses and all orders for this visit:    1. Bilateral hip pain (Primary)  -     Ambulatory Referral to Orthopedic Surgery  -     lidocaine (LIDODERM) 5 %; Place 2 patches on the skin as directed by provider Daily. Remove & Discard patch within 12 hours or as directed by MD  Dispense: 60 patch; Refill: 1    2. Arthritis of both hips  -     Ambulatory Referral to Orthopedic Surgery  -     lidocaine (LIDODERM) 5 %; Place 2 patches on the skin as directed by provider Daily. Remove & Discard patch within 12 hours or as directed by MD  Dispense: 60 patch; Refill: 1    3. Subacute cough  -     Chlorpheniramine-DM 2-15 MG/5ML liquid; Take 5 mL by mouth Every 6 (Six) Hours As Needed (cough and congestion).  Dispense: 118 mL; Refill: 1    4. Rhinorrhea  -     Chlorpheniramine-DM 2-15 MG/5ML liquid; Take 5 mL by mouth Every 6 (Six) Hours As Needed (cough and congestion).  Dispense: 118 mL; Refill: 1    Other orders  -     apixaban (ELIQUIS) 5 MG tablet tablet; Take 1 tablet by mouth Every 12 (Twelve) Hours. Indications: Other - full anticoagulation  Dispense: 60 tablet; Refill: 3        Assessment & Plan  1. Hip pain.  - Reports significant hip pain, exacerbated since recent hospitalizations and procedures.  - Advanced arthritis in both hips noted.  - Lidocaine patches prescribed for application on the hip and lower back, with a maximum of two patches at a time.  - Referral to Dr. Yanez for further evaluation and potential hip replacement surgery made.    2. Cough.  - Reports a persistent cough that worsens at night.  - Lungs are clear with no wheezing.  - Prescription for cough syrup sent to pharmacy to help manage symptoms.  - Advised to increase the use of Trilogy to daily until the cough improves.    3. Blood  clots.  - History of multiple blood clots, including those removed from the heart and legs.  - Currently on Eliquis 5 mg twice daily.  - Advised to continue this medication regimen as prescribed.  - Follow-up appointment with Dr. Deleon scheduled to ensure continuity of care.    4. Diabetes mellitus.  - Last A1c was 9.4, indicating poorly controlled diabetes.  - Not currently on insulin but received insulin in the hospital for elevated blood glucose levels.  - Advised to monitor blood sugar levels closely.  - Follow-up with primary care provider for potential medication adjustments recommended.      Follow Up   Return in about 2 weeks (around 7/21/2025) for follow up with Dr. Deleon.  Patient was given instructions and counseling regarding her condition or for health maintenance advice. Please see specific information pulled into the AVS if appropriate.       Part of this note may be electronic transcription/translation of spoken language to printed text using the Dragon dictation system    Patient or patient representative verbalized consent for the use of Ambient Listening during the visit with  ALVIN Maurer for chart documentation. 7/7/2025  16:56 EDT

## 2025-07-08 ENCOUNTER — OFFICE VISIT (OUTPATIENT)
Dept: WOUND CARE | Facility: HOSPITAL | Age: 65
End: 2025-07-08
Payer: MEDICARE

## 2025-07-08 PROCEDURE — 17250 CHEM CAUT OF GRANLTJ TISSUE: CPT

## 2025-07-09 ENCOUNTER — TELEPHONE (OUTPATIENT)
Dept: FAMILY MEDICINE CLINIC | Facility: CLINIC | Age: 65
End: 2025-07-09

## 2025-07-09 ENCOUNTER — PRIOR AUTHORIZATION (OUTPATIENT)
Dept: FAMILY MEDICINE CLINIC | Facility: CLINIC | Age: 65
End: 2025-07-09
Payer: MEDICARE

## 2025-07-09 NOTE — TELEPHONE ENCOUNTER
Prior Authorization for Lidocaine Patches [place] APPROVED     Approved today by [facility] 2017  PA Case: [id number], Status: Approved, Coverage Starts on: 1/1/2025 12:00:00 AM, Coverage Ends on: 12/31/2025 12:00:00 AM. Questions? Contact [phone].  Effective Date: 1/1/2025  Authorization Expiration Date: 12/31/2025

## 2025-07-15 ENCOUNTER — PATIENT OUTREACH (OUTPATIENT)
Dept: CASE MANAGEMENT | Facility: OTHER | Age: 65
End: 2025-07-15
Payer: MEDICARE

## 2025-07-15 DIAGNOSIS — E11.69 TYPE 2 DIABETES MELLITUS WITH OTHER SPECIFIED COMPLICATION, WITHOUT LONG-TERM CURRENT USE OF INSULIN: ICD-10-CM

## 2025-07-15 DIAGNOSIS — I25.10 CORONARY ARTERY DISEASE INVOLVING NATIVE CORONARY ARTERY OF NATIVE HEART WITHOUT ANGINA PECTORIS: ICD-10-CM

## 2025-07-15 DIAGNOSIS — F41.8 DEPRESSION WITH ANXIETY: Primary | ICD-10-CM

## 2025-07-15 NOTE — OUTREACH NOTE
AMBULATORY CASE MANAGEMENT NOTE    Names and Relationships of Patient/Support Persons: Contact: Desiree Garcia; Relationship: Self -     CCM Interim Update  Patient stated she is home and her wound is just about healed. Patient stated she is having pain in her hip and only has tylenol. Stated she wants pain meds from MD and voiced frustration of not being provided with them. RN educated patient on chronic pain and management through a pain clinic. Patient stated she is willing to go to pain clinic and her son will take her. RN notified will see about getting orders for patient to get eval.    Care Coordination  RN reached out to Dr Kaba and Chayo with update. Requested permission for orders for pain management for patient. Notified patient wants to discuss pain management with Dr Kaba at appt next week. Waiting for response.     Education Documentation  No documentation found.        Marisol BARRON  Ambulatory Case Management    7/15/2025, 13:17 EDT

## 2025-07-17 ENCOUNTER — TELEPHONE (OUTPATIENT)
Dept: FAMILY MEDICINE CLINIC | Facility: CLINIC | Age: 65
End: 2025-07-17

## 2025-07-17 NOTE — ASSESSMENT & PLAN NOTE
Blood pressure on the higher side in the office today.  Previously better controlled.  She reports significant earache at this time.  We will continue lisinopril and Coreg at the current dose.  Counseled regarding low-sodium diet.  
Lipid control uncertain.  Continue atorvastatin 40 mg nightly.  We will repeat the lipid panel now and adjust the dose of the medication if needed.  
She is currently stable with no angina-like symptoms.  LV systolic function is preserved.  Compliant with medications.  Continue aspirin, Plavix, carvedilol and atorvastatin at the current dose.  
none

## 2025-07-21 ENCOUNTER — OFFICE VISIT (OUTPATIENT)
Dept: FAMILY MEDICINE CLINIC | Facility: CLINIC | Age: 65
End: 2025-07-21
Payer: MEDICARE

## 2025-07-21 VITALS
WEIGHT: 150.3 LBS | HEART RATE: 78 BPM | OXYGEN SATURATION: 96 % | TEMPERATURE: 99.4 F | SYSTOLIC BLOOD PRESSURE: 112 MMHG | BODY MASS INDEX: 24.15 KG/M2 | DIASTOLIC BLOOD PRESSURE: 68 MMHG | HEIGHT: 66 IN

## 2025-07-21 DIAGNOSIS — E11.65 TYPE 2 DIABETES MELLITUS WITH HYPERGLYCEMIA, WITH LONG-TERM CURRENT USE OF INSULIN: ICD-10-CM

## 2025-07-21 DIAGNOSIS — M25.551 BILATERAL HIP PAIN: Primary | ICD-10-CM

## 2025-07-21 DIAGNOSIS — R79.89 ABNORMAL CBC: ICD-10-CM

## 2025-07-21 DIAGNOSIS — G62.9 NEUROPATHY: ICD-10-CM

## 2025-07-21 DIAGNOSIS — M25.552 BILATERAL HIP PAIN: Primary | ICD-10-CM

## 2025-07-21 DIAGNOSIS — Z79.4 TYPE 2 DIABETES MELLITUS WITH HYPERGLYCEMIA, WITH LONG-TERM CURRENT USE OF INSULIN: ICD-10-CM

## 2025-07-21 DIAGNOSIS — M16.0 ARTHRITIS OF BOTH HIPS: ICD-10-CM

## 2025-07-21 LAB
ALBUMIN SERPL-MCNC: 3.6 G/DL (ref 3.5–5.2)
ALBUMIN/GLOB SERPL: 1 G/DL
ALP SERPL-CCNC: 109 U/L (ref 39–117)
ALT SERPL W P-5'-P-CCNC: 9 U/L (ref 1–33)
ANION GAP SERPL CALCULATED.3IONS-SCNC: 9 MMOL/L (ref 5–15)
AST SERPL-CCNC: 17 U/L (ref 1–32)
BASOPHILS # BLD MANUAL: 0.15 10*3/MM3 (ref 0–0.2)
BASOPHILS NFR BLD MANUAL: 1 % (ref 0–1.5)
BILIRUB SERPL-MCNC: 0.3 MG/DL (ref 0–1.2)
BUN SERPL-MCNC: 9 MG/DL (ref 8–23)
BUN/CREAT SERPL: 10.2 (ref 7–25)
CALCIUM SPEC-SCNC: 9.3 MG/DL (ref 8.6–10.5)
CHLORIDE SERPL-SCNC: 102 MMOL/L (ref 98–107)
CO2 SERPL-SCNC: 27 MMOL/L (ref 22–29)
CREAT SERPL-MCNC: 0.88 MG/DL (ref 0.57–1)
DEPRECATED RDW RBC AUTO: 45.1 FL (ref 37–54)
EGFRCR SERPLBLD CKD-EPI 2021: 73.5 ML/MIN/1.73
EOSINOPHIL # BLD MANUAL: 0 10*3/MM3 (ref 0–0.4)
EOSINOPHIL NFR BLD MANUAL: 0 % (ref 0.3–6.2)
ERYTHROCYTE [DISTWIDTH] IN BLOOD BY AUTOMATED COUNT: 15.1 % (ref 12.3–15.4)
GLOBULIN UR ELPH-MCNC: 3.7 GM/DL
GLUCOSE SERPL-MCNC: 133 MG/DL (ref 65–99)
HBA1C MFR BLD: 7.1 % (ref 4.8–5.6)
HCT VFR BLD AUTO: 39.1 % (ref 34–46.6)
HGB BLD-MCNC: 11.4 G/DL (ref 12–15.9)
IRON 24H UR-MRATE: 28 MCG/DL (ref 37–145)
IRON SATN MFR SERPL: 10 % (ref 20–50)
LYMPHOCYTES # BLD MANUAL: 5.1 10*3/MM3 (ref 0.7–3.1)
LYMPHOCYTES NFR BLD MANUAL: 6 % (ref 5–12)
MAGNESIUM SERPL-MCNC: 2.1 MG/DL (ref 1.6–2.4)
MCH RBC QN AUTO: 23.8 PG (ref 26.6–33)
MCHC RBC AUTO-ENTMCNC: 29.2 G/DL (ref 31.5–35.7)
MCV RBC AUTO: 81.5 FL (ref 79–97)
METAMYELOCYTES NFR BLD MANUAL: 1 % (ref 0–0)
MONOCYTES # BLD: 0.87 10*3/MM3 (ref 0.1–0.9)
NEUTROPHILS # BLD AUTO: 8.3 10*3/MM3 (ref 1.7–7)
NEUTROPHILS NFR BLD MANUAL: 57 % (ref 42.7–76)
NRBC BLD AUTO-RTO: 0 /100 WBC (ref 0–0.2)
PLAT MORPH BLD: NORMAL
PLATELET # BLD AUTO: 356 10*3/MM3 (ref 140–450)
PMV BLD AUTO: 10.6 FL (ref 6–12)
POIKILOCYTOSIS BLD QL SMEAR: ABNORMAL
POTASSIUM SERPL-SCNC: 3.6 MMOL/L (ref 3.5–5.2)
PROT SERPL-MCNC: 7.3 G/DL (ref 6–8.5)
RBC # BLD AUTO: 4.8 10*6/MM3 (ref 3.77–5.28)
SODIUM SERPL-SCNC: 138 MMOL/L (ref 136–145)
TIBC SERPL-MCNC: 291 MCG/DL (ref 298–536)
TRANSFERRIN SERPL-MCNC: 195 MG/DL (ref 200–360)
TSH SERPL DL<=0.05 MIU/L-ACNC: 2.9 UIU/ML (ref 0.27–4.2)
VARIANT LYMPHS NFR BLD MANUAL: 35 % (ref 19.6–45.3)
WBC MORPH BLD: NORMAL
WBC NRBC COR # BLD AUTO: 14.57 10*3/MM3 (ref 3.4–10.8)

## 2025-07-21 PROCEDURE — 83735 ASSAY OF MAGNESIUM: CPT | Performed by: FAMILY MEDICINE

## 2025-07-21 PROCEDURE — 80053 COMPREHEN METABOLIC PANEL: CPT | Performed by: FAMILY MEDICINE

## 2025-07-21 PROCEDURE — 83540 ASSAY OF IRON: CPT | Performed by: FAMILY MEDICINE

## 2025-07-21 PROCEDURE — 84443 ASSAY THYROID STIM HORMONE: CPT | Performed by: FAMILY MEDICINE

## 2025-07-21 PROCEDURE — 85025 COMPLETE CBC W/AUTO DIFF WBC: CPT | Performed by: FAMILY MEDICINE

## 2025-07-21 PROCEDURE — 85007 BL SMEAR W/DIFF WBC COUNT: CPT | Performed by: FAMILY MEDICINE

## 2025-07-21 PROCEDURE — 83036 HEMOGLOBIN GLYCOSYLATED A1C: CPT | Performed by: FAMILY MEDICINE

## 2025-07-21 PROCEDURE — 84466 ASSAY OF TRANSFERRIN: CPT | Performed by: FAMILY MEDICINE

## 2025-07-21 RX ORDER — LIDOCAINE 50 MG/G
2 PATCH TOPICAL EVERY 24 HOURS
Qty: 60 PATCH | Refills: 1 | Status: SHIPPED | OUTPATIENT
Start: 2025-07-21

## 2025-07-21 RX ORDER — HYDROCODONE BITARTRATE AND ACETAMINOPHEN 10; 325 MG/1; MG/1
1 TABLET ORAL EVERY 6 HOURS PRN
Qty: 21 TABLET | Refills: 0 | Status: SHIPPED | OUTPATIENT
Start: 2025-07-21 | End: 2025-07-28

## 2025-07-21 NOTE — PROGRESS NOTES
Chief Complaint  Follow-up    Subjective      Desiree Garcia presents to [facility]  History of Present Illness      Pt is following up from having 2 blood clots removed in her left leg and the post surgical wound got infected because she said the skilled nursing place she was at did not know she was supposed to get antibiotics.  Pt had a wound vac for a period of time.  She is now outpatient and is getting sick taking some of the pills she is on and her blood sugars are staying higher.     Pt is living with her son and is on food stamps.       The patient presents for evaluation of pain management.    She reports that her pain patches have not been provided to her. She is currently on hydrocodone for pain management.        Medical History: has a past medical history of Arthritis, Cervical cancer screening, COPD (chronic obstructive pulmonary disease), Coronary artery disease involving native heart without angina pectoris (2021), Depression with anxiety, Diabetes mellitus, Forgetfulness, Gastric reflux, Hypertension, Leg paresthesia (2017), Limb swelling, Lumbago (2017), Lumbar spinal stenosis (2017), Lumbosacral radiculopathy (2017), Migraines, Night sweat, Reflux esophagitis, Shortness of breath, ST elevation myocardial infarction (STEMI) (CMS/[facility]) (2021), and Stomach disorder.   Surgical History: has a past surgical history that includes Abdominal surgery; Cardiac catheterization;  section; Cholecystectomy; Colonoscopy (); Esophagogastroduodenoscopy; Foot surgery (Right); Hand surgery; Cardiac catheterization (N/A, 2021); Coronary stent placement; Hysterectomy (); Colonoscopy (N/A, 3/25/2025); Cardiac catheterization (N/A, 2025); Embolectomy (Left, 2025); THORACOSCOPY WITH BIOPSY (Left, 2025); incision and drainage leg (Left, 2025); and incision and drainage leg (Left, 2025).   Family History:  "family history includes Arthritis in her brother, father, mother, and sister; Breast cancer in her sister; Diabetes in her brother and father; Heart attack in her father; Heart disease in her brother and father; Lung cancer in her mother; Stroke in her brother, father, mother, and sister.   Social History: reports that she has been smoking cigarettes. She started smoking about 58 years ago. She has a 58.5 pack-year smoking history. She has been exposed to tobacco smoke. She has never used smokeless tobacco. She reports that she does not currently use drugs after having used the following drugs: Methamphetamines. She reports that she does not drink alcohol.  Immunization History   Administered Date(s) Administered    Influenza, Unspecified 10/09/2020    Tdap 11/10/2022       Objective   Vital Signs:  /68   Pulse 78   Temp 99.4 °F (37.4 °C) (Oral)   Ht 167.6 cm (66\")   Wt 68.2 kg (150 lb 4.8 oz)   SpO2 96%   BMI 24.26 kg/m²   Estimated body mass index is 24.26 kg/m² as calculated from the following:    Height as of this encounter: 167.6 cm (66\").    Weight as of this encounter: 68.2 kg (150 lb 4.8 oz).     BMI is within normal parameters. No other follow-up for BMI required.      ROS:  Review of Systems   Constitutional:  Negative for fatigue and fever.   HENT:  Negative for congestion, ear pain and sinus pressure.    Respiratory:  Negative for cough, chest tightness and shortness of breath.    Cardiovascular:  Negative for chest pain, palpitations and leg swelling.   Gastrointestinal:  Negative for abdominal pain and diarrhea.   Genitourinary:  Negative for dysuria and frequency.   Neurological:  Negative for speech difficulty, headache and confusion.   Psychiatric/Behavioral:  Negative for agitation and behavioral problems.       Physical Exam  Vitals reviewed.   Constitutional:       Appearance: Normal appearance.   HENT:      Right Ear: Tympanic membrane normal.      Left Ear: Tympanic membrane normal. "      Nose: Nose normal.   Eyes:      Extraocular Movements: Extraocular movements intact.      Conjunctiva/sclera: Conjunctivae normal.      Pupils: Pupils are equal, round, and reactive to light.   Cardiovascular:      Rate and Rhythm: Normal rate and regular rhythm.   Pulmonary:      Effort: Pulmonary effort is normal.      Breath sounds: Normal breath sounds.   Abdominal:      General: Bowel sounds are normal.   Musculoskeletal:         General: Normal range of motion.      Cervical back: Normal range of motion.   Skin:     General: Skin is warm and dry.   Neurological:      General: No focal deficit present.      Mental Status: She is alert and oriented to person, place, and time.   Psychiatric:         Mood and Affect: Mood normal.         Behavior: Behavior normal.       Physical Exam        Result Review   The following data was reviewed by: Sandra Kaba MD on 07/21/2025:  Common labs          6/5/2025    09:34 6/5/2025    15:25 6/6/2025    03:31 7/21/2025    14:28   Common Labs   Glucose 134  99  112  133    BUN 7.0  7.0  7.0  9.0    Creatinine 0.64  0.72  0.72  0.88    Sodium 139  142  143  138    Potassium 3.8  3.4  3.8  3.6    Chloride 102  103  106  102    Calcium 9.1  9.2  8.4  9.3    Albumin 3.4  3.5   3.6    Total Bilirubin 0.4  0.4   0.3    Alkaline Phosphatase 103  107   109    AST (SGOT) 13  11   17    ALT (SGPT) 6  6   9    WBC 9.05  10.79  12.23  14.57    Hemoglobin 9.7  9.6  8.9  11.4    Hematocrit 31.5  32.5  29.3  39.1    Platelets 413  424  383  356    Hemoglobin A1C    7.10      Results               Assessment and Plan     Diagnoses and all orders for this visit:    1. Bilateral hip pain (Primary)  -     HYDROcodone-acetaminophen (NORCO)  MG per tablet; Take 1 tablet by mouth Every 6 (Six) Hours As Needed for Moderate Pain for up to 7 days.  Dispense: 21 tablet; Refill: 0  -     lidocaine (LIDODERM) 5 %; Place 2 patches on the skin as directed by provider Daily. Remove & Discard  patch within 12 hours or as directed by MD  Dispense: 60 patch; Refill: 1    2. Arthritis of both hips  -     lidocaine (LIDODERM) 5 %; Place 2 patches on the skin as directed by provider Daily. Remove & Discard patch within 12 hours or as directed by MD  Dispense: 60 patch; Refill: 1    3. Type 2 diabetes mellitus with hyperglycemia, with long-term current use of insulin  -     Insulin Glargine (LANTUS SOLOSTAR) 100 UNIT/ML injection pen; Inject 10 Units under the skin into the appropriate area as directed Daily for 30 days.  Dispense: 3 mL; Refill: 0  -     Comprehensive Metabolic Panel  -     Hemoglobin A1c  -     TSH    4. Abnormal CBC  -     CBC Auto Differential  -     Iron Profile w/o Ferritin  -     Manual Differential    5. Neuropathy  -     Magnesium      Assessment & Plan  1. Pain management.  She reports that she has not been receiving her pain patches. She is currently on hydrocodone for pain management.       I spent 35 minutes caring for Desiree on this date of service. This time includes time spent by me in the following activities:reviewing tests  Follow Up     Return in about 3 months (around 10/21/2025).  Patient was given instructions and counseling regarding her condition or for health maintenance advice. Please see specific information pulled into the AVS if appropriate.   Patient or patient representative verbalized consent for the use of Ambient Listening during the visit with  Sandra Kaba MD for chart documentation. 7/30/2025  17:52 EDT    Sandra Kaba MD

## 2025-08-06 ENCOUNTER — OFFICE VISIT (OUTPATIENT)
Dept: CARDIOLOGY | Facility: CLINIC | Age: 65
End: 2025-08-06
Payer: MEDICARE

## 2025-08-06 VITALS
BODY MASS INDEX: 24.11 KG/M2 | SYSTOLIC BLOOD PRESSURE: 100 MMHG | DIASTOLIC BLOOD PRESSURE: 66 MMHG | WEIGHT: 150 LBS | HEART RATE: 94 BPM | HEIGHT: 66 IN

## 2025-08-06 DIAGNOSIS — E78.2 MIXED HYPERLIPIDEMIA: ICD-10-CM

## 2025-08-06 DIAGNOSIS — I10 HYPERTENSION, ESSENTIAL: ICD-10-CM

## 2025-08-06 DIAGNOSIS — I50.32 CHRONIC HEART FAILURE WITH PRESERVED EJECTION FRACTION (HFPEF): Primary | ICD-10-CM

## 2025-08-06 DIAGNOSIS — I25.10 CORONARY ARTERY DISEASE INVOLVING NATIVE CORONARY ARTERY OF NATIVE HEART WITHOUT ANGINA PECTORIS: ICD-10-CM

## 2025-08-08 ENCOUNTER — PATIENT OUTREACH (OUTPATIENT)
Dept: CASE MANAGEMENT | Facility: OTHER | Age: 65
End: 2025-08-08
Payer: MEDICARE

## 2025-08-21 DIAGNOSIS — E11.65 TYPE 2 DIABETES MELLITUS WITH HYPERGLYCEMIA, WITH LONG-TERM CURRENT USE OF INSULIN: ICD-10-CM

## 2025-08-21 DIAGNOSIS — Z79.4 TYPE 2 DIABETES MELLITUS WITH HYPERGLYCEMIA, WITH LONG-TERM CURRENT USE OF INSULIN: ICD-10-CM

## (undated) DEVICE — CATH F4 INF JL 3.5 100CM: Brand: INFINITI

## (undated) DEVICE — VESSEL LOOPS X-RAY DETECTABLE: Brand: DEROYAL

## (undated) DEVICE — SUT PROLN 5/0 RB1 D/A 36IN 8556H

## (undated) DEVICE — SYS PERFUS SEP PLATLT W TIPS CUST

## (undated) DEVICE — HEARTRAIL III GUIDING CATHETER: Brand: HEARTRAIL

## (undated) DEVICE — RADIFOCUS GLIDEWIRE: Brand: GLIDEWIRE

## (undated) DEVICE — ST ACC MICROPUNCTURE STFF .018 ECHO/PLAT/TP 4F/10CM 21G/7CM

## (undated) DEVICE — COPILOT BLEEDBACK CONTROL VALVE: Brand: COPILOT

## (undated) DEVICE — SUT SILK 4/0 TIES 18IN A183H

## (undated) DEVICE — SUT SILK 2/0 SH CR8 18IN CR8 C012D

## (undated) DEVICE — CATH TEMPO 5F BER II 65CM: Brand: TEMPO

## (undated) DEVICE — SNAR POLYP CAPTIFLEX XS/OVL 11X2.4MM 240CM 1P/U

## (undated) DEVICE — UNDERGLV SURG BIOGEL INDICAT PI SZ8.5 BLU

## (undated) DEVICE — TR BAND RADIAL ARTERY COMPRESSION DEVICE: Brand: TR BAND

## (undated) DEVICE — ANTIBACTERIAL UNDYED BRAIDED (POLYGLACTIN 910), SYNTHETIC ABSORBABLE SUTURE: Brand: COATED VICRYL

## (undated) DEVICE — DEFENDO AIR WATER SUCTION AND BIOPSY VALVE KIT FOR  OLYMPUS: Brand: DEFENDO AIR/WATER/SUCTION AND BIOPSY VALVE

## (undated) DEVICE — 6F .070 JR4 ECO PK: Brand: VISTA BRITE TIP

## (undated) DEVICE — SOL NACL 0.9PCT 1000ML

## (undated) DEVICE — Device

## (undated) DEVICE — EXTRCT STPL SKIN STRL BX/12

## (undated) DEVICE — SYR LL TP 10ML STRL

## (undated) DEVICE — CATH F6 ST JR 4 100CM: Brand: SUPERTORQUE

## (undated) DEVICE — HI-TORQUE BALANCE MIDDLEWEIGHT UNIVERSAL GUIDE WIRE .014 STRAIGHT TIP 3.0 CM X 190 CM: Brand: HI-TORQUE BALANCE MIDDLEWEIGHT UNIVERSAL

## (undated) DEVICE — NC TREK NEO™ CORONARY DILATATION CATHETER 2.00 MM X 12 MM / RAPID-EXCHANGE: Brand: NC TREK NEO™

## (undated) DEVICE — SOL NS 500ML

## (undated) DEVICE — NC TREK NEO™ CORONARY DILATATION CATHETER 2.50 X 12 MM / RAPID-EXCHANGE: Brand: NC TREK NEO™

## (undated) DEVICE — NC TREK CORONARY DILATATION CATHETER 2.5 MM X 15 MM / RAPID-EXCHANGE: Brand: NC TREK

## (undated) DEVICE — LINER SURG CANSTR SXN S/RIGD 1500CC

## (undated) DEVICE — SUREFIT, DUAL DISPERSIVE ELECTRODE, CONTACT QUALITY MONITOR: Brand: SUREFIT

## (undated) DEVICE — GUIDELINER CATHETERS ARE INTENDED TO BE USED IN CONJUNCTION WITH GUIDE CATHETERS TO ACCESS DISCRETE REGIONS OF THE CORONARY AND/OR PERIPHERAL VASCULATURE, AND TO FACILITATE PLACEMENT OF INTERVENTIONAL DEVICES.: Brand: GUIDELINER® V3 CATHETER

## (undated) DEVICE — 4F 80 CM LEMAITRE EMBOLECTOMY CATHETER, EIFU: Brand: LEMAITRE EMBOLECTOMY CATHETER

## (undated) DEVICE — SYR LUERLOK 5CC

## (undated) DEVICE — SINGLE-USE BIOPSY FORCEPS: Brand: RADIAL JAW 4

## (undated) DEVICE — SYR LUERLOK 20CC BX/50

## (undated) DEVICE — GLV SURG BIOGEL LTX PF 7 1/2

## (undated) DEVICE — CATH IMG IVUS EAGLE EYE PLATIN RX DIGITAL .014IN 5FR

## (undated) DEVICE — RADIFOCUS GLIDEWIRE ADVANTAGE GUIDEWIRE: Brand: GLIDEWIRE ADVANTAGE

## (undated) DEVICE — 3F 80 CM LEMAITRE EMBOLECTOMY CATHETER, EIFU: Brand: LEMAITRE EMBOLECTOMY CATHETER

## (undated) DEVICE — CONN JET HYDRA H20 AUXILIARY DISP

## (undated) DEVICE — SYR LUERLOK 30CC

## (undated) DEVICE — DEV INFL BASIXCOMPAK W/INTRO/20G

## (undated) DEVICE — THE SINGLE USE ETRAP – POLYP TRAP IS USED FOR SUCTION RETRIEVAL OF ENDOSCOPICALLY REMOVED POLYPS.: Brand: ETRAP

## (undated) DEVICE — GW INQWIRE FC PTFE STD J/1.5 .035 260

## (undated) DEVICE — CVR PROB 96IN LF STRL

## (undated) DEVICE — KT CATH INDIGO RX ASP 140CM

## (undated) DEVICE — PK ANGIO 40

## (undated) DEVICE — SOL IRRG H2O PL/BG 1000ML STRL

## (undated) DEVICE — GW FC FLOP/TP .035 260CM 3MM

## (undated) DEVICE — NC TREK NEO™ CORONARY DILATATION CATHETER 3.00 MM X 15 MM / RAPID-EXCHANGE: Brand: NC TREK NEO™

## (undated) DEVICE — CATH F5INF TLPIGST145 110CM6SH: Brand: INFINITI

## (undated) DEVICE — SPONGE,LAP,18"X18",DLX,XR,ST,5/PK,40/PK: Brand: MEDLINE

## (undated) DEVICE — RADIFOCUS OPTITORQUE ANGIOGRAPHIC CATHETER: Brand: OPTITORQUE

## (undated) DEVICE — TRAP FLD MINIVAC MEGADYNE 100ML

## (undated) DEVICE — PTA BALLOON DILATATION CATHETER: Brand: MUSTANG™

## (undated) DEVICE — SYRINGE KIT,PACKAGED,,150FT,MK 7(ANGIO-ARTERION, 150ML SYR KIT W/QFT,MC)(60729385): Brand: MEDRAD® MARK 7 ARTERION DISPOSABLE SYRINGE 150 ML WITH QUICK FILL TUBE

## (undated) DEVICE — GOWN,SIRUS,POLYRNF,BRTHSLV,XLN/XXL,18/CS: Brand: MEDLINE

## (undated) DEVICE — GW RUNTHROUGH NS HYPERCOAT .014 3X180CM

## (undated) DEVICE — SOLIDIFIER LIQLOC PLS 1500CC BT

## (undated) DEVICE — PENCL SMOKE/EVAC MEGADYNE TELESCP 10FT

## (undated) DEVICE — 450 ML BOTTLE OF 0.05% CHLORHEXIDINE GLUCONATE IN 99.95% STERILE WATER FOR IRRIGATION, USP AND APPLICATOR.: Brand: IRRISEPT ANTIMICROBIAL WOUND LAVAGE

## (undated) DEVICE — SUT PROLN 6/0 BV1 D/A 30IN 8709H

## (undated) DEVICE — PK ATS CUST W CARDIOTOMY RESEVOIR

## (undated) DEVICE — PINNACLE R/O II INTRODUCER SHEATH WITH RADIOPAQUE MARKER: Brand: PINNACLE

## (undated) DEVICE — GLIDESHEATH SLENDER STAINLESS STEEL KIT: Brand: GLIDESHEATH SLENDER

## (undated) DEVICE — PK ORTHO MINOR 40

## (undated) DEVICE — NC TREK CORONARY DILATATION CATHETER 3.75 MM X 12 MM / RAPID-EXCHANGE: Brand: NC TREK

## (undated) DEVICE — NC TREK NEO™ CORONARY DILATATION CATHETER 3.00 MM X 12 MM / RAPID-EXCHANGE: Brand: NC TREK NEO™

## (undated) DEVICE — STPCK 3WY MARQUIS STD/PRESS SWIVELNUT LT BLU

## (undated) DEVICE — PK AAA 40

## (undated) DEVICE — PATIENT RETURN ELECTRODE, SINGLE-USE, CONTACT QUALITY MONITORING, ADULT, WITH 9FT CORD, FOR PATIENTS WEIGING OVER 33LBS. (15KG): Brand: MEGADYNE

## (undated) DEVICE — NC TREK NEO™ CORONARY DILATATION CATHETER 2.50 X 15 MM / RAPID-EXCHANGE: Brand: NC TREK NEO™

## (undated) DEVICE — GLV SURG BIOGEL LTX PF 8 1/2

## (undated) DEVICE — SUT SILK 3/0 TIES 18IN A184H

## (undated) DEVICE — INFLATION DEVICE: Brand: ENCORE™ 26

## (undated) DEVICE — SUT SILK 2/0 TIES 18IN A185H